# Patient Record
Sex: MALE | Race: BLACK OR AFRICAN AMERICAN | NOT HISPANIC OR LATINO
[De-identification: names, ages, dates, MRNs, and addresses within clinical notes are randomized per-mention and may not be internally consistent; named-entity substitution may affect disease eponyms.]

---

## 2022-09-09 ENCOUNTER — TRANSCRIPTION ENCOUNTER (OUTPATIENT)
Age: 57
End: 2022-09-09

## 2022-09-10 ENCOUNTER — RESULT REVIEW (OUTPATIENT)
Age: 57
End: 2022-09-10

## 2022-09-10 ENCOUNTER — INPATIENT (INPATIENT)
Facility: HOSPITAL | Age: 57
LOS: 13 days | Discharge: HOME HEALTH | End: 2022-09-24
Attending: SURGERY | Admitting: SURGERY
Payer: MEDICAID

## 2022-09-10 ENCOUNTER — TRANSCRIPTION ENCOUNTER (OUTPATIENT)
Age: 57
End: 2022-09-10

## 2022-09-10 VITALS
HEART RATE: 126 BPM | OXYGEN SATURATION: 90 % | RESPIRATION RATE: 32 BRPM | DIASTOLIC BLOOD PRESSURE: 33 MMHG | SYSTOLIC BLOOD PRESSURE: 109 MMHG

## 2022-09-10 LAB
ABO RH CONFIRMATION: SIGNIFICANT CHANGE UP
ALBUMIN SERPL ELPH-MCNC: 1.6 G/DL — LOW (ref 3.3–5)
ALBUMIN SERPL ELPH-MCNC: 2.5 G/DL — LOW (ref 3.3–5)
ALP SERPL-CCNC: 32 U/L — LOW (ref 40–120)
ALP SERPL-CCNC: 92 U/L — SIGNIFICANT CHANGE UP (ref 40–120)
ALT FLD-CCNC: 37 U/L — SIGNIFICANT CHANGE UP (ref 12–78)
ALT FLD-CCNC: 61 U/L — SIGNIFICANT CHANGE UP (ref 12–78)
ANION GAP SERPL CALC-SCNC: 10 MMOL/L — SIGNIFICANT CHANGE UP (ref 5–17)
ANION GAP SERPL CALC-SCNC: 20 MMOL/L — HIGH (ref 5–17)
ANION GAP SERPL CALC-SCNC: 9 MMOL/L — SIGNIFICANT CHANGE UP (ref 5–17)
APPEARANCE UR: ABNORMAL
APTT BLD: 45.1 SEC — HIGH (ref 27.5–35.5)
APTT BLD: 59.7 SEC — HIGH (ref 27.5–35.5)
AST SERPL-CCNC: 121 U/L — HIGH (ref 15–37)
AST SERPL-CCNC: 55 U/L — HIGH (ref 15–37)
BACTERIA # UR AUTO: ABNORMAL
BILIRUB SERPL-MCNC: 1.2 MG/DL — SIGNIFICANT CHANGE UP (ref 0.2–1.2)
BILIRUB SERPL-MCNC: 2.2 MG/DL — HIGH (ref 0.2–1.2)
BILIRUB UR-MCNC: ABNORMAL
BLD GP AB SCN SERPL QL: SIGNIFICANT CHANGE UP
BUN SERPL-MCNC: 29 MG/DL — HIGH (ref 7–23)
BUN SERPL-MCNC: 30 MG/DL — HIGH (ref 7–23)
BUN SERPL-MCNC: 35 MG/DL — HIGH (ref 7–23)
CALCIUM SERPL-MCNC: 8.7 MG/DL — SIGNIFICANT CHANGE UP (ref 8.5–10.1)
CALCIUM SERPL-MCNC: 8.7 MG/DL — SIGNIFICANT CHANGE UP (ref 8.5–10.1)
CALCIUM SERPL-MCNC: 9 MG/DL — SIGNIFICANT CHANGE UP (ref 8.5–10.1)
CHLORIDE SERPL-SCNC: 111 MMOL/L — HIGH (ref 96–108)
CHLORIDE SERPL-SCNC: 113 MMOL/L — HIGH (ref 96–108)
CHLORIDE SERPL-SCNC: 114 MMOL/L — HIGH (ref 96–108)
CK MB BLD-MCNC: 1.3 % — SIGNIFICANT CHANGE UP (ref 0–3.5)
CK MB CFR SERPL CALC: 20.8 NG/ML — HIGH (ref 0.5–3.6)
CK SERPL-CCNC: 1650 U/L — HIGH (ref 26–308)
CO2 SERPL-SCNC: 11 MMOL/L — LOW (ref 22–31)
CO2 SERPL-SCNC: 22 MMOL/L — SIGNIFICANT CHANGE UP (ref 22–31)
CO2 SERPL-SCNC: 24 MMOL/L — SIGNIFICANT CHANGE UP (ref 22–31)
COLOR SPEC: ABNORMAL
CREAT SERPL-MCNC: 1.06 MG/DL — SIGNIFICANT CHANGE UP (ref 0.5–1.3)
CREAT SERPL-MCNC: 1.18 MG/DL — SIGNIFICANT CHANGE UP (ref 0.5–1.3)
CREAT SERPL-MCNC: 2.33 MG/DL — HIGH (ref 0.5–1.3)
D DIMER BLD IA.RAPID-MCNC: 1822 NG/ML DDU — HIGH
DIFF PNL FLD: ABNORMAL
EGFR: 32 ML/MIN/1.73M2 — LOW
EGFR: 72 ML/MIN/1.73M2 — SIGNIFICANT CHANGE UP
EGFR: 82 ML/MIN/1.73M2 — SIGNIFICANT CHANGE UP
EPI CELLS # UR: SIGNIFICANT CHANGE UP
FIBRINOGEN PPP-MCNC: 351 MG/DL — SIGNIFICANT CHANGE UP (ref 340–550)
FLUAV AG NPH QL: SIGNIFICANT CHANGE UP
FLUBV AG NPH QL: SIGNIFICANT CHANGE UP
GAS PNL BLDA: SIGNIFICANT CHANGE UP
GLUCOSE BLDC GLUCOMTR-MCNC: 72 MG/DL — SIGNIFICANT CHANGE UP (ref 70–99)
GLUCOSE SERPL-MCNC: 100 MG/DL — HIGH (ref 70–99)
GLUCOSE SERPL-MCNC: 80 MG/DL — SIGNIFICANT CHANGE UP (ref 70–99)
GLUCOSE SERPL-MCNC: 97 MG/DL — SIGNIFICANT CHANGE UP (ref 70–99)
GLUCOSE UR QL: NEGATIVE MG/DL — SIGNIFICANT CHANGE UP
GRAN CASTS # UR COMP ASSIST: ABNORMAL /LPF
HCT VFR BLD CALC: 26.8 % — LOW (ref 39–50)
HCT VFR BLD CALC: 33.5 % — LOW (ref 39–50)
HCT VFR BLD CALC: 53.5 % — HIGH (ref 39–50)
HCT VFR BLD CALC: 62.3 % — CRITICAL HIGH (ref 39–50)
HGB BLD-MCNC: 11 G/DL — LOW (ref 13–17)
HGB BLD-MCNC: 16.3 G/DL — SIGNIFICANT CHANGE UP (ref 13–17)
HGB BLD-MCNC: 18.3 G/DL — HIGH (ref 13–17)
HGB BLD-MCNC: 8.4 G/DL — LOW (ref 13–17)
INR BLD: 1.93 RATIO — HIGH (ref 0.88–1.16)
INR BLD: 2.04 RATIO — HIGH (ref 0.88–1.16)
INR BLD: 2.23 RATIO — HIGH (ref 0.88–1.16)
KETONES UR-MCNC: ABNORMAL
LACTATE SERPL-SCNC: 11.9 MMOL/L — CRITICAL HIGH (ref 0.7–2)
LACTATE SERPL-SCNC: 5.5 MMOL/L — CRITICAL HIGH (ref 0.7–2)
LACTATE SERPL-SCNC: 5.5 MMOL/L — CRITICAL HIGH (ref 0.7–2)
LACTATE SERPL-SCNC: 7.7 MMOL/L — CRITICAL HIGH (ref 0.7–2)
LACTATE SERPL-SCNC: 8.5 MMOL/L — CRITICAL HIGH (ref 0.7–2)
LEUKOCYTE ESTERASE UR-ACNC: ABNORMAL
LIDOCAIN IGE QN: 70 U/L — LOW (ref 73–393)
MAGNESIUM SERPL-MCNC: 2.1 MG/DL — SIGNIFICANT CHANGE UP (ref 1.6–2.6)
MAGNESIUM SERPL-MCNC: 2.1 MG/DL — SIGNIFICANT CHANGE UP (ref 1.6–2.6)
MCHC RBC-ENTMCNC: 24.5 PG — LOW (ref 27–34)
MCHC RBC-ENTMCNC: 24.7 PG — LOW (ref 27–34)
MCHC RBC-ENTMCNC: 25.6 PG — LOW (ref 27–34)
MCHC RBC-ENTMCNC: 25.9 PG — LOW (ref 27–34)
MCHC RBC-ENTMCNC: 29.4 G/DL — LOW (ref 32–36)
MCHC RBC-ENTMCNC: 30.5 G/DL — LOW (ref 32–36)
MCHC RBC-ENTMCNC: 31.3 G/DL — LOW (ref 32–36)
MCHC RBC-ENTMCNC: 32.8 G/DL — SIGNIFICANT CHANGE UP (ref 32–36)
MCV RBC AUTO: 79 FL — LOW (ref 80–100)
MCV RBC AUTO: 81.2 FL — SIGNIFICANT CHANGE UP (ref 80–100)
MCV RBC AUTO: 81.7 FL — SIGNIFICANT CHANGE UP (ref 80–100)
MCV RBC AUTO: 83.5 FL — SIGNIFICANT CHANGE UP (ref 80–100)
NITRITE UR-MCNC: NEGATIVE — SIGNIFICANT CHANGE UP
NRBC # BLD: 0 /100 WBCS — SIGNIFICANT CHANGE UP (ref 0–0)
PH UR: 6.5 — SIGNIFICANT CHANGE UP (ref 5–8)
PHOSPHATE SERPL-MCNC: 4 MG/DL — SIGNIFICANT CHANGE UP (ref 2.5–4.5)
PHOSPHATE SERPL-MCNC: 4.1 MG/DL — SIGNIFICANT CHANGE UP (ref 2.5–4.5)
PLATELET # BLD AUTO: 227 K/UL — SIGNIFICANT CHANGE UP (ref 150–400)
PLATELET # BLD AUTO: 277 K/UL — SIGNIFICANT CHANGE UP (ref 150–400)
PLATELET # BLD AUTO: 613 K/UL — HIGH (ref 150–400)
PLATELET # BLD AUTO: 804 K/UL — HIGH (ref 150–400)
POTASSIUM SERPL-MCNC: 3.7 MMOL/L — SIGNIFICANT CHANGE UP (ref 3.5–5.3)
POTASSIUM SERPL-MCNC: 4.1 MMOL/L — SIGNIFICANT CHANGE UP (ref 3.5–5.3)
POTASSIUM SERPL-MCNC: 4.1 MMOL/L — SIGNIFICANT CHANGE UP (ref 3.5–5.3)
POTASSIUM SERPL-SCNC: 3.7 MMOL/L — SIGNIFICANT CHANGE UP (ref 3.5–5.3)
POTASSIUM SERPL-SCNC: 4.1 MMOL/L — SIGNIFICANT CHANGE UP (ref 3.5–5.3)
POTASSIUM SERPL-SCNC: 4.1 MMOL/L — SIGNIFICANT CHANGE UP (ref 3.5–5.3)
PROT SERPL-MCNC: 3 GM/DL — LOW (ref 6–8.3)
PROT SERPL-MCNC: 6.5 GM/DL — SIGNIFICANT CHANGE UP (ref 6–8.3)
PROT UR-MCNC: 100 MG/DL
PROTHROM AB SERPL-ACNC: 23.1 SEC — HIGH (ref 10.5–13.4)
PROTHROM AB SERPL-ACNC: 24.4 SEC — HIGH (ref 10.5–13.4)
PROTHROM AB SERPL-ACNC: 26.7 SEC — HIGH (ref 10.5–13.4)
RBC # BLD: 3.28 M/UL — LOW (ref 4.2–5.8)
RBC # BLD: 4.24 M/UL — SIGNIFICANT CHANGE UP (ref 4.2–5.8)
RBC # BLD: 6.59 M/UL — HIGH (ref 4.2–5.8)
RBC # BLD: 7.46 M/UL — HIGH (ref 4.2–5.8)
RBC # FLD: 15.2 % — HIGH (ref 10.3–14.5)
RBC # FLD: 15.4 % — HIGH (ref 10.3–14.5)
RBC # FLD: 17.4 % — HIGH (ref 10.3–14.5)
RBC # FLD: 17.7 % — HIGH (ref 10.3–14.5)
RBC CASTS # UR COMP ASSIST: ABNORMAL /HPF (ref 0–4)
SARS-COV-2 RNA SPEC QL NAA+PROBE: SIGNIFICANT CHANGE UP
SODIUM SERPL-SCNC: 142 MMOL/L — SIGNIFICANT CHANGE UP (ref 135–145)
SODIUM SERPL-SCNC: 145 MMOL/L — SIGNIFICANT CHANGE UP (ref 135–145)
SODIUM SERPL-SCNC: 147 MMOL/L — HIGH (ref 135–145)
SP GR SPEC: 1.02 — SIGNIFICANT CHANGE UP (ref 1.01–1.02)
TROPONIN I, HIGH SENSITIVITY RESULT: 181.1 NG/L — HIGH
TROPONIN I, HIGH SENSITIVITY RESULT: 60.3 NG/L — SIGNIFICANT CHANGE UP
UROBILINOGEN FLD QL: 12 MG/DL
WBC # BLD: 21.3 K/UL — HIGH (ref 3.8–10.5)
WBC # BLD: 30.46 K/UL — HIGH (ref 3.8–10.5)
WBC # BLD: 4.03 K/UL — SIGNIFICANT CHANGE UP (ref 3.8–10.5)
WBC # BLD: 7.74 K/UL — SIGNIFICANT CHANGE UP (ref 3.8–10.5)
WBC # FLD AUTO: 21.3 K/UL — HIGH (ref 3.8–10.5)
WBC # FLD AUTO: 30.46 K/UL — HIGH (ref 3.8–10.5)
WBC # FLD AUTO: 4.03 K/UL — SIGNIFICANT CHANGE UP (ref 3.8–10.5)
WBC # FLD AUTO: 7.74 K/UL — SIGNIFICANT CHANGE UP (ref 3.8–10.5)
WBC UR QL: SIGNIFICANT CHANGE UP

## 2022-09-10 PROCEDURE — 44150 REMOVAL OF COLON: CPT | Mod: 52

## 2022-09-10 PROCEDURE — 45121 REMOVAL OF RECTUM AND COLON: CPT | Mod: AS

## 2022-09-10 PROCEDURE — 36556 INSERT NON-TUNNEL CV CATH: CPT

## 2022-09-10 PROCEDURE — 74018 RADEX ABDOMEN 1 VIEW: CPT | Mod: 26,77

## 2022-09-10 PROCEDURE — 88307 TISSUE EXAM BY PATHOLOGIST: CPT | Mod: 26

## 2022-09-10 PROCEDURE — 74018 RADEX ABDOMEN 1 VIEW: CPT | Mod: 26

## 2022-09-10 PROCEDURE — 44139 MOBILIZATION OF COLON: CPT

## 2022-09-10 PROCEDURE — 93010 ELECTROCARDIOGRAM REPORT: CPT | Mod: 76

## 2022-09-10 PROCEDURE — 71045 X-RAY EXAM CHEST 1 VIEW: CPT | Mod: 26

## 2022-09-10 PROCEDURE — 99223 1ST HOSP IP/OBS HIGH 75: CPT | Mod: 57

## 2022-09-10 PROCEDURE — 71045 X-RAY EXAM CHEST 1 VIEW: CPT | Mod: 26,77

## 2022-09-10 PROCEDURE — 97606 NEG PRS WND THER DME>50 SQCM: CPT

## 2022-09-10 PROCEDURE — 99291 CRITICAL CARE FIRST HOUR: CPT | Mod: 25

## 2022-09-10 PROCEDURE — 99285 EMERGENCY DEPT VISIT HI MDM: CPT | Mod: 25

## 2022-09-10 PROCEDURE — 36620 INSERTION CATHETER ARTERY: CPT | Mod: 59

## 2022-09-10 PROCEDURE — 74177 CT ABD & PELVIS W/CONTRAST: CPT | Mod: 26,MA

## 2022-09-10 RX ORDER — PANTOPRAZOLE SODIUM 20 MG/1
40 TABLET, DELAYED RELEASE ORAL
Refills: 0 | Status: DISCONTINUED | OUTPATIENT
Start: 2022-09-10 | End: 2022-09-24

## 2022-09-10 RX ORDER — VANCOMYCIN HCL 1 G
1000 VIAL (EA) INTRAVENOUS ONCE
Refills: 0 | Status: COMPLETED | OUTPATIENT
Start: 2022-09-10 | End: 2022-09-10

## 2022-09-10 RX ORDER — CASPOFUNGIN ACETATE 7 MG/ML
70 INJECTION, POWDER, LYOPHILIZED, FOR SOLUTION INTRAVENOUS ONCE
Refills: 0 | Status: COMPLETED | OUTPATIENT
Start: 2022-09-10 | End: 2022-09-10

## 2022-09-10 RX ORDER — MEROPENEM 1 G/30ML
1000 INJECTION INTRAVENOUS ONCE
Refills: 0 | Status: COMPLETED | OUTPATIENT
Start: 2022-09-10 | End: 2022-09-10

## 2022-09-10 RX ORDER — MEROPENEM 1 G/30ML
1000 INJECTION INTRAVENOUS EVERY 12 HOURS
Refills: 0 | Status: DISCONTINUED | OUTPATIENT
Start: 2022-09-11 | End: 2022-09-11

## 2022-09-10 RX ORDER — KETAMINE HYDROCHLORIDE 100 MG/ML
2 INJECTION INTRAMUSCULAR; INTRAVENOUS
Qty: 1000 | Refills: 0 | Status: DISCONTINUED | OUTPATIENT
Start: 2022-09-10 | End: 2022-09-13

## 2022-09-10 RX ORDER — CASPOFUNGIN ACETATE 7 MG/ML
INJECTION, POWDER, LYOPHILIZED, FOR SOLUTION INTRAVENOUS
Refills: 0 | Status: COMPLETED | OUTPATIENT
Start: 2022-09-10 | End: 2022-09-14

## 2022-09-10 RX ORDER — NOREPINEPHRINE BITARTRATE/D5W 8 MG/250ML
0.05 PLASTIC BAG, INJECTION (ML) INTRAVENOUS
Qty: 16 | Refills: 0 | Status: DISCONTINUED | OUTPATIENT
Start: 2022-09-10 | End: 2022-09-13

## 2022-09-10 RX ORDER — PHYTONADIONE (VIT K1) 5 MG
10 TABLET ORAL ONCE
Refills: 0 | Status: COMPLETED | OUTPATIENT
Start: 2022-09-10 | End: 2022-09-10

## 2022-09-10 RX ORDER — SODIUM CHLORIDE 9 MG/ML
1000 INJECTION, SOLUTION INTRAVENOUS
Refills: 0 | Status: DISCONTINUED | OUTPATIENT
Start: 2022-09-10 | End: 2022-09-10

## 2022-09-10 RX ORDER — FENTANYL CITRATE 50 UG/ML
50 INJECTION INTRAVENOUS ONCE
Refills: 0 | Status: DISCONTINUED | OUTPATIENT
Start: 2022-09-10 | End: 2022-09-13

## 2022-09-10 RX ORDER — HYDROMORPHONE HYDROCHLORIDE 2 MG/ML
0.5 INJECTION INTRAMUSCULAR; INTRAVENOUS; SUBCUTANEOUS ONCE
Refills: 0 | Status: DISCONTINUED | OUTPATIENT
Start: 2022-09-10 | End: 2022-09-10

## 2022-09-10 RX ORDER — PIPERACILLIN AND TAZOBACTAM 4; .5 G/20ML; G/20ML
3.38 INJECTION, POWDER, LYOPHILIZED, FOR SOLUTION INTRAVENOUS ONCE
Refills: 0 | Status: DISCONTINUED | OUTPATIENT
Start: 2022-09-10 | End: 2022-09-10

## 2022-09-10 RX ORDER — CALCIUM GLUCONATE 100 MG/ML
1 VIAL (ML) INTRAVENOUS ONCE
Refills: 0 | Status: COMPLETED | OUTPATIENT
Start: 2022-09-10 | End: 2022-09-11

## 2022-09-10 RX ORDER — SODIUM CHLORIDE 9 MG/ML
2000 INJECTION, SOLUTION INTRAVENOUS ONCE
Refills: 0 | Status: COMPLETED | OUTPATIENT
Start: 2022-09-10 | End: 2022-09-10

## 2022-09-10 RX ORDER — SODIUM CHLORIDE 9 MG/ML
1000 INJECTION, SOLUTION INTRAVENOUS ONCE
Refills: 0 | Status: COMPLETED | OUTPATIENT
Start: 2022-09-10 | End: 2022-09-10

## 2022-09-10 RX ORDER — TRANEXAMIC ACID 100 MG/ML
1000 INJECTION, SOLUTION INTRAVENOUS EVERY 8 HOURS
Refills: 0 | Status: COMPLETED | OUTPATIENT
Start: 2022-09-10 | End: 2022-09-11

## 2022-09-10 RX ORDER — FENTANYL CITRATE 50 UG/ML
100 INJECTION INTRAVENOUS ONCE
Refills: 0 | Status: DISCONTINUED | OUTPATIENT
Start: 2022-09-10 | End: 2022-09-10

## 2022-09-10 RX ORDER — ONDANSETRON 8 MG/1
4 TABLET, FILM COATED ORAL ONCE
Refills: 0 | Status: COMPLETED | OUTPATIENT
Start: 2022-09-10 | End: 2022-09-10

## 2022-09-10 RX ORDER — KETAMINE HYDROCHLORIDE 100 MG/ML
0.25 INJECTION INTRAMUSCULAR; INTRAVENOUS
Qty: 1000 | Refills: 0 | Status: DISCONTINUED | OUTPATIENT
Start: 2022-09-10 | End: 2022-09-10

## 2022-09-10 RX ORDER — MEROPENEM 1 G/30ML
INJECTION INTRAVENOUS
Refills: 0 | Status: DISCONTINUED | OUTPATIENT
Start: 2022-09-10 | End: 2022-09-11

## 2022-09-10 RX ORDER — CASPOFUNGIN ACETATE 7 MG/ML
50 INJECTION, POWDER, LYOPHILIZED, FOR SOLUTION INTRAVENOUS EVERY 24 HOURS
Refills: 0 | Status: COMPLETED | OUTPATIENT
Start: 2022-09-11 | End: 2022-09-14

## 2022-09-10 RX ORDER — FENTANYL CITRATE 50 UG/ML
0.5 INJECTION INTRAVENOUS
Qty: 2500 | Refills: 0 | Status: DISCONTINUED | OUTPATIENT
Start: 2022-09-10 | End: 2022-09-13

## 2022-09-10 RX ORDER — CHLORHEXIDINE GLUCONATE 213 G/1000ML
1 SOLUTION TOPICAL
Refills: 0 | Status: DISCONTINUED | OUTPATIENT
Start: 2022-09-11 | End: 2022-09-24

## 2022-09-10 RX ORDER — SODIUM CHLORIDE 9 MG/ML
2100 INJECTION INTRAMUSCULAR; INTRAVENOUS; SUBCUTANEOUS ONCE
Refills: 0 | Status: COMPLETED | OUTPATIENT
Start: 2022-09-10 | End: 2022-09-10

## 2022-09-10 RX ORDER — HEPARIN SODIUM 5000 [USP'U]/ML
5000 INJECTION INTRAVENOUS; SUBCUTANEOUS EVERY 12 HOURS
Refills: 0 | Status: DISCONTINUED | OUTPATIENT
Start: 2022-09-10 | End: 2022-09-10

## 2022-09-10 RX ORDER — CHLORHEXIDINE GLUCONATE 213 G/1000ML
15 SOLUTION TOPICAL EVERY 12 HOURS
Refills: 0 | Status: DISCONTINUED | OUTPATIENT
Start: 2022-09-10 | End: 2022-09-14

## 2022-09-10 RX ORDER — PIPERACILLIN AND TAZOBACTAM 4; .5 G/20ML; G/20ML
3.38 INJECTION, POWDER, LYOPHILIZED, FOR SOLUTION INTRAVENOUS ONCE
Refills: 0 | Status: COMPLETED | OUTPATIENT
Start: 2022-09-10 | End: 2022-09-10

## 2022-09-10 RX ORDER — SODIUM CHLORIDE 9 MG/ML
2100 INJECTION, SOLUTION INTRAVENOUS ONCE
Refills: 0 | Status: COMPLETED | OUTPATIENT
Start: 2022-09-10 | End: 2022-09-10

## 2022-09-10 RX ADMIN — Medication 3.19 MICROGRAM(S)/KG/MIN: at 21:09

## 2022-09-10 RX ADMIN — SODIUM CHLORIDE 2000 MILLILITER(S): 9 INJECTION, SOLUTION INTRAVENOUS at 15:27

## 2022-09-10 RX ADMIN — PIPERACILLIN AND TAZOBACTAM 200 GRAM(S): 4; .5 INJECTION, POWDER, LYOPHILIZED, FOR SOLUTION INTRAVENOUS at 15:27

## 2022-09-10 RX ADMIN — SODIUM CHLORIDE 2100 MILLILITER(S): 9 INJECTION, SOLUTION INTRAVENOUS at 14:47

## 2022-09-10 RX ADMIN — SODIUM CHLORIDE 200 MILLILITER(S): 9 INJECTION, SOLUTION INTRAVENOUS at 17:21

## 2022-09-10 RX ADMIN — PANTOPRAZOLE SODIUM 40 MILLIGRAM(S): 20 TABLET, DELAYED RELEASE ORAL at 21:11

## 2022-09-10 RX ADMIN — KETAMINE HYDROCHLORIDE 13.6 MG/KG/HR: 100 INJECTION INTRAMUSCULAR; INTRAVENOUS at 22:29

## 2022-09-10 RX ADMIN — Medication 102 MILLIGRAM(S): at 17:21

## 2022-09-10 RX ADMIN — SODIUM CHLORIDE 2100 MILLILITER(S): 9 INJECTION INTRAMUSCULAR; INTRAVENOUS; SUBCUTANEOUS at 12:56

## 2022-09-10 RX ADMIN — FENTANYL CITRATE 100 MICROGRAM(S): 50 INJECTION INTRAVENOUS at 21:15

## 2022-09-10 RX ADMIN — SODIUM CHLORIDE 2100 MILLILITER(S): 9 INJECTION INTRAMUSCULAR; INTRAVENOUS; SUBCUTANEOUS at 13:56

## 2022-09-10 RX ADMIN — CASPOFUNGIN ACETATE 70 MILLIGRAM(S): 7 INJECTION, POWDER, LYOPHILIZED, FOR SOLUTION INTRAVENOUS at 22:29

## 2022-09-10 RX ADMIN — TRANEXAMIC ACID 220 MILLIGRAM(S): 100 INJECTION, SOLUTION INTRAVENOUS at 21:10

## 2022-09-10 RX ADMIN — FENTANYL CITRATE 100 MICROGRAM(S): 50 INJECTION INTRAVENOUS at 20:50

## 2022-09-10 RX ADMIN — SODIUM CHLORIDE 2100 MILLILITER(S): 9 INJECTION, SOLUTION INTRAVENOUS at 15:47

## 2022-09-10 RX ADMIN — ONDANSETRON 4 MILLIGRAM(S): 8 TABLET, FILM COATED ORAL at 14:31

## 2022-09-10 RX ADMIN — HYDROMORPHONE HYDROCHLORIDE 0.5 MILLIGRAM(S): 2 INJECTION INTRAMUSCULAR; INTRAVENOUS; SUBCUTANEOUS at 14:31

## 2022-09-10 RX ADMIN — HYDROMORPHONE HYDROCHLORIDE 0.5 MILLIGRAM(S): 2 INJECTION INTRAMUSCULAR; INTRAVENOUS; SUBCUTANEOUS at 14:46

## 2022-09-10 RX ADMIN — SODIUM CHLORIDE 1000 MILLILITER(S): 9 INJECTION, SOLUTION INTRAVENOUS at 17:15

## 2022-09-10 RX ADMIN — Medication 250 MILLIGRAM(S): at 17:36

## 2022-09-10 RX ADMIN — FENTANYL CITRATE 3.4 MICROGRAM(S)/KG/HR: 50 INJECTION INTRAVENOUS at 21:09

## 2022-09-10 RX ADMIN — MEROPENEM 100 MILLIGRAM(S): 1 INJECTION INTRAVENOUS at 22:30

## 2022-09-10 NOTE — PROVIDER CONTACT NOTE (EICU) - BACKGROUND
patient just transferred to ICU from OR. Dr. Goncalves and CC NP Marlena at bedside, requesting assistance with orders

## 2022-09-10 NOTE — H&P ADULT - NSHPLABSRESULTS_GEN_ALL_CORE
CBC:            18.3   30.46 )-----------( 804      ( 09-10-22 @ 12:36 )             62.3         Chem:         ( 09-10-22 @ 12:36 )    142  |  111<H>  |  35<H>  ----------------------------<  97  4.1   |  11<L>  |  2.33<H>        Liver Functions: ( 09-10-22 @ 12:36 )  Alb: 2.5 g/dL / Pro: 6.5 gm/dL / ALK PHOS: 92 U/L / ALT: 37 U/L / AST: 55 U/L / GGT: x            Type & Screen:   Troponin I, High Sensitivity Result: 60.3: Serial measurements of high sensitivity troponin I (hs TnI) showing   Lipase, Serum: 70 U/L (09.10.22 @ 12:36)   Lactate, Blood: 11.9: TYPE:(C=Critical, N=Notification, A=Abnormal) c 3    Prothrombin Time and INR, Plasma (09.10.22 @ 15:30)   Prothrombin Time, Plasma: 24.4: Effective February 23,2022, the reference range has changed. sec   INR: 2.04: Recommended targets/ranges for therapeutic INR:    Flu With COVID-19 By JORGE (09.10.22 @ 12:36)   SARS-CoV-2 Result: NotDetec: EUA/IVD     Influenza A Result: NotDetec: EUA/IVD   Influenza B Result: NotDetec: EUA/IVD     < from: CT Abdomen and Pelvis w/ IV Cont (09.10.22 @ 14:24) >    IMPRESSION:  1.6 cm length sigmoid tumor resulting in high-grade colonic obstruction.   Suspecttransmural extension of tumor. Probable pneumatosis in the   ascending colon. The colon is distended to 10.1 cm. Fluid-filled dilated   esophagus.    Enlarged mesenteric lymph node.    CRITICAL VALUE: I discussed the major findings in this report with Dr. Ren Flores  at 9/10/2022 2:42 PM with readback. Critical value     < end of copied text >      EKG reviewed sinus tach

## 2022-09-10 NOTE — ED ADULT NURSE NOTE - PAIN RATING/NUMBER SCALE (0-10): ACTIVITY
Quality 130: Documentation Of Current Medications In The Medical Record: Current Medications Documented Detail Level: Detailed 6

## 2022-09-10 NOTE — ED PROVIDER NOTE - PHYSICAL EXAMINATION
Flores:  General: No distress.  Mentation at baseline.   HEENT: WNL  Chest/Lungs: CTAB, No wheeze, No retractions, No increased work of breathing, Normal rate  Heart: S1S2 RRR, No M/R/G, Pules equal Bilaterally in upper and lower extremities distally  Abd: distended, tympanic non peritoneal  Extrem: FROM in all joints, no significant edema noted, No ulcers.  Cap refil < 2sec.  Skin: No rash noted, warm dry.  Neuro:  Grossly normal.  No difficulty ambulating. No focal deficits.  Psychiatric: No evidence of delusions. No SI/HI.

## 2022-09-10 NOTE — ED ADULT TRIAGE NOTE - CHIEF COMPLAINT QUOTE
BIBA as per EMS pt c/o abdominal pain and distention, N/V x 1 day. Found hypotensive BP 70 systolic and  desatting to 80's on RA. NbP 132/45 and sat 100% on RA in triage. 18g in R AC and L hand placed by Ems, pt received 1.8L NC.   denies blood in stool or vomit.

## 2022-09-10 NOTE — H&P ADULT - NSHPPHYSICALEXAM_GEN_ALL_CORE
Vital Signs Last 24 Hrs  T(C): 36.6 (10 Sep 2022 15:23), Max: 36.6 (10 Sep 2022 15:23)  T(F): 97.8 (10 Sep 2022 15:23), Max: 97.8 (10 Sep 2022 15:23)  HR: 126 (10 Sep 2022 15:23) (122 - 126)  BP: 70/40 (10 Sep 2022 15:23) (70/40 - 138/90)  BP(mean): --  RR: 20 (10 Sep 2022 15:23) (20 - 32)  SpO2: 93% (10 Sep 2022 15:23) (90% - 120%)    Parameters below as of 10 Sep 2022 15:23  Patient On (Oxygen Delivery Method): nasal cannula          Constitutional: awake and alert.  HEENT: PERRLA, EOMI  Neck: Supple.  Respiratory: Breath sounds are clear bilaterally  Cardiovascular: S1 and S2, tachycardia   Gastrointestinal: distended, tympanic decreased bowel sounds. NG tube placed coffee ground return.   Extremities:  no edema   uncircumcised- esquivel placed with dark coca cola colored urine.  Musculoskeletal: no joint swelling/tenderness, no abnormal movements  Skin: No rashes

## 2022-09-10 NOTE — CONSULT NOTE ADULT - SUBJECTIVE AND OBJECTIVE BOX
Pt brought to ICU s/p ex lap in shock state, full note to follow.  Mr. Diaz is a 56 year old male with no reported PMH who presented to [facility] ED earlier today via EMS for severe diffuse abdominal pain with distention and N/V. Per report pt recently arrived back to [place] from [place] and ate food from take out and was concerned that he developed food poisoning, however, he was found to be hypoxemic with O2 sat 80% on room air with low dbp and tachycardia. Last BM reported was a few hours prior. 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 Suspect transmural extension of tumor. Probable pneumatosis in the ascending colon. The colon is distended to 10.1 cm. Fluid-filled dilated esophagus." Lactate was 11.9, INR 2, and creatinine 2.33 with brown colored urine noted. Pt taken to OR for emergent ex lap. Post op pt brought to ICU.         Allergies  No Known Allergies      MEDICATIONS  (STANDING):  caspofungin IVPB      chlorhexidine 0.12% Liquid 15 milliLiter(s) Oral Mucosa every 12 hours  chlorhexidine 2% Cloths 1 Application(s) Topical <User Schedule>  fentaNYL    Injectable 50 MICROGram(s) IV Push once  fentaNYL   Infusion. 0.5 MICROgram(s)/kG/Hr (3.4 mL/Hr) IV Continuous <Continuous>  ketamine Infusion. 2 mG/kG/Hr (13.6 mL/Hr) IV Continuous <Continuous>  meropenem  IVPB      norepinephrine Infusion 0.05 MICROgram(s)/kG/Min (3.19 mL/Hr) IV Continuous <Continuous>  pantoprazole  Injectable 40 milliGRAM(s) IV Push two times a day  tranexamic acid IVPB 1000 milliGRAM(s) IV Intermittent every 8 hours        Daily Height in cm: 175.26 (10 Sep 2022 15:23)    Daily       Drug Dosing Weight  Height (cm): 172.7 (10 Sep 2022 16:34)  Weight (kg): 68 (10 Sep 2022 12:26)  BMI (kg/m2): 22.8 (10 Sep 2022 16:34)  BSA (m2): 1.81 (10 Sep 2022 16:34)      PAST MEDICAL & SURGICAL HISTORY:  No pertinent past medical history      No significant past surgical history        ADVANCE DIRECTIVES: Full code      REVIEW OF SYSTEMS: HARDEEP given pt intubated/sedated.      Vital Signs Last 24 Hrs  T(C): 36.6 (10 Sep 2022 15:23), Max: 36.6 (10 Sep 2022 15:23)  T(F): 97.8 (10 Sep 2022 15:23), Max: 97.8 (10 Sep 2022 15:23)  HR: 94 (10 Sep 2022 21:45) (76 - 126)  BP: 133/79 (10 Sep 2022 21:00) (71/50 - 138/90)  BP(mean): 94 (10 Sep 2022 21:00) (66 - 94)  ABP: 89/61 (10 Sep 2022 21:45) (89/61 - 145/95)  ABP(mean): 74 (10 Sep 2022 21:45) (74 - 118)  RR: 26 (10 Sep 2022 21:45) (16 - 32)  SpO2: 100% (10 Sep 2022 21:34) (90% - 120%)      O2 Parameters below as of 10 Sep 2022 20:15  Patient On (Oxygen Delivery Method): ventilator    O2 Concentration (%): 100    ABG - ( 10 Sep 2022 21:18 )  pH, Arterial: 7.38  pH, Blood: x     /  pCO2: 34    /  pO2: 444   / HCO3: 20    / Base Excess: -4.4  /  SaO2: 100.0         I&O's Detail    10 Sep 2022 07:01  -  10 Sep 2022 22:31  --------------------------------------------------------  IN:  Total IN: 0 mL    OUT:    Nasogastric/Oral tube (mL): 350 mL    Voided (mL): 200 mL  Total OUT: 550 mL    Total NET: -550 mL        PHYSICAL EXAM:  GENERAL: Intubated / sedated  HEAD:  Atraumatic, Normocephalic  EYES: EOMI, PERRLA, conjunctiva and sclera clear  NECK: Supple  NERVOUS SYSTEM:  HARDEEP, sedated  CHEST/LUNG: Mechanical bs noted bilaterally; equal chest expansion noted  HEART: Regular rate and rhythm; No murmurs, rubs, or gallops; s1/s2 ntoed  ABDOMEN: midline abdominal incision noted with large surgical dressing placed to wall suction with bloody output noted, no obvious leaks to dressing  EXTREMITIES: cool to touch, + capillary refill noted  LYMPH: No lymphadenopathy noted  SKIN: No rashes or lesions        LABS:  CBC Full  -  ( 10 Sep 2022 21:00 )  WBC Count : 4.03 K/uL  RBC Count : 3.28 M/uL  Hemoglobin : 8.4 g/dL  Hematocrit : 26.8 %  Platelet Count - Automated : 227 K/uL  Mean Cell Volume : 81.7 fl  Mean Cell Hemoglobin : 25.6 pg  Mean Cell Hemoglobin Concentration : 31.3 g/dL      09-10    145  |  113<H>  |  29<H>  ----------------------------<  80  4.1   |  22  |  1.18    Ca    8.7      10 Sep 2022 21:00  Phos  4.0     09-10  Mg     2.1     09-10    TPro  3.0<L>  /  Alb  1.6<L>  /  TBili  1.2  /  DBili  x   /  AST  121<H>  /  ALT  61  /  AlkPhos  32<L>  09-10        PT/INR - ( 10 Sep 2022 21:00 )   PT: 26.7 sec;   INR: 2.23 ratio    PTT - ( 10 Sep 2022 21:00 )  PTT:59.7 sec      Urinalysis Basic - ( 10 Sep 2022 16:20 )  Color: Brown / Appearance: very cloudy / S.020 / pH: x  Gluc: x / Ketone: Small  / Bili: Moderate / Urobili: 12 mg/dL   Blood: x / Protein: 100 mg/dL / Nitrite: Negative   Leuk Esterase: Trace / RBC: 6-10 /HPF / WBC 3-5   Sq Epi: x / Non Sq Epi: Occasional / Bacteria: Moderate      CARDIAC MARKERS ( 10 Sep 2022 21:00 )  x     / x     / 1650 U/L / x     / 20.8 ng/mL          RADIOLOGY:  CT Abdomen and Pelvis w/ IV Cont (09.10.22 @ 14:24)   IMPRESSION:  1.6 cm length sigmoid tumor resulting in high-grade colonic obstruction.   Suspecttransmural extension of tumor. Probable pneumatosis in the   ascending colon. The colon is distended to 10.1 cm. Fluid-filled dilated   esophagus.    Enlarged mesenteric lymph node.          CRITICAL CARE TIME SPENT: 60 minutes

## 2022-09-10 NOTE — ED ADULT NURSE REASSESSMENT NOTE - NS ED NURSE REASSESS COMMENT FT1
called blood bank, plasma not ready, as per lianne charles. okay to send plasma up stairs, plasma to be given in Operating room.

## 2022-09-10 NOTE — CHART NOTE - NSCHARTNOTEFT_GEN_A_CORE
Multiple phone calls attempted by myself and then  x1 to contact pt wife Naomy at [phone]. Received message stating "call will not go through." Attempted to comb the chart for any other phone numbers, attempted pt personal cell just in case wife may have it on her. Physically checked hallway multiple times and lobby for pt wife. Unable to locate. Multiple phone calls attempted by myself and then  x1 to contact pt wife Naomy at [phone]. Received message stating "call will not go through." Attempted to comb the chart for any other phone numbers, attempted pt personal cell just in case wife may have it on her. Physically checked hallway multiple times and lobby for pt wife. Unable to locate.      UPDATE  Rec phone call from Naomy pt wife on unit phone, she states her number is [phone], will update with admitting.

## 2022-09-10 NOTE — ED PROVIDER NOTE - OBJECTIVE STATEMENT
Pt is a 56 year old male with no significant PMH who presents to the ED today BIBEMS for abdominal pain x 1 day. Per EMS pt came from [place] last night, had some food from [facility] and started to have episodes of nausea and vomit. Pt was hypotensive prior to arrival with a blood pressure of 132/42, abd hypoxic at 80%. Pt's LBM was 2 hours ago which he reports was normal. Pt endorses abdominal pain, distention, and generalized weakness. Pt reports previous similar symptoms. Denies CP, SOB, LOC, constipation, fevers, chills, body aches, coughs, or headaches.

## 2022-09-10 NOTE — H&P ADULT - ASSESSMENT
56 year old male with sigmoid tumor and colonic obstruction, MAXWELL, INR 2.04,     admit to Dr. Plasencia  will need ICU post operatively  consent obtained for ex lap, colon resection and colostomy  iv abx- zosyn  central and art line to be placed  esquivel to gravity  aggressive IVF resucitation  npo  NG to LWS  scd and sqh  will give vit K x1   patient and family aware of findings and plan.   discussed with ICU team.

## 2022-09-10 NOTE — H&P ADULT - HISTORY OF PRESENT ILLNESS
56M with vomiting and chills for 2 days. Recently traveled from [place] and thought he just had a stomach bug however he was increasingly becoming distended and complaining of diffuse abdominal pain. Denies any Pmhx, trauma, or family hx. Has never had a colonoscopy. On ct shows sigmoid tumor and bowel obstruction.

## 2022-09-10 NOTE — CHART NOTE - NSCHARTNOTEFT_GEN_A_CORE
Patient taken emergently to OR after central line placement and aggressive fluid resuscitation. Patient underwent subtotal colectomy and received FFP and PRBC intraop, patient was hemodynamically unstable requiring pressor support intra op and decision made to leave wound open for now with plan for return to OR in 48 hrs or sooner.   Will remain intubated, needs broad spectrum abx- anastasia, vanco, zosyn, and caspofungin, will require continued blood products as needed and pressor support. Patient remains critically ill and this possibility was discussed with wife prior to OR by Dr. Plasencia.   sign out given to ICU team.   patient has 2 drains to wall suction and packing with ioban over wound.   he remains full code.

## 2022-09-10 NOTE — ED PROVIDER NOTE - CARE PLAN
1 Principal Discharge DX:	Large bowel obstruction  Secondary Diagnosis:	Carcinoid tumor of sigmoid colon, unspecified whether malignant

## 2022-09-10 NOTE — CONSULT NOTE ADULT - ASSESSMENT
56 year old male with no PMH presenting to ICU s/p emergent ex lap with colon resection, left open for tx of LBO. Pt also noted to be in shock state, likely 2/2 septic +/- hemorrhagic shock, ABLA, MAXWELL, LA, NSTEMI and decreased LV function on POCU/S. Plan as below:    --admit to ICU, monitor vitals, o2 sat, neuro status, tele rhythm  --maintain ett, abg prn, wean vent settings as tolerated; settings adjusted upon arrival to compensate for LA; adjusted ett per post op cxr placement now in good position  --continue vasopressor support to maintain MAP >65 mmHg; initially rec on ramin/vaso; changing ramin to levo given  LV function and sepsis; s/p IVF resuscitation; POCU/S revealed adequate volume with decreased LV function, focusing on replacing blood products as well as pressors for now, will continue to assess/reassess; a-line placed upon arrival by attending MD; trend lactate  --trend cardiac enzymes; no st elevations noted on 12 lead, however pt likely too unstable for transfer, also unable to anticoagulate in light of hemorrhage; will do additional ekg's  --s/p 3 units prbc 2 ffp in OR with large reported blood loss noted; adding cryo, txa, and additional prbc/ffp/plt transfusions as needed; will goal hgb higher given visual hemorrhage from abdominal wounds, shock state and nstemi  --surgical plan per surgery team, maintain abdominal dressing and keep to wall suction and NGT to suction per surgeon  --stat post op labs  --NPO, monitor glucose  --blood cultures sent in ED, changing abx to meropenem and caspofungin with vanco x level (dose given earlier in ED)  --monitor u/o, bun/creatinine, avoid nephrotoxic agents  --scd for dvt prophylaxis   --full code  --attempted multiple times to contact pt with Naomy, however contact number in chart is not working, will update when able to reach 56 year old male with no PMH presenting to ICU s/p emergent ex lap with colon resection, left open for tx of LBO 2/2 sigmoid colon mass. Pt also noted to be in shock state, likely 2/2 septic +/- hemorrhagic shock, ABLA, MAXWELL, LA, NSTEMI and decreased LV function on POCU/S. Plan as below:    --admit to ICU, monitor vitals, o2 sat, neuro status, tele rhythm  --maintain ett, abg prn, wean vent settings as tolerated; settings adjusted upon arrival to compensate for LA; adjusted ett per post op cxr placement now in good position  --continue vasopressor support to maintain MAP >65 mmHg; initially rec on ramin/vaso; changing ramin to levo given  LV function and sepsis; s/p IVF resuscitation; POCU/S revealed adequate volume with decreased LV function, focusing on replacing blood products as well as pressors for now, will continue to assess/reassess; a-line placed upon arrival by attending MD; trend lactate  --trend cardiac enzymes; no st elevations noted on 12 lead, however pt likely too unstable for transfer, also unable to anticoagulate in light of hemorrhage; will do additional ekg's  --s/p 3 units prbc 2 ffp in OR with large reported blood loss noted; adding cryo, txa, and additional prbc/ffp/plt transfusions as needed; will goal hgb higher given visual hemorrhage from abdominal wounds, shock state and nstemi  --surgical plan per surgery team, maintain abdominal dressing and keep to wall suction and NGT to suction per surgeon  --stat post op labs  --NPO, monitor glucose  --blood cultures sent in ED, changing abx to meropenem and caspofungin with vanco x level (dose given earlier in ED)  --monitor u/o, bun/creatinine, avoid nephrotoxic agents  --scd for dvt prophylaxis   --full code  --attempted multiple times to contact pt with Naomy, however contact number in chart is not working, will update when able to reach 56 year old male with no PMH presenting to ICU s/p emergent ex lap with colon resection, left open for tx of LBO 2/2 sigmoid colon mass. Pt also noted to be in shock state, likely 2/2 septic +/- hemorrhagic shock, ABLA, MAXWELL, LA, NSTEMI and decreased LV function on POCU/S. Plan as below:    --admit to ICU, monitor vitals, o2 sat, neuro status, tele rhythm  --maintain ett, abg prn, wean vent settings as tolerated; settings adjusted upon arrival to compensate for LA; adjusted ett per post op cxr placement now in good position  --continue vasopressor support to maintain MAP >65 mmHg; initially rec on ramin/vaso; changing ramin to levo given  LV function and sepsis; s/p IVF resuscitation; POCU/S revealed adequate volume with decreased LV function, focusing on replacing blood products as well as pressors for now, will continue to assess/reassess; a-line placed upon arrival by attending MD; trend lactate  --trend cardiac enzymes; no st elevations noted on 12 lead, however pt likely too unstable for transfer, also unable to anticoagulate in light of hemorrhage; will do additional ekg's  --s/p 3 units prbc 2 ffp in OR with large reported blood loss noted; adding cryo, txa, and additional prbc/ffp/plt transfusions as needed; will goal hgb higher given visual hemorrhage from abdominal drain, shock state and nstemi  --surgical plan per surgery team, maintain abdominal dressing and keep to wall suction and NGT to suction per surgeon; RTOR for closure  --stat post op labs  --NPO, monitor glucose  --blood cultures sent in ED, changing abx to meropenem and caspofungin with vanco x level (dose given earlier in ED)  --monitor u/o, bun/creatinine, avoid nephrotoxic agents  --scd for dvt prophylaxis   --tte  --full code  --attempted multiple times to contact pt with Naomy, however contact number in chart is not working, will update when able to reach 56 year old male with no PMH presenting to ICU s/p emergent ex lap with colon resection, left open for tx of LBO 2/2 sigmoid colon mass. Pt also noted to be in shock state, likely 2/2 septic +/- hemorrhagic shock, ABLA, MAXWELL, LA, NSTEMI and decreased LV function on POCU/S. Plan as below:    --admit to ICU, monitor vitals, o2 sat, neuro status, tele rhythm  --maintain ett, abg prn, wean vent settings as tolerated; settings adjusted upon arrival to compensate for LA; adjusted ett per post op cxr placement now in good position  --continue vasopressor support to maintain MAP >65 mmHg; initially rec on ramin/vaso; changing ramin to levo given  LV function and sepsis; s/p IVF resuscitation; POCU/S revealed adequate volume with decreased LV function, focusing on replacing blood products as well as pressors for now, will continue to assess/reassess; a-line placed upon arrival by attending MD; trend lactate  --trend cardiac enzymes; no st elevations noted on 12 lead, however pt likely too unstable for transfer, also unable to anticoagulate in light of hemorrhage; will do additional ekg's  --s/p 3 units prbc 2 ffp in OR with large reported blood loss noted; adding cryo, txa, and additional prbc/ffp/plt transfusions as needed; will goal hgb higher given visual hemorrhage from abdominal drain, shock state and nstemi  --surgical plan per surgery team, maintain abdominal dressing and keep to wall suction and NGT to suction per surgeon; RTOR for closure; fup pathology  --stat post op labs  --NPO, monitor glucose  --blood cultures sent in ED, changing abx to meropenem and caspofungin with vanco x level (dose given earlier in ED)  --monitor u/o, bun/creatinine, avoid nephrotoxic agents  --scd for dvt prophylaxis   --tte  --full code  --attempted multiple times to contact pt with Naomy, however contact number in chart is not working, will update when able to reach 56 year old male with no PMH presenting to ICU s/p emergent ex lap with colon resection, left open for tx of LBO 2/2 sigmoid colon mass. Pt also noted to be in shock state, likely 2/2 septic +/- hemorrhagic shock, ABLA, MAXWELL, LA, NSTEMI and decreased LV function on POCU/S. Plan as below:    --admit to ICU, monitor vitals, o2 sat, neuro status, tele rhythm  --maintain ett, abg prn, wean vent settings as tolerated; settings adjusted upon arrival to compensate for LA; adjusted ett per post op cxr placement now in good position  --fentanyl and ketamine for sedation; goal RASS 0/-1  --continue vasopressor support to maintain MAP >65 mmHg; initially rec on ramin/vaso; changing ramin to levo given  LV function and sepsis; s/p IVF resuscitation; POCU/S revealed adequate volume with decreased LV function, focusing on replacing blood products as well as pressors for now, will continue to assess/reassess; a-line placed upon arrival by attending MD; trend lactate  --trend cardiac enzymes; no st elevations noted on 12 lead, however pt likely too unstable for transfer, also unable to anticoagulate in light of hemorrhage; will do additional ekg's  --s/p 3 units prbc 2 ffp in OR with large reported blood loss noted; adding cryo, txa, and additional prbc/ffp/plt transfusions as needed; will goal hgb higher given visual hemorrhage from abdominal drain, shock state and nstemi  --surgical plan per surgery team, maintain abdominal dressing and keep to wall suction and NGT to suction per surgeon; RTOR for closure; fup pathology  --stat post op labs  --NPO, monitor glucose  --blood cultures sent in ED, changing abx to meropenem and caspofungin with vanco x level (dose given earlier in ED)  --monitor u/o, bun/creatinine, avoid nephrotoxic agents  --scd for dvt prophylaxis   --tte  --full code  --attempted multiple times to contact pt with Naomy, however contact number in chart is not working, will update when able to reach 56 year old male with no PMH presenting to ICU s/p emergent ex lap with colon resection, left open for tx of LBO 2/2 sigmoid colon mass. Pt also noted to be in shock state, likely 2/2 septic +/- hemorrhagic shock, ABLA, MAXWELL, LA, NSTEMI and decreased LV function on POCU/S. Plan as below:    --admit to ICU, monitor vitals, o2 sat, neuro status, tele rhythm  --maintain ett, abg prn, wean vent settings as tolerated; settings adjusted upon arrival to compensate for LA; adjusted ett per post op cxr placement now in good position; sbt when tolerating  --fentanyl and ketamine for sedation; goal RASS 0/-1; daily sat when able  --continue vasopressor support to maintain MAP >65 mmHg; initially rec on ramin/vaso; changing ramin to levo given  LV function and sepsis; s/p IVF resuscitation; POCU/S revealed adequate volume with decreased LV function, focusing on replacing blood products as well as pressors for now, will continue to assess/reassess; a-line placed upon arrival by attending MD; trend lactate  --trend cardiac enzymes; no st elevations noted on 12 lead, however pt likely too unstable for transfer, also unable to anticoagulate in light of hemorrhage; will do additional ekg's  --s/p 3 units prbc 2 ffp in OR with large reported blood loss noted; adding cryo, txa, and additional prbc/ffp/plt transfusions as needed; will goal hgb higher given visual hemorrhage from abdominal drain, shock state and nstemi  --surgical plan per surgery team, maintain abdominal dressing and keep to wall suction and NGT to suction per surgeon; RTOR for closure; fup pathology  --stat post op labs  --NPO, monitor glucose  --blood cultures sent in ED, changing abx to meropenem and caspofungin with vanco x level (dose given earlier in ED)  --monitor u/o, bun/creatinine, avoid nephrotoxic agents  --scd for dvt prophylaxis   --tte  --full code  --attempted multiple times to contact pt with Naomy, however contact number in chart is not working, will update when able to reach (later was able to reach Naomy to update as she called the unit, phone number fixed in chart at that time)      d/w attending MD and eicu MD

## 2022-09-10 NOTE — ED ADULT NURSE NOTE - OBJECTIVE STATEMENT
56m denies pmhx presents to the ED with abd pain, bloating and rigid abd after returning from flight from [place] . patient states that last meal was african food and wing stop. also reports diaphoresis prior to arrival 56m denies pmhx presents to the ED with abd pain, bloating and rigid abd after returning from flight from [place] . patient states that last meal was african food and wing stop. also reports diaphoresis prior to arrival. last eat and drink 5am

## 2022-09-11 ENCOUNTER — TRANSCRIPTION ENCOUNTER (OUTPATIENT)
Age: 57
End: 2022-09-11

## 2022-09-11 LAB
-  STREPTOCOCCUS SP. (NOT GRP A, B OR S PNEUMONIAE): SIGNIFICANT CHANGE UP
ALBUMIN SERPL ELPH-MCNC: 2 G/DL — LOW (ref 3.3–5)
ALP SERPL-CCNC: 43 U/L — SIGNIFICANT CHANGE UP (ref 40–120)
ALT FLD-CCNC: 76 U/L — SIGNIFICANT CHANGE UP (ref 12–78)
ANION GAP SERPL CALC-SCNC: 6 MMOL/L — SIGNIFICANT CHANGE UP (ref 5–17)
ANION GAP SERPL CALC-SCNC: 7 MMOL/L — SIGNIFICANT CHANGE UP (ref 5–17)
AST SERPL-CCNC: 165 U/L — HIGH (ref 15–37)
BILIRUB SERPL-MCNC: 2.7 MG/DL — HIGH (ref 0.2–1.2)
BUN SERPL-MCNC: 30 MG/DL — HIGH (ref 7–23)
BUN SERPL-MCNC: 31 MG/DL — HIGH (ref 7–23)
CALCIUM SERPL-MCNC: 8.1 MG/DL — LOW (ref 8.5–10.1)
CALCIUM SERPL-MCNC: 8.4 MG/DL — LOW (ref 8.5–10.1)
CHLORIDE SERPL-SCNC: 113 MMOL/L — HIGH (ref 96–108)
CHLORIDE SERPL-SCNC: 114 MMOL/L — HIGH (ref 96–108)
CK MB BLD-MCNC: 2.2 % — SIGNIFICANT CHANGE UP (ref 0–3.5)
CK MB CFR SERPL CALC: 48 NG/ML — HIGH (ref 0.5–3.6)
CK SERPL-CCNC: 2182 U/L — HIGH (ref 26–308)
CO2 SERPL-SCNC: 24 MMOL/L — SIGNIFICANT CHANGE UP (ref 22–31)
CO2 SERPL-SCNC: 25 MMOL/L — SIGNIFICANT CHANGE UP (ref 22–31)
CREAT SERPL-MCNC: 1.15 MG/DL — SIGNIFICANT CHANGE UP (ref 0.5–1.3)
CREAT SERPL-MCNC: 1.18 MG/DL — SIGNIFICANT CHANGE UP (ref 0.5–1.3)
EGFR: 72 ML/MIN/1.73M2 — SIGNIFICANT CHANGE UP
EGFR: 74 ML/MIN/1.73M2 — SIGNIFICANT CHANGE UP
FIBRINOGEN PPP-MCNC: 518 MG/DL — SIGNIFICANT CHANGE UP (ref 340–550)
GAS PNL BLDA: SIGNIFICANT CHANGE UP
GLUCOSE BLDC GLUCOMTR-MCNC: 141 MG/DL — HIGH (ref 70–99)
GLUCOSE BLDC GLUCOMTR-MCNC: 55 MG/DL — LOW (ref 70–99)
GLUCOSE BLDC GLUCOMTR-MCNC: 57 MG/DL — LOW (ref 70–99)
GLUCOSE BLDC GLUCOMTR-MCNC: 71 MG/DL — SIGNIFICANT CHANGE UP (ref 70–99)
GLUCOSE BLDC GLUCOMTR-MCNC: 86 MG/DL — SIGNIFICANT CHANGE UP (ref 70–99)
GLUCOSE BLDC GLUCOMTR-MCNC: 97 MG/DL — SIGNIFICANT CHANGE UP (ref 70–99)
GLUCOSE SERPL-MCNC: 122 MG/DL — HIGH (ref 70–99)
GLUCOSE SERPL-MCNC: 84 MG/DL — SIGNIFICANT CHANGE UP (ref 70–99)
GRAM STN FLD: SIGNIFICANT CHANGE UP
HCT VFR BLD CALC: 38.3 % — LOW (ref 39–50)
HCT VFR BLD CALC: 39.8 % — SIGNIFICANT CHANGE UP (ref 39–50)
HCT VFR BLD CALC: 40 % — SIGNIFICANT CHANGE UP (ref 39–50)
HCV AB S/CO SERPL IA: 0.08 S/CO — SIGNIFICANT CHANGE UP (ref 0–0.99)
HCV AB SERPL-IMP: SIGNIFICANT CHANGE UP
HGB BLD-MCNC: 12.3 G/DL — LOW (ref 13–17)
HGB BLD-MCNC: 13 G/DL — SIGNIFICANT CHANGE UP (ref 13–17)
HGB BLD-MCNC: 13.2 G/DL — SIGNIFICANT CHANGE UP (ref 13–17)
INR BLD: 1.9 RATIO — HIGH (ref 0.88–1.16)
LACTATE SERPL-SCNC: 3.7 MMOL/L — HIGH (ref 0.7–2)
LACTATE SERPL-SCNC: 4.6 MMOL/L — CRITICAL HIGH (ref 0.7–2)
MAGNESIUM SERPL-MCNC: 2 MG/DL — SIGNIFICANT CHANGE UP (ref 1.6–2.6)
MAGNESIUM SERPL-MCNC: 2.2 MG/DL — SIGNIFICANT CHANGE UP (ref 1.6–2.6)
MCHC RBC-ENTMCNC: 25.6 PG — LOW (ref 27–34)
MCHC RBC-ENTMCNC: 25.6 PG — LOW (ref 27–34)
MCHC RBC-ENTMCNC: 26.7 PG — LOW (ref 27–34)
MCHC RBC-ENTMCNC: 32.1 G/DL — SIGNIFICANT CHANGE UP (ref 32–36)
MCHC RBC-ENTMCNC: 32.5 G/DL — SIGNIFICANT CHANGE UP (ref 32–36)
MCHC RBC-ENTMCNC: 33.2 G/DL — SIGNIFICANT CHANGE UP (ref 32–36)
MCV RBC AUTO: 78.7 FL — LOW (ref 80–100)
MCV RBC AUTO: 79.6 FL — LOW (ref 80–100)
MCV RBC AUTO: 80.6 FL — SIGNIFICANT CHANGE UP (ref 80–100)
METHOD TYPE: SIGNIFICANT CHANGE UP
NRBC # BLD: 0 /100 WBCS — SIGNIFICANT CHANGE UP (ref 0–0)
PHOSPHATE SERPL-MCNC: 2.9 MG/DL — SIGNIFICANT CHANGE UP (ref 2.5–4.5)
PHOSPHATE SERPL-MCNC: 3.9 MG/DL — SIGNIFICANT CHANGE UP (ref 2.5–4.5)
PLATELET # BLD AUTO: 382 K/UL — SIGNIFICANT CHANGE UP (ref 150–400)
PLATELET # BLD AUTO: 464 K/UL — HIGH (ref 150–400)
PLATELET # BLD AUTO: 468 K/UL — HIGH (ref 150–400)
POTASSIUM SERPL-MCNC: 3.9 MMOL/L — SIGNIFICANT CHANGE UP (ref 3.5–5.3)
POTASSIUM SERPL-MCNC: 4.4 MMOL/L — SIGNIFICANT CHANGE UP (ref 3.5–5.3)
POTASSIUM SERPL-SCNC: 3.9 MMOL/L — SIGNIFICANT CHANGE UP (ref 3.5–5.3)
POTASSIUM SERPL-SCNC: 4.4 MMOL/L — SIGNIFICANT CHANGE UP (ref 3.5–5.3)
PROT SERPL-MCNC: 3.9 GM/DL — LOW (ref 6–8.3)
PROTHROM AB SERPL-ACNC: 22.9 SEC — HIGH (ref 10.5–13.4)
RBC # BLD: 4.81 M/UL — SIGNIFICANT CHANGE UP (ref 4.2–5.8)
RBC # BLD: 4.94 M/UL — SIGNIFICANT CHANGE UP (ref 4.2–5.8)
RBC # BLD: 5.08 M/UL — SIGNIFICANT CHANGE UP (ref 4.2–5.8)
RBC # FLD: 15.1 % — HIGH (ref 10.3–14.5)
RBC # FLD: 15.2 % — HIGH (ref 10.3–14.5)
RBC # FLD: 16.7 % — HIGH (ref 10.3–14.5)
SODIUM SERPL-SCNC: 144 MMOL/L — SIGNIFICANT CHANGE UP (ref 135–145)
SODIUM SERPL-SCNC: 145 MMOL/L — SIGNIFICANT CHANGE UP (ref 135–145)
SPECIMEN SOURCE: SIGNIFICANT CHANGE UP
TROPONIN I, HIGH SENSITIVITY RESULT: 122.6 NG/L — HIGH
VANCOMYCIN TROUGH SERPL-MCNC: 1.5 UG/ML — LOW (ref 10–20)
WBC # BLD: 5.55 K/UL — SIGNIFICANT CHANGE UP (ref 3.8–10.5)
WBC # BLD: 8.53 K/UL — SIGNIFICANT CHANGE UP (ref 3.8–10.5)
WBC # BLD: 8.73 K/UL — SIGNIFICANT CHANGE UP (ref 3.8–10.5)
WBC # FLD AUTO: 5.55 K/UL — SIGNIFICANT CHANGE UP (ref 3.8–10.5)
WBC # FLD AUTO: 8.53 K/UL — SIGNIFICANT CHANGE UP (ref 3.8–10.5)
WBC # FLD AUTO: 8.73 K/UL — SIGNIFICANT CHANGE UP (ref 3.8–10.5)

## 2022-09-11 PROCEDURE — 70450 CT HEAD/BRAIN W/O DYE: CPT | Mod: 26

## 2022-09-11 PROCEDURE — 93010 ELECTROCARDIOGRAM REPORT: CPT

## 2022-09-11 PROCEDURE — 99291 CRITICAL CARE FIRST HOUR: CPT

## 2022-09-11 RX ORDER — VANCOMYCIN HCL 1 G
1250 VIAL (EA) INTRAVENOUS EVERY 12 HOURS
Refills: 0 | Status: DISCONTINUED | OUTPATIENT
Start: 2022-09-11 | End: 2022-09-13

## 2022-09-11 RX ORDER — MIDAZOLAM HYDROCHLORIDE 1 MG/ML
0.1 INJECTION, SOLUTION INTRAMUSCULAR; INTRAVENOUS
Qty: 100 | Refills: 0 | Status: DISCONTINUED | OUTPATIENT
Start: 2022-09-11 | End: 2022-09-13

## 2022-09-11 RX ORDER — VASOPRESSIN 20 [USP'U]/ML
0.04 INJECTION INTRAVENOUS
Qty: 50 | Refills: 0 | Status: DISCONTINUED | OUTPATIENT
Start: 2022-09-11 | End: 2022-09-11

## 2022-09-11 RX ORDER — MEROPENEM 1 G/30ML
1000 INJECTION INTRAVENOUS EVERY 8 HOURS
Refills: 0 | Status: DISCONTINUED | OUTPATIENT
Start: 2022-09-12 | End: 2022-09-13

## 2022-09-11 RX ORDER — DEXTROSE 50 % IN WATER 50 %
25 SYRINGE (ML) INTRAVENOUS ONCE
Refills: 0 | Status: COMPLETED | OUTPATIENT
Start: 2022-09-11 | End: 2022-09-11

## 2022-09-11 RX ORDER — MIDAZOLAM HYDROCHLORIDE 1 MG/ML
2 INJECTION, SOLUTION INTRAMUSCULAR; INTRAVENOUS ONCE
Refills: 0 | Status: DISCONTINUED | OUTPATIENT
Start: 2022-09-11 | End: 2022-09-11

## 2022-09-11 RX ORDER — HYDROCORTISONE 20 MG
100 TABLET ORAL EVERY 8 HOURS
Refills: 0 | Status: DISCONTINUED | OUTPATIENT
Start: 2022-09-11 | End: 2022-09-14

## 2022-09-11 RX ORDER — HYDROCORTISONE 20 MG
100 TABLET ORAL ONCE
Refills: 0 | Status: COMPLETED | OUTPATIENT
Start: 2022-09-11 | End: 2022-09-11

## 2022-09-11 RX ORDER — VANCOMYCIN HCL 1 G
1500 VIAL (EA) INTRAVENOUS ONCE
Refills: 0 | Status: COMPLETED | OUTPATIENT
Start: 2022-09-11 | End: 2022-09-11

## 2022-09-11 RX ORDER — ACETAMINOPHEN 500 MG
1000 TABLET ORAL ONCE
Refills: 0 | Status: COMPLETED | OUTPATIENT
Start: 2022-09-11 | End: 2022-09-11

## 2022-09-11 RX ADMIN — MIDAZOLAM HYDROCHLORIDE 6.8 MG/KG/HR: 1 INJECTION, SOLUTION INTRAMUSCULAR; INTRAVENOUS at 19:39

## 2022-09-11 RX ADMIN — TRANEXAMIC ACID 220 MILLIGRAM(S): 100 INJECTION, SOLUTION INTRAVENOUS at 14:48

## 2022-09-11 RX ADMIN — CASPOFUNGIN ACETATE 260 MILLIGRAM(S): 7 INJECTION, POWDER, LYOPHILIZED, FOR SOLUTION INTRAVENOUS at 19:51

## 2022-09-11 RX ADMIN — CHLORHEXIDINE GLUCONATE 15 MILLILITER(S): 213 SOLUTION TOPICAL at 17:10

## 2022-09-11 RX ADMIN — Medication 300 MILLIGRAM(S): at 05:52

## 2022-09-11 RX ADMIN — Medication 25 GRAM(S): at 17:46

## 2022-09-11 RX ADMIN — TRANEXAMIC ACID 220 MILLIGRAM(S): 100 INJECTION, SOLUTION INTRAVENOUS at 05:33

## 2022-09-11 RX ADMIN — PANTOPRAZOLE SODIUM 40 MILLIGRAM(S): 20 TABLET, DELAYED RELEASE ORAL at 05:56

## 2022-09-11 RX ADMIN — MIDAZOLAM HYDROCHLORIDE 2 MILLIGRAM(S): 1 INJECTION, SOLUTION INTRAMUSCULAR; INTRAVENOUS at 17:46

## 2022-09-11 RX ADMIN — Medication 3.19 MICROGRAM(S)/KG/MIN: at 19:24

## 2022-09-11 RX ADMIN — Medication 100 GRAM(S): at 03:27

## 2022-09-11 RX ADMIN — MEROPENEM 100 MILLIGRAM(S): 1 INJECTION INTRAVENOUS at 23:10

## 2022-09-11 RX ADMIN — Medication 100 MILLIGRAM(S): at 14:48

## 2022-09-11 RX ADMIN — CHLORHEXIDINE GLUCONATE 1 APPLICATION(S): 213 SOLUTION TOPICAL at 05:56

## 2022-09-11 RX ADMIN — MEROPENEM 100 MILLIGRAM(S): 1 INJECTION INTRAVENOUS at 05:33

## 2022-09-11 RX ADMIN — Medication 166.67 MILLIGRAM(S): at 19:26

## 2022-09-11 RX ADMIN — MEROPENEM 100 MILLIGRAM(S): 1 INJECTION INTRAVENOUS at 17:11

## 2022-09-11 RX ADMIN — Medication 400 MILLIGRAM(S): at 12:00

## 2022-09-11 RX ADMIN — Medication 3.19 MICROGRAM(S)/KG/MIN: at 13:07

## 2022-09-11 RX ADMIN — Medication 3.19 MICROGRAM(S)/KG/MIN: at 17:10

## 2022-09-11 RX ADMIN — KETAMINE HYDROCHLORIDE 13.6 MG/KG/HR: 100 INJECTION INTRAMUSCULAR; INTRAVENOUS at 20:02

## 2022-09-11 RX ADMIN — FENTANYL CITRATE 3.4 MICROGRAM(S)/KG/HR: 50 INJECTION INTRAVENOUS at 03:27

## 2022-09-11 RX ADMIN — Medication 100 MILLIGRAM(S): at 05:55

## 2022-09-11 RX ADMIN — Medication 1000 MILLIGRAM(S): at 13:00

## 2022-09-11 RX ADMIN — PANTOPRAZOLE SODIUM 40 MILLIGRAM(S): 20 TABLET, DELAYED RELEASE ORAL at 17:10

## 2022-09-11 RX ADMIN — CHLORHEXIDINE GLUCONATE 15 MILLILITER(S): 213 SOLUTION TOPICAL at 05:55

## 2022-09-11 RX ADMIN — Medication 100 MILLIGRAM(S): at 22:20

## 2022-09-11 RX ADMIN — Medication 3.19 MICROGRAM(S)/KG/MIN: at 22:14

## 2022-09-11 RX ADMIN — VASOPRESSIN 2.4 UNIT(S)/MIN: 20 INJECTION INTRAVENOUS at 05:33

## 2022-09-11 RX ADMIN — FENTANYL CITRATE 3.4 MICROGRAM(S)/KG/HR: 50 INJECTION INTRAVENOUS at 15:23

## 2022-09-11 NOTE — PROGRESS NOTE ADULT - ASSESSMENT
56 year old male with no PMH presenting to ICU s/p emergent ex lap with colon resection, left open for tx of LBO 2/2 sigmoid colon mass. Pt also noted to be in shock state, likely 2/2 septic +/- hemorrhagic shock, ABLA, MAXWELL, LA, NSTEMI and decreased LV function on POCU/S.  Ovenight multiple blood products, and on 3 pressors now down to just levo.  Pocus with B/L consolidation ? atelectasis.  Improved LVF with normal RV size and function.   neuro: keep comfortable with fent, will add ketamine or prop as needed for pain  CV shock initially mixed hemorragic, septic and cardiogenic appears to be pure vasoldilatory/septic now  Pulm Consolidations Likely just 2/2 atalactasis will increase peep as hemodynamics allow    56 year old male with no PMH presenting to ICU s/p emergent ex lap with colon resection, left open for tx of LBO 2/2 sigmoid colon mass. Pt also noted to be in shock state, likely 2/2 septic +/- hemorrhagic shock, ABLA, MAXWELL, LA, NSTEMI and decreased LV function on POCU/S.  Ovenight multiple blood products, and on 3 pressors now down to just levo.  Pocus with B/L consolidation ? atelectasis.  Improved LVF with normal RV size and function.   Neuro: keep comfortable with fent, will add ketamine or prop as needed for pain    CV shock initially mixed hemorragic, septic and cardiogenic appears to be pure vasoldilatory/septic now    Pulm: Consolidations Likely just 2/2 atelectasis will increase peep as hemodynamics allow, FiO2 requirements have been decreasing    Renal: urine output has been fantastic though over all i/o dont reflect all the OR blood and fluid lost or administered.   - monitor i/o and BMP    GI: open abdomen with packing, and NGT to suction, colon cancer? with tumor like material eroding through and adherent to surrounding tissue now with peritonitis   - plan to go back to OR per surgery in the next day or so.  Suspect given his improved hemodynamics and coagulopathy he is as optimized as we can make him with his uncontrolled source of infection to go back to OR.     ID: Cultures pending, intrabdominal source   Karen, vanc and caspo adjusted for improved renal function    Heme: coagulaopthy corrected, monitor CBC and INR q8-12  Follow drain output.     DVT ppx: SCDs   Ethics full code.

## 2022-09-11 NOTE — PROGRESS NOTE ADULT - ASSESSMENT
56M s/p subtotal colectomy and received FFP and PRBC intraop, patient was hemodynamically unstable requiring pressor support intra op and decision made to leave wound open for now with plan for return to OR in 48 hrs or sooner.     -continue abx  -monitor drain output  -blood products as needed and pressor support

## 2022-09-11 NOTE — PROGRESS NOTE ADULT - SUBJECTIVE AND OBJECTIVE BOX
CHIEF COMPLAINT:Patient is a 56y old  Male who presents with a chief complaint of bowel obstruction (11 Sep 2022 07:36)        Interval Events:    REVIEW OF SYSTEMS:  [ ] All other systems negative  [x] Unable to assess ROS because ______sedated__    OBJECTIVE:  ICU Vital Signs Last 24 Hrs  T(C): 39.3 (11 Sep 2022 15:08), Max: 39.3 (11 Sep 2022 12:00)  T(F): 102.7 (11 Sep 2022 15:08), Max: 102.7 (11 Sep 2022 12:00)  HR: 118 (11 Sep 2022 15:30) (76 - 133)  BP: 88/56 (11 Sep 2022 12:00) (77/55 - 133/79)  BP(mean): 63 (11 Sep 2022 12:00) (60 - 94)  ABP: 109/73 (11 Sep 2022 15:30) (67/48 - 145/95)  ABP(mean): 87 (11 Sep 2022 15:30) (58 - 118)  RR: 26 (11 Sep 2022 15:30) (16 - 28)  SpO2: 99% (11 Sep 2022 15:30) (93% - 100%)    O2 Parameters below as of 10 Sep 2022 20:15  Patient On (Oxygen Delivery Method): ventilator    O2 Concentration (%): 100      Mode: AC/ CMV (Assist Control/ Continuous Mandatory Ventilation), RR (machine): 26, TV (machine): 450, FiO2: 40, PEEP: 5, ITime: 0.7, MAP: 10, PIP: 19    09-10 @ 07:01  -  - @ 07:00  --------------------------------------------------------  IN: 2307.3 mL / OUT: 5250 mL / NET: -2942.7 mL      CAPILLARY BLOOD GLUCOSE      POCT Blood Glucose.: 71 mg/dL (11 Sep 2022 11:39)      PHYSICAL EXAM:  GENERAL: Intubated and  sedated  HEAD:  Atraumatic, Normocephalic  EYES: EOMI, PERRLA, conjunctiva and sclera clear  CHEST/LUNG: Mechanical bs noted bilaterally; equal chest expansion noted  HEART: Tachycardic, Regular rhythm; No murmurs, rubs, or gallops; s1/s2 ntoed  ABDOMEN: midline abdominal incision noted with large surgical dressing placed to wall suction with bloody output noted,  EXTREMITIES: cool to touch, poor peripheral pulses  LYMPH: No lymphadenopathy noted  SKIN: No rashes or lesions      LINES:    HOSPITAL MEDICATIONS:  Standing Meds:  caspofungin IVPB      caspofungin IVPB 50 milliGRAM(s) IV Intermittent every 24 hours  chlorhexidine 0.12% Liquid 15 milliLiter(s) Oral Mucosa every 12 hours  chlorhexidine 2% Cloths 1 Application(s) Topical <User Schedule>  fentaNYL    Injectable 50 MICROGram(s) IV Push once  fentaNYL   Infusion. 0.5 MICROgram(s)/kG/Hr IV Continuous <Continuous>  hydrocortisone sodium succinate Injectable 100 milliGRAM(s) IV Push every 8 hours  ketamine Infusion. 2 mG/kG/Hr IV Continuous <Continuous>  meropenem  IVPB 1000 milliGRAM(s) IV Intermittent every 12 hours  meropenem  IVPB      norepinephrine Infusion 0.05 MICROgram(s)/kG/Min IV Continuous <Continuous>  pantoprazole  Injectable 40 milliGRAM(s) IV Push two times a day      PRN Meds:      LABS:                        13.2   5.55  )-----------( 382      ( 11 Sep 2022 10:30 )             39.8     Hgb Trend: 13.2<--, 13.0<--, 11.0<--, 8.4<--, 16.3<--  09    145  |  114<H>  |  31<H>  ----------------------------<  84  4.4   |  25  |  1.18    Ca    8.1<L>      11 Sep 2022 11:40  Phos  2.9       Mg     2.0         TPro  3.9<L>  /  Alb  2.0<L>  /  TBili  2.7<H>  /  DBili  x   /  AST  165<H>  /  ALT  76  /  AlkPhos  43      Creatinine Trend: 1.18<--, 1.15<--, 1.06<--, 1.18<--, 2.33<--  PT/INR - ( 11 Sep 2022 11:40 )   PT: 22.9 sec;   INR: 1.90 ratio         PTT - ( 10 Sep 2022 22:15 )  PTT:45.1 sec  Urinalysis Basic - ( 10 Sep 2022 16:20 )    Color: Brown / Appearance: very cloudy / S.020 / pH: x  Gluc: x / Ketone: Small  / Bili: Moderate / Urobili: 12 mg/dL   Blood: x / Protein: 100 mg/dL / Nitrite: Negative   Leuk Esterase: Trace / RBC: 6-10 /HPF / WBC 3-5   Sq Epi: x / Non Sq Epi: Occasional / Bacteria: Moderate      Arterial Blood Gas:   @ 08:12  7.44/34/188/23/99.7/-0.5  ABG lactate: --  Arterial Blood Gas:   @ 05:24  7.34/42/128/23/100.0/-3.0  ABG lactate: --  Arterial Blood Gas:   @ 00:11  7.36/36/127/20/100.0/-4.6  ABG lactate: --  Arterial Blood Gas:  09-10 @ 21:18  7.38/34/444/20/100.0/-4.4  ABG lactate: --        MICROBIOLOGY:     RADIOLOGY:  [ ] Reviewed and interpreted by me    EKG:

## 2022-09-11 NOTE — PROVIDER CONTACT NOTE (CRITICAL VALUE NOTIFICATION) - PERSON GIVING RESULT:
Zuleika Sanchez from [facility]
tanja, [facility]
[place], lab
: ARY REZA

## 2022-09-11 NOTE — PROVIDER CONTACT NOTE (CRITICAL VALUE NOTIFICATION) - TEST AND RESULT REPORTED:
Lactate: 5.5
+Blood culture in anaerobic bottle gram positive cocci in pairs and chains
CPK = 2,182
lactate 11.9   HCT 62.3
lactate 8.5

## 2022-09-11 NOTE — PROGRESS NOTE ADULT - SUBJECTIVE AND OBJECTIVE BOX
SURGERY PROGRESS HPI:  Pt seen and examined at bedside. In ICU, intubated, on max dose of levophed, drains with 1L output overnight.      Vital Signs Last 24 Hrs  T(C): 36.2 (11 Sep 2022 00:00), Max: 36.6 (10 Sep 2022 15:23)  T(F): 97.1 (11 Sep 2022 00:00), Max: 97.8 (10 Sep 2022 15:23)  HR: 125 (11 Sep 2022 05:05) (76 - 126)  BP: 99/74 (11 Sep 2022 00:00) (71/50 - 138/90)  BP(mean): 79 (11 Sep 2022 00:00) (66 - 94)  RR: 26 (11 Sep 2022 03:30) (16 - 32)  SpO2: 100% (11 Sep 2022 05:05) (90% - 120%)    Parameters below as of 10 Sep 2022 20:15  Patient On (Oxygen Delivery Method): ventilator    O2 Concentration (%): 100      PHYSICAL EXAM:    GENERAL: NAD  HEAD:  Atraumatic, Normocephalic  CHEST/LUNG: Clear to ausculation, bilaterally   HEART: RRR S1S2  ABDOMEN: dressing intact, drain with good suction, abd not distended   EXTREMITIES:  calf soft, non tender b/l    I&O's Detail    10 Sep 2022 07:01  -  11 Sep 2022 07:00  --------------------------------------------------------  IN:    FentaNYL: 156.4 mL    IV PiggyBack: 450 mL    Ketamine: 53.6 mL    Norepinephrine: 184.5 mL    Plasma: 300 mL    Platelets - Single Donor: 300 mL    PRBCs (Packed Red Blood Cells): 350 mL  Total IN: 1794.5 mL    OUT:    Blood Loss (mL): 2000 mL    Nasogastric/Oral tube (mL): 450 mL    Voided (mL): 2200 mL  Total OUT: 4650 mL    Total NET: -2855.5 mL          LABS:                        13.0   8.53  )-----------( 464      ( 11 Sep 2022 03:27 )             40.0     09-11    144  |  113<H>  |  30<H>  ----------------------------<  122<H>  3.9   |  24  |  1.15    Ca    8.4<L>      11 Sep 2022 03:27  Phos  3.9       Mg     2.2         TPro  3.9<L>  /  Alb  2.0<L>  /  TBili  2.7<H>  /  DBili  x   /  AST  165<H>  /  ALT  76  /  AlkPhos  43  -11    PT/INR - ( 10 Sep 2022 22:15 )   PT: 23.1 sec;   INR: 1.93 ratio         PTT - ( 10 Sep 2022 22:15 )  PTT:45.1 sec  Urinalysis Basic - ( 10 Sep 2022 16:20 )    Color: Brown / Appearance: very cloudy / S.020 / pH: x  Gluc: x / Ketone: Small  / Bili: Moderate / Urobili: 12 mg/dL   Blood: x / Protein: 100 mg/dL / Nitrite: Negative   Leuk Esterase: Trace / RBC: 6-10 /HPF / WBC 3-5   Sq Epi: x / Non Sq Epi: Occasional / Bacteria: Moderate        type    RADIOLOGY & ADDITIONAL STUDIES:    Assessment:    Plan:  -pain management prn  -continue DVT prophylaxis, OOB, Ambulating  -continue incentive spirometer  -continue local wound care  -antibiotics   -f/u labs  -will discuss pt with surgical attending

## 2022-09-12 ENCOUNTER — RESULT REVIEW (OUTPATIENT)
Age: 57
End: 2022-09-12

## 2022-09-12 DIAGNOSIS — K56.609 UNSPECIFIED INTESTINAL OBSTRUCTION, UNSPECIFIED AS TO PARTIAL VERSUS COMPLETE OBSTRUCTION: ICD-10-CM

## 2022-09-12 DIAGNOSIS — A41.9 SEPSIS, UNSPECIFIED ORGANISM: ICD-10-CM

## 2022-09-12 DIAGNOSIS — C18.9 MALIGNANT NEOPLASM OF COLON, UNSPECIFIED: ICD-10-CM

## 2022-09-12 DIAGNOSIS — A40.9 STREPTOCOCCAL SEPSIS, UNSPECIFIED: ICD-10-CM

## 2022-09-12 LAB
ALBUMIN SERPL ELPH-MCNC: 1.6 G/DL — LOW (ref 3.3–5)
ALP SERPL-CCNC: 49 U/L — SIGNIFICANT CHANGE UP (ref 40–120)
ALT FLD-CCNC: 76 U/L — SIGNIFICANT CHANGE UP (ref 12–78)
ANION GAP SERPL CALC-SCNC: 4 MMOL/L — LOW (ref 5–17)
ANISOCYTOSIS BLD QL: SIGNIFICANT CHANGE UP
APTT BLD: 43.8 SEC — HIGH (ref 27.5–35.5)
APTT BLD: 44.8 SEC — HIGH (ref 27.5–35.5)
AST SERPL-CCNC: 147 U/L — HIGH (ref 15–37)
BASOPHILS # BLD AUTO: 0 K/UL — SIGNIFICANT CHANGE UP (ref 0–0.2)
BASOPHILS NFR BLD AUTO: 0 % — SIGNIFICANT CHANGE UP (ref 0–2)
BILIRUB SERPL-MCNC: 1.9 MG/DL — HIGH (ref 0.2–1.2)
BLD GP AB SCN SERPL QL: SIGNIFICANT CHANGE UP
BUN SERPL-MCNC: 27 MG/DL — HIGH (ref 7–23)
CALCIUM SERPL-MCNC: 7.4 MG/DL — LOW (ref 8.5–10.1)
CHLORIDE SERPL-SCNC: 112 MMOL/L — HIGH (ref 96–108)
CO2 SERPL-SCNC: 27 MMOL/L — SIGNIFICANT CHANGE UP (ref 22–31)
CREAT SERPL-MCNC: 1.18 MG/DL — SIGNIFICANT CHANGE UP (ref 0.5–1.3)
EGFR: 72 ML/MIN/1.73M2 — SIGNIFICANT CHANGE UP
EOSINOPHIL # BLD AUTO: 0 K/UL — SIGNIFICANT CHANGE UP (ref 0–0.5)
EOSINOPHIL NFR BLD AUTO: 0 % — SIGNIFICANT CHANGE UP (ref 0–6)
GIANT NEUTROPHILS: PRESENT — SIGNIFICANT CHANGE UP
GIANT PLATELETS BLD QL SMEAR: PRESENT — SIGNIFICANT CHANGE UP
GLUCOSE BLDC GLUCOMTR-MCNC: 100 MG/DL — HIGH (ref 70–99)
GLUCOSE BLDC GLUCOMTR-MCNC: 102 MG/DL — HIGH (ref 70–99)
GLUCOSE BLDC GLUCOMTR-MCNC: 114 MG/DL — HIGH (ref 70–99)
GLUCOSE BLDC GLUCOMTR-MCNC: 115 MG/DL — HIGH (ref 70–99)
GLUCOSE BLDC GLUCOMTR-MCNC: 80 MG/DL — SIGNIFICANT CHANGE UP (ref 70–99)
GLUCOSE BLDC GLUCOMTR-MCNC: 93 MG/DL — SIGNIFICANT CHANGE UP (ref 70–99)
GLUCOSE SERPL-MCNC: 105 MG/DL — HIGH (ref 70–99)
HCT VFR BLD CALC: 34.3 % — LOW (ref 39–50)
HCT VFR BLD CALC: 36.3 % — LOW (ref 39–50)
HGB BLD-MCNC: 11.6 G/DL — LOW (ref 13–17)
HGB BLD-MCNC: 12.2 G/DL — LOW (ref 13–17)
INR BLD: 2.31 RATIO — HIGH (ref 0.88–1.16)
INR BLD: 2.56 RATIO — HIGH (ref 0.88–1.16)
LACTATE SERPL-SCNC: 2.7 MMOL/L — HIGH (ref 0.7–2)
LG PLATELETS BLD QL AUTO: SIGNIFICANT CHANGE UP
LYMPHOCYTES # BLD AUTO: 0.61 K/UL — LOW (ref 1–3.3)
LYMPHOCYTES # BLD AUTO: 2 % — LOW (ref 13–44)
MACROCYTES BLD QL: SIGNIFICANT CHANGE UP
MAGNESIUM SERPL-MCNC: 2.1 MG/DL — SIGNIFICANT CHANGE UP (ref 1.6–2.6)
MANUAL DIF COMMENT BLD-IMP: SIGNIFICANT CHANGE UP
MANUAL SMEAR VERIFICATION: SIGNIFICANT CHANGE UP
MCHC RBC-ENTMCNC: 26.6 PG — LOW (ref 27–34)
MCHC RBC-ENTMCNC: 27 PG — SIGNIFICANT CHANGE UP (ref 27–34)
MCHC RBC-ENTMCNC: 33.6 G/DL — SIGNIFICANT CHANGE UP (ref 32–36)
MCHC RBC-ENTMCNC: 33.8 G/DL — SIGNIFICANT CHANGE UP (ref 32–36)
MCV RBC AUTO: 79.3 FL — LOW (ref 80–100)
MCV RBC AUTO: 79.8 FL — LOW (ref 80–100)
METAMYELOCYTES # FLD: 2 % — HIGH (ref 0–0)
MONOCYTES # BLD AUTO: 2.13 K/UL — HIGH (ref 0–0.9)
MONOCYTES NFR BLD AUTO: 7 % — SIGNIFICANT CHANGE UP (ref 2–14)
NEUTROPHILS # BLD AUTO: 20.41 K/UL — HIGH (ref 1.8–7.4)
NEUTROPHILS NFR BLD AUTO: 62 % — SIGNIFICANT CHANGE UP (ref 43–77)
NEUTS BAND # BLD: 5 % — SIGNIFICANT CHANGE UP (ref 0–8)
NRBC # BLD: 0 /100 WBCS — SIGNIFICANT CHANGE UP (ref 0–0)
NRBC # BLD: 0 /100 WBCS — SIGNIFICANT CHANGE UP (ref 0–0)
NRBC # BLD: 0 /100 — SIGNIFICANT CHANGE UP (ref 0–0)
NRBC # BLD: SIGNIFICANT CHANGE UP /100 WBCS (ref 0–0)
PHOSPHATE SERPL-MCNC: 2.6 MG/DL — SIGNIFICANT CHANGE UP (ref 2.5–4.5)
PLAT MORPH BLD: NORMAL — SIGNIFICANT CHANGE UP
PLATELET # BLD AUTO: 317 K/UL — SIGNIFICANT CHANGE UP (ref 150–400)
PLATELET # BLD AUTO: 325 K/UL — SIGNIFICANT CHANGE UP (ref 150–400)
PLATELET COUNT - ESTIMATE: ABNORMAL
POIKILOCYTOSIS BLD QL AUTO: SLIGHT — SIGNIFICANT CHANGE UP
POLYCHROMASIA BLD QL SMEAR: SLIGHT — SIGNIFICANT CHANGE UP
POTASSIUM SERPL-MCNC: 4.5 MMOL/L — SIGNIFICANT CHANGE UP (ref 3.5–5.3)
POTASSIUM SERPL-SCNC: 4.5 MMOL/L — SIGNIFICANT CHANGE UP (ref 3.5–5.3)
PROT SERPL-MCNC: 4.4 GM/DL — LOW (ref 6–8.3)
PROTHROM AB SERPL-ACNC: 27.7 SEC — HIGH (ref 10.5–13.4)
PROTHROM AB SERPL-ACNC: 31 SEC — HIGH (ref 10.5–13.4)
RBC # BLD: 4.3 M/UL — SIGNIFICANT CHANGE UP (ref 4.2–5.8)
RBC # BLD: 4.58 M/UL — SIGNIFICANT CHANGE UP (ref 4.2–5.8)
RBC # FLD: 16.3 % — HIGH (ref 10.3–14.5)
RBC # FLD: 16.5 % — HIGH (ref 10.3–14.5)
RBC BLD AUTO: SIGNIFICANT CHANGE UP
SMUDGE CELLS # BLD: PRESENT — SIGNIFICANT CHANGE UP
SODIUM SERPL-SCNC: 143 MMOL/L — SIGNIFICANT CHANGE UP (ref 135–145)
VANCOMYCIN FLD-MCNC: 9.6 UG/ML — SIGNIFICANT CHANGE UP
VARIANT LYMPHS # BLD: 22 % — HIGH (ref 0–6)
WBC # BLD: 13.6 K/UL — HIGH (ref 3.8–10.5)
WBC # BLD: 14.3 K/UL — HIGH (ref 3.8–10.5)
WBC # FLD AUTO: 13.6 K/UL — HIGH (ref 3.8–10.5)
WBC # FLD AUTO: 14.3 K/UL — HIGH (ref 3.8–10.5)

## 2022-09-12 PROCEDURE — 99291 CRITICAL CARE FIRST HOUR: CPT

## 2022-09-12 PROCEDURE — 99233 SBSQ HOSP IP/OBS HIGH 50: CPT | Mod: 57

## 2022-09-12 PROCEDURE — 44125 REMOVAL OF SMALL INTESTINE: CPT | Mod: 58

## 2022-09-12 PROCEDURE — 93306 TTE W/DOPPLER COMPLETE: CPT | Mod: 26

## 2022-09-12 PROCEDURE — 99223 1ST HOSP IP/OBS HIGH 75: CPT

## 2022-09-12 PROCEDURE — 74018 RADEX ABDOMEN 1 VIEW: CPT | Mod: 26

## 2022-09-12 DEVICE — SURGICEL 4 X 8": Type: IMPLANTABLE DEVICE | Status: FUNCTIONAL

## 2022-09-12 DEVICE — STAPLER COVIDIEN TRI-STAPLE 60MM PURPLE RELOAD: Type: IMPLANTABLE DEVICE | Status: FUNCTIONAL

## 2022-09-12 RX ORDER — SODIUM CHLORIDE 9 MG/ML
1000 INJECTION, SOLUTION INTRAVENOUS
Refills: 0 | Status: DISCONTINUED | OUTPATIENT
Start: 2022-09-12 | End: 2022-09-15

## 2022-09-12 RX ORDER — ACETAMINOPHEN 500 MG
1000 TABLET ORAL ONCE
Refills: 0 | Status: COMPLETED | OUTPATIENT
Start: 2022-09-12 | End: 2022-09-12

## 2022-09-12 RX ORDER — PHYTONADIONE (VIT K1) 5 MG
5 TABLET ORAL ONCE
Refills: 0 | Status: COMPLETED | OUTPATIENT
Start: 2022-09-12 | End: 2022-09-12

## 2022-09-12 RX ORDER — SODIUM CHLORIDE 9 MG/ML
500 INJECTION, SOLUTION INTRAVENOUS ONCE
Refills: 0 | Status: COMPLETED | OUTPATIENT
Start: 2022-09-12 | End: 2022-09-12

## 2022-09-12 RX ORDER — SODIUM CHLORIDE 9 MG/ML
1000 INJECTION, SOLUTION INTRAVENOUS
Refills: 0 | Status: DISCONTINUED | OUTPATIENT
Start: 2022-09-12 | End: 2022-09-12

## 2022-09-12 RX ADMIN — KETAMINE HYDROCHLORIDE 13.6 MG/KG/HR: 100 INJECTION INTRAMUSCULAR; INTRAVENOUS at 06:31

## 2022-09-12 RX ADMIN — MEROPENEM 100 MILLIGRAM(S): 1 INJECTION INTRAVENOUS at 23:28

## 2022-09-12 RX ADMIN — CHLORHEXIDINE GLUCONATE 1 APPLICATION(S): 213 SOLUTION TOPICAL at 22:30

## 2022-09-12 RX ADMIN — FENTANYL CITRATE 3.4 MICROGRAM(S)/KG/HR: 50 INJECTION INTRAVENOUS at 09:46

## 2022-09-12 RX ADMIN — MEROPENEM 100 MILLIGRAM(S): 1 INJECTION INTRAVENOUS at 09:49

## 2022-09-12 RX ADMIN — MIDAZOLAM HYDROCHLORIDE 6.8 MG/KG/HR: 1 INJECTION, SOLUTION INTRAMUSCULAR; INTRAVENOUS at 14:57

## 2022-09-12 RX ADMIN — Medication 3.19 MICROGRAM(S)/KG/MIN: at 19:27

## 2022-09-12 RX ADMIN — SODIUM CHLORIDE 60 MILLILITER(S): 9 INJECTION, SOLUTION INTRAVENOUS at 01:44

## 2022-09-12 RX ADMIN — FENTANYL CITRATE 3.4 MICROGRAM(S)/KG/HR: 50 INJECTION INTRAVENOUS at 19:27

## 2022-09-12 RX ADMIN — SODIUM CHLORIDE 100 MILLILITER(S): 9 INJECTION, SOLUTION INTRAVENOUS at 22:53

## 2022-09-12 RX ADMIN — CHLORHEXIDINE GLUCONATE 15 MILLILITER(S): 213 SOLUTION TOPICAL at 05:10

## 2022-09-12 RX ADMIN — Medication 100 MILLIGRAM(S): at 14:28

## 2022-09-12 RX ADMIN — KETAMINE HYDROCHLORIDE 13.6 MG/KG/HR: 100 INJECTION INTRAMUSCULAR; INTRAVENOUS at 09:46

## 2022-09-12 RX ADMIN — MEROPENEM 100 MILLIGRAM(S): 1 INJECTION INTRAVENOUS at 15:06

## 2022-09-12 RX ADMIN — Medication 100 MILLIGRAM(S): at 05:09

## 2022-09-12 RX ADMIN — PANTOPRAZOLE SODIUM 40 MILLIGRAM(S): 20 TABLET, DELAYED RELEASE ORAL at 17:28

## 2022-09-12 RX ADMIN — MIDAZOLAM HYDROCHLORIDE 6.8 MG/KG/HR: 1 INJECTION, SOLUTION INTRAMUSCULAR; INTRAVENOUS at 05:32

## 2022-09-12 RX ADMIN — MIDAZOLAM HYDROCHLORIDE 6.8 MG/KG/HR: 1 INJECTION, SOLUTION INTRAMUSCULAR; INTRAVENOUS at 09:45

## 2022-09-12 RX ADMIN — Medication 166.67 MILLIGRAM(S): at 22:55

## 2022-09-12 RX ADMIN — CHLORHEXIDINE GLUCONATE 1 APPLICATION(S): 213 SOLUTION TOPICAL at 05:10

## 2022-09-12 RX ADMIN — KETAMINE HYDROCHLORIDE 13.6 MG/KG/HR: 100 INJECTION INTRAMUSCULAR; INTRAVENOUS at 19:27

## 2022-09-12 RX ADMIN — PANTOPRAZOLE SODIUM 40 MILLIGRAM(S): 20 TABLET, DELAYED RELEASE ORAL at 05:09

## 2022-09-12 RX ADMIN — Medication 400 MILLIGRAM(S): at 06:23

## 2022-09-12 RX ADMIN — Medication 3.19 MICROGRAM(S)/KG/MIN: at 09:45

## 2022-09-12 RX ADMIN — Medication 166.67 MILLIGRAM(S): at 09:52

## 2022-09-12 RX ADMIN — CHLORHEXIDINE GLUCONATE 15 MILLILITER(S): 213 SOLUTION TOPICAL at 17:29

## 2022-09-12 RX ADMIN — SODIUM CHLORIDE 500 MILLILITER(S): 9 INJECTION, SOLUTION INTRAVENOUS at 05:04

## 2022-09-12 RX ADMIN — Medication 100 MILLIGRAM(S): at 23:21

## 2022-09-12 RX ADMIN — SODIUM CHLORIDE 60 MILLILITER(S): 9 INJECTION, SOLUTION INTRAVENOUS at 17:29

## 2022-09-12 RX ADMIN — Medication 3.19 MICROGRAM(S)/KG/MIN: at 02:40

## 2022-09-12 RX ADMIN — Medication 101 MILLIGRAM(S): at 06:02

## 2022-09-12 RX ADMIN — MIDAZOLAM HYDROCHLORIDE 6.8 MG/KG/HR: 1 INJECTION, SOLUTION INTRAMUSCULAR; INTRAVENOUS at 19:27

## 2022-09-12 RX ADMIN — CASPOFUNGIN ACETATE 260 MILLIGRAM(S): 7 INJECTION, POWDER, LYOPHILIZED, FOR SOLUTION INTRAVENOUS at 22:55

## 2022-09-12 RX ADMIN — Medication 1000 MILLIGRAM(S): at 06:30

## 2022-09-12 NOTE — PROVIDER CONTACT NOTE (EICU) - ACTION/TREATMENT ORDERED:
cryoprecipitate 10 units and ketamine drip ordered as requested
Gentle mIVF D5LR 60cc/h started. Accuchecks q4h. To be followed up by bedside provider.
Additional 500cc LR bolus x1, Vitamin K 5mg IVPB x1 ordered. To be following up by bedside team.

## 2022-09-12 NOTE — DIETITIAN INITIAL EVALUATION ADULT - PERTINENT LABORATORY DATA
Sort throat, cough, sneezing since last night.
09-12    143  |  112<H>  |  27<H>  ----------------------------<  105<H>  4.5   |  27  |  1.18    Ca    7.4<L>      12 Sep 2022 02:20  Phos  2.6     09-12  Mg     2.1     09-12    TPro  4.4<L>  /  Alb  1.6<L>  /  TBili  1.9<H>  /  DBili  x   /  AST  147<H>  /  ALT  76  /  AlkPhos  49  09-12  POCT Blood Glucose.: 102 mg/dL (09-12-22 @ 10:01)

## 2022-09-12 NOTE — DIETITIAN INITIAL EVALUATION ADULT - OTHER CALCULATIONS
Height (cm): 172.7 (09-10)  Weight (kg): 81 (09-11)  BMI (kg/m2): 27.2 (09-11)  IBW: 69.8 kg       % IBW: 116%        UBW: 68.2 kg       %UBW: 118%. I/O: 8508/2790(+768)

## 2022-09-12 NOTE — DIETITIAN INITIAL EVALUATION ADULT - PERTINENT MEDS FT
MEDICATIONS  (STANDING):  caspofungin IVPB      caspofungin IVPB 50 milliGRAM(s) IV Intermittent every 24 hours  chlorhexidine 0.12% Liquid 15 milliLiter(s) Oral Mucosa every 12 hours  chlorhexidine 2% Cloths 1 Application(s) Topical <User Schedule>  dextrose 5% + lactated ringers. 1000 milliLiter(s) (60 mL/Hr) IV Continuous <Continuous>  fentaNYL    Injectable 50 MICROGram(s) IV Push once  fentaNYL   Infusion. 0.5 MICROgram(s)/kG/Hr (3.4 mL/Hr) IV Continuous <Continuous>  hydrocortisone sodium succinate Injectable 100 milliGRAM(s) IV Push every 8 hours  ketamine Infusion. 2 mG/kG/Hr (13.6 mL/Hr) IV Continuous <Continuous>  meropenem  IVPB 1000 milliGRAM(s) IV Intermittent every 8 hours  midazolam Infusion. 0.1 mG/kG/Hr (6.8 mL/Hr) IV Continuous <Continuous>  norepinephrine Infusion 0.05 MICROgram(s)/kG/Min (3.19 mL/Hr) IV Continuous <Continuous>  pantoprazole  Injectable 40 milliGRAM(s) IV Push two times a day  vancomycin  IVPB 1250 milliGRAM(s) IV Intermittent every 12 hours    MEDICATIONS  (PRN):

## 2022-09-12 NOTE — BRIEF OPERATIVE NOTE - NSICDXBRIEFPROCEDURE_GEN_ALL_CORE_FT
PROCEDURES:  Small bowel resection 13-Sep-2022 23:59:35  Gary Plasencia  Creation, enterostomy 13-Sep-2022 23:59:55  Gary Plasencia  Closure, fascia, abdomen 14-Sep-2022 00:00:09  Gary Plasencia  
PROCEDURES:  Exploratory laparotomy 10-Sep-2022 19:58:40  Reina Burnette  Colon resection 10-Sep-2022 19:59:06  Reina Burnette  
PROCEDURES:  Jose christopher 12-Sep-2022 17:41:00  Gwendolyn Dela Cruz

## 2022-09-12 NOTE — DIETITIAN INITIAL EVALUATION ADULT - NSFNSGIIOFT_GEN_A_CORE
09-11-22 @ 07:01  -  09-12-22 @ 07:00  --------------------------------------------------------  OUT:    Nasogastric/Oral tube (mL): 350 mL  Total OUT: 350 mL    Total NET: -350 mL

## 2022-09-12 NOTE — PROGRESS NOTE ADULT - SUBJECTIVE AND OBJECTIVE BOX
Pre-operative Note    - Pre-operative Diagnosis: sigmoid tumor, high grade colon obstruction    - Procedure: closure of abdomen, ileostomy creation      - Labs:                        13.2   5.55  )-----------( 382      ( 11 Sep 2022 10:30 )             39.8     09-11    145  |  114<H>  |  31<H>  ----------------------------<  84  4.4   |  25  |  1.18    Ca    8.1<L>      11 Sep 2022 11:40  Phos  2.9     09-11  Mg     2.0     09-11    TPro  3.9<L>  /  Alb  2.0<L>  /  TBili  2.7<H>  /  DBili  x   /  AST  165<H>  /  ALT  76  /  AlkPhos  43  09-11    PT/INR - ( 11 Sep 2022 11:40 )   PT: 22.9 sec;   INR: 1.90 ratio      PTT - ( 10 Sep 2022 22:15 )  PTT:45.1 sec    - CXR: completed   - EKG: completed    - Orders:  > NPO, IV hydration  > Continue antimicrobials   > Labs: CBC, BMP, coags, type & screen  > 2uPRBC on hold for OR    - Permits:  > Consent in chart  > Case scheduled with nursing supervisor  > Covid negative

## 2022-09-12 NOTE — CONSULT NOTE ADULT - SUBJECTIVE AND OBJECTIVE BOX
[facility]  Division of Infectious Diseases  [phone]    TAISHA ROGERS  56y, Male  [id number]    HPI--  Patient is intubated on ventilator and is a non-historian.  As per HPI:  56M with vomiting and chills for 2 days. Recently traveled from [place] and thought he just had a stomach bug however he was increasingly becoming distended and complaining of diffuse abdominal pain. Denies any Pmhx, trauma, or family hx. Has never had a colonoscopy. On ct shows sigmoid tumor and bowel obstruction.  (10 Sep 2022 15:54)    Patient was emergently taken to the OR and is now postoperative day #2 subtotal colectomy.  He was left with a open abdomen and returned to the OR is planned for today.  He remains in shock, and is hypoglycemic and bacteremic.  He is on broad-spectrum antibiotics including meropenem vancomycin and caspofungin.  He is also on stress dose steroids.      PMH/PSH--  No pertinent past medical history    No significant past surgical history        Allergies--  No Known Allergies      Medications--  Antibiotics: caspofungin IVPB      caspofungin IVPB 50 milliGRAM(s) IV Intermittent every 24 hours  meropenem  IVPB 1000 milliGRAM(s) IV Intermittent every 8 hours  vancomycin  IVPB 1250 milliGRAM(s) IV Intermittent every 12 hours    Immunologic:   Other: chlorhexidine 0.12% Liquid  chlorhexidine 2% Cloths  dextrose 5% + lactated ringers.  fentaNYL    Injectable  fentaNYL   Infusion.  hydrocortisone sodium succinate Injectable  ketamine Infusion.  midazolam Infusion.  norepinephrine Infusion  pantoprazole  Injectable    Antimicrobials last 90 days per EMR: MEDICATIONS  (STANDING):    caspofungin IVPB   70 milliGRAM(s) IV Intermittent (09-10-22 @ 22:29)    caspofungin IVPB   260 mL/Hr IV Intermittent (09-11-22 @ 19:51)    meropenem  IVPB   100 mL/Hr IV Intermittent (09-10-22 @ 22:30)    meropenem  IVPB   100 mL/Hr IV Intermittent (09-11-22 @ 17:11)   100 mL/Hr IV Intermittent (09-11-22 @ 05:33)    meropenem  IVPB   100 mL/Hr IV Intermittent (09-12-22 @ 15:06)   100 mL/Hr IV Intermittent (09-12-22 @ 09:49)   100 mL/Hr IV Intermittent (09-11-22 @ 23:10)    piperacillin/tazobactam IVPB.   200 mL/Hr IV Intermittent (09-10-22 @ 15:27)    vancomycin  IVPB   250 mL/Hr IV Intermittent (09-10-22 @ 17:36)    vancomycin  IVPB   300 mL/Hr IV Intermittent (09-11-22 @ 05:52)    vancomycin  IVPB   166.67 mL/Hr IV Intermittent (09-12-22 @ 09:52)   166.67 mL/Hr IV Intermittent (09-11-22 @ 19:26)        Social History--  EtOH: none known.  Tobacco: none known.  Drug use: none known.      Family/Marital History--  Unable        Review of Systems:  Review of systems unable secondary to clinical condition.     Physical Exam--  Vital Signs: T(F): 99.3 (09-12-22 @ 17:00), Max: 101.4 (09-12-22 @ 05:00)  HR: 88 (09-12-22 @ 17:52)  BP: 103/68 (09-11-22 @ 18:05)  RR: 26 (09-12-22 @ 17:52)  SpO2: 100% (09-12-22 @ 17:52)  Wt(kg): --  General: Nontoxic-appearing Male in no acute distress. Intubated on vent  HEENT: AT/NC. Pupils/EOM unable secondary to patient compliance. Oropharynx/dentition unable secondary to patient compliance.  Anicteric. Conjunctiva pink and moist. OETT.  Neck: Not rigid. No sense of mass.  Nodes: None palpable.  Lungs: Coarse B BS  Heart: Regular rate and rhythm. No Murmur. No rub. No gallop. No palpable thrill.  Abdomen: Bowel sounds absent. Abdomen packed open with iodinated clear dressing overlying. No reaction to palpation.   Back: Unable  Extremities: No cyanosis or clubbing. 1+ edema.   Skin: Warm. Dry. Good turgor. No rash. No vasculitic stigmata.  Psychiatric: Unable      Laboratory & Imaging Data--  CBC                        11.6   14.30 )-----------( 317      ( 12 Sep 2022 11:00 )             34.3       Chemistries  09-12    143  |  112<H>  |  27<H>  ----------------------------<  105<H>  4.5   |  27  |  1.18    Ca    7.4<L>      12 Sep 2022 02:20  Phos  2.6     09-12  Mg     2.1     09-12    TPro  4.4<L>  /  Alb  1.6<L>  /  TBili  1.9<H>  /  DBili  x   /  AST  147<H>  /  ALT  76  /  AlkPhos  49  09-12      Culture Data    Culture - Blood (collected 10 Sep 2022 15:30)  Source: .Blood Blood-Peripheral  Gram Stain (prelim) (11 Sep 2022 19:14):    Growth in anaerobic bottle: Gram Positive Cocci in Pairs and Chains  Preliminary Report (11 Sep 2022 19:14):    Growth in anaerobic bottle: Gram Positive Cocci in Pairs and Chains    ***Blood Panel PCR results on this specimen are available    approximately 3 hours after the Gram stain result.***    Gram stain, PCR, and/or culture results may not always    correspond due to difference in methodologies.    ************************************************************    This PCR assay was performed by multiplex PCR. This    Assay tests for 66 bacterial and resistance gene targets.    Please refer to the [facility] test directory    at [email or web address] for details.  Organism: Blood Culture PCR (11 Sep 2022 20:51)  Organism: Blood Culture PCR (11 Sep 2022 20:51)    Culture - Blood (collected 10 Sep 2022 15:30)  Source: .Blood Blood-Peripheral  Preliminary Report (11 Sep 2022 22:01):    No growth to date.      < from: CT Abdomen and Pelvis w/ IV Cont (09.10.22 @ 14:24) >    ACC: [id number] EXAM:  CT ABDOMEN AND PELVIS IC                          PROCEDURE DATE:  09/10/2022          INTERPRETATION:  CLINICAL INFORMATION: 56 years  Male with pain   distention.    COMPARISON: None.    CONTRAST/COMPLICATIONS:  IV Contrast: Omnipaque 350  85 cc administered   5 cc discarded  Oral Contrast: NONE  Complications: None reported at time of study completion    PROCEDURE:  CT of the Abdomen and Pelvis was performed.  Sagittal and coronal reformats were performed.    FINDINGS:  LOWER CHEST: Within normal limits.    LIVER: Within normal limits.  BILE DUCTS: Normal caliber.  GALLBLADDER: Within normal limits.  SPLEEN: Within normal limits.  PANCREAS: Within normal limits.  ADRENALS: Within normal limits.  KIDNEYS/URETERS: Within normal limits.    BLADDER: Within normal limits.  REPRODUCTIVE ORGANS: Mildly enlarged prostate.    BOWEL: Fluid-filled dilated esophagus. Normal caliber small bowel.   Markedly distended colon from cecum to an approximately 7.6 cm length   apple core lesion highly suspicious for malignancy (2:125 and 6:63).   Adjacent pericolonic fat stranding just of transmural extension of tumor.   Dependent air lucencies in the colon may reflect pneumatosis versus   intervening air between the stool and thecolonic wall, particularly in   the ascending colon. The colon is distended to 10.1 cm. Appendix is not   visualized. No evidence of inflammation in the pericecal region..  PERITONEUM: No ascites.  VESSELS: Within normal limits.  RETROPERITONEUM/LYMPH NODES: Enlarged left lower abdominal mesenteric   lymph node 2.0 x 1.4 cm (4:64).  ABDOMINAL WALL: Within normal limits.  BONES: Within normal limits.    IMPRESSION:  1.6 cm length sigmoid tumor resulting in high-grade colonic obstruction.   Suspecttransmural extension of tumor. Probable pneumatosis in the   ascending colon. The colon is distended to 10.1 cm. Fluid-filled dilated   esophagus.    Enlarged mesenteric lymph node.    CRITICAL VALUE: I discussed the major findings in this report with Dr. Ren Flores  at 9/10/2022 2:42 PM with readback. Critical value   policy of the hospital was followed. Read back and confirmation of   receipt of this communication was  performed. This verbal communication supplements the text report of this   document.    --- End of Report ---  ADDIS GALEANO MD; Attending Radiologist  This document has been electronically signed. Sep 10 2022  2:48PM    < end of copied text >    < from: CT Head No Cont (09.11.22 @ 21:53) >  mpression: No evidence of acute intracranial abnormality.    < end of copied text >    < from: Xray Chest 1 View- PORTABLE-Urgent (Xray Chest 1 View- PORTABLE-Urgent .) (09.10.22 @ 21:42) >  INTERPRETATION:  Chest one view    HISTORY: Intubation    COMPARISON STUDY: Earlier the same day    Frontal expiratory view of the chest shows the heart to be normal in   size. Right jugular line reaches the lower SVC. Nasogastric tube coils in   the stomach. Endotracheal tube reaches the orifice of the right bronchus.    The lungs show minimal right base atelectasis and there is no evidence of   pneumothorax nor pleural effusion.    IMPRESSION:  ET tube as noted.    Review of the patient's electronic record at that time showed that the   patient demonstrated normal breath sounds bilaterally.    < end of copied text >    < from: TTE Echo Complete w/o Contrast w/ Doppler (09.12.22 @ 09:04) >  Summary:   1. Technically difficult study.   2. Left ventricular endocardium not well visualized, overall left   ventricular systolic function appears to be preserved in the visualized   segments. Would consider repeat study with definity contrast if   clinically indicated.   3. The left atrium is normal in size.   4. Structurally normal mitral valve, with normal leaflet excursion.   5. Mild tricuspid regurgitation.      < end of copied text >

## 2022-09-12 NOTE — PROGRESS NOTE ADULT - ASSESSMENT
56 year old male with no PMH admitted to the ICU s/p emergent ex lap with colon resection, left open for tx of LBO 2/2 sigmoid colon mass. In shock state,  septic +/- hemorrhagic shock.     #Neuro  - Intubated and sedated   - Goal RASS 0/-1  - On fentanyl, ketamine and versed     #Cardiac  // Shock initially thought to be septic + hemorrhagic now likely septic  - On Levophed, previously in ramin/vaso   - Maintain MAP >65  - Pending TTE     //NSTEMI  - Troponin 181>122  - No EKG changes     #Pulm  - Intubated on 9/10, mechanical vent settings 26/450/40/5  - F/U ABG   - Wean vent settings as tolerated    #Renal  - Cr 1.18  - Monitor I/O and BMP    #GI  // LBO 2/2 mass eroding through and adherent to surrounding tissue now with peritonitis   -  s/p open abdomen with packing and dressing   - NGT to suction  - Maintain dressing   - Plan to return to OR today, f/u pathology   - NPO     #ID:   // ?Peritonitis   - Blood cultures 9/10 NGT, f/u on cultures   - Temp 100.8 in AM   - On meropenem, Vancomycin and Capsofungin     #ENDO  // BS 80s, started on 60cc/hr D5lr  - q4 checks    Heme  // Blood loss during  procedure   -s/p 3 units prbc 2 ffp in OR, cryo and TXA added   - Hgb 12.2, wbc 13.60   - PT 31, ptt 44.8, inr 2.56   - s/p Vit k overnight   - Plan for 2x FFP prior to or   - Trend H/H, continue transfusions as indicated     // DVT ppx: SCDs       #Ethics   - Full code

## 2022-09-12 NOTE — PROVIDER CONTACT NOTE (EICU) - BACKGROUND
-S/p multiple PRBC, FFP, Cryo, TXA, Vitamin K for mixed shock state (hypovolemic/septic).   -Hgb stable this AM (12.2). No signs of active bleeding per bedside RN. Patient w/ open abdomen w/ packing s/p emergent ex lap with colon resection, left open for tx of LBO 2/2 sigmoid colon mass. Plan to go back to OR possibly today.   -On gentle mIVF  D5LR.

## 2022-09-12 NOTE — BRIEF OPERATIVE NOTE - NSICDXBRIEFPOSTOP_GEN_ALL_CORE_FT
POST-OP DIAGNOSIS:  Septic shock 14-Sep-2022 00:01:10  Gary Plasencia  Colonic mass 14-Sep-2022 00:01:05  Gary Plasencia  Small bowel ischemia 14-Sep-2022 00:01:27  Gary Plasencia  
POST-OP DIAGNOSIS:  Appendicitis, acute 12-Sep-2022 17:42:31  Gwendolyn Dela Cruz

## 2022-09-12 NOTE — CONSULT NOTE ADULT - ASSESSMENT
Severe sepsis with fever, shock, in the setting of large bowel obstruction due to colon tumor with high-grade obstruction.  Official pathology is pending.  Patient has a single blood culture positive for gram-positive cocci in pairs and change.  This is likely an alphahemolytic Streptococcus species but full identification and sensitivity is pending.  Current antibiotics are exceedingly broad.  Does not know that this patient has any recent hospitalization or antibiotic use which would place him at risk for resistant yue.    Suggestions  Would stop vancomycin  For now continue caspofungin  Would de-escalate meropenem to Zosyn unless there is stronger support for use of the former  Follow-up blood culture data  Trend CBC, though white blood cell count may be difficult to interpret in the setting of ongoing steroid use  IV fluids  Decrease pressors as able  Trend lactate  Await pathology results  Await return to the OR  Prognosis poor    Thank you for the courtesy of this referral    Steve Hernandez MD  Attending Physician  [facility]  Division of Infectious Diseases  [phone]

## 2022-09-12 NOTE — PROGRESS NOTE ADULT - SUBJECTIVE AND OBJECTIVE BOX
INTERVAL HPI/OVERNIGHT EVENTS:    SUBJECTIVE: Patient seen and examined at bedside.       VITAL SIGNS:  ICU Vital Signs Last 24 Hrs  T(C): 38.6 (12 Sep 2022 05:00), Max: 39.3 (11 Sep 2022 12:00)  T(F): 101.4 (12 Sep 2022 05:00), Max: 102.7 (11 Sep 2022 12:00)  HR: 101 (12 Sep 2022 07:00) (72 - 133)  BP: 103/68 (11 Sep 2022 18:05) (77/55 - 103/68)  BP(mean): 76 (11 Sep 2022 18:05) (60 - 76)  ABP: 116/70 (12 Sep 2022 07:00) (51/47 - 131/122)  ABP(mean): 89 (12 Sep 2022 07:00) (47 - 127)  RR: 26 (12 Sep 2022 07:00) (25 - 26)  SpO2: 100% (12 Sep 2022 07:00) (96% - 100%)      Mode: AC/ CMV (Assist Control/ Continuous Mandatory Ventilation), RR (machine): 26, TV (machine): 450, FiO2: 40, PEEP: 5, ITime: 1, MAP: 10, PIP: 21  Plateau pressure:   P/F ratio:     09-11 @ 07:01  -  -12 @ 07:00  --------------------------------------------------------  IN: 3557.7 mL / OUT: 2790 mL / NET: 767.7 mL      CAPILLARY BLOOD GLUCOSE      POCT Blood Glucose.: 93 mg/dL (12 Sep 2022 05:28)    ECG:    PHYSICAL EXAM:    General:   HEENT:   Neck:   Respiratory:   Cardiovascular:   Abdomen:   Extremities:  Neurological:    MEDICATIONS:  MEDICATIONS  (STANDING):  caspofungin IVPB      caspofungin IVPB 50 milliGRAM(s) IV Intermittent every 24 hours  chlorhexidine 0.12% Liquid 15 milliLiter(s) Oral Mucosa every 12 hours  chlorhexidine 2% Cloths 1 Application(s) Topical <User Schedule>  dextrose 5% + lactated ringers. 1000 milliLiter(s) (60 mL/Hr) IV Continuous <Continuous>  fentaNYL    Injectable 50 MICROGram(s) IV Push once  fentaNYL   Infusion. 0.5 MICROgram(s)/kG/Hr (3.4 mL/Hr) IV Continuous <Continuous>  hydrocortisone sodium succinate Injectable 100 milliGRAM(s) IV Push every 8 hours  ketamine Infusion. 2 mG/kG/Hr (13.6 mL/Hr) IV Continuous <Continuous>  meropenem  IVPB 1000 milliGRAM(s) IV Intermittent every 8 hours  midazolam Infusion. 0.1 mG/kG/Hr (6.8 mL/Hr) IV Continuous <Continuous>  norepinephrine Infusion 0.05 MICROgram(s)/kG/Min (3.19 mL/Hr) IV Continuous <Continuous>  pantoprazole  Injectable 40 milliGRAM(s) IV Push two times a day  vancomycin  IVPB 1250 milliGRAM(s) IV Intermittent every 12 hours    MEDICATIONS  (PRN):      ALLERGIES:  Allergies    No Known Allergies    Intolerances        LABS:                        12.2   13.60 )-----------( 325      ( 12 Sep 2022 02:20 )             36.3     09-12    143  |  112<H>  |  27<H>  ----------------------------<  105<H>  4.5   |  27  |  1.18    Ca    7.4<L>      12 Sep 2022 02:20  Phos  2.6     -12  Mg     2.1     12    TPro  4.4<L>  /  Alb  1.6<L>  /  TBili  1.9<H>  /  DBili  x   /  AST  147<H>  /  ALT  76  /  AlkPhos  49  09-12    PT/INR - ( 12 Sep 2022 02:20 )   PT: 31.0 sec;   INR: 2.56 ratio         PTT - ( 12 Sep 2022 02:20 )  PTT:44.8 sec  Urinalysis Basic - ( 10 Sep 2022 16:20 )    Color: Brown / Appearance: very cloudy / S.020 / pH: x  Gluc: x / Ketone: Small  / Bili: Moderate / Urobili: 12 mg/dL   Blood: x / Protein: 100 mg/dL / Nitrite: Negative   Leuk Esterase: Trace / RBC: 6-10 /HPF / WBC 3-5   Sq Epi: x / Non Sq Epi: Occasional / Bacteria: Moderate        RADIOLOGY & ADDITIONAL TESTS: Reviewed.   INTERVAL HPI/OVERNIGHT EVENTS: BS 80s, started IVF D5LR 60cc/h. INR 2.56, GIVEN 500cc LR bolus x1, Vitamin K 5mg IVPB x1.     SUBJECTIVE: Patient seen and examined at bedside. Intubated and sedated.       VITAL SIGNS:  ICU Vital Signs Last 24 Hrs  T(C): 38.6 (12 Sep 2022 05:00), Max: 39.3 (11 Sep 2022 12:00)  T(F): 101.4 (12 Sep 2022 05:00), Max: 102.7 (11 Sep 2022 12:00)  HR: 101 (12 Sep 2022 07:00) (72 - 133)  BP: 103/68 (11 Sep 2022 18:05) (77/55 - 103/68)  BP(mean): 76 (11 Sep 2022 18:05) (60 - 76)  ABP: 116/70 (12 Sep 2022 07:00) (51/47 - 131/122)  ABP(mean): 89 (12 Sep 2022 07:00) (47 - 127)  RR: 26 (12 Sep 2022 07:00) (25 - 26)  SpO2: 100% (12 Sep 2022 07:00) (96% - 100%)      Mode: AC/ CMV (Assist Control/ Continuous Mandatory Ventilation), RR (machine): 26, TV (machine): 450, FiO2: 40, PEEP: 5, ITime: 1, MAP: 10, PIP: 21  Plateau pressure:   P/F ratio:     09-11 @ 07:01  -  09-12 @ 07:00  --------------------------------------------------------  IN: 3557.7 mL / OUT: 2790 mL / NET: 767.7 mL      CAPILLARY BLOOD GLUCOSE      POCT Blood Glucose.: 93 mg/dL (12 Sep 2022 05:28)      PHYSICAL EXAM:    General: Intubated and sedated   HEENT: Atraumatic and Normocephalic. NG tube in place,   Respiratory: Intubated  Cardiovascular: RRR  Abdomen: Midline abdominal incision with surgical dressing in place. Output from site brown/bloody.   Extremities: No erythema or warmth, cood to touch.   Neurological: Sedated     MEDICATIONS:  MEDICATIONS  (STANDING):  caspofungin IVPB      caspofungin IVPB 50 milliGRAM(s) IV Intermittent every 24 hours  chlorhexidine 0.12% Liquid 15 milliLiter(s) Oral Mucosa every 12 hours  chlorhexidine 2% Cloths 1 Application(s) Topical <User Schedule>  dextrose 5% + lactated ringers. 1000 milliLiter(s) (60 mL/Hr) IV Continuous <Continuous>  fentaNYL    Injectable 50 MICROGram(s) IV Push once  fentaNYL   Infusion. 0.5 MICROgram(s)/kG/Hr (3.4 mL/Hr) IV Continuous <Continuous>  hydrocortisone sodium succinate Injectable 100 milliGRAM(s) IV Push every 8 hours  ketamine Infusion. 2 mG/kG/Hr (13.6 mL/Hr) IV Continuous <Continuous>  meropenem  IVPB 1000 milliGRAM(s) IV Intermittent every 8 hours  midazolam Infusion. 0.1 mG/kG/Hr (6.8 mL/Hr) IV Continuous <Continuous>  norepinephrine Infusion 0.05 MICROgram(s)/kG/Min (3.19 mL/Hr) IV Continuous <Continuous>  pantoprazole  Injectable 40 milliGRAM(s) IV Push two times a day  vancomycin  IVPB 1250 milliGRAM(s) IV Intermittent every 12 hours    MEDICATIONS  (PRN):      ALLERGIES:  Allergies    No Known Allergies    Intolerances        LABS:                        12.2   13.60 )-----------( 325      ( 12 Sep 2022 02:20 )             36.3     09-12    143  |  112<H>  |  27<H>  ----------------------------<  105<H>  4.5   |  27  |  1.18    Ca    7.4<L>      12 Sep 2022 02:20  Phos  2.6     09-12  Mg     2.1     -12    TPro  4.4<L>  /  Alb  1.6<L>  /  TBili  1.9<H>  /  DBili  x   /  AST  147<H>  /  ALT  76  /  AlkPhos  49  09-12    PT/INR - ( 12 Sep 2022 02:20 )   PT: 31.0 sec;   INR: 2.56 ratio         PTT - ( 12 Sep 2022 02:20 )  PTT:44.8 sec  Urinalysis Basic - ( 10 Sep 2022 16:20 )    Color: Brown / Appearance: very cloudy / S.020 / pH: x  Gluc: x / Ketone: Small  / Bili: Moderate / Urobili: 12 mg/dL   Blood: x / Protein: 100 mg/dL / Nitrite: Negative   Leuk Esterase: Trace / RBC: 6-10 /HPF / WBC 3-5   Sq Epi: x / Non Sq Epi: Occasional / Bacteria: Moderate        RADIOLOGY & ADDITIONAL TESTS: Reviewed.

## 2022-09-12 NOTE — BRIEF OPERATIVE NOTE - NSICDXBRIEFPREOP_GEN_ALL_CORE_FT
PRE-OP DIAGNOSIS:  Acute appendicitis 12-Sep-2022 17:41:10  Gwendolyn Dela Cruz  
PRE-OP DIAGNOSIS:  Bowel obstruction 10-Sep-2022 20:00:22  Reina Burnette  Septic shock 10-Sep-2022 20:00:34  Reina Burnette  
PRE-OP DIAGNOSIS:  Colonic mass 14-Sep-2022 00:00:31  Gary Plasencia  Septic shock 10-Sep-2022 20:00:34  Reina Burnette

## 2022-09-12 NOTE — DIETITIAN INITIAL EVALUATION ADULT - PHYSCIAL ASSESSMENT
Pt's wife did not notice any wt. changes PTA, estimated usual wt. of 150 LBS/68.2 kg reported./overweight/debilitated

## 2022-09-12 NOTE — DIETITIAN INITIAL EVALUATION ADULT - ORAL INTAKE PTA/DIET HISTORY
Pt is sedated, on vent, spoke to pt's wife who is visiting pt at present.  Pt c usual intake of 2 meals/day, avoided milk due to lactose intolerance, used almond milk at home, however was eating some milk containing products ie. ice cream PTA.  Vomiting and chills 2 days PTA noted.

## 2022-09-12 NOTE — DIETITIAN INITIAL EVALUATION ADULT - OTHER INFO
Pt s/p surgery on 09/10 exploratory laparotomy , colon resection, open wound and return to OR 09/12 for closure of wound, ileostomy creation.

## 2022-09-12 NOTE — PROVIDER CONTACT NOTE (EICU) - SITUATION
e-Alerted by bedside RN as patient w/ BS 80 (previously 97 on last check)- sugars have been persistently low throughout day and patient is NPO.
eLert from bedside team requesting assistance with 
e-Alerted by bedside RN w/ elevated lactate (2.7, downtrending/previously 3.7 - improving) and INR 2.56.

## 2022-09-12 NOTE — BRIEF OPERATIVE NOTE - OPERATION/FINDINGS
Removal of termporary abdominal dressing  Frankly necrotic terminal ileum  Areas of patchy necrosis of small bowel  120cm of small bowel left  Creation of enterostomy  Post Surgery X Ray did not demonstrate any remaining lap pads or retained instrumentation
Johanne castrejon, see op report
subtotal colectomy,

## 2022-09-13 LAB
-  CEFTRIAXONE: SIGNIFICANT CHANGE UP
-  CLINDAMYCIN: SIGNIFICANT CHANGE UP
-  ERYTHROMYCIN: SIGNIFICANT CHANGE UP
-  LEVOFLOXACIN: SIGNIFICANT CHANGE UP
-  PENICILLIN: SIGNIFICANT CHANGE UP
-  VANCOMYCIN: SIGNIFICANT CHANGE UP
ALBUMIN SERPL ELPH-MCNC: 1.5 G/DL — LOW (ref 3.3–5)
ALP SERPL-CCNC: 51 U/L — SIGNIFICANT CHANGE UP (ref 40–120)
ALT FLD-CCNC: 73 U/L — SIGNIFICANT CHANGE UP (ref 12–78)
ANION GAP SERPL CALC-SCNC: 3 MMOL/L — LOW (ref 5–17)
AST SERPL-CCNC: 101 U/L — HIGH (ref 15–37)
BILIRUB SERPL-MCNC: 1 MG/DL — SIGNIFICANT CHANGE UP (ref 0.2–1.2)
BUN SERPL-MCNC: 21 MG/DL — SIGNIFICANT CHANGE UP (ref 7–23)
CALCIUM SERPL-MCNC: 7.7 MG/DL — LOW (ref 8.5–10.1)
CHLORIDE SERPL-SCNC: 111 MMOL/L — HIGH (ref 96–108)
CO2 SERPL-SCNC: 29 MMOL/L — SIGNIFICANT CHANGE UP (ref 22–31)
CREAT SERPL-MCNC: 0.86 MG/DL — SIGNIFICANT CHANGE UP (ref 0.5–1.3)
CULTURE RESULTS: SIGNIFICANT CHANGE UP
EGFR: 101 ML/MIN/1.73M2 — SIGNIFICANT CHANGE UP
GLUCOSE BLDC GLUCOMTR-MCNC: 106 MG/DL — HIGH (ref 70–99)
GLUCOSE BLDC GLUCOMTR-MCNC: 117 MG/DL — HIGH (ref 70–99)
GLUCOSE BLDC GLUCOMTR-MCNC: 118 MG/DL — HIGH (ref 70–99)
GLUCOSE BLDC GLUCOMTR-MCNC: 94 MG/DL — SIGNIFICANT CHANGE UP (ref 70–99)
GLUCOSE SERPL-MCNC: 133 MG/DL — HIGH (ref 70–99)
GRAM STN FLD: SIGNIFICANT CHANGE UP
HCT VFR BLD CALC: 27.5 % — LOW (ref 39–50)
HCT VFR BLD CALC: 28.3 % — LOW (ref 39–50)
HGB BLD-MCNC: 9 G/DL — LOW (ref 13–17)
HGB BLD-MCNC: 9.3 G/DL — LOW (ref 13–17)
MAGNESIUM SERPL-MCNC: 2 MG/DL — SIGNIFICANT CHANGE UP (ref 1.6–2.6)
MAGNESIUM SERPL-MCNC: 2.2 MG/DL — SIGNIFICANT CHANGE UP (ref 1.6–2.6)
MCHC RBC-ENTMCNC: 26.5 PG — LOW (ref 27–34)
MCHC RBC-ENTMCNC: 26.5 PG — LOW (ref 27–34)
MCHC RBC-ENTMCNC: 32.7 G/DL — SIGNIFICANT CHANGE UP (ref 32–36)
MCHC RBC-ENTMCNC: 32.9 G/DL — SIGNIFICANT CHANGE UP (ref 32–36)
MCV RBC AUTO: 80.6 FL — SIGNIFICANT CHANGE UP (ref 80–100)
MCV RBC AUTO: 81.1 FL — SIGNIFICANT CHANGE UP (ref 80–100)
METHOD TYPE: SIGNIFICANT CHANGE UP
NRBC # BLD: 0 /100 WBCS — SIGNIFICANT CHANGE UP (ref 0–0)
NRBC # BLD: 0 /100 WBCS — SIGNIFICANT CHANGE UP (ref 0–0)
ORGANISM # SPEC MICROSCOPIC CNT: SIGNIFICANT CHANGE UP
PHOSPHATE SERPL-MCNC: 1.4 MG/DL — LOW (ref 2.5–4.5)
PHOSPHATE SERPL-MCNC: 2 MG/DL — LOW (ref 2.5–4.5)
PLATELET # BLD AUTO: 179 K/UL — SIGNIFICANT CHANGE UP (ref 150–400)
PLATELET # BLD AUTO: 202 K/UL — SIGNIFICANT CHANGE UP (ref 150–400)
POTASSIUM SERPL-MCNC: 3.3 MMOL/L — LOW (ref 3.5–5.3)
POTASSIUM SERPL-SCNC: 3.3 MMOL/L — LOW (ref 3.5–5.3)
PROT SERPL-MCNC: 4.3 GM/DL — LOW (ref 6–8.3)
RBC # BLD: 3.39 M/UL — LOW (ref 4.2–5.8)
RBC # BLD: 3.51 M/UL — LOW (ref 4.2–5.8)
RBC # FLD: 16 % — HIGH (ref 10.3–14.5)
RBC # FLD: 16.2 % — HIGH (ref 10.3–14.5)
SODIUM SERPL-SCNC: 143 MMOL/L — SIGNIFICANT CHANGE UP (ref 135–145)
SPECIMEN SOURCE: SIGNIFICANT CHANGE UP
WBC # BLD: 6.25 K/UL — SIGNIFICANT CHANGE UP (ref 3.8–10.5)
WBC # BLD: 6.74 K/UL — SIGNIFICANT CHANGE UP (ref 3.8–10.5)
WBC # FLD AUTO: 6.25 K/UL — SIGNIFICANT CHANGE UP (ref 3.8–10.5)
WBC # FLD AUTO: 6.74 K/UL — SIGNIFICANT CHANGE UP (ref 3.8–10.5)

## 2022-09-13 PROCEDURE — 99291 CRITICAL CARE FIRST HOUR: CPT | Mod: GC

## 2022-09-13 PROCEDURE — 99232 SBSQ HOSP IP/OBS MODERATE 35: CPT

## 2022-09-13 RX ORDER — HEPARIN SODIUM 5000 [USP'U]/ML
5000 INJECTION INTRAVENOUS; SUBCUTANEOUS EVERY 12 HOURS
Refills: 0 | Status: DISCONTINUED | OUTPATIENT
Start: 2022-09-13 | End: 2022-09-21

## 2022-09-13 RX ORDER — PIPERACILLIN AND TAZOBACTAM 4; .5 G/20ML; G/20ML
3.38 INJECTION, POWDER, LYOPHILIZED, FOR SOLUTION INTRAVENOUS ONCE
Refills: 0 | Status: COMPLETED | OUTPATIENT
Start: 2022-09-13 | End: 2022-09-13

## 2022-09-13 RX ORDER — DEXMEDETOMIDINE HYDROCHLORIDE IN 0.9% SODIUM CHLORIDE 4 UG/ML
0.2 INJECTION INTRAVENOUS
Qty: 200 | Refills: 0 | Status: DISCONTINUED | OUTPATIENT
Start: 2022-09-13 | End: 2022-09-14

## 2022-09-13 RX ORDER — PIPERACILLIN AND TAZOBACTAM 4; .5 G/20ML; G/20ML
3.38 INJECTION, POWDER, LYOPHILIZED, FOR SOLUTION INTRAVENOUS EVERY 8 HOURS
Refills: 0 | Status: DISCONTINUED | OUTPATIENT
Start: 2022-09-13 | End: 2022-09-14

## 2022-09-13 RX ORDER — PIPERACILLIN AND TAZOBACTAM 4; .5 G/20ML; G/20ML
3.75 INJECTION, POWDER, LYOPHILIZED, FOR SOLUTION INTRAVENOUS EVERY 12 HOURS
Refills: 0 | Status: DISCONTINUED | OUTPATIENT
Start: 2022-09-13 | End: 2022-09-13

## 2022-09-13 RX ORDER — POTASSIUM PHOSPHATE, MONOBASIC POTASSIUM PHOSPHATE, DIBASIC 236; 224 MG/ML; MG/ML
30 INJECTION, SOLUTION INTRAVENOUS ONCE
Refills: 0 | Status: COMPLETED | OUTPATIENT
Start: 2022-09-13 | End: 2022-09-13

## 2022-09-13 RX ORDER — FENTANYL CITRATE 50 UG/ML
50 INJECTION INTRAVENOUS
Refills: 0 | Status: DISCONTINUED | OUTPATIENT
Start: 2022-09-13 | End: 2022-09-15

## 2022-09-13 RX ADMIN — SODIUM CHLORIDE 100 MILLILITER(S): 9 INJECTION, SOLUTION INTRAVENOUS at 19:11

## 2022-09-13 RX ADMIN — PANTOPRAZOLE SODIUM 40 MILLIGRAM(S): 20 TABLET, DELAYED RELEASE ORAL at 17:41

## 2022-09-13 RX ADMIN — FENTANYL CITRATE 3.4 MICROGRAM(S)/KG/HR: 50 INJECTION INTRAVENOUS at 08:15

## 2022-09-13 RX ADMIN — PIPERACILLIN AND TAZOBACTAM 25 GRAM(S): 4; .5 INJECTION, POWDER, LYOPHILIZED, FOR SOLUTION INTRAVENOUS at 22:15

## 2022-09-13 RX ADMIN — CHLORHEXIDINE GLUCONATE 15 MILLILITER(S): 213 SOLUTION TOPICAL at 05:08

## 2022-09-13 RX ADMIN — MIDAZOLAM HYDROCHLORIDE 6.8 MG/KG/HR: 1 INJECTION, SOLUTION INTRAMUSCULAR; INTRAVENOUS at 03:27

## 2022-09-13 RX ADMIN — FENTANYL CITRATE 50 MICROGRAM(S): 50 INJECTION INTRAVENOUS at 23:18

## 2022-09-13 RX ADMIN — PIPERACILLIN AND TAZOBACTAM 200 GRAM(S): 4; .5 INJECTION, POWDER, LYOPHILIZED, FOR SOLUTION INTRAVENOUS at 08:49

## 2022-09-13 RX ADMIN — POTASSIUM PHOSPHATE, MONOBASIC POTASSIUM PHOSPHATE, DIBASIC 83.33 MILLIMOLE(S): 236; 224 INJECTION, SOLUTION INTRAVENOUS at 08:15

## 2022-09-13 RX ADMIN — FENTANYL CITRATE 50 MICROGRAM(S): 50 INJECTION INTRAVENOUS at 15:14

## 2022-09-13 RX ADMIN — FENTANYL CITRATE 50 MICROGRAM(S): 50 INJECTION INTRAVENOUS at 23:40

## 2022-09-13 RX ADMIN — MEROPENEM 100 MILLIGRAM(S): 1 INJECTION INTRAVENOUS at 08:15

## 2022-09-13 RX ADMIN — HEPARIN SODIUM 5000 UNIT(S): 5000 INJECTION INTRAVENOUS; SUBCUTANEOUS at 18:27

## 2022-09-13 RX ADMIN — Medication 100 MILLIGRAM(S): at 14:41

## 2022-09-13 RX ADMIN — MIDAZOLAM HYDROCHLORIDE 6.8 MG/KG/HR: 1 INJECTION, SOLUTION INTRAMUSCULAR; INTRAVENOUS at 08:15

## 2022-09-13 RX ADMIN — DEXMEDETOMIDINE HYDROCHLORIDE IN 0.9% SODIUM CHLORIDE 4.05 MICROGRAM(S)/KG/HR: 4 INJECTION INTRAVENOUS at 23:31

## 2022-09-13 RX ADMIN — FENTANYL CITRATE 50 MICROGRAM(S): 50 INJECTION INTRAVENOUS at 15:29

## 2022-09-13 RX ADMIN — SODIUM CHLORIDE 100 MILLILITER(S): 9 INJECTION, SOLUTION INTRAVENOUS at 08:16

## 2022-09-13 RX ADMIN — CASPOFUNGIN ACETATE 260 MILLIGRAM(S): 7 INJECTION, POWDER, LYOPHILIZED, FOR SOLUTION INTRAVENOUS at 21:03

## 2022-09-13 RX ADMIN — PANTOPRAZOLE SODIUM 40 MILLIGRAM(S): 20 TABLET, DELAYED RELEASE ORAL at 05:08

## 2022-09-13 RX ADMIN — PIPERACILLIN AND TAZOBACTAM 25 GRAM(S): 4; .5 INJECTION, POWDER, LYOPHILIZED, FOR SOLUTION INTRAVENOUS at 15:19

## 2022-09-13 RX ADMIN — Medication 100 MILLIGRAM(S): at 06:51

## 2022-09-13 RX ADMIN — SODIUM CHLORIDE 100 MILLILITER(S): 9 INJECTION, SOLUTION INTRAVENOUS at 17:42

## 2022-09-13 RX ADMIN — CHLORHEXIDINE GLUCONATE 15 MILLILITER(S): 213 SOLUTION TOPICAL at 17:42

## 2022-09-13 RX ADMIN — Medication 100 MILLIGRAM(S): at 21:15

## 2022-09-13 NOTE — PROGRESS NOTE ADULT - CRITICAL CARE ATTENDING COMMENT
55 yo M with no PMH a/w large bowel obstruction, now s/p emergent ex lap with colon resection, left open for tx of LBO 2/2 sigmoid colon mass.  Noted to be shock state 2/2 septic and hemorrhagic shock from acute blood loss anemia MAXWELL, lactic acidosis, NSTEMI with decreased LV function on POCUS.  Now weaning off 3 pressors, down to only norepinephrine.  Overnight noted to have ? seizure, s/p ct head with no acute findings.      Neuro: Intubated and sedated - continue with fentanyl, ketamine and versed; maintain RASS goal of 0 to -1.  Pending return to OR for washout and closure  CV: Shock state - likely combined in nature initially with hemorrhagic and septic, possibly septic at this point.  Continue with norepinephrine to maintain MAP >65.  TTE pending.  - Troponin leak possibly NSTEMI vs demand ischemia given pressor requirements in the setting of shock state.  Pulm: Intubate on 9/10 for OR; c/w 26/450/40/+5; given return to OR not a candidate for weaning at this time.   GI: Large bowel obstruction 2/2 mass eroding through and adherent to surrounding structures with peritonitis, plan for return to OR for washout, NGT to suction; abdominal wound with blood/serosanginuous drainage.  Continue to monitor output.  Renal/Metabolic: MAXWELL with creatinine improving. Monitor closely.   ID: Peritonitis from perforated viscous - ID recommending deescalating to zosyn and caspofungin only at this time.   Heme: Acute blood loss anemia in the setting of persistent coagulopathy - s/p 3 units prbc 2 ffp in OR, cryo and TXA added   - received vitamin K overnight, will give 2 units FFP prior to OR.  Trend H/H and will require H/H after OR.    - SCDs  Endo: Started on D5LR for FS in 80s  Dispo: Full code
57 yo M with no PMH a/w large bowel obstruction, now s/p emergent ex lap with colon resection, left open for tx of LBO 2/2 sigmoid colon mass.  Noted to be shock state 2/2 septic and hemorrhagic shock from acute blood loss anemia MAXWELL, lactic acidosis, NSTEMI with decreased LV function on POCUS.  Now weaning off 3 pressors, down to only norepinephrine.  Overnight noted to have ? seizure, s/p ct head with no acute findings.      Neuro: Intubated and sedated - continue with fentanyl, ketamine and versed; maintain RASS goal of 0 to -1.  Pending return to OR for washout and closure.  Weaned off ketamine overnight, now holding fentanyl and versed.    CV: Shock state - likely combined in nature initially with hemorrhagic and septic, possibly septic at this point.  Weaned off norepinephrine overnight.  Maintain MAP >65.  TTE pending.  - Troponin leak possibly NSTEMI vs demand ischemia given pressor requirements in the setting of shock state.  Pulm: Intubate on 9/10 for OR; c/w 26/450/40/+5; given return to OR not a candidate for weaning at this time.   GI: Large bowel obstruction 2/2 mass eroding through and adherent to surrounding structures with peritonitis, plan for return to OR for washout, NGT to suction; abdominal wound with blood/serosanginuous drainage.  Continue to monitor output.  Renal/Metabolic: MAXWELL with creatinine improving. Monitor closely.   ID: Peritonitis from perforated viscous - ID recommending deescalating to zosyn and caspofungin only at this time.   Heme: Acute blood loss anemia in the setting of persistent coagulopathy - s/p 3 units prbc 2 ffp in OR, cryo and TXA added   - received vitamin K overnight, will give 2 units FFP prior to OR.  Trend H/H and will require H/H after OR.    - SCDs  Endo: Started on D5LR for FS in 80s  Dispo: Full code. Awaiting re-evaluation of mental status for pressure support trials and possible extubation.

## 2022-09-13 NOTE — PROGRESS NOTE ADULT - ASSESSMENT
Severe sepsis with fever, shock, in the setting of large bowel obstruction due to colon tumor with high-grade obstruction.  Official pathology is pending.  Patient has a single blood culture positive for gram-positive cocci in pairs and change.  This is likely an alphahemolytic Streptococcus species but full identification and sensitivity is pending.  Current antibiotics are exceedingly broad.  Does not know that this patient has any recent hospitalization or antibiotic use which would place him at risk for resistant yue.    9/13: Shock improved, white blood cell count normal, cultures limited to a single positive bottle with an alphahemolytic Streptococcus species.  While this could be a contaminant, it could also be a true positive given the clinical context.  We will see a role to augment antibiotics at this juncture.    Suggestions  Hope to limit caspofungin use to another 24 hours  Continue Zosyn  Follow-up culture data  Monitor CBC      Steve Hernandez MD  Attending Physician  [facility]  Division of Infectious Diseases  [phone]

## 2022-09-13 NOTE — PROGRESS NOTE ADULT - SUBJECTIVE AND OBJECTIVE BOX
INTERVAL HPI/OVERNIGHT EVENTS: Returned to OR, s/p     SUBJECTIVE: Patient seen and examined at bedside.       VITAL SIGNS:  ICU Vital Signs Last 24 Hrs  T(C): 37.7 (13 Sep 2022 15:30), Max: 37.7 (13 Sep 2022 15:30)  T(F): 99.8 (13 Sep 2022 15:30), Max: 99.8 (13 Sep 2022 15:30)  HR: 83 (13 Sep 2022 16:42) (71 - 88)  BP: 143/88 (12 Sep 2022 23:34) (122/84 - 146/89)  BP(mean): 102 (12 Sep 2022 23:34) (93 - 103)  ABP: 152/90 (13 Sep 2022 16:00) (95/90 - 166/99)  ABP(mean): 114 (13 Sep 2022 16:00) (-3 - 165)  RR: 26 (13 Sep 2022 16:00) (26 - 29)  SpO2: 100% (13 Sep 2022 16:42) (99% - 100%)    O2 Parameters below as of 13 Sep 2022 07:30  Patient On (Oxygen Delivery Method): ventilator,450;5;26;40%          Mode: AC/ CMV (Assist Control/ Continuous Mandatory Ventilation), RR (machine): 26, TV (machine): 450, FiO2: 30, PEEP: 5, ITime: 0.7, MAP: 10, PIP: 21  Plateau pressure:   P/F ratio:     09-12 @ 07:01  -  09-13 @ 07:00  --------------------------------------------------------  IN: 3639.4 mL / OUT: 1575 mL / NET: 2064.4 mL    09-13 @ 07:01  -  09-13 @ 16:47  --------------------------------------------------------  IN: 1131 mL / OUT: 807 mL / NET: 324 mL      CAPILLARY BLOOD GLUCOSE      POCT Blood Glucose.: 118 mg/dL (13 Sep 2022 13:21)    ECG:    PHYSICAL EXAM:    General:   HEENT:   Neck:   Respiratory:   Cardiovascular:   Abdomen:   Extremities:  Neurological:    MEDICATIONS:  MEDICATIONS  (STANDING):  caspofungin IVPB      caspofungin IVPB 50 milliGRAM(s) IV Intermittent every 24 hours  chlorhexidine 0.12% Liquid 15 milliLiter(s) Oral Mucosa every 12 hours  chlorhexidine 2% Cloths 1 Application(s) Topical <User Schedule>  dextrose 5% + lactated ringers. 1000 milliLiter(s) (100 mL/Hr) IV Continuous <Continuous>  fentaNYL   Infusion. 0.5 MICROgram(s)/kG/Hr (3.4 mL/Hr) IV Continuous <Continuous>  hydrocortisone sodium succinate Injectable 100 milliGRAM(s) IV Push every 8 hours  midazolam Infusion. 0.1 mG/kG/Hr (6.8 mL/Hr) IV Continuous <Continuous>  norepinephrine Infusion 0.05 MICROgram(s)/kG/Min (3.19 mL/Hr) IV Continuous <Continuous>  pantoprazole  Injectable 40 milliGRAM(s) IV Push two times a day  piperacillin/tazobactam IVPB.. 3.375 Gram(s) IV Intermittent every 8 hours    MEDICATIONS  (PRN):      ALLERGIES:  Allergies    No Known Allergies    Intolerances        LABS:                        9.0    6.74  )-----------( 179      ( 13 Sep 2022 14:28 )             27.5     09-13    143  |  111<H>  |  21  ----------------------------<  133<H>  3.3<L>   |  29  |  0.86    Ca    7.7<L>      13 Sep 2022 03:00  Phos  2.0     09-13  Mg     2.2     09-13    TPro  4.3<L>  /  Alb  1.5<L>  /  TBili  1.0  /  DBili  x   /  AST  101<H>  /  ALT  73  /  AlkPhos  51  09-13    PT/INR - ( 12 Sep 2022 11:00 )   PT: 27.7 sec;   INR: 2.31 ratio         PTT - ( 12 Sep 2022 11:00 )  PTT:43.8 sec      RADIOLOGY & ADDITIONAL TESTS: Reviewed.   INTERVAL HPI/OVERNIGHT EVENTS: Returned to OR for closure of abdomen, SBR, ileostomy creation    SUBJECTIVE: Patient seen and examined at bedside. No immediate concerns.       VITAL SIGNS:  ICU Vital Signs Last 24 Hrs  T(C): 37.7 (13 Sep 2022 15:30), Max: 37.7 (13 Sep 2022 15:30)  T(F): 99.8 (13 Sep 2022 15:30), Max: 99.8 (13 Sep 2022 15:30)  HR: 83 (13 Sep 2022 16:42) (71 - 88)  BP: 143/88 (12 Sep 2022 23:34) (122/84 - 146/89)  BP(mean): 102 (12 Sep 2022 23:34) (93 - 103)  ABP: 152/90 (13 Sep 2022 16:00) (95/90 - 166/99)  ABP(mean): 114 (13 Sep 2022 16:00) (-3 - 165)  RR: 26 (13 Sep 2022 16:00) (26 - 29)  SpO2: 100% (13 Sep 2022 16:42) (99% - 100%)    O2 Parameters below as of 13 Sep 2022 07:30  Patient On (Oxygen Delivery Method): ventilator,450;5;26;40%          Mode: AC/ CMV (Assist Control/ Continuous Mandatory Ventilation), RR (machine): 26, TV (machine): 450, FiO2: 30, PEEP: 5, ITime: 0.7, MAP: 10, PIP: 21  Plateau pressure:   P/F ratio:     09-12 @ 07:01 - 09-13 @ 07:00  --------------------------------------------------------  IN: 3639.4 mL / OUT: 1575 mL / NET: 2064.4 mL    09-13 @ 07:01  -  09-13 @ 16:47  --------------------------------------------------------  IN: 1131 mL / OUT: 807 mL / NET: 324 mL      CAPILLARY BLOOD GLUCOSE      POCT Blood Glucose.: 118 mg/dL (13 Sep 2022 13:21)    ECG:    PHYSICAL EXAM:    General: Intubated and sedated.   Head: Normocephalic and atraumatic.  Eyes: PERRLA with EOMI.  Neck: Supple. Trachea midline.   Cardiac: Normal S1 and S2 w/ RRR. No murmurs appreciated. No JVD appreciated.  Pulmonary: Intubated. CTA bilaterally  Abdominal: surgical incision with dressing.   Neurologic: Sedated  Skin: Color appropriate for race. Intact, warm, and well-perfused.    MEDICATIONS:  MEDICATIONS  (STANDING):  caspofungin IVPB      caspofungin IVPB 50 milliGRAM(s) IV Intermittent every 24 hours  chlorhexidine 0.12% Liquid 15 milliLiter(s) Oral Mucosa every 12 hours  chlorhexidine 2% Cloths 1 Application(s) Topical <User Schedule>  dextrose 5% + lactated ringers. 1000 milliLiter(s) (100 mL/Hr) IV Continuous <Continuous>  fentaNYL   Infusion. 0.5 MICROgram(s)/kG/Hr (3.4 mL/Hr) IV Continuous <Continuous>  hydrocortisone sodium succinate Injectable 100 milliGRAM(s) IV Push every 8 hours  midazolam Infusion. 0.1 mG/kG/Hr (6.8 mL/Hr) IV Continuous <Continuous>  norepinephrine Infusion 0.05 MICROgram(s)/kG/Min (3.19 mL/Hr) IV Continuous <Continuous>  pantoprazole  Injectable 40 milliGRAM(s) IV Push two times a day  piperacillin/tazobactam IVPB.. 3.375 Gram(s) IV Intermittent every 8 hours    MEDICATIONS  (PRN):      ALLERGIES:  Allergies    No Known Allergies    Intolerances        LABS:                        9.0    6.74  )-----------( 179      ( 13 Sep 2022 14:28 )             27.5     09-13    143  |  111<H>  |  21  ----------------------------<  133<H>  3.3<L>   |  29  |  0.86    Ca    7.7<L>      13 Sep 2022 03:00  Phos  2.0     09-13  Mg     2.2     09-13    TPro  4.3<L>  /  Alb  1.5<L>  /  TBili  1.0  /  DBili  x   /  AST  101<H>  /  ALT  73  /  AlkPhos  51  09-13    PT/INR - ( 12 Sep 2022 11:00 )   PT: 27.7 sec;   INR: 2.31 ratio         PTT - ( 12 Sep 2022 11:00 )  PTT:43.8 sec      RADIOLOGY & ADDITIONAL TESTS: Reviewed.

## 2022-09-13 NOTE — PROGRESS NOTE ADULT - SUBJECTIVE AND OBJECTIVE BOX
[facility]  Division of Infectious Diseases  [phone]    Name: TAISHA ROGERS  Age: 56y  Gender: Male  MRN: [id number]    Interval History--  Notes reviewed. Seen earlier today.  Remains in ICU, intubated on ventilator.  Patient is postoperative day #1 closure of abdominal wall small bowel resection, ileostomy creation.  Discussed with critical care assessment.    Past Medical History--  No pertinent past medical history    No significant past surgical history        For details regarding the patient's social history, family history, and other miscellaneous elements, please refer the initial infectious diseases consultation and/or the admitting history and physical examination for this admission.    Allergies    No Known Allergies    Intolerances        Medications--  Antibiotics:  caspofungin IVPB      caspofungin IVPB 50 milliGRAM(s) IV Intermittent every 24 hours  piperacillin/tazobactam IVPB.. 3.375 Gram(s) IV Intermittent every 8 hours    Immunologic:    Other:  chlorhexidine 0.12% Liquid  chlorhexidine 2% Cloths  dextrose 5% + lactated ringers.  fentaNYL   Infusion.  hydrocortisone sodium succinate Injectable  midazolam Infusion.  norepinephrine Infusion  pantoprazole  Injectable      Review of Systems--  Review of systems unable secondary to clinical condition.    Physical Examination--  Vital Signs: T(F): 99.8 (09-13-22 @ 15:30), Max: 99.8 (09-13-22 @ 15:30)  HR: 83 (09-13-22 @ 16:42)  BP: 143/88 (09-12-22 @ 23:34)  RR: 26 (09-13-22 @ 16:00)  SpO2: 100% (09-13-22 @ 16:42)  Wt(kg): --  General: Nontoxic-appearing Male in no acute distress. Intubated on vent  HEENT: AT/NC. Pupils/EOM unable secondary to patient compliance. Oropharynx/dentition unable secondary to patient compliance.  Anicteric. Conjunctiva pink and moist. OETT.  Neck: Not rigid. No sense of mass.  Nodes: None palpable.  Lungs: Coarse B BS  Heart: Regular rate and rhythm. No Murmur. No rub. No gallop. No palpable thrill.  Abdomen: Bowel sounds absent. Abdomen dressed. No reaction to palpation.   Back: Unable  Extremities: No cyanosis or clubbing. 1+ edema.   Skin: Warm. Dry. Good turgor. No rash. No vasculitic stigmata.  Psychiatric: Unable        Laboratory Studies--  CBC                        9.0    6.74  )-----------( 179      ( 13 Sep 2022 14:28 )             27.5       Chemistries  09-13    143  |  111<H>  |  21  ----------------------------<  133<H>  3.3<L>   |  29  |  0.86    Ca    7.7<L>      13 Sep 2022 03:00  Phos  2.0     09-13  Mg     2.2     09-13    TPro  4.3<L>  /  Alb  1.5<L>  /  TBili  1.0  /  DBili  x   /  AST  101<H>  /  ALT  73  /  AlkPhos  51  09-13      Culture Data    Culture - Blood (collected 10 Sep 2022 15:30)  Source: .Blood Blood-Peripheral  Gram Stain (13 Sep 2022 16:49):    Growth in anaerobic bottle: Gram Positive Cocci in Pairs and Chains  Final Report (13 Sep 2022 16:49):    Growth in anaerobic bottle: Streptococcus anginosus    ***Blood Panel PCR results on this specimen are available    approximately 3 hours after the Gram stain result.***    Gram stain, PCR, and/or culture results may not always    correspond due to difference in methodologies.    ************************************************************    This PCR assay was performed by multiplex PCR. This    Assay tests for 66 bacterial and resistance gene targets.    Please refer to the [facility] test directory    at [email or web address] for details.  Organism: Blood Culture PCR  Streptococcus anginosus (13 Sep 2022 16:49)  Organism: Streptococcus anginosus (13 Sep 2022 16:49)  Organism: Blood Culture PCR (13 Sep 2022 16:49)    Culture - Blood (collected 10 Sep 2022 15:30)  Source: .Blood Blood-Peripheral  Preliminary Report (11 Sep 2022 22:01):    No growth to date.

## 2022-09-13 NOTE — PROGRESS NOTE ADULT - SUBJECTIVE AND OBJECTIVE BOX
SURGERY PROGRESS HPI:  POST-OP CHECK  Pt seen and examined at bedside intubated in the ICU.     Vital Signs Last 24 Hrs  T(C): 36.2 (12 Sep 2022 22:30), Max: 38.2 (12 Sep 2022 08:30)  T(F): 97.2 (12 Sep 2022 22:30), Max: 100.7 (12 Sep 2022 08:30)  HR: 79 (13 Sep 2022 06:00) (71 - 101)  BP: 143/88 (12 Sep 2022 23:34) (122/84 - 146/89)  BP(mean): 102 (12 Sep 2022 23:34) (93 - 103)  RR: 29 (13 Sep 2022 06:00) (26 - 29)  SpO2: 100% (13 Sep 2022 06:00) (99% - 100%)    Parameters below as of 12 Sep 2022 07:30  Patient On (Oxygen Delivery Method): ventilator,450;5;26;40%        PHYSICAL EXAM:    CONSTITUTIONAL: NAD  HEENT: NGT in place with minimal output  RESPIRATORY: Clear to ausculation, bilaterally   CARDIOVASCULAR: RRR S1S2  GASTROINTESTINAL: Dressing clean/dry/intact. JPs x 2 with serosanguinous output. non distended, soft, non tender, no guarding  : Lind indwelling with clear yellow urine  MUSCULOSKELETAL: calf soft, non tender b/l    I&O's Detail    11 Sep 2022 07:01  -  12 Sep 2022 07:00  --------------------------------------------------------  IN:    FentaNYL: 456 mL    IV PiggyBack: 950 mL    Ketamine: 107.3 mL    Lactated Ringers Bolus: 500 mL    Midazolam (Status Epilepticus): 97.2 mL    Norepinephrine: 1097.1 mL    Plasma: 350 mL  Total IN: 3557.7 mL    OUT:    Blood Loss (mL): 1000 mL    Indwelling Catheter - Urethral (mL): 1440 mL    Nasogastric/Oral tube (mL): 350 mL  Total OUT: 2790 mL    Total NET: 767.7 mL      12 Sep 2022 07:01  -  13 Sep 2022 06:35  --------------------------------------------------------  IN:    dextrose 5% + lactated ringers: 700 mL    dextrose 5% + lactated ringers: 660 mL    FentaNYL: 162 mL    IV PiggyBack: 900 mL    Ketamine: 97.2 mL    Midazolam (Status Epilepticus): 162 mL    Norepinephrine: 207 mL    Plasma: 635 mL  Total IN: 3523.2 mL    OUT:    Bulb (mL): 65 mL    Bulb (mL): 130 mL    Colostomy (mL): 25 mL    Indwelling Catheter - Urethral (mL): 1230 mL    Nasogastric/Oral tube (mL): 50 mL  Total OUT: 1500 mL    Total NET: 2023.2 mL    LABS:                        9.3    6.25  )-----------( 202      ( 13 Sep 2022 03:00 )             28.3     09-13    143  |  111<H>  |  21  ----------------------------<  133<H>  3.3<L>   |  29  |  0.86    Ca    7.7<L>      13 Sep 2022 03:00  Phos  1.4     09-13  Mg     2.0     09-13    TPro  4.3<L>  /  Alb  1.5<L>  /  TBili  1.0  /  DBili  x   /  AST  101<H>  /  ALT  73  /  AlkPhos  51  09-13    PT/INR - ( 12 Sep 2022 11:00 )   PT: 27.7 sec;   INR: 2.31 ratio         PTT - ( 12 Sep 2022 11:00 )  PTT:43.8 sec    Culture Results:   No growth to date. (09-10 @ 15:30)  Culture Results:   Growth in anaerobic bottle: Streptococcus anginosus  ***Blood Panel PCR results on this specimen are available  approximately 3 hours after the Gram stain result.***  Gram stain, PCR, and/or culture results may not always  correspond due to difference in methodologies.  ************************************************************  This PCR assay was performed by multiplex PCR. This  Assay tests for 66 bacterial and resistance gene targets.  Please refer to the [facility] test directory  at [email or web address] for details. (09-10 @ 15:30)      Assessment: 57 y/o male with sigmoid tumor with high grade obstruction s/p ex lap, subtotal colectomy 9/10. s/p closure of abdomen, SBR, ileostomy creation POD#1. Off levo.    Plan:  -NPO, NGT to LWS, IVF  -pain management prn  -continue DVT prophylaxis, OOB, Ambulating  -continue incentive spirometer  -continue local wound care  -antimicrobials per ID  -f/u labs  -will discuss pt with surgical attending

## 2022-09-13 NOTE — PROGRESS NOTE ADULT - ATTENDING COMMENTS
Kathy: I have seen and examined the patient face to face, have reviewed and addended the HPI, PE and a/p as necessary.
Kathy: I have seen and examined the patient face to face, have reviewed and addended the HPI, PE and a/p as necessary.     Please see CCM addendum

## 2022-09-13 NOTE — PROGRESS NOTE ADULT - ASSESSMENT
56 year old male with no PMH admitted to the ICU s/p emergent ex lap with colon resection, left open for tx of LBO 2/2 sigmoid colon mass. In shock state,  septic +/- hemorrhagic shock.     #Neuro  - Intubated and sedated   - Goal RASS 0/-1  - On fentanyl and versed, weaning off  - Off ketamine     #Cardiac  // Shock initially thought to be septic + hemorrhagic now likely septic  - On Levophed, previously in ramin/vaso   - Was initially off levo, requried overnight, now weaning   - Maintain MAP >65  - Pending TTE     //NSTEMI  - Troponin 181>122  - No EKG changes     #Pulm  - Intubated on 9/10, mechanical vent settings 26/450/40/5  - F/U ABG   - Wean vent settings as tolerated    #Renal  - Cr 0.86  - Monitor I/O and BMP    #GI  // LBO 2/2 mass eroding through and adherent to surrounding tissue now with peritonitis   -  s/p open abdomen with packing and dressing 9/10  - NGT to suction  - Maintain dressing   - s/p closure of abdomen, SBR, ileostomy creation on 9/2, f/u pathology   - NPO     #ID:   // ?Peritonitis   - Blood cultures 9/10 NGT, f/u on cultures   - Temp 100.8 in AM   - Was on meropenem, Vancomycin, now on zosyn 3.375 q8hrs  - Cont Capsofungin     #ENDO  // BS 80s, started on 60cc/hr D5lr  - q4 checks    Heme  // Blood loss during  procedure   -s/p 3 units prbc 2 ffp in OR cryo and TXA and vit k, 2 addition units of ffp given  - Hgb 9.0, wbc 6.74   - PT 27.7, ptt 43.8, inr 2.31   - Trend H/H, continue transfusions as indicated     // DVT ppx: SCDs       #Ethics   - Full code

## 2022-09-14 LAB
ALBUMIN SERPL ELPH-MCNC: 1.4 G/DL — LOW (ref 3.3–5)
ALP SERPL-CCNC: 72 U/L — SIGNIFICANT CHANGE UP (ref 40–120)
ALT FLD-CCNC: 65 U/L — SIGNIFICANT CHANGE UP (ref 12–78)
ANION GAP SERPL CALC-SCNC: 5 MMOL/L — SIGNIFICANT CHANGE UP (ref 5–17)
AST SERPL-CCNC: 74 U/L — HIGH (ref 15–37)
BASOPHILS # BLD AUTO: 0 K/UL — SIGNIFICANT CHANGE UP (ref 0–0.2)
BASOPHILS NFR BLD AUTO: 0 % — SIGNIFICANT CHANGE UP (ref 0–2)
BILIRUB SERPL-MCNC: 0.6 MG/DL — SIGNIFICANT CHANGE UP (ref 0.2–1.2)
BUN SERPL-MCNC: 22 MG/DL — SIGNIFICANT CHANGE UP (ref 7–23)
CALCIUM SERPL-MCNC: 7.4 MG/DL — LOW (ref 8.5–10.1)
CHLORIDE SERPL-SCNC: 113 MMOL/L — HIGH (ref 96–108)
CO2 SERPL-SCNC: 28 MMOL/L — SIGNIFICANT CHANGE UP (ref 22–31)
CREAT SERPL-MCNC: 0.88 MG/DL — SIGNIFICANT CHANGE UP (ref 0.5–1.3)
EGFR: 100 ML/MIN/1.73M2 — SIGNIFICANT CHANGE UP
EOSINOPHIL # BLD AUTO: 0 K/UL — SIGNIFICANT CHANGE UP (ref 0–0.5)
EOSINOPHIL NFR BLD AUTO: 0 % — SIGNIFICANT CHANGE UP (ref 0–6)
GAS PNL BLDA: SIGNIFICANT CHANGE UP
GLUCOSE BLDC GLUCOMTR-MCNC: 135 MG/DL — HIGH (ref 70–99)
GLUCOSE SERPL-MCNC: 141 MG/DL — HIGH (ref 70–99)
HCT VFR BLD CALC: 25.5 % — LOW (ref 39–50)
HCT VFR BLD CALC: 27 % — LOW (ref 39–50)
HGB BLD-MCNC: 8.2 G/DL — LOW (ref 13–17)
HGB BLD-MCNC: 8.5 G/DL — LOW (ref 13–17)
HYPOGRAN NEUTS BLD QL SMEAR: PRESENT — SIGNIFICANT CHANGE UP
HYPOSEGMENTATION: PRESENT — SIGNIFICANT CHANGE UP
INR BLD: 1.21 RATIO — HIGH (ref 0.88–1.16)
LYMPHOCYTES # BLD AUTO: 0.37 K/UL — LOW (ref 1–3.3)
LYMPHOCYTES # BLD AUTO: 6 % — LOW (ref 13–44)
MAGNESIUM SERPL-MCNC: 2.3 MG/DL — SIGNIFICANT CHANGE UP (ref 1.6–2.6)
MANUAL SMEAR VERIFICATION: SIGNIFICANT CHANGE UP
MCHC RBC-ENTMCNC: 26.3 PG — LOW (ref 27–34)
MCHC RBC-ENTMCNC: 26.5 PG — LOW (ref 27–34)
MCHC RBC-ENTMCNC: 31.5 G/DL — LOW (ref 32–36)
MCHC RBC-ENTMCNC: 32.2 G/DL — SIGNIFICANT CHANGE UP (ref 32–36)
MCV RBC AUTO: 82.5 FL — SIGNIFICANT CHANGE UP (ref 80–100)
MCV RBC AUTO: 83.6 FL — SIGNIFICANT CHANGE UP (ref 80–100)
MONOCYTES # BLD AUTO: 0.31 K/UL — SIGNIFICANT CHANGE UP (ref 0–0.9)
MONOCYTES NFR BLD AUTO: 5 % — SIGNIFICANT CHANGE UP (ref 2–14)
MYELOCYTES NFR BLD: 1 % — HIGH (ref 0–0)
NEUTROPHILS # BLD AUTO: 5.3 K/UL — SIGNIFICANT CHANGE UP (ref 1.8–7.4)
NEUTROPHILS NFR BLD AUTO: 84 % — HIGH (ref 43–77)
NEUTS BAND # BLD: 1 % — SIGNIFICANT CHANGE UP (ref 0–8)
NRBC # BLD: 0 /100 WBCS — SIGNIFICANT CHANGE UP (ref 0–0)
NRBC # BLD: 0 /100 — SIGNIFICANT CHANGE UP (ref 0–0)
NRBC # BLD: SIGNIFICANT CHANGE UP /100 WBCS (ref 0–0)
PHOSPHATE SERPL-MCNC: 1.6 MG/DL — LOW (ref 2.5–4.5)
PLAT MORPH BLD: NORMAL — SIGNIFICANT CHANGE UP
PLATELET # BLD AUTO: 143 K/UL — LOW (ref 150–400)
PLATELET # BLD AUTO: 157 K/UL — SIGNIFICANT CHANGE UP (ref 150–400)
POTASSIUM SERPL-MCNC: 3.2 MMOL/L — LOW (ref 3.5–5.3)
POTASSIUM SERPL-SCNC: 3.2 MMOL/L — LOW (ref 3.5–5.3)
PROT SERPL-MCNC: 4.2 GM/DL — LOW (ref 6–8.3)
PROTHROM AB SERPL-ACNC: 14.4 SEC — HIGH (ref 10.5–13.4)
RBC # BLD: 3.09 M/UL — LOW (ref 4.2–5.8)
RBC # BLD: 3.23 M/UL — LOW (ref 4.2–5.8)
RBC # FLD: 16.1 % — HIGH (ref 10.3–14.5)
RBC # FLD: 16.4 % — HIGH (ref 10.3–14.5)
RBC BLD AUTO: NORMAL — SIGNIFICANT CHANGE UP
SODIUM SERPL-SCNC: 146 MMOL/L — HIGH (ref 135–145)
VARIANT LYMPHS # BLD: 3 % — SIGNIFICANT CHANGE UP (ref 0–6)
WBC # BLD: 6.24 K/UL — SIGNIFICANT CHANGE UP (ref 3.8–10.5)
WBC # BLD: 6.76 K/UL — SIGNIFICANT CHANGE UP (ref 3.8–10.5)
WBC # FLD AUTO: 6.24 K/UL — SIGNIFICANT CHANGE UP (ref 3.8–10.5)
WBC # FLD AUTO: 6.76 K/UL — SIGNIFICANT CHANGE UP (ref 3.8–10.5)

## 2022-09-14 PROCEDURE — 99233 SBSQ HOSP IP/OBS HIGH 50: CPT

## 2022-09-14 PROCEDURE — 99291 CRITICAL CARE FIRST HOUR: CPT

## 2022-09-14 RX ORDER — SODIUM CHLORIDE 9 MG/ML
500 INJECTION, SOLUTION INTRAVENOUS ONCE
Refills: 0 | Status: COMPLETED | OUTPATIENT
Start: 2022-09-14 | End: 2022-09-14

## 2022-09-14 RX ORDER — PROPOFOL 10 MG/ML
10 INJECTION, EMULSION INTRAVENOUS
Qty: 1000 | Refills: 0 | Status: DISCONTINUED | OUTPATIENT
Start: 2022-09-14 | End: 2022-09-14

## 2022-09-14 RX ORDER — POTASSIUM PHOSPHATE, MONOBASIC POTASSIUM PHOSPHATE, DIBASIC 236; 224 MG/ML; MG/ML
30 INJECTION, SOLUTION INTRAVENOUS ONCE
Refills: 0 | Status: COMPLETED | OUTPATIENT
Start: 2022-09-14 | End: 2022-09-14

## 2022-09-14 RX ORDER — HYDROCORTISONE 20 MG
50 TABLET ORAL EVERY 12 HOURS
Refills: 0 | Status: ACTIVE | OUTPATIENT
Start: 2022-09-16 | End: 2022-09-17

## 2022-09-14 RX ORDER — CEFTRIAXONE 500 MG/1
2000 INJECTION, POWDER, FOR SOLUTION INTRAMUSCULAR; INTRAVENOUS EVERY 24 HOURS
Refills: 0 | Status: DISCONTINUED | OUTPATIENT
Start: 2022-09-15 | End: 2022-09-19

## 2022-09-14 RX ORDER — HYDROCORTISONE 20 MG
50 TABLET ORAL DAILY
Refills: 0 | Status: COMPLETED | OUTPATIENT
Start: 2022-09-18 | End: 2022-09-19

## 2022-09-14 RX ORDER — POTASSIUM CHLORIDE 20 MEQ
10 PACKET (EA) ORAL
Refills: 0 | Status: COMPLETED | OUTPATIENT
Start: 2022-09-14 | End: 2022-09-14

## 2022-09-14 RX ORDER — HYDROCORTISONE 20 MG
50 TABLET ORAL EVERY 8 HOURS
Refills: 0 | Status: COMPLETED | OUTPATIENT
Start: 2022-09-14 | End: 2022-09-16

## 2022-09-14 RX ADMIN — HEPARIN SODIUM 5000 UNIT(S): 5000 INJECTION INTRAVENOUS; SUBCUTANEOUS at 17:52

## 2022-09-14 RX ADMIN — Medication 100 MILLIEQUIVALENT(S): at 05:45

## 2022-09-14 RX ADMIN — Medication 50 MILLIGRAM(S): at 17:52

## 2022-09-14 RX ADMIN — PANTOPRAZOLE SODIUM 40 MILLIGRAM(S): 20 TABLET, DELAYED RELEASE ORAL at 05:23

## 2022-09-14 RX ADMIN — DEXMEDETOMIDINE HYDROCHLORIDE IN 0.9% SODIUM CHLORIDE 4.05 MICROGRAM(S)/KG/HR: 4 INJECTION INTRAVENOUS at 06:26

## 2022-09-14 RX ADMIN — FENTANYL CITRATE 50 MICROGRAM(S): 50 INJECTION INTRAVENOUS at 21:49

## 2022-09-14 RX ADMIN — PIPERACILLIN AND TAZOBACTAM 25 GRAM(S): 4; .5 INJECTION, POWDER, LYOPHILIZED, FOR SOLUTION INTRAVENOUS at 17:51

## 2022-09-14 RX ADMIN — DEXMEDETOMIDINE HYDROCHLORIDE IN 0.9% SODIUM CHLORIDE 4.05 MICROGRAM(S)/KG/HR: 4 INJECTION INTRAVENOUS at 08:26

## 2022-09-14 RX ADMIN — FENTANYL CITRATE 50 MICROGRAM(S): 50 INJECTION INTRAVENOUS at 12:52

## 2022-09-14 RX ADMIN — Medication 100 MILLIGRAM(S): at 05:23

## 2022-09-14 RX ADMIN — CASPOFUNGIN ACETATE 260 MILLIGRAM(S): 7 INJECTION, POWDER, LYOPHILIZED, FOR SOLUTION INTRAVENOUS at 21:26

## 2022-09-14 RX ADMIN — CHLORHEXIDINE GLUCONATE 15 MILLILITER(S): 213 SOLUTION TOPICAL at 05:24

## 2022-09-14 RX ADMIN — HEPARIN SODIUM 5000 UNIT(S): 5000 INJECTION INTRAVENOUS; SUBCUTANEOUS at 05:24

## 2022-09-14 RX ADMIN — PANTOPRAZOLE SODIUM 40 MILLIGRAM(S): 20 TABLET, DELAYED RELEASE ORAL at 17:52

## 2022-09-14 RX ADMIN — POTASSIUM PHOSPHATE, MONOBASIC POTASSIUM PHOSPHATE, DIBASIC 83.33 MILLIMOLE(S): 236; 224 INJECTION, SOLUTION INTRAVENOUS at 06:18

## 2022-09-14 RX ADMIN — SODIUM CHLORIDE 1000 MILLILITER(S): 9 INJECTION, SOLUTION INTRAVENOUS at 03:41

## 2022-09-14 RX ADMIN — PIPERACILLIN AND TAZOBACTAM 25 GRAM(S): 4; .5 INJECTION, POWDER, LYOPHILIZED, FOR SOLUTION INTRAVENOUS at 05:24

## 2022-09-14 RX ADMIN — FENTANYL CITRATE 50 MICROGRAM(S): 50 INJECTION INTRAVENOUS at 02:35

## 2022-09-14 RX ADMIN — FENTANYL CITRATE 50 MICROGRAM(S): 50 INJECTION INTRAVENOUS at 14:00

## 2022-09-14 RX ADMIN — FENTANYL CITRATE 50 MICROGRAM(S): 50 INJECTION INTRAVENOUS at 21:25

## 2022-09-14 RX ADMIN — PROPOFOL 4.86 MICROGRAM(S)/KG/MIN: 10 INJECTION, EMULSION INTRAVENOUS at 02:58

## 2022-09-14 RX ADMIN — Medication 100 MILLIEQUIVALENT(S): at 07:37

## 2022-09-14 RX ADMIN — CHLORHEXIDINE GLUCONATE 1 APPLICATION(S): 213 SOLUTION TOPICAL at 02:23

## 2022-09-14 RX ADMIN — FENTANYL CITRATE 50 MICROGRAM(S): 50 INJECTION INTRAVENOUS at 02:19

## 2022-09-14 RX ADMIN — DEXMEDETOMIDINE HYDROCHLORIDE IN 0.9% SODIUM CHLORIDE 4.05 MICROGRAM(S)/KG/HR: 4 INJECTION INTRAVENOUS at 02:51

## 2022-09-14 RX ADMIN — FENTANYL CITRATE 50 MICROGRAM(S): 50 INJECTION INTRAVENOUS at 07:37

## 2022-09-14 RX ADMIN — FENTANYL CITRATE 50 MICROGRAM(S): 50 INJECTION INTRAVENOUS at 08:00

## 2022-09-14 RX ADMIN — Medication 100 MILLIEQUIVALENT(S): at 06:45

## 2022-09-14 RX ADMIN — SODIUM CHLORIDE 100 MILLILITER(S): 9 INJECTION, SOLUTION INTRAVENOUS at 03:57

## 2022-09-14 NOTE — PROGRESS NOTE ADULT - SUBJECTIVE AND OBJECTIVE BOX
INTERVAL HPI/OVERNIGHT EVENTS:    Weaning well this am on PS 5/5/30%    Hgb drifting down, will repeat cbc and INR this afternoon.  Transfuse for hgb<8    CENTRAL LINE: [x ] YES [ ] NO  LOCATION:   [facility] TLC 9/10/22    VIVAR: [x ] YES [ ] NO        A-LINE:  [ x] YES [ ] NO  LOCATION:   R axillary 9/10/22    GLOBAL ISSUE/BEST PRACTICE:  Analgesia: fentaNYL    Injectable PRN  Sedation: dexMEDEtomidine Infusion  HOB elevation: yes  Stress ulcer prophylaxis: pantoprazole  Injectable  VTE prophylaxis: HSQ  Oral Care: Chlorhexidine  Glycemic control:   Nutrition:Diet, NPO (09-10-22 @ 20:44) [Active]    REVIEW OF SYSTEMS: [x] Unable to obtain because: intubated     PHYSICAL EXAM:  Gen: intubated and sedated  HEENT: Intubated with ETT  CARD -s1s2, RRR, no M,G,R;   PULM - Coarse vented breath sounds, symmetric breath sounds;   ABD -  +BS, midline incision intact JAVED R and L with 50cc drainage overnight, ileostomy in RL intact pink, soft, no guarding, no rebound, no masses;   EXT - symmetric pulses, no edema;   NEURO - no focal neuro deficits     ICU Vital Signs Last 24 Hrs  T(C): 36.5 (14 Sep 2022 07:30), Max: 37.7 (13 Sep 2022 15:30)  T(F): 97.7 (14 Sep 2022 07:30), Max: 99.8 (13 Sep 2022 15:30)  HR: 61 (14 Sep 2022 08:45) (61 - 104)  BP: --  BP(mean): --  ABP: 110/67 (14 Sep 2022 07:30) (72/45 - 167/95)  ABP(mean): 84 (14 Sep 2022 07:30) (57 - 124)  RR: 20 (14 Sep 2022 07:30) (18 - 27)  SpO2: 100% (14 Sep 2022 08:45) (98% - 100%)      I&O's Detail    13 Sep 2022 07:01  -  14 Sep 2022 07:00  --------------------------------------------------------  IN:    Dexmedetomidine: 85.1 mL    dextrose 5% + lactated ringers: 2300 mL    FentaNYL: 40.5 mL    IV PiggyBack: 1250 mL    Midazolam (Status Epilepticus): 40.5 mL  Total IN: 3716.1 mL    OUT:    Bulb (mL): 145 mL    Bulb (mL): 185 mL    Colostomy (mL): 700 mL    Indwelling Catheter - Urethral (mL): 1167 mL    Nasogastric/Oral tube (mL): 150 mL  Total OUT: 2347 mL    Total NET: 1369.1 mL      14 Sep 2022 07:01  -  14 Sep 2022 10:13  --------------------------------------------------------  IN:    Dexmedetomidine: 20.3 mL    dextrose 5% + lactated ringers: 100 mL    IV PiggyBack: 100 mL  Total IN: 220.3 mL    OUT:  Total OUT: 0 mL    Total NET: 220.3 mL        MEDICATIONS  NEURO  Meds: dexMEDEtomidine Infusion 0.2 MICROgram(s)/kG/Hr (4.05 mL/Hr) IV Continuous <Continuous>  fentaNYL    Injectable 50 MICROGram(s) IV Push every 3 hours PRN Severe Pain (7 - 10)    RESPIRATORY    Meds:   CARDIOVASCULAR  Meds:   GI/NUTRITION  Meds: pantoprazole  Injectable 40 milliGRAM(s) IV Push two times a day    GENITOURINARY  Meds: dextrose 5% + lactated ringers. 1000 milliLiter(s) IV Continuous <Continuous>    HEMATOLOGIC  Meds: heparin   Injectable 5000 Unit(s) SubCutaneous every 12 hours    [x] VTE Prophylaxis  INFECTIOUS DISEASES  Meds: caspofungin IVPB      caspofungin IVPB 50 milliGRAM(s) IV Intermittent every 24 hours  piperacillin/tazobactam IVPB.. 3.375 Gram(s) IV Intermittent every 8 hours    ENDOCRINE  CAPILLARY BLOOD GLUCOSE      POCT Blood Glucose.: 135 mg/dL (14 Sep 2022 04:32)  POCT Blood Glucose.: 117 mg/dL (13 Sep 2022 21:17)  POCT Blood Glucose.: 106 mg/dL (13 Sep 2022 17:26)  POCT Blood Glucose.: 118 mg/dL (13 Sep 2022 13:21)    Meds:   OTHER MEDICATIONS:  chlorhexidine 0.12% Liquid 15 milliLiter(s) Oral Mucosa every 12 hours  chlorhexidine 2% Cloths 1 Application(s) Topical <User Schedule>  :    LABS:                        8.2    6.24  )-----------( 157      ( 14 Sep 2022 02:35 )             25.5      09-14    146<H>  |  113<H>  |  22  ----------------------------<  141<H>  3.2<L>   |  28  |  0.88    Ca    7.4<L>      14 Sep 2022 02:35  Phos  1.6     09-14  Mg     2.3     09-14    TPro  4.2<L>  /  Alb  1.4<L>  /  TBili  0.6  /  DBili  x   /  AST  74<H>  /  ALT  65  /  AlkPhos  72  09-14    Mode: CPAP with PS, FiO2: 30, PEEP: 5, PS: 5, ITime: 1, MAP: 7, PIP: 10      RADIOLOGY & ADDITIONAL STUDIES:

## 2022-09-14 NOTE — AIRWAY REMOVAL NOTE  ADULT & PEDS - ARTIFICAL AIRWAY REMOVAL COMMENTS
Pt was extubated after physician ordered to a 2LPM/NC, Diaz Root RN, was present at the extubation time, Pt tolerated well, %, no respiratory distress.

## 2022-09-14 NOTE — PROGRESS NOTE ADULT - SUBJECTIVE AND OBJECTIVE BOX
[facility]  Division of Infectious Diseases  [phone]    Name: TAISHA ROGERS  Age: 56y  Gender: Male  MRN: [id number]    Interval History--  Notes reviewed.     Past Medical History--  No pertinent past medical history    No significant past surgical history        For details regarding the patient's social history, family history, and other miscellaneous elements, please refer the initial infectious diseases consultation and/or the admitting history and physical examination for this admission.    Allergies    No Known Allergies    Intolerances        Medications--  Antibiotics:  caspofungin IVPB      caspofungin IVPB 50 milliGRAM(s) IV Intermittent every 24 hours  piperacillin/tazobactam IVPB.. 3.375 Gram(s) IV Intermittent every 8 hours    Immunologic:    Other:  chlorhexidine 0.12% Liquid  chlorhexidine 2% Cloths  dexMEDEtomidine Infusion  dextrose 5% + lactated ringers.  fentaNYL    Injectable PRN  heparin   Injectable  hydrocortisone sodium succinate Injectable  pantoprazole  Injectable      Review of Systems--  A 10-point review of systems was obtained.     Pertinent positives and negatives--  Constitutional: No fevers. No Chills. No Rigors.   Cardiovascular: No chest pain. No palpitations.  Respiratory: No shortness of breath. No cough.  Gastrointestinal: No nausea or vomiting. No diarrhea or constipation.   Psychiatric: Pleasant. Appropriate affect.    Review of systems otherwise negative except as previously noted.    Physical Examination--  Vital Signs: T(F): 98.3 (09-14-22 @ 11:50), Max: 99.8 (09-13-22 @ 15:30)  HR: 71 (09-14-22 @ 11:50)  BP: --  RR: 16 (09-14-22 @ 11:50)  SpO2: 100% (09-14-22 @ 11:50)  Wt(kg): --  General: Nontoxic-appearing Male in no acute distress.  HEENT: AT/NC. PERRL. EOMI. Anicteric. Conjunctiva pink and moist. Oropharynx clear. Dentition fair.  Neck: Not rigid. No sense of mass.  Nodes: None palpable.  Lungs: Clear bilaterally without rales, wheezing or rhonchi  Heart: Regular rate and rhythm. No Murmur. No rub. No gallop. No palpable thrill.  Abdomen: Bowel sounds present and normoactive. Soft. Nondistended. Nontender. No sense of mass. No organomegaly.  Back: No spinal tenderness. No costovertebral angle tenderness.   Extremities: No cyanosis or clubbing. No edema.   Skin: Warm. Dry. Good turgor. No rash. No vasculitic stigmata.  Psychiatric: Appropriate affect and mood for situation.         Laboratory Studies--  CBC                        8.2    6.24  )-----------( 157      ( 14 Sep 2022 02:35 )             25.5       Chemistries  09-14    146<H>  |  113<H>  |  22  ----------------------------<  141<H>  3.2<L>   |  28  |  0.88    Ca    7.4<L>      14 Sep 2022 02:35  Phos  1.6     09-14  Mg     2.3     09-14    TPro  4.2<L>  /  Alb  1.4<L>  /  TBili  0.6  /  DBili  x   /  AST  74<H>  /  ALT  65  /  AlkPhos  72  09-14      Culture Data    Culture - Blood (collected 10 Sep 2022 15:30)  Source: .Blood Blood-Peripheral  Gram Stain (13 Sep 2022 16:49):    Growth in anaerobic bottle: Gram Positive Cocci in Pairs and Chains  Final Report (13 Sep 2022 16:49):    Growth in anaerobic bottle: Streptococcus anginosus    ***Blood Panel PCR results on this specimen are available    approximately 3 hours after the Gram stain result.***    Gram stain, PCR, and/or culture results may not always    correspond due to difference in methodologies.    ************************************************************    This PCR assay was performed by multiplex PCR. This    Assay tests for 66 bacterial and resistance gene targets.    Please refer to the [facility] test directory    at [email or web address] for details.  Organism: Blood Culture PCR  Streptococcus anginosus (13 Sep 2022 16:49)  Organism: Streptococcus anginosus (13 Sep 2022 16:49)  Organism: Blood Culture PCR (13 Sep 2022 16:49)    Culture - Blood (collected 10 Sep 2022 15:30)  Source: .Blood Blood-Peripheral  Preliminary Report (11 Sep 2022 22:01):    No growth to date.             [facility]  Division of Infectious Diseases  [phone]    Name: TAISHA ROGERS  Age: 56y  Gender: Male  MRN: [id number]    Interval History--  Notes reviewed. Seen earlier today. Extubated. Still seems sedated.  Family at bedside.     Past Medical History--  No pertinent past medical history    No significant past surgical history        For details regarding the patient's social history, family history, and other miscellaneous elements, please refer the initial infectious diseases consultation and/or the admitting history and physical examination for this admission.    Allergies    No Known Allergies    Intolerances        Medications--  Antibiotics:  caspofungin IVPB      caspofungin IVPB 50 milliGRAM(s) IV Intermittent every 24 hours  piperacillin/tazobactam IVPB.. 3.375 Gram(s) IV Intermittent every 8 hours    Immunologic:    Other:  chlorhexidine 0.12% Liquid  chlorhexidine 2% Cloths  dexMEDEtomidine Infusion  dextrose 5% + lactated ringers.  fentaNYL    Injectable PRN  heparin   Injectable  hydrocortisone sodium succinate Injectable  pantoprazole  Injectable      Review of Systems--  Review of systems unable secondary to clinical condition.     Physical Examination--  Vital Signs: T(F): 98.3 (09-14-22 @ 11:50), Max: 99.8 (09-13-22 @ 15:30)  HR: 71 (09-14-22 @ 11:50)  BP: --  RR: 16 (09-14-22 @ 11:50)  SpO2: 100% (09-14-22 @ 11:50)  Wt(kg): --  General: Nontoxic-appearing Male in no acute distress. Intubated on vent  HEENT: AT/NC. Pupils/EOM unable secondary to patient compliance. Oropharynx/dentition unable secondary to patient compliance.  Anicteric. Conjunctiva pink and moist.  Neck: Not rigid. No sense of mass.  Nodes: None palpable.  Lungs: Coarse B BS  Heart: Regular rate and rhythm. No Murmur. No rub. No gallop. No palpable thrill.  Abdomen: Bowel sounds absent. Abdomen incision dressed. No reaction to palpation. Ostomy no output  Back: Unable  Extremities: No cyanosis or clubbing. 1+ edema.   Skin: Warm. Dry. Good turgor. No rash. No vasculitic stigmata.  Psychiatric: Unable      Laboratory Studies--  CBC                        8.2    6.24  )-----------( 157      ( 14 Sep 2022 02:35 )             25.5       Chemistries  09-14    146<H>  |  113<H>  |  22  ----------------------------<  141<H>  3.2<L>   |  28  |  0.88    Ca    7.4<L>      14 Sep 2022 02:35  Phos  1.6     09-14  Mg     2.3     09-14    TPro  4.2<L>  /  Alb  1.4<L>  /  TBili  0.6  /  DBili  x   /  AST  74<H>  /  ALT  65  /  AlkPhos  72  09-14      Culture Data  Culture - Blood (collected 10 Sep 2022 15:30)  Source: .Blood Blood-Peripheral  Gram Stain (13 Sep 2022 16:49):    Growth in anaerobic bottle: Gram Positive Cocci in Pairs and Chains  Final Report (13 Sep 2022 16:49):    Growth in anaerobic bottle: Streptococcus anginosus    ***Blood Panel PCR results on this specimen are available    approximately 3 hours after the Gram stain result.***    Gram stain, PCR, and/or culture results may not always    correspond due to difference in methodologies.    ************************************************************    This PCR assay was performed by multiplex PCR. This    Assay tests for 66 bacterial and resistance gene targets.    Please refer to the [facility] test directory    at [email or web address] for details.  Organism: Blood Culture PCR  Streptococcus anginosus (13 Sep 2022 16:49)  Organism: Streptococcus anginosus (13 Sep 2022 16:49)  Organism: Blood Culture PCR (13 Sep 2022 16:49)    Culture - Blood (collected 10 Sep 2022 15:30)  Source: .Blood Blood-Peripheral  Preliminary Report (11 Sep 2022 22:01):    No growth to date.

## 2022-09-14 NOTE — PHARMACOTHERAPY INTERVENTION NOTE - COMMENTS
Recommended repeat surveillance blood cultures given gram positive bacteremia.
Recommended restarting venous thromboembolic chemoprophylaxis post-procedure.
As per policy, piperacillin-tazobactam IV 3.375 grams q12h adjusted to 3.375 grams q8h based on GFR/CrCl > 20.
Recommend discontinuation/taper of stress dose steroids (hydrocortisone) given now improved hemodynamics.

## 2022-09-14 NOTE — PROGRESS NOTE ADULT - ASSESSMENT
57 yo M with no PMH a/w large bowel obstruction, now s/p emergent ex lap with colon resection, left open for tx of LBO 2/2 sigmoid colon mass.  Noted to be shock state 2/2 septic and hemorrhagic shock from acute blood loss anemia MAXWELL, lactic acidosis, NSTEMI with decreased LV function on POCUS.  Now weaning off 3 pressors, down to only norepinephrine.  Overnight noted to have ? seizure, s/p ct head with no acute findings.      Neuro: Intubated and sedated - continue with fentanyl, ketamine and versed; maintain RASS goal of 0 to -1.  Pending return to OR for washout and closure.  Weaned off ketamine overnight, now holding fentanyl and versed.    - required precedex this am while weaning.  weaned to extubation this AM>    CV: Shock state - likely combined in nature initially with hemorrhagic and septic, possibly septic at this point.  Weaned off norepinephrine overnight.  Maintain MAP >65.  TTE pending.  - Troponin leak possibly NSTEMI vs demand ischemia given pressor requirements in the setting of shock state.  Pulm: Intubate on 9/10 for OR; c/w 26/450/40/+5;   	- Extubated today   GI: Large bowel obstruction 2/2 mass eroding through and adherent to surrounding structures with peritonitis, plan for return to OR for washout, NGT to suction; abdominal wound with blood/serosanginuous drainage.  Continue to monitor output.  Renal/Metabolic: MAXWELL with creatinine improving. Monitor closely.   ID: Peritonitis from perforated viscous - ID recommending deescalating to zosyn and caspofungin only at this time.   Heme: Acute blood loss anemia in the setting of persistent coagulopathy - s/p 3 units prbc 2 ffp in OR, cryo and TXA added   - received vitamin K overnight, will give 2 units FFP prior to OR.  Trend H/H and will require H/H after OR.    	- H/H noted to downtrend overnight, repeated within acceptable range and will continue to observe.   - SCDs  Endo: Started on D5LR for FS in 80s  Dispo: Full code. Extubated this am.  Will monitor in ICU today.

## 2022-09-14 NOTE — PROGRESS NOTE ADULT - ASSESSMENT
Severe sepsis with fever, shock, in the setting of large bowel obstruction due to colon tumor with high-grade obstruction.  Official pathology is pending.  Patient has a single blood culture positive for gram-positive cocci in pairs and change.  This is likely an alphahemolytic Streptococcus species but full identification and sensitivity is pending.  Current antibiotics are exceedingly broad.  Does not know that this patient has any recent hospitalization or antibiotic use which would place him at risk for resistant yue.    9/13: Shock improved, white blood cell count normal, cultures limited to a single positive bottle with an alphahemolytic Streptococcus species.  While this could be a contaminant, it could also be a true positive given the clinical context.  We will see a role to augment antibiotics at this juncture.  9/14: POD#2. Off pressors, WBC normal, extubated. No return of bowel function as of yet. Drop in hemoglobin over the past 48h. No concern of hemolysis. Drop in Platelets as well, suspect not on the basis of uncontrolled infection at this point in time      Suggestions  Would stop caspofungin after todayt's doses  Ok to narrow Zosyn to Ceftriaxone 2g/day  Monitor Hb.  Monitor platelets.  Follow-up culture data      Steve Hernandez MD  Attending Physician  [facility]  Division of Infectious Diseases  [phone]

## 2022-09-14 NOTE — PROGRESS NOTE ADULT - SUBJECTIVE AND OBJECTIVE BOX
Patient seen and  examined at bedside resting.  Remains intubated but alert, admits to pain.   Denies fever, chills, N/V/D, CP     Vital Signs Last 24 Hrs  T(F): 98.1 (09-14-22 @ 05:18), Max: 99.8 (09-13-22 @ 15:30)  HR: 68 (09-14-22 @ 06:15)  BP: --  RR: 26 (09-14-22 @ 05:18)  SpO2: 100% (09-14-22 @ 06:15)  Wt(kg): --   CAPILLARY BLOOD GLUCOSE  POCT Blood Glucose.: 135 mg/dL (14 Sep 2022 04:32)    PHYSICAL EXAM:  GENERAL: Alert, NAD  CHEST/LUNG: intubated, breathing synchronous with vent  HEART: Regular rate and rhythm; S1 & S2 appreciated  ABDOMEN: soft, NT/ND. Midline incision packed with saline soaked gauze and covered with dry gauze and ABD. JAVED x2 with minimal serosanguineous output. Ostomy with minimal watery brown stool   EXTREMITIES:  no calf tenderness, No edema    I&O's Detail    12 Sep 2022 07:01  -  13 Sep 2022 07:00  --------------------------------------------------------  IN:    dextrose 5% + lactated ringers: 660 mL    dextrose 5% + lactated ringers: 800 mL    FentaNYL: 170.1 mL    IV PiggyBack: 900 mL    Ketamine: 97.2 mL    Midazolam (Status Epilepticus): 170.1 mL    Norepinephrine: 207 mL    Plasma: 635 mL  Total IN: 3639.4 mL    OUT:    Bulb (mL): 65 mL    Bulb (mL): 130 mL    Colostomy (mL): 25 mL    Indwelling Catheter - Urethral (mL): 1280 mL    Nasogastric/Oral tube (mL): 75 mL  Total OUT: 1575 mL    Total NET: 2064.4 mL      13 Sep 2022 07:01  -  14 Sep 2022 06:42  --------------------------------------------------------  IN:    Dexmedetomidine: 65.8 mL    dextrose 5% + lactated ringers: 2100 mL    FentaNYL: 40.5 mL    IV PiggyBack: 650 mL    Midazolam (Status Epilepticus): 40.5 mL  Total IN: 2896.8 mL    OUT:    Bulb (mL): 135 mL    Bulb (mL): 85 mL    Colostomy (mL): 550 mL    Indwelling Catheter - Urethral (mL): 1057 mL    Nasogastric/Oral tube (mL): 100 mL  Total OUT: 1927 mL    Total NET: 969.8 mL    LABS:                        8.2    6.24  )-----------( 157      ( 14 Sep 2022 02:35 )             25.5     09-14    146<H>  |  113<H>  |  22  ----------------------------<  141<H>  3.2<L>   |  28  |  0.88    Ca    7.4<L>      14 Sep 2022 02:35  Phos  1.6     09-14  Mg     2.3     09-14    TPro  4.2<L>  /  Alb  1.4<L>  /  TBili  0.6  /  DBili  x   /  AST  74<H>  /  ALT  65  /  AlkPhos  72  09-14    PT/INR - ( 12 Sep 2022 11:00 )   PT: 27.7 sec;   INR: 2.31 ratio         PTT - ( 12 Sep 2022 11:00 )  PTT:43.8 sec    RADIOLOGY & ADDITIONAL STUDIES:    A/P  55 y/o male with sigmoid tumor with high grade obstruction s/p ex lap, subtotal colectomy 9/10. s/p closure of abdomen, SBR, ileostomy creation POD#2. Off levo.    - NPO, NGT, IVF  - abx per ID  - analgesia prn  - local wound care per surgery  - monitor JAVED and ostomy output  - DVT ppx, GI ppx  - wean vent as tolerated  - will d/w surgical attending

## 2022-09-15 LAB
ALBUMIN SERPL ELPH-MCNC: 1.5 G/DL — LOW (ref 3.3–5)
ALP SERPL-CCNC: 85 U/L — SIGNIFICANT CHANGE UP (ref 40–120)
ALT FLD-CCNC: 64 U/L — SIGNIFICANT CHANGE UP (ref 12–78)
ANION GAP SERPL CALC-SCNC: 4 MMOL/L — LOW (ref 5–17)
ANION GAP SERPL CALC-SCNC: 5 MMOL/L — SIGNIFICANT CHANGE UP (ref 5–17)
AST SERPL-CCNC: 63 U/L — HIGH (ref 15–37)
BASOPHILS # BLD AUTO: 0.02 K/UL — SIGNIFICANT CHANGE UP (ref 0–0.2)
BASOPHILS NFR BLD AUTO: 0.2 % — SIGNIFICANT CHANGE UP (ref 0–2)
BILIRUB SERPL-MCNC: 0.4 MG/DL — SIGNIFICANT CHANGE UP (ref 0.2–1.2)
BUN SERPL-MCNC: 19 MG/DL — SIGNIFICANT CHANGE UP (ref 7–23)
BUN SERPL-MCNC: 20 MG/DL — SIGNIFICANT CHANGE UP (ref 7–23)
CALCIUM SERPL-MCNC: 7.4 MG/DL — LOW (ref 8.5–10.1)
CALCIUM SERPL-MCNC: 7.7 MG/DL — LOW (ref 8.5–10.1)
CHLORIDE SERPL-SCNC: 114 MMOL/L — HIGH (ref 96–108)
CHLORIDE SERPL-SCNC: 115 MMOL/L — HIGH (ref 96–108)
CO2 SERPL-SCNC: 29 MMOL/L — SIGNIFICANT CHANGE UP (ref 22–31)
CO2 SERPL-SCNC: 30 MMOL/L — SIGNIFICANT CHANGE UP (ref 22–31)
CREAT SERPL-MCNC: 0.74 MG/DL — SIGNIFICANT CHANGE UP (ref 0.5–1.3)
CREAT SERPL-MCNC: 0.79 MG/DL — SIGNIFICANT CHANGE UP (ref 0.5–1.3)
CULTURE RESULTS: SIGNIFICANT CHANGE UP
EGFR: 104 ML/MIN/1.73M2 — SIGNIFICANT CHANGE UP
EGFR: 106 ML/MIN/1.73M2 — SIGNIFICANT CHANGE UP
EOSINOPHIL # BLD AUTO: 0.04 K/UL — SIGNIFICANT CHANGE UP (ref 0–0.5)
EOSINOPHIL NFR BLD AUTO: 0.5 % — SIGNIFICANT CHANGE UP (ref 0–6)
GLUCOSE SERPL-MCNC: 110 MG/DL — HIGH (ref 70–99)
GLUCOSE SERPL-MCNC: 98 MG/DL — SIGNIFICANT CHANGE UP (ref 70–99)
HCT VFR BLD CALC: 26.7 % — LOW (ref 39–50)
HGB BLD-MCNC: 8.4 G/DL — LOW (ref 13–17)
IMM GRANULOCYTES NFR BLD AUTO: 8.2 % — HIGH (ref 0–1.5)
LYMPHOCYTES # BLD AUTO: 1.02 K/UL — SIGNIFICANT CHANGE UP (ref 1–3.3)
LYMPHOCYTES # BLD AUTO: 12.2 % — LOW (ref 13–44)
MAGNESIUM SERPL-MCNC: 2.4 MG/DL — SIGNIFICANT CHANGE UP (ref 1.6–2.6)
MCHC RBC-ENTMCNC: 26.3 PG — LOW (ref 27–34)
MCHC RBC-ENTMCNC: 31.5 G/DL — LOW (ref 32–36)
MCV RBC AUTO: 83.4 FL — SIGNIFICANT CHANGE UP (ref 80–100)
MONOCYTES # BLD AUTO: 0.76 K/UL — SIGNIFICANT CHANGE UP (ref 0–0.9)
MONOCYTES NFR BLD AUTO: 9.1 % — SIGNIFICANT CHANGE UP (ref 2–14)
NEUTROPHILS # BLD AUTO: 5.86 K/UL — SIGNIFICANT CHANGE UP (ref 1.8–7.4)
NEUTROPHILS NFR BLD AUTO: 69.8 % — SIGNIFICANT CHANGE UP (ref 43–77)
NRBC # BLD: 0 /100 WBCS — SIGNIFICANT CHANGE UP (ref 0–0)
PHOSPHATE SERPL-MCNC: 1.6 MG/DL — LOW (ref 2.5–4.5)
PHOSPHATE SERPL-MCNC: 2.3 MG/DL — LOW (ref 2.5–4.5)
PLATELET # BLD AUTO: 161 K/UL — SIGNIFICANT CHANGE UP (ref 150–400)
POTASSIUM SERPL-MCNC: 3.1 MMOL/L — LOW (ref 3.5–5.3)
POTASSIUM SERPL-MCNC: 3.3 MMOL/L — LOW (ref 3.5–5.3)
POTASSIUM SERPL-SCNC: 3.1 MMOL/L — LOW (ref 3.5–5.3)
POTASSIUM SERPL-SCNC: 3.3 MMOL/L — LOW (ref 3.5–5.3)
PROT SERPL-MCNC: 4.2 GM/DL — LOW (ref 6–8.3)
RBC # BLD: 3.2 M/UL — LOW (ref 4.2–5.8)
RBC # FLD: 16.6 % — HIGH (ref 10.3–14.5)
SODIUM SERPL-SCNC: 148 MMOL/L — HIGH (ref 135–145)
SODIUM SERPL-SCNC: 149 MMOL/L — HIGH (ref 135–145)
SPECIMEN SOURCE: SIGNIFICANT CHANGE UP
SURGICAL PATHOLOGY STUDY: SIGNIFICANT CHANGE UP
WBC # BLD: 8.39 K/UL — SIGNIFICANT CHANGE UP (ref 3.8–10.5)
WBC # FLD AUTO: 8.39 K/UL — SIGNIFICANT CHANGE UP (ref 3.8–10.5)

## 2022-09-15 PROCEDURE — 99233 SBSQ HOSP IP/OBS HIGH 50: CPT

## 2022-09-15 RX ORDER — OXYCODONE HYDROCHLORIDE 5 MG/1
5 TABLET ORAL EVERY 6 HOURS
Refills: 0 | Status: DISCONTINUED | OUTPATIENT
Start: 2022-09-15 | End: 2022-09-15

## 2022-09-15 RX ORDER — POTASSIUM PHOSPHATE, MONOBASIC POTASSIUM PHOSPHATE, DIBASIC 236; 224 MG/ML; MG/ML
30 INJECTION, SOLUTION INTRAVENOUS ONCE
Refills: 0 | Status: COMPLETED | OUTPATIENT
Start: 2022-09-15 | End: 2022-09-15

## 2022-09-15 RX ORDER — POTASSIUM CHLORIDE 20 MEQ
40 PACKET (EA) ORAL ONCE
Refills: 0 | Status: COMPLETED | OUTPATIENT
Start: 2022-09-15 | End: 2022-09-15

## 2022-09-15 RX ORDER — SODIUM,POTASSIUM PHOSPHATES 278-250MG
1 POWDER IN PACKET (EA) ORAL ONCE
Refills: 0 | Status: COMPLETED | OUTPATIENT
Start: 2022-09-15 | End: 2022-09-15

## 2022-09-15 RX ORDER — MORPHINE SULFATE 50 MG/1
2 CAPSULE, EXTENDED RELEASE ORAL EVERY 6 HOURS
Refills: 0 | Status: DISCONTINUED | OUTPATIENT
Start: 2022-09-15 | End: 2022-09-21

## 2022-09-15 RX ORDER — DEXTROSE MONOHYDRATE, SODIUM CHLORIDE, AND POTASSIUM CHLORIDE 50; .745; 4.5 G/1000ML; G/1000ML; G/1000ML
1000 INJECTION, SOLUTION INTRAVENOUS
Refills: 0 | Status: DISCONTINUED | OUTPATIENT
Start: 2022-09-15 | End: 2022-09-15

## 2022-09-15 RX ORDER — POTASSIUM CHLORIDE 20 MEQ
10 PACKET (EA) ORAL
Refills: 0 | Status: COMPLETED | OUTPATIENT
Start: 2022-09-15 | End: 2022-09-15

## 2022-09-15 RX ORDER — MORPHINE SULFATE 50 MG/1
2 CAPSULE, EXTENDED RELEASE ORAL EVERY 6 HOURS
Refills: 0 | Status: DISCONTINUED | OUTPATIENT
Start: 2022-09-15 | End: 2022-09-15

## 2022-09-15 RX ORDER — ACETAMINOPHEN 500 MG
975 TABLET ORAL EVERY 6 HOURS
Refills: 0 | Status: DISCONTINUED | OUTPATIENT
Start: 2022-09-15 | End: 2022-09-24

## 2022-09-15 RX ORDER — SODIUM CHLORIDE 9 MG/ML
1000 INJECTION, SOLUTION INTRAVENOUS
Refills: 0 | Status: DISCONTINUED | OUTPATIENT
Start: 2022-09-15 | End: 2022-09-17

## 2022-09-15 RX ADMIN — SODIUM CHLORIDE 75 MILLILITER(S): 9 INJECTION, SOLUTION INTRAVENOUS at 14:21

## 2022-09-15 RX ADMIN — Medication 50 MILLIGRAM(S): at 09:49

## 2022-09-15 RX ADMIN — HEPARIN SODIUM 5000 UNIT(S): 5000 INJECTION INTRAVENOUS; SUBCUTANEOUS at 18:49

## 2022-09-15 RX ADMIN — PANTOPRAZOLE SODIUM 40 MILLIGRAM(S): 20 TABLET, DELAYED RELEASE ORAL at 18:48

## 2022-09-15 RX ADMIN — Medication 50 MILLIGRAM(S): at 18:47

## 2022-09-15 RX ADMIN — PANTOPRAZOLE SODIUM 40 MILLIGRAM(S): 20 TABLET, DELAYED RELEASE ORAL at 05:36

## 2022-09-15 RX ADMIN — CHLORHEXIDINE GLUCONATE 1 APPLICATION(S): 213 SOLUTION TOPICAL at 05:20

## 2022-09-15 RX ADMIN — Medication 975 MILLIGRAM(S): at 18:47

## 2022-09-15 RX ADMIN — Medication 1 PACKET(S): at 18:49

## 2022-09-15 RX ADMIN — HEPARIN SODIUM 5000 UNIT(S): 5000 INJECTION INTRAVENOUS; SUBCUTANEOUS at 05:28

## 2022-09-15 RX ADMIN — Medication 50 MILLIGRAM(S): at 05:28

## 2022-09-15 RX ADMIN — Medication 100 MILLIEQUIVALENT(S): at 06:36

## 2022-09-15 RX ADMIN — POTASSIUM PHOSPHATE, MONOBASIC POTASSIUM PHOSPHATE, DIBASIC 83.33 MILLIMOLE(S): 236; 224 INJECTION, SOLUTION INTRAVENOUS at 06:35

## 2022-09-15 RX ADMIN — CEFTRIAXONE 100 MILLIGRAM(S): 500 INJECTION, POWDER, FOR SOLUTION INTRAMUSCULAR; INTRAVENOUS at 05:29

## 2022-09-15 RX ADMIN — Medication 100 MILLIEQUIVALENT(S): at 05:29

## 2022-09-15 RX ADMIN — Medication 100 MILLIEQUIVALENT(S): at 06:12

## 2022-09-15 RX ADMIN — Medication 40 MILLIEQUIVALENT(S): at 18:49

## 2022-09-15 NOTE — CHART NOTE - NSCHARTNOTEFT_GEN_A_CORE
Mr. Diaz is a 56 year old male with no reported PMH who presented to [facility] ED earlier today via EMS for severe diffuse abdominal pain with distention and N/V. Per report pt recently arrived back to [place] from [place] and ate food from take out and was concerned that he developed food poisoning, however, he was found to be hypoxemic with O2 sat 80% on room air with low dbp and tachycardia. Last BM reported was a few hours prior. 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 Suspect transmural extension of tumor. Probable pneumatosis in the ascending colon. The colon is distended to 10.1 cm. Fluid-filled dilated esophagus." Lactate was 11.9, INR 2, and creatinine 2.33 with brown colored urine noted. Pt taken to OR for emergent ex lap. Post op pt brought to ICU intubated s/p emergent ex lap with colon resection-  postoperative  subtotal colectomy, left open for tx of LBO 2/2 sigmoid colon mass. Pt also noted to be in shock state, likely 2/2 septic +/- hemorrhagic shock,  MAXWELL, [place], NSTEMI and decreased LV function on POCUS. Required multiple blood products, and initially on 3 pressors. Now off. Pocus with B/L consolidation ? atelectasis.  Improved LVF with normal RV size and function. CV shock initially mixed hemorraghic, septic and cardiogenic appears to be pure vasodilatory - septic.   Consolidations Likely just 2/2 atelectasis Shock initially thought to be septic + hemorrhagic now likely septic.  LBO 2/2 mass eroding through and adherent to surrounding tissue now with peritonitis. ID consulted and placed on  meropenem, Vancomycin and Caspofungin.  Continue ABX of per ID. Patient returned to OR 9/12 for removal of temporary abdominal dressing and frankly necrotic terminal ileum. Found to have areas of patchy necrosis of small bowel. Per surgery 120cm of small bowel left and creation of enterostomy.  9/14 patient weaned and extubated to nasal canula O2. Today 9/15 NGT was clamped x 4 hours and now removed and started on clears. PT and OT ordered. Abdominal binder order.    Surgical PA  for Dr. Muniz aware of transfer  NAVYA Lopez  critical care

## 2022-09-15 NOTE — CHART NOTE - NSCHARTNOTEFT_GEN_A_CORE
NGT clamped x 4 hours. NO residual. Results given to Surgical PA.   Plan: will dc ngt and may start clears as d/w surgery.

## 2022-09-15 NOTE — PROGRESS NOTE ADULT - SUBJECTIVE AND OBJECTIVE BOX
INTERVAL HPI/OVERNIGHT EVENTS:      CENTRAL LINE: [x ] YES [ ] NO  LOCATION:   [facility] 9/10/22    VIVAR: [x ] YES [ ] NO        A-LINE:  [ x] YES [ ] NO  LOCATION:   R axillary 9/10/22    GLOBAL ISSUE/BEST PRACTICE:  Analgesia: fentaNYL    Injectable PRN  Sedation: dexMEDEtomidine Infusion  HOB elevation: yes  Stress ulcer prophylaxis: pantoprazole  Injectable  VTE prophylaxis: HSQ  Oral Care: Chlorhexidine  Glycemic control:   Nutrition:Diet, NPO (09-10-22 @ 20:44) [Active]    REVIEW OF SYSTEMS: Extubated  No acute complaints    PHYSICAL EXAM:  GEN - NAD; well appearing; A+O x3; non-toxic appearing  CARD -s1s2, RRR, no M,G,R;   PULM - CTA b/l, symmetric breath sounds;   ABD -  +BS, midline incision intact JAVED R and L with 50cc drainage overnight, ileostomy in RL intact pink, soft, no guarding, no rebound, no masses;   BACK - no CVA tenderness, Normal  spine;   EXT - symmetric pulses, 2+ dp, capillary refill < 2 seconds, no cyanosis, no edema;   NEURO - no focal neuro deficits, no slurred speech      ICU Vital Signs Last 24 Hrs  T(C): 36.1 (15 Sep 2022 07:30), Max: 37.3 (14 Sep 2022 14:43)  T(F): 97 (15 Sep 2022 07:30), Max: 99.2 (14 Sep 2022 14:43)  HR: 86 (15 Sep 2022 13:07) (63 - 93)  BP: 132/65 (15 Sep 2022 13:07) (118/60 - 132/65)  BP(mean): 81 (15 Sep 2022 13:07) (75 - 88)  ABP: 146/79 (15 Sep 2022 12:00) (113/87 - 147/78)  ABP(mean): 105 (15 Sep 2022 12:00) (85 - 136)  RR: 25 (15 Sep 2022 13:07) (4 - 35)  SpO2: 94% (15 Sep 2022 13:07) (94% - 100%)      I&O's Detail    14 Sep 2022 07:01  -  15 Sep 2022 07:00  --------------------------------------------------------  IN:    Dexmedetomidine: 101.5 mL    dextrose 5% + lactated ringers: 1300 mL    IV PiggyBack: 100 mL  Total IN: 1501.5 mL    OUT:    Bulb (mL): 250 mL    Bulb (mL): 110 mL    Colostomy (mL): 560 mL    Indwelling Catheter - Urethral (mL): 375 mL    Nasogastric/Oral tube (mL): 250 mL    Voided (mL): 1030 mL  Total OUT: 2575 mL    Total NET: -1073.5 mL      15 Sep 2022 07:01  -  15 Sep 2022 13:31  --------------------------------------------------------  IN:    dextrose 5% + lactated ringers: 400 mL  Total IN: 400 mL    OUT:    Bulb (mL): 190 mL    Bulb (mL): 80 mL    Colostomy (mL): 370 mL    Nasogastric/Oral tube (mL): 100 mL    Voided (mL): 500 mL  Total OUT: 1240 mL    Total NET: -840 mL        MEDICATIONS  NEURO  Meds: fentaNYL    Injectable 50 MICROGram(s) IV Push every 3 hours PRN Severe Pain (7 - 10)    RESPIRATORY  ABG - ( 14 Sep 2022 10:21 )  pH: 7.46  /  pCO2: 39    /  pO2: 146   / HCO3: 28    / Base Excess: 3.6   /  SaO2: 98.0    Lactate: x                Meds:   CARDIOVASCULAR  Meds:   GI/NUTRITION  Meds: pantoprazole  Injectable 40 milliGRAM(s) IV Push two times a day    GENITOURINARY  Meds: dextrose 5% + sodium chloride 0.45% with potassium chloride 20 mEq/L 1000 milliLiter(s) IV Continuous <Continuous>    HEMATOLOGIC  Meds: heparin   Injectable 5000 Unit(s) SubCutaneous every 12 hours    [x] VTE Prophylaxis  INFECTIOUS DISEASES  Meds: cefTRIAXone   IVPB 2000 milliGRAM(s) IV Intermittent every 24 hours    ENDOCRINE  CAPILLARY BLOOD GLUCOSE        Meds:   OTHER MEDICATIONS:  chlorhexidine 2% Cloths 1 Application(s) Topical <User Schedule>  :    LABS:                        8.4    8.39  )-----------( 161      ( 15 Sep 2022 03:00 )             26.7      09-15    149<H>  |  114<H>  |  20  ----------------------------<  110<H>  3.1<L>   |  30  |  0.79    Ca    7.4<L>      15 Sep 2022 03:00  Phos  1.6     09-15  Mg     2.4     09-15    TPro  4.2<L>  /  Alb  1.5<L>  /  TBili  0.4  /  DBili  x   /  AST  63<H>  /  ALT  64  /  AlkPhos  85  09-15    PT/INR - ( 14 Sep 2022 13:53 )   PT: 14.4 sec;   INR: 1.21 ratio             Culture Results:   No growth to date. (09-13 @ 14:50)  Culture Results:   No growth to date. (09-13 @ 14:24)            RADIOLOGY & ADDITIONAL STUDIES:

## 2022-09-15 NOTE — PROGRESS NOTE ADULT - SUBJECTIVE AND OBJECTIVE BOX
HPI: 56 year old male POD #3 s/p ex lap, subtotal colectomy 9/10. s/p closure of abdomen, SBR, ileostomy creation extubated, off pressors    Vital Signs Last 24 Hrs  T(C): 36.1 (15 Sep 2022 05:46), Max: 37.3 (14 Sep 2022 14:43)  T(F): 96.9 (15 Sep 2022 05:46), Max: 99.2 (14 Sep 2022 14:43)  HR: 88 (15 Sep 2022 06:00) (58 - 93)  BP: 127/68 (15 Sep 2022 06:00) (118/60 - 131/74  BP(mean): 80 (15 Sep 2022 06:00) (75 - 88)  RR: 22 (15 Sep 2022 06:00) (4 - 35)  SpO2: 100% (15 Sep 2022 06:00) (94% - 100%)    Parameters below as of 14 Sep 2022 11:27    O2 Flow (L/min): 2  O2 Concentration (%): 28    MEDICATIONS  (STANDING):  cefTRIAXone   IVPB 2000 milliGRAM(s) IV Intermittent every 24 hours  chlorhexidine 2% Cloths 1 Application(s) Topical <User Schedule>  dextrose 5% + lactated ringers. 1000 milliLiter(s) (100 mL/Hr) IV Continuous <Continuous>  heparin   Injectable 5000 Unit(s) SubCutaneous every 12 hours  hydrocortisone sodium succinate Injectable 50 milliGRAM(s) IV Push every 8 hours  pantoprazole  Injectable 40 milliGRAM(s) IV Push two times a day    MEDICATIONS  (PRN):  fentaNYL    Injectable 50 MICROGram(s) IV Push every 3 hours PRN Severe Pain (7 - 10)    I&O's Detail    14 Sep 2022 07:01  -  15 Sep 2022 07:00  --------------------------------------------------------  IN:    Dexmedetomidine: 101.5 mL    dextrose 5% + lactated ringers: 1200 mL    IV PiggyBack: 100 mL  Total IN: 1401.5 mL    OUT:    Bulb (mL): 250 mL    Bulb (mL): 110 mL    Colostomy (mL): 560 mL    Indwelling Catheter - Urethral (mL): 375 mL    Nasogastric/Oral tube (mL): 250 mL    Voided (mL): 1030 mL  Total OUT: 2575 mL    Total NET: -1173.5 mL          PHYSICAL EXAM:      Constitutional: awake and alert.  HEENT: PERRLA, EOMI,   Neck: Supple.  Respiratory: Breath sounds are clear bilaterally  Cardiovascular: S1 and S2, regular   Gastrointestinal: soft, midline incision dressing changed, packing changed. JAVED with serosanguinous output x2   Extremities:  no edema  Musculoskeletal: no joint swelling/tenderness, no abnormal movements  Skin: No rashes        LABS:                         8.4    8.39  )-----------( 161      ( 15 Sep 2022 03:00 )             26.7     09-15    149<H>  |  114<H>  |  20  ----------------------------<  110<H>  3.1<L>   |  30  |  0.79    Ca    7.4<L>      15 Sep 2022 03:00  Phos  1.6     09-15  Mg     2.4     09-15    TPro  4.2<L>  /  Alb  1.5<L>  /  TBili  0.4  /  DBili  x   /  AST  63<H>  /  ALT  64  /  AlkPhos  85  09-15    LIVER FUNCTIONS - ( 15 Sep 2022 03:00 )  Alb: 1.5 g/dL / Pro: 4.2 gm/dL / ALK PHOS: 85 U/L / ALT: 64 U/L / AST: 63 U/L / GGT: x

## 2022-09-15 NOTE — PROGRESS NOTE ADULT - NS ATTEST RISK PROBLEM GEN_ALL_CORE FT
s/p or with severe blood loss anemia, and shock state, now resolved.  Continues to have electrolyte derangements from post op GI losses, will continue to monitor on floors.

## 2022-09-15 NOTE — PROGRESS NOTE ADULT - ASSESSMENT
55 yo M with no PMH a/w large bowel obstruction, now s/p emergent ex lap with colon resection, left open for tx of LBO 2/2 sigmoid colon mass.  Noted to be shock state 2/2 septic and hemorrhagic shock from acute blood loss anemia MAXWELL, lactic acidosis, NSTEMI with decreased LV function on POCUS.  Now weaning off 3 pressors, down to only norepinephrine.  Overnight noted to have ? seizure, s/p ct head with no acute findings.      Neuro: Extubated yesterday.  Fentanyl prn for pain  CV: Shock state - now resolved;  Maintain MAP >65.    Pulm: Intubate on 9/10 for OR; c/w 26/450/40/+5;   	- Extubated today   GI: Large bowel obstruction 2/2 mass eroding through and adherent to surrounding structures with peritonitis, plan for return to OR for washout, NGT to suction; abdominal wound with blood/serosanginuous drainage.  Continue to monitor output.  Renal/Metabolic: MAXWELL with creatinine improving. Monitor closely.   - hypok and hypo phos- will need to keep a close eye for losses and possibility of short gut.    ID: Peritonitis from perforated viscous - ID recommending deescalating to zosyn and caspofungin only at this time.   Heme: Acute blood loss anemia in the setting of persistent coagulopathy - s/p 3 units prbc 2 ffp in OR, cryo and TXA added   - received vitamin K overnight, will give 2 units FFP prior to OR.  Trend H/H and will require H/H after OR.    - continue to trend h/h  - SCDs; start hsq for now.    Endo: Started on D5LR for FS in 80s  Dispo: Full code. Extubated yesterday.  Stable for transfer to floor

## 2022-09-15 NOTE — PROGRESS NOTE ADULT - ASSESSMENT
56 year old male POD #3 s/p ex lap, subtotal colectomy 9/10. s/p closure of abdomen, SBR, ileostomy creation extubated, off pressors    - NPO, NGT, IVF  - abx per ID  - analgesia prn  - local wound care per surgery  - monitor JAVED and ostomy output  - DVT ppx, GI ppx  - PROM in bed, consider oob   - will d/w surgical attending

## 2022-09-16 LAB
ALBUMIN SERPL ELPH-MCNC: 1.7 G/DL — LOW (ref 3.3–5)
ALP SERPL-CCNC: 105 U/L — SIGNIFICANT CHANGE UP (ref 40–120)
ALT FLD-CCNC: 112 U/L — HIGH (ref 12–78)
ANION GAP SERPL CALC-SCNC: 3 MMOL/L — LOW (ref 5–17)
AST SERPL-CCNC: 108 U/L — HIGH (ref 15–37)
BILIRUB SERPL-MCNC: 0.7 MG/DL — SIGNIFICANT CHANGE UP (ref 0.2–1.2)
BUN SERPL-MCNC: 21 MG/DL — SIGNIFICANT CHANGE UP (ref 7–23)
CALCIUM SERPL-MCNC: 7.9 MG/DL — LOW (ref 8.5–10.1)
CHLORIDE SERPL-SCNC: 116 MMOL/L — HIGH (ref 96–108)
CO2 SERPL-SCNC: 29 MMOL/L — SIGNIFICANT CHANGE UP (ref 22–31)
CREAT SERPL-MCNC: 0.7 MG/DL — SIGNIFICANT CHANGE UP (ref 0.5–1.3)
EGFR: 107 ML/MIN/1.73M2 — SIGNIFICANT CHANGE UP
GLUCOSE BLDC GLUCOMTR-MCNC: 89 MG/DL — SIGNIFICANT CHANGE UP (ref 70–99)
GLUCOSE BLDC GLUCOMTR-MCNC: 93 MG/DL — SIGNIFICANT CHANGE UP (ref 70–99)
GLUCOSE SERPL-MCNC: 101 MG/DL — HIGH (ref 70–99)
HCT VFR BLD CALC: 29.7 % — LOW (ref 39–50)
HGB BLD-MCNC: 9.4 G/DL — LOW (ref 13–17)
MAGNESIUM SERPL-MCNC: 2.4 MG/DL — SIGNIFICANT CHANGE UP (ref 1.6–2.6)
MCHC RBC-ENTMCNC: 26.6 PG — LOW (ref 27–34)
MCHC RBC-ENTMCNC: 31.6 G/DL — LOW (ref 32–36)
MCV RBC AUTO: 84.1 FL — SIGNIFICANT CHANGE UP (ref 80–100)
NRBC # BLD: 1 /100 WBCS — HIGH (ref 0–0)
PHOSPHATE SERPL-MCNC: 2.5 MG/DL — SIGNIFICANT CHANGE UP (ref 2.5–4.5)
PLATELET # BLD AUTO: 186 K/UL — SIGNIFICANT CHANGE UP (ref 150–400)
POTASSIUM SERPL-MCNC: 3.3 MMOL/L — LOW (ref 3.5–5.3)
POTASSIUM SERPL-SCNC: 3.3 MMOL/L — LOW (ref 3.5–5.3)
PROT SERPL-MCNC: 4.8 GM/DL — LOW (ref 6–8.3)
RBC # BLD: 3.53 M/UL — LOW (ref 4.2–5.8)
RBC # FLD: 16.9 % — HIGH (ref 10.3–14.5)
SODIUM SERPL-SCNC: 148 MMOL/L — HIGH (ref 135–145)
SURGICAL PATHOLOGY STUDY: SIGNIFICANT CHANGE UP
WBC # BLD: 12.63 K/UL — HIGH (ref 3.8–10.5)
WBC # FLD AUTO: 12.63 K/UL — HIGH (ref 3.8–10.5)

## 2022-09-16 PROCEDURE — 76937 US GUIDE VASCULAR ACCESS: CPT | Mod: 26

## 2022-09-16 PROCEDURE — 36410 VNPNXR 3YR/> PHY/QHP DX/THER: CPT | Mod: 59

## 2022-09-16 PROCEDURE — 99232 SBSQ HOSP IP/OBS MODERATE 35: CPT

## 2022-09-16 RX ORDER — POTASSIUM CHLORIDE 20 MEQ
10 PACKET (EA) ORAL
Refills: 0 | Status: COMPLETED | OUTPATIENT
Start: 2022-09-16 | End: 2022-09-16

## 2022-09-16 RX ORDER — SODIUM CHLORIDE 9 MG/ML
10 INJECTION INTRAMUSCULAR; INTRAVENOUS; SUBCUTANEOUS
Refills: 0 | Status: DISCONTINUED | OUTPATIENT
Start: 2022-09-16 | End: 2022-09-24

## 2022-09-16 RX ORDER — POTASSIUM CHLORIDE 20 MEQ
40 PACKET (EA) ORAL ONCE
Refills: 0 | Status: COMPLETED | OUTPATIENT
Start: 2022-09-16 | End: 2022-09-16

## 2022-09-16 RX ADMIN — CEFTRIAXONE 100 MILLIGRAM(S): 500 INJECTION, POWDER, FOR SOLUTION INTRAMUSCULAR; INTRAVENOUS at 11:59

## 2022-09-16 RX ADMIN — Medication 975 MILLIGRAM(S): at 06:36

## 2022-09-16 RX ADMIN — HEPARIN SODIUM 5000 UNIT(S): 5000 INJECTION INTRAVENOUS; SUBCUTANEOUS at 05:58

## 2022-09-16 RX ADMIN — SODIUM CHLORIDE 75 MILLILITER(S): 9 INJECTION, SOLUTION INTRAVENOUS at 14:48

## 2022-09-16 RX ADMIN — Medication 100 MILLIEQUIVALENT(S): at 11:59

## 2022-09-16 RX ADMIN — Medication 975 MILLIGRAM(S): at 12:07

## 2022-09-16 RX ADMIN — Medication 50 MILLIGRAM(S): at 17:35

## 2022-09-16 RX ADMIN — HEPARIN SODIUM 5000 UNIT(S): 5000 INJECTION INTRAVENOUS; SUBCUTANEOUS at 17:36

## 2022-09-16 RX ADMIN — CHLORHEXIDINE GLUCONATE 1 APPLICATION(S): 213 SOLUTION TOPICAL at 05:09

## 2022-09-16 RX ADMIN — Medication 975 MILLIGRAM(S): at 05:49

## 2022-09-16 RX ADMIN — Medication 40 MILLIEQUIVALENT(S): at 12:07

## 2022-09-16 RX ADMIN — Medication 100 MILLIEQUIVALENT(S): at 14:47

## 2022-09-16 RX ADMIN — Medication 975 MILLIGRAM(S): at 17:35

## 2022-09-16 RX ADMIN — PANTOPRAZOLE SODIUM 40 MILLIGRAM(S): 20 TABLET, DELAYED RELEASE ORAL at 17:35

## 2022-09-16 RX ADMIN — Medication 100 MILLIEQUIVALENT(S): at 13:34

## 2022-09-16 NOTE — PROGRESS NOTE ADULT - SUBJECTIVE AND OBJECTIVE BOX
TAISHA ROGERS  MRN-[id number]    Follow Up:  sepsis, streptococcus anginosus positive blood cultures, necrotic terminal ileum, necrosis of small bowel, post op state, fevers, leukocytosis     Interval History: The pt was seen and examined earlier, no distress, awake and alert, somewhat fatigued, states that he is feeling great, family present. The pt was extubated, weaned off pressors, downgraded from ICU to the medical floor, no fevers since 9/12, on RA during my exam, WBC 12.63.     PAST MEDICAL & SURGICAL HISTORY:  No pertinent past medical history      No significant past surgical history          ROS:    [ ] Unobtainable because:  [ x] All other systems negative    Constitutional: no fever, no chills  Head: no trauma  Eyes: no vision changes, no eye pain  ENT:  no sore throat, no rhinorrhea  Cardiovascular:  no chest pain, no palpitation  Respiratory:  no SOB, no cough  GI:  no abd pain, no vomiting, with ileostomy  urinary: with Lind   musculoskeletal:  no joint pain, no joint swelling  skin:  no rash  neurology:  no headache, no seizure, no change in mental status  psych: no anxiety, no depression         Allergies  No Known Allergies        ANTIMICROBIALS:  cefTRIAXone   IVPB 2000 every 24 hours      OTHER MEDS:  acetaminophen     Tablet .. 975 milliGRAM(s) Oral every 6 hours  chlorhexidine 2% Cloths 1 Application(s) Topical <User Schedule>  dextrose 5%. 1000 milliLiter(s) IV Continuous <Continuous>  heparin   Injectable 5000 Unit(s) SubCutaneous every 12 hours  hydrocortisone sodium succinate Injectable 50 milliGRAM(s) IV Push every 12 hours  morphine  - Injectable 2 milliGRAM(s) IV Push every 6 hours PRN  oxyCODONE    IR 5 milliGRAM(s) Oral every 6 hours PRN  pantoprazole  Injectable 40 milliGRAM(s) IV Push two times a day  potassium chloride  10 mEq/100 mL IVPB 10 milliEquivalent(s) IV Intermittent every 1 hour  sodium chloride 0.9% lock flush 10 milliLiter(s) IV Push every 1 hour PRN      Vital Signs Last 24 Hrs  T(C): 36.7 (16 Sep 2022 11:39), Max: 36.8 (16 Sep 2022 04:55)  T(F): 98 (16 Sep 2022 11:39), Max: 98.3 (16 Sep 2022 04:55)  HR: 68 (16 Sep 2022 11:39) (60 - 89)  BP: 152/77 (16 Sep 2022 11:39) (130/76 - 157/79)  BP(mean): --  RR: 18 (16 Sep 2022 11:39) (18 - 31)  SpO2: 95% (16 Sep 2022 11:39) (94% - 100%)    Parameters below as of 16 Sep 2022 11:39  Patient On (Oxygen Delivery Method): room air        Physical Exam:  General: Nontoxic-appearing Male in no acute distress. RA  HEENT: AT/NC. Pupils reactive to light bilaterally, no thrush, Anicteric. Conjunctiva pink and moist.  Neck: Not rigid. No sense of mass.  Nodes: None palpable.  Lungs: Diminished breath sounds bilaterally, no wheezing, no rhonchi  Heart: Regular rate and rhythm. No Murmur. No rub. No gallop. No palpable thrill.  Abdomen: Bowel sounds hypoactive, colostomy with thin liquid stool, JAVED x 2 with serosanguineous drainage, Surgical site is clean, no pus, no malodor, no erythema of surrounding tissues, not significantly tender.   Back: Unable  Extremities: No cyanosis or clubbing. trace edema of b/l LEs, 2+ edema of b/l Upper extremities  Skin: Warm. Dry. Good turgor. No rash. No vasculitic stigmata.  Psychiatric: awake and alert, appropriate     WBC Count: 12.63 K/uL (09-16 @ 06:35)  WBC Count: 8.39 K/uL (09-15 @ 03:00)  WBC Count: 6.76 K/uL (09-14 @ 13:53)  WBC Count: 6.24 K/uL (09-14 @ 02:35)  WBC Count: 6.74 K/uL (09-13 @ 14:28)  WBC Count: 6.25 K/uL (09-13 @ 03:00)  WBC Count: 14.30 K/uL (09-12 @ 11:00)                            9.4    12.63 )-----------( 186      ( 16 Sep 2022 06:35 )             29.7       09-16    148<H>  |  116<H>  |  21  ----------------------------<  101<H>  3.3<L>   |  29  |  0.70    Ca    7.9<L>      16 Sep 2022 06:35  Phos  2.5     09-16  Mg     2.4     09-16    TPro  4.8<L>  /  Alb  1.7<L>  /  TBili  0.7  /  DBili  x   /  AST  108<H>  /  ALT  112<H>  /  AlkPhos  105  09-16          Creatinine Trend: 0.70<--, 0.74<--, 0.79<--, 0.88<--, 0.86<--, 1.18<--      MICROBIOLOGY:  v  .Blood Blood-Peripheral  09-13-22   No growth to date.  --  --      .Blood Blood-Peripheral  09-13-22   No growth to date.  --  --      .Blood Blood-Peripheral  09-10-22   No Growth Final  --  Blood Culture PCR  Streptococcus anginosus    D-Dimer Assay, Quantitative: 5495 (09-10)    RADIOLOGY:     TAISHA ROGERS  MRN-[id number]    Follow Up:  sepsis, streptococcus anginosus positive blood cultures, necrotic terminal ileum, necrosis of small bowel, post op state, fevers, leukocytosis     Interval History: The pt was seen and examined earlier, no distress, awake and alert, somewhat fatigued, states that he is feeling great, family present. The pt was extubated, weaned off pressors, downgraded from ICU to the medical floor, no fevers since 9/12, on RA during my exam, WBC 12.63.     PAST MEDICAL & SURGICAL HISTORY:  No pertinent past medical history      No significant past surgical history          ROS:    [ ] Unobtainable because:  [ x] All other systems negative    Constitutional: no fever, no chills  Head: no trauma  Eyes: no vision changes, no eye pain  ENT:  no sore throat, no rhinorrhea  Cardiovascular:  no chest pain, no palpitation  Respiratory:  no SOB, no cough  GI:  no abd pain, no vomiting, with ileostomy  urinary: with Lind   musculoskeletal:  no joint pain, no joint swelling  skin:  no rash  neurology:  no headache, no seizure, no change in mental status  psych: no anxiety, no depression         Allergies  No Known Allergies        ANTIMICROBIALS:  cefTRIAXone   IVPB 2000 every 24 hours      OTHER MEDS:  acetaminophen     Tablet .. 975 milliGRAM(s) Oral every 6 hours  chlorhexidine 2% Cloths 1 Application(s) Topical <User Schedule>  dextrose 5%. 1000 milliLiter(s) IV Continuous <Continuous>  heparin   Injectable 5000 Unit(s) SubCutaneous every 12 hours  hydrocortisone sodium succinate Injectable 50 milliGRAM(s) IV Push every 12 hours  morphine  - Injectable 2 milliGRAM(s) IV Push every 6 hours PRN  oxyCODONE    IR 5 milliGRAM(s) Oral every 6 hours PRN  pantoprazole  Injectable 40 milliGRAM(s) IV Push two times a day  potassium chloride  10 mEq/100 mL IVPB 10 milliEquivalent(s) IV Intermittent every 1 hour  sodium chloride 0.9% lock flush 10 milliLiter(s) IV Push every 1 hour PRN      Vital Signs Last 24 Hrs  T(C): 36.7 (16 Sep 2022 11:39), Max: 36.8 (16 Sep 2022 04:55)  T(F): 98 (16 Sep 2022 11:39), Max: 98.3 (16 Sep 2022 04:55)  HR: 68 (16 Sep 2022 11:39) (60 - 89)  BP: 152/77 (16 Sep 2022 11:39) (130/76 - 157/79)  BP(mean): --  RR: 18 (16 Sep 2022 11:39) (18 - 31)  SpO2: 95% (16 Sep 2022 11:39) (94% - 100%)    Parameters below as of 16 Sep 2022 11:39  Patient On (Oxygen Delivery Method): room air        Physical Exam:  General: Nontoxic-appearing Male in no acute distress. RA  HEENT: AT/NC. Pupils reactive to light bilaterally, no thrush, Anicteric. Conjunctiva pink and moist.  Neck: Not rigid. No sense of mass.  Nodes: None palpable.  Lungs: Diminished breath sounds bilaterally, no wheezing, no rhonchi  Heart: Regular rate and rhythm. No Murmur. No rub. No gallop. No palpable thrill.  Abdomen: Bowel sounds hypoactive, colostomy with thin liquid stool, JAVED x 2 with serosanguineous drainage, Surgical site is clean, no pus, no malodor, no erythema of surrounding tissues, not significantly tender.   : Lind   Back: Unable  Extremities: No cyanosis or clubbing. trace edema of b/l LEs, 2+ edema of b/l Upper extremities  Skin: Warm. Dry. Good turgor. No rash. No vasculitic stigmata.  Psychiatric: awake and alert, appropriate     WBC Count: 12.63 K/uL (09-16 @ 06:35)  WBC Count: 8.39 K/uL (09-15 @ 03:00)  WBC Count: 6.76 K/uL (09-14 @ 13:53)  WBC Count: 6.24 K/uL (09-14 @ 02:35)  WBC Count: 6.74 K/uL (09-13 @ 14:28)  WBC Count: 6.25 K/uL (09-13 @ 03:00)  WBC Count: 14.30 K/uL (09-12 @ 11:00)                            9.4    12.63 )-----------( 186      ( 16 Sep 2022 06:35 )             29.7       09-16    148<H>  |  116<H>  |  21  ----------------------------<  101<H>  3.3<L>   |  29  |  0.70    Ca    7.9<L>      16 Sep 2022 06:35  Phos  2.5     09-16  Mg     2.4     09-16    TPro  4.8<L>  /  Alb  1.7<L>  /  TBili  0.7  /  DBili  x   /  AST  108<H>  /  ALT  112<H>  /  AlkPhos  105  09-16          Creatinine Trend: 0.70<--, 0.74<--, 0.79<--, 0.88<--, 0.86<--, 1.18<--      MICROBIOLOGY:  v  .Blood Blood-Peripheral  09-13-22   No growth to date.  --  --      .Blood Blood-Peripheral  09-13-22   No growth to date.  --  --      .Blood Blood-Peripheral  09-10-22   No Growth Final  --  Blood Culture PCR  Streptococcus anginosus    D-Dimer Assay, Quantitative: 9944 (09-10)    RADIOLOGY:

## 2022-09-16 NOTE — PROGRESS NOTE ADULT - NS ATTEND AMEND GEN_ALL_CORE FT
Attending Addendum--  Case reviewed with SILVIO Moran. Her note reviewed and modified as appropriate.   Patient personally assessed and examined.  Awake and alert, interactive, wife and brother at bedside.  In good spirits. No specific complaints  Abdominal exam benign  F/U CBC LFT  Steve Hernandez MD  Attending Physician  [facility]  Division of Infectious Diseases  [phone]

## 2022-09-16 NOTE — CONSULT NOTE ADULT - SUBJECTIVE AND OBJECTIVE BOX
[facility] NEPHROLOGY SERVICES, [facility]  NEPHROLOGY AND HYPERTENSION  [address]  [address]  [place] [address]  [phone]    MD LIZANDRO ENAMORADO MD ANDREY GONCHARUK, MD MADHU KORRAPATI, MD YELENA ROSENBERG, MD BINNY KOSHY, MD CHRISTOPHER CAPUTO, MD EDWARD BOVER, MD      Information from chart:  "Patient is a 56y old  Male who presents with a chief complaint of bowel obstruction (16 Sep 2022 14:26)    HPI:  56M with vomiting and chills for 2 days. Recently traveled from [place] and thought he just had a stomach bug however he was increasingly becoming distended and complaining of diffuse abdominal pain. Denies any Pmhx, trauma, or family hx. Has never had a colonoscopy. On ct shows sigmoid tumor and bowel obstruction.  (10 Sep 2022 15:54)   "        PAST MEDICAL & SURGICAL HISTORY:  No pertinent past medical history      No significant past surgical history        FAMILY HISTORY:    Allergies    No Known Allergies    Intolerances      Home Medications:    MEDICATIONS  (STANDING):  acetaminophen     Tablet .. 975 milliGRAM(s) Oral every 6 hours  cefTRIAXone   IVPB 2000 milliGRAM(s) IV Intermittent every 24 hours  chlorhexidine 2% Cloths 1 Application(s) Topical <User Schedule>  dextrose 5%. 1000 milliLiter(s) (75 mL/Hr) IV Continuous <Continuous>  heparin   Injectable 5000 Unit(s) SubCutaneous every 12 hours  hydrocortisone sodium succinate Injectable 50 milliGRAM(s) IV Push every 12 hours  pantoprazole  Injectable 40 milliGRAM(s) IV Push two times a day    MEDICATIONS  (PRN):  morphine  - Injectable 2 milliGRAM(s) IV Push every 6 hours PRN Severe Pain (7 - 10)  oxyCODONE    IR 5 milliGRAM(s) Oral every 6 hours PRN Moderate Pain (4 - 6)  sodium chloride 0.9% lock flush 10 milliLiter(s) IV Push every 1 hour PRN Pre/post blood products, medications, blood draw, and to maintain line patency    Vital Signs Last 24 Hrs  T(C): 36.7 (16 Sep 2022 11:39), Max: 36.8 (16 Sep 2022 04:55)  T(F): 98 (16 Sep 2022 11:39), Max: 98.3 (16 Sep 2022 04:55)  HR: 68 (16 Sep 2022 11:39) (60 - 68)  BP: 152/77 (16 Sep 2022 11:39) (130/76 - 157/79)  BP(mean): --  RR: 18 (16 Sep 2022 11:39) (18 - 20)  SpO2: 95% (16 Sep 2022 11:39) (94% - 96%)    Parameters below as of 16 Sep 2022 11:39  Patient On (Oxygen Delivery Method): room air        Daily     Daily     09-15-22 @ 07:01  -  09-16-22 @ 07:00  --------------------------------------------------------  IN: 550 mL / OUT: 2617.5 mL / NET: -2067.5 mL    09-16-22 @ 07:01  -  09-16-22 @ 15:01  --------------------------------------------------------  IN: 0 mL / OUT: 1700 mL / NET: -1700 mL      CAPILLARY BLOOD GLUCOSE      POCT Blood Glucose.: 89 mg/dL (16 Sep 2022 11:20)  POCT Blood Glucose.: 93 mg/dL (16 Sep 2022 07:26)    PHYSICAL EXAM:      T(C): 36.7 (09-16-22 @ 11:39), Max: 36.8 (09-16-22 @ 04:55)  HR: 68 (09-16-22 @ 11:39) (60 - 68)  BP: 152/77 (09-16-22 @ 11:39) (130/76 - 157/79)  RR: 18 (09-16-22 @ 11:39) (18 - 20)  SpO2: 95% (09-16-22 @ 11:39) (94% - 96%)  Wt(kg): --  Lungs clear  Heart S1S2  Abd soft NT ND  Extremities:   tr edema              09-16    148<H>  |  116<H>  |  21  ----------------------------<  101<H>  3.3<L>   |  29  |  0.70    Ca    7.9<L>      16 Sep 2022 06:35  Phos  2.5     09-16  Mg     2.4     09-16    TPro  4.8<L>  /  Alb  1.7<L>  /  TBili  0.7  /  DBili  x   /  AST  108<H>  /  ALT  112<H>  /  AlkPhos  105  09-16                          9.4    12.63 )-----------( 186      ( 16 Sep 2022 06:35 )             29.7     Creatinine Trend: 0.70<--, 0.74<--, 0.79<--, 0.88<--, 0.86<--, 1.18<--          Assessment   Hypernatremia; hypokalemia;   Post operative s/p ex lap, subtotal colectomy 9/10. s/p closure of abdomen, SBR, ileostomy creation.    Plan  Hypotonic IVF;   Replete K  Follow ileostomy output.    Jonah Dow MD

## 2022-09-16 NOTE — PHYSICAL THERAPY INITIAL EVALUATION ADULT - BALANCE TRAINING, PT EVAL
Pt will improve static & dynamic standing balance to Good using [Rolling walker] to perform ADL, Gait independently  in 1 week

## 2022-09-16 NOTE — PROGRESS NOTE ADULT - SUBJECTIVE AND OBJECTIVE BOX
SURGERY PROGRESS HPI:  Pt seen and examined at bedside. Pt denies complaints. No acute events overnight.       Vital Signs Last 24 Hrs  T(C): 36.8 (16 Sep 2022 04:55), Max: 36.8 (16 Sep 2022 04:55)  T(F): 98.3 (16 Sep 2022 04:55), Max: 98.3 (16 Sep 2022 04:55)  HR: 60 (16 Sep 2022 04:55) (60 - 89)  BP: 157/79 (16 Sep 2022 04:55) (118/68 - 157/79)  BP(mean): 81 (15 Sep 2022 13:07) (79 - 87)  RR: 18 (16 Sep 2022 04:55) (14 - 31)  SpO2: 94% (16 Sep 2022 04:55) (94% - 100%)    Parameters below as of 16 Sep 2022 04:55  Patient On (Oxygen Delivery Method): room air          PHYSICAL EXAM:    GENERAL: NAD  HEAD:  Atraumatic, Normocephalic  CHEST/LUNG: Clear to ausculation, bilaterally   HEART: RRR S1S2  ABDOMEN: non distended, +BS, soft, non tender, no guarding, midline incision healing well.  both drains with serosanguinous output  EXTREMITIES:  calf soft, non tender b/l    I&O's Detail    15 Sep 2022 07:01  -  16 Sep 2022 07:00  --------------------------------------------------------  IN:    dextrose 5%: 150 mL    dextrose 5% + lactated ringers: 400 mL  Total IN: 550 mL    OUT:    Bulb (mL): 345 mL    Bulb (mL): 152.5 mL    Colostomy (mL): 1520 mL    Nasogastric/Oral tube (mL): 100 mL    Voided (mL): 500 mL  Total OUT: 2617.5 mL    Total NET: -2067.5 mL          LABS:                        9.4    12.63 )-----------( 186      ( 16 Sep 2022 06:35 )             29.7     09-15    148<H>  |  115<H>  |  19  ----------------------------<  98  3.3<L>   |  29  |  0.74    Ca    7.7<L>      15 Sep 2022 16:40  Phos  2.3     09-15  Mg     2.4     09-15    TPro  4.2<L>  /  Alb  1.5<L>  /  TBili  0.4  /  DBili  x   /  AST  63<H>  /  ALT  64  /  AlkPhos  85  09-15    PT/INR - ( 14 Sep 2022 13:53 )   PT: 14.4 sec;   INR: 1.21 ratio             Culture Results:   No growth to date. (09-13 @ 14:50)  Culture Results:   No growth to date. (09-13 @ 14:24)

## 2022-09-16 NOTE — PROGRESS NOTE ADULT - ASSESSMENT
56 year old male POD #4 s/p ex lap, subtotal colectomy 9/10. s/p closure of abdomen, SBR, ileostomy creation extubated, off pressors    - clear liquid diet, IVF  - abx per ID  - analgesia prn  - local wound care per surgery  - monitor JAVED and ostomy output  - DVT ppx, GI ppx  - PROM in bed, consider oob   - will d/w surgical attending

## 2022-09-16 NOTE — PHYSICAL THERAPY INITIAL EVALUATION ADULT - ADDITIONAL COMMENTS
As per pt he lives with his family in a house with 2 threshold steps where a RW can fit in. Once inside there are 1 flight of stairs  with left rail up to go to his bed room(pt spouse stated can arrange his bed room in the main level for time being), He was independent in all functional mobility using no AD, PTA

## 2022-09-17 LAB
ANION GAP SERPL CALC-SCNC: 6 MMOL/L — SIGNIFICANT CHANGE UP (ref 5–17)
BUN SERPL-MCNC: 12 MG/DL — SIGNIFICANT CHANGE UP (ref 7–23)
CALCIUM SERPL-MCNC: 7.7 MG/DL — LOW (ref 8.5–10.1)
CHLORIDE SERPL-SCNC: 114 MMOL/L — HIGH (ref 96–108)
CO2 SERPL-SCNC: 24 MMOL/L — SIGNIFICANT CHANGE UP (ref 22–31)
CREAT SERPL-MCNC: 0.7 MG/DL — SIGNIFICANT CHANGE UP (ref 0.5–1.3)
EGFR: 107 ML/MIN/1.73M2 — SIGNIFICANT CHANGE UP
GLUCOSE SERPL-MCNC: 145 MG/DL — HIGH (ref 70–99)
MAGNESIUM SERPL-MCNC: 2.2 MG/DL — SIGNIFICANT CHANGE UP (ref 1.6–2.6)
PHOSPHATE SERPL-MCNC: 2.7 MG/DL — SIGNIFICANT CHANGE UP (ref 2.5–4.5)
POTASSIUM SERPL-MCNC: 3.4 MMOL/L — LOW (ref 3.5–5.3)
POTASSIUM SERPL-SCNC: 3.4 MMOL/L — LOW (ref 3.5–5.3)
SODIUM SERPL-SCNC: 144 MMOL/L — SIGNIFICANT CHANGE UP (ref 135–145)

## 2022-09-17 RX ORDER — LOPERAMIDE HCL 2 MG
2 TABLET ORAL EVERY 6 HOURS
Refills: 0 | Status: DISCONTINUED | OUTPATIENT
Start: 2022-09-17 | End: 2022-09-19

## 2022-09-17 RX ORDER — POTASSIUM CHLORIDE 20 MEQ
10 PACKET (EA) ORAL
Refills: 0 | Status: COMPLETED | OUTPATIENT
Start: 2022-09-17 | End: 2022-09-17

## 2022-09-17 RX ORDER — DEXTROSE MONOHYDRATE, SODIUM CHLORIDE, AND POTASSIUM CHLORIDE 50; .745; 4.5 G/1000ML; G/1000ML; G/1000ML
1000 INJECTION, SOLUTION INTRAVENOUS
Refills: 0 | Status: DISCONTINUED | OUTPATIENT
Start: 2022-09-17 | End: 2022-09-19

## 2022-09-17 RX ADMIN — DEXTROSE MONOHYDRATE, SODIUM CHLORIDE, AND POTASSIUM CHLORIDE 100 MILLILITER(S): 50; .745; 4.5 INJECTION, SOLUTION INTRAVENOUS at 14:47

## 2022-09-17 RX ADMIN — Medication 100 MILLIEQUIVALENT(S): at 21:16

## 2022-09-17 RX ADMIN — CHLORHEXIDINE GLUCONATE 1 APPLICATION(S): 213 SOLUTION TOPICAL at 05:13

## 2022-09-17 RX ADMIN — HEPARIN SODIUM 5000 UNIT(S): 5000 INJECTION INTRAVENOUS; SUBCUTANEOUS at 05:52

## 2022-09-17 RX ADMIN — Medication 975 MILLIGRAM(S): at 11:51

## 2022-09-17 RX ADMIN — Medication 975 MILLIGRAM(S): at 19:04

## 2022-09-17 RX ADMIN — Medication 975 MILLIGRAM(S): at 23:41

## 2022-09-17 RX ADMIN — Medication 2 MILLIGRAM(S): at 11:51

## 2022-09-17 RX ADMIN — Medication 975 MILLIGRAM(S): at 12:50

## 2022-09-17 RX ADMIN — PANTOPRAZOLE SODIUM 40 MILLIGRAM(S): 20 TABLET, DELAYED RELEASE ORAL at 05:52

## 2022-09-17 RX ADMIN — Medication 2 MILLIGRAM(S): at 23:41

## 2022-09-17 RX ADMIN — CEFTRIAXONE 100 MILLIGRAM(S): 500 INJECTION, POWDER, FOR SOLUTION INTRAMUSCULAR; INTRAVENOUS at 05:50

## 2022-09-17 RX ADMIN — Medication 100 MILLIEQUIVALENT(S): at 23:41

## 2022-09-17 RX ADMIN — Medication 50 MILLIGRAM(S): at 05:52

## 2022-09-17 RX ADMIN — Medication 100 MILLIEQUIVALENT(S): at 19:47

## 2022-09-17 NOTE — PROGRESS NOTE ADULT - SUBJECTIVE AND OBJECTIVE BOX
NEPHROLOGY PROGRESS NOTE    CHIEF COMPLAINT:  hypernatremia, hypokalemia    HPI:  Brisk ostomy output, 1.7 liters x 24 hrs    ROS:  no SOB    EXAM:  T(F): 98 (09-17-22 @ 10:50)  HR: 82 (09-17-22 @ 10:50)  BP: 146/82 (09-17-22 @ 10:50)  RR: 18 (09-17-22 @ 10:50)  SpO2: 97% (09-17-22 @ 10:50)    Conversant, in no apparent distress  Normal respiratory effort, lungs clear bilaterally  Heart RRR with no murmur, no peripheral edema         LABS                             9.4    12.63 )-----------( 186      ( 16 Sep 2022 06:35 )             29.7          09-16    148<H>  |  116<H>  |  21  ----------------------------<  101<H>  3.3<L>   |  29  |  0.70    Ca    7.9<L>      16 Sep 2022 06:35  Phos  2.5     09-16  Mg     2.4     09-16    TPro  4.8<L>  /  Alb  1.7<L>  /  TBili  0.7  /  DBili  x   /  AST  108<H>  /  ALT  112<H>  /  AlkPhos  105  09-16           Assessment   Hypernatremia; hypokalemia;   Post operative s/p ex lap, subtotal colectomy 9/10. s/p closure of abdomen, SBR, ileostomy creation    Plan  Check electrolytes today and daily  Continue hypotonic IVF  Supplement KCl as needed

## 2022-09-17 NOTE — OCCUPATIONAL THERAPY INITIAL EVALUATION ADULT - GENERAL OBSERVATIONS, REHAB EVAL
Pt was encountered supine in bed; NAD, POD #5 s/p ex lap, subtotal colectomy 9/10. s/p closure of abdomen, SBR, ileostomy creation, abdominal dressing clean, dry and intact, JAVED drain 2x +, iliostomy bag +, abdominal binder +, alert, verbalized name and , followed commands, cooperative; pt c/o pain in abdomen region which impacts pts ability to perform functional tasks/transfers and mobility.

## 2022-09-17 NOTE — OCCUPATIONAL THERAPY INITIAL EVALUATION ADULT - ADDITIONAL COMMENTS
Pt lives with spouse and family (Can assist when home) in a private house with 2 steps (Large enough to fit a walker on) to enter. Once inside, the pt has 12 steps with a L handrail to reach the main floor where the bedroom and bathroom is. The pts bathroom has a walk in shower stall, comfort height toilet seat and no grab bars. The pt ambulates with no device and does not own any device for ambulation.

## 2022-09-17 NOTE — PROGRESS NOTE ADULT - ASSESSMENT
56 year old male POD #5 s/p ex lap, subtotal colectomy 9/10. s/p closure of abdomen, SBR, ileostomy creation.    - clear liquid diet, IVF  - continue abx per ID  - appreciate renal consult recs  - analgesia prn  - local wound care per surgery  - monitor JAVED and ostomy output  - DVT ppx, GI ppx  - continue PT, OOB  - will d/w surgical attending

## 2022-09-17 NOTE — OCCUPATIONAL THERAPY INITIAL EVALUATION ADULT - STRENGTHENING, PT EVAL
Pt will increase BUE/BLE and trunk flexion strength to 5/5 to improve functional strength needed to engage in functional tasks by 4 weeks

## 2022-09-17 NOTE — OCCUPATIONAL THERAPY INITIAL EVALUATION ADULT - LEVEL OF INDEPENDENCE: DRESS LOWER BODY, OT EVAL
Due to pain in abdominal region which impairs pt to be able to actively flex spine./unable to perform

## 2022-09-17 NOTE — OCCUPATIONAL THERAPY INITIAL EVALUATION ADULT - RANGE OF MOTION EXAMINATION
Alert-The patient is alert, awake and responds to voice. The patient is oriented to time, place, and person. The triage nurse is able to obtain subjective information.
Trunk flexion grossly impaired due to abdominal pain from surgical site.

## 2022-09-17 NOTE — OCCUPATIONAL THERAPY INITIAL EVALUATION ADULT - RANGE OF MOTION, PT EVAL
Pt will have WFL trunk flexion AROM in order to perform functional ADLS/transfers and mobility independently within 4 weeks.

## 2022-09-17 NOTE — CHART NOTE - NSCHARTNOTEFT_GEN_A_CORE
Multiple attempts by phlebotomy and nursing to obtain blood draw for AM labs today.  Pt seen bedside, right radial arterial stick performed without incident.  Pt tolerated well. No active bleeding from site.  Results reviewed.  Hypokalemia repleted. Continue maintenance IVF.  CBC clotted, will repeat labs in am.

## 2022-09-17 NOTE — PROGRESS NOTE ADULT - SUBJECTIVE AND OBJECTIVE BOX
SURGERY PROGRESS HPI:  Pt seen and examined at bedside. Pain is well controlled on pain medication. Pt denies complaints. No acute events overnight. Tolerating CLD, air and liquid stool in bag.      Vital Signs Last 24 Hrs  T(C): 36.7 (17 Sep 2022 05:42), Max: 37.2 (16 Sep 2022 23:24)  T(F): 98.1 (17 Sep 2022 05:42), Max: 98.9 (16 Sep 2022 23:24)  HR: 74 (17 Sep 2022 05:42) (68 - 90)  BP: 147/82 (17 Sep 2022 05:42) (145/83 - 152/77)  BP(mean): --  RR: 18 (17 Sep 2022 05:42) (17 - 18)  SpO2: 95% (17 Sep 2022 05:42) (95% - 96%)    Parameters below as of 17 Sep 2022 05:42  Patient On (Oxygen Delivery Method): room air          PHYSICAL EXAM:    GENERAL: NAD  HEAD:  Atraumatic, Normocephalic  CHEST/LUNG: Clear to ausculation, bilaterally   HEART: RRR S1S2  ABDOMEN: non distended, +BS, soft, non tender, no guarding, midline incision healing well, dressing with some green color this AM, no purulence. JPx2 are serosanguinous  EXTREMITIES:  calf soft, non tender b/l    I&O's Detail    15 Sep 2022 07:01  -  16 Sep 2022 07:00  --------------------------------------------------------  IN:    dextrose 5%: 150 mL    dextrose 5% + lactated ringers: 400 mL  Total IN: 550 mL    OUT:    Bulb (mL): 345 mL    Bulb (mL): 152.5 mL    Colostomy (mL): 1520 mL    Nasogastric/Oral tube (mL): 100 mL    Voided (mL): 500 mL  Total OUT: 2617.5 mL    Total NET: -2067.5 mL      16 Sep 2022 07:01  -  17 Sep 2022 06:32  --------------------------------------------------------  IN:  Total IN: 0 mL    OUT:    Bulb (mL): 50 mL    Bulb (mL): 180 mL    Colostomy (mL): 1750 mL    Voided (mL): 1200 mL  Total OUT: 3180 mL    Total NET: -3180 mL          LABS:                        9.4    12.63 )-----------( 186      ( 16 Sep 2022 06:35 )             29.7     09-16    148<H>  |  116<H>  |  21  ----------------------------<  101<H>  3.3<L>   |  29  |  0.70    Ca    7.9<L>      16 Sep 2022 06:35  Phos  2.5     09-16  Mg     2.4     09-16    TPro  4.8<L>  /  Alb  1.7<L>  /  TBili  0.7  /  DBili  x   /  AST  108<H>  /  ALT  112<H>  /  AlkPhos  105  09-16

## 2022-09-17 NOTE — OCCUPATIONAL THERAPY INITIAL EVALUATION ADULT - PERTINENT HX OF CURRENT PROBLEM, REHAB EVAL
56 year old male with no reported PMH who presented to [facility] ED earlier today via EMS for severe diffuse abdominal pain with distention and N/V. Per report pt recently arrived back to [place] from [place] and ate food from take out and was concerned that he developed food poisoning, however, he was found to be hypoxemic with O2 sat 80% on room air with low dbp and tachycardia. Last BM reported was a few hours prior. 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 Suspect transmural extension of tumor. Probable pneumatosis in the ascending colon. The colon is distended to 10.1 cm. Fluid-filled dilated esophagus." Lactate was 11.9, INR 2, and creatinine 2.33 with brown colored urine noted. Pt taken to OR for emergent ex lap. Post op pt brought to ICU intubated s/p emergent ex lap with colon resection-  postoperative  subtotal colectomy, left open for tx of LBO 2/2 sigmoid colon mass. Pt also noted to be in shock state, likely 2/2 septic +/- hemorrhagic shock,  MAXWELL, LA, NSTEMI and decreased LV function on POCUS. Required multiple blood products, and initially on 3 pressors. Now off. Pocus with B/L consolidation ? atelectasis.  Improved LVF with normal RV size and function. CV shock initially mixed hemorraghic, septic and cardiogenic appears to be pure vasodilatory - septic.   Consolidations Likely just 2/2 atelectasis Shock initially thought to be septic + hemorrhagic now likely septic.  LBO 2/2 mass eroding through and adherent to surrounding tissue now with peritonitis. ID consulted and placed on  meropenem, Vancomycin and Caspofungin.  Continue ABX of per ID. Patient returned to OR 9/12 for removal of temporary abdominal dressing and frankly necrotic terminal ileum. Found to have areas of patchy necrosis of small bowel. Per surgery 120cm of small bowel left and creation of enterostomy.  9/14 patient weaned and extubated to nasal canula O2. Today 9/15 NGT was clamped x 4 hours and now removed and started on clears. PT and OT ordered. Abdominal binder order.

## 2022-09-18 LAB
ANION GAP SERPL CALC-SCNC: 5 MMOL/L — SIGNIFICANT CHANGE UP (ref 5–17)
BUN SERPL-MCNC: 13 MG/DL — SIGNIFICANT CHANGE UP (ref 7–23)
CALCIUM SERPL-MCNC: 7.7 MG/DL — LOW (ref 8.5–10.1)
CHLORIDE SERPL-SCNC: 112 MMOL/L — HIGH (ref 96–108)
CO2 SERPL-SCNC: 26 MMOL/L — SIGNIFICANT CHANGE UP (ref 22–31)
CREAT SERPL-MCNC: 0.72 MG/DL — SIGNIFICANT CHANGE UP (ref 0.5–1.3)
CULTURE RESULTS: SIGNIFICANT CHANGE UP
CULTURE RESULTS: SIGNIFICANT CHANGE UP
EGFR: 107 ML/MIN/1.73M2 — SIGNIFICANT CHANGE UP
GLUCOSE SERPL-MCNC: 114 MG/DL — HIGH (ref 70–99)
HCT VFR BLD CALC: 31.6 % — LOW (ref 39–50)
HGB BLD-MCNC: 9.9 G/DL — LOW (ref 13–17)
MAGNESIUM SERPL-MCNC: 2.2 MG/DL — SIGNIFICANT CHANGE UP (ref 1.6–2.6)
MCHC RBC-ENTMCNC: 26.3 PG — LOW (ref 27–34)
MCHC RBC-ENTMCNC: 31.3 G/DL — LOW (ref 32–36)
MCV RBC AUTO: 83.8 FL — SIGNIFICANT CHANGE UP (ref 80–100)
NRBC # BLD: 0 /100 WBCS — SIGNIFICANT CHANGE UP (ref 0–0)
PHOSPHATE SERPL-MCNC: 2.9 MG/DL — SIGNIFICANT CHANGE UP (ref 2.5–4.5)
PLATELET # BLD AUTO: 168 K/UL — SIGNIFICANT CHANGE UP (ref 150–400)
POTASSIUM SERPL-MCNC: 3.6 MMOL/L — SIGNIFICANT CHANGE UP (ref 3.5–5.3)
POTASSIUM SERPL-SCNC: 3.6 MMOL/L — SIGNIFICANT CHANGE UP (ref 3.5–5.3)
RBC # BLD: 3.77 M/UL — LOW (ref 4.2–5.8)
RBC # FLD: 17.8 % — HIGH (ref 10.3–14.5)
SODIUM SERPL-SCNC: 143 MMOL/L — SIGNIFICANT CHANGE UP (ref 135–145)
SPECIMEN SOURCE: SIGNIFICANT CHANGE UP
SPECIMEN SOURCE: SIGNIFICANT CHANGE UP
WBC # BLD: 17.19 K/UL — HIGH (ref 3.8–10.5)
WBC # FLD AUTO: 17.19 K/UL — HIGH (ref 3.8–10.5)

## 2022-09-18 RX ADMIN — Medication 975 MILLIGRAM(S): at 06:45

## 2022-09-18 RX ADMIN — Medication 975 MILLIGRAM(S): at 12:45

## 2022-09-18 RX ADMIN — CHLORHEXIDINE GLUCONATE 1 APPLICATION(S): 213 SOLUTION TOPICAL at 05:46

## 2022-09-18 RX ADMIN — HEPARIN SODIUM 5000 UNIT(S): 5000 INJECTION INTRAVENOUS; SUBCUTANEOUS at 18:45

## 2022-09-18 RX ADMIN — Medication 975 MILLIGRAM(S): at 23:40

## 2022-09-18 RX ADMIN — CEFTRIAXONE 100 MILLIGRAM(S): 500 INJECTION, POWDER, FOR SOLUTION INTRAMUSCULAR; INTRAVENOUS at 05:43

## 2022-09-18 RX ADMIN — PANTOPRAZOLE SODIUM 40 MILLIGRAM(S): 20 TABLET, DELAYED RELEASE ORAL at 05:43

## 2022-09-18 RX ADMIN — Medication 2 MILLIGRAM(S): at 11:36

## 2022-09-18 RX ADMIN — Medication 975 MILLIGRAM(S): at 18:46

## 2022-09-18 RX ADMIN — HEPARIN SODIUM 5000 UNIT(S): 5000 INJECTION INTRAVENOUS; SUBCUTANEOUS at 05:43

## 2022-09-18 RX ADMIN — DEXTROSE MONOHYDRATE, SODIUM CHLORIDE, AND POTASSIUM CHLORIDE 100 MILLILITER(S): 50; .745; 4.5 INJECTION, SOLUTION INTRAVENOUS at 16:47

## 2022-09-18 RX ADMIN — Medication 975 MILLIGRAM(S): at 00:41

## 2022-09-18 RX ADMIN — PANTOPRAZOLE SODIUM 40 MILLIGRAM(S): 20 TABLET, DELAYED RELEASE ORAL at 18:45

## 2022-09-18 RX ADMIN — Medication 975 MILLIGRAM(S): at 11:36

## 2022-09-18 RX ADMIN — Medication 975 MILLIGRAM(S): at 05:45

## 2022-09-18 RX ADMIN — DEXTROSE MONOHYDRATE, SODIUM CHLORIDE, AND POTASSIUM CHLORIDE 100 MILLILITER(S): 50; .745; 4.5 INJECTION, SOLUTION INTRAVENOUS at 05:44

## 2022-09-18 RX ADMIN — Medication 50 MILLIGRAM(S): at 05:43

## 2022-09-18 RX ADMIN — Medication 2 MILLIGRAM(S): at 18:46

## 2022-09-18 RX ADMIN — Medication 975 MILLIGRAM(S): at 19:46

## 2022-09-18 RX ADMIN — Medication 2 MILLIGRAM(S): at 05:44

## 2022-09-18 RX ADMIN — Medication 2 MILLIGRAM(S): at 23:39

## 2022-09-18 NOTE — PROGRESS NOTE ADULT - ASSESSMENT
56 year old male POD #6 s/p ex lap, subtotal colectomy 9/10. s/p closure of abdomen, SBR, ileostomy creation.    - continue regular diet  - continue abx per ID  - local wound care per surgery  - monitor JAVED and ostomy output  - DVT ppx, GI ppx  - continue PT, OOB  - will d/w surgical attending

## 2022-09-18 NOTE — PROGRESS NOTE ADULT - SUBJECTIVE AND OBJECTIVE BOX
SURGERY PROGRESS HPI:  Pt seen and examined at bedside. Pain is well controlled, denies complaints. No acute events overnight. Pt tolerating diet. Pt denies nausea and vomiting.  liquid stool in bag.    Vital Signs Last 24 Hrs  T(C): 36.3 (18 Sep 2022 05:07), Max: 37 (17 Sep 2022 23:36)  T(F): 97.4 (18 Sep 2022 05:07), Max: 98.6 (17 Sep 2022 23:36)  HR: 67 (18 Sep 2022 05:07) (67 - 85)  BP: 143/82 (18 Sep 2022 05:07) (123/76 - 156/76)  BP(mean): --  RR: 18 (18 Sep 2022 05:07) (17 - 18)  SpO2: 96% (18 Sep 2022 05:07) (96% - 98%)    Parameters below as of 18 Sep 2022 05:07  Patient On (Oxygen Delivery Method): room air          PHYSICAL EXAM:    GENERAL: NAD  HEAD:  Atraumatic, Normocephalic  CHEST/LUNG: Clear to ausculation, bilaterally   HEART: RRR S1S2  ABDOMEN: non distended, +BS, soft, non tender, no guarding, midline incision is healing well.  Ostomy with liquid stool.  EXTREMITIES:  calf soft, non tender b/l    I&O's Detail    17 Sep 2022 07:01  -  18 Sep 2022 07:00  --------------------------------------------------------  IN:    dextrose 5% with potassium chloride 20 mEq/L: 1200 mL  Total IN: 1200 mL    OUT:    Bulb (mL): 110 mL    Bulb (mL): 235 mL    Colostomy (mL): 1710 mL    Voided (mL): 600 mL  Total OUT: 2655 mL    Total NET: -1455 mL          LABS:                        9.9    17.19 )-----------( 168      ( 18 Sep 2022 06:34 )             31.6     09-18    143  |  112<H>  |  13  ----------------------------<  114<H>  3.6   |  26  |  0.72    Ca    7.7<L>      18 Sep 2022 06:34  Phos  2.9     09-18  Mg     2.2     09-18

## 2022-09-19 LAB
ANION GAP SERPL CALC-SCNC: 7 MMOL/L — SIGNIFICANT CHANGE UP (ref 5–17)
APTT BLD: 27.4 SEC — LOW (ref 27.5–35.5)
BASOPHILS # BLD AUTO: 0.05 K/UL — SIGNIFICANT CHANGE UP (ref 0–0.2)
BASOPHILS NFR BLD AUTO: 0.3 % — SIGNIFICANT CHANGE UP (ref 0–2)
BUN SERPL-MCNC: 11 MG/DL — SIGNIFICANT CHANGE UP (ref 7–23)
CALCIUM SERPL-MCNC: 7.5 MG/DL — LOW (ref 8.5–10.1)
CHLORIDE SERPL-SCNC: 112 MMOL/L — HIGH (ref 96–108)
CO2 SERPL-SCNC: 24 MMOL/L — SIGNIFICANT CHANGE UP (ref 22–31)
CREAT SERPL-MCNC: 0.71 MG/DL — SIGNIFICANT CHANGE UP (ref 0.5–1.3)
EGFR: 107 ML/MIN/1.73M2 — SIGNIFICANT CHANGE UP
EOSINOPHIL # BLD AUTO: 0.24 K/UL — SIGNIFICANT CHANGE UP (ref 0–0.5)
EOSINOPHIL NFR BLD AUTO: 1.2 % — SIGNIFICANT CHANGE UP (ref 0–6)
GLUCOSE SERPL-MCNC: 102 MG/DL — HIGH (ref 70–99)
HCT VFR BLD CALC: 31 % — LOW (ref 39–50)
HGB BLD-MCNC: 9.7 G/DL — LOW (ref 13–17)
IMM GRANULOCYTES NFR BLD AUTO: 4.6 % — HIGH (ref 0–0.9)
LYMPHOCYTES # BLD AUTO: 1.05 K/UL — SIGNIFICANT CHANGE UP (ref 1–3.3)
LYMPHOCYTES # BLD AUTO: 5.5 % — LOW (ref 13–44)
MAGNESIUM SERPL-MCNC: 1.8 MG/DL — SIGNIFICANT CHANGE UP (ref 1.6–2.6)
MCHC RBC-ENTMCNC: 26.2 PG — LOW (ref 27–34)
MCHC RBC-ENTMCNC: 31.3 G/DL — LOW (ref 32–36)
MCV RBC AUTO: 83.8 FL — SIGNIFICANT CHANGE UP (ref 80–100)
MONOCYTES # BLD AUTO: 0.44 K/UL — SIGNIFICANT CHANGE UP (ref 0–0.9)
MONOCYTES NFR BLD AUTO: 2.3 % — SIGNIFICANT CHANGE UP (ref 2–14)
NEUTROPHILS # BLD AUTO: 16.54 K/UL — HIGH (ref 1.8–7.4)
NEUTROPHILS NFR BLD AUTO: 86.1 % — HIGH (ref 43–77)
NRBC # BLD: 0 /100 WBCS — SIGNIFICANT CHANGE UP (ref 0–0)
PHOSPHATE SERPL-MCNC: 2.5 MG/DL — SIGNIFICANT CHANGE UP (ref 2.5–4.5)
PLATELET # BLD AUTO: 158 K/UL — SIGNIFICANT CHANGE UP (ref 150–400)
POTASSIUM SERPL-MCNC: 3.7 MMOL/L — SIGNIFICANT CHANGE UP (ref 3.5–5.3)
POTASSIUM SERPL-SCNC: 3.7 MMOL/L — SIGNIFICANT CHANGE UP (ref 3.5–5.3)
RBC # BLD: 3.7 M/UL — LOW (ref 4.2–5.8)
RBC # FLD: 18.2 % — HIGH (ref 10.3–14.5)
SODIUM SERPL-SCNC: 143 MMOL/L — SIGNIFICANT CHANGE UP (ref 135–145)
WBC # BLD: 19.21 K/UL — HIGH (ref 3.8–10.5)
WBC # FLD AUTO: 19.21 K/UL — HIGH (ref 3.8–10.5)

## 2022-09-19 PROCEDURE — 76937 US GUIDE VASCULAR ACCESS: CPT | Mod: 26

## 2022-09-19 PROCEDURE — 99232 SBSQ HOSP IP/OBS MODERATE 35: CPT

## 2022-09-19 PROCEDURE — 74177 CT ABD & PELVIS W/CONTRAST: CPT | Mod: 26

## 2022-09-19 PROCEDURE — 71260 CT THORAX DX C+: CPT | Mod: 26

## 2022-09-19 PROCEDURE — 93971 EXTREMITY STUDY: CPT | Mod: 26,LT

## 2022-09-19 PROCEDURE — 36410 VNPNXR 3YR/> PHY/QHP DX/THER: CPT | Mod: 59

## 2022-09-19 RX ORDER — LOPERAMIDE HCL 2 MG
4 TABLET ORAL EVERY 6 HOURS
Refills: 0 | Status: DISCONTINUED | OUTPATIENT
Start: 2022-09-19 | End: 2022-09-24

## 2022-09-19 RX ORDER — PIPERACILLIN AND TAZOBACTAM 4; .5 G/20ML; G/20ML
3.38 INJECTION, POWDER, LYOPHILIZED, FOR SOLUTION INTRAVENOUS EVERY 8 HOURS
Refills: 0 | Status: DISCONTINUED | OUTPATIENT
Start: 2022-09-19 | End: 2022-09-24

## 2022-09-19 RX ORDER — DIATRIZOATE MEGLUMINE 180 MG/ML
90 INJECTION, SOLUTION INTRAVESICAL ONCE
Refills: 0 | Status: COMPLETED | OUTPATIENT
Start: 2022-09-19 | End: 2022-09-19

## 2022-09-19 RX ORDER — HEPARIN SODIUM 5000 [USP'U]/ML
INJECTION INTRAVENOUS; SUBCUTANEOUS
Qty: 25000 | Refills: 0 | Status: DISCONTINUED | OUTPATIENT
Start: 2022-09-19 | End: 2022-09-21

## 2022-09-19 RX ADMIN — PANTOPRAZOLE SODIUM 40 MILLIGRAM(S): 20 TABLET, DELAYED RELEASE ORAL at 05:24

## 2022-09-19 RX ADMIN — HEPARIN SODIUM 1500 UNIT(S)/HR: 5000 INJECTION INTRAVENOUS; SUBCUTANEOUS at 21:55

## 2022-09-19 RX ADMIN — Medication 50 MILLIGRAM(S): at 05:24

## 2022-09-19 RX ADMIN — DEXTROSE MONOHYDRATE, SODIUM CHLORIDE, AND POTASSIUM CHLORIDE 100 MILLILITER(S): 50; .745; 4.5 INJECTION, SOLUTION INTRAVENOUS at 07:00

## 2022-09-19 RX ADMIN — Medication 2 MILLIGRAM(S): at 05:23

## 2022-09-19 RX ADMIN — CEFTRIAXONE 100 MILLIGRAM(S): 500 INJECTION, POWDER, FOR SOLUTION INTRAMUSCULAR; INTRAVENOUS at 05:23

## 2022-09-19 RX ADMIN — HEPARIN SODIUM 5000 UNIT(S): 5000 INJECTION INTRAVENOUS; SUBCUTANEOUS at 05:24

## 2022-09-19 RX ADMIN — DIATRIZOATE MEGLUMINE 90 MILLILITER(S): 180 INJECTION, SOLUTION INTRAVESICAL at 13:22

## 2022-09-19 RX ADMIN — Medication 4 MILLIGRAM(S): at 18:25

## 2022-09-19 RX ADMIN — Medication 975 MILLIGRAM(S): at 11:08

## 2022-09-19 RX ADMIN — Medication 975 MILLIGRAM(S): at 18:25

## 2022-09-19 RX ADMIN — Medication 975 MILLIGRAM(S): at 00:40

## 2022-09-19 RX ADMIN — HEPARIN SODIUM 5000 UNIT(S): 5000 INJECTION INTRAVENOUS; SUBCUTANEOUS at 18:24

## 2022-09-19 RX ADMIN — Medication 2 MILLIGRAM(S): at 11:09

## 2022-09-19 RX ADMIN — CHLORHEXIDINE GLUCONATE 1 APPLICATION(S): 213 SOLUTION TOPICAL at 05:26

## 2022-09-19 RX ADMIN — PANTOPRAZOLE SODIUM 40 MILLIGRAM(S): 20 TABLET, DELAYED RELEASE ORAL at 18:25

## 2022-09-19 RX ADMIN — PIPERACILLIN AND TAZOBACTAM 25 GRAM(S): 4; .5 INJECTION, POWDER, LYOPHILIZED, FOR SOLUTION INTRAVENOUS at 22:02

## 2022-09-19 NOTE — PROGRESS NOTE ADULT - SUBJECTIVE AND OBJECTIVE BOX
TAISHA ROGERS  MRN-[id number]    Follow Up:  sepsis, streptococcus anginosus positive blood cultures, necrotic terminal ileum, necrosis of small bowel, post op state, fevers, leukocytosis     Interval History: The pt was seen and examined earlier, no distress, states that he is feeling much better today. The pt is out of bed to chair, having his lunch and tolerates his prescribed diet. The pt is afebrile, WBC elevated.     PAST MEDICAL & SURGICAL HISTORY:  No pertinent past medical history      No significant past surgical history          ROS:    [ ] Unobtainable because:  [x ] All other systems negative    Constitutional: no fever, no chills  Head: no trauma  Eyes: no vision changes, no eye pain  ENT:  no sore throat, no rhinorrhea  Cardiovascular:  no chest pain, no palpitation  Respiratory:  no SOB, no cough  GI:  no abd pain, no vomiting, colostomy is functional   urinary: no dysuria, no hematuria, no flank pain  musculoskeletal:  no joint pain, no joint swelling  skin:  no rash  neurology:  no headache, no seizure, no change in mental status  psych: no anxiety, no depression         Allergies  No Known Allergies        ANTIMICROBIALS:  cefTRIAXone   IVPB 2000 every 24 hours      OTHER MEDS:  acetaminophen     Tablet .. 975 milliGRAM(s) Oral every 6 hours  chlorhexidine 2% Cloths 1 Application(s) Topical <User Schedule>  dextrose 5% with potassium chloride 20 mEq/L 1000 milliLiter(s) IV Continuous <Continuous>  heparin   Injectable 5000 Unit(s) SubCutaneous every 12 hours  loperamide 4 milliGRAM(s) Oral every 6 hours  morphine  - Injectable 2 milliGRAM(s) IV Push every 6 hours PRN  oxyCODONE    IR 5 milliGRAM(s) Oral every 6 hours PRN  pantoprazole  Injectable 40 milliGRAM(s) IV Push two times a day  sodium chloride 0.9% lock flush 10 milliLiter(s) IV Push every 1 hour PRN      Vital Signs Last 24 Hrs  T(C): 36.4 (19 Sep 2022 11:35), Max: 36.7 (18 Sep 2022 23:37)  T(F): 97.6 (19 Sep 2022 11:35), Max: 98 (18 Sep 2022 23:37)  HR: 80 (19 Sep 2022 11:35) (80 - 94)  BP: 121/74 (19 Sep 2022 11:35) (121/74 - 153/81)  BP(mean): --  RR: 17 (19 Sep 2022 11:35) (16 - 17)  SpO2: 99% (19 Sep 2022 11:35) (95% - 99%)    Parameters below as of 19 Sep 2022 05:27  Patient On (Oxygen Delivery Method): room air        Physical Exam:  General: Nontoxic-appearing Male in no acute distress. RA  HEENT: AT/NC. Pupils reactive to light bilaterally, no thrush, Anicteric. Conjunctiva pink and moist.  Neck: Not rigid. No sense of mass.  Nodes: None palpable.  Lungs: Diminished breath sounds bilaterally, no wheezing, no rhonchi  Heart: Regular rate and rhythm. No Murmur. No rub. No gallop. No palpable thrill.  Abdomen: Bowel sounds present, colostomy if functional, JAVED x 2 with serosanguineous drainage, Surgical site is clean, no pus, no malodor, no erythema of surrounding tissues, not significantly tender.   Back: Unable  Extremities: No cyanosis or clubbing. trace edema of b/l LEs, 2+ edema Left Upper extremity   Skin: Warm. Dry. Good turgor. No rash. No vasculitic stigmata.  Psychiatric: awake and alert, appropriate     WBC Count: 19.21 K/uL (09-19 @ 07:20)  WBC Count: 17.19 K/uL (09-18 @ 06:34)  WBC Count: 12.63 K/uL (09-16 @ 06:35)  WBC Count: 8.39 K/uL (09-15 @ 03:00)  WBC Count: 6.76 K/uL (09-14 @ 13:53)  WBC Count: 6.24 K/uL (09-14 @ 02:35)  WBC Count: 6.74 K/uL (09-13 @ 14:28)                            9.7    19.21 )-----------( 158      ( 19 Sep 2022 07:20 )             31.0       09-19    143  |  112<H>  |  11  ----------------------------<  102<H>  3.7   |  24  |  0.71    Ca    7.5<L>      19 Sep 2022 07:20  Phos  2.5     09-19  Mg     1.8     09-19            Creatinine Trend: 0.71<--, 0.72<--, 0.70<--, 0.70<--, 0.74<--, 0.79<--      MICROBIOLOGY:  v  .Blood Blood-Peripheral  09-13-22   No Growth Final  --  --      .Blood Blood-Peripheral  09-13-22   No Growth Final  --  --      .Blood Blood-Peripheral  09-10-22   No Growth Final  --  Blood Culture PCR  Streptococcus anginosus      D-Dimer Assay, Quantitative: 3384 (09-10)          RADIOLOGY:

## 2022-09-19 NOTE — CHART NOTE - NSCHARTNOTEFT_GEN_A_CORE
Assessment: Pt with no significant PMHx, admitted with large bowel obstruction; s/p ex lap, subtotal colectomy 9/10. S/p closure of abdomen, SBR, ileostomy creation. Pt ICU, s/p shock state, peritonitis from perforated viscous, acute blood loss anemia in setting of persistent coagulapathy s/p PRBC. Pt also with MAXWELL (improving).    Factors impacting intake: [x] none [ ] nausea  [ ] vomiting [ ] diarrhea [ ] constipation  [ ]chewing problems [ ] swallowing issues  [ ] other:     Diet Prescription: Diet, Regular:   Supplement Feeding Modality:  Oral  Ensure Enlive Cans or Servings Per Day:  2       Frequency:  Two Times a day (09-17-22 @ 13:17)    Intake: Pt reports po intake improving to normal; pt reports po intake mostly % of meals. Pt does not want to continue with Ensure Enlive nutrition supplement as he feels it gives him diarrhea; pt amenable to Ensure Clear nutrition supplement.    Current Weight: 87.2 kg (9/15), 85.1 kg (9/13), 81 kg (9/11)  % Weight Change: 7.7% increase x 4 days; likely fluid related    2+ generalized edema, increased    Physical appearance:     Pertinent Medications: MEDICATIONS  (STANDING):  acetaminophen     Tablet .. 975 milliGRAM(s) Oral every 6 hours  cefTRIAXone   IVPB 2000 milliGRAM(s) IV Intermittent every 24 hours  chlorhexidine 2% Cloths 1 Application(s) Topical <User Schedule>  dextrose 5% with potassium chloride 20 mEq/L 1000 milliLiter(s) (100 mL/Hr) IV Continuous <Continuous>  heparin   Injectable 5000 Unit(s) SubCutaneous every 12 hours  loperamide 2 milliGRAM(s) Oral every 6 hours  pantoprazole  Injectable 40 milliGRAM(s) IV Push two times a day    MEDICATIONS  (PRN):  morphine  - Injectable 2 milliGRAM(s) IV Push every 6 hours PRN Severe Pain (7 - 10)  oxyCODONE    IR 5 milliGRAM(s) Oral every 6 hours PRN Moderate Pain (4 - 6)  sodium chloride 0.9% lock flush 10 milliLiter(s) IV Push every 1 hour PRN Pre/post blood products, medications, blood draw, and to maintain line patency    Pertinent Labs: 09-19 Na143 mmol/L Glu 102 mg/dL<H> K+ 3.7 mmol/L Cr  0.71 mg/dL BUN 11 mg/dL 09-19 Phos 2.5 mg/dL 09-16 Alb 1.7 g/dL<L>    Skin:     Estimated Needs:   [ ] no change since previous assessment  [ ] recalculated:     Previous Nutrition Diagnosis:   [ ] Inadequate Energy Intake [ ]Inadequate Oral Intake [ ] Excessive Energy Intake   [ ] Underweight [ ] Increased Nutrient Needs [ ] Overweight/Obesity   [ ] Altered GI Function [ ] Unintended Weight Loss [ ] Food & Nutrition Related Knowledge Deficit [ ] Malnutrition     Nutrition Diagnosis is [ ] ongoing  [ ] resolved [ ] not applicable     New Nutrition Diagnosis: [ ] not applicable       Interventions:   Recommend  [x] Change Diet To: Low fiber,   [ ] Nutrition Supplement  [ ] Nutrition Support  [x] Other: Monitor and replete electrolytes as needed    Monitoring and Evaluation:   [ ] PO intake [ x ] Tolerance to diet prescription [ x ] weights [ x ] labs [ x ] follow up per protocol  [ ] other: Assessment: Pt with no significant PMHx, admitted with large bowel obstruction; s/p ex lap, subtotal colectomy 9/10. S/p closure of abdomen, SBR, ileostomy creation. Pt ICU, s/p shock state, peritonitis from perforated viscous, acute blood loss anemia in setting of persistent coagulapathy s/p PRBC. Pt also with MAXWELL (improving).    Factors impacting intake: [x] none [ ] nausea  [ ] vomiting [ ] diarrhea [ ] constipation  [ ]chewing problems [ ] swallowing issues  [ ] other:     Diet Prescription: Diet, Regular:   Supplement Feeding Modality:  Oral  Ensure Enlive Cans or Servings Per Day:  2       Frequency:  Two Times a day (22 @ 13:17)    Intake: Pt reports po intake improving to normal; pt reports po intake mostly % of meals. Pt does not want to continue with Ensure Enlive nutrition supplement as he feels it gives him diarrhea; pt amenable to Ensure Clear nutrition supplement.    Current Weight: 87.2 kg (9/15), 85.1 kg (), 81 kg ()  % Weight Change: 7.7% increase x 4 days; likely fluid related    Fluid accumulation: 2+ generalized edema; increased from 1+ generalized edema on admission    Physical appearance: Pt with no physical signs of malnutrition except for mild muscle wasting of clavicle region    Pertinent Medications: MEDICATIONS  (STANDING):  acetaminophen     Tablet .. 975 milliGRAM(s) Oral every 6 hours  cefTRIAXone   IVPB 2000 milliGRAM(s) IV Intermittent every 24 hours  chlorhexidine 2% Cloths 1 Application(s) Topical <User Schedule>  dextrose 5% with potassium chloride 20 mEq/L 1000 milliLiter(s) (100 mL/Hr) IV Continuous <Continuous>  heparin   Injectable 5000 Unit(s) SubCutaneous every 12 hours  loperamide 2 milliGRAM(s) Oral every 6 hours  pantoprazole  Injectable 40 milliGRAM(s) IV Push two times a day    MEDICATIONS  (PRN):  morphine  - Injectable 2 milliGRAM(s) IV Push every 6 hours PRN Severe Pain (7 - 10)  oxyCODONE    IR 5 milliGRAM(s) Oral every 6 hours PRN Moderate Pain (4 - 6)  sodium chloride 0.9% lock flush 10 milliLiter(s) IV Push every 1 hour PRN Pre/post blood products, medications, blood draw, and to maintain line patency    Pertinent Labs:  Na143 mmol/L Glu 102 mg/dL<H> K+ 3.7 mmol/L Cr  0.71 mg/dL BUN 11 mg/dL  Phos 2.5 mg/dL  Alb 1.7 g/dL<L>    Skin: WDL    Estimated Needs:   [x] no change since previous assessment on  for estimated energy and protein needs (25-30 kcal/k4448-5478 kcal/day; 25-30 ml/k9303-7071 ml fluid/day)  [x] recalculated: Protein; pt no longer critical care; Using IBW 69.8 kg  1.0-1.2 g/k.8-83.76 g protein/day    Previous Nutrition Diagnosis:   Inadequate Energy Intake  Etiology: s/p surgery, colon resection, ileostomy  Signs/symptoms: NPO x 2 days  GOAL: pt to meet >50-75% nutrition needs via tolerated route - goal being met    Nutrition Diagnosis is [x] ongoing  [ ] resolved [ ] not applicable     New Nutrition Diagnosis: [x] not applicable       Interventions:   Recommend  [x] Change Diet To: Low fiber, Lactose free, Easy to Chew diet + Ensure Clear x 2/day (480 kcal & 16 g protein)  [ ] Nutrition Supplement  [ ] Nutrition Support  [x] Other: Monitor and replete electrolytes as needed    Monitoring and Evaluation:   [ x ] PO intake [ x ] Tolerance to diet prescription [ x ] weights [ x ] labs [ x ] follow up per protocol  [ ] other: Assessment: Pt with no significant PMHx, admitted with large bowel obstruction; s/p ex lap, subtotal colectomy 9/10. S/p closure of abdomen, SBR, ileostomy creation. S/p ICU, s/p shock state, peritonitis from perforated viscous, acute blood loss anemia in setting of persistent coagulapathy s/p PRBC. Pt also with MAXWELL (improving).    Factors impacting intake: [ ] none [ ] nausea  [ ] vomiting [ ] diarrhea [ ] constipation  [ ]chewing problems [ ] swallowing issues  [x] other: alteration in GI function    Diet Prescription: Diet, Regular:   Supplement Feeding Modality:  Oral  Ensure Enlive Cans or Servings Per Day:  2       Frequency:  Two Times a day (22 @ 13:17)    Intake: Pt reports po intake improving to normal, consuming mostly % of meals. Pt does not want to continue with Ensure Enlive nutrition supplement as he feels it gives him diarrhea; pt amenable to Ensure Clear nutrition supplement.    Current Weight: 87.2 kg (9/15), 85.1 kg (), 81 kg ()  % Weight Change: 7.7% increase x 4 days; likely fluid related    Fluid accumulation: 2+ generalized edema (); increased from 1+ generalized edema on admission (9/10)    Physical appearance: Pt with no physical signs of malnutrition except for mild muscle wasting of clavicle region    Pertinent Medications: MEDICATIONS  (STANDING):  acetaminophen     Tablet .. 975 milliGRAM(s) Oral every 6 hours  cefTRIAXone   IVPB 2000 milliGRAM(s) IV Intermittent every 24 hours  chlorhexidine 2% Cloths 1 Application(s) Topical <User Schedule>  dextrose 5% with potassium chloride 20 mEq/L 1000 milliLiter(s) (100 mL/Hr) IV Continuous <Continuous>  heparin   Injectable 5000 Unit(s) SubCutaneous every 12 hours  loperamide 2 milliGRAM(s) Oral every 6 hours  pantoprazole  Injectable 40 milliGRAM(s) IV Push two times a day    MEDICATIONS  (PRN):  morphine  - Injectable 2 milliGRAM(s) IV Push every 6 hours PRN Severe Pain (7 - 10)  oxyCODONE    IR 5 milliGRAM(s) Oral every 6 hours PRN Moderate Pain (4 - 6)  sodium chloride 0.9% lock flush 10 milliLiter(s) IV Push every 1 hour PRN Pre/post blood products, medications, blood draw, and to maintain line patency    Pertinent Labs:  Na143 mmol/L Glu 102 mg/dL<H> K+ 3.7 mmol/L Cr  0.71 mg/dL BUN 11 mg/dL  Phos 2.5 mg/dL  Alb 1.7 g/dL<L>    Skin: WDL    Estimated Needs:   [x] no change since previous assessment on  for estimated energy and fluid needs (25-30 kcal/k4845-0783 kcal/day; 25-30 ml/k4840-8005 ml fluid/day)  [x] recalculated: Protein; pt no longer critical care; Using IBW 69.8 kg for calculations  1.0-1.2 g/k.8-83.76 g protein/day    Previous Nutrition Diagnosis:   Inadequate Energy Intake  Etiology: s/p surgery, colon resection, ileostomy  Signs/symptoms: NPO x 2 days  GOAL: pt to meet >50-75% nutrition needs via tolerated route - goal met    Nutrition Diagnosis is [ ] ongoing [x] resolving [ ] not applicable     New Nutrition Diagnosis: [x] not applicable       Interventions:   Recommend  [x] Change Diet To: Low fiber, Lactose free, Easy to Chew diet + Ensure Clear x 2/day (480 kcal & 16 g protein)  [ ] Nutrition Supplement  [ ] Nutrition Support  [x] Other: Monitor and replete electrolytes as needed    Monitoring and Evaluation:   [ x ] PO intake [ x ] Tolerance to diet prescription [ x ] weights [ x ] labs [ x ] follow up per protocol  [ ] other: Assessment: Pt with no significant PMHx, admitted with large bowel obstruction; s/p ex lap, subtotal colectomy 9/10. S/p closure of abdomen, SBR, ileostomy creation. S/p ICU, s/p shock state, peritonitis from perforated viscous, acute blood loss anemia in setting of persistent coagulapathy s/p PRBC. Pt also with MAXWELL (improving).    Factors impacting intake: [ ] none [ ] nausea  [ ] vomiting [ ] diarrhea [ ] constipation  [ ]chewing problems [ ] swallowing issues  [x] other: alteration in GI function    Diet Prescription: Diet, Regular:   Supplement Feeding Modality:  Oral  Ensure Enlive Cans or Servings Per Day:  2       Frequency:  Two Times a day (22 @ 13:17)    Intake: Pt reports po intake improving to normal, consuming mostly % of meals. Pt does not want to continue with Ensure Enlive nutrition supplement as he feels it gives him diarrhea; pt amenable to Ensure Clear nutrition supplement.    Current Weight: 87.2 kg (9/15), 85.1 kg (), 81 kg ()  % Weight Change: 7.7% increase x 4 days; likely fluid related    Fluid accumulation: 2+ generalized edema (); increased from 1+ generalized edema on admission (9/10)    Physical appearance: Pt with no physical signs of malnutrition except for mild muscle wasting of clavicle region    Pertinent Medications: MEDICATIONS  (STANDING):  acetaminophen     Tablet .. 975 milliGRAM(s) Oral every 6 hours  cefTRIAXone   IVPB 2000 milliGRAM(s) IV Intermittent every 24 hours  chlorhexidine 2% Cloths 1 Application(s) Topical <User Schedule>  dextrose 5% with potassium chloride 20 mEq/L 1000 milliLiter(s) (100 mL/Hr) IV Continuous <Continuous>  heparin   Injectable 5000 Unit(s) SubCutaneous every 12 hours  loperamide 2 milliGRAM(s) Oral every 6 hours  pantoprazole  Injectable 40 milliGRAM(s) IV Push two times a day    MEDICATIONS  (PRN):  morphine  - Injectable 2 milliGRAM(s) IV Push every 6 hours PRN Severe Pain (7 - 10)  oxyCODONE    IR 5 milliGRAM(s) Oral every 6 hours PRN Moderate Pain (4 - 6)  sodium chloride 0.9% lock flush 10 milliLiter(s) IV Push every 1 hour PRN Pre/post blood products, medications, blood draw, and to maintain line patency    Pertinent Labs:  Na143 mmol/L Glu 102 mg/dL<H> K+ 3.7 mmol/L Cr  0.71 mg/dL BUN 11 mg/dL  Phos 2.5 mg/dL  Alb 1.7 g/dL<L>    Skin: WDL    Estimated Needs:   [x] no change since previous assessment on  for estimated energy and fluid needs (25-30 kcal/k2706-8799 kcal/day; 25-30 ml/k7997-7254 ml fluid/day)  [x] recalculated: Protein; pt no longer critical care; Using IBW 69.8 kg for calculations  1.0-1.2 g/k.8-83.76 g protein/day    Previous Nutrition Diagnosis:   Inadequate Energy Intake  Etiology: s/p surgery, colon resection, ileostomy  Signs/symptoms: NPO x 2 days  GOAL: pt to meet >50-75% nutrition needs via tolerated route - goal met    Nutrition Diagnosis is [ ] ongoing [x] resolving [ ] not applicable     New Nutrition Diagnosis: [x] not applicable       Interventions:   Recommend  [x] Change Diet To: Low fiber, Lactose Restricted, Easy to Chew + Ensure Clear x 2/day (480 kcal & 16 g protein)  [ ] Nutrition Supplement  [ ] Nutrition Support  [x] Other: Monitor and replete electrolytes as needed    Monitoring and Evaluation:   [ x ] PO intake [ x ] Tolerance to diet prescription [ x ] weights [ x ] labs [ x ] follow up per protocol  [ ] other:

## 2022-09-19 NOTE — PROGRESS NOTE ADULT - SUBJECTIVE AND OBJECTIVE BOX
[facility] NEPHROLOGY SERVICES, [facility]  NEPHROLOGY AND HYPERTENSION  [address]  [address]  [place] [address]  [phone]    MD LIZANDRO ENAMORADO MD ANDREY GONCHARUK, MD MADHU KORRAPATI, MD YELENA ROSENBERG, MD BINNY KOSHY, MD CHRISTOPHER CAPUTO, MD FELY CANCINO MD          Patient events noted  No distress;     MEDICATIONS  (STANDING):  acetaminophen     Tablet .. 975 milliGRAM(s) Oral every 6 hours  cefTRIAXone   IVPB 2000 milliGRAM(s) IV Intermittent every 24 hours  chlorhexidine 2% Cloths 1 Application(s) Topical <User Schedule>  dextrose 5% with potassium chloride 20 mEq/L 1000 milliLiter(s) (100 mL/Hr) IV Continuous <Continuous>  diatrizoate meglumine/diatrizoate sodium. 90 milliLiter(s) Oral once  heparin   Injectable 5000 Unit(s) SubCutaneous every 12 hours  loperamide 2 milliGRAM(s) Oral every 6 hours  pantoprazole  Injectable 40 milliGRAM(s) IV Push two times a day    MEDICATIONS  (PRN):  morphine  - Injectable 2 milliGRAM(s) IV Push every 6 hours PRN Severe Pain (7 - 10)  oxyCODONE    IR 5 milliGRAM(s) Oral every 6 hours PRN Moderate Pain (4 - 6)  sodium chloride 0.9% lock flush 10 milliLiter(s) IV Push every 1 hour PRN Pre/post blood products, medications, blood draw, and to maintain line patency      09-18-22 @ 07:01 - 09-19-22 @ 07:00  --------------------------------------------------------  IN: 800 mL / OUT: 2875 mL / NET: -2075 mL    09-19-22 @ 07:01  -  09-19-22 @ 12:31  --------------------------------------------------------  IN: 0 mL / OUT: 700 mL / NET: -700 mL      PHYSICAL EXAM:      T(C): 36.4 (09-19-22 @ 11:35), Max: 36.7 (09-18-22 @ 23:37)  HR: 80 (09-19-22 @ 11:35) (80 - 94)  BP: 121/74 (09-19-22 @ 11:35) (121/74 - 153/81)  RR: 17 (09-19-22 @ 11:35) (16 - 17)  SpO2: 99% (09-19-22 @ 11:35) (95% - 99%)  Wt(kg): --  Lungs clear  Heart S1S2  Abd soft NT ND  Extremities:   tr edema                                    9.7    19.21 )-----------( 158      ( 19 Sep 2022 07:20 )             31.0     09-19    143  |  112<H>  |  11  ----------------------------<  102<H>  3.7   |  24  |  0.71    Ca    7.5<L>      19 Sep 2022 07:20  Phos  2.5     09-19  Mg     1.8     09-19          Creatinine Trend: 0.71<--, 0.72<--, 0.70<--, 0.70<--, 0.74<--, 0.79<--             Assessment   Hypernatremia; hypokalemia;   Post operative s/p ex lap, subtotal colectomy 9/10. s/p closure of abdomen, SBR, ileostomy creation    Plan  Check electrolytes every other day acceptable   PO intake         Jonah Dow MD

## 2022-09-19 NOTE — PROGRESS NOTE ADULT - ASSESSMENT
Severe sepsis with fever, shock, in the setting of large bowel obstruction due to colon tumor with high-grade obstruction.  Official pathology is pending.  Patient has a single blood culture positive for gram-positive cocci in pairs and change.  This is likely an alphahemolytic Streptococcus species but full identification and sensitivity is pending.  Current antibiotics are exceedingly broad.  Does not know that this patient has any recent hospitalization or antibiotic use which would place him at risk for resistant yue.    9/13: Shock improved, white blood cell count normal, cultures limited to a single positive bottle with an alphahemolytic Streptococcus species.  While this could be a contaminant, it could also be a true positive given the clinical context.  We will see a role to augment antibiotics at this juncture.  9/14: POD#2. Off pressors, WBC normal, extubated. No return of bowel function as of yet. Drop in hemoglobin over the past 48h. No concern of hemolysis. Drop in Platelets as well, suspect not on the basis of uncontrolled infection at this point in time  9/16: s/p exploratory lap, colon resection on 9/10 and Small bowel resection, Creation, enterostomy, Closure, fascia, abdomen on 9/13. The pt was downgraded from ICU, no fevers since 9/12, on RA and comfortable, WBC 12.63, Cr ok, LFTs elevated, surgical site is clean, ileostomy is functional, JPs x 2 drain serosanguineous drainage, repeat BCs remain with no growth to date, tolerates prescribed diet, Ceftriaxone IV continued.   Pt's LFTs are elevated, will monitor.   Attending Addendum--  Case reviewed with NP Olga Moran. Her note reviewed and modified as appropriate.   Patient personally assessed and examined.  Awake and alert, interactive, wife and brother at bedside.  In good spirits. No specific complaints  Abdominal exam benign  F/U CBC LFT  9/19: feeling better overall, tolerates his diet, no fevers, WBC elevated, repeat BCs remain with no growth, ceftriaxone IV continued, CT c/a/p are pending. Pt's left arm with significant swelling, ordered US venous doppler to r/o DVT.       Suggestions  continue Ceftriaxone 2g/day  Monitor Hb.  Monitor platelets  Monitor LFTs  Follow-up culture data  Trend temperatures and WBC  Monitor ileostomy and JAVED x 2 output   CT c/a/p are pending  US venous doppler left arm r/o DVT     will discuss with attending

## 2022-09-19 NOTE — CONSULT NOTE ADULT - ASSESSMENT
Interventional Radiology    Evaluate for Procedure: Midline placement    HPI: 56y Male with large bowel obstruction 2/2 sigmoid mass s/p ex lap with colectomy and ileostomy creation. Patient requiring midline for venous access.     Allergies:   Medications (Abx/Cardiac/Anticoagulation/Blood Products)    cefTRIAXone   IVPB: 100 mL/Hr IV Intermittent (09-19 @ 05:23)  heparin   Injectable: 5000 Unit(s) SubCutaneous (09-19 @ 05:24)    Data:    T(C): 36.4  HR: 80  BP: 121/74  RR: 17  SpO2: 99%    -WBC 19.21 / HgB 9.7 / Hct 31.0 / Plt 158  -Na 143 / Cl 112 / BUN 11 / Glucose 102  -K 3.7 / CO2 24 / Cr 0.71  -ALT -- / Alk Phos -- / T.Bili --  -INR 1.21 / PTT --    Assessment/Plan:   56y Male with large bowel obstruction 2/2 sigmoid mass s/p ex lap with colectomy and ileostomy creation. Patient requiring midline for venous access.     Midline 9/19

## 2022-09-19 NOTE — PROCEDURE NOTE - NSPROCDETAILS_GEN_ALL_CORE
location identified, draped/prepped, sterile technique used/sterile dressing applied/supine position/ultrasound guidance
location identified, draped/prepped, sterile technique used/sterile dressing applied/sterile technique, catheter placed/ultrasound guidance

## 2022-09-19 NOTE — PROGRESS NOTE ADULT - SUBJECTIVE AND OBJECTIVE BOX
Patient seen and  examined at bedside resting comfortably.   Offers no complaints, states he feels well.  Tolerating diet. Ileostomy functioning well.  Denies fever, chills, N/V/D, CP, SOB.     Vital Signs Last 24 Hrs  T(F): 97.6 (09-19-22 @ 11:35), Max: 98 (09-18-22 @ 23:37)  HR: 80 (09-19-22 @ 11:35)  BP: 121/74 (09-19-22 @ 11:35)  RR: 17 (09-19-22 @ 11:35)  SpO2: 99% (09-19-22 @ 11:35)  Wt(kg): --   CAPILLARY BLOOD GLUCOSE    PHYSICAL EXAM:  GENERAL: Alert, NAD  CHEST/LUNG: Clear to auscultation bilaterally, respirations nonlabored  HEART: Regular rate and rhythm; S1 & S2 appreciated  ABDOMEN: soft, NT/ND. Well healing midline incision. Ostomy with liquid stool in bag. JAVED x2 with serous output  EXTREMITIES:  no calf tenderness, No edema    I&O's Detail    18 Sep 2022 07:01  -  19 Sep 2022 07:00  --------------------------------------------------------  IN:    dextrose 5% with potassium chloride 20 mEq/L: 800 mL  Total IN: 800 mL    OUT:    Bulb (mL): 210 mL    Bulb (mL): 140 mL    Colostomy (mL): 2000 mL    Voided (mL): 525 mL  Total OUT: 2875 mL    Total NET: -2075 mL      19 Sep 2022 07:01  -  19 Sep 2022 13:23  --------------------------------------------------------  IN:  Total IN: 0 mL    OUT:    Bulb (mL): 40 mL    Bulb (mL): 60 mL    Colostomy (mL): 100 mL    Voided (mL): 500 mL  Total OUT: 700 mL    Total NET: -700 mL          LABS:                        9.7    19.21 )-----------( 158      ( 19 Sep 2022 07:20 )             31.0     09-19    143  |  112<H>  |  11  ----------------------------<  102<H>  3.7   |  24  |  0.71    Ca    7.5<L>      19 Sep 2022 07:20  Phos  2.5     09-19  Mg     1.8     09-19    RADIOLOGY & ADDITIONAL STUDIES:    A/P  56 year old male POD #7 s/p ex lap, subtotal colectomy 9/10. s/p closure of abdomen, SBR, ileostomy creation.  With worsening leukocytosis  Abdomen benign on exam    - f/u CT chest, A/P to r/o possible sources of leukocytosis  - increase imodium dose  - regular diet  - abx per ID  - local wound care per surgery  - monitor JAVED and ostomy output  - DVT ppx, GI ppx, PT, OOB  - d/w surgical attending

## 2022-09-20 LAB
ANION GAP SERPL CALC-SCNC: 4 MMOL/L — LOW (ref 5–17)
APTT BLD: 109.8 SEC — HIGH (ref 27.5–35.5)
APTT BLD: 52.3 SEC — HIGH (ref 27.5–35.5)
APTT BLD: 76.5 SEC — HIGH (ref 27.5–35.5)
BASOPHILS # BLD AUTO: 0.04 K/UL — SIGNIFICANT CHANGE UP (ref 0–0.2)
BASOPHILS NFR BLD AUTO: 0.3 % — SIGNIFICANT CHANGE UP (ref 0–2)
BUN SERPL-MCNC: 9 MG/DL — SIGNIFICANT CHANGE UP (ref 7–23)
CALCIUM SERPL-MCNC: 7.6 MG/DL — LOW (ref 8.5–10.1)
CHLORIDE SERPL-SCNC: 113 MMOL/L — HIGH (ref 96–108)
CO2 SERPL-SCNC: 26 MMOL/L — SIGNIFICANT CHANGE UP (ref 22–31)
CREAT SERPL-MCNC: 0.7 MG/DL — SIGNIFICANT CHANGE UP (ref 0.5–1.3)
EGFR: 107 ML/MIN/1.73M2 — SIGNIFICANT CHANGE UP
EOSINOPHIL # BLD AUTO: 0.26 K/UL — SIGNIFICANT CHANGE UP (ref 0–0.5)
EOSINOPHIL NFR BLD AUTO: 1.7 % — SIGNIFICANT CHANGE UP (ref 0–6)
GLUCOSE SERPL-MCNC: 96 MG/DL — SIGNIFICANT CHANGE UP (ref 70–99)
HCT VFR BLD CALC: 29.6 % — LOW (ref 39–50)
HGB BLD-MCNC: 9.3 G/DL — LOW (ref 13–17)
IMM GRANULOCYTES NFR BLD AUTO: 4.7 % — HIGH (ref 0–0.9)
LYMPHOCYTES # BLD AUTO: 1.85 K/UL — SIGNIFICANT CHANGE UP (ref 1–3.3)
LYMPHOCYTES # BLD AUTO: 11.9 % — LOW (ref 13–44)
MAGNESIUM SERPL-MCNC: 1.9 MG/DL — SIGNIFICANT CHANGE UP (ref 1.6–2.6)
MCHC RBC-ENTMCNC: 26.5 PG — LOW (ref 27–34)
MCHC RBC-ENTMCNC: 31.4 G/DL — LOW (ref 32–36)
MCV RBC AUTO: 84.3 FL — SIGNIFICANT CHANGE UP (ref 80–100)
MONOCYTES # BLD AUTO: 0.74 K/UL — SIGNIFICANT CHANGE UP (ref 0–0.9)
MONOCYTES NFR BLD AUTO: 4.8 % — SIGNIFICANT CHANGE UP (ref 2–14)
NEUTROPHILS # BLD AUTO: 11.92 K/UL — HIGH (ref 1.8–7.4)
NEUTROPHILS NFR BLD AUTO: 76.6 % — SIGNIFICANT CHANGE UP (ref 43–77)
NRBC # BLD: 0 /100 WBCS — SIGNIFICANT CHANGE UP (ref 0–0)
PHOSPHATE SERPL-MCNC: 2.9 MG/DL — SIGNIFICANT CHANGE UP (ref 2.5–4.5)
PLATELET # BLD AUTO: 183 K/UL — SIGNIFICANT CHANGE UP (ref 150–400)
POTASSIUM SERPL-MCNC: 3.5 MMOL/L — SIGNIFICANT CHANGE UP (ref 3.5–5.3)
POTASSIUM SERPL-SCNC: 3.5 MMOL/L — SIGNIFICANT CHANGE UP (ref 3.5–5.3)
RBC # BLD: 3.51 M/UL — LOW (ref 4.2–5.8)
RBC # FLD: 19 % — HIGH (ref 10.3–14.5)
SODIUM SERPL-SCNC: 143 MMOL/L — SIGNIFICANT CHANGE UP (ref 135–145)
WBC # BLD: 15.54 K/UL — HIGH (ref 3.8–10.5)
WBC # FLD AUTO: 15.54 K/UL — HIGH (ref 3.8–10.5)

## 2022-09-20 PROCEDURE — 99232 SBSQ HOSP IP/OBS MODERATE 35: CPT

## 2022-09-20 RX ORDER — MAGNESIUM SULFATE 500 MG/ML
1 VIAL (ML) INJECTION ONCE
Refills: 0 | Status: COMPLETED | OUTPATIENT
Start: 2022-09-20 | End: 2022-09-20

## 2022-09-20 RX ORDER — POTASSIUM CHLORIDE 20 MEQ
40 PACKET (EA) ORAL EVERY 4 HOURS
Refills: 0 | Status: COMPLETED | OUTPATIENT
Start: 2022-09-20 | End: 2022-09-20

## 2022-09-20 RX ORDER — POTASSIUM CHLORIDE 20 MEQ
20 PACKET (EA) ORAL ONCE
Refills: 0 | Status: DISCONTINUED | OUTPATIENT
Start: 2022-09-20 | End: 2022-09-20

## 2022-09-20 RX ADMIN — Medication 975 MILLIGRAM(S): at 01:38

## 2022-09-20 RX ADMIN — Medication 40 MILLIEQUIVALENT(S): at 14:15

## 2022-09-20 RX ADMIN — HEPARIN SODIUM 5000 UNIT(S): 5000 INJECTION INTRAVENOUS; SUBCUTANEOUS at 17:44

## 2022-09-20 RX ADMIN — Medication 40 MILLIEQUIVALENT(S): at 11:21

## 2022-09-20 RX ADMIN — PANTOPRAZOLE SODIUM 40 MILLIGRAM(S): 20 TABLET, DELAYED RELEASE ORAL at 17:43

## 2022-09-20 RX ADMIN — PIPERACILLIN AND TAZOBACTAM 25 GRAM(S): 4; .5 INJECTION, POWDER, LYOPHILIZED, FOR SOLUTION INTRAVENOUS at 05:30

## 2022-09-20 RX ADMIN — HEPARIN SODIUM 1700 UNIT(S)/HR: 5000 INJECTION INTRAVENOUS; SUBCUTANEOUS at 05:27

## 2022-09-20 RX ADMIN — Medication 4 MILLIGRAM(S): at 22:54

## 2022-09-20 RX ADMIN — Medication 4 MILLIGRAM(S): at 01:08

## 2022-09-20 RX ADMIN — CHLORHEXIDINE GLUCONATE 1 APPLICATION(S): 213 SOLUTION TOPICAL at 05:37

## 2022-09-20 RX ADMIN — Medication 4 MILLIGRAM(S): at 17:44

## 2022-09-20 RX ADMIN — HEPARIN SODIUM 1700 UNIT(S)/HR: 5000 INJECTION INTRAVENOUS; SUBCUTANEOUS at 14:12

## 2022-09-20 RX ADMIN — Medication 100 GRAM(S): at 11:22

## 2022-09-20 RX ADMIN — PIPERACILLIN AND TAZOBACTAM 25 GRAM(S): 4; .5 INJECTION, POWDER, LYOPHILIZED, FOR SOLUTION INTRAVENOUS at 14:15

## 2022-09-20 RX ADMIN — HEPARIN SODIUM 1700 UNIT(S)/HR: 5000 INJECTION INTRAVENOUS; SUBCUTANEOUS at 20:19

## 2022-09-20 RX ADMIN — Medication 4 MILLIGRAM(S): at 11:21

## 2022-09-20 RX ADMIN — Medication 4 MILLIGRAM(S): at 05:28

## 2022-09-20 RX ADMIN — PIPERACILLIN AND TAZOBACTAM 25 GRAM(S): 4; .5 INJECTION, POWDER, LYOPHILIZED, FOR SOLUTION INTRAVENOUS at 22:54

## 2022-09-20 RX ADMIN — HEPARIN SODIUM 1700 UNIT(S)/HR: 5000 INJECTION INTRAVENOUS; SUBCUTANEOUS at 07:51

## 2022-09-20 RX ADMIN — PANTOPRAZOLE SODIUM 40 MILLIGRAM(S): 20 TABLET, DELAYED RELEASE ORAL at 05:37

## 2022-09-20 RX ADMIN — Medication 975 MILLIGRAM(S): at 01:08

## 2022-09-20 NOTE — PROGRESS NOTE ADULT - ASSESSMENT
56 year old male  POD #8 s/p ex lap, subtotal colectomy 9/10. s/p closure of abdomen, SBR, ileostomy creation.     - Leukocytosis improving on IV Abx- changed to zosyn due to intraabdominal collection  - IR consult for fluid collection drainage.   - Multiple DVT- started on heparin gtt monitor PTT   - increase imodium dose  - regular diet  - local wound care  - monitor JAVED and ostomy output  - DVT ppx, GI ppx, PT, OOB  - d/w surgical attending Dr. Plasencia

## 2022-09-20 NOTE — PROGRESS NOTE ADULT - SUBJECTIVE AND OBJECTIVE BOX
HPI: 56 year old male s/p ex lap subtotal colectomy and ileostomy with small bowel resection, developed multiple DVT and started on heparin gtt last night. Denies complaints at this time.     Vital Signs Last 24 Hrs  T(C): 37 (20 Sep 2022 05:07), Max: 37 (20 Sep 2022 05:07)  T(F): 98.6 (20 Sep 2022 05:07), Max: 98.6 (20 Sep 2022 05:07)  HR: 88 (20 Sep 2022 05:07) (80 - 91)  BP: 124/83 (20 Sep 2022 05:07) (121/74 - 125/77)  BP(mean): --  RR: 18 (20 Sep 2022 05:07) (17 - 18)  SpO2: 96% (20 Sep 2022 05:07) (96% - 99%)    Parameters below as of 20 Sep 2022 05:07  Patient On (Oxygen Delivery Method): room air        MEDICATIONS  (STANDING):  acetaminophen     Tablet .. 975 milliGRAM(s) Oral every 6 hours  chlorhexidine 2% Cloths 1 Application(s) Topical <User Schedule>  heparin   Injectable 5000 Unit(s) SubCutaneous every 12 hours  heparin  Infusion.  Unit(s)/Hr (15 mL/Hr) IV Continuous <Continuous>  loperamide 4 milliGRAM(s) Oral every 6 hours  pantoprazole  Injectable 40 milliGRAM(s) IV Push two times a day  piperacillin/tazobactam IVPB.. 3.375 Gram(s) IV Intermittent every 8 hours    MEDICATIONS  (PRN):  morphine  - Injectable 2 milliGRAM(s) IV Push every 6 hours PRN Severe Pain (7 - 10)  oxyCODONE    IR 5 milliGRAM(s) Oral every 6 hours PRN Moderate Pain (4 - 6)  sodium chloride 0.9% lock flush 10 milliLiter(s) IV Push every 1 hour PRN Pre/post blood products, medications, blood draw, and to maintain line patency      PHYSICAL EXAM:      Constitutional: awake and alert.  HEENT: PERRLA, EOMI,   Neck: Supple.  Respiratory: Breath sounds are clear bilaterally  Cardiovascular: S1 and S2, regular   Gastrointestinal: soft, nontender, midline incision healing by 2ndary intention, packing changed and dressing changed. JAVED x2 with serosanguinous output. Ostomy with brown liquid stool.   Extremities:  LUE swollen   Vascular: Caritid Bruit - no  Musculoskeletal: no joint swelling/tenderness, no abnormal movements  Skin: No rashes      LABS:                         9.3    15.54 )-----------( 183      ( 20 Sep 2022 04:55 )             29.6     09-20    143  |  113<H>  |  9   ----------------------------<  96  3.5   |  26  |  0.70    Ca    7.6<L>      20 Sep 2022 04:55  Phos  2.9     09-20  Mg     1.9     09-20            RADIOLOGY: < from: CT Chest w/ IV Cont (09.19.22 @ 16:25) >  Status post subtotal colectomy. Loculated mildly enhancing pelvic   peritoneal fluid surrounding edematous distal ileum. Correlate for   symptoms of peritonitis.    New superior mesenteric vein thrombus and DVT in the left external iliac   vein. Recommend lower extremity venous Doppler ultrasound to evaluate   extent of the lowerextremity DVT.    < end of copied text >    < from: US Duplex Venous Upper Ext Ltd, Left (09.19.22 @ 16:44) >  IMPRESSION:  Thrombus is identified within visualized segments of the left radial and   ulnar veins, consistent with DVT. Patency of the left internal jugular,   subclavian, axillary and brachial veins.    < end of copied text >

## 2022-09-20 NOTE — CONSULT NOTE ADULT - SUBJECTIVE AND OBJECTIVE BOX
HPI:  56M with vomiting and chills for 2 days. Recently traveled from [place] and thought he just had a stomach bug however he was increasingly becoming distended and complaining of diffuse abdominal pain. Denies any Pmhx, trauma, or family hx.       Interventional Radiology    Evaluate for Procedure: abd fluid collection    HPI:  56M with vomiting and chills for 2 days. Recently traveled from [place] and thought he just had a stomach bug however he was increasingly becoming distended and complaining of diffuse abdominal pain. Denied any Pmhx, trauma, or family hx.    now  POD #8 s/p ex lap, subtotal colectomy 9/10. s/p closure of abdomen, SBR, ileostomy creation. new DVT on full anticoagulation.    has intra-abdominal fluid collection on CT. without an approachable window for IR procedure.       llergies: NKDA  Medications (Abx/Cardiac/Anticoagulation/Blood Products)    cefTRIAXone   IVPB: 100 mL/Hr IV Intermittent (09-19 @ 05:23)  heparin   Injectable: 5000 Unit(s) SubCutaneous (09-19 @ 18:24)  heparin  Infusion.: 1700 Unit(s)/Hr IV Continuous (09-20 @ 05:28)  piperacillin/tazobactam IVPB..: 25 mL/Hr IV Intermittent (09-20 @ 05:30)    Data:    T(C): 37.1  HR: 93  BP: 156/80  RR: 17  SpO2: 95%    -WBC 15.54 / HgB 9.3 / Hct 29.6 / Plt 183  -Na 143 / Cl 113 / BUN 9 / Glucose 96  -K 3.5 / CO2 26 / Cr 0.70  -ALT -- / Alk Phos -- / T.Bili --  -INR -- / PTT 52.3      Radiology:  IMPRESSION:  Status post subtotal colectomy. Loculated mildly enhancing pelvic   peritoneal fluid surrounding edematous distal ileum. Correlate for   symptoms of peritonitis.    New superior mesenteric vein thrombus and DVT in the left external iliac   vein. Recommend lower extremity venous Doppler ultrasound to evaluate   extent of the lower extremity DVT.    IMPRESSION:  Thrombus is identified within visualized segments of the left radial and   ulnar veins, consistent with DVT. Patency of the left internal jugular,   subclavian, axillary and brachial veins.

## 2022-09-20 NOTE — CONSULT NOTE ADULT - ASSESSMENT
Interventional Radiology    Evaluate for Procedure: abd fluid collection    HPI: 56y Male presented with N/v for several days found to have SBO    Allergies: NKDA  Medications (Abx/Cardiac/Anticoagulation/Blood Products)    cefTRIAXone   IVPB: 100 mL/Hr IV Intermittent (09-19 @ 05:23)  heparin   Injectable: 5000 Unit(s) SubCutaneous (09-19 @ 18:24)  heparin  Infusion.: 1700 Unit(s)/Hr IV Continuous (09-20 @ 05:28)  piperacillin/tazobactam IVPB..: 25 mL/Hr IV Intermittent (09-20 @ 05:30)    Data:    T(C): 37.1  HR: 93  BP: 156/80  RR: 17  SpO2: 95%    -WBC 15.54 / HgB 9.3 / Hct 29.6 / Plt 183  -Na 143 / Cl 113 / BUN 9 / Glucose 96  -K 3.5 / CO2 26 / Cr 0.70  -ALT -- / Alk Phos -- / T.Bili --  -INR -- / PTT 52.3      Radiology:     Assessment/Plan:     -- IR defers procedure as there is no window or adequate collection size.   continues medical management  re-consult IR as needed  D/W IR attending Assessment/Plan:     -- IR defers procedure as there is no window or adequate collection size.   continues medical management  re-consult IR as needed  D/W IR attending

## 2022-09-20 NOTE — PROGRESS NOTE ADULT - ASSESSMENT
Severe sepsis with fever, shock, in the setting of large bowel obstruction due to colon tumor with high-grade obstruction.  Official pathology is pending.  Patient has a single blood culture positive for gram-positive cocci in pairs and change.  This is likely an alphahemolytic Streptococcus species but full identification and sensitivity is pending.  Current antibiotics are exceedingly broad.  Does not know that this patient has any recent hospitalization or antibiotic use which would place him at risk for resistant yue.    9/13: Shock improved, white blood cell count normal, cultures limited to a single positive bottle with an alphahemolytic Streptococcus species.  While this could be a contaminant, it could also be a true positive given the clinical context.  We will see a role to augment antibiotics at this juncture.  9/14: POD#2. Off pressors, WBC normal, extubated. No return of bowel function as of yet. Drop in hemoglobin over the past 48h. No concern of hemolysis. Drop in Platelets as well, suspect not on the basis of uncontrolled infection at this point in time  9/16: s/p exploratory lap, colon resection on 9/10 and Small bowel resection, Creation, enterostomy, Closure, fascia, abdomen on 9/13. The pt was downgraded from ICU, no fevers since 9/12, on RA and comfortable, WBC 12.63, Cr ok, LFTs elevated, surgical site is clean, ileostomy is functional, JPs x 2 drain serosanguineous drainage, repeat BCs remain with no growth to date, tolerates prescribed diet, Ceftriaxone IV continued.   Pt's LFTs are elevated, will monitor.   Attending Addendum--  Case reviewed with NP Olga Moran. Her note reviewed and modified as appropriate.   Patient personally assessed and examined.  Awake and alert, interactive, wife and brother at bedside.  In good spirits. No specific complaints  Abdominal exam benign  F/U CBC LFT  9/19: feeling better overall, tolerates his diet, no fevers, WBC elevated, repeat BCs remain with no growth, ceftriaxone IV continued, CT c/a/p are pending. Pt's left arm with significant swelling, ordered US venous doppler to r/o DVT.   9/20: no fevers, WBC better, + multiple DVTs on heparin drip, CT a/p - loculated pelvic peritoneal fluid - seen by IR, IR deferred the procedure as there is no window or adequate collection size. Abx were broadened by the surgery to Zosyn due to new findings on CT.        Suggestions  continue Zosyn IV for now  Monitor Hb  Monitor platelets  Monitor LFTs  Follow all culture data  Trend temperatures and WBC  Monitor ileostomy and JAVED x 2 output   CT c/a/p - reviewed   IR evaluation is appreciated   DVT management per primary service       Discussed with Dr. Hernandez

## 2022-09-20 NOTE — PROGRESS NOTE ADULT - SUBJECTIVE AND OBJECTIVE BOX
TAISHA ROGERS  MRN-[id number]    Follow Up:  sepsis, streptococcus anginosus positive blood cultures, necrotic terminal ileum, necrosis of small bowel, post op state, fevers, leukocytosis, multiple DVTs    Interval History: The pt was seen and examined earlier, no distress, in his bed upright, no new complaints. The pt is afebrile, RA, WBC better.     PAST MEDICAL & SURGICAL HISTORY:  No pertinent past medical history      No significant past surgical history          ROS:    [ ] Unobtainable because:  [ x] All other systems negative    Constitutional: no fever, no chills  Head: no trauma  Eyes: no vision changes, no eye pain  ENT:  no sore throat, no rhinorrhea  Cardiovascular:  no chest pain, no palpitation  Respiratory:  no SOB, no cough  GI:  no abd pain, no vomiting, no diarrhea  urinary: no dysuria, no hematuria, no flank pain  musculoskeletal:  no joint pain, no joint swelling  skin:  no rash  neurology:  no headache, no seizure, no change in mental status  psych: no anxiety, no depression         Allergies  No Known Allergies        ANTIMICROBIALS:  piperacillin/tazobactam IVPB.. 3.375 every 8 hours      OTHER MEDS:  acetaminophen     Tablet .. 975 milliGRAM(s) Oral every 6 hours  chlorhexidine 2% Cloths 1 Application(s) Topical <User Schedule>  heparin   Injectable 5000 Unit(s) SubCutaneous every 12 hours  heparin  Infusion.  Unit(s)/Hr IV Continuous <Continuous>  loperamide 4 milliGRAM(s) Oral every 6 hours  morphine  - Injectable 2 milliGRAM(s) IV Push every 6 hours PRN  oxyCODONE    IR 5 milliGRAM(s) Oral every 6 hours PRN  pantoprazole  Injectable 40 milliGRAM(s) IV Push two times a day  sodium chloride 0.9% lock flush 10 milliLiter(s) IV Push every 1 hour PRN      Vital Signs Last 24 Hrs  T(C): 37.1 (20 Sep 2022 11:18), Max: 37.1 (20 Sep 2022 11:18)  T(F): 98.8 (20 Sep 2022 11:18), Max: 98.8 (20 Sep 2022 11:18)  HR: 93 (20 Sep 2022 11:18) (88 - 93)  BP: 156/80 (20 Sep 2022 11:18) (124/83 - 156/80)  BP(mean): --  RR: 17 (20 Sep 2022 11:18) (17 - 18)  SpO2: 95% (20 Sep 2022 11:18) (95% - 96%)    Parameters below as of 20 Sep 2022 11:18  Patient On (Oxygen Delivery Method): room air        Physical Exam:  General: Nontoxic-appearing Male in no acute distress. RA  HEENT: AT/NC. Pupils reactive to light bilaterally, no thrush, Anicteric. Conjunctiva pink and moist.  Neck: Not rigid. No sense of mass.  Nodes: None palpable.  Lungs: Clear breath sounds bilaterally, no wheezing, no rhonchi  Heart: Regular rate and rhythm. No Murmur. No rub. No gallop. No palpable thrill.  Abdomen: Bowel sounds present, colostomy with liquid brown stool, JAVED x 2 with serosanguineous drainage, Surgical site is clean, no pus, no malodor, no erythema of surrounding tissues, not significantly tender.   Extremities: No cyanosis or clubbing. trace edema of b/l LEs, 2+ edema Left Upper extremity   Skin: Warm. Dry. Good turgor. No rash. No vasculitic stigmata.  Psychiatric: awake and alert, appropriate     WBC Count: 15.54 K/uL (09-20 @ 04:55)  WBC Count: 19.21 K/uL (09-19 @ 07:20)  WBC Count: 17.19 K/uL (09-18 @ 06:34)  WBC Count: 12.63 K/uL (09-16 @ 06:35)  WBC Count: 8.39 K/uL (09-15 @ 03:00)  WBC Count: 6.76 K/uL (09-14 @ 13:53)  WBC Count: 6.24 K/uL (09-14 @ 02:35)                            9.3    15.54 )-----------( 183      ( 20 Sep 2022 04:55 )             29.6       09-20    143  |  113<H>  |  9   ----------------------------<  96  3.5   |  26  |  0.70    Ca    7.6<L>      20 Sep 2022 04:55  Phos  2.9     09-20  Mg     1.9     09-20            Creatinine Trend: 0.70<--, 0.71<--, 0.72<--, 0.70<--, 0.70<--, 0.74<--      MICROBIOLOGY:  v  .Blood Blood-Peripheral  09-13-22   No Growth Final  --  --      .Blood Blood-Peripheral  09-13-22   No Growth Final  --  --      .Blood Blood-Peripheral  09-10-22   No Growth Final  --  Blood Culture PCR  Streptococcus anginosus        D-Dimer Assay, Quantitative: 1822 (09-10)          RADIOLOGY:    < from: CT Abdomen and Pelvis w/ Oral Cont and w/ IV Cont (09.19.22 @ 16:25) >    ACC: [id number] EXAM:  CT ABDOMEN AND PELVIS OC IC                        ACC: [id number] EXAM:  CT CHEST IC                          PROCEDURE DATE:  09/19/2022          INTERPRETATION:  CLINICAL INFORMATION: Leukocytosis.    COMPARISON: None.    CONTRAST/COMPLICATIONS:  IV Contrast: Omnipaque 350 (accession [id number]), IV contrast documented   in associated exam (accession [id number])  95 cc administered  Oral Contrast: [facility] (accession [id number]), NONE (accession [id number])  Complications: None reported at time of study completion    PROCEDURE:  CT abdomen pelvis 9/10/2022    FINDINGS:  CHEST:  LUNGS AND LARGE AIRWAYS: Patent central airways. Dependent right lower   lobe atelectasis..  PLEURA: Trace bilateral pleural effusions..  VESSELS: Within normal limits.  HEART: Heart size is normal. No pericardial effusion.  MEDIASTINUM AND SHAILA: No lymphadenopathy.  CHEST WALL AND LOWER NECK: Subcentimeter hypodense left thyroid lobe   nodule. Mild gynecomastia.    ABDOMEN AND PELVIS:  LIVER: Within normal limits.  BILE DUCTS: Normal caliber.  GALLBLADDER: Within normal limits.  SPLEEN: Mild splenomegaly.  PANCREAS: Within normal limits.  ADRENALS: Within normal limits.  KIDNEYS/URETERS: Within normal limits.    BLADDER: Tiny focus of intraluminal gas likely due to recent   instrumentation..  REPRODUCTIVE ORGANS: Prostate within normal limits.    BOWEL/PERITONEUM: Status post subtotal colectomy with Joseph's procedure   and right lower quadrant ileostomy. Mildly edematous distal ileum.   Loculated mildly enhancing peritoneal fluid in the pelvis measures 9.5 x   3.7 cm in axial dimension. No bowel obstruction.  VESSELS: New thrombus within the superior mesenteric vein and a right   lower quadrant tributary. No portal vein thrombus. New left external   iliac DVT..  RETROPERITONEUM/LYMPH NODES: No lymphadenopathy.  ABDOMINAL WALL: Midline vertical incision..  BONES: Within normal limits.    IMPRESSION:  Status post subtotal colectomy. Loculated mildly enhancing pelvic   peritoneal fluid surrounding edematous distal ileum. Correlate for   symptoms of peritonitis.    New superior mesenteric vein thrombus and DVT in the left external iliac   vein. Recommend lower extremity venous Doppler ultrasound to evaluate   extent of the lowerextremity DVT.    Findings discussed with JUNIOR Dela Cruz by Dr. Carl on 9/19/2022 at 5:10   PM  with read back.        --- End of Report ---            ELICEO CARL MD; Attending Radiologist  This document has been electronically signed. Sep 19 2022  5:09PM    < end of copied text >

## 2022-09-21 LAB
ANION GAP SERPL CALC-SCNC: 7 MMOL/L — SIGNIFICANT CHANGE UP (ref 5–17)
APTT BLD: 96 SEC — HIGH (ref 27.5–35.5)
BUN SERPL-MCNC: 5 MG/DL — LOW (ref 7–23)
CALCIUM SERPL-MCNC: 7.9 MG/DL — LOW (ref 8.5–10.1)
CHLORIDE SERPL-SCNC: 113 MMOL/L — HIGH (ref 96–108)
CO2 SERPL-SCNC: 23 MMOL/L — SIGNIFICANT CHANGE UP (ref 22–31)
CREAT SERPL-MCNC: 0.7 MG/DL — SIGNIFICANT CHANGE UP (ref 0.5–1.3)
EGFR: 107 ML/MIN/1.73M2 — SIGNIFICANT CHANGE UP
GLUCOSE SERPL-MCNC: 104 MG/DL — HIGH (ref 70–99)
HCT VFR BLD CALC: 28.9 % — LOW (ref 39–50)
HGB BLD-MCNC: 9.1 G/DL — LOW (ref 13–17)
MAGNESIUM SERPL-MCNC: 1.9 MG/DL — SIGNIFICANT CHANGE UP (ref 1.6–2.6)
MCHC RBC-ENTMCNC: 26.8 PG — LOW (ref 27–34)
MCHC RBC-ENTMCNC: 31.5 G/DL — LOW (ref 32–36)
MCV RBC AUTO: 85.3 FL — SIGNIFICANT CHANGE UP (ref 80–100)
NRBC # BLD: 0 /100 WBCS — SIGNIFICANT CHANGE UP (ref 0–0)
PHOSPHATE SERPL-MCNC: 2.7 MG/DL — SIGNIFICANT CHANGE UP (ref 2.5–4.5)
PLATELET # BLD AUTO: 188 K/UL — SIGNIFICANT CHANGE UP (ref 150–400)
POTASSIUM SERPL-MCNC: 4.1 MMOL/L — SIGNIFICANT CHANGE UP (ref 3.5–5.3)
POTASSIUM SERPL-SCNC: 4.1 MMOL/L — SIGNIFICANT CHANGE UP (ref 3.5–5.3)
RBC # BLD: 3.39 M/UL — LOW (ref 4.2–5.8)
RBC # FLD: 19.5 % — HIGH (ref 10.3–14.5)
SODIUM SERPL-SCNC: 143 MMOL/L — SIGNIFICANT CHANGE UP (ref 135–145)
WBC # BLD: 14.11 K/UL — HIGH (ref 3.8–10.5)
WBC # FLD AUTO: 14.11 K/UL — HIGH (ref 3.8–10.5)

## 2022-09-21 PROCEDURE — 99232 SBSQ HOSP IP/OBS MODERATE 35: CPT

## 2022-09-21 RX ORDER — APIXABAN 2.5 MG/1
2 TABLET, FILM COATED ORAL
Qty: 28 | Refills: 0
Start: 2022-09-21 | End: 2022-09-27

## 2022-09-21 RX ORDER — ENOXAPARIN SODIUM 100 MG/ML
80 INJECTION SUBCUTANEOUS EVERY 12 HOURS
Refills: 0 | Status: DISCONTINUED | OUTPATIENT
Start: 2022-09-21 | End: 2022-09-23

## 2022-09-21 RX ORDER — ENOXAPARIN SODIUM 100 MG/ML
80 INJECTION SUBCUTANEOUS
Qty: 320 | Refills: 0
Start: 2022-09-21 | End: 2022-12-19

## 2022-09-21 RX ADMIN — HEPARIN SODIUM 1500 UNIT(S)/HR: 5000 INJECTION INTRAVENOUS; SUBCUTANEOUS at 00:13

## 2022-09-21 RX ADMIN — PANTOPRAZOLE SODIUM 40 MILLIGRAM(S): 20 TABLET, DELAYED RELEASE ORAL at 17:52

## 2022-09-21 RX ADMIN — PANTOPRAZOLE SODIUM 40 MILLIGRAM(S): 20 TABLET, DELAYED RELEASE ORAL at 05:48

## 2022-09-21 RX ADMIN — ENOXAPARIN SODIUM 80 MILLIGRAM(S): 100 INJECTION SUBCUTANEOUS at 22:06

## 2022-09-21 RX ADMIN — ENOXAPARIN SODIUM 80 MILLIGRAM(S): 100 INJECTION SUBCUTANEOUS at 09:07

## 2022-09-21 RX ADMIN — Medication 975 MILLIGRAM(S): at 11:45

## 2022-09-21 RX ADMIN — CHLORHEXIDINE GLUCONATE 1 APPLICATION(S): 213 SOLUTION TOPICAL at 17:39

## 2022-09-21 RX ADMIN — Medication 4 MILLIGRAM(S): at 05:49

## 2022-09-21 RX ADMIN — Medication 4 MILLIGRAM(S): at 11:45

## 2022-09-21 RX ADMIN — Medication 975 MILLIGRAM(S): at 17:51

## 2022-09-21 RX ADMIN — Medication 4 MILLIGRAM(S): at 17:51

## 2022-09-21 RX ADMIN — Medication 975 MILLIGRAM(S): at 12:45

## 2022-09-21 RX ADMIN — HEPARIN SODIUM 1500 UNIT(S)/HR: 5000 INJECTION INTRAVENOUS; SUBCUTANEOUS at 07:18

## 2022-09-21 RX ADMIN — PIPERACILLIN AND TAZOBACTAM 25 GRAM(S): 4; .5 INJECTION, POWDER, LYOPHILIZED, FOR SOLUTION INTRAVENOUS at 14:59

## 2022-09-21 RX ADMIN — PIPERACILLIN AND TAZOBACTAM 25 GRAM(S): 4; .5 INJECTION, POWDER, LYOPHILIZED, FOR SOLUTION INTRAVENOUS at 22:06

## 2022-09-21 NOTE — PROGRESS NOTE ADULT - SUBJECTIVE AND OBJECTIVE BOX
Patient seen and examined at bedside, patient without complaints.   Abdominal pain is well controlled.  Denies nausea and vomiting. Tolerating diet.  Denies chest pain, dyspnea, cough.      MEDICATIONS  (STANDING):  acetaminophen     Tablet .. 975 milliGRAM(s) Oral every 6 hours  chlorhexidine 2% Cloths 1 Application(s) Topical <User Schedule>  enoxaparin Injectable 80 milliGRAM(s) SubCutaneous every 12 hours  loperamide 4 milliGRAM(s) Oral every 6 hours  pantoprazole  Injectable 40 milliGRAM(s) IV Push two times a day  piperacillin/tazobactam IVPB.. 3.375 Gram(s) IV Intermittent every 8 hours    MEDICATIONS  (PRN):  oxyCODONE    IR 5 milliGRAM(s) Oral every 6 hours PRN Moderate Pain (4 - 6)  sodium chloride 0.9% lock flush 10 milliLiter(s) IV Push every 1 hour PRN Pre/post blood products, medications, blood draw, and to maintain line patency      Vital Signs Last 24 Hrs  T(C): 37 (21 Sep 2022 12:36), Max: 37.1 (20 Sep 2022 17:55)  T(F): 98.6 (21 Sep 2022 12:36), Max: 98.8 (20 Sep 2022 17:55)  HR: 98 (21 Sep 2022 12:36) (78 - 98)  BP: 106/73 (21 Sep 2022 12:36) (106/73 - 138/83)  RR: 18 (21 Sep 2022 12:36) (17 - 18)  SpO2: 98% (21 Sep 2022 12:36) (97% - 99%)    Parameters below as of 21 Sep 2022 05:22  Patient On (Oxygen Delivery Method): room air      PHYSICAL EXAM:  Constitutional: awake and alert.  HEENT: PERRLA, EOMI,   Neck: Supple.  Respiratory: Breath sounds are clear bilaterally  Cardiovascular: S1 and S2, regular   Gastrointestinal: soft, nontender, midline incision healing by 2ndary intention, packing changed and dressing changed. JAVED x2 with serosanguinous. Ostomy with brown liquid stool.   Extremities:  LUE swollen   Vascular: Caritid Bruit - no  Musculoskeletal: no joint swelling/tenderness, no abnormal movements  Skin: No rashes     I&O's Detail    20 Sep 2022 07:01  -  21 Sep 2022 07:00  --------------------------------------------------------  IN:    Heparin Infusion: 190 mL  Total IN: 190 mL    OUT:    Bulb (mL): 135 mL    Bulb (mL): 155 mL    Colostomy (mL): 1250 mL    Voided (mL): 800 mL  Total OUT: 2340 mL    Total NET: -2150 mL          LABS:                        9.1    14.11 )-----------( 188      ( 21 Sep 2022 06:54 )             28.9     09-21    143  |  113<H>  |  5<L>  ----------------------------<  104<H>  4.1   |  23  |  0.70    Ca    7.9<L>      21 Sep 2022 06:54  Phos  2.7     09-21  Mg     1.9     09-21      PTT - ( 21 Sep 2022 06:54 )  PTT:96.0 sec

## 2022-09-21 NOTE — PROGRESS NOTE ADULT - ASSESSMENT
Severe sepsis with fever, shock, in the setting of large bowel obstruction due to colon tumor with high-grade obstruction.  Official pathology is pending.  Patient has a single blood culture positive for gram-positive cocci in pairs and change.  This is likely an alphahemolytic Streptococcus species but full identification and sensitivity is pending.  Current antibiotics are exceedingly broad.  Does not know that this patient has any recent hospitalization or antibiotic use which would place him at risk for resistant yue.    9/13: Shock improved, white blood cell count normal, cultures limited to a single positive bottle with an alphahemolytic Streptococcus species.  While this could be a contaminant, it could also be a true positive given the clinical context.  We will see a role to augment antibiotics at this juncture.  9/14: POD#2. Off pressors, WBC normal, extubated. No return of bowel function as of yet. Drop in hemoglobin over the past 48h. No concern of hemolysis. Drop in Platelets as well, suspect not on the basis of uncontrolled infection at this point in time  9/16: s/p exploratory lap, colon resection on 9/10 and Small bowel resection, Creation, enterostomy, Closure, fascia, abdomen on 9/13. The pt was downgraded from ICU, no fevers since 9/12, on RA and comfortable, WBC 12.63, Cr ok, LFTs elevated, surgical site is clean, ileostomy is functional, JPs x 2 drain serosanguineous drainage, repeat BCs remain with no growth to date, tolerates prescribed diet, Ceftriaxone IV continued.   Pt's LFTs are elevated, will monitor.   Attending Addendum--  Case reviewed with NP Olga Moran. Her note reviewed and modified as appropriate.   Patient personally assessed and examined.  Awake and alert, interactive, wife and brother at bedside.  In good spirits. No specific complaints  Abdominal exam benign  F/U CBC LFT  9/19: feeling better overall, tolerates his diet, no fevers, WBC elevated, repeat BCs remain with no growth, ceftriaxone IV continued, CT c/a/p are pending. Pt's left arm with significant swelling, ordered US venous doppler to r/o DVT.   9/20: no fevers, WBC better, + multiple DVTs on heparin drip, CT a/p - loculated pelvic peritoneal fluid - seen by IR, IR deferred the procedure as there is no window or adequate collection size. Abx were broadened by the surgery to Zosyn due to new findings on CT.    9/21: remains afebrile, on RA, WBC better 14.11, Cr ok, will continue with broad spectrum abx for now, Zosyn and will continue to monitor.       Suggestions  continue Zosyn IV   Monitor Hb  Monitor platelets  Monitor LFTs  Follow all culture data  Trend temperatures and WBC  Monitor ileostomy and JAVED x 2 output   DVT management per primary service       Discussed with Dr. Hernandez   Discussed with the pt and his wife

## 2022-09-21 NOTE — PROGRESS NOTE ADULT - SUBJECTIVE AND OBJECTIVE BOX
TAISHA ROGERS  MRN-[id number]    Follow Up:  abd fluid collection, s/p abd surgery    Interval History: the pt was seen and examined earlier, no distress, out of bed to chair, in a good mood, reports having some discomfort at the surgical site with cough and laugh, uses pillow for support when he needs to cough, pt's wife is present.     PAST MEDICAL & SURGICAL HISTORY:  No pertinent past medical history      No significant past surgical history          ROS:    [ ] Unobtainable because:  [x] All other systems negative    Constitutional: no fever, no chills  Head: no trauma  Eyes: no vision changes, no eye pain  ENT:  no sore throat, no rhinorrhea  Cardiovascular:  no chest pain, no palpitation  Respiratory:  no SOB, no cough  GI:  post op pain is controlled   urinary: no dysuria, no hematuria, no flank pain  musculoskeletal:  no joint pain, no joint swelling  skin:  no rash  neurology:  no headache, no seizure, no change in mental status  psych: no anxiety, no depression         Allergies  No Known Allergies        ANTIMICROBIALS:  piperacillin/tazobactam IVPB.. 3.375 every 8 hours      OTHER MEDS:  acetaminophen     Tablet .. 975 milliGRAM(s) Oral every 6 hours  chlorhexidine 2% Cloths 1 Application(s) Topical <User Schedule>  enoxaparin Injectable 80 milliGRAM(s) SubCutaneous every 12 hours  loperamide 4 milliGRAM(s) Oral every 6 hours  oxyCODONE    IR 5 milliGRAM(s) Oral every 6 hours PRN  pantoprazole  Injectable 40 milliGRAM(s) IV Push two times a day  sodium chloride 0.9% lock flush 10 milliLiter(s) IV Push every 1 hour PRN      Vital Signs Last 24 Hrs  T(C): 37 (21 Sep 2022 12:36), Max: 37.1 (20 Sep 2022 17:55)  T(F): 98.6 (21 Sep 2022 12:36), Max: 98.8 (20 Sep 2022 17:55)  HR: 98 (21 Sep 2022 12:36) (78 - 98)  BP: 106/73 (21 Sep 2022 12:36) (106/73 - 138/83)  BP(mean): --  RR: 18 (21 Sep 2022 12:36) (17 - 18)  SpO2: 98% (21 Sep 2022 12:36) (97% - 98%)    Parameters below as of 21 Sep 2022 05:22  Patient On (Oxygen Delivery Method): room air        Physical Exam:  General: Nontoxic-appearing Male in no acute distress. RA  HEENT: AT/NC. Pupils reactive to light bilaterally, no thrush, Anicteric. Conjunctiva pink and moist.  Neck: Not rigid. No sense of mass.  Nodes: None palpable.  Lungs: Clear breath sounds bilaterally, no wheezing, no rhonchi  Heart: Regular rate and rhythm. No Murmur. No rub. No gallop. No palpable thrill.  Abdomen: Bowel sounds present, colostomy with liquid brown stool, JAVED x 2 with serosanguineous drainage, Surgical site is dressed   Extremities: No cyanosis or clubbing. trace edema of b/l LEs, 2+ edema Left Upper extremity   Skin: Warm. Dry. Good turgor. No rash. No vasculitic stigmata.  Psychiatric: awake and alert, appropriate     WBC Count: 14.11 K/uL (09-21 @ 06:54)  WBC Count: 15.54 K/uL (09-20 @ 04:55)  WBC Count: 19.21 K/uL (09-19 @ 07:20)  WBC Count: 17.19 K/uL (09-18 @ 06:34)  WBC Count: 12.63 K/uL (09-16 @ 06:35)  WBC Count: 8.39 K/uL (09-15 @ 03:00)                            9.1    14.11 )-----------( 188      ( 21 Sep 2022 06:54 )             28.9       09-21    143  |  113<H>  |  5<L>  ----------------------------<  104<H>  4.1   |  23  |  0.70    Ca    7.9<L>      21 Sep 2022 06:54  Phos  2.7     09-21  Mg     1.9     09-21            Creatinine Trend: 0.70<--, 0.70<--, 0.71<--, 0.72<--, 0.70<--, 0.70<--      MICROBIOLOGY:  v  .Blood Blood-Peripheral  09-13-22   No Growth Final  --  --      .Blood Blood-Peripheral  09-13-22   No Growth Final  --  --      .Blood Blood-Peripheral  09-10-22   No Growth Final  --  Blood Culture PCR  Streptococcus anginosus    D-Dimer Assay, Quantitative: 1822 (09-10)    RADIOLOGY:

## 2022-09-21 NOTE — PROGRESS NOTE ADULT - ASSESSMENT
56 year old male  POD #9 s/p ex lap, subtotal colectomy 9/10. s/p closure of abdomen, SBR, ileostomy creation.     - Leukocytosis improving on IV Abx- Continue Zosyn  - IR consult for fluid collection drainage.   - Multiple DVT, now on full dose Lovenox  Continue Imodium  - regular diet  - local wound care  - monitor JAVED and ostomy output  - DVT ppx, GI ppx, PT, OOB  Discussed with Surgical attending

## 2022-09-22 LAB
ANION GAP SERPL CALC-SCNC: 6 MMOL/L — SIGNIFICANT CHANGE UP (ref 5–17)
ANISOCYTOSIS BLD QL: SIGNIFICANT CHANGE UP
BASOPHILS # BLD AUTO: 0 K/UL — SIGNIFICANT CHANGE UP (ref 0–0.2)
BASOPHILS NFR BLD AUTO: 0 % — SIGNIFICANT CHANGE UP (ref 0–2)
BUN SERPL-MCNC: 5 MG/DL — LOW (ref 7–23)
CALCIUM SERPL-MCNC: 8.1 MG/DL — LOW (ref 8.5–10.1)
CHLORIDE SERPL-SCNC: 108 MMOL/L — SIGNIFICANT CHANGE UP (ref 96–108)
CO2 SERPL-SCNC: 25 MMOL/L — SIGNIFICANT CHANGE UP (ref 22–31)
CREAT SERPL-MCNC: 0.71 MG/DL — SIGNIFICANT CHANGE UP (ref 0.5–1.3)
EGFR: 107 ML/MIN/1.73M2 — SIGNIFICANT CHANGE UP
EOSINOPHIL # BLD AUTO: 0 K/UL — SIGNIFICANT CHANGE UP (ref 0–0.5)
EOSINOPHIL NFR BLD AUTO: 0 % — SIGNIFICANT CHANGE UP (ref 0–6)
GLUCOSE SERPL-MCNC: 92 MG/DL — SIGNIFICANT CHANGE UP (ref 70–99)
HCT VFR BLD CALC: 29.8 % — LOW (ref 39–50)
HGB BLD-MCNC: 9.3 G/DL — LOW (ref 13–17)
LYMPHOCYTES # BLD AUTO: 1.84 K/UL — SIGNIFICANT CHANGE UP (ref 1–3.3)
LYMPHOCYTES # BLD AUTO: 14 % — SIGNIFICANT CHANGE UP (ref 13–44)
MACROCYTES BLD QL: SLIGHT — SIGNIFICANT CHANGE UP
MAGNESIUM SERPL-MCNC: 1.9 MG/DL — SIGNIFICANT CHANGE UP (ref 1.6–2.6)
MANUAL SMEAR VERIFICATION: SIGNIFICANT CHANGE UP
MCHC RBC-ENTMCNC: 26.3 PG — LOW (ref 27–34)
MCHC RBC-ENTMCNC: 31.2 G/DL — LOW (ref 32–36)
MCV RBC AUTO: 84.4 FL — SIGNIFICANT CHANGE UP (ref 80–100)
MICROCYTES BLD QL: SLIGHT — SIGNIFICANT CHANGE UP
MONOCYTES # BLD AUTO: 0.53 K/UL — SIGNIFICANT CHANGE UP (ref 0–0.9)
MONOCYTES NFR BLD AUTO: 4 % — SIGNIFICANT CHANGE UP (ref 2–14)
NEUTROPHILS # BLD AUTO: 10.77 K/UL — HIGH (ref 1.8–7.4)
NEUTROPHILS NFR BLD AUTO: 80 % — HIGH (ref 43–77)
NEUTS BAND # BLD: 2 % — SIGNIFICANT CHANGE UP (ref 0–8)
NRBC # BLD: 0 /100 — SIGNIFICANT CHANGE UP (ref 0–0)
NRBC # BLD: SIGNIFICANT CHANGE UP /100 WBCS (ref 0–0)
OVALOCYTES BLD QL SMEAR: SLIGHT — SIGNIFICANT CHANGE UP
PHOSPHATE SERPL-MCNC: 3.3 MG/DL — SIGNIFICANT CHANGE UP (ref 2.5–4.5)
PLAT MORPH BLD: NORMAL — SIGNIFICANT CHANGE UP
PLATELET # BLD AUTO: 223 K/UL — SIGNIFICANT CHANGE UP (ref 150–400)
POIKILOCYTOSIS BLD QL AUTO: SLIGHT — SIGNIFICANT CHANGE UP
POLYCHROMASIA BLD QL SMEAR: SLIGHT — SIGNIFICANT CHANGE UP
POTASSIUM SERPL-MCNC: 4.2 MMOL/L — SIGNIFICANT CHANGE UP (ref 3.5–5.3)
POTASSIUM SERPL-SCNC: 4.2 MMOL/L — SIGNIFICANT CHANGE UP (ref 3.5–5.3)
RBC # BLD: 3.53 M/UL — LOW (ref 4.2–5.8)
RBC # FLD: 19.6 % — HIGH (ref 10.3–14.5)
RBC BLD AUTO: ABNORMAL
SODIUM SERPL-SCNC: 139 MMOL/L — SIGNIFICANT CHANGE UP (ref 135–145)
WBC # BLD: 13.13 K/UL — HIGH (ref 3.8–10.5)
WBC # FLD AUTO: 13.13 K/UL — HIGH (ref 3.8–10.5)

## 2022-09-22 PROCEDURE — 99232 SBSQ HOSP IP/OBS MODERATE 35: CPT

## 2022-09-22 RX ORDER — APIXABAN 2.5 MG/1
1 TABLET, FILM COATED ORAL
Qty: 60 | Refills: 0
Start: 2022-09-22 | End: 2022-10-21

## 2022-09-22 RX ORDER — APIXABAN 2.5 MG/1
1 TABLET, FILM COATED ORAL
Qty: 60 | Refills: 1
Start: 2022-09-22 | End: 2022-11-20

## 2022-09-22 RX ADMIN — ENOXAPARIN SODIUM 80 MILLIGRAM(S): 100 INJECTION SUBCUTANEOUS at 22:14

## 2022-09-22 RX ADMIN — Medication 975 MILLIGRAM(S): at 19:06

## 2022-09-22 RX ADMIN — Medication 4 MILLIGRAM(S): at 00:07

## 2022-09-22 RX ADMIN — Medication 4 MILLIGRAM(S): at 23:50

## 2022-09-22 RX ADMIN — Medication 975 MILLIGRAM(S): at 20:06

## 2022-09-22 RX ADMIN — PIPERACILLIN AND TAZOBACTAM 25 GRAM(S): 4; .5 INJECTION, POWDER, LYOPHILIZED, FOR SOLUTION INTRAVENOUS at 22:38

## 2022-09-22 RX ADMIN — PANTOPRAZOLE SODIUM 40 MILLIGRAM(S): 20 TABLET, DELAYED RELEASE ORAL at 05:08

## 2022-09-22 RX ADMIN — Medication 975 MILLIGRAM(S): at 12:23

## 2022-09-22 RX ADMIN — Medication 975 MILLIGRAM(S): at 19:30

## 2022-09-22 RX ADMIN — Medication 975 MILLIGRAM(S): at 05:07

## 2022-09-22 RX ADMIN — Medication 975 MILLIGRAM(S): at 13:30

## 2022-09-22 RX ADMIN — CHLORHEXIDINE GLUCONATE 1 APPLICATION(S): 213 SOLUTION TOPICAL at 05:09

## 2022-09-22 RX ADMIN — Medication 975 MILLIGRAM(S): at 00:06

## 2022-09-22 RX ADMIN — Medication 4 MILLIGRAM(S): at 12:23

## 2022-09-22 RX ADMIN — PIPERACILLIN AND TAZOBACTAM 25 GRAM(S): 4; .5 INJECTION, POWDER, LYOPHILIZED, FOR SOLUTION INTRAVENOUS at 14:42

## 2022-09-22 RX ADMIN — Medication 4 MILLIGRAM(S): at 19:06

## 2022-09-22 RX ADMIN — ENOXAPARIN SODIUM 80 MILLIGRAM(S): 100 INJECTION SUBCUTANEOUS at 09:35

## 2022-09-22 RX ADMIN — Medication 975 MILLIGRAM(S): at 23:50

## 2022-09-22 RX ADMIN — PIPERACILLIN AND TAZOBACTAM 25 GRAM(S): 4; .5 INJECTION, POWDER, LYOPHILIZED, FOR SOLUTION INTRAVENOUS at 05:07

## 2022-09-22 RX ADMIN — Medication 4 MILLIGRAM(S): at 05:08

## 2022-09-22 RX ADMIN — PANTOPRAZOLE SODIUM 40 MILLIGRAM(S): 20 TABLET, DELAYED RELEASE ORAL at 19:06

## 2022-09-22 NOTE — PROGRESS NOTE ADULT - ASSESSMENT
Severe sepsis with fever, shock, in the setting of large bowel obstruction due to colon tumor with high-grade obstruction.  Official pathology is pending.  Patient has a single blood culture positive for gram-positive cocci in pairs and change.  This is likely an alphahemolytic Streptococcus species but full identification and sensitivity is pending.  Current antibiotics are exceedingly broad.  Does not know that this patient has any recent hospitalization or antibiotic use which would place him at risk for resistant yue.    9/13: Shock improved, white blood cell count normal, cultures limited to a single positive bottle with an alphahemolytic Streptococcus species.  While this could be a contaminant, it could also be a true positive given the clinical context.  We will see a role to augment antibiotics at this juncture.  9/14: POD#2. Off pressors, WBC normal, extubated. No return of bowel function as of yet. Drop in hemoglobin over the past 48h. No concern of hemolysis. Drop in Platelets as well, suspect not on the basis of uncontrolled infection at this point in time  9/16: s/p exploratory lap, colon resection on 9/10 and Small bowel resection, Creation, enterostomy, Closure, fascia, abdomen on 9/13. The pt was downgraded from ICU, no fevers since 9/12, on RA and comfortable, WBC 12.63, Cr ok, LFTs elevated, surgical site is clean, ileostomy is functional, JPs x 2 drain serosanguineous drainage, repeat BCs remain with no growth to date, tolerates prescribed diet, Ceftriaxone IV continued.   Pt's LFTs are elevated, will monitor.   Attending Addendum--  Case reviewed with NP Olga Moran. Her note reviewed and modified as appropriate.   Patient personally assessed and examined.  Awake and alert, interactive, wife and brother at bedside.  In good spirits. No specific complaints  Abdominal exam benign  F/U CBC LFT  9/19: feeling better overall, tolerates his diet, no fevers, WBC elevated, repeat BCs remain with no growth, ceftriaxone IV continued, CT c/a/p are pending. Pt's left arm with significant swelling, ordered US venous doppler to r/o DVT.   9/20: no fevers, WBC better, + multiple DVTs on heparin drip, CT a/p - loculated pelvic peritoneal fluid - seen by IR, IR deferred the procedure as there is no window or adequate collection size. Abx were broadened by the surgery to Zosyn due to new findings on CT.    9/21: remains afebrile, on RA, WBC better 14.11, Cr ok, will continue with broad spectrum abx for now, Zosyn and will continue to monitor.   9/22: no change in pt's presentation, no fevers, on RA, WBC better 13.13, Cr ok, Zosyn IV continued - will limit zosyn to 7 days total and then will consider changing abx back to ceftriaxone. Left arm with swelling, + known DVT, asked RN to remove piv.       Suggestions  continue Zosyn IV   Monitor Hb  Monitor platelets  Monitor LFTs  Follow all culture data  Trend temperatures and WBC  Monitor ileostomy and JAVED x 2 output   DVT management per primary service   remove left arm PIV      Discussed with Dr. Hernandez   Discussed with RN

## 2022-09-22 NOTE — PROGRESS NOTE ADULT - SUBJECTIVE AND OBJECTIVE BOX
SURGERY PROGRESS HPI:  Pt seen and examined at bedside. Denies pain or complaints. Pt tolerating regular diet. Pt denies nausea and vomiting. +ostomy function. Voiding well. Pt denies chest pain, SOB, dizziness, fever, chills. Ambulating.    Vital Signs Last 24 Hrs  T(C): 36.7 (22 Sep 2022 05:14), Max: 37 (21 Sep 2022 12:36)  T(F): 98.1 (22 Sep 2022 05:14), Max: 98.6 (21 Sep 2022 12:36)  HR: 85 (22 Sep 2022 05:14) (85 - 98)  BP: 130/79 (22 Sep 2022 05:14) (106/73 - 134/71)  BP(mean): --  RR: 18 (22 Sep 2022 05:14) (17 - 18)  SpO2: 98% (22 Sep 2022 05:14) (98% - 98%)    Parameters below as of 22 Sep 2022 05:14  Patient On (Oxygen Delivery Method): room air        PHYSICAL EXAM:    CONSTITUTIONAL: NAD  HEENT:  Atraumatic, Normocephalic  RESPIRATORY: Clear to ausculation, bilaterally   CARDIOVASCULAR: RRR S1S2  GASTROINTESTINAL: Ostomy pink and viable with brown stool in the bag. Dressing clean/dry/intact. Wound cleansed with NS and new packing and dressing placed. JPs x 2 with serous output. non distended, +BS, soft, non tender, no guarding  MUSCULOSKELETAL: calf soft, non tender b/l    I&O's Detail    20 Sep 2022 07:01  -  21 Sep 2022 07:00  --------------------------------------------------------  IN:    Heparin Infusion: 190 mL  Total IN: 190 mL    OUT:    Bulb (mL): 135 mL    Bulb (mL): 155 mL    Colostomy (mL): 1250 mL    Voided (mL): 800 mL  Total OUT: 2340 mL    Total NET: -2150 mL      21 Sep 2022 07:01  -  22 Sep 2022 06:00  --------------------------------------------------------  IN:    IV PiggyBack: 200 mL  Total IN: 200 mL    OUT:    Bulb (mL): 70 mL    Bulb (mL): 30 mL    Colostomy (mL): 200 mL    Voided (mL): 450 mL  Total OUT: 750 mL    Total NET: -550 mL          LABS:                        9.1    14.11 )-----------( 188      ( 21 Sep 2022 06:54 )             28.9     09-21    143  |  113<H>  |  5<L>  ----------------------------<  104<H>  4.1   |  23  |  0.70    Ca    7.9<L>      21 Sep 2022 06:54  Phos  2.7     09-21  Mg     1.9     09-21  PTT - ( 21 Sep 2022 06:54 )  PTT:96.0 sec      56 year old male s/p ex lap, subtotal colectomy 9/10. s/p closure of abdomen, SBR, ileostomy creation 9/12  Pelvic collection too small to drain per IR    - Zosyn  - Full dose Lovenox  - Imodium  - regular diet  - local wound care  - monitor JAVED and ostomy output  - DVT ppx, GI ppx, PT, OOB

## 2022-09-22 NOTE — PROGRESS NOTE ADULT - NS PANP COMMENT GEN_ALL_CORE FT
Patient seen and examined  Extubated, on precedex, off pressors    Abd is soft, mild distension, appropriately tender  Ileostomy is pink, viable, without function  NGT with bilious output  Drains serosanguinous    - appreciate ICU care  - continue NGT and await ostomy function
Patient seen and examined with PA  No nausea or vomiting  Tolerated clears    On exam: awake, alert  Abd is soft, not distended, appropriately tender  Ileostomy is pink, viable with 1750mL of output  Drains are serous    - regular diet  - start loperamide 2mg PO q6 hours  - continue to monitor ileostomy output
Patient seen and examined with PAs  Doing well  NO nausea, vomiting ,fever or chills  Tolerating pO intake    Abd is soft, not distended, appropriately tender  Ileostomy with some thicker output    - diet as tolerated  - continue antibiotics until 9/24  - continue anticoagulation  - continue to monitor ileostomy output quality and quantity
Patient seen and examined with PAs  High ileostomy output  Tolerating PO intake    Ostomy pink, viable, 2L output  JAVED drains are serous    - regular diet  - increase loperamide to 4mg PO q6 hours  - CT chest/abd/pelvis with PO and IV contrast

## 2022-09-22 NOTE — PROGRESS NOTE ADULT - SUBJECTIVE AND OBJECTIVE BOX
TAISHA ROGERS  MRN-[id number]    Follow Up:  dvt, s/p abd surgery    Interval History: the pt was seen and examined earlier, no distress, no change in pt's presentation, no new complaints.     PAST MEDICAL & SURGICAL HISTORY:  No pertinent past medical history      No significant past surgical history          ROS:    [ ] Unobtainable because:  [x ] All other systems negative    Constitutional: no fever, no chills  Head: no trauma  Eyes: no vision changes, no eye pain  ENT:  no sore throat, no rhinorrhea  Cardiovascular:  no chest pain, no palpitation  Respiratory:  no SOB, no cough  GI:  no abd pain, no vomiting, no diarrhea  urinary: no dysuria, no hematuria, no flank pain  musculoskeletal:  no joint pain, no joint swelling  skin:  no rash  neurology:  no headache, no seizure, no change in mental status  psych: no anxiety, no depression         Allergies  No Known Allergies        ANTIMICROBIALS:  piperacillin/tazobactam IVPB.. 3.375 every 8 hours      OTHER MEDS:  acetaminophen     Tablet .. 975 milliGRAM(s) Oral every 6 hours  chlorhexidine 2% Cloths 1 Application(s) Topical <User Schedule>  enoxaparin Injectable 80 milliGRAM(s) SubCutaneous every 12 hours  loperamide 4 milliGRAM(s) Oral every 6 hours  oxyCODONE    IR 5 milliGRAM(s) Oral every 6 hours PRN  pantoprazole  Injectable 40 milliGRAM(s) IV Push two times a day  sodium chloride 0.9% lock flush 10 milliLiter(s) IV Push every 1 hour PRN      Vital Signs Last 24 Hrs  T(C): 36.7 (22 Sep 2022 11:58), Max: 36.8 (21 Sep 2022 16:48)  T(F): 98.1 (22 Sep 2022 11:58), Max: 98.2 (21 Sep 2022 16:48)  HR: 85 (22 Sep 2022 11:58) (85 - 90)  BP: 124/74 (22 Sep 2022 11:58) (124/74 - 134/71)  BP(mean): --  RR: 18 (22 Sep 2022 11:58) (17 - 18)  SpO2: 98% (22 Sep 2022 11:58) (98% - 98%)    Parameters below as of 22 Sep 2022 05:14  Patient On (Oxygen Delivery Method): room air        Physical Exam:  General: Nontoxic-appearing Male in no acute distress. RA  HEENT: AT/NC. Pupils reactive to light bilaterally, no thrush, Anicteric. Conjunctiva pink and moist.  Neck: Not rigid. No sense of mass.  Nodes: None palpable.  Lungs: Clear breath sounds bilaterally, no wheezing, no rhonchi  Heart: Regular rate and rhythm. No Murmur. No rub. No gallop. No palpable thrill.  Abdomen: Bowel sounds present, colostomy with liquid brown stool, JAVED x 2 with serosanguineous drainage, Surgical site is dressed c/d/i  Extremities: No cyanosis or clubbing. trace edema of b/l LEs, 2+ edema Left Upper extremity, + PIV - asked RN not remove, right arm midline c/d/i   Skin: Warm. Dry. Good turgor. No rash. No vasculitic stigmata.  Psychiatric: awake and alert, appropriate     WBC Count: 13.13 K/uL (09-22 @ 07:46)  WBC Count: 14.11 K/uL (09-21 @ 06:54)  WBC Count: 15.54 K/uL (09-20 @ 04:55)  WBC Count: 19.21 K/uL (09-19 @ 07:20)  WBC Count: 17.19 K/uL (09-18 @ 06:34)  WBC Count: 12.63 K/uL (09-16 @ 06:35)                            9.3    13.13 )-----------( 223      ( 22 Sep 2022 07:46 )             29.8       09-22    139  |  108  |  5<L>  ----------------------------<  92  4.2   |  25  |  0.71    Ca    8.1<L>      22 Sep 2022 07:46  Phos  3.3     09-22  Mg     1.9     09-22            Creatinine Trend: 0.71<--, 0.70<--, 0.70<--, 0.71<--, 0.72<--, 0.70<--      MICROBIOLOGY:  v  .Blood Blood-Peripheral  09-13-22   No Growth Final  --  --      .Blood Blood-Peripheral  09-13-22   No Growth Final  --  --      .Blood Blood-Peripheral  09-10-22   No Growth Final  --  Blood Culture PCR  Streptococcus anginosus    D-Dimer Assay, Quantitative: 6982 (09-10)    RADIOLOGY:

## 2022-09-22 NOTE — PROGRESS NOTE ADULT - NS PANP OPT1 GEN_ALL_CORE
I independently performed the documented history, exam, and medical decision making.

## 2022-09-23 ENCOUNTER — TRANSCRIPTION ENCOUNTER (OUTPATIENT)
Age: 57
End: 2022-09-23

## 2022-09-23 LAB
ANION GAP SERPL CALC-SCNC: 6 MMOL/L — SIGNIFICANT CHANGE UP (ref 5–17)
BUN SERPL-MCNC: 6 MG/DL — LOW (ref 7–23)
CALCIUM SERPL-MCNC: 8.4 MG/DL — LOW (ref 8.5–10.1)
CHLORIDE SERPL-SCNC: 109 MMOL/L — HIGH (ref 96–108)
CO2 SERPL-SCNC: 26 MMOL/L — SIGNIFICANT CHANGE UP (ref 22–31)
CREAT SERPL-MCNC: 0.8 MG/DL — SIGNIFICANT CHANGE UP (ref 0.5–1.3)
EGFR: 103 ML/MIN/1.73M2 — SIGNIFICANT CHANGE UP
FLUAV AG NPH QL: SIGNIFICANT CHANGE UP
FLUBV AG NPH QL: SIGNIFICANT CHANGE UP
GLUCOSE SERPL-MCNC: 80 MG/DL — SIGNIFICANT CHANGE UP (ref 70–99)
HCT VFR BLD CALC: 36.2 % — LOW (ref 39–50)
HGB BLD-MCNC: 10.8 G/DL — LOW (ref 13–17)
MAGNESIUM SERPL-MCNC: 1.7 MG/DL — SIGNIFICANT CHANGE UP (ref 1.6–2.6)
MCHC RBC-ENTMCNC: 26.1 PG — LOW (ref 27–34)
MCHC RBC-ENTMCNC: 29.8 G/DL — LOW (ref 32–36)
MCV RBC AUTO: 87.4 FL — SIGNIFICANT CHANGE UP (ref 80–100)
NRBC # BLD: 0 /100 WBCS — SIGNIFICANT CHANGE UP (ref 0–0)
PHOSPHATE SERPL-MCNC: 3.7 MG/DL — SIGNIFICANT CHANGE UP (ref 2.5–4.5)
PLATELET # BLD AUTO: 287 K/UL — SIGNIFICANT CHANGE UP (ref 150–400)
POTASSIUM SERPL-MCNC: 4.3 MMOL/L — SIGNIFICANT CHANGE UP (ref 3.5–5.3)
POTASSIUM SERPL-SCNC: 4.3 MMOL/L — SIGNIFICANT CHANGE UP (ref 3.5–5.3)
RBC # BLD: 4.14 M/UL — LOW (ref 4.2–5.8)
RBC # FLD: 19.7 % — HIGH (ref 10.3–14.5)
SARS-COV-2 RNA SPEC QL NAA+PROBE: SIGNIFICANT CHANGE UP
SODIUM SERPL-SCNC: 141 MMOL/L — SIGNIFICANT CHANGE UP (ref 135–145)
WBC # BLD: 11.17 K/UL — HIGH (ref 3.8–10.5)
WBC # FLD AUTO: 11.17 K/UL — HIGH (ref 3.8–10.5)

## 2022-09-23 PROCEDURE — 99232 SBSQ HOSP IP/OBS MODERATE 35: CPT

## 2022-09-23 RX ORDER — LOPERAMIDE HCL 2 MG
2 TABLET ORAL
Qty: 0 | Refills: 0 | DISCHARGE
Start: 2022-09-23

## 2022-09-23 RX ORDER — MAGNESIUM OXIDE 400 MG ORAL TABLET 241.3 MG
400 TABLET ORAL
Refills: 0 | Status: COMPLETED | OUTPATIENT
Start: 2022-09-23 | End: 2022-09-23

## 2022-09-23 RX ORDER — ACETAMINOPHEN 500 MG
3 TABLET ORAL
Qty: 0 | Refills: 0 | DISCHARGE
Start: 2022-09-23

## 2022-09-23 RX ORDER — APIXABAN 2.5 MG/1
10 TABLET, FILM COATED ORAL EVERY 12 HOURS
Refills: 0 | Status: DISCONTINUED | OUTPATIENT
Start: 2022-09-23 | End: 2022-09-24

## 2022-09-23 RX ADMIN — Medication 975 MILLIGRAM(S): at 00:30

## 2022-09-23 RX ADMIN — PIPERACILLIN AND TAZOBACTAM 25 GRAM(S): 4; .5 INJECTION, POWDER, LYOPHILIZED, FOR SOLUTION INTRAVENOUS at 21:44

## 2022-09-23 RX ADMIN — Medication 4 MILLIGRAM(S): at 23:08

## 2022-09-23 RX ADMIN — Medication 4 MILLIGRAM(S): at 17:40

## 2022-09-23 RX ADMIN — MAGNESIUM OXIDE 400 MG ORAL TABLET 400 MILLIGRAM(S): 241.3 TABLET ORAL at 11:40

## 2022-09-23 RX ADMIN — CHLORHEXIDINE GLUCONATE 1 APPLICATION(S): 213 SOLUTION TOPICAL at 05:48

## 2022-09-23 RX ADMIN — APIXABAN 10 MILLIGRAM(S): 2.5 TABLET, FILM COATED ORAL at 17:40

## 2022-09-23 RX ADMIN — Medication 975 MILLIGRAM(S): at 06:00

## 2022-09-23 RX ADMIN — PANTOPRAZOLE SODIUM 40 MILLIGRAM(S): 20 TABLET, DELAYED RELEASE ORAL at 17:40

## 2022-09-23 RX ADMIN — Medication 4 MILLIGRAM(S): at 11:41

## 2022-09-23 RX ADMIN — PIPERACILLIN AND TAZOBACTAM 25 GRAM(S): 4; .5 INJECTION, POWDER, LYOPHILIZED, FOR SOLUTION INTRAVENOUS at 14:18

## 2022-09-23 RX ADMIN — Medication 975 MILLIGRAM(S): at 05:47

## 2022-09-23 RX ADMIN — PANTOPRAZOLE SODIUM 40 MILLIGRAM(S): 20 TABLET, DELAYED RELEASE ORAL at 05:47

## 2022-09-23 RX ADMIN — MAGNESIUM OXIDE 400 MG ORAL TABLET 400 MILLIGRAM(S): 241.3 TABLET ORAL at 17:40

## 2022-09-23 RX ADMIN — ENOXAPARIN SODIUM 80 MILLIGRAM(S): 100 INJECTION SUBCUTANEOUS at 09:12

## 2022-09-23 RX ADMIN — Medication 4 MILLIGRAM(S): at 05:48

## 2022-09-23 RX ADMIN — PIPERACILLIN AND TAZOBACTAM 25 GRAM(S): 4; .5 INJECTION, POWDER, LYOPHILIZED, FOR SOLUTION INTRAVENOUS at 05:48

## 2022-09-23 NOTE — PROGRESS NOTE ADULT - SUBJECTIVE AND OBJECTIVE BOX
TAISHA ROGERS  MRN-[id number]    Interval History: the pt was seen and examined earlier, no distress, no new complaints, pt's wife is present. The pt remains afebrile, WBC better, tolerates his diet.     PAST MEDICAL & SURGICAL HISTORY:  No pertinent past medical history      No significant past surgical history          ROS:    [ ] Unobtainable because:  [x ] All other systems negative    Constitutional: no fever, no chills  Head: no trauma  Eyes: no vision changes, no eye pain  ENT:  no sore throat, no rhinorrhea  Cardiovascular:  no chest pain, no palpitation  Respiratory:  no SOB, no cough  GI:  no abd pain, no vomiting, no diarrhea  urinary: no dysuria, no hematuria, no flank pain  musculoskeletal:  no joint pain, no joint swelling  skin:  no rash  neurology:  no headache, no seizure, no change in mental status  psych: no anxiety, no depression         Allergies  No Known Allergies        ANTIMICROBIALS:  piperacillin/tazobactam IVPB.. 3.375 every 8 hours      OTHER MEDS:  acetaminophen     Tablet .. 975 milliGRAM(s) Oral every 6 hours  apixaban 10 milliGRAM(s) Oral every 12 hours  chlorhexidine 2% Cloths 1 Application(s) Topical <User Schedule>  loperamide 4 milliGRAM(s) Oral every 6 hours  magnesium oxide 400 milliGRAM(s) Oral two times a day with meals  pantoprazole  Injectable 40 milliGRAM(s) IV Push two times a day  sodium chloride 0.9% lock flush 10 milliLiter(s) IV Push every 1 hour PRN      Vital Signs Last 24 Hrs  T(C): 36.9 (23 Sep 2022 10:31), Max: 36.9 (22 Sep 2022 23:28)  T(F): 98.5 (23 Sep 2022 10:31), Max: 98.5 (23 Sep 2022 10:31)  HR: 90 (23 Sep 2022 10:31) (72 - 90)  BP: 168/90 (23 Sep 2022 10:31) (128/76 - 174/95)  BP(mean): --  RR: 18 (23 Sep 2022 10:31) (18 - 18)  SpO2: 99% (23 Sep 2022 10:31) (97% - 99%)    Parameters below as of 23 Sep 2022 10:31  Patient On (Oxygen Delivery Method): room air        Physical Exam:  General: Nontoxic-appearing Male in no acute distress. RA  HEENT: AT/NC. Pupils reactive to light bilaterally, no thrush, Anicteric. Conjunctiva pink and moist.  Neck: Not rigid. No sense of mass.  Nodes: None palpable.  Lungs: Clear breath sounds bilaterally, no wheezing, no rhonchi  Heart: Regular rate and rhythm. No Murmur. No rub. No gallop. No palpable thrill.  Abdomen: Bowel sounds present, colostomy with liquid brown stool, JAVED x 2 with serous drainage, Surgical site is dressed c/d/i  Extremities: No cyanosis or clubbing. no edema of b/l LEs, 2+ edema Left Upper extremity, right arm midline c/d/i   Skin: Warm. Dry. Good turgor. No rash. No vasculitic stigmata.  Psychiatric: awake and alert, appropriate     WBC Count: 11.17 K/uL (09-23 @ 07:40)  WBC Count: 13.13 K/uL (09-22 @ 07:46)  WBC Count: 14.11 K/uL (09-21 @ 06:54)  WBC Count: 15.54 K/uL (09-20 @ 04:55)  WBC Count: 19.21 K/uL (09-19 @ 07:20)  WBC Count: 17.19 K/uL (09-18 @ 06:34)                            10.8   11.17 )-----------( 287      ( 23 Sep 2022 07:40 )             36.2       09-23    141  |  109<H>  |  6<L>  ----------------------------<  80  4.3   |  26  |  0.80    Ca    8.4<L>      23 Sep 2022 07:40  Phos  3.7     09-23  Mg     1.7     09-23            Creatinine Trend: 0.80<--, 0.71<--, 0.70<--, 0.70<--, 0.71<--, 0.72<--      MICROBIOLOGY:  v  .Blood Blood-Peripheral  09-13-22   No Growth Final  --  --      .Blood Blood-Peripheral  09-13-22   No Growth Final  --  --      .Blood Blood-Peripheral  09-10-22   No Growth Final  --  Blood Culture PCR  Streptococcus anginosus    D-Dimer Assay, Quantitative: 1822 (09-10)      SARS-CoV-2 Result: Trung (09-23-22 @ 07:00)      RADIOLOGY:

## 2022-09-23 NOTE — DISCHARGE NOTE PROVIDER - PROVIDER TOKENS
FREE:[LAST:[Mckenziei],FIRST:[Zach Wilson],PHONE:[(   )    -],FAX:[(   )    -],ADDRESS:[Surgery  [address] [place] [address], [address] [place] [address]  Phone: [phone]  Fax: [phone]  Follow Up Time: 1 week],FOLLOWUP:[1 week]]

## 2022-09-23 NOTE — PROGRESS NOTE ADULT - ASSESSMENT
Severe sepsis with fever, shock, in the setting of large bowel obstruction due to colon tumor with high-grade obstruction.  Official pathology is pending.  Patient has a single blood culture positive for gram-positive cocci in pairs and change.  This is likely an alphahemolytic Streptococcus species but full identification and sensitivity is pending.  Current antibiotics are exceedingly broad.  Does not know that this patient has any recent hospitalization or antibiotic use which would place him at risk for resistant yue.    9/13: Shock improved, white blood cell count normal, cultures limited to a single positive bottle with an alphahemolytic Streptococcus species.  While this could be a contaminant, it could also be a true positive given the clinical context.  We will see a role to augment antibiotics at this juncture.  9/14: POD#2. Off pressors, WBC normal, extubated. No return of bowel function as of yet. Drop in hemoglobin over the past 48h. No concern of hemolysis. Drop in Platelets as well, suspect not on the basis of uncontrolled infection at this point in time  9/16: s/p exploratory lap, colon resection on 9/10 and Small bowel resection, Creation, enterostomy, Closure, fascia, abdomen on 9/13. The pt was downgraded from ICU, no fevers since 9/12, on RA and comfortable, WBC 12.63, Cr ok, LFTs elevated, surgical site is clean, ileostomy is functional, JPs x 2 drain serosanguineous drainage, repeat BCs remain with no growth to date, tolerates prescribed diet, Ceftriaxone IV continued.   Pt's LFTs are elevated, will monitor.   Attending Addendum--  Case reviewed with NP Olga Moran. Her note reviewed and modified as appropriate.   Patient personally assessed and examined.  Awake and alert, interactive, wife and brother at bedside.  In good spirits. No specific complaints  Abdominal exam benign  F/U CBC LFT  9/19: feeling better overall, tolerates his diet, no fevers, WBC elevated, repeat BCs remain with no growth, ceftriaxone IV continued, CT c/a/p are pending. Pt's left arm with significant swelling, ordered US venous doppler to r/o DVT.   9/20: no fevers, WBC better, + multiple DVTs on heparin drip, CT a/p - loculated pelvic peritoneal fluid - seen by IR, IR deferred the procedure as there is no window or adequate collection size. Abx were broadened by the surgery to Zosyn due to new findings on CT.    9/21: remains afebrile, on RA, WBC better 14.11, Cr ok, will continue with broad spectrum abx for now, Zosyn and will continue to monitor.   9/22: no change in pt's presentation, no fevers, on RA, WBC better 13.13, Cr ok, Zosyn IV continued - will limit zosyn to 7 days total and then will consider changing abx back to ceftriaxone. Left arm with swelling, + known DVT, asked RN to remove piv.   9/23: remains afebrile, WBC continues to improve, JPs x 2 with serous drainage. As per my conversation with surgical team, possible discharge home tomorrow with surgical f/up. Will continue with Zosyn IV while in the hospital and if for discharge tomorrow, please give the pt two weeks supply of po Augmentin. ID team recommends to obtain repeat CT a/p prior finishing abx to evaluate for resolution / improvement of loculated pelvic peritoneal fluid seen on previous CT.       Suggestions  continue Zosyn IV while in pt  if for discharge tomorrow, please give the pt 2 weeks supply of po Augmentin  f/up with surgery prior finishing po abx  obtain repeat CT a/p prior finishing abx, to evaluate loculated pelvic peritoneal fluid seen on previous CT, non-drainable as  per IR  Monitor Hb  Monitor platelets  Monitor LFTs  Follow all culture data  Trend temperatures and WBC  Monitor ileostomy and JAVED x 2 output   DVT management per primary service   surgical f/up as op         Discussed with Dr. Hernandez   Discussed with the pt and his wife  Discussed with surgical team, covering PA

## 2022-09-23 NOTE — DISCHARGE NOTE PROVIDER - NSDCFUADDINST_GEN_ALL_CORE_FT
Follow up in 7-10 days with Dr. Plasencia. Please call the office to make an appointment. Activity as tolerated. Rest as needed. You may eat a regular diet. Do not lift anything heavier than 10 pounds. You may take motrin or advil for mild pain as needed, in addition to prescribed narcotic medication. Do not drive while taking narcotic pain medication. You may take over the counter stool softeners as needed. Call for any fever over 101, nausea, vomiting, severe pain, no passing of gas or bowel movement. You may shower avoid soaking the surgical region and pat dry.

## 2022-09-23 NOTE — DISCHARGE NOTE PROVIDER - NSDCMRMEDTOKEN_GEN_ALL_CORE_FT
apixaban 5 mg oral tablet: 1 tab(s) orally 2 times a day   Eliquis Starter Pack for Treatment of DVT and PE 5 mg oral tablet: Take as directed   acetaminophen 325 mg oral tablet: 3 tab(s) orally every 6 hours  amoxicillin-clavulanate 875 mg-125 mg oral tablet: 1 tab(s) orally 2 times a day   apixaban 5 mg oral tablet: 1 tab(s) orally 2 times a day   Eliquis Starter Pack for Treatment of DVT and PE 5 mg oral tablet: Take as directed  loperamide 2 mg oral capsule: 2 cap(s) orally every 6 hours

## 2022-09-23 NOTE — PROGRESS NOTE ADULT - SUBJECTIVE AND OBJECTIVE BOX
SURGERY PROGRESS HPI:  Pt seen and examined at bedside. Denies pain or complaints. Pt tolerating regular diet. Pt denies nausea and vomiting. +ostomy function. Voiding well. Pt denies chest pain, SOB, dizziness, fever, chills. Ambulating.    Vital Signs Last 24 Hrs  T(C): 36.8 (23 Sep 2022 05:27), Max: 36.9 (22 Sep 2022 23:28)  T(F): 98.2 (23 Sep 2022 05:27), Max: 98.4 (22 Sep 2022 23:28)  HR: 87 (23 Sep 2022 05:27) (72 - 87)  BP: 174/95 (23 Sep 2022 05:27) (124/74 - 174/95)  BP(mean): --  RR: 18 (23 Sep 2022 05:27) (18 - 18)  SpO2: 99% (23 Sep 2022 05:27) (97% - 99%)    Parameters below as of 23 Sep 2022 05:27  Patient On (Oxygen Delivery Method): room air        PHYSICAL EXAM:  CONSTITUTIONAL: NAD  HEENT:  Atraumatic, Normocephalic  RESPIRATORY: Clear to ausculation, bilaterally   CARDIOVASCULAR: RRR S1S2  GASTROINTESTINAL: Ostomy pink and viable with brown stool in the bag. Dressing intact. Wound cleansed with NS and new packing and dressing placed. JPs x 2 with serous output. non distended, +BS, soft, non tender, no guarding  MUSCULOSKELETAL: calf soft, non tender b/l    I&O's Detail    21 Sep 2022 07:01  -  22 Sep 2022 07:00  --------------------------------------------------------  IN:    IV PiggyBack: 200 mL  Total IN: 200 mL    OUT:    Bulb (mL): 30 mL    Bulb (mL): 70 mL    Colostomy (mL): 200 mL    Voided (mL): 450 mL  Total OUT: 750 mL    Total NET: -550 mL      22 Sep 2022 07:01  -  23 Sep 2022 06:15  --------------------------------------------------------  IN:    IV PiggyBack: 100 mL  Total IN: 100 mL    OUT:    Bulb (mL): 20 mL    Bulb (mL): 45 mL    Colostomy (mL): 200 mL  Total OUT: 265 mL    Total NET: -165 mL    LABS:                        9.3    13.13 )-----------( 223      ( 22 Sep 2022 07:46 )             29.8     09-22    139  |  108  |  5<L>  ----------------------------<  92  4.2   |  25  |  0.71    Ca    8.1<L>      22 Sep 2022 07:46  Phos  3.3     09-22  Mg     1.9     09-22      PTT - ( 21 Sep 2022 06:54 )  PTT:96.0 sec        56 year old male s/p ex lap, subtotal colectomy 9/10. s/p closure of abdomen, SBR, ileostomy creation 9/12  Pelvic collection too small to drain per IR    - Abx per ID  - Full dose Lovenox  - Imodium  - regular diet  - local wound care  - monitor JAVED and ostomy output  - DVT ppx, GI ppx, PT, OOB

## 2022-09-23 NOTE — DISCHARGE NOTE PROVIDER - HOSPITAL COURSE
56 year old male presented 56 year old male presented with abdominal pain and distension, had been vomiting and thought it was food poisoning. Pt was found to have large sigmoid tumor and high grade SBO, s/o emergency ex lap subtototal colectomy on 9/10 and return to OR for small bowel resection and ileostomy on 9/12/2022. Patient found to have adenocarcinoma - Invasive adenocarcinoma of sigmoid colon, moderately differentiated, 7cm -Tumor invades the visceral peritoneum with gross perforation, pT4a. Patient was cared for in ICU, had nephrology consult. Patient will need oncology follow up as outpatient. Patient with DVT in left radial and ulnar veins, Patient treated with lovenox and discharged on eliquis.

## 2022-09-23 NOTE — DISCHARGE NOTE PROVIDER - CARE PROVIDER_API CALL
Zach Plasencia  Surgery  [address], [address]  [place] [id number]  Phone: [phone]  Fax: [phone]  Follow Up Time: 1 week  Phone: (   )    -  Fax: (   )    -  Follow Up Time: 1 week

## 2022-09-23 NOTE — DISCHARGE NOTE PROVIDER - NSDCCPCAREPLAN_GEN_ALL_CORE_FT
PRINCIPAL DISCHARGE DIAGNOSIS  Diagnosis: Carcinoid tumor of sigmoid colon, unspecified whether malignant  Assessment and Plan of Treatment:       SECONDARY DISCHARGE DIAGNOSES  Diagnosis: Severe sepsis with septic shock  Assessment and Plan of Treatment:     Diagnosis: Large bowel obstruction  Assessment and Plan of Treatment:     Diagnosis: Carcinoid tumor of sigmoid colon, unspecified whether malignant  Assessment and Plan of Treatment:

## 2022-09-23 NOTE — DISCHARGE NOTE PROVIDER - NSDCCPTREATMENT_GEN_ALL_CORE_FT
PRINCIPAL PROCEDURE  Procedure: Exploratory laparotomy  Findings and Treatment: subtotal colectomy 9/10/2022      SECONDARY PROCEDURE  Procedure: Small bowel resection  Findings and Treatment: with creation of ileostomy 9/12/2022

## 2022-09-24 ENCOUNTER — TRANSCRIPTION ENCOUNTER (OUTPATIENT)
Age: 57
End: 2022-09-24

## 2022-09-24 VITALS
SYSTOLIC BLOOD PRESSURE: 153 MMHG | RESPIRATION RATE: 18 BRPM | HEART RATE: 79 BPM | TEMPERATURE: 98 F | OXYGEN SATURATION: 98 % | DIASTOLIC BLOOD PRESSURE: 89 MMHG

## 2022-09-24 RX ADMIN — PANTOPRAZOLE SODIUM 40 MILLIGRAM(S): 20 TABLET, DELAYED RELEASE ORAL at 05:27

## 2022-09-24 RX ADMIN — APIXABAN 10 MILLIGRAM(S): 2.5 TABLET, FILM COATED ORAL at 05:17

## 2022-09-24 RX ADMIN — CHLORHEXIDINE GLUCONATE 1 APPLICATION(S): 213 SOLUTION TOPICAL at 05:17

## 2022-09-24 RX ADMIN — Medication 4 MILLIGRAM(S): at 05:16

## 2022-09-24 RX ADMIN — Medication 4 MILLIGRAM(S): at 11:32

## 2022-09-24 RX ADMIN — PIPERACILLIN AND TAZOBACTAM 25 GRAM(S): 4; .5 INJECTION, POWDER, LYOPHILIZED, FOR SOLUTION INTRAVENOUS at 05:17

## 2022-09-24 RX ADMIN — Medication 975 MILLIGRAM(S): at 11:23

## 2022-09-24 NOTE — PROGRESS NOTE ADULT - REASON FOR ADMISSION
bowel obstruction

## 2022-09-24 NOTE — PROGRESS NOTE ADULT - ASSESSMENT
56 year old male s/p ex lap, subtotal colectomy 9/10. s/p closure of abdomen, SBR, ileostomy creation 9/12  Pelvic collection too small to drain per IR    - Abx per ID- will go home on course of augmentin x 2 weeks and have ct scan prior to completing abx  - Eliquis   - Imodium  - regular diet  - local wound care  - ostomy teaching  - cleared for discharge home will follow up with Dr. Plasencia in 1 week.   - wife and patient aware and agreeable with plan.

## 2022-09-24 NOTE — DISCHARGE NOTE NURSING/CASE MANAGEMENT/SOCIAL WORK - PATIENT PORTAL LINK FT
You can access the FollowMyHealth Patient Portal offered by [facility] by registering at the following website: [email or web address]. By joining [employer] [facility]’s FollowMyHealth portal, you will also be able to view your health information using other applications (apps) compatible with our system.

## 2022-09-24 NOTE — CHART NOTE - NSCHARTNOTESELECT_GEN_ALL_CORE
Event Note
Nutrition Services
emergency surgery update/Event Note
Contact/Event Note
Transfer Note
blood draw/Event Note
midline remove/Event Note

## 2022-09-24 NOTE — DISCHARGE NOTE NURSING/CASE MANAGEMENT/SOCIAL WORK - NSDCPEFALRISK_GEN_ALL_CORE
For information on Fall & Injury Prevention, visit: [email or web address] OR  [email or web address] OR  https://www.cdc.gov/steadi/patient.html

## 2022-09-24 NOTE — PROGRESS NOTE ADULT - SUBJECTIVE AND OBJECTIVE BOX
HPI: 56 year old male s/p ex lap subtotal colectomy, closure of abdominal wound and ileostomy creation. No overnight events cleared for discharge home.     Vital Signs Last 24 Hrs  T(C): 36.6 (24 Sep 2022 17:00), Max: 37.3 (23 Sep 2022 23:44)  T(F): 97.9 (24 Sep 2022 17:00), Max: 99.2 (23 Sep 2022 23:44)  HR: 79 (24 Sep 2022 17:00) (79 - 95)  BP: 153/89 (24 Sep 2022 17:00) (147/84 - 169/93)  BP(mean): --  RR: 18 (24 Sep 2022 17:00) (18 - 18)  SpO2: 98% (24 Sep 2022 17:00) (98% - 100%)    Parameters below as of 23 Sep 2022 23:44  Patient On (Oxygen Delivery Method): room air        MEDICATIONS  (STANDING):  acetaminophen     Tablet .. 975 milliGRAM(s) Oral every 6 hours  apixaban 10 milliGRAM(s) Oral every 12 hours  chlorhexidine 2% Cloths 1 Application(s) Topical <User Schedule>  loperamide 4 milliGRAM(s) Oral every 6 hours    MEDICATIONS  (PRN):      PHYSICAL EXAM:      Constitutional: awake and alert.  HEENT: PERRLA, EOMI,   Neck: Supple.  Respiratory: Breath sounds are clear bilaterally  Cardiovascular: S1 and S2, regular   Gastrointestinal: soft, nontender, packing changed, new dressing placed, JAVED removed x 2. midline incision healing well.   Extremities:  no edema  Musculoskeletal: no joint swelling/tenderness, no abnormal movements  Skin: No rashes            LABS:                         10.8   11.17 )-----------( 287      ( 23 Sep 2022 07:40 )             36.2     09-23    141  |  109<H>  |  6<L>  ----------------------------<  80  4.3   |  26  |  0.80    Ca    8.4<L>      23 Sep 2022 07:40  Phos  3.7     09-23  Mg     1.7     09-23

## 2022-09-28 ENCOUNTER — APPOINTMENT (OUTPATIENT)
Dept: BARIATRICS | Facility: CLINIC | Age: 57
End: 2022-09-28

## 2022-09-28 VITALS
HEART RATE: 94 BPM | WEIGHT: 145.06 LBS | SYSTOLIC BLOOD PRESSURE: 122 MMHG | OXYGEN SATURATION: 100 % | TEMPERATURE: 98.2 F | HEIGHT: 67 IN | DIASTOLIC BLOOD PRESSURE: 80 MMHG | BODY MASS INDEX: 22.77 KG/M2

## 2022-09-28 DIAGNOSIS — E87.2 ACIDOSIS: ICD-10-CM

## 2022-09-28 DIAGNOSIS — E87.6 HYPOKALEMIA: ICD-10-CM

## 2022-09-28 DIAGNOSIS — K56.609 UNSPECIFIED INTESTINAL OBSTRUCTION, UNSPECIFIED AS TO PARTIAL VERSUS COMPLETE OBSTRUCTION: ICD-10-CM

## 2022-09-28 DIAGNOSIS — R65.21 SEVERE SEPSIS WITH SEPTIC SHOCK: ICD-10-CM

## 2022-09-28 DIAGNOSIS — D62 ACUTE POSTHEMORRHAGIC ANEMIA: ICD-10-CM

## 2022-09-28 DIAGNOSIS — I82.622 ACUTE EMBOLISM AND THROMBOSIS OF DEEP VEINS OF LEFT UPPER EXTREMITY: ICD-10-CM

## 2022-09-28 DIAGNOSIS — E16.2 HYPOGLYCEMIA, UNSPECIFIED: ICD-10-CM

## 2022-09-28 DIAGNOSIS — D68.9 COAGULATION DEFECT, UNSPECIFIED: ICD-10-CM

## 2022-09-28 DIAGNOSIS — K65.8 OTHER PERITONITIS: ICD-10-CM

## 2022-09-28 DIAGNOSIS — R57.8 OTHER SHOCK: ICD-10-CM

## 2022-09-28 DIAGNOSIS — K55.029 ACUTE INFARCTION OF SMALL INTESTINE, EXTENT UNSPECIFIED: ICD-10-CM

## 2022-09-28 DIAGNOSIS — N17.9 ACUTE KIDNEY FAILURE, UNSPECIFIED: ICD-10-CM

## 2022-09-28 DIAGNOSIS — E87.0 HYPEROSMOLALITY AND HYPERNATREMIA: ICD-10-CM

## 2022-09-28 DIAGNOSIS — A41.9 SEPSIS, UNSPECIFIED ORGANISM: ICD-10-CM

## 2022-09-28 DIAGNOSIS — Z28.310 UNVACCINATED FOR COVID-19: ICD-10-CM

## 2022-09-28 DIAGNOSIS — C18.7 MALIGNANT NEOPLASM OF SIGMOID COLON: ICD-10-CM

## 2022-09-28 PROBLEM — Z00.00 ENCOUNTER FOR PREVENTIVE HEALTH EXAMINATION: Status: ACTIVE | Noted: 2022-09-28

## 2022-09-28 PROCEDURE — 99024 POSTOP FOLLOW-UP VISIT: CPT

## 2022-09-28 NOTE — HISTORY OF PRESENT ILLNESS
[de-identified] : Patient is a 55 y/o man who presented with a large bowel obstruction, septic shock in the setting of gram negative bacteremia on 9/10/22.  He underwent damage control laparotomy, subtotal colectomy with abdominal packing.  He was rescuscitated over the ensuing days and was taken back to the OR on 9/12/22 where necrotic/ischemic small bowel was resected and end enterostomy was created.  \par \par Post operatively he was appreciated to develop SMV, Left Iliac, Left Radial and LEft Ulnar Vein Thromboses.  He is currently on Eliquis\par He is also on augmentin for the gram negative bacteremia.\par \par Tolerating PO, although appetite is decreased.\par \par Osotmy output is very loose

## 2022-09-28 NOTE — PLAN
[FreeTextEntry1] : Topical Dakins daily\par Repeat CT scan in 2 weeks\par F/U after CT to discuss results\par Cancer center outreach

## 2022-09-30 ENCOUNTER — OUTPATIENT (OUTPATIENT)
Dept: OUTPATIENT SERVICES | Facility: HOSPITAL | Age: 57
LOS: 1 days | Discharge: HOME | End: 2022-09-30

## 2022-09-30 ENCOUNTER — RESULT REVIEW (OUTPATIENT)
Age: 57
End: 2022-09-30

## 2022-09-30 ENCOUNTER — TRANSCRIPTION ENCOUNTER (OUTPATIENT)
Age: 57
End: 2022-09-30

## 2022-09-30 ENCOUNTER — NON-APPOINTMENT (OUTPATIENT)
Age: 57
End: 2022-09-30

## 2022-09-30 DIAGNOSIS — C18.9 MALIGNANT NEOPLASM OF COLON, UNSPECIFIED: ICD-10-CM

## 2022-10-03 ENCOUNTER — APPOINTMENT (OUTPATIENT)
Dept: HEMATOLOGY ONCOLOGY | Facility: CLINIC | Age: 57
End: 2022-10-03

## 2022-10-03 ENCOUNTER — NON-APPOINTMENT (OUTPATIENT)
Age: 57
End: 2022-10-03

## 2022-10-03 VITALS
OXYGEN SATURATION: 99 % | BODY MASS INDEX: 21.45 KG/M2 | TEMPERATURE: 97.7 F | RESPIRATION RATE: 16 BRPM | HEIGHT: 67 IN | WEIGHT: 136.66 LBS | DIASTOLIC BLOOD PRESSURE: 76 MMHG | HEART RATE: 68 BPM | SYSTOLIC BLOOD PRESSURE: 114 MMHG

## 2022-10-03 PROCEDURE — 99205 OFFICE O/P NEW HI 60 MIN: CPT

## 2022-10-03 NOTE — ASSESSMENT
[FreeTextEntry1] : 57 year old male with newly diagnosed L. sided colon cancer, KEY, at least Stage IIb ([id number]) s/p subtotal colectomy, small bowel resection and enterostomy presents to cancer center to establish care. \par \par #L. sided colon cancer, KEY, at least Stage IIb ([id number]) s/p subtotal colectomy\par - due to high risk features (presented with bowel obstruction, presence of macroscopic tumor perforation) will recommend adjuvant therapy. \par -Discussed CAPOX (3mo) vs FOLFOX (6mo) with patient and wife. Chemo education provided. Side effects included but not limited to fatigue, nausea/vomiting, diarrhea, low blood counts, neuropathy reviewed. Patient and wife will go home and discuss options and let us know how they want to proceed. He is still recovering from surgery and ICU stay, requesting 3 weeks time before initiating chemo. Will plan for mediport and in the meantime has repeat CT imaging and surgery appointment next week. \par \par #SMV thrombus and DVT L. external iliac vein\par - c/w Eliquis\par \par RTC in 3 weeks

## 2022-10-03 NOTE — HISTORY OF PRESENT ILLNESS
[Disease: _____________________] : Disease: [unfilled] [AJCC Stage: ____] : AJCC Stage: [unfilled] [de-identified] : 57 year old M who was in usual state of health with no PMH experienced abdominal pain and vomiting X 2 days and presented to ER of [facility] on 9/10/22. Imaging revealed a large bowel obstruction secondary to a sigmoid colon mass with pneumatosis and lactate of 11. He was emergently taken to OR and ex-lap and subtotal colectomy were performed with post-op course complicated by septic shock, placed on broad spectrum antibiotics and stress dose steroids. He was taken back to OR on 9/12/22 for closure of abdominal fascia, resection of necrotic/ischemic small bowel and creation of enterostomy. Post operatively he was noted to develop SMV and left external iliac DVT for which he remains on Eliquis. He presents to cancer center today to establish care. \par \par Patient states he has been healthy, was not taking any medication prior to diagnosis and lived an active lifestyle. He had never had a colonoscopy, was planned for one during pandemic. Otherwise, he was not experiencing weight loss, fatigue, abdominal pain, nausea/vomiting, change in bowel habits- only the 2 day history of vomiting on presentation. 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 lives at home with wife and 2 children aged 13 and 6\par \par CT A/P 9/10/22: 1.6cm sigmoid tumor resulting in high grade colonic obstruction with colon distended to 10.1cm \par Path from distal sigmoid biopsy 9/10/22: invasive adenocarcinoma, moderately differentiated, 7cm tumor invades visceral peritoneum with macroscopic tumor perforation  [de-identified] : [id number] [de-identified] : MMR proteins intact [de-identified] : Patient presents to clinic with wife today. He is overall feeling stronger day by day, but still feels he needs more time to recover prior to initiating any chemo. He is having loose stool from colostomy bag for which he has antidiarrheals. Is worried about weight loss, requests nutrition referral.

## 2022-10-04 ENCOUNTER — NON-APPOINTMENT (OUTPATIENT)
Age: 57
End: 2022-10-04

## 2022-10-05 LAB — SURGICAL PATHOLOGY STUDY: SIGNIFICANT CHANGE UP

## 2022-10-07 ENCOUNTER — INPATIENT (INPATIENT)
Facility: HOSPITAL | Age: 57
LOS: 4 days | Discharge: HOME HEALTH | End: 2022-10-12
Attending: SURGERY | Admitting: SURGERY

## 2022-10-07 VITALS
HEART RATE: 97 BPM | WEIGHT: 136.03 LBS | RESPIRATION RATE: 18 BRPM | OXYGEN SATURATION: 99 % | TEMPERATURE: 98 F | HEIGHT: 69 IN

## 2022-10-07 DIAGNOSIS — Z90.49 ACQUIRED ABSENCE OF OTHER SPECIFIED PARTS OF DIGESTIVE TRACT: Chronic | ICD-10-CM

## 2022-10-07 DIAGNOSIS — E86.0 DEHYDRATION: ICD-10-CM

## 2022-10-07 DIAGNOSIS — Z90.49 ACQUIRED ABSENCE OF OTHER SPECIFIED PARTS OF DIGESTIVE TRACT: ICD-10-CM

## 2022-10-07 DIAGNOSIS — Z93.2 ILEOSTOMY STATUS: ICD-10-CM

## 2022-10-07 DIAGNOSIS — C18.9 MALIGNANT NEOPLASM OF COLON, UNSPECIFIED: ICD-10-CM

## 2022-10-07 DIAGNOSIS — E87.1 HYPO-OSMOLALITY AND HYPONATREMIA: ICD-10-CM

## 2022-10-07 DIAGNOSIS — Z79.01 LONG TERM (CURRENT) USE OF ANTICOAGULANTS: ICD-10-CM

## 2022-10-07 DIAGNOSIS — Z86.718 PERSONAL HISTORY OF OTHER VENOUS THROMBOSIS AND EMBOLISM: ICD-10-CM

## 2022-10-07 DIAGNOSIS — D72.829 ELEVATED WHITE BLOOD CELL COUNT, UNSPECIFIED: ICD-10-CM

## 2022-10-07 LAB
ALBUMIN SERPL ELPH-MCNC: 3.6 G/DL — SIGNIFICANT CHANGE UP (ref 3.3–5)
ALP SERPL-CCNC: 330 U/L — HIGH (ref 40–120)
ALT FLD-CCNC: 86 U/L — HIGH (ref 12–78)
ANION GAP SERPL CALC-SCNC: 10 MMOL/L — SIGNIFICANT CHANGE UP (ref 5–17)
APPEARANCE UR: CLEAR — SIGNIFICANT CHANGE UP
APTT BLD: 38.2 SEC — HIGH (ref 27.5–35.5)
AST SERPL-CCNC: 47 U/L — HIGH (ref 15–37)
BACTERIA # UR AUTO: ABNORMAL
BASOPHILS # BLD AUTO: 0.07 K/UL — SIGNIFICANT CHANGE UP (ref 0–0.2)
BASOPHILS NFR BLD AUTO: 0.5 % — SIGNIFICANT CHANGE UP (ref 0–2)
BILIRUB SERPL-MCNC: 0.5 MG/DL — SIGNIFICANT CHANGE UP (ref 0.2–1.2)
BILIRUB UR-MCNC: NEGATIVE — SIGNIFICANT CHANGE UP
BLD GP AB SCN SERPL QL: SIGNIFICANT CHANGE UP
BUN SERPL-MCNC: 26 MG/DL — HIGH (ref 7–23)
CALCIUM SERPL-MCNC: 10 MG/DL — SIGNIFICANT CHANGE UP (ref 8.5–10.1)
CHLORIDE SERPL-SCNC: 89 MMOL/L — LOW (ref 96–108)
CO2 SERPL-SCNC: 27 MMOL/L — SIGNIFICANT CHANGE UP (ref 22–31)
COLOR SPEC: YELLOW — SIGNIFICANT CHANGE UP
CREAT SERPL-MCNC: 1.64 MG/DL — HIGH (ref 0.5–1.3)
D DIMER BLD IA.RAPID-MCNC: 533 NG/ML DDU — HIGH
DIFF PNL FLD: NEGATIVE — SIGNIFICANT CHANGE UP
EGFR: 48 ML/MIN/1.73M2 — LOW
EOSINOPHIL # BLD AUTO: 0.37 K/UL — SIGNIFICANT CHANGE UP (ref 0–0.5)
EOSINOPHIL NFR BLD AUTO: 2.7 % — SIGNIFICANT CHANGE UP (ref 0–6)
EPI CELLS # UR: SIGNIFICANT CHANGE UP
FLUAV AG NPH QL: SIGNIFICANT CHANGE UP
FLUBV AG NPH QL: SIGNIFICANT CHANGE UP
GLUCOSE SERPL-MCNC: 111 MG/DL — HIGH (ref 70–99)
GLUCOSE UR QL: NEGATIVE MG/DL — SIGNIFICANT CHANGE UP
HCT VFR BLD CALC: 43.6 % — SIGNIFICANT CHANGE UP (ref 39–50)
HGB BLD-MCNC: 13.8 G/DL — SIGNIFICANT CHANGE UP (ref 13–17)
IMM GRANULOCYTES NFR BLD AUTO: 0.9 % — SIGNIFICANT CHANGE UP (ref 0–0.9)
INR BLD: 1.65 RATIO — HIGH (ref 0.88–1.16)
KETONES UR-MCNC: NEGATIVE — SIGNIFICANT CHANGE UP
LEUKOCYTE ESTERASE UR-ACNC: ABNORMAL
LIDOCAIN IGE QN: 910 U/L — HIGH (ref 73–393)
LYMPHOCYTES # BLD AUTO: 18.3 % — SIGNIFICANT CHANGE UP (ref 13–44)
LYMPHOCYTES # BLD AUTO: 2.46 K/UL — SIGNIFICANT CHANGE UP (ref 1–3.3)
MAGNESIUM SERPL-MCNC: 1.5 MG/DL — LOW (ref 1.6–2.6)
MCHC RBC-ENTMCNC: 25.1 PG — LOW (ref 27–34)
MCHC RBC-ENTMCNC: 31.7 G/DL — LOW (ref 32–36)
MCV RBC AUTO: 79.4 FL — LOW (ref 80–100)
MONOCYTES # BLD AUTO: 1.13 K/UL — HIGH (ref 0–0.9)
MONOCYTES NFR BLD AUTO: 8.4 % — SIGNIFICANT CHANGE UP (ref 2–14)
NEUTROPHILS # BLD AUTO: 9.31 K/UL — HIGH (ref 1.8–7.4)
NEUTROPHILS NFR BLD AUTO: 69.2 % — SIGNIFICANT CHANGE UP (ref 43–77)
NITRITE UR-MCNC: NEGATIVE — SIGNIFICANT CHANGE UP
NRBC # BLD: 0 /100 WBCS — SIGNIFICANT CHANGE UP (ref 0–0)
PH UR: 6 — SIGNIFICANT CHANGE UP (ref 5–8)
PLATELET # BLD AUTO: 825 K/UL — HIGH (ref 150–400)
POTASSIUM SERPL-MCNC: 4.9 MMOL/L — SIGNIFICANT CHANGE UP (ref 3.5–5.3)
POTASSIUM SERPL-SCNC: 4.9 MMOL/L — SIGNIFICANT CHANGE UP (ref 3.5–5.3)
PROT SERPL-MCNC: 9.7 GM/DL — HIGH (ref 6–8.3)
PROT UR-MCNC: 30 MG/DL
PROTHROM AB SERPL-ACNC: 19.9 SEC — HIGH (ref 10.5–13.4)
RBC # BLD: 5.49 M/UL — SIGNIFICANT CHANGE UP (ref 4.2–5.8)
RBC # FLD: 17.3 % — HIGH (ref 10.3–14.5)
RBC CASTS # UR COMP ASSIST: SIGNIFICANT CHANGE UP /HPF (ref 0–4)
SARS-COV-2 RNA SPEC QL NAA+PROBE: SIGNIFICANT CHANGE UP
SODIUM SERPL-SCNC: 126 MMOL/L — LOW (ref 135–145)
SP GR SPEC: 1.02 — SIGNIFICANT CHANGE UP (ref 1.01–1.02)
TROPONIN I, HIGH SENSITIVITY RESULT: 6.7 NG/L — SIGNIFICANT CHANGE UP
UROBILINOGEN FLD QL: NEGATIVE MG/DL — SIGNIFICANT CHANGE UP
WBC # BLD: 13.46 K/UL — HIGH (ref 3.8–10.5)
WBC # FLD AUTO: 13.46 K/UL — HIGH (ref 3.8–10.5)
WBC UR QL: SIGNIFICANT CHANGE UP

## 2022-10-07 PROCEDURE — 93010 ELECTROCARDIOGRAM REPORT: CPT

## 2022-10-07 PROCEDURE — 71045 X-RAY EXAM CHEST 1 VIEW: CPT | Mod: 26

## 2022-10-07 PROCEDURE — 99223 1ST HOSP IP/OBS HIGH 75: CPT | Mod: 24

## 2022-10-07 PROCEDURE — 99285 EMERGENCY DEPT VISIT HI MDM: CPT

## 2022-10-07 RX ORDER — DIPHENOXYLATE HCL/ATROPINE 2.5-.025MG
1 TABLET ORAL EVERY 6 HOURS
Refills: 0 | Status: DISCONTINUED | OUTPATIENT
Start: 2022-10-07 | End: 2022-10-07

## 2022-10-07 RX ORDER — SODIUM CHLORIDE 9 MG/ML
1000 INJECTION, SOLUTION INTRAVENOUS ONCE
Refills: 0 | Status: COMPLETED | OUTPATIENT
Start: 2022-10-07 | End: 2022-10-07

## 2022-10-07 RX ORDER — LOPERAMIDE HCL 2 MG
4 TABLET ORAL EVERY 6 HOURS
Refills: 0 | Status: DISCONTINUED | OUTPATIENT
Start: 2022-10-07 | End: 2022-10-12

## 2022-10-07 RX ORDER — SODIUM CHLORIDE 9 MG/ML
1000 INJECTION, SOLUTION INTRAVENOUS
Refills: 0 | Status: DISCONTINUED | OUTPATIENT
Start: 2022-10-07 | End: 2022-10-09

## 2022-10-07 RX ORDER — CHOLESTYRAMINE 4 G/9G
4 POWDER, FOR SUSPENSION ORAL DAILY
Refills: 0 | Status: DISCONTINUED | OUTPATIENT
Start: 2022-10-07 | End: 2022-10-12

## 2022-10-07 RX ORDER — SODIUM CHLORIDE 9 MG/ML
1000 INJECTION INTRAMUSCULAR; INTRAVENOUS; SUBCUTANEOUS ONCE
Refills: 0 | Status: COMPLETED | OUTPATIENT
Start: 2022-10-07 | End: 2022-10-07

## 2022-10-07 RX ORDER — APIXABAN 2.5 MG/1
5 TABLET, FILM COATED ORAL EVERY 12 HOURS
Refills: 0 | Status: DISCONTINUED | OUTPATIENT
Start: 2022-10-07 | End: 2022-10-12

## 2022-10-07 RX ORDER — DIPHENOXYLATE HCL/ATROPINE 2.5-.025MG
2 TABLET ORAL EVERY 6 HOURS
Refills: 0 | Status: DISCONTINUED | OUTPATIENT
Start: 2022-10-07 | End: 2022-10-12

## 2022-10-07 RX ORDER — SODIUM CHLORIDE 9 MG/ML
2000 INJECTION INTRAMUSCULAR; INTRAVENOUS; SUBCUTANEOUS
Refills: 0 | Status: DISCONTINUED | OUTPATIENT
Start: 2022-10-07 | End: 2022-10-09

## 2022-10-07 RX ADMIN — Medication 4 MILLIGRAM(S): at 18:28

## 2022-10-07 RX ADMIN — SODIUM CHLORIDE 1000 MILLILITER(S): 9 INJECTION, SOLUTION INTRAVENOUS at 22:53

## 2022-10-07 RX ADMIN — APIXABAN 5 MILLIGRAM(S): 2.5 TABLET, FILM COATED ORAL at 18:20

## 2022-10-07 RX ADMIN — Medication 2 TABLET(S): at 18:20

## 2022-10-07 RX ADMIN — SODIUM CHLORIDE 1000 MILLILITER(S): 9 INJECTION INTRAMUSCULAR; INTRAVENOUS; SUBCUTANEOUS at 20:05

## 2022-10-07 RX ADMIN — SODIUM CHLORIDE 1000 MILLILITER(S): 9 INJECTION INTRAMUSCULAR; INTRAVENOUS; SUBCUTANEOUS at 22:30

## 2022-10-07 RX ADMIN — SODIUM CHLORIDE 1000 MILLILITER(S): 9 INJECTION INTRAMUSCULAR; INTRAVENOUS; SUBCUTANEOUS at 13:35

## 2022-10-07 NOTE — ED PROVIDER NOTE - DURATION
Pt called to let us know he has been using his nebulizer and is still coughing, has congestion, and low fever 99.5. Wants to know if Justin Newman recommends anything else? 2/day(s)

## 2022-10-07 NOTE — ED PROVIDER NOTE - PHYSICAL EXAMINATION
GEN: Awake, alert, interactive, NAD.  HEAD AND NECK: NC/AT. Airway patent. Neck supple.   EYES:  Clear b/l. EOMI. PERRL.   ENT: Moist mucus membranes.   CARDIAC: Regular rate, regular rhythm. No evident pedal edema.    RESP/CHEST: Normal respiratory effort with no use of accessory muscles or retractions. Clear throughout on auscultation.  ABD: soft, non-distended, non-tender. No rebound, no guarding. (+) ileostomy bag to the RLQ (+) surgical wound with mild dehiscence and serosanguineous drainage.   BACK: No midline spinal TTP. No CVAT.   EXTREMITIES: Moving all extremities with no apparent deformities.   SKIN: Warm, dry, intact normal color. No rash.   NEURO: AOx3, CN II-XII grossly intact, no focal deficits.   PSYCH: Appropriate mood and affect.

## 2022-10-07 NOTE — H&P ADULT - NS ATTEND AMEND GEN_ALL_CORE FT
Patient seen and examined  High ileostomy output; patient states the output has been very watery; and has emptied multiple times a day  Elevated creatinine, electrolyte abnormalities, and hemoconcentrated    Will admit for dehydration secondary to high ileostomy output  Strict monitory of ileostomy output   Loperamide 4mg PO q6 hours; Lomotil 2 tabs PO q6 hours - can add tincture of opium if necessary  2L bolus; and IV fluids

## 2022-10-07 NOTE — ED PROVIDER NOTE - NS ED ATTENDING STATEMENT MOD
This was a shared visit with the JUD. I reviewed and verified the documentation and independently performed the documented:

## 2022-10-07 NOTE — ED PROVIDER NOTE - CLINICAL SUMMARY MEDICAL DECISION MAKING FREE TEXT BOX
58 y/o male with colon ca s/p colon resection here with lightheadedness and dyspnea x 2 days.   Will check labs, ekg, cxr, orthostatis, ivf, cov.d 56 y/o male with colon ca s/p colon resection here with lightheadedness and dyspnea x 2 days.   Will check labs, ekg, cxr, orthostatis, ivf, COVID.   labs reviewed and notable to sodium 126 and mild elevated creatinine 1.6. Mild leukocytosis   D-dimer mildly elevated. Per patient has history blood clots and currently on eliquis.   Discussed with surgery PA, will admit to surgery service. Per surgery  consult does not want cta chest or abdomen/pelvis.

## 2022-10-07 NOTE — H&P ADULT - NSHPPHYSICALEXAM_GEN_ALL_CORE
· Physical Examination: GEN: Awake, alert, interactive, NAD.  HEAD AND NECK: NC/AT. Airway patent. Neck supple.   EYES:  Clear b/l. EOMI. PERRL.   ENT: Moist mucus membranes.   CARDIAC: Regular rate, regular rhythm. No evident pedal edema.    RESP/CHEST: Normal respiratory effort with no use of accessory muscles or retractions. Clear throughout on auscultation.  ABD: soft, non-distended, non-tender. No rebound, no guarding. (+) ileostomy bag to the RLQ (+) surgical wound with mild dehiscence and serosanguineous drainage.   BACK: No midline spinal TTP. No CVAT.   EXTREMITIES: Moving all extremities with no apparent deformities.   SKIN: Warm, dry, intact normal color. No rash.   NEURO: AOx3, CN II-XII grossly intact, no focal deficits.   PSYCH: Appropriate mood and affect.

## 2022-10-07 NOTE — ED ADULT TRIAGE NOTE - CHIEF COMPLAINT QUOTE
pt states " I had bowel surgery on september 12. 2 days ago I started to feel weak, fagtige and short or breath.  pt states he passed out yesterday. history of colon carcer. pt has a colostomy bag.

## 2022-10-07 NOTE — ED ADULT NURSE NOTE - OBJECTIVE STATEMENT
Patient alert and oriented X 4 states he had bowel surgery on september 12th and is c/o weakness, SOB and fatigue. Patient states he had a syncopal episode yesterday. history of colon carcer and pt noted with colostomy bag, denies seeing any blood in colostomy bag.

## 2022-10-07 NOTE — ED PROVIDER NOTE - OBJECTIVE STATEMENT
56 y/o male with colon ca s/p colon resection with ileostomy on 9/2022 here presents with lightheadedness and dyspnea x2 days. Pt reports lightheadedness when standing up and almost passed out yesterday. Reports having some dyspnea. Denies chest pain, dyspnea, vomiting. Mild abdominal discomfort. Denies black stools or blood in stool. Reports having yellow stool the ileostomy bag. Denies fever, chills, cough. 58 y/o male with colon ca s/p colon resection with ileostomy on 9/2022, history dvt on eliquis here presents with lightheadedness and dyspnea x2 days. Pt reports lightheadedness when standing up and almost passed out yesterday. Reports having some dyspnea. Denies chest pain, dyspnea, vomiting. Mild abdominal discomfort. Denies black stools or blood in stool. Reports having yellow stool the ileostomy bag. Denies fever, chills, cough.

## 2022-10-07 NOTE — ED ADULT NURSE NOTE - PAIN RATING/NUMBER SCALE (0-10): ACTIVITY
Patient received semi fowlers (+) Hemovac (+)heplock tolerating bedside ambulation, limited by pain, 0

## 2022-10-07 NOTE — H&P ADULT - ASSESSMENT
57M with with h/o ex lap, subtotal colectomy 9/10, closure of abdomen, SBR, ileostomy creation 9/12, now with weakness, electrolyte abnormalities.  Suspect dehydration 2/2 high output of ileostomy    admit to surgery  regular diet with protein shakes  loperamide and lomotil, 4mg q6h  IV hydration  STRICT MONITORING OF OSTOMY OUTPUT  continue eliquis

## 2022-10-07 NOTE — ED PROVIDER NOTE - NS ED ROS FT
CONSTITUTIONAL: No fever, no chills.   EYES: No visual changes  ENT: No ear pain, no sore throat  CARDIOVASCULAR: No chest pain, no palpitations  RESPIRATORY: No cough.   GI: No abdominal pain, no nausea, no vomiting, no constipation, no diarrhea  GENITOURINARY: No dysuria, no frequency, no hematuria  MUSKULOSKELETAL: No backpain, no joint pain, no myalgias  SKIN: No rash  NEURO: No headache    ALL OTHER SYSTEMS NEGATIVE.

## 2022-10-07 NOTE — PATIENT PROFILE ADULT - FALL HARM RISK - HARM RISK INTERVENTIONS

## 2022-10-07 NOTE — H&P ADULT - HISTORY OF PRESENT ILLNESS
· Chief Complaint: The patient is a 57y Male complaining of syncope.  · HPI Objective Statement: 58 y/o male with colon ca s/p colon resection with ileostomy on 9/2022, history dvt on eliquis here presents with lightheadedness and dyspnea x2 days. Pt reports lightheadedness when standing up and almost passed out yesterday. Reports having some dyspnea. Denies chest pain, dyspnea, vomiting. Mild abdominal discomfort. Denies black stools or blood in stool. Reports having yellow stool the ileostomy bag. Denies fever, chills, cough.  · Presenting Symptoms: DIZZINESS, SHORTNESS OF BREATH, WEAKNESS  · Negative Findings: no chills, no fever, no nausea, no numbness, no tingling, no vomiting  · Duration: day(s)  2  · Severity: MODERATE    Pt seen bedside.  He reports weakness for 2 days.  Denies fevers, chills, nausea or vomiting.  Is tolerating PO, but says he has loss of appetite  Says output in ostomy bag is the same since dc.  Says he changes his ostomy bay 5 times a day when it "gets full."

## 2022-10-07 NOTE — ED ADULT NURSE REASSESSMENT NOTE - NS ED NURSE REASSESS COMMENT FT1
Patient abdominal surgical site dressing changed. wound is clean dry and intact. No bleeding noted. New dressing applied.

## 2022-10-07 NOTE — ED PROVIDER NOTE - INTERPRETATION
After Visit Summary   6/7/2017    Marissa Guadarrama    MRN: [id number]           Patient Information     Date Of Birth          1949        Visit Information        Provider Department      6/7/2017 3:45 PM Allen Downey MD [facility]        Today's Diagnoses     Primary osteoarthritis of right knee    -  1    Chronic pain of right knee          Care Instructions    Please remember to call and schedule a follow up appointment if one was recommended at your earliest convenience.   Orthopedics CLINIC HOURS TELEPHONE NUMBER   Allen Downey M.D.      Kathy Alejandre,  LPN Tuesday 8 am -5 pm    1st & 3rd Wednesday  1-4pm [place] 2nd & 4th Wednesday  8 am -11 pm / [place]  1-4pm / [place] Thursday 8 am -5 pm   Specialty schedulers:   [phone] to make an appointment with any Specialty Provider.   Main Clinic:   [phone] to make an appointment with your primary provider   Urgent Care locations:    [facility] Monday-Friday 5 pm - 9 pm  Saturday-Sunday 9 am -5pm      Monday-Friday 11 am - 9 pm  Saturday 9 am - 5 pm [phone]     If SURGERY has been recommended, please call our Specialty Schedulers at [phone] to schedule your procedure.    If you need a medication refill, please contact your pharmacy. Please allow 3 business days for your refill to be completed.  Use [employer] (secure e-mail communication and access to your chart) to send a message or to make an appointment. Please ask how you can sign up for [employer].            Follow-ups after your visit        Your next 10 appointments already scheduled     Jun 23, 2017   Procedure with Talisha Greco MD   [facility], [place], Endoscopy ([facility], [facility])    [address] [id number] [phone]           The [facility] is located on the corner of [address] [facility] [address] [facility] and [facility] on the  [address]. It is easily accessible from virtually any point in the [place] area, via [address] and [address].            Jul 05, 2017 12:30 PM CDT   MA DIAGNOSTIC DIGITAL BILATERAL with [facility]   [facility] ([facility])    [address] [facility] [address] [address] [phone]           Do not use any powder, lotion or deodorant under your arms or on your breast. If you do, we will ask you to remove it before your exam.  Wear comfortable, two-piece clothing.  If you have any allergies, tell your care team.  Bring any previous mammograms from other facilities or have them mailed to the breast center.            Jul 05, 2017 12:50 PM CDT   LAB with [facility] ([facility])    [address] [facility] [address] [id number] [phone]           Patient must bring picture ID.  Patient should be prepared to give a urine specimen  Please do not eat 10-12 hours before your appointment if you are coming in fasting for labs on lipids, cholesterol, or glucose (sugar).  Pregnant women should follow their Care Team instructions. Water with medications is okay. Do not drink coffee or other fluids.   If you have concerns about taking  your medications, please ask at office or if scheduling via [employer], send a message by clicking on Secure Messaging, Message Your Care Team.            Jul 07, 2017 11:30 AM CDT   Return Visit with Hosea King MD   [facility] ([facility])    [facility]  [address]  [facility] [id number] [phone]              Who to contact     If you have questions or need follow up information about today's clinic visit or your schedule please contact [facility] directly at [phone].  Normal or non-critical lab and imaging results will be communicated to you by MyChart, letter or phone within 4 business days after the clinic has received  "the results. If you do not hear from us within 7 days, please contact the clinic through [employer] or phone. If you have a critical or abnormal lab result, we will notify you by phone as soon as possible.  Submit refill requests through [employer] or call your pharmacy and they will forward the refill request to us. Please allow 3 business days for your refill to be completed.          Additional Information About Your Visit        [employer] Information     [employer] lets you send messages to your doctor, view your test results, renew your prescriptions, schedule appointments and more. To sign up, go to [email or web address] . Click on \"Log in\" on the left side of the screen, which will take you to the Welcome page. Then click on \"Sign up Now\" on the right side of the page.     You will be asked to enter the access code listed below, as well as some personal information. Please follow the directions to create your username and password.     Your access code is: [email or web address]  Expires: 2017  8:39 AM     Your access code will  in 90 days. If you need help or a new code, please call your [facility] clinic or [phone].        Care EveryWhere ID     This is your Care EveryWhere ID. This could be used by other organizations to access your [facility] medical records  [id number]        Your Vitals Were     Pulse Pulse Oximetry BMI (Body Mass Index)             71 91% 38.7 kg/m2          Blood Pressure from Last 3 Encounters:   17 (!) 157/93   17 149/88   17 145/79    Weight from Last 3 Encounters:   17 113.8 kg (250 lb 12.8 oz)   17 116.8 kg (257 lb 8 oz)   17 117.5 kg (259 lb)              We Performed the Following     DRAIN/INJECT LARGE JOINT/BURSA     METHYLPREDNISOLONE 80 MG INJ        Primary Care Provider Office Phone # Fax #    Bernice Howard [phone]       [address] [address]        Thank you!     " Thank you for choosing [facility]  for your care. Our goal is always to provide you with excellent care. Hearing back from our patients is one way we can continue to improve our services. Please take a few minutes to complete the written survey that you may receive in the mail after your visit with us. Thank you!             Your Updated Medication List - Protect others around you: Learn how to safely use, store and throw away your medicines at www.disposemymeds.org.          This list is accurate as of: 6/7/17 11:59 PM.  Always use your most recent med list.                   Brand Name Dispense Instructions for use    albuterol 108 (90 BASE) MCG/ACT Inhaler    PROAIR HFA/PROVENTIL HFA/VENTOLIN HFA    1 Inhaler    Inhale 2 puffs into the lungs every 6 hours as needed for shortness of breath / dyspnea or wheezing       exemestane 25 MG tablet    AROMASIN    90 tablet    Take 1 tablet (25 mg) by mouth daily       gabapentin 600 MG tablet    NEURONTIN    180 tablet    Take 1 tablet (600 mg) by mouth 3 times daily       hydrochlorothiazide 25 MG tablet    HYDRODIURIL     Take 0.5 tablets (12.5 mg) by mouth daily       HYDROcodone-acetaminophen 5-325 MG per tablet    NORCO    30 tablet    Take 1-2 tablets by mouth every 4 hours as needed for other (Moderate to Severe Pain)       lisinopril 10 MG tablet    PRINIVIL/ZESTRIL    90 tablet    TAKE ONE TABLET (10 MG) BY MOUTH ONCE DAILY       oxybutynin 5 MG tablet    DITROPAN    20 tablet    Take 1 tablet (5 mg) by mouth 2 times daily as needed for bladder spasms       oxyCODONE 5 MG IR tablet    ROXICODONE    20 tablet    Take 1 tablet (5 mg) by mouth every 6 hours as needed for pain maximum 4 tablet(s) per day       Vitamin D-3 1000 UNITS Caps      Take 1 capsule by mouth       VITAMIN E NATURAL PO      Take 100 Units by mouth daily          normal sinus rhythm

## 2022-10-08 LAB
ANION GAP SERPL CALC-SCNC: 8 MMOL/L — SIGNIFICANT CHANGE UP (ref 5–17)
BUN SERPL-MCNC: 19 MG/DL — SIGNIFICANT CHANGE UP (ref 7–23)
CALCIUM SERPL-MCNC: 9.3 MG/DL — SIGNIFICANT CHANGE UP (ref 8.5–10.1)
CHLORIDE SERPL-SCNC: 97 MMOL/L — SIGNIFICANT CHANGE UP (ref 96–108)
CO2 SERPL-SCNC: 24 MMOL/L — SIGNIFICANT CHANGE UP (ref 22–31)
CREAT SERPL-MCNC: 1.07 MG/DL — SIGNIFICANT CHANGE UP (ref 0.5–1.3)
EGFR: 80 ML/MIN/1.73M2 — SIGNIFICANT CHANGE UP
GLUCOSE SERPL-MCNC: 84 MG/DL — SIGNIFICANT CHANGE UP (ref 70–99)
HCT VFR BLD CALC: 36.2 % — LOW (ref 39–50)
HGB BLD-MCNC: 11.4 G/DL — LOW (ref 13–17)
MAGNESIUM SERPL-MCNC: 1.3 MG/DL — LOW (ref 1.6–2.6)
MCHC RBC-ENTMCNC: 25.3 PG — LOW (ref 27–34)
MCHC RBC-ENTMCNC: 31.5 G/DL — LOW (ref 32–36)
MCV RBC AUTO: 80.3 FL — SIGNIFICANT CHANGE UP (ref 80–100)
NRBC # BLD: 0 /100 WBCS — SIGNIFICANT CHANGE UP (ref 0–0)
PHOSPHATE SERPL-MCNC: 3.2 MG/DL — SIGNIFICANT CHANGE UP (ref 2.5–4.5)
PLATELET # BLD AUTO: 528 K/UL — HIGH (ref 150–400)
POTASSIUM SERPL-MCNC: 4.7 MMOL/L — SIGNIFICANT CHANGE UP (ref 3.5–5.3)
POTASSIUM SERPL-SCNC: 4.7 MMOL/L — SIGNIFICANT CHANGE UP (ref 3.5–5.3)
RBC # BLD: 4.51 M/UL — SIGNIFICANT CHANGE UP (ref 4.2–5.8)
RBC # FLD: 16.9 % — HIGH (ref 10.3–14.5)
SODIUM SERPL-SCNC: 129 MMOL/L — LOW (ref 135–145)
WBC # BLD: 9.63 K/UL — SIGNIFICANT CHANGE UP (ref 3.8–10.5)
WBC # FLD AUTO: 9.63 K/UL — SIGNIFICANT CHANGE UP (ref 3.8–10.5)

## 2022-10-08 PROCEDURE — 99232 SBSQ HOSP IP/OBS MODERATE 35: CPT | Mod: 24

## 2022-10-08 RX ORDER — MAGNESIUM SULFATE 500 MG/ML
2 VIAL (ML) INJECTION ONCE
Refills: 0 | Status: COMPLETED | OUTPATIENT
Start: 2022-10-08 | End: 2022-10-08

## 2022-10-08 RX ADMIN — Medication 2 TABLET(S): at 00:45

## 2022-10-08 RX ADMIN — Medication 2 TABLET(S): at 23:46

## 2022-10-08 RX ADMIN — Medication 1 TABLET(S): at 17:18

## 2022-10-08 RX ADMIN — Medication 2 TABLET(S): at 11:28

## 2022-10-08 RX ADMIN — SODIUM CHLORIDE 110 MILLILITER(S): 9 INJECTION, SOLUTION INTRAVENOUS at 00:43

## 2022-10-08 RX ADMIN — Medication 2 TABLET(S): at 17:18

## 2022-10-08 RX ADMIN — Medication 4 MILLIGRAM(S): at 05:39

## 2022-10-08 RX ADMIN — Medication 4 MILLIGRAM(S): at 11:28

## 2022-10-08 RX ADMIN — Medication 2 TABLET(S): at 05:39

## 2022-10-08 RX ADMIN — Medication 4 MILLIGRAM(S): at 23:46

## 2022-10-08 RX ADMIN — Medication 4 MILLIGRAM(S): at 17:18

## 2022-10-08 RX ADMIN — APIXABAN 5 MILLIGRAM(S): 2.5 TABLET, FILM COATED ORAL at 17:18

## 2022-10-08 RX ADMIN — SODIUM CHLORIDE 125 MILLILITER(S): 9 INJECTION, SOLUTION INTRAVENOUS at 21:59

## 2022-10-08 RX ADMIN — Medication 25 GRAM(S): at 14:26

## 2022-10-08 RX ADMIN — Medication 1 TABLET(S): at 05:39

## 2022-10-08 RX ADMIN — APIXABAN 5 MILLIGRAM(S): 2.5 TABLET, FILM COATED ORAL at 05:39

## 2022-10-08 RX ADMIN — Medication 4 MILLIGRAM(S): at 00:44

## 2022-10-08 RX ADMIN — CHOLESTYRAMINE 4 GRAM(S): 4 POWDER, FOR SUSPENSION ORAL at 09:22

## 2022-10-08 NOTE — PROGRESS NOTE ADULT - NS ATTEND AMEND GEN_ALL_CORE FT
Patient seen and examined  NO nausea, vomiting, fever or chills  States feels better after boluses  No abdominal pain    On exam: awake, alert  Breathing comfortably on room air  Abd is soft, not tender and not distended  ileostomy output is water; 1250mL recorded overnight    Admitted for dehydration to high ileostomy output  - continue loperamide 4mg q6 hours; and Lomotil 2 tabs q6 hours  - monitor ileostomy output; replace fluids 1:0.5  - if still high output today; may need to start tincture of opium tomorrow

## 2022-10-08 NOTE — PROGRESS NOTE ADULT - ASSESSMENT
57 year old male with recent 57M with h/o ex lap, subtotal colectomy 9/10, closure of abdomen, SBR, ileostomy creation 9/12, now with weakness, electrolyte abnormalities.  Suspect dehydration 2/2 high output of ileostomy      regular diet with protein shakes  questran, loperamide and lomotil, 4mg q6h  IV hydration  STRICT MONITORING OF OSTOMY OUTPUT  continue eliquis

## 2022-10-09 ENCOUNTER — TRANSCRIPTION ENCOUNTER (OUTPATIENT)
Age: 57
End: 2022-10-09

## 2022-10-09 LAB
ANION GAP SERPL CALC-SCNC: 6 MMOL/L — SIGNIFICANT CHANGE UP (ref 5–17)
BUN SERPL-MCNC: 12 MG/DL — SIGNIFICANT CHANGE UP (ref 7–23)
CALCIUM SERPL-MCNC: 8.8 MG/DL — SIGNIFICANT CHANGE UP (ref 8.5–10.1)
CHLORIDE SERPL-SCNC: 100 MMOL/L — SIGNIFICANT CHANGE UP (ref 96–108)
CO2 SERPL-SCNC: 26 MMOL/L — SIGNIFICANT CHANGE UP (ref 22–31)
CREAT SERPL-MCNC: 0.82 MG/DL — SIGNIFICANT CHANGE UP (ref 0.5–1.3)
EGFR: 102 ML/MIN/1.73M2 — SIGNIFICANT CHANGE UP
GLUCOSE SERPL-MCNC: 77 MG/DL — SIGNIFICANT CHANGE UP (ref 70–99)
HCT VFR BLD CALC: 36.9 % — LOW (ref 39–50)
HGB BLD-MCNC: 11.3 G/DL — LOW (ref 13–17)
MAGNESIUM SERPL-MCNC: 1.9 MG/DL — SIGNIFICANT CHANGE UP (ref 1.6–2.6)
MCHC RBC-ENTMCNC: 25 PG — LOW (ref 27–34)
MCHC RBC-ENTMCNC: 30.6 G/DL — LOW (ref 32–36)
MCV RBC AUTO: 81.6 FL — SIGNIFICANT CHANGE UP (ref 80–100)
NRBC # BLD: 0 /100 WBCS — SIGNIFICANT CHANGE UP (ref 0–0)
PHOSPHATE SERPL-MCNC: 3 MG/DL — SIGNIFICANT CHANGE UP (ref 2.5–4.5)
PLATELET # BLD AUTO: 431 K/UL — HIGH (ref 150–400)
POTASSIUM SERPL-MCNC: 4.3 MMOL/L — SIGNIFICANT CHANGE UP (ref 3.5–5.3)
POTASSIUM SERPL-SCNC: 4.3 MMOL/L — SIGNIFICANT CHANGE UP (ref 3.5–5.3)
RBC # BLD: 4.52 M/UL — SIGNIFICANT CHANGE UP (ref 4.2–5.8)
RBC # FLD: 16.5 % — HIGH (ref 10.3–14.5)
SODIUM SERPL-SCNC: 132 MMOL/L — LOW (ref 135–145)
WBC # BLD: 6.72 K/UL — SIGNIFICANT CHANGE UP (ref 3.8–10.5)
WBC # FLD AUTO: 6.72 K/UL — SIGNIFICANT CHANGE UP (ref 3.8–10.5)

## 2022-10-09 PROCEDURE — 99232 SBSQ HOSP IP/OBS MODERATE 35: CPT | Mod: 24

## 2022-10-09 RX ORDER — DIPHENOXYLATE HCL/ATROPINE 2.5-.025MG
2 TABLET ORAL
Qty: 90 | Refills: 0
Start: 2022-10-09

## 2022-10-09 RX ORDER — CHOLESTYRAMINE 4 G/9G
1 POWDER, FOR SUSPENSION ORAL
Qty: 1 | Refills: 0
Start: 2022-10-09

## 2022-10-09 RX ORDER — LOPERAMIDE HCL 2 MG
2 TABLET ORAL
Qty: 0 | Refills: 0 | DISCHARGE
Start: 2022-10-09

## 2022-10-09 RX ORDER — LOPERAMIDE HCL 2 MG
2 TABLET ORAL
Qty: 90 | Refills: 0
Start: 2022-10-09

## 2022-10-09 RX ADMIN — CHOLESTYRAMINE 4 GRAM(S): 4 POWDER, FOR SUSPENSION ORAL at 08:09

## 2022-10-09 RX ADMIN — Medication 2 TABLET(S): at 17:42

## 2022-10-09 RX ADMIN — SODIUM CHLORIDE 125 MILLILITER(S): 9 INJECTION, SOLUTION INTRAVENOUS at 05:34

## 2022-10-09 RX ADMIN — APIXABAN 5 MILLIGRAM(S): 2.5 TABLET, FILM COATED ORAL at 17:42

## 2022-10-09 RX ADMIN — Medication 4 MILLIGRAM(S): at 11:43

## 2022-10-09 RX ADMIN — APIXABAN 5 MILLIGRAM(S): 2.5 TABLET, FILM COATED ORAL at 05:33

## 2022-10-09 RX ADMIN — Medication 4 MILLIGRAM(S): at 17:42

## 2022-10-09 RX ADMIN — Medication 4 MILLIGRAM(S): at 23:54

## 2022-10-09 RX ADMIN — Medication 2 TABLET(S): at 05:34

## 2022-10-09 RX ADMIN — Medication 2 TABLET(S): at 11:43

## 2022-10-09 RX ADMIN — Medication 4 MILLIGRAM(S): at 05:33

## 2022-10-09 RX ADMIN — Medication 2 TABLET(S): at 23:54

## 2022-10-09 NOTE — CHART NOTE - NSCHARTNOTEFT_GEN_A_CORE
Patient seen at bedside with Dr. Ibrahim, ostomy output 1860 in 24 hrs.   dc iv fluids and monitor electrolytes in am.   dispo planning possibly home tomorrow.       LABS:                        11.3   6.72  )-----------( 431      ( 09 Oct 2022 05:30 )             36.9     09 Oct 2022 05:30    132    |  100    |  12     ----------------------------<  77     4.3     |  26     |  0.82     Ca    8.8        09 Oct 2022 05:30  Phos  3.0       09 Oct 2022 05:30  Mg     1.9       09 Oct 2022 05:30      PT/INR - ( 07 Oct 2022 13:00 )   PT: 19.9 sec;   INR: 1.65 ratio         PTT - ( 07 Oct 2022 13:00 )  PTT:38.2 sec

## 2022-10-09 NOTE — DISCHARGE NOTE PROVIDER - NSDCMRMEDTOKEN_GEN_ALL_CORE_FT
acetaminophen 325 mg oral tablet: 3 tab(s) orally every 6 hours  apixaban 5 mg oral tablet: 1 tab(s) orally 2 times a day   cholestyramine 4 g/9 g oral powder for reconstitution: 1 application orally 2 times a day   diphenoxylate-atropine 2.5 mg-0.025 mg oral tablet: 2 tab(s) orally every 6 hours MDD:8  Eliquis Starter Pack for Treatment of DVT and PE 5 mg oral tablet: Take as directed  loperamide 2 mg oral capsule: 2 cap(s) orally every 6 hours  loperamide 2 mg oral capsule: 2 cap(s) orally every 6 hours   acetaminophen 325 mg oral tablet: 3 tab(s) orally every 6 hours  apixaban 5 mg oral tablet: 1 tab(s) orally 2 times a day   cholestyramine 4 g/9 g oral powder for reconstitution: 1 application orally 2 times a day   diphenoxylate-atropine 2.5 mg-0.025 mg oral tablet: 2 tab(s) orally every 6 hours MDD:8  Eliquis Starter Pack for Treatment of DVT and PE 5 mg oral tablet: Take as directed  loperamide 2 mg oral capsule: 2 cap(s) orally every 6 hours  loperamide 2 mg oral capsule: 2 cap(s) orally every 6 hours  ocular lubricant ophthalmic solution: 1 drop(s) to each affected eye every 4 hours, As needed, Dry Eyes

## 2022-10-09 NOTE — DISCHARGE NOTE PROVIDER - NSDCFUSCHEDAPPT_GEN_ALL_CORE_FT
[facility]  BARIATRIC [address]  Scheduled Appointment: 10/12/2022    Gillian Coronado  [facility]  [facility]  Scheduled Appointment: 10/24/2022    Drake Yo  [facility]  INTERVEN [phone]  Scheduled Appointment: 10/27/2022     Gary Plasencia  [facility] Physician [facility]  BARIATRIC [address]  Scheduled Appointment: 10/19/2022    Gillian Coronado  [facility] Physician [facility]  [facility]  Scheduled Appointment: 10/24/2022    Drake Yo  [facility] Physician [facility]  INTERVEN [address]  Scheduled Appointment: 10/27/2022

## 2022-10-09 NOTE — PROGRESS NOTE ADULT - NS ATTEND AMEND GEN_ALL_CORE FT
Patient seen and examined with PA  Doing well  No nausea, vomiting, fever or chills  Has been feeling better after IV fluid resuscitation    On exam: awake, alert  Breathing comfortably on room air  Abd is soft, not tender and not distended  No rebound, no guarding  Ileostomy is pink, viable with output that is getting thicker (1850mL over 24 hours)    - continue diet as tolerated  - continue Lomotil and loperamide  - continue to monitor ileostomy output  - stop IV fluids today

## 2022-10-09 NOTE — DISCHARGE NOTE PROVIDER - CARE PROVIDER_API CALL
Wanda Plasencia)  Surgery  [address] [place] [address]  Phone: [phone]  Fax: [phone]  Follow Up Time: 1 week

## 2022-10-09 NOTE — DISCHARGE NOTE PROVIDER - NSDCCPCAREPLAN_GEN_ALL_CORE_FT
PRINCIPAL DISCHARGE DIAGNOSIS  Diagnosis: Dehydration with hyponatremia  Assessment and Plan of Treatment:

## 2022-10-09 NOTE — DISCHARGE NOTE PROVIDER - NSDCFUADDINST_GEN_ALL_CORE_FT
Follow up in 7-10 days with Dr. Plasencia. Please call the office to make an appointment. Activity as tolerated. Rest as needed. You may eat a regular diet. Do not lift anything heavier than 10 pounds. You may take motrin or advil for mild pain as needed, in addition to prescribed narcotic medication. You may take over the counter stool softeners as needed. Call for any fever over 101, nausea, vomiting, severe pain, no passing of gas or bowel movement.

## 2022-10-09 NOTE — PROGRESS NOTE ADULT - ASSESSMENT
57M with h/o ex lap, subtotal colectomy 9/10, closure of abdomen, SBR, ileostomy creation 9/12, came to ER with weakness, electrolyte abnormalities. Likely dehydration 2/2 high output of ileostomy.  Is feeling better after IVF.    continue regular diet with protein shakes  continue questran, loperamide and lomotil, 4mg q6h  IV hydration  STRICT MONITORING OF OSTOMY OUTPUT  continue eliquis

## 2022-10-09 NOTE — DISCHARGE NOTE PROVIDER - HOSPITAL COURSE
57 year old male with colon cancer and short segment colon and ostomy with high outputs unable to hydrate to match losses. Patient started on loperamide, lomitil, and questran, and outputs decreased. Patient tolerating diet. Electrolytes stabilized. Patient cleared for discharge home.

## 2022-10-10 ENCOUNTER — APPOINTMENT (OUTPATIENT)
Dept: CT IMAGING | Facility: IMAGING CENTER | Age: 57
End: 2022-10-10

## 2022-10-10 ENCOUNTER — TRANSCRIPTION ENCOUNTER (OUTPATIENT)
Age: 57
End: 2022-10-10

## 2022-10-10 LAB
ANION GAP SERPL CALC-SCNC: 7 MMOL/L — SIGNIFICANT CHANGE UP (ref 5–17)
BUN SERPL-MCNC: 9 MG/DL — SIGNIFICANT CHANGE UP (ref 7–23)
CALCIUM SERPL-MCNC: 8.5 MG/DL — SIGNIFICANT CHANGE UP (ref 8.5–10.1)
CHLORIDE SERPL-SCNC: 101 MMOL/L — SIGNIFICANT CHANGE UP (ref 96–108)
CO2 SERPL-SCNC: 25 MMOL/L — SIGNIFICANT CHANGE UP (ref 22–31)
CREAT SERPL-MCNC: 0.86 MG/DL — SIGNIFICANT CHANGE UP (ref 0.5–1.3)
EGFR: 100 ML/MIN/1.73M2 — SIGNIFICANT CHANGE UP
GLUCOSE SERPL-MCNC: 83 MG/DL — SIGNIFICANT CHANGE UP (ref 70–99)
HCT VFR BLD CALC: 34.1 % — LOW (ref 39–50)
HGB BLD-MCNC: 10.6 G/DL — LOW (ref 13–17)
MAGNESIUM SERPL-MCNC: 1.8 MG/DL — SIGNIFICANT CHANGE UP (ref 1.6–2.6)
MCHC RBC-ENTMCNC: 25.1 PG — LOW (ref 27–34)
MCHC RBC-ENTMCNC: 31.1 G/DL — LOW (ref 32–36)
MCV RBC AUTO: 80.8 FL — SIGNIFICANT CHANGE UP (ref 80–100)
NRBC # BLD: 0 /100 WBCS — SIGNIFICANT CHANGE UP (ref 0–0)
PHOSPHATE SERPL-MCNC: 3.3 MG/DL — SIGNIFICANT CHANGE UP (ref 2.5–4.5)
PLATELET # BLD AUTO: 451 K/UL — HIGH (ref 150–400)
POTASSIUM SERPL-MCNC: 4.7 MMOL/L — SIGNIFICANT CHANGE UP (ref 3.5–5.3)
POTASSIUM SERPL-SCNC: 4.7 MMOL/L — SIGNIFICANT CHANGE UP (ref 3.5–5.3)
RBC # BLD: 4.22 M/UL — SIGNIFICANT CHANGE UP (ref 4.2–5.8)
RBC # FLD: 16.6 % — HIGH (ref 10.3–14.5)
SODIUM SERPL-SCNC: 133 MMOL/L — LOW (ref 135–145)
WBC # BLD: 8.3 K/UL — SIGNIFICANT CHANGE UP (ref 3.8–10.5)
WBC # FLD AUTO: 8.3 K/UL — SIGNIFICANT CHANGE UP (ref 3.8–10.5)

## 2022-10-10 RX ORDER — MAGNESIUM OXIDE 400 MG ORAL TABLET 241.3 MG
400 TABLET ORAL ONCE
Refills: 0 | Status: DISCONTINUED | OUTPATIENT
Start: 2022-10-10 | End: 2022-10-10

## 2022-10-10 RX ADMIN — Medication 1 DROP(S): at 15:04

## 2022-10-10 RX ADMIN — Medication 4 MILLIGRAM(S): at 05:25

## 2022-10-10 RX ADMIN — APIXABAN 5 MILLIGRAM(S): 2.5 TABLET, FILM COATED ORAL at 18:02

## 2022-10-10 RX ADMIN — Medication 2 TABLET(S): at 12:13

## 2022-10-10 RX ADMIN — Medication 4 MILLIGRAM(S): at 18:01

## 2022-10-10 RX ADMIN — Medication 2 TABLET(S): at 05:25

## 2022-10-10 RX ADMIN — CHOLESTYRAMINE 4 GRAM(S): 4 POWDER, FOR SUSPENSION ORAL at 10:07

## 2022-10-10 RX ADMIN — Medication 2 TABLET(S): at 18:01

## 2022-10-10 RX ADMIN — Medication 4 MILLIGRAM(S): at 12:13

## 2022-10-10 RX ADMIN — APIXABAN 5 MILLIGRAM(S): 2.5 TABLET, FILM COATED ORAL at 05:25

## 2022-10-10 NOTE — DISCHARGE NOTE NURSING/CASE MANAGEMENT/SOCIAL WORK - NSDCPEFALRISK_GEN_ALL_CORE
For information on Fall & Injury Prevention, visit: [email or web address] OR  [email or web address] OR  https://www.cdc.gov/steadi/patient.html

## 2022-10-10 NOTE — DISCHARGE NOTE NURSING/CASE MANAGEMENT/SOCIAL WORK - PATIENT PORTAL LINK FT
You can access the FollowMyHealth Patient Portal offered by [facility] by registering at the following website: [email or web address]. By joining [employer]’s FollowMyHealth portal, you will also be able to view your health information using other applications (apps) compatible with our system.

## 2022-10-10 NOTE — PROGRESS NOTE ADULT - ASSESSMENT
57M with h/o ex lap, subtotal colectomy 9/10, closure of abdomen, SBR, ileostomy creation 9/12, came to ER with weakness, electrolyte abnormalities. Likely dehydration 2/2 high output of ileostomy.  Is feeling better after IVF.    continue questran, loperamide and lomotil, 4mg q6h  IV hydration  dc planning

## 2022-10-11 LAB
ANION GAP SERPL CALC-SCNC: 6 MMOL/L — SIGNIFICANT CHANGE UP (ref 5–17)
BUN SERPL-MCNC: 10 MG/DL — SIGNIFICANT CHANGE UP (ref 7–23)
CALCIUM SERPL-MCNC: 9.2 MG/DL — SIGNIFICANT CHANGE UP (ref 8.5–10.1)
CHLORIDE SERPL-SCNC: 102 MMOL/L — SIGNIFICANT CHANGE UP (ref 96–108)
CO2 SERPL-SCNC: 25 MMOL/L — SIGNIFICANT CHANGE UP (ref 22–31)
CREAT SERPL-MCNC: 0.78 MG/DL — SIGNIFICANT CHANGE UP (ref 0.5–1.3)
EGFR: 103 ML/MIN/1.73M2 — SIGNIFICANT CHANGE UP
GLUCOSE SERPL-MCNC: 87 MG/DL — SIGNIFICANT CHANGE UP (ref 70–99)
HCT VFR BLD CALC: 34.6 % — LOW (ref 39–50)
HGB BLD-MCNC: 10.8 G/DL — LOW (ref 13–17)
MAGNESIUM SERPL-MCNC: 1.3 MG/DL — LOW (ref 1.6–2.6)
MCHC RBC-ENTMCNC: 24.9 PG — LOW (ref 27–34)
MCHC RBC-ENTMCNC: 31.2 G/DL — LOW (ref 32–36)
MCV RBC AUTO: 79.7 FL — LOW (ref 80–100)
NRBC # BLD: 0 /100 WBCS — SIGNIFICANT CHANGE UP (ref 0–0)
PHOSPHATE SERPL-MCNC: 3.5 MG/DL — SIGNIFICANT CHANGE UP (ref 2.5–4.5)
PLATELET # BLD AUTO: 466 K/UL — HIGH (ref 150–400)
POTASSIUM SERPL-MCNC: 4.5 MMOL/L — SIGNIFICANT CHANGE UP (ref 3.5–5.3)
POTASSIUM SERPL-SCNC: 4.5 MMOL/L — SIGNIFICANT CHANGE UP (ref 3.5–5.3)
RBC # BLD: 4.34 M/UL — SIGNIFICANT CHANGE UP (ref 4.2–5.8)
RBC # FLD: 16.2 % — HIGH (ref 10.3–14.5)
SODIUM SERPL-SCNC: 133 MMOL/L — LOW (ref 135–145)
WBC # BLD: 7.91 K/UL — SIGNIFICANT CHANGE UP (ref 3.8–10.5)
WBC # FLD AUTO: 7.91 K/UL — SIGNIFICANT CHANGE UP (ref 3.8–10.5)

## 2022-10-11 PROCEDURE — 74177 CT ABD & PELVIS W/CONTRAST: CPT | Mod: 26

## 2022-10-11 RX ORDER — SODIUM CHLORIDE 9 MG/ML
1000 INJECTION INTRAMUSCULAR; INTRAVENOUS; SUBCUTANEOUS ONCE
Refills: 0 | Status: COMPLETED | OUTPATIENT
Start: 2022-10-11 | End: 2022-10-11

## 2022-10-11 RX ORDER — MAGNESIUM SULFATE 500 MG/ML
2 VIAL (ML) INJECTION ONCE
Refills: 0 | Status: COMPLETED | OUTPATIENT
Start: 2022-10-11 | End: 2022-10-11

## 2022-10-11 RX ORDER — DIATRIZOATE MEGLUMINE 180 MG/ML
90 INJECTION, SOLUTION INTRAVESICAL ONCE
Refills: 0 | Status: COMPLETED | OUTPATIENT
Start: 2022-10-11 | End: 2022-10-11

## 2022-10-11 RX ORDER — IOHEXOL 300 MG/ML
30 INJECTION, SOLUTION INTRAVENOUS ONCE
Refills: 0 | Status: DISCONTINUED | OUTPATIENT
Start: 2022-10-11 | End: 2022-10-11

## 2022-10-11 RX ADMIN — Medication 2 TABLET(S): at 00:04

## 2022-10-11 RX ADMIN — APIXABAN 5 MILLIGRAM(S): 2.5 TABLET, FILM COATED ORAL at 17:06

## 2022-10-11 RX ADMIN — Medication 4 MILLIGRAM(S): at 11:19

## 2022-10-11 RX ADMIN — APIXABAN 5 MILLIGRAM(S): 2.5 TABLET, FILM COATED ORAL at 05:01

## 2022-10-11 RX ADMIN — CHOLESTYRAMINE 4 GRAM(S): 4 POWDER, FOR SUSPENSION ORAL at 09:38

## 2022-10-11 RX ADMIN — Medication 2 TABLET(S): at 17:06

## 2022-10-11 RX ADMIN — Medication 2 TABLET(S): at 23:31

## 2022-10-11 RX ADMIN — Medication 2 TABLET(S): at 11:19

## 2022-10-11 RX ADMIN — DIATRIZOATE MEGLUMINE 90 MILLILITER(S): 180 INJECTION, SOLUTION INTRAVESICAL at 13:41

## 2022-10-11 RX ADMIN — Medication 4 MILLIGRAM(S): at 23:31

## 2022-10-11 RX ADMIN — SODIUM CHLORIDE 1000 MILLILITER(S): 9 INJECTION INTRAMUSCULAR; INTRAVENOUS; SUBCUTANEOUS at 08:51

## 2022-10-11 RX ADMIN — Medication 4 MILLIGRAM(S): at 00:03

## 2022-10-11 RX ADMIN — Medication 25 GRAM(S): at 10:44

## 2022-10-11 RX ADMIN — Medication 4 MILLIGRAM(S): at 17:06

## 2022-10-11 RX ADMIN — Medication 4 MILLIGRAM(S): at 05:01

## 2022-10-11 RX ADMIN — Medication 2 TABLET(S): at 05:01

## 2022-10-11 NOTE — PROGRESS NOTE ADULT - ASSESSMENT
57M with h/o ex lap, subtotal colectomy 9/10, closure of abdomen, SBR, ileostomy creation 9/12, came to ER with weakness, electrolyte abnormalities. Likely dehydration 2/2 high output of ileostomy.   Volume of output improving     continue questran, loperamide and lomotil, 4mg q6h  dc planning today once Homecare set up 57M with h/o ex lap, subtotal colectomy 9/10, closure of abdomen, SBR, ileostomy creation 9/12, came to ER with weakness, electrolyte abnormalities. Likely dehydration 2/2 high output of ileostomy.   Volume of output improving     continue questran, loperamide and lomotil, 4mg q6h  Ct abdomen and pelvis with PO and IV contrast

## 2022-10-12 ENCOUNTER — APPOINTMENT (OUTPATIENT)
Dept: BARIATRICS | Facility: CLINIC | Age: 57
End: 2022-10-12

## 2022-10-12 VITALS
OXYGEN SATURATION: 100 % | DIASTOLIC BLOOD PRESSURE: 72 MMHG | TEMPERATURE: 98 F | SYSTOLIC BLOOD PRESSURE: 116 MMHG | HEART RATE: 73 BPM | RESPIRATION RATE: 18 BRPM

## 2022-10-12 LAB
ANION GAP SERPL CALC-SCNC: 4 MMOL/L — LOW (ref 5–17)
BUN SERPL-MCNC: 7 MG/DL — SIGNIFICANT CHANGE UP (ref 7–23)
CALCIUM SERPL-MCNC: 8.5 MG/DL — SIGNIFICANT CHANGE UP (ref 8.5–10.1)
CHLORIDE SERPL-SCNC: 104 MMOL/L — SIGNIFICANT CHANGE UP (ref 96–108)
CO2 SERPL-SCNC: 25 MMOL/L — SIGNIFICANT CHANGE UP (ref 22–31)
CREAT SERPL-MCNC: 0.72 MG/DL — SIGNIFICANT CHANGE UP (ref 0.5–1.3)
EGFR: 106 ML/MIN/1.73M2 — SIGNIFICANT CHANGE UP
GLUCOSE SERPL-MCNC: 83 MG/DL — SIGNIFICANT CHANGE UP (ref 70–99)
HCT VFR BLD CALC: 32.8 % — LOW (ref 39–50)
HGB BLD-MCNC: 10.1 G/DL — LOW (ref 13–17)
MAGNESIUM SERPL-MCNC: 1.5 MG/DL — LOW (ref 1.6–2.6)
MCHC RBC-ENTMCNC: 25.4 PG — LOW (ref 27–34)
MCHC RBC-ENTMCNC: 30.8 G/DL — LOW (ref 32–36)
MCV RBC AUTO: 82.6 FL — SIGNIFICANT CHANGE UP (ref 80–100)
NRBC # BLD: 0 /100 WBCS — SIGNIFICANT CHANGE UP (ref 0–0)
PHOSPHATE SERPL-MCNC: 3.6 MG/DL — SIGNIFICANT CHANGE UP (ref 2.5–4.5)
PLATELET # BLD AUTO: 490 K/UL — HIGH (ref 150–400)
POTASSIUM SERPL-MCNC: 4.2 MMOL/L — SIGNIFICANT CHANGE UP (ref 3.5–5.3)
POTASSIUM SERPL-SCNC: 4.2 MMOL/L — SIGNIFICANT CHANGE UP (ref 3.5–5.3)
RBC # BLD: 3.97 M/UL — LOW (ref 4.2–5.8)
RBC # FLD: 16.5 % — HIGH (ref 10.3–14.5)
SODIUM SERPL-SCNC: 133 MMOL/L — LOW (ref 135–145)
WBC # BLD: 7.98 K/UL — SIGNIFICANT CHANGE UP (ref 3.8–10.5)
WBC # FLD AUTO: 7.98 K/UL — SIGNIFICANT CHANGE UP (ref 3.8–10.5)

## 2022-10-12 RX ORDER — MAGNESIUM SULFATE 500 MG/ML
2 VIAL (ML) INJECTION ONCE
Refills: 0 | Status: COMPLETED | OUTPATIENT
Start: 2022-10-12 | End: 2022-10-12

## 2022-10-12 RX ORDER — SODIUM CHLORIDE 9 MG/ML
1000 INJECTION INTRAMUSCULAR; INTRAVENOUS; SUBCUTANEOUS ONCE
Refills: 0 | Status: COMPLETED | OUTPATIENT
Start: 2022-10-12 | End: 2022-10-12

## 2022-10-12 RX ADMIN — Medication 2 TABLET(S): at 11:19

## 2022-10-12 RX ADMIN — APIXABAN 5 MILLIGRAM(S): 2.5 TABLET, FILM COATED ORAL at 05:12

## 2022-10-12 RX ADMIN — Medication 2 TABLET(S): at 05:12

## 2022-10-12 RX ADMIN — SODIUM CHLORIDE 1000 MILLILITER(S): 9 INJECTION INTRAMUSCULAR; INTRAVENOUS; SUBCUTANEOUS at 09:37

## 2022-10-12 RX ADMIN — Medication 25 GRAM(S): at 10:25

## 2022-10-12 RX ADMIN — Medication 4 MILLIGRAM(S): at 05:13

## 2022-10-12 RX ADMIN — Medication 4 MILLIGRAM(S): at 11:19

## 2022-10-12 RX ADMIN — CHOLESTYRAMINE 4 GRAM(S): 4 POWDER, FOR SUSPENSION ORAL at 08:11

## 2022-10-12 NOTE — PROGRESS NOTE ADULT - SUBJECTIVE AND OBJECTIVE BOX
Patient seen and examined at bedside resting comfortably.   Offers no complaints, states he feels well and is ready to go home.   Tolerating diet. Voiding well, ostomy output now controlled.   Denies fever, chills, N/V/D, CP, SOB    Vital Signs Last 24 Hrs  T(F): 97.9 (10-12-22 @ 05:04), Max: 98.4 (10-11-22 @ 23:29)  HR: 77 (10-12-22 @ 05:04)  BP: 103/64 (10-12-22 @ 05:04)  RR: 18 (10-12-22 @ 05:04)  SpO2: 100% (10-12-22 @ 05:04)    PHYSICAL EXAM:  GENERAL: Alert, NAD  CHEST/LUNG: Clear to auscultation bilaterally, respirations nonlabored  HEART: Regular rate and rhythm; S1 & S2 appreciated  ABDOMEN: + Bowel sounds, soft, Nontender, Nondistended. Ileostomy bag with brown liquid output  EXTREMITIES:  no calf tenderness, No edema    I&O's Detail    11 Oct 2022 07:01  -  12 Oct 2022 07:00  --------------------------------------------------------  IN:    Oral Fluid: 240 mL  Total IN: 240 mL    OUT:    Ileostomy (mL): 1025 mL    Voided (mL): 2000 mL  Total OUT: 3025 mL    Total NET: -2785 mL          LABS:                        10.1   7.98  )-----------( 490      ( 12 Oct 2022 06:30 )             32.8     10-12    133<L>  |  104  |  7   ----------------------------<  83  4.2   |  25  |  0.72    Ca    8.5      12 Oct 2022 06:30  Phos  3.6     10-12  Mg     1.5     10-12    RADIOLOGY & ADDITIONAL STUDIES:  < from: CT Abdomen and Pelvis w/ Oral Cont and w/ IV Cont (10.11.22 @ 15:21) >  ACC: [id number] EXAM:  CT ABDOMEN AND PELVIS OC IC                          PROCEDURE DATE:  10/11/2022          INTERPRETATION:  CLINICAL INFORMATION: 57 years  Male with s/p ex lap,   subtotal colectomy. Status post subtotal colectomy 9/12/2022 forlarge   bowel obstruction and septic shock.    COMPARISON: 9/19/2022    CONTRAST/COMPLICATIONS:  IV Contrast: Omnipaque 350  95 cc administered   5 cc discarded  Oral Contrast: Gastroview  Complications: None reported at time of study completion    PROCEDURE:  CT of the Abdomen and Pelvis was performed.  Sagittal and coronal reformats were performed.    Limitation: Motion artifact. Streak artifact from patient's arms.    FINDINGS:  LOWER CHEST: Partially visualized right pectoralis muscle asymmetry. Left   gynecomastia.    LIVER: Within normal limits.  BILE DUCTS: Normal caliber.  GALLBLADDER: Within normal limits.  SPLEEN: Stable mild splenomegaly.  PANCREAS: Within normal limits.  ADRENALS: Within normal limits.  KIDNEYS/URETERS: Within normal limits.    BLADDER: Decompressed.  REPRODUCTIVE ORGANS: Mildly enlarged prostate.    BOWEL: No bowel obstruction. Subtotal colectomy with right-sided   ileostomy and Joseph's pouch. Mildly thickened distal ileum   reidentified. Previously seen adjacent collection is significantly   smaller than prior. Please refer to peritoneum findings.  PERITONEUM: No ascites. 2.5 x 1.8 cm fluid collection along the bladder   dome (2:85), previously 9.5 x 3.7 cm. Right lower quadrant mesenteric fat   stranding improved from prior.  VESSELS: Within normal limits. No SMV thrombosis identified. No portal   vein thrombosis. Limited evaluation for external iliac DVT due to phase   of injection.  RETROPERITONEUM/LYMPH NODES: No lymphadenopathy.  ABDOMINAL WALL: Postoperative changes.  BONES: Within normal limits.    IMPRESSION:    Subtotal colectomy with right-sided ileostomy. No bowel obstruction.    2.5 x 1.8 cm fluid collection along the bladder dome is significantly   smaller than prior. Right lower quadrant mesenteric fat stranding   improved from prior.    Stable mild splenomegaly.    Partially visualized right pectoralis muscle asymmetry.    < end of copied text >    A/P  57M with h/o ex lap, subtotal colectomy 9/10, closure of abdomen, SBR, ileostomy creation 9/12, came to ER with weakness, electrolyte abnormalities. Likely dehydration 2/2 high output of ileostomy.   CT 10/11 with significantly smaller fluid collection along bladder dome    - continue questran, loperamide and lomotil, 4mg q6h  - continue reg diet  - wound care per surgery  - stable for discharge from surgical standpoint  - d/w Dr. Baldwin
HPI: 57M with high output from ileostomy states he is feeling much better this morning, outputs decreasing with medication and iv fluids to replete losses.     Vital Signs Last 24 Hrs  T(C): 36.7 (08 Oct 2022 05:19), Max: 36.7 (07 Oct 2022 15:35)  T(F): 98.1 (08 Oct 2022 05:19), Max: 98.1 (08 Oct 2022 05:19)  HR: 70 (08 Oct 2022 05:19) (70 - 97)  BP: 113/71 (08 Oct 2022 05:19) (113/71 - 126/75)  BP(mean): --  RR: 18 (08 Oct 2022 05:19) (18 - 19)  SpO2: 99% (08 Oct 2022 05:19) (96% - 100%)    Parameters below as of 08 Oct 2022 05:19  Patient On (Oxygen Delivery Method): room air        MEDICATIONS  (STANDING):  amoxicillin  875 milliGRAM(s)/clavulanate      amoxicillin  875 milliGRAM(s)/clavulanate 1 Tablet(s) Oral once  amoxicillin  875 milliGRAM(s)/clavulanate 1 Tablet(s) Oral every 12 hours  apixaban 5 milliGRAM(s) Oral every 12 hours  cholestyramine Powder (Sugar-Free) 4 Gram(s) Oral daily  diphenoxylate/atropine 2 Tablet(s) Oral every 6 hours  lactated ringers. 1000 milliLiter(s) (110 mL/Hr) IV Continuous <Continuous>  loperamide 4 milliGRAM(s) Oral every 6 hours  sodium chloride 0.9%. 2000 milliLiter(s) (1000 mL/Hr) IV Continuous <Continuous>    MEDICATIONS  (PRN):    I&O's Detail    07 Oct 2022 07:01  -  08 Oct 2022 06:12  --------------------------------------------------------  IN:  Total IN: 0 mL    OUT:    Ileostomy (mL): 1250 mL    Voided (mL): 300 mL  Total OUT: 1550 mL    Total NET: -1550 mL        PHYSICAL EXAM:      Constitutional: awake and alert.  HEENT: PERRLA, EOMI,   Neck: Supple.  Respiratory: Breath sounds are clear bilaterally  Cardiovascular: S1 and S2, regular   Gastrointestinal: soft, nontender, ileostomy output decreasing from 800-->250>200.   Extremities:  no edema  Musculoskeletal: no joint swelling/tenderness, no abnormal movements  Skin: No rashes        LABS:                         13.8   13.46 )-----------( 825      ( 07 Oct 2022 13:00 )             43.6     10-07    126<L>  |  89<L>  |  26<H>  ----------------------------<  111<H>  4.9   |  27  |  1.64<H>    Ca    10.0      07 Oct 2022 13:00  Mg     1.5     10-07    TPro  9.7<H>  /  Alb  3.6  /  TBili  0.5  /  DBili  x   /  AST  47<H>  /  ALT  86<H>  /  AlkPhos  330<H>  10-07    LIVER FUNCTIONS - ( 07 Oct 2022 13:00 )  Alb: 3.6 g/dL / Pro: 9.7 gm/dL / ALK PHOS: 330 U/L / ALT: 86 U/L / AST: 47 U/L / GGT: x               
SURGERY PROGRESS HPI:  Pt seen and examined at bedside. Says he is feeling better.  Output from ostomy noted below. No acute events overnight. Pt tolerating diet. Pt denies nausea and vomiting.      Vital Signs Last 24 Hrs  T(C): 36.4 (09 Oct 2022 05:20), Max: 36.7 (08 Oct 2022 10:55)  T(F): 97.6 (09 Oct 2022 05:20), Max: 98.1 (08 Oct 2022 10:55)  HR: 67 (09 Oct 2022 05:20) (67 - 83)  BP: 139/77 (09 Oct 2022 05:20) (105/67 - 139/77)  BP(mean): --  RR: 18 (09 Oct 2022 05:20) (17 - 18)  SpO2: 100% (09 Oct 2022 05:20) (98% - 100%)    Parameters below as of 08 Oct 2022 17:10  Patient On (Oxygen Delivery Method): room air          PHYSICAL EXAM:    GENERAL: NAD  HEAD:  Atraumatic, Normocephalic  CHEST/LUNG: Breathing normally, in NAD  HEART: RRR   ABDOMEN: non distended, +BS, soft, non tender, no guarding, ostomy with air and liquid stool in bag. Midline incision healing well, dressing CDI  EXTREMITIES:  calf soft, non tender b/l    I&O's Detail    07 Oct 2022 07:01  -  08 Oct 2022 07:00  --------------------------------------------------------  IN:  Total IN: 0 mL    OUT:    Ileostomy (mL): 1250 mL    Voided (mL): 300 mL  Total OUT: 1550 mL    Total NET: -1550 mL      08 Oct 2022 07:01  -  09 Oct 2022 05:54  --------------------------------------------------------  IN:    Oral Fluid: 2030 mL  Total IN: 2030 mL    OUT:    Ileostomy (mL): 1610 mL  Total OUT: 1610 mL    Total NET: 420 mL          LABS:                        11.4   9.63  )-----------( 528      ( 08 Oct 2022 07:15 )             36.2     10-08    129<L>  |  97  |  19  ----------------------------<  84  4.7   |  24  |  1.07    Ca    9.3      08 Oct 2022 07:15  Phos  3.2     10-08  Mg     1.3     10-08    TPro  9.7<H>  /  Alb  3.6  /  TBili  0.5  /  DBili  x   /  AST  47<H>  /  ALT  86<H>  /  AlkPhos  330<H>  10-07    PT/INR - ( 07 Oct 2022 13:00 )   PT: 19.9 sec;   INR: 1.65 ratio         PTT - ( 07 Oct 2022 13:00 )  PTT:38.2 sec  Urinalysis Basic - ( 07 Oct 2022 16:45 )    Color: Yellow / Appearance: Clear / S.020 / pH: x  Gluc: x / Ketone: Negative  / Bili: Negative / Urobili: Negative mg/dL   Blood: x / Protein: 30 mg/dL / Nitrite: Negative   Leuk Esterase: Trace / RBC: 0-2 /HPF / WBC 3-5   Sq Epi: x / Non Sq Epi: Occasional / Bacteria: Moderate      
Surgery NP note  Patient seen and examined bedside resting comfortably with wife.   No complaints offered. No Abdominal pain.  Denies nausea and vomiting. Tolerating diet.  Ostomy with liquid output    T(F): 98.5 (10-11-22 @ 10:42), Max: 98.5 (10-11-22 @ 10:42)  HR: 77 (10-11-22 @ 10:42) (71 - 79)  BP: 99/62 (10-11-22 @ 10:42) (99/62 - 119/80)  RR: 18 (10-11-22 @ 10:42) (18 - 18)  SpO2: 99% (10-11-22 @ 10:42) (99% - 100%)  Wt(kg): --  CAPILLARY BLOOD GLUCOSE          PHYSICAL EXAM:  General: NAD, WDWN.   Neuro:  Alert & responsive  HEENT: NCAT, EOMI, conjunctiva clear  CV: +S1+S2 regular rate and rhythm  Lung: clear to ausculation bilaterally, respirations nonlabored, good inspiratory effort  Abdomen: soft, Non tender, No Distension. Normal active BS, ileostomy pink viable with brown liquid output, 600 ml /12hrs. MIDLINE wound Dsg changed, no purulence, + Granulation. healing well  Extremities: no pedal edema or calf tenderness noted     LABS:                        10.8   7.91  )-----------( 466      ( 11 Oct 2022 06:13 )             34.6     10-11    133<L>  |  102  |  10  ----------------------------<  87  4.5   |  25  |  0.78    Ca    9.2      11 Oct 2022 06:13  Phos  3.5     10-11  Mg     1.3     10-11          I&O's Detail    10 Oct 2022 07:01  -  11 Oct 2022 07:00  --------------------------------------------------------  IN:    Oral Fluid: 200 mL  Total IN: 200 mL    OUT:    Ileostomy (mL): 800 mL  Total OUT: 800 mL    Total NET: -600 mL          
SURGERY PROGRESS HPI:  Pt seen and examined at bedside. Pt is feeling much better, increased appetite, solid stool in ostomy.      Vital Signs Last 24 Hrs  T(C): 36.1 (10 Oct 2022 05:14), Max: 36.6 (09 Oct 2022 10:40)  T(F): 97 (10 Oct 2022 05:14), Max: 97.8 (09 Oct 2022 10:40)  HR: 69 (10 Oct 2022 05:14) (69 - 78)  BP: 138/84 (10 Oct 2022 05:14) (108/69 - 138/84)  BP(mean): --  RR: 18 (10 Oct 2022 05:14) (18 - 18)  SpO2: 99% (10 Oct 2022 05:14) (99% - 100%)    Parameters below as of 09 Oct 2022 23:52  Patient On (Oxygen Delivery Method): room air          PHYSICAL EXAM:    GENERAL: NAD  HEAD:  Atraumatic, Normocephalic  CHEST/LUNG: Clear to ausculation, bilaterally   HEART: RRR S1S2  ABDOMEN: non distended, +BS, soft, non tender, no guarding, ostomy pink with solid stool, air.    EXTREMITIES:  calf soft, non tender b/l    I&O's Detail    09 Oct 2022 07:01  -  10 Oct 2022 07:00  --------------------------------------------------------  IN:    Oral Fluid: 1040 mL  Total IN: 1040 mL    OUT:    Ileostomy (mL): 750 mL    Voided (mL): 4 mL  Total OUT: 754 mL    Total NET: 286 mL          LABS:                        10.6   8.30  )-----------( 451      ( 10 Oct 2022 06:00 )             34.1     10-10    133<L>  |  101  |  9   ----------------------------<  83  4.7   |  25  |  0.86    Ca    8.5      10 Oct 2022 06:00  Phos  3.3     10-10  Mg     1.8     10-10

## 2022-10-19 ENCOUNTER — APPOINTMENT (OUTPATIENT)
Dept: BARIATRICS | Facility: CLINIC | Age: 57
End: 2022-10-19

## 2022-10-19 VITALS
TEMPERATURE: 97.9 F | SYSTOLIC BLOOD PRESSURE: 125 MMHG | OXYGEN SATURATION: 100 % | WEIGHT: 135.5 LBS | DIASTOLIC BLOOD PRESSURE: 77 MMHG | HEART RATE: 80 BPM | HEIGHT: 67 IN | BODY MASS INDEX: 21.27 KG/M2

## 2022-10-19 PROCEDURE — 99024 POSTOP FOLLOW-UP VISIT: CPT

## 2022-10-21 NOTE — PLAN
[FreeTextEntry1] : Dakins not indicated at this time\par silver nitrate applied to wound\par Daily dressing changes with dry dressing\par Cont lomotil and loperamide qid\par F/U next week for wound check

## 2022-10-21 NOTE — HISTORY OF PRESENT ILLNESS
[de-identified] : Patient is a 55 y/o man who presented with a large bowel obstruction, septic shock in the setting of gram negative bacteremia on 9/10/22.  He underwent damage control laparotomy, subtotal colectomy with abdominal packing.  He was resuscitated over the ensuing days and was taken back to the OR on 9/12/22 where necrotic/ischemic small bowel was resected and end enterostomy was created.  Of note, he still has gross tumor in the pelvis.\par \par Post operatively he was appreciated to develop SMV, Left Iliac, Left Radial and Left Ulnar Vein Thromboses.\par  [de-identified] : Hospitalized last week for dehydration and repeat CT performed as in-patient - collection markedly improved and abx stopped\par \par Currently tolerating PO\par Energy and stamina improved\par had fever over the weekend, but this is improved\par No abdominal pain

## 2022-10-21 NOTE — PHYSICAL EXAM
[de-identified] : NCAT, NAD [de-identified] : Incision healing well with granulation tissue at lower aspect\par Ostomy with appliance intact and healthy appearing mucosa

## 2022-10-24 ENCOUNTER — APPOINTMENT (OUTPATIENT)
Dept: HEMATOLOGY ONCOLOGY | Facility: CLINIC | Age: 57
End: 2022-10-24

## 2022-10-24 VITALS
DIASTOLIC BLOOD PRESSURE: 91 MMHG | HEART RATE: 80 BPM | RESPIRATION RATE: 16 BRPM | BODY MASS INDEX: 21.45 KG/M2 | TEMPERATURE: 98.1 F | OXYGEN SATURATION: 99 % | HEIGHT: 67.01 IN | SYSTOLIC BLOOD PRESSURE: 129 MMHG | WEIGHT: 136.66 LBS

## 2022-10-24 PROCEDURE — 99214 OFFICE O/P EST MOD 30 MIN: CPT

## 2022-10-24 NOTE — HISTORY OF PRESENT ILLNESS
[Disease: _____________________] : Disease: [unfilled] [AJCC Stage: ____] : AJCC Stage: [unfilled] [de-identified] : 57 year old M who was in usual state of health with no PMH experienced abdominal pain and vomiting X 2 days and presented to ER of [facility] on 9/10/22. Imaging revealed a large bowel obstruction secondary to a sigmoid colon mass with pneumatosis and lactate of 11. He was emergently taken to OR and ex-lap and subtotal colectomy were performed with post-op course complicated by septic shock, placed on broad spectrum antibiotics and stress dose steroids. He was taken back to OR on 9/12/22 for closure of abdominal fascia, resection of necrotic/ischemic small bowel and creation of enterostomy. Post operatively he was noted to develop SMV and left external iliac DVT for which he remains on Eliquis. He presents to cancer center today to establish care. \par \par Patient states he has been healthy, was not taking any medication prior to diagnosis and lived an active lifestyle. He had never had a colonoscopy, was planned for one during pandemic. Otherwise, he was not experiencing weight loss, fatigue, abdominal pain, nausea/vomiting, change in bowel habits- only the 2 day history of vomiting on presentation. 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 lives at home with wife and 2 children aged 13 and 6\par \par CT A/P 9/10/22: 1.6cm sigmoid tumor resulting in high grade colonic obstruction with colon distended to 10.1cm \par Path from distal sigmoid biopsy 9/10/22: invasive adenocarcinoma, moderately differentiated, 7cm tumor invades visceral peritoneum with macroscopic tumor perforation \par \par 10/21/22: Case reviewed with patient's surgeon Dr. Plasencia who explained patient still has  gross tumor in the pelvis. Has recommended f/u with Dr. Moe of surg-onc  [de-identified] : MMR proteins intact [de-identified] : [id number] [de-identified] : Patient presents to clinic with wife today. He recently had an admission for dehydration due to increased ostomy output, now improved. Feels stronger daily, has appt with surg-onc Friday to discuss further plans re:residual tumor in pelvis.

## 2022-10-24 NOTE — ASSESSMENT
[FreeTextEntry1] : 57 year old male with newly diagnosed L. sided colon cancer, KEY, at least Stage IIb ([id number]) s/p subtotal colectomy, small bowel resection and enterostomy presents to cancer center to establish care. \par \par #L. sided colon cancer, KEY, at least Stage IIb ([id number]) s/p subtotal colectomy, however there is still gross tumor in pelvis\par - patient will need f/u with surg-onc, Dr. Moe. Has appt on Friday, will discuss treatment plan after \par - due to high risk features (presented with bowel obstruction, presence of macroscopic tumor perforation) will recommend adjuvant therapy. \par -Previously discussed CAPOX (3mo) vs FOLFOX (6mo) with patient and wife. Chemo education provided. Side effects included but not limited to fatigue, nausea/vomiting, diarrhea, low blood counts, neuropathy reviewed. Patient and wife will go home and discuss options and let us know how they want to proceed. Needs mediport, repeat imaging and surgical oncology appointment later this week. 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

## 2022-10-26 ENCOUNTER — APPOINTMENT (OUTPATIENT)
Dept: BARIATRICS | Facility: CLINIC | Age: 57
End: 2022-10-26

## 2022-10-26 ENCOUNTER — NON-APPOINTMENT (OUTPATIENT)
Age: 57
End: 2022-10-26

## 2022-10-26 VITALS
SYSTOLIC BLOOD PRESSURE: 121 MMHG | DIASTOLIC BLOOD PRESSURE: 84 MMHG | TEMPERATURE: 97.2 F | HEIGHT: 67 IN | HEART RATE: 72 BPM | OXYGEN SATURATION: 98 % | WEIGHT: 134.38 LBS | BODY MASS INDEX: 21.09 KG/M2

## 2022-10-26 PROCEDURE — 99024 POSTOP FOLLOW-UP VISIT: CPT

## 2022-10-26 RX ORDER — LOPERAMIDE HYDROCHLORIDE 2 MG/1
2 TABLET ORAL
Qty: 90 | Refills: 3 | Status: ACTIVE | COMMUNITY
Start: 2022-10-26 | End: 1900-01-01

## 2022-10-26 NOTE — HISTORY OF PRESENT ILLNESS
[de-identified] : Patient is a 55 y/o man who presented with a large bowel obstruction, septic shock in the setting of gram negative bacteremia on 9/10/22.  He underwent damage control laparotomy, subtotal colectomy with abdominal packing.  He was resuscitated over the ensuing days and was taken back to the OR on 9/12/22 where necrotic/ischemic small bowel was resected and end enterostomy was created.  Of note, he still has gross tumor in the pelvis.\par \par Post operatively he was appreciated to develop SMV, Left Iliac, Left Radial and Left Ulnar Vein Thromboses.\par  [de-identified] : Currently tolerating PO\par \par Being followed by Medical Oncology\par Upcoming appointment with surgical oncology

## 2022-10-26 NOTE — PLAN
[FreeTextEntry1] : -  Cont loperamide and lomotil for diarrhea prevention\par -  Cont Apixaban for DVT\par -  Cont local wound care - silver nitrate application today\par -  Cont F/U with Med Onc and Surg Onc\par -  F/U with me in 2 weeks

## 2022-10-26 NOTE — PHYSICAL EXAM
[de-identified] : Osotmy output thick\par Ostomy with healthy appearing mucosa\par wound with granulation tissue of lower midline

## 2022-10-27 ENCOUNTER — APPOINTMENT (OUTPATIENT)
Dept: INTERVENTIONAL RADIOLOGY/VASCULAR | Facility: CLINIC | Age: 57
End: 2022-10-27

## 2022-10-28 ENCOUNTER — APPOINTMENT (OUTPATIENT)
Dept: SURGICAL ONCOLOGY | Facility: CLINIC | Age: 57
End: 2022-10-28

## 2022-10-28 VITALS
WEIGHT: 134 LBS | RESPIRATION RATE: 17 BRPM | OXYGEN SATURATION: 99 % | HEART RATE: 71 BPM | DIASTOLIC BLOOD PRESSURE: 82 MMHG | SYSTOLIC BLOOD PRESSURE: 127 MMHG | BODY MASS INDEX: 21.03 KG/M2 | TEMPERATURE: 98 F | HEIGHT: 67 IN

## 2022-10-28 PROCEDURE — 99205 OFFICE O/P NEW HI 60 MIN: CPT

## 2022-10-28 NOTE — PHYSICAL EXAM
[FreeTextEntry1] : General: No acute distress. Well nourished and well kempt.\par Head: AT/NC\par Eyes: PERRL. EOMI. No scleral icterus\par Pulmonary: No respiratory distress. Clear to auscultation bilaterally. No wheezes, rales, or rhonchi. \par CV: Regular rate and rhythm. No chest wall tenderness. Distal pulses intact. Good capillary refill.\par ABD: Soft. NT/ND. No rebound, no guarding, no rigidity. No peritoneal signs. No masses.  Incision with minimal breakdown covered with dry dressing. Ileostomy functional\par Extremities: Warm. No edema. 2+ pulses.\par Neurological: A&O x 4.\par Psychiatric: Normal affect and mood.\par \par

## 2022-10-28 NOTE — ASSESSMENT
[FreeTextEntry1] : 57M with obstructing sigmoid cancer that presented with colonic ischemia requiring total abdominal colectomy with end ileostomy. Initially was left in discontinuity with open abdomen given septic shock with GNR bacteremia. He has recovered very well and is home with stable/ increasing weight, tolerating a diet, and functional ileostomy. \par \par Per discussion with his surgeon, there was residual disease in the pelvis. \par \par We discussed options at this point including re-exploration versus systemic therapy. \par \par I think the best course of action at this juncture is systemic therapy with interval imaging prior to considering resection and/ or ileostomy reversal, which has the potential for considerable morbidity. I don’t think diagnostic laparoscopy will be feasible/ safe given the extent of prior operation. \par \par Plan: \par Check baseline CEA\par Obtain pelvic MRI to see is we can visualize residual disease\par Consider PET-CT\par Tumor board discussion\par Pathology review\par FU with Dr. Plasencia and Dr. Coronado\par If plan for exploration will need to coordinate with colorectal and urology\par

## 2022-11-08 ENCOUNTER — APPOINTMENT (OUTPATIENT)
Dept: MRI IMAGING | Facility: CLINIC | Age: 57
End: 2022-11-08

## 2022-11-08 ENCOUNTER — OUTPATIENT (OUTPATIENT)
Dept: OUTPATIENT SERVICES | Facility: HOSPITAL | Age: 57
LOS: 1 days | End: 2022-11-08
Payer: MEDICAID

## 2022-11-08 DIAGNOSIS — Z90.49 ACQUIRED ABSENCE OF OTHER SPECIFIED PARTS OF DIGESTIVE TRACT: Chronic | ICD-10-CM

## 2022-11-08 DIAGNOSIS — C18.9 MALIGNANT NEOPLASM OF COLON, UNSPECIFIED: ICD-10-CM

## 2022-11-08 PROCEDURE — 72197 MRI PELVIS W/O & W/DYE: CPT

## 2022-11-08 PROCEDURE — A9585: CPT

## 2022-11-08 PROCEDURE — 72197 MRI PELVIS W/O & W/DYE: CPT | Mod: 26

## 2022-11-09 ENCOUNTER — APPOINTMENT (OUTPATIENT)
Dept: BARIATRICS | Facility: CLINIC | Age: 57
End: 2022-11-09

## 2022-11-09 VITALS
HEART RATE: 65 BPM | SYSTOLIC BLOOD PRESSURE: 128 MMHG | OXYGEN SATURATION: 98 % | BODY MASS INDEX: 20.63 KG/M2 | TEMPERATURE: 98.1 F | HEIGHT: 67 IN | WEIGHT: 131.44 LBS | DIASTOLIC BLOOD PRESSURE: 84 MMHG

## 2022-11-09 PROCEDURE — 99024 POSTOP FOLLOW-UP VISIT: CPT

## 2022-11-14 ENCOUNTER — RESULT REVIEW (OUTPATIENT)
Age: 57
End: 2022-11-14

## 2022-11-14 ENCOUNTER — APPOINTMENT (OUTPATIENT)
Dept: HEMATOLOGY ONCOLOGY | Facility: CLINIC | Age: 57
End: 2022-11-14

## 2022-11-14 VITALS
WEIGHT: 134.04 LBS | TEMPERATURE: 97.5 F | OXYGEN SATURATION: 99 % | BODY MASS INDEX: 21.04 KG/M2 | RESPIRATION RATE: 17 BRPM | HEIGHT: 67 IN | DIASTOLIC BLOOD PRESSURE: 75 MMHG | SYSTOLIC BLOOD PRESSURE: 151 MMHG | HEART RATE: 69 BPM

## 2022-11-14 LAB
BASOPHILS # BLD AUTO: 0.06 K/UL — SIGNIFICANT CHANGE UP (ref 0–0.2)
BASOPHILS NFR BLD AUTO: 0.9 % — SIGNIFICANT CHANGE UP (ref 0–2)
EOSINOPHIL # BLD AUTO: 0.53 K/UL — HIGH (ref 0–0.5)
EOSINOPHIL NFR BLD AUTO: 7.6 % — HIGH (ref 0–6)
HCT VFR BLD CALC: 41.1 % — SIGNIFICANT CHANGE UP (ref 39–50)
HGB BLD-MCNC: 12.5 G/DL — LOW (ref 13–17)
IMM GRANULOCYTES NFR BLD AUTO: 0.3 % — SIGNIFICANT CHANGE UP (ref 0–0.9)
LYMPHOCYTES # BLD AUTO: 1.93 K/UL — SIGNIFICANT CHANGE UP (ref 1–3.3)
LYMPHOCYTES # BLD AUTO: 27.6 % — SIGNIFICANT CHANGE UP (ref 13–44)
MCHC RBC-ENTMCNC: 23.4 PG — LOW (ref 27–34)
MCHC RBC-ENTMCNC: 30.4 G/DL — LOW (ref 32–36)
MCV RBC AUTO: 76.8 FL — LOW (ref 80–100)
MONOCYTES # BLD AUTO: 0.46 K/UL — SIGNIFICANT CHANGE UP (ref 0–0.9)
MONOCYTES NFR BLD AUTO: 6.6 % — SIGNIFICANT CHANGE UP (ref 2–14)
NEUTROPHILS # BLD AUTO: 4 K/UL — SIGNIFICANT CHANGE UP (ref 1.8–7.4)
NEUTROPHILS NFR BLD AUTO: 57 % — SIGNIFICANT CHANGE UP (ref 43–77)
NRBC # BLD: 0 /100 WBCS — SIGNIFICANT CHANGE UP (ref 0–0)
PLATELET # BLD AUTO: 463 K/UL — HIGH (ref 150–400)
RBC # BLD: 5.35 M/UL — SIGNIFICANT CHANGE UP (ref 4.2–5.8)
RBC # FLD: 15.7 % — HIGH (ref 10.3–14.5)
WBC # BLD: 7 K/UL — SIGNIFICANT CHANGE UP (ref 3.8–10.5)
WBC # FLD AUTO: 7 K/UL — SIGNIFICANT CHANGE UP (ref 3.8–10.5)

## 2022-11-14 PROCEDURE — 99214 OFFICE O/P EST MOD 30 MIN: CPT

## 2022-11-14 NOTE — ASSESSMENT
[FreeTextEntry1] : 57 year old male with newly diagnosed L. sided colon cancer, KEY, at least Stage IIb ([id number]) s/p subtotal colectomy, small bowel resection and enterostomy presents to cancer center to establish care. \par \par #L. sided colon cancer, KEY, at least Stage IIb ([id number]) s/p subtotal colectomy, however there is still gross tumor in pelvis\par - patient had f/u with surg-onc, Dr. Moe. MRI pelvis performed 11/8 and reviewed imaging with her- concern for residual disease in dome of bladder. \par - At this time would recommend initiation of systemic therapy and ex-lap with surg-onc later, but patient would like to wait until his case is presented at tumor board to make this decision. In addition he has expressed interest in receiving a second opinion which I encouraged. \par - still needs Mediport. does not want to schedule at this time. \par - patient with high risk features (presented with bowel obstruction, presence of macroscopic tumor perforation). Previously discussed CAPOX vs FOLFOX (6mo) with patient and wife. Chemo education provided. Side effects included but not limited to fatigue, nausea/vomiting, diarrhea, low blood counts, neuropathy reviewed. \par \par #SMV thrombus and DVT L. external iliac vein\par - c/w Eliquis\par \par RTC in 3 weeks

## 2022-11-14 NOTE — HISTORY OF PRESENT ILLNESS
[Disease: _____________________] : Disease: [unfilled] [AJCC Stage: ____] : AJCC Stage: [unfilled] [de-identified] : 57 year old M who was in usual state of health with no PMH experienced abdominal pain and vomiting X 2 days and presented to ER of [facility] on 9/10/22. Imaging revealed a large bowel obstruction secondary to a sigmoid colon mass with pneumatosis and lactate of 11. He was emergently taken to OR and ex-lap and subtotal colectomy were performed with post-op course complicated by septic shock, placed on broad spectrum antibiotics and stress dose steroids. He was taken back to OR on 9/12/22 for closure of abdominal fascia, resection of necrotic/ischemic small bowel and creation of enterostomy. Post operatively he was noted to develop SMV and left external iliac DVT for which he remains on Eliquis. He presents to cancer center today to establish care. \par \par Patient states he has been healthy, was not taking any medication prior to diagnosis and lived an active lifestyle. He had never had a colonoscopy, was planned for one during pandemic. Otherwise, he was not experiencing weight loss, fatigue, abdominal pain, nausea/vomiting, change in bowel habits- only the 2 day history of vomiting on presentation. 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 lives at home with wife and 2 children aged 13 and 6\par \par CT A/P 9/10/22: 1.6cm sigmoid tumor resulting in high grade colonic obstruction with colon distended to 10.1cm \par Path from distal sigmoid biopsy 9/10/22: invasive adenocarcinoma, moderately differentiated, 7cm tumor invades visceral peritoneum with macroscopic tumor perforation \par \par 10/21/22: Case reviewed with patient's surgeon Dr. Plasencia who explained patient still has  gross tumor in the pelvis. Has recommended f/u with Dr. Moe of surg-onc  [de-identified] : [id number] [de-identified] : MMR proteins intact [de-identified] : Patient presents to clinic with wife today. He recently had MRI pelvis 11/8/22 with 2.1 X 2.1 X 1.8 cm enhancing abnormality in wall of the dome of bladder, likely residual disease post-op. At this time would recommend initiation of chemotherapy then follow up with surg-onc for ex-lap however patient is hesitant to begin at this time. Would like to wait until his case is presented at tumor board. He otherwise has no complaints today and is feeling well.

## 2022-11-15 LAB
ALBUMIN SERPL ELPH-MCNC: 4.6 G/DL
ALP BLD-CCNC: 246 U/L
ALT SERPL-CCNC: 222 U/L
ANION GAP SERPL CALC-SCNC: 13 MMOL/L
AST SERPL-CCNC: 82 U/L
BILIRUB SERPL-MCNC: 0.7 MG/DL
BUN SERPL-MCNC: 16 MG/DL
CALCIUM SERPL-MCNC: 9.8 MG/DL
CEA SERPL-MCNC: 3 NG/ML
CHLORIDE SERPL-SCNC: 98 MMOL/L
CO2 SERPL-SCNC: 21 MMOL/L
CREAT SERPL-MCNC: 0.98 MG/DL
EGFR: 89 ML/MIN/1.73M2
GLUCOSE SERPL-MCNC: 88 MG/DL
HBV CORE IGG+IGM SER QL: NONREACTIVE
HBV SURFACE AB SER QL: NONREACTIVE
HBV SURFACE AG SER QL: NONREACTIVE
HCV AB SER QL: NONREACTIVE
HCV S/CO RATIO: 0.14 S/CO
POTASSIUM SERPL-SCNC: 5 MMOL/L
PROT SERPL-MCNC: 7.8 G/DL
SODIUM SERPL-SCNC: 132 MMOL/L

## 2022-11-22 NOTE — PLAN
[FreeTextEntry1] : -  Cont loperamide and lomotil for diarrhea prevention\par -  Cont Apixaban for DVT\par -  Cont F/U with Med Onc and Surg Onc\par -  F/U with me in 2 months

## 2022-11-22 NOTE — HISTORY OF PRESENT ILLNESS
[de-identified] : Patient is a 55 y/o man who presented with a large bowel obstruction, septic shock in the setting of gram negative bacteremia on 9/10/22.  He underwent damage control laparotomy, subtotal colectomy with abdominal packing.  He was resuscitated over the ensuing days and was taken back to the OR on 9/12/22 where necrotic/ischemic small bowel was resected and end enterostomy was created.  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

## 2022-11-22 NOTE — PHYSICAL EXAM
[de-identified] : NCAT, NAD [de-identified] : Wound - CDI\par Ostomy - healthy appearing mucosa, stool well-formed

## 2022-12-07 ENCOUNTER — NON-APPOINTMENT (OUTPATIENT)
Age: 57
End: 2022-12-07

## 2022-12-12 ENCOUNTER — NON-APPOINTMENT (OUTPATIENT)
Age: 57
End: 2022-12-12

## 2022-12-28 ENCOUNTER — APPOINTMENT (OUTPATIENT)
Dept: BARIATRICS | Facility: CLINIC | Age: 57
End: 2022-12-28
Payer: MEDICAID

## 2022-12-28 VITALS
OXYGEN SATURATION: 98 % | TEMPERATURE: 98.1 F | WEIGHT: 121.44 LBS | HEART RATE: 65 BPM | BODY MASS INDEX: 19.06 KG/M2 | DIASTOLIC BLOOD PRESSURE: 84 MMHG | HEIGHT: 67 IN | SYSTOLIC BLOOD PRESSURE: 116 MMHG

## 2022-12-28 PROCEDURE — 99213 OFFICE O/P EST LOW 20 MIN: CPT

## 2023-01-03 ENCOUNTER — APPOINTMENT (OUTPATIENT)
Dept: UROLOGY | Facility: CLINIC | Age: 58
End: 2023-01-03

## 2023-01-05 NOTE — PHYSICAL EXAM
[de-identified] : Appears cachectic [de-identified] : Soft, non-tender, wound healed, ostomy with thin output

## 2023-01-05 NOTE — HISTORY OF PRESENT ILLNESS
[de-identified] : Patient is a 55 y/o man who presented with a large bowel obstruction, septic shock in the setting of gram negative bacteremia on 9/10/22.  He underwent damage control laparotomy, subtotal colectomy with abdominal packing.  He was resuscitated over the ensuing days and was taken back to the OR on 9/12/22 where necrotic/ischemic small bowel was resected and end enterostomy was created.  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 [de-identified] : Currently following up with oncology and surgical oncology.  Oncology recommending chemotherapy.  Patient is deferring at this time.  He has been engaging in a good amount of physical activity.  He has been losing weight.

## 2023-01-05 NOTE — PLAN
[FreeTextEntry1] : Encourage increase in protein intake (90g daily recommended)\par Encouraged to cont F/U with oncology\par Keep up with fluid intake\par F/U in 2 weeks\par

## 2023-01-11 ENCOUNTER — APPOINTMENT (OUTPATIENT)
Dept: BARIATRICS | Facility: CLINIC | Age: 58
End: 2023-01-11
Payer: MEDICAID

## 2023-01-11 VITALS
TEMPERATURE: 97.9 F | HEART RATE: 76 BPM | DIASTOLIC BLOOD PRESSURE: 82 MMHG | BODY MASS INDEX: 19.93 KG/M2 | OXYGEN SATURATION: 100 % | HEIGHT: 67 IN | SYSTOLIC BLOOD PRESSURE: 121 MMHG | WEIGHT: 127 LBS

## 2023-01-11 PROCEDURE — 99212 OFFICE O/P EST SF 10 MIN: CPT

## 2023-01-12 NOTE — PLAN
[FreeTextEntry1] : - Cont loperamide and lomotil\par - Cont 90g of protein daily\par - F/U in 2 weeks\par - F/U with Dr. Esparza to discuss reversal

## 2023-01-12 NOTE — HISTORY OF PRESENT ILLNESS
[de-identified] : Patient is a 55 y/o man who presented with a large bowel obstruction, septic shock in the setting of gram negative bacteremia on 9/10/22.  He underwent damage control laparotomy, subtotal colectomy with abdominal packing.  He was resuscitated over the ensuing days and was taken back to the OR on 9/12/22 where necrotic/ischemic small bowel was resected and end enterostomy was created.  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 [de-identified] : Tolerating PO\par Has been maintaining adequate PO Protein intake\par Good exercise tolerance\par Foregoing chemotherapy at this time

## 2023-01-31 ENCOUNTER — NON-APPOINTMENT (OUTPATIENT)
Age: 58
End: 2023-01-31

## 2023-01-31 ENCOUNTER — APPOINTMENT (OUTPATIENT)
Dept: UROLOGY | Facility: CLINIC | Age: 58
End: 2023-01-31
Payer: MEDICAID

## 2023-01-31 ENCOUNTER — LABORATORY RESULT (OUTPATIENT)
Age: 58
End: 2023-01-31

## 2023-01-31 ENCOUNTER — OUTPATIENT (OUTPATIENT)
Dept: OUTPATIENT SERVICES | Facility: HOSPITAL | Age: 58
LOS: 1 days | End: 2023-01-31
Payer: MEDICAID

## 2023-01-31 DIAGNOSIS — Z90.49 ACQUIRED ABSENCE OF OTHER SPECIFIED PARTS OF DIGESTIVE TRACT: Chronic | ICD-10-CM

## 2023-01-31 DIAGNOSIS — R35.0 FREQUENCY OF MICTURITION: ICD-10-CM

## 2023-01-31 PROCEDURE — 52000 CYSTOURETHROSCOPY: CPT

## 2023-01-31 PROCEDURE — 99204 OFFICE O/P NEW MOD 45 MIN: CPT | Mod: 25

## 2023-02-01 ENCOUNTER — APPOINTMENT (OUTPATIENT)
Dept: BARIATRICS | Facility: CLINIC | Age: 58
End: 2023-02-01
Payer: MEDICAID

## 2023-02-01 VITALS
DIASTOLIC BLOOD PRESSURE: 79 MMHG | SYSTOLIC BLOOD PRESSURE: 118 MMHG | HEIGHT: 67 IN | WEIGHT: 127 LBS | HEART RATE: 85 BPM | OXYGEN SATURATION: 100 % | BODY MASS INDEX: 19.93 KG/M2 | TEMPERATURE: 97.9 F

## 2023-02-01 LAB
ALBUMIN SERPL ELPH-MCNC: 4.8 G/DL
ALP BLD-CCNC: 134 U/L
ALT SERPL-CCNC: 27 U/L
ANION GAP SERPL CALC-SCNC: 14 MMOL/L
AST SERPL-CCNC: 28 U/L
BASOPHILS # BLD AUTO: 0.26 K/UL
BASOPHILS NFR BLD AUTO: 2.6 %
BILIRUB SERPL-MCNC: 1.1 MG/DL
BUN SERPL-MCNC: 26 MG/DL
CALCIUM SERPL-MCNC: 9.5 MG/DL
CHLORIDE SERPL-SCNC: 95 MMOL/L
CO2 SERPL-SCNC: 23 MMOL/L
CREAT SERPL-MCNC: 1.52 MG/DL
EGFR: 53 ML/MIN/1.73M2
EOSINOPHIL # BLD AUTO: 0.35 K/UL
EOSINOPHIL NFR BLD AUTO: 3.5 %
GLUCOSE SERPL-MCNC: 80 MG/DL
HCT VFR BLD CALC: 46.8 %
HGB BLD-MCNC: 14.8 G/DL
LYMPHOCYTES # BLD AUTO: 2.61 K/UL
LYMPHOCYTES NFR BLD AUTO: 26.1 %
MAN DIFF?: NORMAL
MCHC RBC-ENTMCNC: 24.3 PG
MCHC RBC-ENTMCNC: 31.6 GM/DL
MCV RBC AUTO: 76.8 FL
MONOCYTES # BLD AUTO: 0.61 K/UL
MONOCYTES NFR BLD AUTO: 6.1 %
NEUTROPHILS # BLD AUTO: 6.18 K/UL
NEUTROPHILS NFR BLD AUTO: 61.7 %
PLATELET # BLD AUTO: 650 K/UL
POTASSIUM SERPL-SCNC: 6.1 MMOL/L
PROT SERPL-MCNC: 8.2 G/DL
RBC # BLD: 6.09 M/UL
RBC # FLD: 21.2 %
SODIUM SERPL-SCNC: 132 MMOL/L
WBC # FLD AUTO: 10.01 K/UL

## 2023-02-01 PROCEDURE — 99212 OFFICE O/P EST SF 10 MIN: CPT

## 2023-02-01 NOTE — HISTORY OF PRESENT ILLNESS
[FreeTextEntry1] : Patient initially presented with a large obstructing mass in the sigmoid colon.  He had a bowel obstruction associated.  He underwent surgery including resection of the bowel and diverting ostomy.  Subsequent imaging demonstrated an enhancing lesion adjacent to or involving the anterior bladder wall.  Patient presents now for evaluation.\par \par He denies bothersome urinary symptoms.  Denies gross hematuria or dysuria.  No prior prostate or bladder surgery.\par \par He was recommended initially to undergo adjuvant chemotherapy however the patient opted against.  His last imaging was about 3 months ago.

## 2023-02-01 NOTE — HISTORY OF PRESENT ILLNESS
[de-identified] : Patient is a 57 y/o man who presented with a large bowel obstruction, septic shock in the setting of gram negative bacteremia on 9/10/22.  He underwent damage control laparotomy, subtotal colectomy with abdominal packing.  He was resuscitated over the ensuing days and was taken back to the OR on 9/12/22 where necrotic/ischemic small bowel was resected and end enterostomy was created.  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 [de-identified] : Being followed by surg onc and urology\par Tolerating PO\par

## 2023-02-01 NOTE — PHYSICAL EXAM
[General Appearance - Well Developed] : well developed [General Appearance - Well Nourished] : well nourished [Normal Appearance] : normal appearance [Well Groomed] : well groomed [General Appearance - In No Acute Distress] : no acute distress [Edema] : no peripheral edema [] : no respiratory distress [Respiration, Rhythm And Depth] : normal respiratory rhythm and effort [Exaggerated Use Of Accessory Muscles For Inspiration] : no accessory muscle use [Abdomen Soft] : soft [Abdomen Tenderness] : non-tender [Costovertebral Angle Tenderness] : no ~M costovertebral angle tenderness [Urethral Meatus] : meatus normal [Penis Abnormality] : normal circumcised penis [Urinary Bladder Findings] : the bladder was normal on palpation [Testes Tenderness] : no tenderness of the testes [Testes Mass (___cm)] : there were no testicular masses

## 2023-02-01 NOTE — PHYSICAL EXAM
[de-identified] : Soft, non-tender, non-distended\par Incisions CDI\par Ileostomy with healthy appearing mucosa

## 2023-02-01 NOTE — ASSESSMENT
[FreeTextEntry1] : Previous CT and MRI imaging reviewed.  Based on imaging I recommended cystoscopy which was performed today in the office.  On cystoscopy was a large area in the posterior bladder wall with a submucosal nodularity.  The overlying mucosa was normal.  Findings are consistent with either extrinsic compression or intramural nodule of the bladder.\par \par I recommended that he undergo repeat imaging.  MRI abdomen/pelvis with and without IV contrast was ordered.  Labs today.  Likely will require cystoscopy and biopsy to determine if this represents a colon cancer recurrent implant.  All questions answered.

## 2023-02-03 DIAGNOSIS — C18.9 MALIGNANT NEOPLASM OF COLON, UNSPECIFIED: ICD-10-CM

## 2023-02-03 DIAGNOSIS — D41.4 NEOPLASM OF UNCERTAIN BEHAVIOR OF BLADDER: ICD-10-CM

## 2023-02-14 LAB
ANION GAP SERPL CALC-SCNC: 14 MMOL/L
BUN SERPL-MCNC: 29 MG/DL
CALCIUM SERPL-MCNC: 10.3 MG/DL
CHLORIDE SERPL-SCNC: 95 MMOL/L
CO2 SERPL-SCNC: 20 MMOL/L
CREAT SERPL-MCNC: 1.46 MG/DL
EGFR: 55 ML/MIN/1.73M2
GLUCOSE SERPL-MCNC: 93 MG/DL
POTASSIUM SERPL-SCNC: 5.2 MMOL/L
SODIUM SERPL-SCNC: 130 MMOL/L

## 2023-03-01 ENCOUNTER — RESULT REVIEW (OUTPATIENT)
Age: 58
End: 2023-03-01

## 2023-03-02 ENCOUNTER — APPOINTMENT (OUTPATIENT)
Dept: MRI IMAGING | Facility: CLINIC | Age: 58
End: 2023-03-02

## 2023-03-16 ENCOUNTER — OUTPATIENT (OUTPATIENT)
Dept: OUTPATIENT SERVICES | Facility: HOSPITAL | Age: 58
LOS: 1 days | End: 2023-03-16
Payer: MEDICAID

## 2023-03-16 ENCOUNTER — APPOINTMENT (OUTPATIENT)
Dept: MRI IMAGING | Facility: CLINIC | Age: 58
End: 2023-03-16
Payer: MEDICAID

## 2023-03-16 DIAGNOSIS — Z90.49 ACQUIRED ABSENCE OF OTHER SPECIFIED PARTS OF DIGESTIVE TRACT: Chronic | ICD-10-CM

## 2023-03-16 DIAGNOSIS — D41.4 NEOPLASM OF UNCERTAIN BEHAVIOR OF BLADDER: ICD-10-CM

## 2023-03-16 PROCEDURE — 74183 MRI ABD W/O CNTR FLWD CNTR: CPT | Mod: 26

## 2023-03-16 PROCEDURE — 72197 MRI PELVIS W/O & W/DYE: CPT | Mod: 26

## 2023-03-16 PROCEDURE — A9585: CPT

## 2023-03-16 PROCEDURE — 74183 MRI ABD W/O CNTR FLWD CNTR: CPT

## 2023-03-16 PROCEDURE — 72197 MRI PELVIS W/O & W/DYE: CPT

## 2023-04-06 ENCOUNTER — EMERGENCY (EMERGENCY)
Facility: HOSPITAL | Age: 58
LOS: 0 days | Discharge: ANOTHER HOSPITAL | End: 2023-04-07
Attending: STUDENT IN AN ORGANIZED HEALTH CARE EDUCATION/TRAINING PROGRAM
Payer: MEDICAID

## 2023-04-06 VITALS
TEMPERATURE: 98 F | DIASTOLIC BLOOD PRESSURE: 74 MMHG | OXYGEN SATURATION: 95 % | WEIGHT: 116.84 LBS | HEIGHT: 67 IN | HEART RATE: 87 BPM | SYSTOLIC BLOOD PRESSURE: 107 MMHG | RESPIRATION RATE: 19 BRPM

## 2023-04-06 DIAGNOSIS — E87.1 HYPO-OSMOLALITY AND HYPONATREMIA: ICD-10-CM

## 2023-04-06 DIAGNOSIS — Z85.038 PERSONAL HISTORY OF OTHER MALIGNANT NEOPLASM OF LARGE INTESTINE: ICD-10-CM

## 2023-04-06 DIAGNOSIS — Z20.822 CONTACT WITH AND (SUSPECTED) EXPOSURE TO COVID-19: ICD-10-CM

## 2023-04-06 DIAGNOSIS — Z90.49 ACQUIRED ABSENCE OF OTHER SPECIFIED PARTS OF DIGESTIVE TRACT: Chronic | ICD-10-CM

## 2023-04-06 DIAGNOSIS — N13.2 HYDRONEPHROSIS WITH RENAL AND URETERAL CALCULOUS OBSTRUCTION: ICD-10-CM

## 2023-04-06 DIAGNOSIS — E87.5 HYPERKALEMIA: ICD-10-CM

## 2023-04-06 DIAGNOSIS — N17.9 ACUTE KIDNEY FAILURE, UNSPECIFIED: ICD-10-CM

## 2023-04-06 DIAGNOSIS — R11.2 NAUSEA WITH VOMITING, UNSPECIFIED: ICD-10-CM

## 2023-04-06 DIAGNOSIS — M54.50 LOW BACK PAIN, UNSPECIFIED: ICD-10-CM

## 2023-04-06 DIAGNOSIS — Z79.01 LONG TERM (CURRENT) USE OF ANTICOAGULANTS: ICD-10-CM

## 2023-04-06 DIAGNOSIS — R79.89 OTHER SPECIFIED ABNORMAL FINDINGS OF BLOOD CHEMISTRY: ICD-10-CM

## 2023-04-06 LAB
ALBUMIN SERPL ELPH-MCNC: 3.4 G/DL — SIGNIFICANT CHANGE UP (ref 3.3–5)
ALBUMIN SERPL ELPH-MCNC: 4.1 G/DL — SIGNIFICANT CHANGE UP (ref 3.3–5)
ALP SERPL-CCNC: 168 U/L — HIGH (ref 40–120)
ALP SERPL-CCNC: 209 U/L — HIGH (ref 40–120)
ALT FLD-CCNC: 114 U/L — HIGH (ref 12–78)
ALT FLD-CCNC: 135 U/L — HIGH (ref 12–78)
ANION GAP SERPL CALC-SCNC: 3 MMOL/L — LOW (ref 5–17)
ANION GAP SERPL CALC-SCNC: 8 MMOL/L — SIGNIFICANT CHANGE UP (ref 5–17)
APPEARANCE UR: ABNORMAL
AST SERPL-CCNC: 45 U/L — HIGH (ref 15–37)
AST SERPL-CCNC: 50 U/L — HIGH (ref 15–37)
BACTERIA # UR AUTO: ABNORMAL
BASOPHILS # BLD AUTO: 0 K/UL — SIGNIFICANT CHANGE UP (ref 0–0.2)
BASOPHILS NFR BLD AUTO: 0 % — SIGNIFICANT CHANGE UP (ref 0–2)
BILIRUB SERPL-MCNC: 1.3 MG/DL — HIGH (ref 0.2–1.2)
BILIRUB SERPL-MCNC: 1.6 MG/DL — HIGH (ref 0.2–1.2)
BILIRUB UR-MCNC: NEGATIVE — SIGNIFICANT CHANGE UP
BUN SERPL-MCNC: 69 MG/DL — HIGH (ref 7–23)
BUN SERPL-MCNC: 71 MG/DL — HIGH (ref 7–23)
CALCIUM SERPL-MCNC: 10.3 MG/DL — HIGH (ref 8.5–10.1)
CALCIUM SERPL-MCNC: 9.6 MG/DL — SIGNIFICANT CHANGE UP (ref 8.5–10.1)
CHLORIDE SERPL-SCNC: 89 MMOL/L — LOW (ref 96–108)
CHLORIDE SERPL-SCNC: 95 MMOL/L — LOW (ref 96–108)
CO2 SERPL-SCNC: 20 MMOL/L — LOW (ref 22–31)
CO2 SERPL-SCNC: 26 MMOL/L — SIGNIFICANT CHANGE UP (ref 22–31)
COLOR SPEC: YELLOW — SIGNIFICANT CHANGE UP
CREAT SERPL-MCNC: 3.23 MG/DL — HIGH (ref 0.5–1.3)
CREAT SERPL-MCNC: 3.23 MG/DL — HIGH (ref 0.5–1.3)
DIFF PNL FLD: ABNORMAL
EGFR: 21 ML/MIN/1.73M2 — LOW
EGFR: 21 ML/MIN/1.73M2 — LOW
EOSINOPHIL # BLD AUTO: 0 K/UL — SIGNIFICANT CHANGE UP (ref 0–0.5)
EOSINOPHIL NFR BLD AUTO: 0 % — SIGNIFICANT CHANGE UP (ref 0–6)
EPI CELLS # UR: NEGATIVE — SIGNIFICANT CHANGE UP
GLUCOSE SERPL-MCNC: 112 MG/DL — HIGH (ref 70–99)
GLUCOSE SERPL-MCNC: 181 MG/DL — HIGH (ref 70–99)
GLUCOSE UR QL: NEGATIVE MG/DL — SIGNIFICANT CHANGE UP
HCT VFR BLD CALC: 48.9 % — SIGNIFICANT CHANGE UP (ref 39–50)
HGB BLD-MCNC: 16.1 G/DL — SIGNIFICANT CHANGE UP (ref 13–17)
KETONES UR-MCNC: NEGATIVE — SIGNIFICANT CHANGE UP
LACTATE SERPL-SCNC: 1.8 MMOL/L — SIGNIFICANT CHANGE UP (ref 0.7–2)
LEUKOCYTE ESTERASE UR-ACNC: ABNORMAL
LG PLATELETS BLD QL AUTO: SLIGHT — SIGNIFICANT CHANGE UP
LYMPHOCYTES # BLD AUTO: 1.66 K/UL — SIGNIFICANT CHANGE UP (ref 1–3.3)
LYMPHOCYTES # BLD AUTO: 16 % — SIGNIFICANT CHANGE UP (ref 13–44)
MANUAL SMEAR VERIFICATION: YES — SIGNIFICANT CHANGE UP
MCHC RBC-ENTMCNC: 27.3 PG — SIGNIFICANT CHANGE UP (ref 27–34)
MCHC RBC-ENTMCNC: 32.9 G/DL — SIGNIFICANT CHANGE UP (ref 32–36)
MCV RBC AUTO: 82.9 FL — SIGNIFICANT CHANGE UP (ref 80–100)
MONOCYTES # BLD AUTO: 0.31 K/UL — SIGNIFICANT CHANGE UP (ref 0–0.9)
MONOCYTES NFR BLD AUTO: 3 % — SIGNIFICANT CHANGE UP (ref 2–14)
NEUTROPHILS # BLD AUTO: 8.39 K/UL — HIGH (ref 1.8–7.4)
NEUTROPHILS NFR BLD AUTO: 81 % — HIGH (ref 43–77)
NITRITE UR-MCNC: NEGATIVE — SIGNIFICANT CHANGE UP
NRBC # BLD: 0 /100 — SIGNIFICANT CHANGE UP (ref 0–0)
NRBC # BLD: SIGNIFICANT CHANGE UP /100 WBCS (ref 0–0)
PH UR: 5 — SIGNIFICANT CHANGE UP (ref 5–8)
PLAT MORPH BLD: ABNORMAL
PLATELET # BLD AUTO: 527 K/UL — HIGH (ref 150–400)
PLATELET CLUMP BLD QL SMEAR: SLIGHT
POTASSIUM SERPL-MCNC: 5 MMOL/L — SIGNIFICANT CHANGE UP (ref 3.5–5.3)
POTASSIUM SERPL-MCNC: 6.5 MMOL/L — CRITICAL HIGH (ref 3.5–5.3)
POTASSIUM SERPL-SCNC: 5 MMOL/L — SIGNIFICANT CHANGE UP (ref 3.5–5.3)
POTASSIUM SERPL-SCNC: 6.5 MMOL/L — CRITICAL HIGH (ref 3.5–5.3)
PROT SERPL-MCNC: 7.4 GM/DL — SIGNIFICANT CHANGE UP (ref 6–8.3)
PROT SERPL-MCNC: 9 GM/DL — HIGH (ref 6–8.3)
PROT UR-MCNC: 30 MG/DL
RAPID RVP RESULT: SIGNIFICANT CHANGE UP
RBC # BLD: 5.9 M/UL — HIGH (ref 4.2–5.8)
RBC # FLD: 14.2 % — SIGNIFICANT CHANGE UP (ref 10.3–14.5)
RBC BLD AUTO: SIGNIFICANT CHANGE UP
RBC CASTS # UR COMP ASSIST: ABNORMAL /HPF (ref 0–4)
SARS-COV-2 RNA SPEC QL NAA+PROBE: SIGNIFICANT CHANGE UP
SODIUM SERPL-SCNC: 117 MMOL/L — CRITICAL LOW (ref 135–145)
SODIUM SERPL-SCNC: 124 MMOL/L — LOW (ref 135–145)
SP GR SPEC: 1.02 — SIGNIFICANT CHANGE UP (ref 1.01–1.02)
URATE CRY FLD QL MICRO: ABNORMAL
UROBILINOGEN FLD QL: NEGATIVE MG/DL — SIGNIFICANT CHANGE UP
WBC # BLD: 10.36 K/UL — SIGNIFICANT CHANGE UP (ref 3.8–10.5)
WBC # FLD AUTO: 10.36 K/UL — SIGNIFICANT CHANGE UP (ref 3.8–10.5)
WBC UR QL: SIGNIFICANT CHANGE UP

## 2023-04-06 PROCEDURE — 99285 EMERGENCY DEPT VISIT HI MDM: CPT

## 2023-04-06 PROCEDURE — 93010 ELECTROCARDIOGRAM REPORT: CPT

## 2023-04-06 PROCEDURE — 74176 CT ABD & PELVIS W/O CONTRAST: CPT | Mod: 26,MA

## 2023-04-06 PROCEDURE — 71045 X-RAY EXAM CHEST 1 VIEW: CPT | Mod: 26

## 2023-04-06 RX ORDER — SODIUM BICARBONATE 1 MEQ/ML
50 SYRINGE (ML) INTRAVENOUS ONCE
Refills: 0 | Status: COMPLETED | OUTPATIENT
Start: 2023-04-06 | End: 2023-04-06

## 2023-04-06 RX ORDER — SODIUM CHLORIDE 9 MG/ML
1000 INJECTION INTRAMUSCULAR; INTRAVENOUS; SUBCUTANEOUS
Refills: 0 | Status: DISCONTINUED | OUTPATIENT
Start: 2023-04-06 | End: 2023-04-06

## 2023-04-06 RX ORDER — INSULIN HUMAN 100 [IU]/ML
8 INJECTION, SOLUTION SUBCUTANEOUS ONCE
Refills: 0 | Status: COMPLETED | OUTPATIENT
Start: 2023-04-06 | End: 2023-04-06

## 2023-04-06 RX ORDER — CALCIUM GLUCONATE 100 MG/ML
1 VIAL (ML) INTRAVENOUS ONCE
Refills: 0 | Status: COMPLETED | OUTPATIENT
Start: 2023-04-06 | End: 2023-04-06

## 2023-04-06 RX ORDER — ONDANSETRON 8 MG/1
4 TABLET, FILM COATED ORAL ONCE
Refills: 0 | Status: COMPLETED | OUTPATIENT
Start: 2023-04-06 | End: 2023-04-06

## 2023-04-06 RX ORDER — MORPHINE SULFATE 50 MG/1
2 CAPSULE, EXTENDED RELEASE ORAL ONCE
Refills: 0 | Status: DISCONTINUED | OUTPATIENT
Start: 2023-04-06 | End: 2023-04-06

## 2023-04-06 RX ORDER — PANTOPRAZOLE SODIUM 20 MG/1
40 TABLET, DELAYED RELEASE ORAL ONCE
Refills: 0 | Status: COMPLETED | OUTPATIENT
Start: 2023-04-06 | End: 2023-04-06

## 2023-04-06 RX ORDER — ALBUTEROL 90 UG/1
2.5 AEROSOL, METERED ORAL ONCE
Refills: 0 | Status: COMPLETED | OUTPATIENT
Start: 2023-04-06 | End: 2023-04-06

## 2023-04-06 RX ORDER — SODIUM POLYSTYRENE SULFONATE 4.1 MEQ/G
30 POWDER, FOR SUSPENSION ORAL ONCE
Refills: 0 | Status: COMPLETED | OUTPATIENT
Start: 2023-04-06 | End: 2023-04-06

## 2023-04-06 RX ORDER — DEXTROSE 50 % IN WATER 50 %
50 SYRINGE (ML) INTRAVENOUS ONCE
Refills: 0 | Status: COMPLETED | OUTPATIENT
Start: 2023-04-06 | End: 2023-04-06

## 2023-04-06 RX ORDER — SODIUM CHLORIDE 9 MG/ML
1000 INJECTION INTRAMUSCULAR; INTRAVENOUS; SUBCUTANEOUS ONCE
Refills: 0 | Status: COMPLETED | OUTPATIENT
Start: 2023-04-06 | End: 2023-04-06

## 2023-04-06 RX ADMIN — PANTOPRAZOLE SODIUM 40 MILLIGRAM(S): 20 TABLET, DELAYED RELEASE ORAL at 21:10

## 2023-04-06 RX ADMIN — SODIUM CHLORIDE 1000 MILLILITER(S): 9 INJECTION INTRAMUSCULAR; INTRAVENOUS; SUBCUTANEOUS at 18:44

## 2023-04-06 RX ADMIN — Medication 50 MILLILITER(S): at 21:11

## 2023-04-06 RX ADMIN — Medication 100 GRAM(S): at 21:11

## 2023-04-06 RX ADMIN — ALBUTEROL 2.5 MILLIGRAM(S): 90 AEROSOL, METERED ORAL at 21:16

## 2023-04-06 RX ADMIN — Medication 1 GRAM(S): at 22:30

## 2023-04-06 RX ADMIN — MORPHINE SULFATE 2 MILLIGRAM(S): 50 CAPSULE, EXTENDED RELEASE ORAL at 21:41

## 2023-04-06 RX ADMIN — ONDANSETRON 4 MILLIGRAM(S): 8 TABLET, FILM COATED ORAL at 21:10

## 2023-04-06 RX ADMIN — Medication 50 MILLIEQUIVALENT(S): at 21:12

## 2023-04-06 RX ADMIN — SODIUM CHLORIDE 1000 MILLILITER(S): 9 INJECTION INTRAMUSCULAR; INTRAVENOUS; SUBCUTANEOUS at 21:16

## 2023-04-06 RX ADMIN — MORPHINE SULFATE 2 MILLIGRAM(S): 50 CAPSULE, EXTENDED RELEASE ORAL at 21:11

## 2023-04-06 RX ADMIN — INSULIN HUMAN 8 UNIT(S): 100 INJECTION, SOLUTION SUBCUTANEOUS at 21:29

## 2023-04-06 RX ADMIN — SODIUM POLYSTYRENE SULFONATE 30 GRAM(S): 4.1 POWDER, FOR SUSPENSION ORAL at 21:11

## 2023-04-06 NOTE — ED PROVIDER NOTE - CARE PLAN
1 Principal Discharge DX:	Acute renal failure  Secondary Diagnosis:	Hyperkalemia  Secondary Diagnosis:	Hyponatremia  Secondary Diagnosis:	Kidney stone  Secondary Diagnosis:	Hydronephrosis, right

## 2023-04-06 NOTE — ED PROVIDER NOTE - CROS ED ROS STATEMENT
Hypotension
Hypoxia/Other (specify)...
Abnormal heart rate
Abnormal respiratory rate/Acute mental status changes
all other ROS negative except as per HPI

## 2023-04-06 NOTE — ED PROVIDER NOTE - PROGRESS NOTE DETAILS
Attending Geovanni: received sign out pending CT results. CT w/ 2 mm UVJ stone on R. suspect likely cause of symptoms. W/ associated hydro, however stone is small. Labs notable for hyponatremia, hyperK, and elevated Cr. Meds ordered by previous attending. Spoke w/ ICU for consult given multiple metabolic abnormalities, they will come eval pt. Attending Geovanni: received sign out pending CT results. CT w/ 2 mm UVJ stone on R. suspect likely cause of symptoms. W/ associated hydro, however stone is small. Labs notable for hyponatremia, hyperK, and elevated Cr. Meds ordered by previous attending. Spoke w/ ICU for consult given multiple metabolic abnormalities. ICU PA informed me there is no urology coverage at this time and recommended speaking w/ transfer center for urology consult. Attending Geovanni: spoke w/ transfer center. message left for urologist to call back to consult on case Attending Geovanni: spoke w/ transfer center. uro attending has not called back and phone is going straight to voicemal. transfer center spoke w/ uro resident at [facility] who stated they could not take consult and attending has to. transfer center will continue to try to get uro attending Attending Geovanni: transfer center unable to get in contact w/ uro attending [facility]. Was able to get ahold of [facility] Uro attending Dr. Meadows. Discussed case w/ him and he agreed for transfer for Uro eval at [facility] ED. Discussed results and plan w/ pt and wife, both agreeable w/ transfer.

## 2023-04-06 NOTE — ED PROVIDER NOTE - CLINICAL SUMMARY MEDICAL DECISION MAKING FREE TEXT BOX
pt presents today with h/o colon cancer (s/p ileostomy) and bladder mass c/o right sided lower back pain associated with nausea and vomiting and 10 pound weight loss in one month, on exam pt is thin appearing, has tenderness with right back paraspinal area, pain aggravated with movement, will obtain labs and ct, medicate for pain and hydrate, dispo pending results

## 2023-04-06 NOTE — ED ADULT TRIAGE NOTE - CHIEF COMPLAINT QUOTE
See 12/20 documentation regarding hip replacement   patient c/o of R flank pain ,  c/o of dysuria and dark urine , history of ileostomy

## 2023-04-06 NOTE — ED ADULT NURSE NOTE - OBJECTIVE STATEMENT
Patient received with compplaints of pain to R side of abdomen since this morning around 11 AM, pt state she feels nauseous, denies any vomiting.

## 2023-04-06 NOTE — ED PROVIDER NOTE - OBJECTIVE STATEMENT
57 year old male with h/o colon cancer (s/p ileostomy bag 9/2022) and bladder mass presents today c/o back pain which woke him up today after taking a nap, pt reports having severe pressure type right lower back pain which is aggravated when going from a sitting to lying position associated with nausea and vomiting, pt also reports 10 pound weight loss in one month (-) fevers or chills (-) chest pain (-) sob

## 2023-04-06 NOTE — ED ADULT NURSE NOTE - NSICDXPASTSURGICALHX_GEN_ALL_CORE_FT
Patient called in and stated that since his appointment with DR MANRIQUE he has noticed what appears to be a vein on his right arm, where the iv was at, that is hard and very tender to the touch. Stated it has been this way since his release from the hospital, would like to know if this is normal? Or should he come in for an appointment with Dr. Manrique?    Please advise    Call back is [phone]  
PAST SURGICAL HISTORY:  S/P colon resection

## 2023-04-07 ENCOUNTER — INPATIENT (INPATIENT)
Facility: HOSPITAL | Age: 58
LOS: 5 days | Discharge: HOME | DRG: 682 | End: 2023-04-13
Attending: FAMILY MEDICINE | Admitting: INTERNAL MEDICINE
Payer: COMMERCIAL

## 2023-04-07 VITALS
SYSTOLIC BLOOD PRESSURE: 110 MMHG | OXYGEN SATURATION: 100 % | RESPIRATION RATE: 14 BRPM | TEMPERATURE: 98 F | DIASTOLIC BLOOD PRESSURE: 77 MMHG | HEART RATE: 77 BPM

## 2023-04-07 VITALS
TEMPERATURE: 98 F | RESPIRATION RATE: 18 BRPM | OXYGEN SATURATION: 98 % | HEART RATE: 68 BPM | SYSTOLIC BLOOD PRESSURE: 114 MMHG | HEIGHT: 67 IN | DIASTOLIC BLOOD PRESSURE: 86 MMHG

## 2023-04-07 DIAGNOSIS — Z90.49 ACQUIRED ABSENCE OF OTHER SPECIFIED PARTS OF DIGESTIVE TRACT: Chronic | ICD-10-CM

## 2023-04-07 DIAGNOSIS — E87.1 HYPO-OSMOLALITY AND HYPONATREMIA: ICD-10-CM

## 2023-04-07 LAB
ANION GAP SERPL CALC-SCNC: 15 MMOL/L — SIGNIFICANT CHANGE UP (ref 5–17)
APPEARANCE UR: ABNORMAL
BACTERIA # UR AUTO: ABNORMAL
BILIRUB UR-MCNC: NEGATIVE — SIGNIFICANT CHANGE UP
BUN SERPL-MCNC: 54.5 MG/DL — HIGH (ref 8–20)
CALCIUM SERPL-MCNC: 10.3 MG/DL — SIGNIFICANT CHANGE UP (ref 8.4–10.5)
CHLORIDE SERPL-SCNC: 88 MMOL/L — LOW (ref 96–108)
CO2 SERPL-SCNC: 21 MMOL/L — LOW (ref 22–29)
COLOR SPEC: YELLOW — SIGNIFICANT CHANGE UP
CREAT ?TM UR-MCNC: 145 MG/DL — SIGNIFICANT CHANGE UP
CREAT SERPL-MCNC: 2.22 MG/DL — HIGH (ref 0.5–1.3)
CULTURE RESULTS: NO GROWTH — SIGNIFICANT CHANGE UP
DIFF PNL FLD: ABNORMAL
EGFR: 34 ML/MIN/1.73M2 — LOW
EPI CELLS # UR: SIGNIFICANT CHANGE UP
FLUAV AG NPH QL: SIGNIFICANT CHANGE UP
FLUBV AG NPH QL: SIGNIFICANT CHANGE UP
GLUCOSE SERPL-MCNC: 98 MG/DL — SIGNIFICANT CHANGE UP (ref 70–99)
GLUCOSE UR QL: 100 MG/DL
KETONES UR-MCNC: NEGATIVE — SIGNIFICANT CHANGE UP
LEUKOCYTE ESTERASE UR-ACNC: NEGATIVE — SIGNIFICANT CHANGE UP
NITRITE UR-MCNC: NEGATIVE — SIGNIFICANT CHANGE UP
OSMOLALITY SERPL: 281 MOSMOL/KG — SIGNIFICANT CHANGE UP (ref 275–300)
OSMOLALITY UR: 538 MOSM/KG — SIGNIFICANT CHANGE UP (ref 300–1000)
OSMOLALITY UR: 540 MOSM/KG — SIGNIFICANT CHANGE UP (ref 300–1000)
OSMOLALITY UR: 567 MOSM/KG — SIGNIFICANT CHANGE UP (ref 50–1200)
PH UR: 5 — SIGNIFICANT CHANGE UP (ref 5–8)
POTASSIUM SERPL-MCNC: 5 MMOL/L — SIGNIFICANT CHANGE UP (ref 3.5–5.3)
POTASSIUM SERPL-SCNC: 5 MMOL/L — SIGNIFICANT CHANGE UP (ref 3.5–5.3)
POTASSIUM UR-SCNC: 40 MMOL/L — SIGNIFICANT CHANGE UP
PROT ?TM UR-MCNC: 26 MG/DL — HIGH (ref 0–12)
PROT UR-MCNC: 30 MG/DL
PROT/CREAT UR-RTO: 0.2 RATIO — SIGNIFICANT CHANGE UP
RBC CASTS # UR COMP ASSIST: ABNORMAL /HPF (ref 0–4)
RSV RNA NPH QL NAA+NON-PROBE: SIGNIFICANT CHANGE UP
SARS-COV-2 RNA SPEC QL NAA+PROBE: SIGNIFICANT CHANGE UP
SODIUM SERPL-SCNC: 124 MMOL/L — LOW (ref 135–145)
SODIUM UR-SCNC: <30 MMOL/L — SIGNIFICANT CHANGE UP
SODIUM UR-SCNC: <30 MMOL/L — SIGNIFICANT CHANGE UP
SP GR SPEC: 1.01 — SIGNIFICANT CHANGE UP (ref 1.01–1.02)
SPECIMEN SOURCE: SIGNIFICANT CHANGE UP
TSH SERPL-MCNC: 0.56 UIU/ML — SIGNIFICANT CHANGE UP (ref 0.27–4.2)
URATE CRY FLD QL MICRO: ABNORMAL
UROBILINOGEN FLD QL: NEGATIVE MG/DL — SIGNIFICANT CHANGE UP
WBC UR QL: SIGNIFICANT CHANGE UP /HPF (ref 0–5)

## 2023-04-07 PROCEDURE — 74176 CT ABD & PELVIS W/O CONTRAST: CPT | Mod: 26,MG

## 2023-04-07 PROCEDURE — 52332 CYSTOSCOPY AND TREATMENT: CPT | Mod: RT

## 2023-04-07 PROCEDURE — 99223 1ST HOSP IP/OBS HIGH 75: CPT

## 2023-04-07 PROCEDURE — 99285 EMERGENCY DEPT VISIT HI MDM: CPT

## 2023-04-07 PROCEDURE — 99222 1ST HOSP IP/OBS MODERATE 55: CPT | Mod: 57,25

## 2023-04-07 PROCEDURE — 74420 UROGRAPHY RTRGR +-KUB: CPT | Mod: 26,59

## 2023-04-07 PROCEDURE — G1004: CPT

## 2023-04-07 DEVICE — GUIDEWIRE SENSOR DUAL-FLEX NITINOL STRAIGHT .035" X 150CM: Type: IMPLANTABLE DEVICE | Status: FUNCTIONAL

## 2023-04-07 DEVICE — URETERAL STENT PERCUFLEX PLUS 7FR 26CM: Type: IMPLANTABLE DEVICE | Status: FUNCTIONAL

## 2023-04-07 DEVICE — URETERAL CATH OPEN END FLEXI-TIP 5FR .038" X 70CM: Type: IMPLANTABLE DEVICE | Status: FUNCTIONAL

## 2023-04-07 RX ORDER — DESMOPRESSIN ACETATE 0.1 MG/1
2 TABLET ORAL ONCE
Refills: 0 | Status: COMPLETED | OUTPATIENT
Start: 2023-04-07 | End: 2023-04-07

## 2023-04-07 RX ORDER — DESMOPRESSIN ACETATE 0.1 MG/1
0.2 TABLET ORAL ONCE
Refills: 0 | Status: DISCONTINUED | OUTPATIENT
Start: 2023-04-07 | End: 2023-04-07

## 2023-04-07 RX ORDER — LOPERAMIDE HCL 2 MG
2 TABLET ORAL EVERY 6 HOURS
Refills: 0 | Status: DISCONTINUED | OUTPATIENT
Start: 2023-04-07 | End: 2023-04-13

## 2023-04-07 RX ORDER — FENTANYL CITRATE 50 UG/ML
50 INJECTION INTRAVENOUS
Refills: 0 | Status: DISCONTINUED | OUTPATIENT
Start: 2023-04-07 | End: 2023-04-07

## 2023-04-07 RX ORDER — CEFTRIAXONE 500 MG/1
1000 INJECTION, POWDER, FOR SOLUTION INTRAMUSCULAR; INTRAVENOUS EVERY 24 HOURS
Refills: 0 | Status: COMPLETED | OUTPATIENT
Start: 2023-04-07 | End: 2023-04-10

## 2023-04-07 RX ORDER — SODIUM CHLORIDE 9 MG/ML
1000 INJECTION, SOLUTION INTRAVENOUS
Refills: 0 | Status: DISCONTINUED | OUTPATIENT
Start: 2023-04-07 | End: 2023-04-07

## 2023-04-07 RX ORDER — CEFTRIAXONE 500 MG/1
1000 INJECTION, POWDER, FOR SOLUTION INTRAMUSCULAR; INTRAVENOUS EVERY 24 HOURS
Refills: 0 | Status: DISCONTINUED | OUTPATIENT
Start: 2023-04-07 | End: 2023-04-07

## 2023-04-07 RX ORDER — APIXABAN 2.5 MG/1
5 TABLET, FILM COATED ORAL EVERY 12 HOURS
Refills: 0 | Status: DISCONTINUED | OUTPATIENT
Start: 2023-04-08 | End: 2023-04-13

## 2023-04-07 RX ORDER — IBUPROFEN 200 MG
600 TABLET ORAL EVERY 8 HOURS
Refills: 0 | Status: DISCONTINUED | OUTPATIENT
Start: 2023-04-07 | End: 2023-04-09

## 2023-04-07 RX ORDER — FENTANYL CITRATE 50 UG/ML
25 INJECTION INTRAVENOUS
Refills: 0 | Status: DISCONTINUED | OUTPATIENT
Start: 2023-04-07 | End: 2023-04-07

## 2023-04-07 RX ORDER — TAMSULOSIN HYDROCHLORIDE 0.4 MG/1
0.4 CAPSULE ORAL AT BEDTIME
Refills: 0 | Status: DISCONTINUED | OUTPATIENT
Start: 2023-04-07 | End: 2023-04-13

## 2023-04-07 RX ORDER — CEFTRIAXONE 500 MG/1
1000 INJECTION, POWDER, FOR SOLUTION INTRAMUSCULAR; INTRAVENOUS ONCE
Refills: 0 | Status: COMPLETED | OUTPATIENT
Start: 2023-04-07 | End: 2023-04-07

## 2023-04-07 RX ORDER — CEFTRIAXONE 500 MG/1
1000 INJECTION, POWDER, FOR SOLUTION INTRAMUSCULAR; INTRAVENOUS ONCE
Refills: 0 | Status: DISCONTINUED | OUTPATIENT
Start: 2023-04-07 | End: 2023-04-07

## 2023-04-07 RX ORDER — SODIUM CHLORIDE 9 MG/ML
1000 INJECTION, SOLUTION INTRAVENOUS
Refills: 0 | Status: COMPLETED | OUTPATIENT
Start: 2023-04-07 | End: 2023-04-07

## 2023-04-07 RX ADMIN — DESMOPRESSIN ACETATE 2 MICROGRAM(S): 0.1 TABLET ORAL at 06:37

## 2023-04-07 RX ADMIN — SODIUM CHLORIDE 1000 MILLILITER(S): 9 INJECTION, SOLUTION INTRAVENOUS at 06:40

## 2023-04-07 RX ADMIN — TAMSULOSIN HYDROCHLORIDE 0.4 MILLIGRAM(S): 0.4 CAPSULE ORAL at 21:50

## 2023-04-07 RX ADMIN — CEFTRIAXONE 1000 MILLIGRAM(S): 500 INJECTION, POWDER, FOR SOLUTION INTRAMUSCULAR; INTRAVENOUS at 06:10

## 2023-04-07 NOTE — ED ADULT NURSE REASSESSMENT NOTE - NS ED NURSE REASSESS COMMENT FT1
Pt AOx4 and reports no acute distress at this time. Pt resting comfortably in bed. Will continue to monitor.
Pt received from day RN. Pt reports diminished right flank pain. Will continue to monitor
Pt AOx4 and resting comfortbly in bed. Pt reports no acute distress at this time. Pt accepts the transfer to [facility] . Pt IV hep lock patent and catheter secured.

## 2023-04-07 NOTE — CHART NOTE - NSCHARTNOTEFT_GEN_A_CORE
Patient seen and examined this am at bedside with Dr. Meadows.  Patient at present feeling much better, pain is resolved at present .   Dr. Meadows discussed with patient present situation in light of initial presentation explaining possible cystoscopy with placement of stent explaining risks and complications of procedure and need of following up with need to address stone at later time.     In light of his present finding on examination Surgeon requesting f/u CT scan wo contrast to reassess if possibly passed the stone.      Impression: renal calculi ( right ) with right hydro, possibly passed stone  Plan:  repeat CT scan ( ordered urgent )  with possible surgery today pending CT results. .

## 2023-04-07 NOTE — CONSULT NOTE ADULT - SUBJECTIVE AND OBJECTIVE BOX
HPI Objective Statement: 57 year old male with h/o colon cancer (s/p  ileostomy bag 2022) and bladder mass presents today c/o back pain which woke  him up today after taking a nap, pt reports having severe pressure type right  lower back pain which is aggravated when going from a sitting to lying position  associated with nausea and vomiting, pt also reports 10 pound weight loss in  one month (-) fevers or chills (-) chest pain (-) sob      PAST MEDICAL & SURGICAL HISTORY:  No pertinent past medical history      Colon cancer      H/O ileostomy      S/P colon resection          REVIEW OF SYSTEMS      General:	    Skin/Breast:  	  Ophthalmologic:  	  ENMT:	    Respiratory and Thorax:  	  Cardiovascular:	    Gastrointestinal:	    Genitourinary:	    Musculoskeletal:	    Neurological:	    Psychiatric:	    Hematology/Lymphatics:	    Endocrine:	    Allergic/Immunologic:	    MEDICATIONS  (STANDING):  cefTRIAXone   IVPB 1000 milliGRAM(s) IV Intermittent once    MEDICATIONS  (PRN):      Allergies    No Known Allergies    Intolerances        SOCIAL HISTORY:    FAMILY HISTORY:      Vital Signs Last 24 Hrs  T(C): 36.5 (2023 04:10), Max: 36.6 (2023 15:38)  T(F): 97.7 (2023 04:10), Max: 97.9 (2023 02:45)  HR: 68 (2023 04:10) (65 - 87)  BP: 114/86 (2023 04:10) (107/74 - 137/70)  BP(mean): --  RR: 18 (2023 04:10) (10 - 19)  SpO2: 98% (2023 04:10) (95% - 100%)    Parameters below as of 2023 04:10  Patient On (Oxygen Delivery Method): room air        PHYSICAL EXAM:      Constitutional:    Eyes:    ENMT:    Neck:    Breasts:    Back:    Respiratory:    Cardiovascular:    Gastrointestinal:    Genitourinary:    Rectal:    Extremities:    Vascular:    Neurological:    Skin:    Lymph Nodes:    Musculoskeletal:    Psychiatric:        LABS:                        16.1   10.36 )-----------( 527      ( 2023 18:40 )             48.9     04-06    124<L>  |  95<L>  |  69<H>  ----------------------------<  181<H>  5.0   |  26  |  3.23<H>    Ca    9.6      2023 23:05  Phos  3.3     04-06  Mg     1.8     04-    TPro  7.4  /  Alb  3.4  /  TBili  1.3<H>  /  DBili  x   /  AST  45<H>  /  ALT  114<H>  /  AlkPhos  168<H>  -      Urinalysis Basic - ( 2023 20:00 )    Color: Yellow / Appearance: Slightly Turbid / S.020 / pH: x  Gluc: x / Ketone: Negative  / Bili: Negative / Urobili: Negative mg/dL   Blood: x / Protein: 30 mg/dL / Nitrite: Negative   Leuk Esterase: Small / RBC: 6-10 /HPF / WBC 0-2   Sq Epi: x / Non Sq Epi: x / Bacteria: Few        RADIOLOGY & ADDITIONAL STUDIES: HPI: 57 year old male with hx of colon cancer (s/p ileostomy) with likely new ?bladder cancer who presents as transfer from [place] with flank pain. At [place] CT showed 2 mm UVJ stone on right and cholelithiasis. Labs were notable for hyponatremia (117 -> 124 on repeat BMP @2300), ARF of ~3.2. Pt transferred for Urology eval. At bedside patient states that since leaving [place] he has been feeling well without nausea, vomiting, or pain. Notes that he at times drinks more than his 2L restriction. Often empties ostomy 2-3x during the night. Has had 10 lb unintentional weight loss in last 1 month. Is scheduled for upcoming surgical oncology appt.    PAST MEDICAL & SURGICAL HISTORY:  No pertinent past medical history      Colon cancer      H/O ileostomy      S/P colon resection            MEDICATIONS  (STANDING):  cefTRIAXone   IVPB 1000 milliGRAM(s) IV Intermittent once    MEDICATIONS  (PRN):      Allergies    No Known Allergies    Intolerances    SOCIAL HISTORY:    FAMILY HISTORY:      Vital Signs Last 24 Hrs  T(C): 36.5 (2023 04:10), Max: 36.6 (2023 15:38)  T(F): 97.7 (2023 04:10), Max: 97.9 (2023 02:45)  HR: 68 (2023 04:10) (65 - 87)  BP: 114/86 (2023 04:10) (107/74 - 137/70)  BP(mean): --  RR: 18 (2023 04:10) (10 - 19)  SpO2: 98% (2023 04:10) (95% - 100%)    Parameters below as of 2023 04:10  Patient On (Oxygen Delivery Method): room air        PHYSICAL EXAM:      Constitutional:  Respiratory:  Cardiovascular:  Gastrointestinal:  Genitourinary:  Neurological:            LABS:                        16.1   10.36 )-----------( 527      ( 2023 18:40 )             48.9     04-06    124<L>  |  95<L>  |  69<H>  ----------------------------<  181<H>  5.0   |  26  |  3.23<H>    Ca    9.6      2023 23:05  Phos  3.3     04-06  Mg     1.8     04-06    TPro  7.4  /  Alb  3.4  /  TBili  1.3<H>  /  DBili  x   /  AST  45<H>  /  ALT  114<H>  /  AlkPhos  168<H>  -      Urinalysis Basic - ( 2023 20:00 )    Color: Yellow / Appearance: Slightly Turbid / S.020 / pH: x  Gluc: x / Ketone: Negative  / Bili: Negative / Urobili: Negative mg/dL   Blood: x / Protein: 30 mg/dL / Nitrite: Negative   Leuk Esterase: Small / RBC: 6-10 /HPF / WBC 0-2   Sq Epi: x / Non Sq Epi: x / Bacteria: Few        RADIOLOGY & ADDITIONAL STUDIES: HPI: 57 year old male with hx of colon cancer (s/p ileostomy) with likely new ?bladder cancer who presents as transfer from [place] with flank pain. At [place] CT showed 2 mm UVJ stone on right and cholelithiasis. Labs were notable for hyponatremia (117 -> 124 on repeat BMP @2300), ARF of ~3.2. Pt transferred for Urology eval. At bedside patient states that since leaving [place] he has been feeling well without nausea, vomiting, or pain. Notes that he at times drinks more than his 2L restriction. Often empties ostomy 2-3x during the night. Has had 10 lb unintentional weight loss in last 1 month. Is scheduled for upcoming surgical oncology appt.    PAST MEDICAL & SURGICAL HISTORY:  No pertinent past medical history      Colon cancer      H/O ileostomy      S/P colon resection            MEDICATIONS  (STANDING):  cefTRIAXone   IVPB 1000 milliGRAM(s) IV Intermittent once    MEDICATIONS  (PRN):      Allergies    No Known Allergies    Intolerances    SOCIAL HISTORY:    FAMILY HISTORY:      Vital Signs Last 24 Hrs  T(C): 36.5 (2023 04:10), Max: 36.6 (2023 15:38)  T(F): 97.7 (2023 04:10), Max: 97.9 (2023 02:45)  HR: 68 (2023 04:10) (65 - 87)  BP: 114/86 (2023 04:10) (107/74 - 137/70)  BP(mean): --  RR: 18 (2023 04:10) (10 - 19)  SpO2: 98% (2023 04:10) (95% - 100%)    Parameters below as of 2023 04:10  Patient On (Oxygen Delivery Method): room air        PHYSICAL EXAM:  Constitutional: north gentleman appears fatigued  Respiratory: no respiratory distress, no dyspnea  Genitourinary: voiding   Neurological: A&OX3            LABS:                        16.1   10.36 )-----------( 527      ( 2023 18:40 )             48.9     04-06    124<L>  |  95<L>  |  69<H>  ----------------------------<  181<H>  5.0   |  26  |  3.23<H>    Ca    9.6      2023 23:05  Phos  3.3     04-06  Mg     1.8     04-06    TPro  7.4  /  Alb  3.4  /  TBili  1.3<H>  /  DBili  x   /  AST  45<H>  /  ALT  114<H>  /  AlkPhos  168<H>  04-06      Urinalysis Basic - ( 2023 20:00 )    Color: Yellow / Appearance: Slightly Turbid / S.020 / pH: x  Gluc: x / Ketone: Negative  / Bili: Negative / Urobili: Negative mg/dL   Blood: x / Protein: 30 mg/dL / Nitrite: Negative   Leuk Esterase: Small / RBC: 6-10 /HPF / WBC 0-2   Sq Epi: x / Non Sq Epi: x / Bacteria: Few        RADIOLOGY & ADDITIONAL STUDIES:

## 2023-04-07 NOTE — ED ADULT NURSE NOTE - COVID-19  TEST TYPE
[address] Encounter    TELEHEALTH VISIT -- Audio Only     SUBJECTIVE:    Cee Chavez is a 28 y.o. female evaluated via telephone on 12/14/2021. Consent:  Alvan Ahumada and/or health care decision maker is aware that she may receive a bill for this telephone service, depending on her insurance coverage, and has provided verbal consent to proceed: Yes    Documentation:  I communicated with the patient about:  Chief Complaint   Patient presents with    Congestion     Patient is present for flu-like sx. patient states that sx started this morning with fever and body aches. History of Present Illness:    Generalized Body Aches  This is a new problem. The current episode started today. The problem has been waxing and waning. Associated symptoms include chills, fatigue, a fever (101 F), headaches and myalgias. Pertinent negatives include no abdominal pain, change in bowel habit, chest pain, congestion, coughing, nausea, sore throat or vomiting. Associated symptoms comments: +denies loss of smell or loss of taste. She has tried NSAIDs for the symptoms. The treatment provided mild relief. Symptoms began: 12/14/21. Max temperature in last 24 hrs:101 F. Treatment to date: NSAID, which has been somewhat effective. COVID-19 exposure source: no known exposures to COVID-19; sick contacts- / kids both had cough/ congestion sxs last week. Patient works in a dental office. Relevant PMH: No pertinent PMH. Non-smoker. No recent travel. Fully vaccinated for COVID-19- has booster + flu shot scheduled 12/19. Review of Systems   Constitutional: Positive for chills, fatigue and fever (101 F). HENT: Negative for congestion, postnasal drip, rhinorrhea and sore throat. Respiratory: Negative for cough and shortness of breath. Cardiovascular: Negative for chest pain.    Gastrointestinal: Negative for abdominal pain, change in bowel habit, diarrhea, nausea and vomiting. Musculoskeletal: Positive for myalgias. Neurological: Positive for headaches. OBJECTIVE:     Vital Signs: (As obtained by: pt or caregiver):   Patient-Reported Vitals 12/14/2021   Patient-Reported Weight 114 lb   Patient-Reported Height 5 ft 3 in   Patient-Reported Temperature 100 F      Exam: N/A  (telehealth audio visit)     POC Testing Today:   Results for POC orders placed in visit on 12/14/21   POCT Influenza A/B   Result Value Ref Range    Influenza A Ab NEG     Influenza B Ab Neg      SARS-COV-2, POC   Date Value Ref Range Status   12/14/2021 Not-Detected Not Detected Final       TELEHEALTH ASSESSMENT & PLAN:   Details of this discussion including any medical advice provided:     28 y.o. female with fever, chills, malaise x 1 day. 1. Influenza-like symptoms  Comments:  negative rapid test for SARS-CoV-2 & Flu A+B confirmation PCR test sent. continue supportive care, observation of sxs. see PCP for floow-up if not improving  Orders:  -     POCT COVID-19, Antigen  -     Covid-19 Ambulatory; Future  -     POCT Influenza A/B    - Labs as ordered. - Discussed dx/tx and normal progression of viral infections. - Analgesia/ fever control with OTC acetaminophen prn.  - Advised on symptomatic care- fluids, rest, upright position, intranasal saline prn congestion, heat application to sinuses prn for comfort. - If negative PCR test for COVID-19 and still symptomatic needs OV/consider abx. - Red flags and expected time course for resolution were reviewed. Return for PCP follow-up or return to [facility] as needed if symptoms worsen or fail to improve as expected. I affirm this is a Patient Initiated Episode with an Established Patient who has not had a related appointment within my department in the past 7 days or scheduled within the next 24 hours.     Total Time: minutes: 11-20 minutes    TELEHEALTH EVALUATION -- Audio/Telephone (During [facility]- public health emergency)  This service was provided through telehealth, by MARCELA Cobian CNP and the patient in his/her home via telephone call. 13:20 minutes were spent on the phone with patient. Provider performed history of present illness and review of systems. Diagnosis and treatment plan was discussed with patient. Pharmacy of choice was reviewed along with past medical history, medication allergies, and current medications. Education provided to patient or patient parents/guardian with current illness diagnosis as well as when to seek additional healthcare due to changing or for worsening symptoms. Patient voiced understanding.      Electronically signed by MARCELA Cobian CNP on 12/14/2021 at 5:38 PM MOLECULAR PCR

## 2023-04-07 NOTE — ED ADULT TRIAGE NOTE - CHIEF COMPLAINT QUOTE
[facility] as a transfer from [place]. Pt presented to [place] initially c/o right flank pain, and was found to have a kidney stone. Pt with PMHx ileostomy r/t mass removal on the colon. As per EMS, pt sent to [facility] ED for urology consult. Pt endorses burning with urination.

## 2023-04-07 NOTE — ED ADULT NURSE NOTE - OBJECTIVE STATEMENT
Pt AAOX4 transferred from [place] for C/O Flank pain level 5/10. Lind cath patent draining clear urine.

## 2023-04-07 NOTE — ED PROVIDER NOTE - PHYSICAL EXAMINATION
Gen: Well appearing in NAD  Head: NC/AT  Neck: trachea midline  Resp:  No distress  Ext: no deformities  Abd: +RCVAT  Neuro:  A&O appears non focal  Skin:  Warm and dry as visualized  Psych: Calm, cooperative

## 2023-04-07 NOTE — ED ADULT NURSE NOTE - CHIEF COMPLAINT QUOTE
[facility]E as a transfer from [place]. Pt presented to [place] initially c/o right flank pain, and was found to have a kidney stone. Pt with PMHx ileostomy r/t mass removal on the colon. As per EMS, pt sent to [facility] ED for urology consult. Pt endorses burning with urination.

## 2023-04-07 NOTE — H&P ADULT - ASSESSMENT
57 year old male with PMH of Left sided adeno Ca Colon s/p resection and ileostomy in 2022, no Chemo after, recent MRI in March 2023 showing ? Recurrent Colon Ca based on pelvic mass adjacent to bladder, h/o DVT 2022 and non occlusive portal vein thrombus, presented to  on 4/6/23 with c/o back pain which woke him up after taking a nap, Pt reports having severe pressure type right lower back pain which is aggravated when going from a sitting to lying position associated with nausea and vomiting, pt also reports 10 pound weight loss in one month which he attributes to reducing salt intake and drinking more water. Denies trauma/ hematuria/ fever/ chills/ LOC/ dizziness/ confusion. + for N/V at   At - CT- w/ 2 mm UVJ stone on R. w/ mod hydro. And hyponatremia + MAXWELL. Transferred to [facility] for Uro care.   At [facility]- Initially planned for OR for cysto with stents. But since his symptoms have resolved now, will rpt CT to assess status. MICU was consulted for rapid correction of NA prior to arrival. and DDAVP given.    # Right obstructing renal calculi  appears to have passed given resolution of symptoms  Seen by Uro  Rpt CT - unchanged. In addition has bilateral non obstructing calculi as well.    # R mod Hydronephrosis  sec to above  post resolution of obstruction monitor for gradual improvement    # MAXWELL  Crt 0.7 in Oct 2022  possible etio is obstructive nephro  no c/o retention/ freq etc  Crt downtrending already  Cont to trend  avoid nephrotoxics    # Hyponatremia  sec to low salt intake + more free water intake vs SIADH vs combination of all  Urine Osm nl and U Na <30  S Osm nl  Pt was at 117 at . and corrected to 124 rapidly (before transfer here)  Seen by MICU here. Desmopressin x 1 dose given at 6 AM + D5 1 L  Rpt Na at 9:30 AM is 121   Goal correction not to exceed 6-8mEq every 24 hours  TSH nl  Random cortisol testing  AM cortisol ordered for tomorrow  No dietary restrictions. Avoid excessive free water intake.    Will rpt Na in 6 hrs from prior. Then decide further course.    # Left sided adeno Ca Colon s/p resection and ileostomy in 2022, no Chemo after,   recent MRI in March 2023 showing ? Recurrent Colon Ca based on pelvic mass adjacent to bladder  Has o/p Surg Onc appt scheduled already  Functioning Ileostomy  Cont ostomy care.    # DVT, non occlusive portal vein thrombus  On Eliquis  Cont same    Eliquis

## 2023-04-07 NOTE — CONSULT NOTE ADULT - SUBJECTIVE AND OBJECTIVE BOX
Patient is a 57y old  Male who presents with a chief complaint of     BRIEF HOSPITAL COURSE:   57M PMH colon CA s/p ileostomy, possible bladder CA, who presented to [facility] with R-sided flank pain, found on CT with 2mm UVJ stone on right as well as cholelithiasis, and acute renal failure with Cr of 3.2. Also found with Na of 117 that corrected to 124. He reports drinking much more water (approx 3L/day) over the past few weeks and has been decreasing his salt intake because he thought this "the healthy thing to do." Noted with 10lb weight loss. No fevers or chills. Denies headaches, denies weakness.         PAST MEDICAL & SURGICAL HISTORY:  No pertinent past medical history      Colon cancer      H/O ileostomy      S/P colon resection        Allergies    No Known Allergies    Intolerances      FAMILY HISTORY:      Family history otherwise noncontributory.    Social History:   Non smoker, no EtOH abuse    Review of Systems:  ALL OTHER REVIEW OF SYSTEMS EXCEPT PER HPI NEGATIVE.      Medications:  cefTRIAXone Injectable. 1000 milliGRAM(s) IV Push Once                  desmopressin Injectable 2 MICROGram(s) SubCutaneous Once    dextrose 5%. 1000 milliLiter(s) IV Continuous <Continuous>                ICU Vital Signs Last 24 Hrs  T(C): 36.5 (2023 04:10), Max: 36.6 (2023 15:38)  T(F): 97.7 (2023 04:10), Max: 97.9 (2023 02:45)  HR: 68 (2023 04:10) (65 - 87)  BP: 114/86 (2023 04:10) (107/74 - 137/70)  BP(mean): --  ABP: --  ABP(mean): --  RR: 18 (2023 04:10) (10 - 19)  SpO2: 98% (2023 04:10) (95% - 100%)    O2 Parameters below as of 2023 04:10  Patient On (Oxygen Delivery Method): room air          Vital Signs Last 24 Hrs  T(C): 36.5 (2023 04:10), Max: 36.6 (2023 15:38)  T(F): 97.7 (2023 04:10), Max: 97.9 (2023 02:45)  HR: 68 (2023 04:10) (65 - 87)  BP: 114/86 (2023 04:10) (107/74 - 137/70)  BP(mean): --  RR: 18 (2023 04:10) (10 - 19)  SpO2: 98% (2023 04:10) (95% - 100%)    Parameters below as of 2023 04:10  Patient On (Oxygen Delivery Method): room air            I&O's Detail        LABS:                        16.1   10.36 )-----------( 527      ( 2023 18:40 )             48.9     04-06    124<L>  |  95<L>  |  69<H>  ----------------------------<  181<H>  5.0   |  26  |  3.23<H>    Ca    9.6      2023 23:05  Phos  3.3     04-06  Mg     1.8     04-06    TPro  7.4  /  Alb  3.4  /  TBili  1.3<H>  /  DBili  x   /  AST  45<H>  /  ALT  114<H>  /  AlkPhos  168<H>  04-06          CAPILLARY BLOOD GLUCOSE      POCT Blood Glucose.: 88 mg/dL (2023 21:27)      Urinalysis Basic - ( 2023 20:00 )    Color: Yellow / Appearance: Slightly Turbid / S.020 / pH: x  Gluc: x / Ketone: Negative  / Bili: Negative / Urobili: Negative mg/dL   Blood: x / Protein: 30 mg/dL / Nitrite: Negative   Leuk Esterase: Small / RBC: 6-10 /HPF / WBC 0-2   Sq Epi: x / Non Sq Epi: x / Bacteria: Few      CULTURES:      Physical Examination:  GENERAL: In NAD   HEENT: NC/AT  NECK: Supple  PULM: CTA B/L, good respiratory effort  CVS: +S1, S2  ABD: Soft, TTP in R flank  EXTREMITIES: No pedal edema B/L  SKIN: No open wounds  NEURO: Grossly non-focal    DEVICES:     RADIOLOGY:   < from: CT Abdomen and Pelvis No Cont (23 @ 20:56) >    ACC: [id number] EXAM:  CT ABDOMEN AND PELVIS   ORDERED BY: ABDIAS ABRAHAM     PROCEDURE DATE:  2023          INTERPRETATION:  CLINICAL INFORMATION: Elevated LFTs    COMPARISON: MR abdomen 3/16/2023 and CT abdomen pelvis 10/11/2022.    CONTRAST/COMPLICATIONS:  IV Contrast:  Oral Contrast:  Complications:    PROCEDURE:  CT of the Abdomen and Pelvis was performed.  Sagittal and coronal reformats were performed.    FINDINGS: Evaluation is limited by lack of intravenous contrast.  LOWER CHEST: Within normal limits.    LIVER: Within normal limits.  BILE DUCTS: Normal caliber.  GALLBLADDER: Cholelithiasis.  SPLEEN: Splenomegaly.  PANCREAS: Within normal limits.  ADRENALS: Within normal limits.  KIDNEYS/URETERS: A 2 mm calculus in the right ureterovesicular junction   results in moderate right hydronephrosis. Mild right perinephric   stranding. High density within bilateral lower pole collecting systems   may represent renal calculi versus delayed contrast excretion of the   patient has had recent intravenous contrast administration.    BLADDER: Within normal limits.  REPRODUCTIVE ORGANS: Prostate is enlarged.    BOWEL: No bowel obstruction. Subtotal colectomy with a right lower   quadrant ileostomy and Joseph's pouch. PERITONEUM: No ascites.  VESSELS: Within normal limits.  RETROPERITONEUM/LYMPH NODES: No lymphadenopathy.  ABDOMINAL WALL: Within normal limits.  BONES: Within normal limits.    IMPRESSION:  Moderate right hydronephrosis secondary to 2 mm right UVJ stone.    Cholelithiasis. No evidence for bowel duct                                    dilatation.        --- End of Report ---            ELICEO CARL MD; Attending Radiologist  This document has been electronically signed. 2023  9:20PM    < end of copied text >

## 2023-04-07 NOTE — PATIENT PROFILE ADULT - NSPROGENPREVTRANSF_GEN_A_NUR
Telephone Encounter by Vesna Best RN at 11/15/18 10:47 AM     Author:  Vesna Best RN Service:  (none) Author Type:  Registered Nurse     Filed:  11/15/18 10:47 AM Encounter Date:  11/14/2018 Status:  Signed     :  Vesna Best RN (Registered Nurse)            Patient notified and verbalized understanding.[NH1.1M]       Revision History        User Key Date/Time User Provider Type Action    > NH1.1 11/15/18 10:47 AM Vesna Best RN Registered Nurse Sign    M - Manual             no

## 2023-04-07 NOTE — ED PROVIDER NOTE - ATTENDING CONTRIBUTION TO CARE
57y M w/ hx colon CA s/p ileostomy, bladder mass, transferred from [facility] for kidney stone and hyponatremia. Pt found to have sodium of 117 with acute renal failure. Repeat sodium 4.5 hours later was 124. Also found to have 2mm R UVJ stone. On exam, pt well appearing and in no acute distress. Abdomen soft and nontender. MICU consulted given overcorrection of hyponatremia; advising D5 bolus and DDAVP. Urology consulted for management of kidney stone. Signed out to Dr. Tanner pending repeat sodium and final urology recs.

## 2023-04-07 NOTE — ED ADULT NURSE REASSESSMENT NOTE - NS ED NURSE REASSESS COMMENT FT1
Pt is resting in bed comfortably at this time, no apparent distress noted at this time. pt safety maintained. Pt denies any complaints at this time. Pt denies pain and nausea. PT is awaiting CT.

## 2023-04-07 NOTE — ED ADULT NURSE REASSESSMENT NOTE - NS ED NURSE REASSESS COMMENT FT1
pt care assumed at 0730, no apparent distress noted at this time, charting as noted. Pt received A&Ox4 resting in bed comfortably at this time. Pt denies pain. Pt denies Nausea. Skin around drain is intact. Pt is breathing unlabored. OR at the bedside. pt care assumed at 0730, no apparent distress noted at this time, charting as noted. Pt received A&Ox4 resting in bed comfortably at this time. Pt denies pain. Pt denies Nausea. Skin around abdominal drain is intact. Pt is breathing unlabored. MD at the bedside discussing plan of care.

## 2023-04-07 NOTE — CONSULT NOTE ADULT - ASSESSMENT
57M PMH colon CA s/p ileostomy, possible bladder CA, who presented to [facility] with R-sided flank pain, found on CT with 2mm UVJ stone on right, hydronephrosis, as well as cholelithiasis, and acute renal failure with Cr of 3.2, found to be hyponatremic with rapid correction    Impression/Recommendations:    Severe hyponatremia  - Unclear etiology at this time  - Checking TSH, cortisol, urine + serum osm, urine lytes  - Will not correct more than 6-8mEq every 24 hours  - Advise 1L D5W and 2mcg DDAVP  - Serial BMP, monitor mental status  - Lind, monitor Is/os closely    R-sided hydronephrosis  2mm UVJ stone on right  - Urology following, plan for cystoscopy and stent placement  - Pain control  - Lind    Acute renal failure  - Management of hydronephrosis as above  - Renally adjust medications    Dispo/Ethics  - If patient continues to overcorrect, will monitor in ICU; if repeat BMP is ~120, he can be monitored in SDU  - GOC to be addressed    D/w ED team - Dr. Barrett, Dr. Kevin Ivey M.D.  , Pulmonary & Critical Care Medicine  [facility]  Pulmonary and Sleep Medicine at [place]  [address]  [place], [place] [address]  T: [phone]  F: [phone]   57M PMH colon CA s/p ileostomy, possible bladder CA, who presented to [facility] with R-sided flank pain, found on CT with 2mm UVJ stone on right, hydronephrosis, as well as cholelithiasis, and acute renal failure with Cr of 3.2, found to be hyponatremic with rapid correction    Impression/Recommendations:    Severe hyponatremia  - Unclear etiology at this time  - Check TSH, cortisol, urine + serum osm, urine lytes  - Do not correct more than 6-8mEq every 24 hours  - Advise 1L D5W and 2mcg DDAVP  - Serial BMP, monitor mental status  - Lind, monitor Is/Os closely    R-sided hydronephrosis  2mm UVJ stone on right  - Urology following, plan for cystoscopy and stent placement  - Pain control  - Lind    Acute renal failure  - Management of hydronephrosis as above  - Renally adjust medications    Dispo/Ethics  - If patient continues to overcorrect, will monitor in ICU; if repeat BMP is ~120, he can be admitted to SDU  - [facility] to be addressed    D/w ED team - Dr. Barrett, Dr. Kevin Ivey M.D.  , Pulmonary & Critical Care Medicine  [facility]  Pulmonary and Sleep Medicine at [place]  [address]  [place], [place] [address]  T: [phone]  F: [phone]   57M PMH colon CA s/p ileostomy, possible bladder CA, who presented to [facility] with R-sided flank pain, found on CT with 2mm UVJ stone on right, hydronephrosis, as well as cholelithiasis, and acute renal failure with Cr of 3.2, found to be hyponatremic with rapid correction    Impression/Recommendations:    Severe hyponatremia  - Unclear etiology at this time  - Check TSH, cortisol, urine + serum osm, urine lytes  - Do not correct more than 6-8mEq every 24 hours  - Advise 1L D5W and 2mcg DDAVP as patient overcorrected  - Serial BMP, monitor mental status  - Lind, monitor Is/Os closely    R-sided hydronephrosis  2mm UVJ stone on right  - Urology following, plan for cystoscopy and stent placement  - Pain control  - Lind    Acute renal failure  - Management of hydronephrosis as above  - Renally adjust medications    Dispo/Ethics  - If patient continues to overcorrect, will monitor in ICU; if repeat BMP is ~120, he can be admitted to SDU  - [facility] to be addressed    D/w ED team - Dr. Barrett, Dr. Kevin Ivey M.D.  , Pulmonary & Critical Care Medicine  [facility]  Pulmonary and Sleep Medicine at [place]  [address]  [place], [place] [address]  T: [phone]  F: [phone]

## 2023-04-07 NOTE — ED PROVIDER NOTE - PROGRESS NOTE DETAILS
Kevin: Spoke with Urology PA. Pt planned for stent today. Pt to be NPO, receive IV abx, and be admitted. Kevin: MICU called given rapid correction of hyponatremia prior to ED arrival. DDAVP, D5W, and bmp for after D5w ordered. Plan discussed with DALIA Still. stable on reassessment, labs and imaging independently reviewed, discussion with hospitalist for admission. -DO Martha Flores: Na improved, pt admitted

## 2023-04-07 NOTE — ED ADULT NURSE REASSESSMENT NOTE - NS ED NURSE REASSESS COMMENT FT1
pt handed off to DALIA QUIROZ in stable condition. pt in no apparent distress noted at this time. vital signs stable. pt transferred to holding room in stable condition.

## 2023-04-07 NOTE — ED PROVIDER NOTE - PROGRESS NOTE ADDITIONAL2
[Abdominal Masses] : No abdominal masses [Abdomen Tenderness] : ~T ~M No abdominal tenderness [Alert] : alert [Oriented to Person] : oriented to person [Oriented to Place] : oriented to place [Oriented to Time] : oriented to time [Calm] : calm [de-identified] : She  is alert, well-groomed, and in NAD\par   [de-identified] : anicteric.  Nasal mucosa pink, septum midline. Oral mucosa pink.  Tongue midline, Pharynx without exudates.\par   [de-identified] : Neck supple. Trachea midline. Thyroid isthmus barely palpable, lobes not felt.\par   [de-identified] : Surgical wounds  healed well.   no signs of  inflammation or infection.  Additional Progress Note...

## 2023-04-07 NOTE — ED PROVIDER NOTE - CLINICAL SUMMARY MEDICAL DECISION MAKING FREE TEXT BOX
57 year old male w hx of colon cancer s/p ileostomy presents as transfer for UVJ stone with ARF/hyponatremia. Concern for rapid correction of Na (117->corrected 126) ~4.5 hours so MICU consulted. Pt will require repeat BMP after d5w and ddavp; will be signed out to day team

## 2023-04-07 NOTE — ED PROVIDER NOTE - OBJECTIVE STATEMENT
57 year old male with hx of colon cancer (s/p ileostomy) with likely new ?bladder cancer who presents as transfer from [place] with flank pain. At [place] CT showed 2 mm UVJ stone on right and cholelithiasis. Labs were notable for hyponatremia (117 -> 124 on repeat BMP @2300), ARF of ~3.2. Pt transferred for Urology eval. At bedside patient states that since leaving [facility] he has been feeling well without nausea, vomiting, or pain. Notes that he at times drinks more than his 2L restriction. Often empties ostomy 2-3x during the night. Has had 10 lb unintentional weight loss in last 1 month. Is scheduled for upcoming surgical oncology appt.

## 2023-04-07 NOTE — H&P ADULT - NSHPPHYSICALEXAM_GEN_ALL_CORE
Vital Signs Last 24 Hrs  T(C): 36.3 (04-07-23 @ 07:35), Max: 36.6 (04-06-23 @ 15:38)  T(F): 97.3 (04-07-23 @ 07:35), Max: 97.9 (04-07-23 @ 02:45)  HR: 62 (04-07-23 @ 07:35) (62 - 87)  BP: 123/79 (04-07-23 @ 07:35) (107/74 - 137/70)  BP(mean): --  RR: 18 (04-07-23 @ 07:35) (10 - 19)  SpO2: 100% (04-07-23 @ 07:35) (95% - 100%)    GENERAL: Currently comfortable, pain free, no complaints  HEAD:  Atraumatic, Normocephalic  EYES: EOMI, PERRLA, conjunctiva and sclera clear  NECK: Supple, No JVD, Normal thyroid  NERVOUS SYSTEM:  Alert & Oriented X 3, Motor Strength 5/5 B/L upper and lower extremities  CHEST/LUNG: CTA bilaterally; No rales, rhonchi, wheezing, or rubs  HEART: Regular rate and rhythm; No murmurs, rubs, or gallops  ABDOMEN: Soft, Nontender, Nondistended; Bowel sounds present; Ileostomy + functioning   EXTREMITIES:  2+ Peripheral Pulses, No clubbing, cyanosis, or edema  SKIN: No rashes or lesions

## 2023-04-07 NOTE — H&P ADULT - HISTORY OF PRESENT ILLNESS
57 year old male with PMH of Left sided adeno Ca Colon s/p resection and ileostomy in 2022, no Chemo after, recent MRI in March 2023 showing ? Recurrent Colon Ca based on pelvic mass adjacent to bladder, h/o DVT 2022 and non occlusive portal vein thrombus, presented to  on 4/6/23 with c/o back pain which woke him up after taking a nap, Pt reports having severe pressure type right lower back pain which is aggravated when going from a sitting to lying position associated with nausea and vomiting, pt also reports 10 pound weight loss in one month which he attributes to reducing salt intake and drinking more water. Denies trauma/ hematuria/ fever/ chills/ LOC/ dizziness/ confusion. + for N/V at   At - CT- w/ 2 mm UVJ stone on R. w/ mod hydro. And hyponatremia + MAXWELL. Transferred to [facility] for Uro care.   At [facility]- Initially planned for OR for cysto with stents. But since his symptoms have resolved now, will rpt CT to assess status. MICU was consulted for rapid correction of NA prior to arrival. and DDAVP given.    SH- Denies habits, lives with spouse and children, Works from home  FH- Denies family history of cancers

## 2023-04-07 NOTE — CHART NOTE - NSCHARTNOTEFT_GEN_A_CORE
Post-op Check    Subjective:  Pt offers no acute complaints at this time. Pain well controlled on current regiment. Denies nausea, vomiting, chest pain, SOB, palpitations.       MEDICATIONS  (STANDING):  cefTRIAXone Injectable. 1000 milliGRAM(s) IV Push every 24 hours  tamsulosin 0.4 milliGRAM(s) Oral at bedtime    MEDICATIONS  (PRN):  ibuprofen  Tablet. 600 milliGRAM(s) Oral every 8 hours PRN Moderate Pain (4 - 6)  loperamide 2 milliGRAM(s) Oral every 6 hours PRN Diarrhea      Vital Signs Last 24 Hrs  T(C): 36.9 (07 Apr 2023 18:56), Max: 36.9 (07 Apr 2023 18:56)  T(F): 98.4 (07 Apr 2023 18:56), Max: 98.4 (07 Apr 2023 18:56)  HR: 68 (07 Apr 2023 18:56) (58 - 84)  BP: 120/78 (07 Apr 2023 18:56) (88/55 - 123/79)  BP(mean): 82 (07 Apr 2023 18:42) (64 - 85)  RR: 18 (07 Apr 2023 18:56) (9 - 18)  SpO2: 99% (07 Apr 2023 18:56) (98% - 100%)    Parameters below as of 07 Apr 2023 18:56  Patient On (Oxygen Delivery Method): room air        Physical Exam:    General: Laying comfortably in bed, NAD  HEENT: PERRL, EOMI  Neck: No JVD, FROM without pain  Respiratory: no accessory muscle use, respirations non-labored  Abdomen: soft, nontender, nondistended   Neurological: A&O x 3; without gross deficit  : flank non-tender, esquivel in place with light hematuria    A: 57M s/p cysto and right stent doing well    P:  Continue current care  Pain control  OOB as tolerated  Encourage IS  DVT ppx

## 2023-04-07 NOTE — ED PROVIDER NOTE - CARE PLAN
Principal Discharge DX:	Hyponatremia  Secondary Diagnosis:	Right renal stone  Secondary Diagnosis:	Acute renal failure (ARF)   1

## 2023-04-07 NOTE — CONSULT NOTE ADULT - ASSESSMENT
Patient w/ history of colon ca w/ colostomy presenting to outside hospital with 2 days of abdominal pain found to have hyperkalemia, hyponatremia, arf creatinine 3.2 and 2 mm right renal stone in the right UVJ  plan:  admit to medicine  IV abx  consider ID consult   consider renal/ hemeonc consult  trend labs  NPO  OR today for cysto with right stent  Patient w/ history of colon ca w/ colostomy presenting to outside hospital with 2 days of abdominal pain found to have hyperkalemia, hyponatremia, arf creatinine 3.2 and 2 mm right renal stone in the right UVJ  plan:  consider MICU consult/ stepdown otherwise admit to medicine  IV abx  consider ID consult   consider renal/ hemeonc consult  trend labs  NPO  OR today for cysto with right stent

## 2023-04-08 LAB
ANION GAP SERPL CALC-SCNC: 11 MMOL/L — SIGNIFICANT CHANGE UP (ref 5–17)
ANION GAP SERPL CALC-SCNC: 14 MMOL/L — SIGNIFICANT CHANGE UP (ref 5–17)
BASOPHILS # BLD AUTO: 0.01 K/UL — SIGNIFICANT CHANGE UP (ref 0–0.2)
BASOPHILS NFR BLD AUTO: 0.2 % — SIGNIFICANT CHANGE UP (ref 0–2)
BUN SERPL-MCNC: 39.8 MG/DL — HIGH (ref 8–20)
BUN SERPL-MCNC: 48.4 MG/DL — HIGH (ref 8–20)
CALCIUM SERPL-MCNC: 8.9 MG/DL — SIGNIFICANT CHANGE UP (ref 8.4–10.5)
CALCIUM SERPL-MCNC: 9.7 MG/DL — SIGNIFICANT CHANGE UP (ref 8.4–10.5)
CHLORIDE SERPL-SCNC: 85 MMOL/L — LOW (ref 96–108)
CHLORIDE SERPL-SCNC: 92 MMOL/L — LOW (ref 96–108)
CO2 SERPL-SCNC: 20 MMOL/L — LOW (ref 22–29)
CO2 SERPL-SCNC: 20 MMOL/L — LOW (ref 22–29)
CORTIS AM PEAK SERPL-MCNC: 2.2 UG/DL — LOW (ref 6–18.4)
CREAT SERPL-MCNC: 1.89 MG/DL — HIGH (ref 0.5–1.3)
CREAT SERPL-MCNC: 2.03 MG/DL — HIGH (ref 0.5–1.3)
EGFR: 37 ML/MIN/1.73M2 — LOW
EGFR: 41 ML/MIN/1.73M2 — LOW
EOSINOPHIL # BLD AUTO: 0.02 K/UL — SIGNIFICANT CHANGE UP (ref 0–0.5)
EOSINOPHIL NFR BLD AUTO: 0.3 % — SIGNIFICANT CHANGE UP (ref 0–6)
GLUCOSE SERPL-MCNC: 101 MG/DL — HIGH (ref 70–99)
GLUCOSE SERPL-MCNC: 104 MG/DL — HIGH (ref 70–99)
HCT VFR BLD CALC: 38.3 % — LOW (ref 39–50)
HGB BLD-MCNC: 12.8 G/DL — LOW (ref 13–17)
IMM GRANULOCYTES NFR BLD AUTO: 0.7 % — SIGNIFICANT CHANGE UP (ref 0–0.9)
LYMPHOCYTES # BLD AUTO: 0.71 K/UL — LOW (ref 1–3.3)
LYMPHOCYTES # BLD AUTO: 12.2 % — LOW (ref 13–44)
MAGNESIUM SERPL-MCNC: 1.7 MG/DL — SIGNIFICANT CHANGE UP (ref 1.6–2.6)
MCHC RBC-ENTMCNC: 28 PG — SIGNIFICANT CHANGE UP (ref 27–34)
MCHC RBC-ENTMCNC: 33.4 GM/DL — SIGNIFICANT CHANGE UP (ref 32–36)
MCV RBC AUTO: 83.8 FL — SIGNIFICANT CHANGE UP (ref 80–100)
MONOCYTES # BLD AUTO: 0.43 K/UL — SIGNIFICANT CHANGE UP (ref 0–0.9)
MONOCYTES NFR BLD AUTO: 7.4 % — SIGNIFICANT CHANGE UP (ref 2–14)
NEUTROPHILS # BLD AUTO: 4.59 K/UL — SIGNIFICANT CHANGE UP (ref 1.8–7.4)
NEUTROPHILS NFR BLD AUTO: 79.2 % — HIGH (ref 43–77)
PLATELET # BLD AUTO: 328 K/UL — SIGNIFICANT CHANGE UP (ref 150–400)
POTASSIUM SERPL-MCNC: 4.7 MMOL/L — SIGNIFICANT CHANGE UP (ref 3.5–5.3)
POTASSIUM SERPL-MCNC: 5.7 MMOL/L — HIGH (ref 3.5–5.3)
POTASSIUM SERPL-SCNC: 4.7 MMOL/L — SIGNIFICANT CHANGE UP (ref 3.5–5.3)
POTASSIUM SERPL-SCNC: 5.7 MMOL/L — HIGH (ref 3.5–5.3)
RBC # BLD: 4.57 M/UL — SIGNIFICANT CHANGE UP (ref 4.2–5.8)
RBC # FLD: 13.8 % — SIGNIFICANT CHANGE UP (ref 10.3–14.5)
SODIUM SERPL-SCNC: 119 MMOL/L — CRITICAL LOW (ref 135–145)
SODIUM SERPL-SCNC: 123 MMOL/L — LOW (ref 135–145)
UUN UR-MCNC: 1031 MG/DL — SIGNIFICANT CHANGE UP
WBC # BLD: 5.8 K/UL — SIGNIFICANT CHANGE UP (ref 3.8–10.5)
WBC # FLD AUTO: 5.8 K/UL — SIGNIFICANT CHANGE UP (ref 3.8–10.5)

## 2023-04-08 PROCEDURE — 99232 SBSQ HOSP IP/OBS MODERATE 35: CPT

## 2023-04-08 PROCEDURE — 99233 SBSQ HOSP IP/OBS HIGH 50: CPT

## 2023-04-08 RX ORDER — SODIUM BICARBONATE 1 MEQ/ML
650 SYRINGE (ML) INTRAVENOUS THREE TIMES A DAY
Refills: 0 | Status: DISCONTINUED | OUTPATIENT
Start: 2023-04-08 | End: 2023-04-12

## 2023-04-08 RX ORDER — OXYBUTYNIN CHLORIDE 5 MG
5 TABLET ORAL ONCE
Refills: 0 | Status: COMPLETED | OUTPATIENT
Start: 2023-04-08 | End: 2023-04-08

## 2023-04-08 RX ORDER — SODIUM ZIRCONIUM CYCLOSILICATE 10 G/10G
10 POWDER, FOR SUSPENSION ORAL ONCE
Refills: 0 | Status: COMPLETED | OUTPATIENT
Start: 2023-04-08 | End: 2023-04-08

## 2023-04-08 RX ORDER — SODIUM CHLORIDE 5 G/100ML
1000 INJECTION, SOLUTION INTRAVENOUS
Refills: 0 | Status: DISCONTINUED | OUTPATIENT
Start: 2023-04-08 | End: 2023-04-09

## 2023-04-08 RX ADMIN — SODIUM ZIRCONIUM CYCLOSILICATE 10 GRAM(S): 10 POWDER, FOR SUSPENSION ORAL at 10:03

## 2023-04-08 RX ADMIN — APIXABAN 5 MILLIGRAM(S): 2.5 TABLET, FILM COATED ORAL at 18:38

## 2023-04-08 RX ADMIN — Medication 650 MILLIGRAM(S): at 22:10

## 2023-04-08 RX ADMIN — Medication 600 MILLIGRAM(S): at 23:11

## 2023-04-08 RX ADMIN — CEFTRIAXONE 1000 MILLIGRAM(S): 500 INJECTION, POWDER, FOR SOLUTION INTRAMUSCULAR; INTRAVENOUS at 05:18

## 2023-04-08 RX ADMIN — TAMSULOSIN HYDROCHLORIDE 0.4 MILLIGRAM(S): 0.4 CAPSULE ORAL at 22:10

## 2023-04-08 RX ADMIN — Medication 600 MILLIGRAM(S): at 21:33

## 2023-04-08 RX ADMIN — APIXABAN 5 MILLIGRAM(S): 2.5 TABLET, FILM COATED ORAL at 05:18

## 2023-04-08 RX ADMIN — Medication 650 MILLIGRAM(S): at 12:51

## 2023-04-08 RX ADMIN — Medication 5 MILLIGRAM(S): at 22:10

## 2023-04-08 RX ADMIN — SODIUM CHLORIDE 40 MILLILITER(S): 5 INJECTION, SOLUTION INTRAVENOUS at 10:03

## 2023-04-08 NOTE — PROGRESS NOTE ADULT - ASSESSMENT
58M s/p cysto and right stent with multiple medical comorbidities and evidence of hyponatremia, clearing hematuria     care per primary team   maintain esquivel until hematuria clears  dvt ppx SCD   pain control   ensure appropriate UOP to avoid worsening hydro    58M s/p cysto and right stent with multiple medical comorbidities and evidence of hyponatremia, clearing hematuria     care per primary team   maintain esquivel until hematuria clears  dvt ppx SCD   pain control   ensure appropriate UOP to avoid worsening hydro   Cr downtrending

## 2023-04-08 NOTE — PROGRESS NOTE ADULT - ASSESSMENT
57 year old male with PMH of Left sided adeno Ca Colon s/p resection and ileostomy in 2022, no Chemo after, recent MRI in March 2023 showing ? Recurrent Colon Ca based on pelvic mass adjacent to bladder, h/o DVT 2022 and non occlusive portal vein thrombus, presented to [facility]  on 4/6/23 with c/o back pain which woke him up after taking a nap, Pt reports having severe pressure type right lower back pain which is aggravated when going from a sitting to lying position associated with nausea and vomiting, pt also reports 10 pound weight loss in one month which he attributes to reducing salt intake and drinking more water.   At - CT- w/ 2 mm UVJ stone on R. w/ mod hydro. And hyponatremia + MAXWELL. Transferred to [facility] for Uro care.   At [facility]- Initially planned for OR for cysto with stents. But since his symptoms have resolved now, will rpt CT to assess status. MICU was consulted for rapid correction of NA prior to arrival. and DDAVP given.      hypovolemic hyponatremia     renal consulted    2% saline     trend bmp    liberalized diet    decrease free water intake    hyperkalemia:  maintain on tele    lokelma    # Right obstructing renal calculi  s/p renal stent placement by urology     # R mod Hydronephrosis  sec to above    # MAXWELL  Crt 0.7 in Oct 2022  possible etio is obstructive nephro  improving    # Left sided adeno Ca Colon s/p resection and ileostomy in 2022, no Chemo after,   recent MRI in March 2023 showing ? Recurrent Colon Ca based on pelvic mass adjacent to bladder  Has o/p Surg Onc appt scheduled already  Functioning Ileostomy  Cont ostomy care.    # DVT, non occlusive portal vein thrombus  On Eliquis

## 2023-04-08 NOTE — CONSULT NOTE ADULT - SUBJECTIVE AND OBJECTIVE BOX
HPI:  57 year old male with PMH of Left sided adeno Ca Colon s/p resection and ileostomy in , no Chemo after, recent MRI in 2023 showing ? Recurrent Colon Ca based on pelvic mass adjacent to bladder, h/o DVT  and non occlusive portal vein thrombus, presented to  on 23 with c/o back pain which woke him up after taking a nap, Pt reports having severe pressure type right lower back pain which is aggravated when going from a sitting to lying position associated with nausea and vomiting, pt also reports 10 pound weight loss in one month which he attributes to reducing salt intake and drinking more water. Denies trauma/ hematuria/ fever/ chills/ LOC/ dizziness/ confusion. + for N/V at   At - CT- w/ 2 mm UVJ stone on R. w/ mod hydro. And hyponatremia + MAXWELL. Transferred to [facility] for Uro care.   At [facility]- Initially planned for OR for cysto with stents. But since his symptoms have resolved now, will rpt CT to assess status. MICU was consulted for rapid correction of NA prior to arrival. and DDAVP given.    SH- Denies habits, lives with spouse and children, Works from home  FH- Denies family history of cancers (2023 11:31)    PAST MEDICAL & SURGICAL HISTORY:  No pertinent past medical history      Colon cancer      H/O ileostomy      S/P colon resection    FAMILY HISTORY:  NC    Social History:Non smoker    MEDICATIONS  (STANDING):  apixaban 5 milliGRAM(s) Oral every 12 hours  cefTRIAXone Injectable. 1000 milliGRAM(s) IV Push every 24 hours  tamsulosin 0.4 milliGRAM(s) Oral at bedtime    MEDICATIONS  (PRN):  ibuprofen  Tablet. 600 milliGRAM(s) Oral every 8 hours PRN Moderate Pain (4 - 6)  loperamide 2 milliGRAM(s) Oral every 6 hours PRN Diarrhea   Meds reviewed    Allergies    No Known Allergies    Intolerances      Vital Signs Last 24 Hrs  T(C): 36.4 (2023 04:00), Max: 36.9 (2023 18:56)  T(F): 97.6 (2023 04:00), Max: 98.4 (2023 18:56)  HR: 64 (2023 04:00) (58 - 84)  BP: 114/68 (2023 04:00) (88/55 - 123/64)  BP(mean): 82 (2023 18:42) (64 - 85)  RR: 18 (2023 04:00) (9 - 18)  SpO2: 99% (2023 04:00) (98% - 100%)    Parameters below as of 2023 04:00  Patient On (Oxygen Delivery Method): room air      PHYSICAL EXAM:    GENERAL: appears chronically ill  HEAD: NCAT  EYES: EOMI  NECK: Supple  NERVOUS SYSTEM:  Alert & Oriented  CHEST/LUNG: Clear to percussion bilaterally  HEART: Regular rate and rhythm  ABDOMEN: Soft, Nontender, Nondistended; +BS  EXTREMITIES: edema      LABS:                        12.8   5.80  )-----------( 328      ( 2023 06:05 )             38.3     04-08    119<LL>  |  85<L>  |  48.4<H>  ----------------------------<  104<H>  5.7<H>   |  20.0<L>  |  2.03<H>    Ca    9.7      2023 06:05  Phos  3.3     04-06  Mg     1.7     04-08    TPro  7.4  /  Alb  3.4  /  TBili  1.3<H>  /  DBili  x   /  AST  45<H>  /  ALT  114<H>  /  AlkPhos  168<H>  04-06      Urinalysis Basic - ( 2023 11:11 )    Color: Yellow / Appearance: Slightly Turbid / S.015 / pH: x  Gluc: x / Ketone: Negative  / Bili: Negative / Urobili: Negative mg/dL   Blood: x / Protein: 30 mg/dL / Nitrite: Negative   Leuk Esterase: Negative / RBC: 6-10 /HPF / WBC 3-5 /HPF   Sq Epi: x / Non Sq Epi: x / Bacteria: Occasional      Magnesium, Serum: 1.7 mg/dL (08 @ 06:05)          RADIOLOGY & ADDITIONAL TESTS:     HPI:  57 year old male with PMH of Left sided adeno Ca Colon s/p resection and ileostomy in , no Chemo after, recent MRI in 2023 showing ? Recurrent Colon Ca based on pelvic mass adjacent to bladder, h/o DVT  and non occlusive portal vein thrombus, presented to  on 23 with c/o back pain which woke him up after taking a nap, Pt reports having severe pressure type right lower back pain which is aggravated when going from a sitting to lying position associated with nausea and vomiting, pt also reports 10 pound weight loss in one month which he attributes to reducing salt intake and drinking more water. Denies trauma/ hematuria/ fever/ chills/ LOC/ dizziness/ confusion. + for N/V at   At - CT- w/ 2 mm UVJ stone on R. w/ mod hydro. And hyponatremia + MAXWELL. Transferred to [facility] for Uro care.   At [facility]- Initially planned for OR for cysto with stents. But since his symptoms have resolved now, will rpt CT to assess status. MICU was consulted for rapid correction of NA prior to arrival. and DDAVP given.  Currently feels fine  Eating breakfast  Denies HA CP N/V no SOB    SH- Denies habits, lives with spouse and children, Works from home  FH- Denies family history of cancers (2023 11:31)    PAST MEDICAL & SURGICAL HISTORY:  No pertinent past medical history      Colon cancer      H/O ileostomy      S/P colon resection    FAMILY HISTORY:  NC    Social History:Non smoker    MEDICATIONS  (STANDING):  apixaban 5 milliGRAM(s) Oral every 12 hours  cefTRIAXone Injectable. 1000 milliGRAM(s) IV Push every 24 hours  tamsulosin 0.4 milliGRAM(s) Oral at bedtime    MEDICATIONS  (PRN):  ibuprofen  Tablet. 600 milliGRAM(s) Oral every 8 hours PRN Moderate Pain (4 - 6)  loperamide 2 milliGRAM(s) Oral every 6 hours PRN Diarrhea   Meds reviewed    Allergies    No Known Allergies    Intolerances      Vital Signs Last 24 Hrs  T(C): 36.4 (2023 04:00), Max: 36.9 (2023 18:56)  T(F): 97.6 (2023 04:00), Max: 98.4 (2023 18:56)  HR: 64 (2023 04:00) (58 - 84)  BP: 114/68 (2023 04:00) (88/55 - 123/64)  BP(mean): 82 (2023 18:42) (64 - 85)  RR: 18 (2023 04:00) (9 - 18)  SpO2: 99% (2023 04:00) (98% - 100%)    Parameters below as of 2023 04:00  Patient On (Oxygen Delivery Method): room air      PHYSICAL EXAM:    GENERAL: appears chronically ill  HEAD: NCAT  EYES: EOMI  NECK: Supple  NERVOUS SYSTEM:  Alert & Oriented  CHEST/LUNG: Clear to percussion bilaterally  HEART: Regular rate and rhythm  ABDOMEN: Soft, Nontender, Nondistended; +BS  EXTREMITIES: no edema      LABS:                        12.8   5.80  )-----------( 328      ( 2023 06:05 )             38.3     04-08    119<LL>  |  85<L>  |  48.4<H>  ----------------------------<  104<H>  5.7<H>   |  20.0<L>  |  2.03<H>    Ca    9.7      2023 06:05  Phos  3.3     04-06  Mg     1.7     04-08    TPro  7.4  /  Alb  3.4  /  TBili  1.3<H>  /  DBili  x   /  AST  45<H>  /  ALT  114<H>  /  AlkPhos  168<H>  04-06      Urinalysis Basic - ( 2023 11:11 )    Color: Yellow / Appearance: Slightly Turbid / S.015 / pH: x  Gluc: x / Ketone: Negative  / Bili: Negative / Urobili: Negative mg/dL   Blood: x / Protein: 30 mg/dL / Nitrite: Negative   Leuk Esterase: Negative / RBC: 6-10 /HPF / WBC 3-5 /HPF   Sq Epi: x / Non Sq Epi: x / Bacteria: Occasional      Magnesium, Serum: 1.7 mg/dL (-08 @ 06:05)          RADIOLOGY & ADDITIONAL TESTS:

## 2023-04-08 NOTE — CONSULT NOTE ADULT - ASSESSMENT
57 year old male with PMH of Left sided adeno Ca Colon s/p resection and ileostomy in 2022, no Chemo after, recent MRI in March 2023 showing ? Recurrent Colon Ca based on pelvic mass adjacent to bladder, h/o DVT 2022 and non occlusive portal vein thrombus, presented to [facility] on 4/6/23 with c/o back pain which woke him up after taking a nap, Pt reports having severe pressure type right lower back pain which is aggravated when going from a sitting to lying position associated with nausea and vomiting, pt also reports 10 pound weight loss in one month which he attributes to reducing salt intake and drinking more water.    HypoNatremia  Urine studies not c/w SIADH  Goal correction not to exceed 6-8mEq every 24 hours  DDAVP given by ICU team serum Na 121--> 124 --> 119 ( past 12 hrs)    MAXWELL suspect on CKD  Noted R side mod Hydronephrosis 57 year old male with PMH of Left sided adeno Ca Colon s/p resection and ileostomy in 2022, no Chemo after, recent MRI in March 2023 showing ? Recurrent Colon Ca based on pelvic mass adjacent to bladder, h/o DVT 2022 and non occlusive portal vein thrombus, presented to [facility] on 4/6/23 with c/o back pain which woke him up after taking a nap, Pt reports having severe pressure type right lower back pain which is aggravated when going from a sitting to lying position associated with nausea and vomiting, pt also reports 10 pound weight loss in one month which he attributes to reducing salt intake and drinking more water.    HypoNatremia  Urine studies not c/w SIADH  Goal correction not to exceed 6-8mEq every 24 hours  DDAVP given by ICU team serum Na 121--> 124 --> 119 ( past 12 hrs)  Will give 2% saline for 40 cc/hr for 4 hrs then stop should stop approx 2 PM will repeat BMP approx 2:30 PM  Repeat BMP 7 PM    MAXWELL suspect on CKD  Noted R side mod Hydronephrosis  Avoid nephrotoxic agents   Avoid NSAIS  Renally dose meds    HyperKalemia 2/2 MAXWELL? will give Lokelma 10 gm x 1 repeat BMP 2:30 PM    AM labs will follow

## 2023-04-08 NOTE — PROGRESS NOTE ADULT - SUBJECTIVE AND OBJECTIVE BOX
seen for renal stone, hyponatremia    feels well  ros negative      MEDICATIONS  (STANDING):  apixaban 5 milliGRAM(s) Oral every 12 hours  cefTRIAXone Injectable. 1000 milliGRAM(s) IV Push every 24 hours  sodium bicarbonate 650 milliGRAM(s) Oral three times a day  sodium chloride 2% . 1000 milliLiter(s) (40 mL/Hr) IV Continuous <Continuous>  tamsulosin 0.4 milliGRAM(s) Oral at bedtime    MEDICATIONS  (PRN):  ibuprofen  Tablet. 600 milliGRAM(s) Oral every 8 hours PRN Moderate Pain (4 - 6)  loperamide 2 milliGRAM(s) Oral every 6 hours PRN Diarrhea      Allergies    No Known Allergies      Vital Signs Last 24 Hrs  T(C): 36.6 (2023 09:00), Max: 36.9 (2023 18:56)  T(F): 97.8 (2023 09:00), Max: 98.4 (2023 18:56)  HR: 57 (2023 09:00) (57 - 84)  BP: 118/68 (2023 09:00) (88/55 - 123/64)  BP(mean): 82 (2023 18:42) (64 - 85)  RR: 18 (2023 09:00) (9 - 18)  SpO2: 99% (2023 09:00) (98% - 100%)    Parameters below as of 2023 09:00  Patient On (Oxygen Delivery Method): room air        PHYSICAL EXAM:    GENERAL: NAD  CHEST/LUNG: Clear to ausculation bilaterally  HEART: Regular rate and rhythm; S1 S2  ABDOMEN: Soft, ; Bowel sounds present  +ostomy   EXTREMITIES: no edema   NERVOUS SYSTEM:  Alert & Oriented X3, Motor Strength 5/5 B/L upper and lower extremities  PSYCH: normal mood, appropriate response.    LABS:                        12.8   5.80  )-----------( 328      ( 2023 06:05 )             38.3     04-08    119<LL>  |  85<L>  |  48.4<H>  ----------------------------<  104<H>  5.7<H>   |  20.0<L>  |  2.03<H>    Ca    9.7      2023 06:05  Phos  3.3     04-  Mg     1.7     -    TPro  7.4  /  Alb  3.4  /  TBili  1.3<H>  /  DBili  x   /  AST  45<H>  /  ALT  114<H>  /  AlkPhos  168<H>  -      Urinalysis Basic - ( 2023 11:11 )    Color: Yellow / Appearance: Slightly Turbid / S.015 / pH: x  Gluc: x / Ketone: Negative  / Bili: Negative / Urobili: Negative mg/dL   Blood: x / Protein: 30 mg/dL / Nitrite: Negative   Leuk Esterase: Negative / RBC: 6-10 /HPF / WBC 3-5 /HPF   Sq Epi: x / Non Sq Epi: x / Bacteria: Occasional        CAPILLARY BLOOD GLUCOSE            RADIOLOGY & ADDITIONAL TESTS:

## 2023-04-08 NOTE — PROGRESS NOTE ADULT - SUBJECTIVE AND OBJECTIVE BOX
Subjective: 57 year old male with PMH of Left sided adeno Ca Colon s/p resection and ileostomy in , no Chemo after, recent MRI in 2023 showing ? Recurrent Colon Ca based on pelvic mass adjacent to bladder, h/o DVT  and non occlusive portal vein thrombus, presented to [facility]  on 23 with c/o back pain which woke him up after taking a nap, Pt reports having severe pressure type right lower back pain which is aggravated when going from a sitting to lying position associated with nausea and vomiting, pt also reports 10 pound weight loss in one month which he attributes to reducing salt intake and drinking more water.   At [facility]- CT- w/ 2 mm UVJ stone on R. w/ mod hydro. And hyponatremia + MAXWELL. Transferred to [facility] for Uro care.     s/p cysto and right stent doing well   23       Patient seen and examined bedside. No acute events overnight. Feeling well. Tolerating diet. Pain well controlled.   esquivel in place with clearing hematuria. Denies NV CP SOB headache fever chills.       MEDICATIONS  (STANDING):  apixaban 5 milliGRAM(s) Oral every 12 hours  cefTRIAXone Injectable. 1000 milliGRAM(s) IV Push every 24 hours  sodium bicarbonate 650 milliGRAM(s) Oral three times a day  sodium chloride 2% . 1000 milliLiter(s) (40 mL/Hr) IV Continuous <Continuous>  tamsulosin 0.4 milliGRAM(s) Oral at bedtime    MEDICATIONS  (PRN):  ibuprofen  Tablet. 600 milliGRAM(s) Oral every 8 hours PRN Moderate Pain (4 - 6)  loperamide 2 milliGRAM(s) Oral every 6 hours PRN Diarrhea      Vital Signs Last 24 Hrs  T(C): 36.6 (2023 09:00), Max: 36.9 (2023 18:56)  T(F): 97.8 (2023 09:00), Max: 98.4 (2023 18:56)  HR: 57 (2023 09:00) (57 - 84)  BP: 118/68 (2023 09:00) (88/55 - 123/64)  BP(mean): 82 (2023 18:42) (64 - 85)  RR: 18 (2023 09:00) (9 - 18)  SpO2: 99% (2023 09:00) (98% - 100%)    Parameters below as of 2023 09:00  Patient On (Oxygen Delivery Method): room air        Physical Exam:    Constitutional: NAD  Gastrointestinal: Soft, non-tender  : esquivel in place with light pink urine in bag, tube with clearing hematuria , no clots noted   Extremities: No peripheral edema, No cyanosis  Neurological: A&O x 3; without gross deficit, GCS: 15  Musculoskeletal: No joint pain, swelling, deformity, or point tenderness; no limitation of movement      LABS:                        12.8   5.80  )-----------( 328      ( 2023 06:05 )             38.3     04-08    119<LL>  |  85<L>  |  48.4<H>  ----------------------------<  104<H>  5.7<H>   |  20.0<L>  |  2.03<H>    Ca    9.7      2023 06:05  Phos  3.3     04-06  Mg     1.7     04-08    TPro  7.4  /  Alb  3.4  /  TBili  1.3<H>  /  DBili  x   /  AST  45<H>  /  ALT  114<H>  /  AlkPhos  168<H>  04-06      Urinalysis Basic - ( 2023 11:11 )    Color: Yellow / Appearance: Slightly Turbid / S.015 / pH: x  Gluc: x / Ketone: Negative  / Bili: Negative / Urobili: Negative mg/dL   Blood: x / Protein: 30 mg/dL / Nitrite: Negative   Leuk Esterase: Negative / RBC: 6-10 /HPF / WBC 3-5 /HPF   Sq Epi: x / Non Sq Epi: x / Bacteria: Occasional       Subjective: 57 year old male with PMH of Left sided adeno Ca Colon s/p resection and ileostomy in , no Chemo after, recent MRI in 2023 showing ? Recurrent Colon Ca based on pelvic mass adjacent to bladder, h/o DVT  and non occlusive portal vein thrombus, presented to [facility]  on 23 with c/o back pain which woke him up after taking a nap, Pt reports having severe pressure type right lower back pain which is aggravated when going from a sitting to lying position associated with nausea and vomiting, pt also reports 10 pound weight loss in one month which he attributes to reducing salt intake and drinking more water.   At [facility]- CT- w/ 2 mm UVJ stone on R. w/ mod hydro. And hyponatremia + MAXWELL. Transferred to [facility] for Uro care.     s/p cysto and right stent doing well   23       Patient seen and examined bedside. No acute events overnight. Feeling well. Tolerating diet. Pain well controlled. Cr downtrending.   esquivel in place with clearing hematuria. Denies NV CP SOB headache fever chills.       MEDICATIONS  (STANDING):  apixaban 5 milliGRAM(s) Oral every 12 hours  cefTRIAXone Injectable. 1000 milliGRAM(s) IV Push every 24 hours  sodium bicarbonate 650 milliGRAM(s) Oral three times a day  sodium chloride 2% . 1000 milliLiter(s) (40 mL/Hr) IV Continuous <Continuous>  tamsulosin 0.4 milliGRAM(s) Oral at bedtime    MEDICATIONS  (PRN):  ibuprofen  Tablet. 600 milliGRAM(s) Oral every 8 hours PRN Moderate Pain (4 - 6)  loperamide 2 milliGRAM(s) Oral every 6 hours PRN Diarrhea      Vital Signs Last 24 Hrs  T(C): 36.6 (2023 09:00), Max: 36.9 (2023 18:56)  T(F): 97.8 (2023 09:00), Max: 98.4 (2023 18:56)  HR: 57 (2023 09:00) (57 - 84)  BP: 118/68 (2023 09:00) (88/55 - 123/64)  BP(mean): 82 (2023 18:42) (64 - 85)  RR: 18 (2023 09:00) (9 - 18)  SpO2: 99% (2023 09:00) (98% - 100%)    Parameters below as of 2023 09:00  Patient On (Oxygen Delivery Method): room air        Physical Exam:    Constitutional: NAD  Gastrointestinal: Soft, non-tender  : esquivel in place with light pink urine in bag, tube with clearing hematuria , no clots noted   Extremities: No peripheral edema, No cyanosis  Neurological: A&O x 3; without gross deficit, GCS: 15  Musculoskeletal: No joint pain, swelling, deformity, or point tenderness; no limitation of movement      LABS:                        12.8   5.80  )-----------( 328      ( 2023 06:05 )             38.3     04-08    119<LL>  |  85<L>  |  48.4<H>  ----------------------------<  104<H>  5.7<H>   |  20.0<L>  |  2.03<H>    Ca    9.7      2023 06:05  Phos  3.3     04-06  Mg     1.7     04-08    TPro  7.4  /  Alb  3.4  /  TBili  1.3<H>  /  DBili  x   /  AST  45<H>  /  ALT  114<H>  /  AlkPhos  168<H>  04-06      Urinalysis Basic - ( 2023 11:11 )    Color: Yellow / Appearance: Slightly Turbid / S.015 / pH: x  Gluc: x / Ketone: Negative  / Bili: Negative / Urobili: Negative mg/dL   Blood: x / Protein: 30 mg/dL / Nitrite: Negative   Leuk Esterase: Negative / RBC: 6-10 /HPF / WBC 3-5 /HPF   Sq Epi: x / Non Sq Epi: x / Bacteria: Occasional

## 2023-04-09 LAB
ANION GAP SERPL CALC-SCNC: 11 MMOL/L — SIGNIFICANT CHANGE UP (ref 5–17)
ANION GAP SERPL CALC-SCNC: 9 MMOL/L — SIGNIFICANT CHANGE UP (ref 5–17)
BUN SERPL-MCNC: 32.2 MG/DL — HIGH (ref 8–20)
BUN SERPL-MCNC: 43.6 MG/DL — HIGH (ref 8–20)
CALCIUM SERPL-MCNC: 8.9 MG/DL — SIGNIFICANT CHANGE UP (ref 8.4–10.5)
CALCIUM SERPL-MCNC: 9 MG/DL — SIGNIFICANT CHANGE UP (ref 8.4–10.5)
CHLORIDE SERPL-SCNC: 91 MMOL/L — LOW (ref 96–108)
CHLORIDE SERPL-SCNC: 93 MMOL/L — LOW (ref 96–108)
CO2 SERPL-SCNC: 20 MMOL/L — LOW (ref 22–29)
CO2 SERPL-SCNC: 22 MMOL/L — SIGNIFICANT CHANGE UP (ref 22–29)
CREAT SERPL-MCNC: 1.67 MG/DL — HIGH (ref 0.5–1.3)
CREAT SERPL-MCNC: 2.17 MG/DL — HIGH (ref 0.5–1.3)
EGFR: 34 ML/MIN/1.73M2 — LOW
EGFR: 47 ML/MIN/1.73M2 — LOW
GLUCOSE SERPL-MCNC: 123 MG/DL — HIGH (ref 70–99)
GLUCOSE SERPL-MCNC: 96 MG/DL — SIGNIFICANT CHANGE UP (ref 70–99)
HCT VFR BLD CALC: 34.6 % — LOW (ref 39–50)
HGB BLD-MCNC: 11.6 G/DL — LOW (ref 13–17)
MCHC RBC-ENTMCNC: 28 PG — SIGNIFICANT CHANGE UP (ref 27–34)
MCHC RBC-ENTMCNC: 33.5 GM/DL — SIGNIFICANT CHANGE UP (ref 32–36)
MCV RBC AUTO: 83.6 FL — SIGNIFICANT CHANGE UP (ref 80–100)
PLATELET # BLD AUTO: 298 K/UL — SIGNIFICANT CHANGE UP (ref 150–400)
POTASSIUM SERPL-MCNC: 5 MMOL/L — SIGNIFICANT CHANGE UP (ref 3.5–5.3)
POTASSIUM SERPL-MCNC: 5 MMOL/L — SIGNIFICANT CHANGE UP (ref 3.5–5.3)
POTASSIUM SERPL-SCNC: 5 MMOL/L — SIGNIFICANT CHANGE UP (ref 3.5–5.3)
POTASSIUM SERPL-SCNC: 5 MMOL/L — SIGNIFICANT CHANGE UP (ref 3.5–5.3)
RBC # BLD: 4.14 M/UL — LOW (ref 4.2–5.8)
RBC # FLD: 13.4 % — SIGNIFICANT CHANGE UP (ref 10.3–14.5)
SODIUM SERPL-SCNC: 122 MMOL/L — LOW (ref 135–145)
SODIUM SERPL-SCNC: 124 MMOL/L — LOW (ref 135–145)
WBC # BLD: 5.56 K/UL — SIGNIFICANT CHANGE UP (ref 3.8–10.5)
WBC # FLD AUTO: 5.56 K/UL — SIGNIFICANT CHANGE UP (ref 3.8–10.5)

## 2023-04-09 PROCEDURE — 99232 SBSQ HOSP IP/OBS MODERATE 35: CPT

## 2023-04-09 PROCEDURE — 99223 1ST HOSP IP/OBS HIGH 75: CPT

## 2023-04-09 PROCEDURE — 99233 SBSQ HOSP IP/OBS HIGH 50: CPT

## 2023-04-09 RX ORDER — HYDROCORTISONE 20 MG
10 TABLET ORAL DAILY
Refills: 0 | Status: DISCONTINUED | OUTPATIENT
Start: 2023-04-10 | End: 2023-04-10

## 2023-04-09 RX ORDER — HYDROCORTISONE 20 MG
5 TABLET ORAL
Refills: 0 | Status: DISCONTINUED | OUTPATIENT
Start: 2023-04-10 | End: 2023-04-10

## 2023-04-09 RX ORDER — COSYNTROPIN 0.25 MG/ML
0.25 INJECTION, SOLUTION INTRAVENOUS ONCE
Refills: 0 | Status: COMPLETED | OUTPATIENT
Start: 2023-04-09 | End: 2023-04-09

## 2023-04-09 RX ORDER — HYDROCORTISONE 20 MG
5 TABLET ORAL ONCE
Refills: 0 | Status: COMPLETED | OUTPATIENT
Start: 2023-04-09 | End: 2023-04-09

## 2023-04-09 RX ORDER — SODIUM CHLORIDE 5 G/100ML
1000 INJECTION, SOLUTION INTRAVENOUS
Refills: 0 | Status: COMPLETED | OUTPATIENT
Start: 2023-04-09 | End: 2023-04-09

## 2023-04-09 RX ORDER — ACETAMINOPHEN 500 MG
650 TABLET ORAL EVERY 6 HOURS
Refills: 0 | Status: DISCONTINUED | OUTPATIENT
Start: 2023-04-09 | End: 2023-04-13

## 2023-04-09 RX ADMIN — Medication 650 MILLIGRAM(S): at 22:31

## 2023-04-09 RX ADMIN — APIXABAN 5 MILLIGRAM(S): 2.5 TABLET, FILM COATED ORAL at 06:02

## 2023-04-09 RX ADMIN — APIXABAN 5 MILLIGRAM(S): 2.5 TABLET, FILM COATED ORAL at 17:32

## 2023-04-09 RX ADMIN — CEFTRIAXONE 1000 MILLIGRAM(S): 500 INJECTION, POWDER, FOR SOLUTION INTRAMUSCULAR; INTRAVENOUS at 06:02

## 2023-04-09 RX ADMIN — Medication 650 MILLIGRAM(S): at 13:38

## 2023-04-09 RX ADMIN — TAMSULOSIN HYDROCHLORIDE 0.4 MILLIGRAM(S): 0.4 CAPSULE ORAL at 22:31

## 2023-04-09 RX ADMIN — Medication 5 MILLIGRAM(S): at 22:31

## 2023-04-09 RX ADMIN — COSYNTROPIN 0.25 MILLIGRAM(S): 0.25 INJECTION, SOLUTION INTRAVENOUS at 16:11

## 2023-04-09 RX ADMIN — Medication 650 MILLIGRAM(S): at 06:02

## 2023-04-09 RX ADMIN — SODIUM CHLORIDE 40 MILLILITER(S): 5 INJECTION, SOLUTION INTRAVENOUS at 14:40

## 2023-04-09 NOTE — PROGRESS NOTE ADULT - SUBJECTIVE AND OBJECTIVE BOX
Subjective: Patient seen and examined at bedside, resting comfortably, no complaints at this time. Reports flank pain is improved.     STATUS POST:  R cysto stent     POST OPERATIVE DAY #: 2    MEDICATIONS  (STANDING):  apixaban 5 milliGRAM(s) Oral every 12 hours  cefTRIAXone Injectable. 1000 milliGRAM(s) IV Push every 24 hours  sodium bicarbonate 650 milliGRAM(s) Oral three times a day  sodium chloride 2% . 1000 milliLiter(s) (40 mL/Hr) IV Continuous <Continuous>  tamsulosin 0.4 milliGRAM(s) Oral at bedtime  MEDICATIONS  (PRN):  ibuprofen  Tablet. 600 milliGRAM(s) Oral every 8 hours PRN Moderate Pain (4 - 6)  loperamide 2 milliGRAM(s) Oral every 6 hours PRN Diarrhea    Vital Signs Last 24 Hrs  T(C): 36.7 (2023 04:07), Max: 36.7 (2023 16:53)  T(F): 98 (2023 04:07), Max: 98 (2023 16:53)  HR: 69 (2023 04:07) (57 - 74)  BP: 93/57 (2023 04:07) (93/57 - 118/68)  BP(mean): --  RR: 18 (2023 04:07) (18 - 18)  SpO2: 97% (2023 04:07) (96% - 99%)  Parameters below as of 2023 04:07  Patient On (Oxygen Delivery Method): room air    Physical Exam:  Constitutional: NAD  HEENT: PERRL, EOMI  Neck: No JVD, FROM without pain  Respiratory: Respirations non-labored, no accessory muscle use  Gastrointestinal: Soft, non-tender, non-distended, no flank tenderness   Extremities: No peripheral edema, No cyanosis  Neurological: A&O x 3; without gross deficit  : Esquivel in place with some yellow urine and old blood seen, draining well     LABS:                   11.6   5.56  )-----------( 298      ( 2023 04:48 )             34.6   04-09  122<L>  |  91<L>  |  43.6<H>  ----------------------------<  96  5.0   |  22.0  |  2.17<H>  Ca    8.9      2023 04:48  Mg     1.7     04-08  Urinalysis Basic - ( 2023 11:11 )  Color: Yellow / Appearance: Slightly Turbid / S.015 / pH: x  Gluc: x / Ketone: Negative  / Bili: Negative / Urobili: Negative mg/dL   Blood: x / Protein: 30 mg/dL / Nitrite: Negative   Leuk Esterase: Negative / RBC: 6-10 /HPF / WBC 3-5 /HPF   Sq Epi: x / Non Sq Epi: x / Bacteria: Occasional    A: Patient is a 57 yo M with mhx of adeno colon ca, s/p resection and ileostomy , 2023 MRI showed concern for recurrent colon ca based on pelvic mass adjacent to blader, presented with lower back pain and 1mo 10lb weight loss, transferred to [facility] for urology care from OSH after CT showed R 2 mm UVJ stone with moderate hydronephrosis and labs showed hyponatremia, + MAXWELL, now s/p R cysto stent, pod#2, Cr was downtrending, but today went up to 2.17 from 1.89. Esquivel is still in place, urine is yellow with some old dark blood draining.     Plan:   - Continue care per primary team   - Continue esquivel for now   - I&Os, monitor urine output   - Trend labs, Cr   - Will cont to follow for now since Cr increased today   Subjective: Patient seen and examined at bedside, resting comfortably, no complaints at this time. Reports flank pain is improved.     STATUS POST:  R cysto stent     POST OPERATIVE DAY #: 2    MEDICATIONS  (STANDING):  apixaban 5 milliGRAM(s) Oral every 12 hours  cefTRIAXone Injectable. 1000 milliGRAM(s) IV Push every 24 hours  sodium bicarbonate 650 milliGRAM(s) Oral three times a day  sodium chloride 2% . 1000 milliLiter(s) (40 mL/Hr) IV Continuous <Continuous>  tamsulosin 0.4 milliGRAM(s) Oral at bedtime  MEDICATIONS  (PRN):  ibuprofen  Tablet. 600 milliGRAM(s) Oral every 8 hours PRN Moderate Pain (4 - 6)  loperamide 2 milliGRAM(s) Oral every 6 hours PRN Diarrhea    Vital Signs Last 24 Hrs  T(C): 36.7 (2023 04:07), Max: 36.7 (2023 16:53)  T(F): 98 (2023 04:07), Max: 98 (2023 16:53)  HR: 69 (2023 04:07) (57 - 74)  BP: 93/57 (2023 04:07) (93/57 - 118/68)  BP(mean): --  RR: 18 (2023 04:07) (18 - 18)  SpO2: 97% (2023 04:07) (96% - 99%)  Parameters below as of 2023 04:07  Patient On (Oxygen Delivery Method): room air    Physical Exam:  Constitutional: NAD  HEENT: PERRL, EOMI  Neck: No JVD, FROM without pain  Respiratory: Respirations non-labored, no accessory muscle use  Gastrointestinal: Soft, non-tender, non-distended, no flank tenderness   Extremities: No peripheral edema, No cyanosis  Neurological: A&O x 3; without gross deficit  : Esquivel in place with some yellow urine and old blood seen, draining well     LABS:                   11.6   5.56  )-----------( 298      ( 2023 04:48 )             34.6   04-09  122<L>  |  91<L>  |  43.6<H>  ----------------------------<  96  5.0   |  22.0  |  2.17<H>  Ca    8.9      2023 04:48  Mg     1.7     04-08  Urinalysis Basic - ( 2023 11:11 )  Color: Yellow / Appearance: Slightly Turbid / S.015 / pH: x  Gluc: x / Ketone: Negative  / Bili: Negative / Urobili: Negative mg/dL   Blood: x / Protein: 30 mg/dL / Nitrite: Negative   Leuk Esterase: Negative / RBC: 6-10 /HPF / WBC 3-5 /HPF   Sq Epi: x / Non Sq Epi: x / Bacteria: Occasional    A: Patient is a 59 yo M with mhx of adeno colon ca, s/p resection and ileostomy , 2023 MRI showed concern for recurrent colon ca based on pelvic mass adjacent to blader, presented with lower back pain and 1mo 10lb weight loss, transferred to [facility] for urology care from OSH after CT showed R 2 mm UVJ stone with moderate hydronephrosis and labs showed hyponatremia, + MAXWELL, now s/p R cysto stent, pod#2, Cr was downtrending, but today went up to 2.17 from 1.89. Esquivel is still in place, urine is yellow with some old dark blood draining.     Plan:   - Continue care per primary team   - Continue esquivel for now   - I&Os, monitor urine output   - Trend labs, Cr      Subjective: Patient seen and examined at bedside, resting comfortably, no complaints at this time. Reports flank pain is improved.     STATUS POST:  R cysto stent     POST OPERATIVE DAY #: 2    MEDICATIONS  (STANDING):  apixaban 5 milliGRAM(s) Oral every 12 hours  cefTRIAXone Injectable. 1000 milliGRAM(s) IV Push every 24 hours  sodium bicarbonate 650 milliGRAM(s) Oral three times a day  sodium chloride 2% . 1000 milliLiter(s) (40 mL/Hr) IV Continuous <Continuous>  tamsulosin 0.4 milliGRAM(s) Oral at bedtime  MEDICATIONS  (PRN):  ibuprofen  Tablet. 600 milliGRAM(s) Oral every 8 hours PRN Moderate Pain (4 - 6)  loperamide 2 milliGRAM(s) Oral every 6 hours PRN Diarrhea    Vital Signs Last 24 Hrs  T(C): 36.7 (2023 04:07), Max: 36.7 (2023 16:53)  T(F): 98 (2023 04:07), Max: 98 (2023 16:53)  HR: 69 (2023 04:07) (57 - 74)  BP: 93/57 (2023 04:07) (93/57 - 118/68)  BP(mean): --  RR: 18 (2023 04:07) (18 - 18)  SpO2: 97% (2023 04:07) (96% - 99%)  Parameters below as of 2023 04:07  Patient On (Oxygen Delivery Method): room air    Physical Exam:  Constitutional: NAD  HEENT: PERRL, EOMI  Neck: No JVD, FROM without pain  Respiratory: Respirations non-labored, no accessory muscle use  Gastrointestinal: Soft, non-tender, non-distended, no flank tenderness   Extremities: No peripheral edema, No cyanosis  Neurological: A&O x 3; without gross deficit  : Esquivel in place with some yellow urine and old blood seen, draining well     LABS:                   11.6   5.56  )-----------( 298      ( 2023 04:48 )             34.6   04-09  122<L>  |  91<L>  |  43.6<H>  ----------------------------<  96  5.0   |  22.0  |  2.17<H>  Ca    8.9      2023 04:48  Mg     1.7     04-08  Urinalysis Basic - ( 2023 11:11 )  Color: Yellow / Appearance: Slightly Turbid / S.015 / pH: x  Gluc: x / Ketone: Negative  / Bili: Negative / Urobili: Negative mg/dL   Blood: x / Protein: 30 mg/dL / Nitrite: Negative   Leuk Esterase: Negative / RBC: 6-10 /HPF / WBC 3-5 /HPF   Sq Epi: x / Non Sq Epi: x / Bacteria: Occasional    A: Patient is a 59 yo M with mhx of adeno colon ca, s/p resection and ileostomy , 2023 MRI showed concern for recurrent colon ca based on pelvic mass adjacent to blader, presented with lower back pain and 1mo 10lb weight loss, transferred to [facility] for urology care from OSH after CT showed R 2 mm UVJ stone with moderate hydronephrosis and labs showed hyponatremia, + MAXWELL, now s/p R cysto stent, pod#2, Cr was downtrending, but today went up to 2.17 from 1.89. Esquivel is still in place, urine is yellow with some old dark blood draining.     Plan:   - Continue care per primary team   - Continue esquivel for now   - I&Os, monitor urine output   - Trend labs, Cr     Plan discussed with Dr. Meadows

## 2023-04-09 NOTE — CONSULT NOTE ADULT - ASSESSMENT
58 yo male with h/o colon CA s/p resection  found to have obstrucitve uropathy   Hyponateremia  Hyperkalemia   ARF -   and hypocortioslemia    ?Primary Hypocortisolism (michelle) vs Secondary Hypocortisolism     Cortrosyn Stim test ordered and performed     seems baseline and 1 hour performed    30 min cortisol  may have been missed   Pt with  chronic progressive hyponatremia- outpt labs reviewed-   weight loss-   Pt had been  drinking extra water over past month     No signs of SIADH   Serum osm  wnl Urine Osm wnl  Urine sodium appropriately low     Pt with ? 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

## 2023-04-09 NOTE — CONSULT NOTE ADULT - SUBJECTIVE AND OBJECTIVE BOX
HPI:  57 year old male with PMH of Left sided adeno Ca Colon s/p resection and ileostomy in 2022, no Chemo after NO h/o XRT , recent MRI in March 2023 showing ? Recurrent Colon Ca based on pelvic mass adjacent to bladder, h/o DVT 2022 and non occlusive portal vein thrombus, presented to  on 4/6/23 with c/o back pain which woke him up after taking a nap, Pt reports having severe pressure type right lower back pain which is aggravated when going from a sitting to lying position associated with nausea and vomiting, pt also reports 10 pound weight loss in one month which he attributes to reducing salt intake and drinking more water. Denies trauma/ hematuria/ fever/ chills/ LOC/ dizziness/ confusion. + for N/V at   At - CT- w/ 2 mm UVJ stone on R. w/ mod hydro. And hyponatremia  to 117 + MAXWELL. Transferred to [facility] for Uro care.   At [facility]- Initially planned for OR for cysto with stents. But since his symptoms have resolved now, will rpt CT to assess status. MICU was consulted for rapid correction of NA prior to arrival. and DDAVP given.  Pt soidum imporved a little    work up  showed low cortisol of 2.2 done yesterday AM   pt has not been taking  any steroids  no nasla steroids no steroid creams etc     On review oflabs  from earlier in year serum sodium a littl low at 130 in  Feb 2023       PAST MEDICAL & SURGICAL HISTORY:    Colon cancer      H/O ileostomy      S/P colon resection    ? residual 4 cm mass near bladder per pt - has  been seeing surgical oncology in [facility] - needs to follow up with Surg Onc to determine plan       h/o PE/ DVT     FAMILY HISTORY: Dad CVA   no autoimmune illnesses no thyroid  no DM       SOCIAL HISTORY:  [place]  NO exposure to chem or XRT     REVIEW OF SYSTEMS:    Constitutional: No fever, no chills, +weight loss 10 lbs over  past  1 month       Neck: No neck pain, no change in voice.    Lungs: No shortness of breath, no wheezing, no cough    CV: No chest pain, no palpitations, no pain with walking.    GI: No nausea, no vomiting, no constipation, no diarrhea, no abdominal pain    : No urinary frequency, no blood in urine, no urinary burning, no difficulty voiding.    Musculoskeletal: No muscle pain, no joint pain, no swelling.    Skin: No rash, no infections.    Neurologic: No headaches, no weakness, no burning or pain in feet, no tremor.    Endocrine: No heat intolerance, no cold intolerance, no increased sweating, no shakiness between meals.    Psych: No depression, no anxiety, no trouble concentrating    MEDICATIONS  (STANDING):  apixaban 5 milliGRAM(s) Oral every 12 hours  cefTRIAXone Injectable. 1000 milliGRAM(s) IV Push every 24 hours  sodium bicarbonate 650 milliGRAM(s) Oral three times a day  tamsulosin 0.4 milliGRAM(s) Oral at bedtime    MEDICATIONS  (PRN):  acetaminophen     Tablet .. 650 milliGRAM(s) Oral every 6 hours PRN Temp greater or equal to 38C (100.4F), Mild Pain (1 - 3), Moderate Pain (4 - 6)  loperamide 2 milliGRAM(s) Oral every 6 hours PRN Diarrhea      Allergies    No Known Allergies    Intolerances          CAPILLARY BLOOD GLUCOSE          PHYSICAL EXAM:    Vital Signs Last 24 Hrs  T(C): 36.9 (09 Apr 2023 10:58), Max: 36.9 (09 Apr 2023 10:58)  T(F): 98.4 (09 Apr 2023 10:58), Max: 98.4 (09 Apr 2023 10:58)  HR: 73 (09 Apr 2023 10:58) (69 - 73)  BP: 97/58 (09 Apr 2023 10:58) (93/57 - 97/58)  BP(mean): --  RR: 18 (09 Apr 2023 10:58) (18 - 18)  SpO2: 91% (09 Apr 2023 10:58) (91% - 97%)    Parameters below as of 09 Apr 2023 10:58  Patient On (Oxygen Delivery Method): room air        General appearance: Thin male NAD   Eyes: Pupils equal and reactive to light. EOM full. No exophthalmos.    Neck: Trachea midline. No thyroid enlargement.    Lungs: Normal respiratory excursion. Lungs clear.    CV: Regular cardiac rhythm. No murmur. Carotid and pedal pulses intact.    Abdomen: Soft, non tender, no organomegaly or mass.    Musculoskeletal: No cyanosis, clubbing, or edema. No pedal lesions.    Skin: Warm and moist. No rash. No acanthosis.    Neuro: Cranial nerves intact. Normal motor and sensory function. DTR's normal.    Psych: Normal affect, good judgement.            LABS:                        11.6   5.56  )-----------( 298      ( 09 Apr 2023 04:48 )             34.6     04-09    122<L>  |  91<L>  |  43.6<H>  ----------------------------<  96  5.0   |  22.0  |  2.17<H>    Ca    8.9      09 Apr 2023 04:48  Mg     1.7     04-08      Cortisol AM, Serum . (04.08.23 @ 06:05)   Cortisol AM, Serum: 2.2 ug/dLThyroid Stimulating Hormone, Serum (04.07.23 @ 09:30)   Thyroid Stimulating Hormone, Serum: 0.56 uIU/mL          CAPILLARY BLOOD GLUCOSE      RADIOLOGY & ADDITIONAL STUDIES:     HPI:  57 year old male with PMH of Left sided adeno Ca Colon s/p resection and ileostomy in 2022, no Chemo after NO h/o XRT , recent MRI in March 2023 showing ? Recurrent Colon Ca based on pelvic mass adjacent to bladder, h/o DVT 2022 and non occlusive portal vein thrombus, presented to  on 4/6/23 with c/o back pain which woke him up after taking a nap, Pt reports having severe pressure type right lower back pain which is aggravated when going from a sitting to lying position associated with nausea and vomiting, pt also reports 10 pound weight loss in one month which he attributes to reducing salt intake and drinking more water. Denies trauma/ hematuria/ fever/ chills/ LOC/ dizziness/ confusion. + for N/V at   At - CT- w/ 2 mm UVJ stone on R. w/ mod hydro. And hyponatremia  to 117 + MAXWELL. Transferred to [facility] for Uro care.   At [facility]- Initially planned for OR for cysto with stents. But since his symptoms have resolved now, will rpt CT to assess status. MICU was consulted for rapid correction of NA prior to arrival. and DDAVP given.  Pt soidum imporved a little    work up  showed low cortisol of 2.2 done yesterday AM   pt has not been taking  any steroids  no nasla steroids no steroid creams etc     On review oflabs  from earlier in year serum sodium a littl low at 130 in  Feb 2023       PAST MEDICAL & SURGICAL HISTORY:    Colon cancer      H/O ileostomy      S/P colon resection    ? residual 4 cm mass near bladder per pt - has  been seeing surgical oncology in [facility] - needs to follow up with Surg Onc to determine plan     cholelithiasis     h/o PE/ DVT     FAMILY HISTORY: Dad CVA   no autoimmune illnesses no thyroid  no DM       SOCIAL HISTORY:  Born [place]  NO exposure to chem or XRT     REVIEW OF SYSTEMS:    Constitutional: No fever, no chills, +weight loss 10 lbs over  past  1 month       Neck: No neck pain, no change in voice.    Lungs: No shortness of breath, no wheezing, no cough    CV: No chest pain, no palpitations, no pain with walking.    GI: No nausea, no vomiting, no constipation, no diarrhea, no abdominal pain    : No urinary frequency, no blood in urine, no urinary burning, no difficulty voiding.    Musculoskeletal: No muscle pain, no joint pain, no swelling.    Skin: No rash, no infections.    Neurologic: No headaches, no weakness, no burning or pain in feet, no tremor.    Endocrine: No heat intolerance, no cold intolerance, no increased sweating, no shakiness between meals.    Psych: No depression, no anxiety, no trouble concentrating    MEDICATIONS  (STANDING):  apixaban 5 milliGRAM(s) Oral every 12 hours  cefTRIAXone Injectable. 1000 milliGRAM(s) IV Push every 24 hours  sodium bicarbonate 650 milliGRAM(s) Oral three times a day  tamsulosin 0.4 milliGRAM(s) Oral at bedtime    MEDICATIONS  (PRN):  acetaminophen     Tablet .. 650 milliGRAM(s) Oral every 6 hours PRN Temp greater or equal to 38C (100.4F), Mild Pain (1 - 3), Moderate Pain (4 - 6)  loperamide 2 milliGRAM(s) Oral every 6 hours PRN Diarrhea      Allergies    No Known Allergies    Intolerances          CAPILLARY BLOOD GLUCOSE          PHYSICAL EXAM:    Vital Signs Last 24 Hrs  T(C): 36.9 (09 Apr 2023 10:58), Max: 36.9 (09 Apr 2023 10:58)  T(F): 98.4 (09 Apr 2023 10:58), Max: 98.4 (09 Apr 2023 10:58)  HR: 73 (09 Apr 2023 10:58) (69 - 73)  BP: 97/58 (09 Apr 2023 10:58) (93/57 - 97/58)  BP(mean): --  RR: 18 (09 Apr 2023 10:58) (18 - 18)  SpO2: 91% (09 Apr 2023 10:58) (91% - 97%)    Parameters below as of 09 Apr 2023 10:58  Patient On (Oxygen Delivery Method): room air        General appearance: Thin male NAD   Eyes: Pupils equal and reactive to light. EOM full. No exophthalmos.    Neck: Trachea midline. No thyroid enlargement.    Lungs: Normal respiratory excursion. Lungs clear.    CV: Regular cardiac rhythm. No murmur. Carotid and pedal pulses intact.    Abdomen: Soft, non tender, no organomegaly or mass.    Musculoskeletal: No cyanosis, clubbing, or edema. No pedal lesions.    Skin: Warm and moist. No rash. No acanthosis.    Neuro: Cranial nerves intact. Normal motor and sensory function. DTR's normal.    Psych: Normal affect, good judgement.            LABS:                        11.6   5.56  )-----------( 298      ( 09 Apr 2023 04:48 )             34.6     04-09    122<L>  |  91<L>  |  43.6<H>  ----------------------------<  96  5.0   |  22.0  |  2.17<H>    Ca    8.9      09 Apr 2023 04:48  Mg     1.7     04-08      Cortisol AM, Serum . (04.08.23 @ 06:05)   Cortisol AM, Serum: 2.2 ug/dLThyroid Stimulating Hormone, Serum (04.07.23 @ 09:30)   Thyroid Stimulating Hormone, Serum: 0.56 uIU/mL    Potassium, Random Urine: 40: Reference Ranges have NOT been established for random urine analytes due   to variability in fluid intake and concentration. mmol/L (04.07.23 @ 11:11) Urea Nitrogen, Random Urine: 1031: Reference Ranges have NOT been established for random urine analytes due   to variability in fluid intake and concentration. mg/dL (04.07.23 @ 11:11) Creatinine, Random Urine: 145: Reference Ranges have NOT been established for random urine analytes due   to variability in fluid intake and concentration. mg/dL (04.07.23 @ 11:11) Total Protein, Random Urine: 26.0 mg/dL (04.07.23 @ 11:11) Protein/Creatinine Ratio Calculation: 0.2 Ratio (04.07.23 @ 11:11) Sodium, Random Urine: <30: Reference Ranges have NOT been established for random urine analytes due   to variability in fluid intake and concentration. mmol/L (04.07.23 @ 11:11) Urinalysis (04.07.23 @ 11:11)   pH Urine: 5.0  Glucose Qualitative, Urine: 100 mg/dL  Blood, Urine: Moderate  Color: Yellow  Urine Appearance: Slightly Turbid  Bilirubin: Negative  Ketone - Urine: Negative  Specific Gravity: 1.015  Protein, Urine: 30 mg/dL  Urobilinogen: Negative mg/dL  Nitrite: Negative  Leukocyte Esterase Concentration: NegativeOsmolality, Random Urine: 538 mosm/kg (04.07.23 @ 07:00) Osmolality, Serum: 281 mosmol/kg (04.07.23 @ 09:30)       CAPILLARY BLOOD GLUCOSE      RADIOLOGY & ADDITIONAL STUDIES:    < from: CT Abdomen and Pelvis No Cont (04.07.23 @ 09:05) >  ACC: [id number] EXAM:  CT ABDOMEN AND PELVIS   ORDERED BY: BENITO MAYS     PROCEDURE DATE:  04/07/2023          INTERPRETATION:  CLINICAL INFORMATION: Follow-up of urinary tract stone    COMPARISON: 4/6/2023    CONTRAST/COMPLICATIONS:  IV Contrast:NONE  Oral Contrast: NONE  Complications: None reported at time of study completion    PROCEDURE:  CT of the Abdomen and Pelvis was performed.  Sagittal and coronal reformats were performed.    FINDINGS:  LOWER CHEST: Within normal limits.    LIVER: Within normal limits.  BILE DUCTS: Normal caliber.  GALLBLADDER: Poorly visualized..  SPLEEN: Within normal limits.  PANCREAS: Within normal limits.  ADRENALS: Within normal limits.  KIDNEYS/URETERS: Moderate right hydronephrosis secondary to a 3 mm   calculus right UVJ junction unchanged. Right perirenal stranding.   Multiple bilateral nonobstructive intrarenal calculi the largest on the   left measuring up to 9 mm.    BLADDER: Lind..  REPRODUCTIVE ORGANS: Enlarged prostate    BOWEL: Limited dueto nonopacification underdistention. No gross bowel   inflammation. No bowel obstruction status post right lower quadrant   ileostomy, subtotal colectomy with Joseph's pouch. Appendix no   appendicitis  PERITONEUM: No ascites.  VESSELS: Nonaneurysmal.  RETROPERITONEUM/LYMPH NODES: No lymphadenopathy.  ABDOMINAL WALL: Within normal limits.  BONES: Within normal limits.    IMPRESSION:  Moderate right hydronephrosis secondary to 3 mm right UVJ calculus   similar to prior.  Additional bilateral nonobstructive nephrolithiasis.  Additional findings as discussed        --- End of Report ---            < end of copied text >  < from: CT Abdomen and Pelvis w/ Oral Cont and w/ IV Cont (10.11.22 @ 15:21) >  ACC: [id number] EXAM:  CT ABDOMEN AND PELVIS OC IC                          PROCEDURE DATE:  10/11/2022          INTERPRETATION:  CLINICAL INFORMATION: 57 years  Male with s/p ex lap,   subtotal colectomy. Status post subtotal colectomy 9/12/2022 forlarge   bowel obstruction and septic shock.    COMPARISON: 9/19/2022    CONTRAST/COMPLICATIONS:  IV Contrast: Omnipaque 350  95 cc administered   5 cc discarded  Oral Contrast: Gastroview  Complications: None reported at time of study completion    PROCEDURE:  CT of the Abdomen and Pelvis was performed.  Sagittal and coronal reformats were performed.    Limitation: Motion artifact. Streak artifact from patient's arms.    FINDINGS:  LOWER CHEST: Partially visualized right pectoralis muscle asymmetry. Left   gynecomastia.    LIVER: Within normal limits.  BILE DUCTS: Normal caliber.  GALLBLADDER: Within normal limits.  SPLEEN: Stable mild splenomegaly.  PANCREAS: Within normal limits.  ADRENALS: Within normal limits.  KIDNEYS/URETERS: Within normal limits.    BLADDER: Decompressed.  REPRODUCTIVE ORGANS: Mildly enlarged prostate.    BOWEL: No bowel obstruction. Subtotal colectomy with right-sided   ileostomy and Joseph's pouch. Mildly thickened distal ileum   reidentified. Previously seen adjacent collection is significantly   smaller than prior. Please refer to peritoneum findings.  PERITONEUM: No ascites. 2.5 x 1.8 cm fluid collection along the bladder   dome (2:85), previously 9.5 x 3.7 cm. Right lower quadrant mesenteric fat   stranding improved from prior.  VESSELS: Within normal limits. No SMV thrombosis identified. No portal   vein thrombosis. Limited evaluation for external iliac DVT due to phase   of injection.  RETROPERITONEUM/LYMPH NODES: No lymphadenopathy.  ABDOMINAL WALL: Postoperative changes.  BONES: Within normal limits.    IMPRESSION:    Subtotal colectomy with right-sided ileostomy. No bowel obstruction.    2.5 x 1.8 cm fluid collection along the bladder dome is significantly   smaller than prior. Right lower quadrant mesenteric fat stranding   improved from prior.    Stable mild splenomegaly.    Partially visualized right pectoralis muscle asymmetry.        --- End of Report ---            ADDIS GALEANO MD; Attending Radiologist  This document has been electronically best    < end of copied text >  < from: CT Head No Cont (09.11.22 @ 21:53) >  ACC: [id number] EXAM:  CT BRAIN                          PROCEDURE DATE:  09/11/2022          INTERPRETATION:  Clinical Indication: Weakness, vomiting, coagulopathic.   Rule out bleed.    Comparison: None    Technique: Noncontrast axial CT images of the head were obtained. Coronal   and sagittal reconstructions were performed.    Head CT Findings:  The ventricles and sulci are normal in size for the patient's stated age.    No acute intracranial hemorrhage is identified.  There is no extra-axial   fluid collection. No mass effect or midline shift is seen.  There is no   evidence of acute territorial infarct.  The visualized paranasal sinuses are clear except for trace bilateral   maxillary sinus mucosal thickening. The mastoid air cells are well   aerated on the left. There is trace right mastoid opacification.  No   acute osseous abnormality is seen.  Nasogastric tube is partially imaged.    Impression: No evidence of acute intracranial abnormality.    --- End of Report ---            STEPHANIE JAMA MD; Attending Radiologist  This document has been electronically sign    < end of copied text >

## 2023-04-09 NOTE — PROGRESS NOTE ADULT - SUBJECTIVE AND OBJECTIVE BOX
seen for hyponatremia    complaining of gen weakness  ros negative      MEDICATIONS  (STANDING):  apixaban 5 milliGRAM(s) Oral every 12 hours  cefTRIAXone Injectable. 1000 milliGRAM(s) IV Push every 24 hours  sodium bicarbonate 650 milliGRAM(s) Oral three times a day  sodium chloride 2% . 1000 milliLiter(s) (40 mL/Hr) IV Continuous <Continuous>  tamsulosin 0.4 milliGRAM(s) Oral at bedtime    MEDICATIONS  (PRN):  acetaminophen     Tablet .. 650 milliGRAM(s) Oral every 6 hours PRN Temp greater or equal to 38C (100.4F), Mild Pain (1 - 3), Moderate Pain (4 - 6)  loperamide 2 milliGRAM(s) Oral every 6 hours PRN Diarrhea      Allergies    No Known Allergies    Vital Signs Last 24 Hrs  T(C): 36.9 (09 Apr 2023 10:58), Max: 36.9 (09 Apr 2023 10:58)  T(F): 98.4 (09 Apr 2023 10:58), Max: 98.4 (09 Apr 2023 10:58)  HR: 73 (09 Apr 2023 10:58) (67 - 74)  BP: 97/58 (09 Apr 2023 10:58) (93/57 - 107/56)  BP(mean): --  RR: 18 (09 Apr 2023 10:58) (18 - 18)  SpO2: 91% (09 Apr 2023 10:58) (91% - 97%)    Parameters below as of 09 Apr 2023 10:58  Patient On (Oxygen Delivery Method): room air        PHYSICAL EXAM:    GENERAL: NAD  CHEST/LUNG: Clear to ausculation bilaterally  HEART: Regular rate and rhythm; S1 S2  ABDOMEN: Soft, + ostomy   EXTREMITIES: no edema  gu  blood tinged urine in esquivel   NERVOUS SYSTEM:  Alert & Oriented X3, Motor Strength 5/5 B/L upper and lower extremities  PSYCH: normal mood, appropriate response.    LABS:                        11.6   5.56  )-----------( 298      ( 09 Apr 2023 04:48 )             34.6     04-09    122<L>  |  91<L>  |  43.6<H>  ----------------------------<  96  5.0   |  22.0  |  2.17<H>    Ca    8.9      09 Apr 2023 04:48  Mg     1.7     04-08            CAPILLARY BLOOD GLUCOSE            RADIOLOGY & ADDITIONAL TESTS:

## 2023-04-09 NOTE — PROGRESS NOTE ADULT - ASSESSMENT
57 year old male with PMH of Left sided adeno Ca Colon s/p resection and ileostomy in 2022, no Chemo after, recent MRI in March 2023 showing ? Recurrent Colon Ca based on pelvic mass adjacent to bladder, h/o DVT 2022 and non occlusive portal vein thrombus, presented to [facility] on 4/6/23 with c/o back pain which woke him up after taking a nap, Pt reports having severe pressure type right lower back pain which is aggravated when going from a sitting to lying position associated with nausea and vomiting, pt also reports 10 pound weight loss in one month which he attributes to reducing salt intake and drinking more water.    HypoNatremia  Urine studies not c/w SIADH  Goal correction not to exceed 6-8mEq every 24 hours  Serum Na 121--> 124 --> 119 --> 123--> 122   Will give 2% saline for 40 cc/hr for 6 hrs then stop should stop   Repeat BMP 7 PM    MAXWELL on CKD suspect stage III  Serum Cr 3.2 few days ago is now 2.1  Noted R side mod Hydronephrosis  Avoid nephrotoxic agents   Avoid NSAID   Renally dose meds    HyperKalemia treated resolved w [facility] labs will follow   57 year old male with PMH of Left sided adeno Ca Colon s/p resection and ileostomy in 2022, no Chemo after, recent MRI in March 2023 showing ? Recurrent Colon Ca based on pelvic mass adjacent to bladder, h/o DVT 2022 and non occlusive portal vein thrombus, presented to [facility] on 4/6/23 with c/o back pain which woke him up after taking a nap, Pt reports having severe pressure type right lower back pain which is aggravated when going from a sitting to lying position associated with nausea and vomiting, pt also reports 10 pound weight loss in one month which he attributes to reducing salt intake and drinking more water.    Recurrent Colon Ca based on pelvic mass adjacent to bladder  Renal stone in the right UVJ  s/p cysto and R stent on 4/7    HypoNatremia  Urine studies not c/w SIADH  Goal correction not to exceed 6-8mEq every 24 hours  Serum Na 121--> 124 --> 119 --> 123--> 122   Will give 2% saline for 40 cc/hr for 6 hrs then stop should stop   Repeat BMP 7 PM    MAXWELL on CKD suspect stage III  Serum Cr 3.2 few days ago is now 2.1  Noted R side mod Hydronephrosis s/p strnt  Avoid nephrotoxic agents   Avoid NSAID   Renally dose meds    [facility]

## 2023-04-09 NOTE — PROGRESS NOTE ADULT - ASSESSMENT
57 year old male with PMH of Left sided adeno Ca Colon s/p resection and ileostomy in 2022, no Chemo after, recent MRI in March 2023 showing ? Recurrent Colon Ca based on pelvic mass adjacent to bladder, h/o DVT 2022 and non occlusive portal vein thrombus, presented to [facility]  on 4/6/23 with c/o back pain which woke him up after taking a nap, Pt reports having severe pressure type right lower back pain which is aggravated when going from a sitting to lying position associated with nausea and vomiting, pt also reports 10 pound weight loss in one month which he attributes to reducing salt intake and drinking more water.   At - CT- w/ 2 mm UVJ stone on R. w/ mod hydro. And hyponatremia + MAXWELL. Transferred to [facility] for Uro care.   At [facility]- Initially planned for OR for cysto with stents. But since his symptoms have resolved now, will rpt CT to assess status. MICU was consulted for rapid correction of NA prior to arrival. and DDAVP given.      hypovolemic hyponatremia     renal consulted    2% saline     trend bmp    liberalized diet    decrease free water intake    low cortisol: denies prior steroid use     endocrine consulted for further workup.    hyperkalemia:  maintain on tele    lokelma    # Right obstructing renal calculi  s/p renal stent placement by urology   esquivel with blood tinged urine, monitor h/h  (initial hg 16 likely concentrated)    # R mod Hydronephrosis  sec to above    # MAXWELL  Crt 0.7 in Oct 2022  possible etio is obstructive nephro  improving    # Left sided adeno Ca Colon s/p resection and ileostomy in 2022, no Chemo after,   recent MRI in March 2023 showing ? Recurrent Colon Ca based on pelvic mass adjacent to bladder  Has o/p Surg Onc appt scheduled already  Functioning Ileostomy  Cont ostomy care.    # DVT, non occlusive portal vein thrombus  On Eliquis  may need to hold if hematuria persists.

## 2023-04-09 NOTE — PROGRESS NOTE ADULT - SUBJECTIVE AND OBJECTIVE BOX
NEPHROLOGY INTERVAL HPI/OVERNIGHT EVENTS:    Examined  Awake no distress  Lind w bloody urine  Denies HA CP no SOB    MEDICATIONS  (STANDING):  apixaban 5 milliGRAM(s) Oral every 12 hours  cefTRIAXone Injectable. 1000 milliGRAM(s) IV Push every 24 hours  sodium bicarbonate 650 milliGRAM(s) Oral three times a day  sodium chloride 2% . 1000 milliLiter(s) (40 mL/Hr) IV Continuous <Continuous>  tamsulosin 0.4 milliGRAM(s) Oral at bedtime    MEDICATIONS  (PRN):  ibuprofen  Tablet. 600 milliGRAM(s) Oral every 8 hours PRN Moderate Pain (4 - 6)  loperamide 2 milliGRAM(s) Oral every 6 hours PRN Diarrhea      Allergies    No Known Allergies    Intolerances        Vital Signs Last 24 Hrs  T(C): 36.7 (2023 04:07), Max: 36.7 (2023 16:53)  T(F): 98 (2023 04:07), Max: 98 (2023 16:53)  HR: 69 (2023 04:07) (57 - 74)  BP: 93/57 (2023 04:07) (93/57 - 118/68)  BP(mean): --  RR: 18 (2023 04:07) (18 - 18)  SpO2: 97% (2023 04:07) (96% - 99%)    Parameters below as of 2023 04:07  Patient On (Oxygen Delivery Method): room air      PHYSICAL EXAM:  GENERAL: NAD  HEAD: NCAT  EYES: EOMI  NECK: Supple  NERVOUS SYSTEM:  Alert & Oriented  CHEST/LUNG: Clear to percussion bilaterally  HEART: Regular rate and rhythm  ABDOMEN: Soft, Nontender, Nondistended; +BS  EXTREMITIES: no edema    LABS:                        11.6   5.56  )-----------( 298      ( 2023 04:48 )             34.6     04-09    122<L>  |  91<L>  |  43.6<H>  ----------------------------<  96  5.0   |  22.0  |  2.17<H>    Ca    8.9      2023 04:48  Mg     1.7     04-08        Urinalysis Basic - ( 2023 11:11 )    Color: Yellow / Appearance: Slightly Turbid / S.015 / pH: x  Gluc: x / Ketone: Negative  / Bili: Negative / Urobili: Negative mg/dL   Blood: x / Protein: 30 mg/dL / Nitrite: Negative   Leuk Esterase: Negative / RBC: 6-10 /HPF / WBC 3-5 /HPF   Sq Epi: x / Non Sq Epi: x / Bacteria: Occasional              RADIOLOGY & ADDITIONAL TESTS:

## 2023-04-10 LAB
ACTH STIM ACTH BASELINE: 21.1 PG/ML — SIGNIFICANT CHANGE UP (ref 7.2–63.3)
ACTH STIM CORTISOL 30 MIN: 21.3 UG/DL — SIGNIFICANT CHANGE UP
ACTH STIM CORTISOL 60 MIN: 18.3 UG/DL — SIGNIFICANT CHANGE UP
ACTH STIM CORTISOL BASELINE: 10.2 UG/DL — SIGNIFICANT CHANGE UP (ref 6–18.4)
ALBUMIN SERPL ELPH-MCNC: 3.4 G/DL — SIGNIFICANT CHANGE UP (ref 3.3–5.2)
ALP SERPL-CCNC: 139 U/L — HIGH (ref 40–120)
ALT FLD-CCNC: 61 U/L — HIGH
ANION GAP SERPL CALC-SCNC: 9 MMOL/L — SIGNIFICANT CHANGE UP (ref 5–17)
AST SERPL-CCNC: 30 U/L — SIGNIFICANT CHANGE UP
BILIRUB DIRECT SERPL-MCNC: 0.2 MG/DL — SIGNIFICANT CHANGE UP (ref 0–0.3)
BILIRUB INDIRECT FLD-MCNC: 0.4 MG/DL — SIGNIFICANT CHANGE UP (ref 0.2–1)
BILIRUB SERPL-MCNC: 0.6 MG/DL — SIGNIFICANT CHANGE UP (ref 0.4–2)
BUN SERPL-MCNC: 27.3 MG/DL — HIGH (ref 8–20)
CALCIUM SERPL-MCNC: 9.2 MG/DL — SIGNIFICANT CHANGE UP (ref 8.4–10.5)
CHLORIDE SERPL-SCNC: 95 MMOL/L — LOW (ref 96–108)
CO2 SERPL-SCNC: 22 MMOL/L — SIGNIFICANT CHANGE UP (ref 22–29)
CREAT SERPL-MCNC: 1.59 MG/DL — HIGH (ref 0.5–1.3)
EGFR: 50 ML/MIN/1.73M2 — LOW
GLUCOSE SERPL-MCNC: 97 MG/DL — SIGNIFICANT CHANGE UP (ref 70–99)
HCT VFR BLD CALC: 33.3 % — LOW (ref 39–50)
HGB BLD-MCNC: 11.1 G/DL — LOW (ref 13–17)
MCHC RBC-ENTMCNC: 28.2 PG — SIGNIFICANT CHANGE UP (ref 27–34)
MCHC RBC-ENTMCNC: 33.3 GM/DL — SIGNIFICANT CHANGE UP (ref 32–36)
MCV RBC AUTO: 84.5 FL — SIGNIFICANT CHANGE UP (ref 80–100)
PHOSPHATE SERPL-MCNC: 2.7 MG/DL — SIGNIFICANT CHANGE UP (ref 2.4–4.7)
PLATELET # BLD AUTO: 307 K/UL — SIGNIFICANT CHANGE UP (ref 150–400)
POTASSIUM SERPL-MCNC: 5.3 MMOL/L — SIGNIFICANT CHANGE UP (ref 3.5–5.3)
POTASSIUM SERPL-SCNC: 5.3 MMOL/L — SIGNIFICANT CHANGE UP (ref 3.5–5.3)
PROT SERPL-MCNC: 6.3 G/DL — LOW (ref 6.6–8.7)
RBC # BLD: 3.94 M/UL — LOW (ref 4.2–5.8)
RBC # FLD: 13.8 % — SIGNIFICANT CHANGE UP (ref 10.3–14.5)
SODIUM SERPL-SCNC: 126 MMOL/L — LOW (ref 135–145)
URATE SERPL-MCNC: 5.6 MG/DL — SIGNIFICANT CHANGE UP (ref 3.4–7)
WBC # BLD: 4.98 K/UL — SIGNIFICANT CHANGE UP (ref 3.8–10.5)
WBC # FLD AUTO: 4.98 K/UL — SIGNIFICANT CHANGE UP (ref 3.8–10.5)

## 2023-04-10 PROCEDURE — 99233 SBSQ HOSP IP/OBS HIGH 50: CPT | Mod: GC

## 2023-04-10 PROCEDURE — 99233 SBSQ HOSP IP/OBS HIGH 50: CPT

## 2023-04-10 PROCEDURE — 99232 SBSQ HOSP IP/OBS MODERATE 35: CPT

## 2023-04-10 RX ORDER — CEFTRIAXONE 500 MG/1
1000 INJECTION, POWDER, FOR SOLUTION INTRAMUSCULAR; INTRAVENOUS EVERY 24 HOURS
Refills: 0 | Status: DISCONTINUED | OUTPATIENT
Start: 2023-04-10 | End: 2023-04-13

## 2023-04-10 RX ORDER — CEFTRIAXONE 500 MG/1
1000 INJECTION, POWDER, FOR SOLUTION INTRAMUSCULAR; INTRAVENOUS EVERY 24 HOURS
Refills: 0 | Status: DISCONTINUED | OUTPATIENT
Start: 2023-04-10 | End: 2023-04-10

## 2023-04-10 RX ORDER — SODIUM CHLORIDE 9 MG/ML
1000 INJECTION INTRAMUSCULAR; INTRAVENOUS; SUBCUTANEOUS
Refills: 0 | Status: DISCONTINUED | OUTPATIENT
Start: 2023-04-10 | End: 2023-04-13

## 2023-04-10 RX ORDER — SODIUM CHLORIDE 9 MG/ML
1 INJECTION INTRAMUSCULAR; INTRAVENOUS; SUBCUTANEOUS THREE TIMES A DAY
Refills: 0 | Status: DISCONTINUED | OUTPATIENT
Start: 2023-04-10 | End: 2023-04-12

## 2023-04-10 RX ADMIN — Medication 650 MILLIGRAM(S): at 13:57

## 2023-04-10 RX ADMIN — TAMSULOSIN HYDROCHLORIDE 0.4 MILLIGRAM(S): 0.4 CAPSULE ORAL at 21:20

## 2023-04-10 RX ADMIN — SODIUM CHLORIDE 80 MILLILITER(S): 9 INJECTION INTRAMUSCULAR; INTRAVENOUS; SUBCUTANEOUS at 21:19

## 2023-04-10 RX ADMIN — Medication 650 MILLIGRAM(S): at 05:34

## 2023-04-10 RX ADMIN — Medication 10 MILLIGRAM(S): at 05:34

## 2023-04-10 RX ADMIN — CEFTRIAXONE 1000 MILLIGRAM(S): 500 INJECTION, POWDER, FOR SOLUTION INTRAMUSCULAR; INTRAVENOUS at 05:34

## 2023-04-10 RX ADMIN — CEFTRIAXONE 1000 MILLIGRAM(S): 500 INJECTION, POWDER, FOR SOLUTION INTRAMUSCULAR; INTRAVENOUS at 17:49

## 2023-04-10 RX ADMIN — Medication 650 MILLIGRAM(S): at 21:19

## 2023-04-10 RX ADMIN — APIXABAN 5 MILLIGRAM(S): 2.5 TABLET, FILM COATED ORAL at 17:49

## 2023-04-10 RX ADMIN — APIXABAN 5 MILLIGRAM(S): 2.5 TABLET, FILM COATED ORAL at 05:34

## 2023-04-10 RX ADMIN — SODIUM CHLORIDE 1 GRAM(S): 9 INJECTION INTRAMUSCULAR; INTRAVENOUS; SUBCUTANEOUS at 21:20

## 2023-04-10 RX ADMIN — SODIUM CHLORIDE 80 MILLILITER(S): 9 INJECTION INTRAMUSCULAR; INTRAVENOUS; SUBCUTANEOUS at 19:24

## 2023-04-10 NOTE — PROGRESS NOTE ADULT - SUBJECTIVE AND OBJECTIVE BOX
Patient is a 57y old  Male who presents with a chief complaint of Hyponatremia, hypo-osmolarity, or hypo-osmolar hyponatremia     (10 Apr 2023 11:01)      Patient seen and examined at bedside. No overnight events reported.     ALLERGIES:  No Known Allergies    MEDICATIONS  (STANDING):  apixaban 5 milliGRAM(s) Oral every 12 hours  hydrocortisone 10 milliGRAM(s) Oral daily  hydrocortisone 5 milliGRAM(s) Oral <User Schedule>  sodium bicarbonate 650 milliGRAM(s) Oral three times a day  tamsulosin 0.4 milliGRAM(s) Oral at bedtime    MEDICATIONS  (PRN):  acetaminophen     Tablet .. 650 milliGRAM(s) Oral every 6 hours PRN Temp greater or equal to 38C (100.4F), Mild Pain (1 - 3), Moderate Pain (4 - 6)  loperamide 2 milliGRAM(s) Oral every 6 hours PRN Diarrhea    Vital Signs Last 24 Hrs  T(F): 98.2 (10 Apr 2023 11:08), Max: 98.2 (10 Apr 2023 11:08)  HR: 75 (10 Apr 2023 11:08) (71 - 78)  BP: 114/70 (10 Apr 2023 11:08) (100/60 - 114/76)  RR: 17 (10 Apr 2023 11:08) (17 - 18)  SpO2: 100% (10 Apr 2023 11:08) (100% - 100%)  I&O's Summary    09 Apr 2023 07:01  -  10 Apr 2023 07:00  --------------------------------------------------------  IN: 0 mL / OUT: 3175 mL / NET: -3175 mL      PHYSICAL EXAM:  General: NAD, A/O x 3  ENT: MMM, no thrush  Neck: Supple, No JVD  Lungs: Clear to auscultation bilaterally, good air entry, non-labored breathing  Cardio: RRR, S1/S2, No murmur  Abdomen: Soft, mild suprapubic tenderness, Nondistended; ostomy w/ normal stool output  Extremities: No calf tenderness, No pitting edema  : Lind draining bloody urine    LABS:                        11.1   4.98  )-----------( 307      ( 10 Apr 2023 04:38 )             33.3     04-10    126  |  95  |  27.3  ----------------------------<  97  5.3   |  22.0  |  1.59    Ca    9.2      10 Apr 2023 04:38  Mg     1.7     04-08    TPro  6.3  /  Alb  3.4  /  TBili  0.6  /  DBili  0.2  /  AST  30  /  ALT  61  /  AlkPhos  139  04-10        Culture - Urine (collected 06 Apr 2023 20:00)  Source: Clean Catch Clean Catch (Midstream)  Final Report (07 Apr 2023 21:56):    No growth        RADIOLOGY & ADDITIONAL TESTS:    No new tests    Care Discussed with Consultants/Other Providers:     Urology   Patient is a 57y old  Male who presents with a chief complaint of Hyponatremia, hypo-osmolarity, or hypo-osmolar hyponatremia    Patient seen and examined at bedside.     ALLERGIES:  No Known Allergies    MEDICATIONS  (STANDING):  apixaban 5 milliGRAM(s) Oral every 12 hours  hydrocortisone 10 milliGRAM(s) Oral daily  hydrocortisone 5 milliGRAM(s) Oral <User Schedule>  sodium bicarbonate 650 milliGRAM(s) Oral three times a day  tamsulosin 0.4 milliGRAM(s) Oral at bedtime    MEDICATIONS  (PRN):  acetaminophen     Tablet .. 650 milliGRAM(s) Oral every 6 hours PRN Temp greater or equal to 38C (100.4F), Mild Pain (1 - 3), Moderate Pain (4 - 6)  loperamide 2 milliGRAM(s) Oral every 6 hours PRN Diarrhea    Vital Signs Last 24 Hrs  T(F): 98.2 (10 Apr 2023 11:08), Max: 98.2 (10 Apr 2023 11:08)  HR: 75 (10 Apr 2023 11:08) (71 - 78)  BP: 114/70 (10 Apr 2023 11:08) (100/60 - 114/76)  RR: 17 (10 Apr 2023 11:08) (17 - 18)  SpO2: 100% (10 Apr 2023 11:08) (100% - 100%)  I&O's Summary    09 Apr 2023 07:01  -  10 Apr 2023 07:00  --------------------------------------------------------  IN: 0 mL / OUT: 3175 mL / NET: -3175 mL      PHYSICAL EXAM:  General: NAD, A/O x 3  ENT: MMM, no thrush  Neck: Supple, No JVD  Lungs: Clear to auscultation bilaterally, good air entry, non-labored breathing  Cardio: +s1/s2  Abdomen: Soft, mild suprapubic tenderness, Nondistended; ostomy w/ normal stool output  Extremities: No calf tenderness, No pitting edema    LABS:                        11.1   4.98  )-----------( 307      ( 10 Apr 2023 04:38 )             33.3     04-10    126  |  95  |  27.3  ----------------------------<  97  5.3   |  22.0  |  1.59    Ca    9.2      10 Apr 2023 04:38  Mg     1.7     04-08    TPro  6.3  /  Alb  3.4  /  TBili  0.6  /  DBili  0.2  /  AST  30  /  ALT  61  /  AlkPhos  139  04-10    Culture - Urine (collected 06 Apr 2023 20:00)  Source: Clean Catch Clean Catch (Midstream)  Final Report (07 Apr 2023 21:56):    No growth    RADIOLOGY & ADDITIONAL TESTS:  No new tests    Care Discussed with Consultants/Other Providers:   Urology  Nephrology

## 2023-04-10 NOTE — PROGRESS NOTE ADULT - ASSESSMENT
57 year old male with PMH of Left sided adeno Ca Colon s/p resection and ileostomy in 2022, no Chemo after, recent MRI in March 2023 showing ? Recurrent Colon Ca based on pelvic mass adjacent to bladder, h/o DVT 2022 and non occlusive portal vein thrombus, presented to [facility] on 4/6/23 with c/o back pain which woke him up after taking a nap, Pt reports having severe pressure type right lower back pain which is aggravated when going from a sitting to lying position associated with nausea and vomiting, pt also reports 10 pound weight loss in one month which he attributes to reducing salt intake and drinking more water.    Recurrent Colon Ca based on pelvic mass adjacent to bladder  Renal stone in the right UVJ  s/p cysto and R stent on 4/7    HypoNatremia  Urine studies not c/w SIADH, check uric acid  Goal correction not to exceed 6-8mEq every 24 hours  Serum Na 121--> 124 --> 119 --> 123--> 122 >126  Recd 2% saline for 40 cc/hr for 6 hrs then stop should stop   Ur Na < 35 - add PO NAcl  Repeat BMP 7 PM    MAXWELL on CKD suspect stage III  Serum Cr 3.2 few days ago is now 2.1>1.59  Noted R side mod Hydronephrosis s/p strnt  Avoid nephrotoxic agents   Avoid NSAID   Renally dose meds    HyperKalemia treated resolved w Lokelma, t/rick 2/2 low Cortisol - watch     AM labs will follow

## 2023-04-10 NOTE — DIETITIAN INITIAL EVALUATION ADULT - PERTINENT MEDS FT
MEDICATIONS  (STANDING):  apixaban 5 milliGRAM(s) Oral every 12 hours  hydrocortisone 10 milliGRAM(s) Oral daily  hydrocortisone 5 milliGRAM(s) Oral <User Schedule>  sodium bicarbonate 650 milliGRAM(s) Oral three times a day  tamsulosin 0.4 milliGRAM(s) Oral at bedtime    MEDICATIONS  (PRN):  acetaminophen     Tablet .. 650 milliGRAM(s) Oral every 6 hours PRN Temp greater or equal to 38C (100.4F), Mild Pain (1 - 3), Moderate Pain (4 - 6)  loperamide 2 milliGRAM(s) Oral every 6 hours PRN Diarrhea

## 2023-04-10 NOTE — PROGRESS NOTE ADULT - ASSESSMENT
57 year old male with PMH of Left sided adeno Ca Colon s/p resection and ileostomy in 2022, no Chemo after, recent MRI in March 2023 showing ? Recurrent Colon Ca based on pelvic mass adjacent to bladder, h/o DVT 2022 and non occlusive portal vein thrombus, presented to [facility]  on 4/6/23 with c/o back pain which woke him up after taking a nap, Pt reports having severe pressure type right lower back pain which is aggravated when going from a sitting to lying position associated with nausea and vomiting, pt also reports 10 pound weight loss in one month which he attributes to reducing salt intake and drinking more water.   At - CT- w/ 2 mm UVJ stone on R. w/ mod hydro. And hyponatremia + MAXWELL. Transferred to [facility] for Uro care.   At [facility]- Initially planned for OR for cysto with stents. But since his symptoms have resolved now, will rpt CT to assess status. MICU was consulted for rapid correction of NA prior to arrival. and DDAVP given.    #Right obstructing renal calculi with hydronephrosis  -s/p renal stent placement by urology  -Urology consulted, appreciate recs  -D/c esquivel   -S/p ceftriaxone, will d/c on Ceftin 5 day course  -Flomax, continue on d/c  -Outpatient urology f/u    #Hematuria  -Likely 2/2 obstructive uropathy as above  -Monitor HH, stable  -Outpatient urology f/u    #Hypovolemic hyponatremia  -Nephrology consulted, appreciate recs  -S/p DDAVP, 2% saline  -Sodium bicarbonate TID  -Fluid restriction, liberalized diet  -Improving, monitor for over/rapid correction  -Trend BMP    #MAXWELL  -Likely 2/2 obstructive uropathy  -Improved with IVF  -Monitor BMP    #L colon adenocarcinoma s/p resection with ostomy  -Recurrent Ca with pelvic mass adjacent to bladder as of 3/2023  -Ostomy functioning appropriately  -Outpatient surgical oncology f/u    #Hypocortisolemia  -Endocrine consulted, appreciate recs  -Hydrocortisone PO    #Hyperkalemia  -Improving  -Monitor BMP    # DVT, non occlusive portal vein thrombus  -Eliquis 57 year old male with PMH of Left sided adeno Ca Colon s/p resection and ileostomy in 2022, no Chemo after, recent MRI in March 2023 showing ? Recurrent Colon Ca based on pelvic mass adjacent to bladder, h/o DVT 2022 and non occlusive portal vein thrombus, presented to [facility]  on 4/6/23 with c/o back pain which woke him up after taking a nap found to have kidney stone    #Right obstructing renal calculi with hydronephrosis  - s/p renal stent placement by urology  - Urology consult appreciated- ok to d/c home and d/c esquivel and follow up with urology outpatient when medically stable  - ceftriaxone while in hospital will d/c on Ceftin 5 day course  - Flomax     #Hypovolemic hyponatremia  - Nephrology consult appreciated  - S/p DDAVP, 2% saline  - Sodium bicarbonate TID  - Fluid restriction, liberalized diet  - Improving- monitor    #MAXWELL  - Likely 2/2 obstructive uropathy  - improving  - monitor cr  - avoid nephrotoxic meds    #L colon adenocarcinoma s/p resection with ostomy  - Recurrent Ca with pelvic mass adjacent to bladder as of 3/2023  - Ostomy functioning appropriately  - Outpatient surgical oncology f/u    #Hypocortisol  - endo consult appreciated-> d/w Dr Yeison atkinson to d/c steroid follow up in office 6 weeks no further in patient testing     #DVT, non occlusive portal vein thrombus  - Eliquis    patient speaking to family does not want me to call,  57 year old male with PMH of Left sided adeno Ca Colon s/p resection and ileostomy in 2022, no Chemo after, recent MRI in March 2023 showing ? Recurrent Colon Ca based on pelvic mass adjacent to bladder, h/o DVT 2022 and non occlusive portal vein thrombus, presented to [facility]  on 4/6/23 with c/o back pain which woke him up after taking a nap found to have kidney stone    #Right obstructing renal calculi with hydronephrosis  - s/p renal stent placement by urology  - Urology consult appreciated- ok to d/c home and d/c esquivel and follow up with urology outpatient when medically stable  - ceftriaxone while in hospital will d/c on Ceftin 5 day course  - Flomax     #Hypovolemic hyponatremia  - Nephrology consult appreciated  - S/p DDAVP, 2% saline  - Sodium bicarbonate TID  - IVF-> d/w Dr Sarthak atkinson to start normal saline 80cc/hr x 12 hours   - Improving- monitor    #MAXWELL  - Likely 2/2 obstructive uropathy  - improving  - monitor cr  - avoid nephrotoxic meds    #L colon adenocarcinoma s/p resection with ostomy  - Recurrent Ca with pelvic mass adjacent to bladder as of 3/2023  - Ostomy functioning appropriately  - Outpatient surgical oncology f/u    #Hypocortisol  - endo consult appreciated-> d/w Dr Yeison atkinson to d/c steroid follow up in office 6 weeks no further in patient testing     #DVT, non occlusive portal vein thrombus  - Eliquis    patient speaking to family does not want me to call,

## 2023-04-10 NOTE — PROGRESS NOTE ADULT - ATTENDING COMMENTS
I have personally seen and examined patient on the above date.  I discussed the case with MD Resident Dr Kauffman and I agree with findings and plan as detailed per note above, which I have amended where appropriate.

## 2023-04-10 NOTE — DIETITIAN INITIAL EVALUATION ADULT - WEIGHT CHANGE
Learning About Mindfulness for Stress What are mindfulness and stress? Stress is what you feel when you have to handle more than you are used to. A lot of things can cause stress. You may feel stress when you go on a job interview, take a test, or run a race. This kind of short-term stress is normal and even useful. It can help you if you need to work hard or react quickly. Stress also can last a long time. Long-term stress is caused by stressful situations or events. Examples of long-term stress include long-term health problems, ongoing problems at work, and conflicts in your family. Long-term stress can harm your health. Mindfulness is a focus only on things happening in the present moment. It's a process of purposefully paying attention to and being aware of your surroundings, your emotions, your thoughts, and how your body feels. You are aware of these things, but you aren't judging these experiences as \"good\" or \"bad. \" Mindfulness can help you learn to calm your mind and body to help you cope with illness, pain, and stress. How does mindfulness help to relieve stress? Mindfulness can help quiet your mind and relax your body. Studies show that it can help some people sleep better, feel less anxious, and bring their blood pressure down. And it's been shown to help some people live and cope better with certain health problems like heart disease, depression, chronic pain, and cancer. How do you practice mindfulness? To be mindful is to pay attention, to be present, and to be accepting. · When you're mindful, you do just one thing and you pay close attention to that one thing. For example, you may sit quietly and notice your emotions or how your food tastes and smells. · When you're present, you focus on the things that are happening right now. You let go of your thoughts about the past and the future.  When you dwell on the past or the future, you miss moments that can heal and strengthen you. You may miss moments like hearing a child laugh or seeing a friendly face when you think you're all alone. · When you're accepting, you don't  the present moment. Instead you accept your thoughts and feelings as they come. You can practice anytime, anywhere, and in any way you choose. You can practice in many ways. Here are a few ideas: · While doing your chores, like washing the dishes, let your mind focus on what's in your hand. What does the dish feel like? Is the water warm or cold? · Go outside and take a few deep breaths. What is the air like? Is it warm or cold? · When you can, take some time at the start of your day to sit alone and think. · Take a slow walk by yourself. Count your steps while you breathe in and out. · Try yoga breathing exercises, stretches, and poses to strengthen and relax your muscles. · At work, if you can, try to stop for a few moments each hour. Note how your body feels. Let yourself regroup and let your mind settle before you return to what you were doing. · If you struggle with anxiety or \"worry thoughts,\" imagine your mind as a blue yanci and your worry thoughts as clouds. Now imagine those worry thoughts floating across your mind's yanci. Just let them pass by as you watch. Follow-up care is a key part of your treatment and safety. Be sure to make and go to all appointments, and call your doctor if you are having problems. It's also a good idea to know your test results and keep a list of the medicines you take. Where can you learn more? Go to http://radha-jose antonio.info/ Enter H319 in the search box to learn more about \"Learning About Mindfulness for Stress. \" Current as of: December 16, 2019               Content Version: 12.5 © 7877-7401 [employer]. Care instructions adapted under license by [employer] (which disclaims liability or warranty for this information).  If you have questions about a medical condition or this instruction, always ask your healthcare professional. [place] [address] any warranty or liability for your use of this information. yes

## 2023-04-10 NOTE — PROGRESS NOTE ADULT - SUBJECTIVE AND OBJECTIVE BOX
Patient seen and examined    I&O's Summary    09 Apr 2023 07:01  -  10 Apr 2023 07:00  --------------------------------------------------------  IN: 0 mL / OUT: 3175 mL / NET: -3175 mL    10 Apr 2023 07:01  -  10 Apr 2023 14:03  --------------------------------------------------------  IN: 0 mL / OUT: 100 mL / NET: -100 mL    Lind out, voiding blood stained urine    REVIEW OF SYSTEMS:    CONSTITUTIONAL: No F/C  RESPIRATORY: No cough,  No SOB  CARDIOVASCULAR: No CP/palpitations,    GASTROINTESTINAL: No abdominal pain  or NVD   GENITOURINARY: No UTI sx, mild pain lower abd  NEUROLOGICAL: No headaches, NO wk/numbness  MUSCULOSKELETAL:   EXTREMITIES : no swelling,    Vital Signs Last 24 Hrs  T(C): 36.8 (10 Apr 2023 11:08), Max: 36.8 (10 Apr 2023 11:08)  T(F): 98.2 (10 Apr 2023 11:08), Max: 98.2 (10 Apr 2023 11:08)  HR: 75 (10 Apr 2023 11:08) (71 - 78)  BP: 114/70 (10 Apr 2023 11:08) (100/60 - 114/76)  BP(mean): --  RR: 17 (10 Apr 2023 11:08) (17 - 18)  SpO2: 100% (10 Apr 2023 11:08) (100% - 100%)    Parameters below as of 10 Apr 2023 11:08  Patient On (Oxygen Delivery Method): room air        PHYSICAL EXAM:    GENERAL: NAD,   EYES:  conjunctiva and sclera clear  NECK: Supple, No JVD/Bruit  NERVOUS SYSTEM:  A/O x3,   CHEST:  No rales or rhonchi  HEART:  RRR, No murmur  ABDOMEN: Soft, NT ex sl over bladder area /ND BS+  EXTREMITIES:  No Edema;  SKIN: No rashes    LABS:                          11.1   4.98  )-----------( 307      ( 10 Apr 2023 04:38 )             33.3     04-10    126<L>  |  95<L>  |  27.3<H>  ----------------------------<  97  5.3   |  22.0  |  1.59<H>    Ca    9.2      10 Apr 2023 04:38    TPro  6.3<L>  /  Alb  3.4  /  TBili  0.6  /  DBili  0.2  /  AST  30  /  ALT  61<H>  /  AlkPhos  139<H>  04-10      MEDICATIONS  (STANDING):  acetaminophen     Tablet .. PRN  apixaban  cefTRIAXone   IVPB  loperamide PRN  sodium bicarbonate  tamsulosin

## 2023-04-10 NOTE — PROGRESS NOTE ADULT - ASSESSMENT
58 y/o M with PMHx left sided adeno Ca Colon s/p resection and ileostomy in 2022, no chemo after, recent MRI in March 2023 showing ? Recurrent Colon Ca based on pelvic mass adjacent to bladder, h/o DVT 2022 and non occlusive portal vein thrombus, presented to [facility] on 4/6/23 with c/o back pain which woke him up after taking a nap, Pt reports having severe pressure type right lower back pain which is aggravated when going from a sitting to lying position associated with nausea and vomiting, pt also reports 10 pound weight loss in one month which he attributes to reducing salt intake and drinking more water.   At - CT- w/ 2 mm UVJ stone on R. w/ mod hydro. And hyponatremia + MAXWELL. Transferred to [facility] for Uro care.   At [facility]- Initially planned for OR for cysto with stents. But since his symptoms have resolved now, will rpt CT to assess status, DDAVP given.    1. Hyponatremia, low cortisol  -   - Initial AM cortisol low, 2.2  - ACTH stim test performed and within normal limits  - Can discontinue hydrocortisone  - Recommend to follow up in 6 weeks for repeat AM cortisol  - No signs of SIADH, serum osm and urine osm within normal limits  - D/w primary team   58 y/o M with PMHx left sided adeno Ca Colon s/p resection and ileostomy in 2022, no chemo after, recent MRI in March 2023 showing ? Recurrent Colon Ca based on pelvic mass adjacent to bladder, h/o DVT 2022 and non occlusive portal vein thrombus, presented to [facility] on 4/6/23 with c/o back pain which woke him up after taking a nap, Pt reports having severe pressure type right lower back pain which is aggravated when going from a sitting to lying position associated with nausea and vomiting, pt also reports 10 pound weight loss in one month which he attributes to reducing salt intake and drinking more water.   At - CT- w/ 2 mm UVJ stone on R. w/ mod hydro. And hyponatremia + MAXWELL. Transferred to [facility] for Uro care.   At [facility]- Initially planned for OR for cysto with stents. But since his symptoms have resolved now, will rpt CT to assess status, DDAVP given.    1. Hyponatremia, low cortisol  -   - Initial AM cortisol low, 2.2  - ACTH stim test performed and within normal limits, baseline 10---18,---21.  - Can discontinue hydrocortisone  - Recommend to follow up in 6 weeks for repeat 8AM cortisol  - No signs of SIADH, serum osm and urine osm within normal limits  - D/w primary team

## 2023-04-10 NOTE — PROGRESS NOTE ADULT - SUBJECTIVE AND OBJECTIVE BOX
INTERVAL EVENTS:  Follow up for hyponatremia, low am cortisol    ROS: Offers no complaints at time of exam. No chest pain, sob, abd pain.    MEDICATIONS  (STANDING):  apixaban 5 milliGRAM(s) Oral every 12 hours  hydrocortisone 10 milliGRAM(s) Oral daily  hydrocortisone 5 milliGRAM(s) Oral <User Schedule>  sodium bicarbonate 650 milliGRAM(s) Oral three times a day  tamsulosin 0.4 milliGRAM(s) Oral at bedtime    MEDICATIONS  (PRN):  acetaminophen     Tablet .. 650 milliGRAM(s) Oral every 6 hours PRN Temp greater or equal to 38C (100.4F), Mild Pain (1 - 3), Moderate Pain (4 - 6)  loperamide 2 milliGRAM(s) Oral every 6 hours PRN Diarrhea    Allergies  No Known Allergies    Vital Signs Last 24 Hrs  T(C): 36.8 (10 Apr 2023 11:08), Max: 36.8 (10 Apr 2023 11:08)  T(F): 98.2 (10 Apr 2023 11:08), Max: 98.2 (10 Apr 2023 11:08)  HR: 75 (10 Apr 2023 11:08) (71 - 78)  BP: 114/70 (10 Apr 2023 11:08) (100/60 - 114/76)  BP(mean): --  RR: 17 (10 Apr 2023 11:08) (17 - 18)  SpO2: 100% (10 Apr 2023 11:08) (100% - 100%)    Parameters below as of 10 Apr 2023 11:08  Patient On (Oxygen Delivery Method): room air      PHYSICAL EXAM:  General: No apparent distress  Neck: Supple, trachea midline, no thyromegaly  Respiratory: Lungs clear bilaterally, normal rate, effort  Cardiac: +S1, S2, no m/r/g  GI: +BS, soft, non tender, non distended  Extremities: No peripheral edema, no pedal lesions  Neuro: A+O X3, no tremor      LABS:                        11.1   4.98  )-----------( 307      ( 10 Apr 2023 04:38 )             33.3     04-10    126<L>  |  95<L>  |  27.3<H>  ----------------------------<  97  5.3   |  22.0  |  1.59<H>    Ca    9.2      10 Apr 2023 04:38    TPro  6.3<L>  /  Alb  3.4  /  TBili  0.6  /  DBili  0.2  /  AST  30  /  ALT  61<H>  /  AlkPhos  139<H>  04-10      Thyroid Stimulating Hormone, Serum: 0.56 uIU/mL (04-07-23 @ 09:30)

## 2023-04-10 NOTE — DIETITIAN INITIAL EVALUATION ADULT - ETIOLOGY
related to inadequate protein calorie intake in the setting of colon ca, s/p reection, ileostomy, ureteral stone, s/p stent.

## 2023-04-10 NOTE — DIETITIAN INITIAL EVALUATION ADULT - NSFNSGIIOFT_GEN_A_CORE
04-09-23 @ 07:01  -  04-10-23 @ 07:00  --------------------------------------------------------  OUT:    Ileostomy (mL): 2000 mL  Total OUT: 2000 mL    Total NET: -2000 mL

## 2023-04-10 NOTE — PROGRESS NOTE ADULT - ASSESSMENT
59 yo male transfer from  for obstructing right distal ureter stone, MAXWELL, POD#3 s/p cystoscopy, right ureteral stent placement  - MAXWELL slowly improving  - d/c esquivel  - d/c with PO abx, tamsulosin  - pt to f/u with Dr. Milian in 1-2 weeks

## 2023-04-10 NOTE — DIETITIAN INITIAL EVALUATION ADULT - PERTINENT LABORATORY DATA
04-10    126<L>  |  95<L>  |  27.3<H>  ----------------------------<  97  5.3   |  22.0  |  1.59<H>    Ca    9.2      10 Apr 2023 04:38    TPro  6.3<L>  /  Alb  3.4  /  TBili  0.6  /  DBili  0.2  /  AST  30  /  ALT  61<H>  /  AlkPhos  139<H>  04-10

## 2023-04-10 NOTE — PROGRESS NOTE ADULT - SUBJECTIVE AND OBJECTIVE BOX
Subjective:57yMale POD#3 s/p cystoscopy, right ureteral stent placement for obstructing stone.  Pt was transferred from [facility]. Pt clinically improved, MAXWELL improving.      Vital Signs Last 24 Hrs  T(C): 36.7 (10 Apr 2023 06:13), Max: 36.9 (09 Apr 2023 10:58)  T(F): 98.1 (10 Apr 2023 06:13), Max: 98.4 (09 Apr 2023 10:58)  HR: 78 (10 Apr 2023 06:13) (71 - 78)  BP: 114/76 (10 Apr 2023 06:13) (97/58 - 114/76)  BP(mean): --  RR: 18 (10 Apr 2023 06:13) (18 - 18)  SpO2: 100% (10 Apr 2023 06:13) (91% - 100%)    Parameters below as of 09 Apr 2023 10:58  Patient On (Oxygen Delivery Method): room air      I&O's Detail    09 Apr 2023 07:01  -  10 Apr 2023 07:00  --------------------------------------------------------  IN:  Total IN: 0 mL    OUT:    Ileostomy (mL): 2000 mL    Indwelling Catheter - Urethral (mL): 1175 mL  Total OUT: 3175 mL    Total NET: -3175 mL          Labs:                        11.1   4.98  )-----------( 307      ( 10 Apr 2023 04:38 )             33.3     04-10    126<L>  |  95<L>  |  27.3<H>  ----------------------------<  97  5.3   |  22.0  |  1.59<H>    Ca    9.2      10 Apr 2023 04:38    TPro  6.3<L>  /  Alb  3.4  /  TBili  0.6  /  DBili  0.2  /  AST  30  /  ALT  61<H>  /  AlkPhos  139<H>  04-10

## 2023-04-10 NOTE — DIETITIAN INITIAL EVALUATION ADULT - ORAL INTAKE PTA/DIET HISTORY
Pt reports -150 lbs. Pt endorses 30lb weight loss after resection and ileostomy last year, states he gained about 10 back and had a good appetite until this recent uretal stone where he lost the 10lbs again. States he is feeling better no and eating well. Pt states that he is very cognisant of his protein intake and drinks 2 protein shakes/ day at home. HBV proteins encouraged. Pt receptive, all questions answered.

## 2023-04-10 NOTE — DIETITIAN INITIAL EVALUATION ADULT - OTHER INFO
58 yo male with h/o colon CA s/p resection  found to have obstrucitve uropathy   Hyponateremia  Hyperkalemia   ARF -   and hypocortioslemia    ?Primary Hypocortisolism (michelle) vs Secondary Hypocortisolism

## 2023-04-11 LAB
ALBUMIN SERPL ELPH-MCNC: 3.5 G/DL — SIGNIFICANT CHANGE UP (ref 3.3–5.2)
ALP SERPL-CCNC: 195 U/L — HIGH (ref 40–120)
ALT FLD-CCNC: 96 U/L — HIGH
ANION GAP SERPL CALC-SCNC: 10 MMOL/L — SIGNIFICANT CHANGE UP (ref 5–17)
AST SERPL-CCNC: 56 U/L — HIGH
BILIRUB SERPL-MCNC: 0.7 MG/DL — SIGNIFICANT CHANGE UP (ref 0.4–2)
BUN SERPL-MCNC: 21.2 MG/DL — HIGH (ref 8–20)
CALCIUM SERPL-MCNC: 9.2 MG/DL — SIGNIFICANT CHANGE UP (ref 8.4–10.5)
CALCIUM SERPL-MCNC: 9.7 MG/DL — SIGNIFICANT CHANGE UP (ref 8.4–10.5)
CHLORIDE SERPL-SCNC: 98 MMOL/L — SIGNIFICANT CHANGE UP (ref 96–108)
CO2 SERPL-SCNC: 21 MMOL/L — LOW (ref 22–29)
CREAT SERPL-MCNC: 1.3 MG/DL — SIGNIFICANT CHANGE UP (ref 0.5–1.3)
EGFR: 64 ML/MIN/1.73M2 — SIGNIFICANT CHANGE UP
GLUCOSE SERPL-MCNC: 93 MG/DL — SIGNIFICANT CHANGE UP (ref 70–99)
HCT VFR BLD CALC: 34 % — LOW (ref 39–50)
HGB BLD-MCNC: 11.2 G/DL — LOW (ref 13–17)
MCHC RBC-ENTMCNC: 28.1 PG — SIGNIFICANT CHANGE UP (ref 27–34)
MCHC RBC-ENTMCNC: 32.9 GM/DL — SIGNIFICANT CHANGE UP (ref 32–36)
MCV RBC AUTO: 85.2 FL — SIGNIFICANT CHANGE UP (ref 80–100)
PLATELET # BLD AUTO: 321 K/UL — SIGNIFICANT CHANGE UP (ref 150–400)
POTASSIUM SERPL-MCNC: 4.1 MMOL/L — SIGNIFICANT CHANGE UP (ref 3.5–5.3)
POTASSIUM SERPL-SCNC: 4.1 MMOL/L — SIGNIFICANT CHANGE UP (ref 3.5–5.3)
PROT SERPL-MCNC: 6.7 G/DL — SIGNIFICANT CHANGE UP (ref 6.6–8.7)
PTH-INTACT FLD-MCNC: 29 PG/ML — SIGNIFICANT CHANGE UP (ref 15–65)
RBC # BLD: 3.99 M/UL — LOW (ref 4.2–5.8)
RBC # FLD: 13.9 % — SIGNIFICANT CHANGE UP (ref 10.3–14.5)
SODIUM SERPL-SCNC: 129 MMOL/L — LOW (ref 135–145)
WBC # BLD: 5.09 K/UL — SIGNIFICANT CHANGE UP (ref 3.8–10.5)
WBC # FLD AUTO: 5.09 K/UL — SIGNIFICANT CHANGE UP (ref 3.8–10.5)

## 2023-04-11 PROCEDURE — 99231 SBSQ HOSP IP/OBS SF/LOW 25: CPT

## 2023-04-11 PROCEDURE — 99233 SBSQ HOSP IP/OBS HIGH 50: CPT

## 2023-04-11 RX ORDER — SODIUM CHLORIDE 9 MG/ML
1000 INJECTION INTRAMUSCULAR; INTRAVENOUS; SUBCUTANEOUS
Refills: 0 | Status: COMPLETED | OUTPATIENT
Start: 2023-04-11 | End: 2023-04-11

## 2023-04-11 RX ADMIN — Medication 650 MILLIGRAM(S): at 05:48

## 2023-04-11 RX ADMIN — Medication 650 MILLIGRAM(S): at 13:25

## 2023-04-11 RX ADMIN — SODIUM CHLORIDE 1 GRAM(S): 9 INJECTION INTRAMUSCULAR; INTRAVENOUS; SUBCUTANEOUS at 05:48

## 2023-04-11 RX ADMIN — SODIUM CHLORIDE 1 GRAM(S): 9 INJECTION INTRAMUSCULAR; INTRAVENOUS; SUBCUTANEOUS at 13:25

## 2023-04-11 RX ADMIN — APIXABAN 5 MILLIGRAM(S): 2.5 TABLET, FILM COATED ORAL at 05:48

## 2023-04-11 RX ADMIN — SODIUM CHLORIDE 1 GRAM(S): 9 INJECTION INTRAMUSCULAR; INTRAVENOUS; SUBCUTANEOUS at 21:57

## 2023-04-11 RX ADMIN — SODIUM CHLORIDE 75 MILLILITER(S): 9 INJECTION INTRAMUSCULAR; INTRAVENOUS; SUBCUTANEOUS at 14:49

## 2023-04-11 RX ADMIN — APIXABAN 5 MILLIGRAM(S): 2.5 TABLET, FILM COATED ORAL at 16:59

## 2023-04-11 RX ADMIN — TAMSULOSIN HYDROCHLORIDE 0.4 MILLIGRAM(S): 0.4 CAPSULE ORAL at 21:57

## 2023-04-11 RX ADMIN — CEFTRIAXONE 1000 MILLIGRAM(S): 500 INJECTION, POWDER, FOR SOLUTION INTRAMUSCULAR; INTRAVENOUS at 16:57

## 2023-04-11 RX ADMIN — Medication 650 MILLIGRAM(S): at 21:57

## 2023-04-11 NOTE — PROGRESS NOTE ADULT - ASSESSMENT
57 year old male with PMH of Left sided adeno Ca Colon s/p resection and ileostomy in 2022, no Chemo after, recent MRI in March 2023 showing ? Recurrent Colon Ca based on pelvic mass adjacent to bladder, h/o DVT 2022 and non occlusive portal vein thrombus, presented to [facility]  on 4/6/23 with c/o back pain which woke him up after taking a nap found to have kidney stone    Right obstructing renal calculi with hydronephrosis  s/p renal stent placement by urology  d/w Renal and urology   s/p Lind removal   Continue ceftriaxone while in hospital will d/c on Ceftin 5 day course  Flomax     Hypovolemic hyponatremia  Nephrology consult appreciated  S/p DDAVP, 2% saline  Sodium bicarbonate TID  Will restart  ml/hr for now, repeat in am   Improving- monitor    MAXWELL  Likely 2/2 obstructive uropathy  improving  monitor cr  avoid nephrotoxic meds    L colon adenocarcinoma s/p resection with ostomy  Recurrent Ca with pelvic mass adjacent to bladder as of 3/2023   Ostomy functioning appropriately   Outpatient surgical oncology f/u    Hypocortisol  endo consult appreciated-> d/w Dr Yeison atkinson to d/c steroid follow up in office 6 weeks no further in patient testing     DVT, non occlusive portal vein thrombus  - Eliquis    patient speaking to family deferred family call previously

## 2023-04-11 NOTE — PROGRESS NOTE ADULT - SUBJECTIVE AND OBJECTIVE BOX
INTERVAL EVENTS:  Follow up hyponatremia, low cortisol management    ROS: No pain at time of exam. Endorses good appetite.    MEDICATIONS  (STANDING):  apixaban 5 milliGRAM(s) Oral every 12 hours  cefTRIAXone Injectable. 1000 milliGRAM(s) IV Push every 24 hours  sodium bicarbonate 650 milliGRAM(s) Oral three times a day  sodium chloride 1 Gram(s) Oral three times a day  sodium chloride 0.9%. 1000 milliLiter(s) (80 mL/Hr) IV Continuous <Continuous>  tamsulosin 0.4 milliGRAM(s) Oral at bedtime    MEDICATIONS  (PRN):  acetaminophen     Tablet .. 650 milliGRAM(s) Oral every 6 hours PRN Temp greater or equal to 38C (100.4F), Mild Pain (1 - 3), Moderate Pain (4 - 6)  loperamide 2 milliGRAM(s) Oral every 6 hours PRN Diarrhea    Allergies  No Known Allergies    Vital Signs Last 24 Hrs  T(C): 36.8 (11 Apr 2023 05:38), Max: 36.8 (11 Apr 2023 05:38)  T(F): 98.3 (11 Apr 2023 05:38), Max: 98.3 (11 Apr 2023 05:38)  HR: 93 (11 Apr 2023 05:38) (70 - 93)  BP: 110/72 (11 Apr 2023 05:38) (103/66 - 110/72)  BP(mean): --  RR: 17 (11 Apr 2023 05:38) (17 - 18)  SpO2: 96% (11 Apr 2023 05:38) (96% - 99%)    Parameters below as of 11 Apr 2023 05:38  Patient On (Oxygen Delivery Method): room air    PHYSICAL EXAM:  General: No apparent distress  Neck: Supple, trachea midline, no thyromegaly  Respiratory: Lungs clear bilaterally, normal rate, effort  Cardiac: +S1, S2, no m/r/g  GI: +BS, soft  Extremities: No peripheral edema, no pedal lesions  Neuro: A+O X3, no tremor    LABS:                        11.2   5.09  )-----------( 321      ( 11 Apr 2023 06:44 )             34.0     04-11    129<L>  |  98  |  21.2<H>  ----------------------------<  93  4.1   |  21.0<L>  |  1.30    Ca    9.2      11 Apr 2023 06:44  Phos  2.7     04-10    TPro  6.7  /  Alb  3.5  /  TBili  0.7  /  DBili  x   /  AST  56<H>  /  ALT  96<H>  /  AlkPhos  195<H>  04-11        Thyroid Stimulating Hormone, Serum: 0.56 uIU/mL (04-07-23 @ 09:30)

## 2023-04-11 NOTE — PROGRESS NOTE ADULT - ASSESSMENT
57 year old male with PMH of Left sided adeno Ca Colon s/p resection and ileostomy in 2022, no Chemo after, recent MRI in March 2023 showing ? Recurrent Colon Ca based on pelvic mass adjacent to bladder, h/o DVT 2022 and non occlusive portal vein thrombus, presented to [facility] on 4/6/23 with c/o back pain which woke him up after taking a nap, Pt reports having severe pressure type right lower back pain which is aggravated when going from a sitting to lying position associated with nausea and vomiting, pt also reports 10 pound weight loss in one month which he attributes to reducing salt intake and drinking more water.    Recurrent Colon Ca based on pelvic mass adjacent to bladder  Renal stone in the right UVJ  s/p cysto and R stent on 4/7    HypoNatremia improving  Urine studies not c/w SIADH,  uric acid 5.6, Phos , 3  Goal correction not to exceed 6-8mEq every 24 hours  Serum Na 121--> 124 --> 119 --> 123--> 122 >126>129   Ur Na < 35 - add PO NAcl    MAXWELL on CKD suspect stage III  Serum Cr 3.2 few days ago is now 2.1>1.59>1.3  Noted R side mod Hydronephrosis s/p strnt  Avoid nephrotoxic agents   Avoid NSAID   Renally dose meds    HyperKalemia treated resolved     AM labs will follow

## 2023-04-11 NOTE — PROGRESS NOTE ADULT - SUBJECTIVE AND OBJECTIVE BOX
Patient seen and examined        I&O's Summary    10 Apr 2023 07:01  -  11 Apr 2023 07:00  --------------------------------------------------------  IN: 650 mL / OUT: 1620 mL / NET: -970 mL      OOB/Ch voiding blood stained urine, less daark    REVIEW OF SYSTEMS:    CONSTITUTIONAL: No F/C  RESPIRATORY: No cough,  No SOB  CARDIOVASCULAR: No CP/palpitations,    GASTROINTESTINAL: No abdominal pain  or NVD   GENITOURINARY: No UTI sx, mild pain lower abd  NEUROLOGICAL: No headaches, NO wk/numbness  MUSCULOSKELETAL:   EXTREMITIES : no swelling,    Vital Signs Last 24 Hrs  T(C): 36.6 (11 Apr 2023 11:35), Max: 36.8 (11 Apr 2023 05:38)  T(F): 97.8 (11 Apr 2023 11:35), Max: 98.3 (11 Apr 2023 05:38)  HR: 85 (11 Apr 2023 11:35) (70 - 93)  BP: 106/71 (11 Apr 2023 11:35) (103/66 - 110/72)  BP(mean): --  RR: 17 (11 Apr 2023 11:35) (17 - 18)  SpO2: 99% (11 Apr 2023 11:35) (96% - 99%)    Parameters below as of 11 Apr 2023 11:35  Patient On (Oxygen Delivery Method): room air      PHYSICAL EXAM:    GENERAL: NAD,   EYES:  conjunctiva and sclera clear  NECK: Supple, No JVD/Bruit  NERVOUS SYSTEM:  A/O x3,   CHEST:  No rales or rhonchi  HEART:  RRR, No murmur  ABDOMEN: Soft, NT ex sl over bladder area /ND BS+; R side bag half full  EXTREMITIES:  No Edema;  SKIN: No rashes    LABS:                        11.2   5.09  )-----------( 321      ( 11 Apr 2023 06:44 )             34.0                           11.1   4.98  )-----------( 307      ( 10 Apr 2023 04:38 )             33.3     04-10    126<L>  |  95<L>  |  27.3<H>  ----------------------------<  97  5.3   |  22.0  |  1.59<H>    Ca    9.2      10 Apr 2023 04:38    TPro  6.3<L>  /  Alb  3.4  /  TBili  0.6  /  DBili  0.2  /  AST  30  /  ALT  61<H>  /  AlkPhos  139<H>  04-10  04-11    129<L>  |  98  |  21.2<H>  ----------------------------<  93  4.1   |  21.0<L>  |  1.30    Ca    9.2      11 Apr 2023 06:44  Phos  2.7     04-10    TPro  6.7  /  Alb  3.5  /  TBili  0.7  /  DBili  x   /  AST  56<H>  /  ALT  96<H>  /  AlkPhos  195<H>  04-11      MEDICATIONS  (STANDING):  acetaminophen     Tablet .. PRN  apixaban  cefTRIAXone   IVPB  loperamide PRN  sodium bicarbonate  tamsulosin

## 2023-04-11 NOTE — PROGRESS NOTE ADULT - SUBJECTIVE AND OBJECTIVE BOX
[facility] Division of Hospital Medicine    Chief Complaint:  Hyponatremia     SUBJECTIVE / OVERNIGHT EVENTS:  Pt examined lying in bed  Making urine, cola colored   Patient denies chest pain, SOB, abd pain, N/V, fever, chills, dysuria or any other complaints. All remainder ROS negative.     MEDICATIONS  (STANDING):  apixaban 5 milliGRAM(s) Oral every 12 hours  cefTRIAXone Injectable. 1000 milliGRAM(s) IV Push every 24 hours  sodium bicarbonate 650 milliGRAM(s) Oral three times a day  sodium chloride 1 Gram(s) Oral three times a day  sodium chloride 0.9%. 1000 milliLiter(s) (80 mL/Hr) IV Continuous <Continuous>  tamsulosin 0.4 milliGRAM(s) Oral at bedtime    MEDICATIONS  (PRN):  acetaminophen     Tablet .. 650 milliGRAM(s) Oral every 6 hours PRN Temp greater or equal to 38C (100.4F), Mild Pain (1 - 3), Moderate Pain (4 - 6)  loperamide 2 milliGRAM(s) Oral every 6 hours PRN Diarrhea        I&O's Summary    10 Apr 2023 07:01  -  11 Apr 2023 07:00  --------------------------------------------------------  IN: 650 mL / OUT: 1620 mL / NET: -970 mL        PHYSICAL EXAM:  Vital Signs Last 24 Hrs  T(C): 36.6 (11 Apr 2023 11:35), Max: 36.8 (11 Apr 2023 05:38)  T(F): 97.8 (11 Apr 2023 11:35), Max: 98.3 (11 Apr 2023 05:38)  HR: 85 (11 Apr 2023 11:35) (70 - 93)  BP: 106/71 (11 Apr 2023 11:35) (103/66 - 110/72)  BP(mean): --  RR: 17 (11 Apr 2023 11:35) (17 - 18)  SpO2: 99% (11 Apr 2023 11:35) (96% - 99%)    Parameters below as of 11 Apr 2023 11:35  Patient On (Oxygen Delivery Method): room air      CONSTITUTIONAL: Non toxic appearing thin male lying in bed,   ENMT: Moist oral mucosa, no pharyngeal injection or exudates; normal dentition  RESPIRATORY: Normal respiratory effort; lungs are clear to auscultation bilaterally  CARDIOVASCULAR: Regular rate and rhythm, normal S1 and S2, no murmur/rub/gallop; No lower extremity edema; Peripheral pulses are 2+ bilaterally  ABDOMEN: Nontender to palpation, Rt colostomy, stoma site c/d/i  MUSCLOSKELETAL:   no clubbing or cyanosis of digits; no joint swelling or tenderness to palpation  PSYCH: A+O to person, place, and time; affect appropriate  NEUROLOGY: CN 2-12 are intact and symmetric; no gross sensory deficits;   SKIN: No rashes; no palpable lesions    LABS:                        11.2   5.09  )-----------( 321      ( 11 Apr 2023 06:44 )             34.0     04-11    129<L>  |  98  |  21.2<H>  ----------------------------<  93  4.1   |  21.0<L>  |  1.30    Ca    9.2      11 Apr 2023 06:44  Phos  2.7     04-10    TPro  6.7  /  Alb  3.5  /  TBili  0.7  /  DBili  x   /  AST  56<H>  /  ALT  96<H>  /  AlkPhos  195<H>  04-11              CAPILLARY BLOOD GLUCOSE            RADIOLOGY & ADDITIONAL TESTS:  Results Reviewed:   Imaging Personally Reviewed:  Electrocardiogram Personally Reviewed:

## 2023-04-11 NOTE — PROGRESS NOTE ADULT - ASSESSMENT
58 y/o M with PMHx left sided adeno Ca Colon s/p resection and ileostomy in 2022, no chemo after, recent MRI in March 2023 showing ? Recurrent Colon Ca based on pelvic mass adjacent to bladder, h/o DVT 2022 and non occlusive portal vein thrombus, presented to [facility] on 4/6/23 with c/o back pain which woke him up after taking a nap, Pt reports having severe pressure type right lower back pain which is aggravated when going from a sitting to lying position associated with nausea and vomiting, pt also reports 10 pound weight loss in one month which he attributes to reducing salt intake and drinking more water.   At - CT- w/ 2 mm UVJ stone on R. w/ mod hydro. And hyponatremia + MAXWELL. Transferred to [facility] for Uro care.   At [facility]- Initially planned for OR for cysto with stents. But since his symptoms have resolved now, will rpt CT to assess status, DDAVP given.    1. Hyponatremia, low cortisol  -   - Initial AM cortisol low, 2.2  - ACTH stim test performed and within normal limits, baseline 10---18---21  - Hydrocortisone discontinued  - No signs of SIADH, serum osm and urine osm within normal limits  - Recommend to follow up in 6 weeks for repeat 8AM cortisol. At that time, may check full thyroid panel/LH, FSH, testosterone prolactin

## 2023-04-12 ENCOUNTER — TRANSCRIPTION ENCOUNTER (OUTPATIENT)
Age: 58
End: 2023-04-12

## 2023-04-12 LAB
ANION GAP SERPL CALC-SCNC: 15 MMOL/L — SIGNIFICANT CHANGE UP (ref 5–17)
BUN SERPL-MCNC: 18.9 MG/DL — SIGNIFICANT CHANGE UP (ref 8–20)
CALCIUM SERPL-MCNC: 9.6 MG/DL — SIGNIFICANT CHANGE UP (ref 8.4–10.5)
CHLORIDE SERPL-SCNC: 92 MMOL/L — LOW (ref 96–108)
CO2 SERPL-SCNC: 18 MMOL/L — LOW (ref 22–29)
CREAT SERPL-MCNC: 1.25 MG/DL — SIGNIFICANT CHANGE UP (ref 0.5–1.3)
EGFR: 67 ML/MIN/1.73M2 — SIGNIFICANT CHANGE UP
GLUCOSE SERPL-MCNC: 78 MG/DL — SIGNIFICANT CHANGE UP (ref 70–99)
HCT VFR BLD CALC: 33.5 % — LOW (ref 39–50)
HGB BLD-MCNC: 11.2 G/DL — LOW (ref 13–17)
MCHC RBC-ENTMCNC: 28.5 PG — SIGNIFICANT CHANGE UP (ref 27–34)
MCHC RBC-ENTMCNC: 33.4 GM/DL — SIGNIFICANT CHANGE UP (ref 32–36)
MCV RBC AUTO: 85.2 FL — SIGNIFICANT CHANGE UP (ref 80–100)
PLATELET # BLD AUTO: 319 K/UL — SIGNIFICANT CHANGE UP (ref 150–400)
POTASSIUM SERPL-MCNC: 4 MMOL/L — SIGNIFICANT CHANGE UP (ref 3.5–5.3)
POTASSIUM SERPL-SCNC: 4 MMOL/L — SIGNIFICANT CHANGE UP (ref 3.5–5.3)
RBC # BLD: 3.93 M/UL — LOW (ref 4.2–5.8)
RBC # FLD: 13.8 % — SIGNIFICANT CHANGE UP (ref 10.3–14.5)
SODIUM SERPL-SCNC: 125 MMOL/L — LOW (ref 135–145)
WBC # BLD: 5.76 K/UL — SIGNIFICANT CHANGE UP (ref 3.8–10.5)
WBC # FLD AUTO: 5.76 K/UL — SIGNIFICANT CHANGE UP (ref 3.8–10.5)

## 2023-04-12 PROCEDURE — 99232 SBSQ HOSP IP/OBS MODERATE 35: CPT

## 2023-04-12 PROCEDURE — 99233 SBSQ HOSP IP/OBS HIGH 50: CPT

## 2023-04-12 RX ORDER — UREA 15 G
15 POWDER IN PACKET (EA) ORAL DAILY
Refills: 0 | Status: DISCONTINUED | OUTPATIENT
Start: 2023-04-12 | End: 2023-04-13

## 2023-04-12 RX ORDER — SODIUM BICARBONATE 1 MEQ/ML
650 SYRINGE (ML) INTRAVENOUS
Refills: 0 | Status: DISCONTINUED | OUTPATIENT
Start: 2023-04-12 | End: 2023-04-13

## 2023-04-12 RX ORDER — CHOLESTYRAMINE 4 G/9G
4 POWDER, FOR SUSPENSION ORAL DAILY
Refills: 0 | Status: DISCONTINUED | OUTPATIENT
Start: 2023-04-12 | End: 2023-04-13

## 2023-04-12 RX ADMIN — Medication 650 MILLIGRAM(S): at 23:11

## 2023-04-12 RX ADMIN — Medication 650 MILLIGRAM(S): at 11:38

## 2023-04-12 RX ADMIN — TAMSULOSIN HYDROCHLORIDE 0.4 MILLIGRAM(S): 0.4 CAPSULE ORAL at 21:24

## 2023-04-12 RX ADMIN — CHOLESTYRAMINE 4 GRAM(S): 4 POWDER, FOR SUSPENSION ORAL at 19:09

## 2023-04-12 RX ADMIN — APIXABAN 5 MILLIGRAM(S): 2.5 TABLET, FILM COATED ORAL at 17:04

## 2023-04-12 RX ADMIN — APIXABAN 5 MILLIGRAM(S): 2.5 TABLET, FILM COATED ORAL at 05:08

## 2023-04-12 RX ADMIN — Medication 650 MILLIGRAM(S): at 05:08

## 2023-04-12 RX ADMIN — CEFTRIAXONE 1000 MILLIGRAM(S): 500 INJECTION, POWDER, FOR SOLUTION INTRAMUSCULAR; INTRAVENOUS at 17:04

## 2023-04-12 RX ADMIN — Medication 15 GRAM(S): at 19:09

## 2023-04-12 RX ADMIN — SODIUM CHLORIDE 1 GRAM(S): 9 INJECTION INTRAMUSCULAR; INTRAVENOUS; SUBCUTANEOUS at 05:08

## 2023-04-12 RX ADMIN — SODIUM CHLORIDE 1 GRAM(S): 9 INJECTION INTRAMUSCULAR; INTRAVENOUS; SUBCUTANEOUS at 11:38

## 2023-04-12 RX ADMIN — Medication 650 MILLIGRAM(S): at 17:04

## 2023-04-12 NOTE — DISCHARGE NOTE PROVIDER - NSDCCPTREATMENT_GEN_ALL_CORE_FT
PRINCIPAL PROCEDURE  Procedure: Cystoscopy, with ureteral stent insertion  Findings and Treatment: right stent placement

## 2023-04-12 NOTE — PROGRESS NOTE ADULT - SUBJECTIVE AND OBJECTIVE BOX
UROLOGY F/U:    POD# 5 s/p cysto- right stent placement  Patient seen and examined at bedside; feels well, OOB and ambulating well.  tolerating PO    Vital Signs Last 24 Hrs  T(C): 36.7 (12 Apr 2023 05:00), Max: 36.8 (11 Apr 2023 17:46)  T(F): 98 (12 Apr 2023 05:00), Max: 98.3 (11 Apr 2023 17:46)  HR: 87 (12 Apr 2023 05:00) (68 - 87)  BP: 117/76 (12 Apr 2023 05:00) (102/67 - 117/76)  RR: 18 (12 Apr 2023 05:00) (17 - 18)  SpO2: 99% (12 Apr 2023 05:00) (99% - 100%)        abdomen: soft n/d, n/t no suprapubic discomfort   : voiding well, same urinal this am urine slightly dark yellow ( encourage to take extra fluids PO  denies any urinary symptoms    Labs:                         11.2   5.76  )-----------( 319      ( 12 Apr 2023 06:31 )             33.5     04-12    125<L>  |  92<L>  |  18.9  ----------------------------<  78  4.0   |  18.0<L>  |  1.25      Impression:  stable post op- improving   discussed with Surgeon present situation and continued care  Plan:  continue present care and plans per medicine.  OK for discharge from urological stand.  verbally reinforced outpatient f/u with Dr. Meadows in office 7-10days to patient.

## 2023-04-12 NOTE — PROGRESS NOTE ADULT - NS ATTEND AMEND GEN_ALL_CORE FT
agree with assessment  will need further urology follow up after  will assist with scheduling appointment
agree with assessment and plan  arranged for patient to have follow up with urology closer to home
I have seen and examined patient with NP, agree with above assessment and plan.
I have seen and examined patient with NP, agree with above assessment and plan.
agree with assessment and plan  discussed care with the patient  will monitor abs  will also assist with finding a urologist closer to where he lives for further treatment
agree with assessment  will monitor labs further

## 2023-04-12 NOTE — PROGRESS NOTE ADULT - ASSESSMENT
57 year old male with PMH of Left sided adeno Ca Colon s/p resection and ileostomy in 2022, no Chemo after, recent MRI in March 2023 showing ? Recurrent Colon Ca based on pelvic mass adjacent to bladder, h/o DVT 2022 and non occlusive portal vein thrombus, presented to [facility]  on 4/6/23 with c/o back, fopund to have obstructing Rt renal calculi and transferred for higher level of care to [facility]. On arrival, imaging neg for stone (likely had passed) with Na notable for rapid correction.     Right obstructing renal calculi with hydronephrosis  s/p renal stent placement by urology (4/8)  d/w Renal and urology   s/p Lind removal, voiding w/o difficulty. Urine color improving    Continue ceftriaxone (was started for empirical coverage for hydro/pyelonephritis. Can transition to PO on dsc   Flomax   d/w Urology, outpt f/u (Dr Meadows in 7-10 days)    Hypovolemic hyponatremia with hyperkalemia   Nephrology consult appreciated  S/p DDAVP, 2% saline / Lokelma   Sodium bicarbonate TID  K improved, after initial improvement in Na now down again  d/w pt in detail regarding need for ongoing fluid replacement (s/p colostomy) but avoidance of free water  Renal on board, f/u     MAXWELL  Likely 2/2 obstructive uropathy  improving  monitor cr  avoid nephrotoxic meds    L colon adenocarcinoma s/p resection with ostomy  Recurrent Ca with pelvic mass adjacent to bladder as of 3/2023  Ostomy functioning appropriately however with significant volume losses.   Outpatient surgical oncology f/u    Hypocortisol  endo consult appreciated-> d/w Dr Yeison atkinson to d/c steroid follow up in office 6 weeks no further in patient testing     DVT, non occlusive portal vein thrombus  Eliquis    patient previously declined family updates

## 2023-04-12 NOTE — DISCHARGE NOTE PROVIDER - CARE PROVIDER_API CALL
Denilson Meadows)  Urology  [address], [address]  [place] [address]  Phone: [phone]  Fax: [phone]  Follow Up Time: 1 week    Reese De León)  Internal Medicine; Nephrology  [address] [facility], [place] [address]  Phone: [phone]  Fax: [phone]  Follow Up Time: 1 week    Marie Bowman)  EndocrinologyMetabDiabetes; Internal Medicine  [address] [place] [address]  Phone: [phone]  Fax: [phone]  Follow Up Time: 1 month    Giorgio Hinds [facility]  [address] [place] [address]  Phone: [phone]  Fax: [phone]  Established Patient  Follow Up Time: 1 week

## 2023-04-12 NOTE — DISCHARGE NOTE PROVIDER - PROVIDER TOKENS
PROVIDER:[TOKEN:[[id number]],FOLLOWUP:[1 week]],PROVIDER:[TOKEN:[[id number]],FOLLOWUP:[1 week]],PROVIDER:[TOKEN:[[id number]],FOLLOWUP:[1 month]],PROVIDER:[TOKEN:[[id number]],FOLLOWUP:[1 week],ESTABLISHEDPATIENT:[T]]

## 2023-04-12 NOTE — DISCHARGE NOTE PROVIDER - NSDCFUADDAPPT_GEN_ALL_CORE_FT
Discussed follow up with Patient with Dr. Meadows or his Pvt. Urologist. Dr. Meadows at [address] [place] [address], phone [phone] . Discussed follow up with Patient with Dr. Meadows or his Pvt. Urologist. Dr. Meadows at [address] [place] [address], phone [phone] .    Follow up with Endo in 6 weeks for repeat 8AM cortisol, full thyroid panel, LH, FSH, testosterone and prolactin.     Follow up with Oncology as scheduled.

## 2023-04-12 NOTE — DISCHARGE NOTE PROVIDER - NSDCCPCAREPLAN_GEN_ALL_CORE_FT
PRINCIPAL DISCHARGE DIAGNOSIS  Diagnosis: Hyponatremia  Assessment and Plan of Treatment:       SECONDARY DISCHARGE DIAGNOSES  Diagnosis: Right renal stone  Assessment and Plan of Treatment:     Diagnosis: Acute renal failure (ARF)  Assessment and Plan of Treatment:     Diagnosis: Ureteral stone with hydronephrosis  Assessment and Plan of Treatment:      PRINCIPAL DISCHARGE DIAGNOSIS  Diagnosis: Hyponatremia  Assessment and Plan of Treatment: Improving.   Encouraged for oral intake.   Follow up with PMD/Renal in 1 week.      SECONDARY DISCHARGE DIAGNOSES  Diagnosis: Right renal stone  Assessment and Plan of Treatment: s/p ureteral stent.  Renal function improving.  Continue medications as prescribed.  Follow up with Urology/Nephro in 1 week.    Diagnosis: Acute renal failure (ARF)  Assessment and Plan of Treatment:     Diagnosis: Ureteral stone with hydronephrosis  Assessment and Plan of Treatment:

## 2023-04-12 NOTE — PROGRESS NOTE ADULT - SUBJECTIVE AND OBJECTIVE BOX
NEPHROLOGY INTERVAL HPI/OVERNIGHT EVENTS:    Feels well   Trying to keep up with Ostomy losses by drinking water   Ostomy losses 1.6 L    ml     feels well  No new events     MEDICATIONS  (STANDING):  apixaban 5 milliGRAM(s) Oral every 12 hours  cefTRIAXone Injectable. 1000 milliGRAM(s) IV Push every 24 hours  sodium bicarbonate 650 milliGRAM(s) Oral three times a day  sodium chloride 1 Gram(s) Oral three times a day  sodium chloride 0.9%. 1000 milliLiter(s) (80 mL/Hr) IV Continuous <Continuous>  tamsulosin 0.4 milliGRAM(s) Oral at bedtime    MEDICATIONS  (PRN):  acetaminophen     Tablet .. 650 milliGRAM(s) Oral every 6 hours PRN Temp greater or equal to 38C (100.4F), Mild Pain (1 - 3), Moderate Pain (4 - 6)  loperamide 2 milliGRAM(s) Oral every 6 hours PRN Diarrhea      Allergies    No Known Allergies    Intolerances              Vital Signs Last 24 Hrs  T(C): 36.9 (2023 12:45), Max: 37.1 (2023 09:00)  T(F): 98.5 (2023 12:45), Max: 98.7 (2023 09:00)  HR: 85 (2023 12:45) (68 - 87)  BP: 107/73 (2023 12:45) (102/67 - 120/81)  BP(mean): --  RR: 18 (2023 12:45) (18 - 18)  SpO2: 100% (2023 12:45) (99% - 100%)    Parameters below as of 2023 12:45  Patient On (Oxygen Delivery Method): room air      Daily     Daily Weight in k.1 (2023 01:00)  I&O's Detail    2023 07:01  -  2023 07:00  --------------------------------------------------------  IN:    Oral Fluid: 500 mL  Total IN: 500 mL    OUT:    Ileostomy (mL): 1610 mL    Voided (mL): 900 mL  Total OUT: 2510 mL    Total NET: - mL        I&O's Summary    2023 07:01  -  2023 07:00  --------------------------------------------------------  IN: 500 mL / OUT: 2510 mL / NET: - mL    GENERAL: NAD,   EYES:  conjunctiva and sclera clear  NECK: Supple, No JVD/Bruit  NERVOUS SYSTEM:  A/O x3,   CHEST:  No rales or rhonchi  HEART:  RRR, No murmur  ABDOMEN: Soft, NT ex sl over bladder area /ND BS+; R side bag half full  EXTREMITIES:  No Edema;        LABS:                        11.2   5.76  )-----------( 319      ( 2023 06:31 )             33.5     04-12    125<L>  |  92<L>  |  18.9  ----------------------------<  78  4.0   |  18.0<L>  |  1.25    Ca    9.6      2023 06:31    TPro  6.7  /  Alb  3.5  /  TBili  0.7  /  DBili  x   /  AST  56<H>  /  ALT  96<H>  /  AlkPhos  195<H>  04-11                RADIOLOGY & ADDITIONAL TESTS:

## 2023-04-12 NOTE — DISCHARGE NOTE PROVIDER - ATTENDING DISCHARGE PHYSICAL EXAMINATION:
Vital Signs   T(C): 36.8 (13 Apr 2023 12:39), Max: 36.8 (12 Apr 2023 17:04)  T(F): 98.2 (13 Apr 2023 12:39), Max: 98.3 (13 Apr 2023 08:49)  HR: 99 (13 Apr 2023 12:39) (80 - 99)  BP: 102/70 (13 Apr 2023 12:39) (102/70 - 116/77)  RR: 18 (13 Apr 2023 12:39) (18 - 18)  SpO2: 99% (13 Apr 2023 12:39) (99% - 100%)  Parameters below as of 13 Apr 2023 12:39  Patient On (Oxygen Delivery Method): room air  General: Middle aged male sitting in chair comfortably. No acute distress  HEENT: EOMI. Clear conjunctivae. Moist mucus membrane  Neck: Supple.   Chest: CTA bilaterally - no wheezing, rales or rhonchi.   Heart: Normal S1 & S2 with RRR.   Abdomen: Non distended. Soft. Non-tender. + BS. Ostomy in place. Old healed scar.   Ext: No pedal edema. No calf tenderness   Neuro: AAO x 3. No focal deficit. No speech disorder.  Skin: Warm and Dry  Psychiatry: Normal mood and affect

## 2023-04-12 NOTE — DISCHARGE NOTE PROVIDER - CARE PROVIDERS DIRECT ADDRESSES
,DirectAddress_Unknown,DirectAddress_Unknown,[email or web address],DirectAddress_Unknown

## 2023-04-12 NOTE — DISCHARGE NOTE PROVIDER - NSDCMRMEDTOKEN_GEN_ALL_CORE_FT
apixaban 5 mg oral tablet: 1 tab(s) orally 2 times a day   loperamide 2 mg oral capsule: 2 cap(s) orally every 6 hours   apixaban 5 mg oral tablet: 1 tab(s) orally 2 times a day   cholestyramine 4 g/9 g oral powder for reconstitution: 4 gram(s) orally once a day  loperamide 2 mg oral capsule: 2 cap(s) orally every 6 hours as needed for  diarrhea  sodium bicarbonate 650 mg oral tablet: 2 tab(s) orally 2 times a day  tamsulosin 0.4 mg oral capsule: 1 cap(s) orally once a day (at bedtime)

## 2023-04-12 NOTE — PROGRESS NOTE ADULT - SUBJECTIVE AND OBJECTIVE BOX
[facility] Division of Hospital Medicine    Chief Complaint:  Hyponatremia     SUBJECTIVE / OVERNIGHT EVENTS:  Pt examined lying in bed  Urine still dark but improving   Na again down. d/w pt in detail, reports that prior to increasing his fluid intake ~1 month ago he had no fluid/ electrolyte issues   Patient denies chest pain, SOB, abd pain, N/V, fever, chills, dysuria or any other complaints. All remainder ROS negative.     MEDICATIONS  (STANDING):  apixaban 5 milliGRAM(s) Oral every 12 hours  cefTRIAXone Injectable. 1000 milliGRAM(s) IV Push every 24 hours  sodium bicarbonate 650 milliGRAM(s) Oral three times a day  sodium chloride 1 Gram(s) Oral three times a day  sodium chloride 0.9%. 1000 milliLiter(s) (80 mL/Hr) IV Continuous <Continuous>  tamsulosin 0.4 milliGRAM(s) Oral at bedtime    MEDICATIONS  (PRN):  acetaminophen     Tablet .. 650 milliGRAM(s) Oral every 6 hours PRN Temp greater or equal to 38C (100.4F), Mild Pain (1 - 3), Moderate Pain (4 - 6)  loperamide 2 milliGRAM(s) Oral every 6 hours PRN Diarrhea        PHYSICAL EXAM:  Vital Signs Last 24 Hrs  T(C): 37.1 (12 Apr 2023 09:00), Max: 37.1 (12 Apr 2023 09:00)  T(F): 98.7 (12 Apr 2023 09:00), Max: 98.7 (12 Apr 2023 09:00)  HR: 81 (12 Apr 2023 09:00) (68 - 87)  BP: 120/81 (12 Apr 2023 09:00) (102/67 - 120/81)  BP(mean): --  RR: 18 (12 Apr 2023 09:00) (18 - 18)  SpO2: 99% (12 Apr 2023 09:00) (99% - 100%)    Parameters below as of 12 Apr 2023 05:00  Patient On (Oxygen Delivery Method): room air        CONSTITUTIONAL: Non toxic appearing thin male lying in bed,   ENMT: Moist oral mucosa, no pharyngeal injection or exudates; normal dentition  RESPIRATORY: Normal respiratory effort; lungs are clear to auscultation bilaterally  CARDIOVASCULAR: Regular rate and rhythm, normal S1 and S2, no murmur/rub/gallop; No lower extremity edema; Peripheral pulses are 2+ bilaterally  ABDOMEN: Nontender to palpation, Rt colostomy, stoma site c/d/i  MUSCLOSKELETAL:   no clubbing or cyanosis of digits; no joint swelling or tenderness to palpation  PSYCH: A+O to person, place, and time; affect appropriate  NEUROLOGY: CN 2-12 are intact and symmetric; no gross sensory deficits;   SKIN: No rashes; no palpable lesions    LABS:                          11.2   5.76  )-----------( 319      ( 12 Apr 2023 06:31 )             33.5   04-12    125<L>  |  92<L>  |  18.9  ----------------------------<  78  4.0   |  18.0<L>  |  1.25    Ca    9.6      12 Apr 2023 06:31    TPro  6.7  /  Alb  3.5  /  TBili  0.7  /  DBili  x   /  AST  56<H>  /  ALT  96<H>  /  AlkPhos  195<H>  04-11              CAPILLARY BLOOD GLUCOSE            RADIOLOGY & ADDITIONAL TESTS:  Results Reviewed:   Imaging Personally Reviewed:  Electrocardiogram Personally Reviewed:

## 2023-04-12 NOTE — DISCHARGE NOTE PROVIDER - HOSPITAL COURSE
57 year old male with PMH of Left sided adeno Ca Colon s/p resection and ileostomy in 2022, no Chemo after, recent MRI in March 2023 showing ? Recurrent Colon Ca based on pelvic mass adjacent to bladder, h/o DVT 2022 and non occlusive portal vein thrombus, presented to [facility]  on 4/6/23 with c/o back, found to have obstructing Rt renal calculi and transferred for higher level of care to [facility]. Seen by Urology and had right ureteral stent placement on 4/8. Postoperatively, he was monitored closely. Renal function is improving. He was treated for Hyperkalemia / Hyponatremia (Sodium improving) / Hypomagnesemia. He was followed closely by Uro / Renal / Endo. He is feeling much better and is stable for discharge.

## 2023-04-12 NOTE — PROGRESS NOTE ADULT - ASSESSMENT
57 year old male with PMH of Left sided adeno Ca Colon s/p resection and ileostomy in 2022, no Chemo after, recent MRI in March 2023 showing ? Recurrent Colon Ca based on pelvic mass adjacent to bladder, h/o DVT 2022 and non occlusive portal vein thrombus, presented to [facility] on 4/[facility]/23 with c/o back pain which woke him up after taking a nap, Pt reports having severe pressure type right lower back pain which is aggravated when going from a sitting to lying position associated with nausea and vomiting, pt also reports 10 pound weight loss in one month which he attributes to reducing salt intake and drinking more water.    Recurrent Colon Ca based on pelvic mass adjacent to bladder  Renal stone in the right UVJ  s/p cysto and R stent on 4/7    MA - Convert NaCL to NaHCO3 QID     HypoNatremia - decreased effectual solute   Urine studies not c/w SIADH,  uric acid 5.6, Phos , 3  Add oral Urea   Add Questran   Patient to be more careful with his water ingestion     MAXWELL on CKD suspect stage III  Creat much better   Noted R side mod Hydronephrosis s/p stent   Avoid nephrotoxic agents   Avoid NSAID

## 2023-04-12 NOTE — DISCHARGE NOTE PROVIDER - NSDCFUSCHEDAPPT_GEN_ALL_CORE_FT
Brayan Nino  [facility] Physician [facility]  UROLOGY [address]  Scheduled Appointment: 05/03/2023

## 2023-04-13 ENCOUNTER — TRANSCRIPTION ENCOUNTER (OUTPATIENT)
Age: 58
End: 2023-04-13

## 2023-04-13 VITALS
TEMPERATURE: 98 F | HEART RATE: 82 BPM | RESPIRATION RATE: 18 BRPM | SYSTOLIC BLOOD PRESSURE: 110 MMHG | OXYGEN SATURATION: 99 % | DIASTOLIC BLOOD PRESSURE: 72 MMHG

## 2023-04-13 LAB
ANION GAP SERPL CALC-SCNC: 16 MMOL/L — SIGNIFICANT CHANGE UP (ref 5–17)
BUN SERPL-MCNC: 31.3 MG/DL — HIGH (ref 8–20)
CALCIUM SERPL-MCNC: 10 MG/DL — SIGNIFICANT CHANGE UP (ref 8.4–10.5)
CHLORIDE SERPL-SCNC: 95 MMOL/L — LOW (ref 96–108)
CO2 SERPL-SCNC: 20 MMOL/L — LOW (ref 22–29)
CREAT SERPL-MCNC: 1.61 MG/DL — HIGH (ref 0.5–1.3)
EGFR: 49 ML/MIN/1.73M2 — LOW
GLUCOSE SERPL-MCNC: 149 MG/DL — HIGH (ref 70–99)
HCT VFR BLD CALC: 39.5 % — SIGNIFICANT CHANGE UP (ref 39–50)
HGB BLD-MCNC: 12.9 G/DL — LOW (ref 13–17)
MAGNESIUM SERPL-MCNC: 1.5 MG/DL — LOW (ref 1.6–2.6)
MCHC RBC-ENTMCNC: 27.9 PG — SIGNIFICANT CHANGE UP (ref 27–34)
MCHC RBC-ENTMCNC: 32.7 GM/DL — SIGNIFICANT CHANGE UP (ref 32–36)
MCV RBC AUTO: 85.5 FL — SIGNIFICANT CHANGE UP (ref 80–100)
PLATELET # BLD AUTO: 432 K/UL — HIGH (ref 150–400)
POTASSIUM SERPL-MCNC: 3.7 MMOL/L — SIGNIFICANT CHANGE UP (ref 3.5–5.3)
POTASSIUM SERPL-SCNC: 3.7 MMOL/L — SIGNIFICANT CHANGE UP (ref 3.5–5.3)
RBC # BLD: 4.62 M/UL — SIGNIFICANT CHANGE UP (ref 4.2–5.8)
RBC # FLD: 14 % — SIGNIFICANT CHANGE UP (ref 10.3–14.5)
SODIUM SERPL-SCNC: 131 MMOL/L — LOW (ref 135–145)
WBC # BLD: 6.71 K/UL — SIGNIFICANT CHANGE UP (ref 3.8–10.5)
WBC # FLD AUTO: 6.71 K/UL — SIGNIFICANT CHANGE UP (ref 3.8–10.5)

## 2023-04-13 PROCEDURE — 76000 FLUOROSCOPY <1 HR PHYS/QHP: CPT

## 2023-04-13 PROCEDURE — G1004: CPT

## 2023-04-13 PROCEDURE — 85025 COMPLETE CBC W/AUTO DIFF WBC: CPT

## 2023-04-13 PROCEDURE — 83935 ASSAY OF URINE OSMOLALITY: CPT

## 2023-04-13 PROCEDURE — 80048 BASIC METABOLIC PNL TOTAL CA: CPT

## 2023-04-13 PROCEDURE — 99231 SBSQ HOSP IP/OBS SF/LOW 25: CPT

## 2023-04-13 PROCEDURE — 82533 TOTAL CORTISOL: CPT

## 2023-04-13 PROCEDURE — 83735 ASSAY OF MAGNESIUM: CPT

## 2023-04-13 PROCEDURE — C2617: CPT

## 2023-04-13 PROCEDURE — C1769: CPT

## 2023-04-13 PROCEDURE — 83930 ASSAY OF BLOOD OSMOLALITY: CPT

## 2023-04-13 PROCEDURE — 81001 URINALYSIS AUTO W/SCOPE: CPT

## 2023-04-13 PROCEDURE — 84156 ASSAY OF PROTEIN URINE: CPT

## 2023-04-13 PROCEDURE — 36415 COLL VENOUS BLD VENIPUNCTURE: CPT

## 2023-04-13 PROCEDURE — 80053 COMPREHEN METABOLIC PANEL: CPT

## 2023-04-13 PROCEDURE — 74176 CT ABD & PELVIS W/O CONTRAST: CPT | Mod: MG

## 2023-04-13 PROCEDURE — C1758: CPT

## 2023-04-13 PROCEDURE — 82570 ASSAY OF URINE CREATININE: CPT

## 2023-04-13 PROCEDURE — 82310 ASSAY OF CALCIUM: CPT

## 2023-04-13 PROCEDURE — 87637 SARSCOV2&INF A&B&RSV AMP PRB: CPT

## 2023-04-13 PROCEDURE — 83970 ASSAY OF PARATHORMONE: CPT

## 2023-04-13 PROCEDURE — 84300 ASSAY OF URINE SODIUM: CPT

## 2023-04-13 PROCEDURE — 84540 ASSAY OF URINE/UREA-N: CPT

## 2023-04-13 PROCEDURE — 80076 HEPATIC FUNCTION PANEL: CPT

## 2023-04-13 PROCEDURE — 84133 ASSAY OF URINE POTASSIUM: CPT

## 2023-04-13 PROCEDURE — 84443 ASSAY THYROID STIM HORMONE: CPT

## 2023-04-13 PROCEDURE — 84550 ASSAY OF BLOOD/URIC ACID: CPT

## 2023-04-13 PROCEDURE — 84100 ASSAY OF PHOSPHORUS: CPT

## 2023-04-13 PROCEDURE — 82024 ASSAY OF ACTH: CPT

## 2023-04-13 PROCEDURE — 99239 HOSP IP/OBS DSCHRG MGMT >30: CPT

## 2023-04-13 PROCEDURE — 85027 COMPLETE CBC AUTOMATED: CPT

## 2023-04-13 RX ORDER — TAMSULOSIN HYDROCHLORIDE 0.4 MG/1
1 CAPSULE ORAL
Qty: 30 | Refills: 0
Start: 2023-04-13 | End: 2023-05-12

## 2023-04-13 RX ORDER — CHOLESTYRAMINE 4 G/9G
4 POWDER, FOR SUSPENSION ORAL
Qty: 120 | Refills: 0
Start: 2023-04-13 | End: 2023-05-12

## 2023-04-13 RX ORDER — SODIUM BICARBONATE 1 MEQ/ML
2 SYRINGE (ML) INTRAVENOUS
Qty: 60 | Refills: 0
Start: 2023-04-13 | End: 2023-04-27

## 2023-04-13 RX ORDER — MAGNESIUM SULFATE 500 MG/ML
2 VIAL (ML) INJECTION ONCE
Refills: 0 | Status: COMPLETED | OUTPATIENT
Start: 2023-04-13 | End: 2023-04-13

## 2023-04-13 RX ADMIN — CHOLESTYRAMINE 4 GRAM(S): 4 POWDER, FOR SUSPENSION ORAL at 11:13

## 2023-04-13 RX ADMIN — APIXABAN 5 MILLIGRAM(S): 2.5 TABLET, FILM COATED ORAL at 18:07

## 2023-04-13 RX ADMIN — Medication 650 MILLIGRAM(S): at 11:13

## 2023-04-13 RX ADMIN — CEFTRIAXONE 1000 MILLIGRAM(S): 500 INJECTION, POWDER, FOR SOLUTION INTRAMUSCULAR; INTRAVENOUS at 18:08

## 2023-04-13 RX ADMIN — Medication 25 GRAM(S): at 14:34

## 2023-04-13 RX ADMIN — APIXABAN 5 MILLIGRAM(S): 2.5 TABLET, FILM COATED ORAL at 05:15

## 2023-04-13 RX ADMIN — Medication 650 MILLIGRAM(S): at 05:15

## 2023-04-13 RX ADMIN — Medication 15 GRAM(S): at 11:13

## 2023-04-13 NOTE — PROGRESS NOTE ADULT - ASSESSMENT
56 y/o M with PMHx left sided adeno Ca Colon s/p resection and ileostomy in 2022, no chemo after, recent MRI in March 2023 showing ? Recurrent Colon Ca based on pelvic mass adjacent to bladder, h/o DVT 2022 and non occlusive portal vein thrombus, presented to [facility] on 4/6/23 with c/o back pain which woke him up after taking a nap, Pt reports having severe pressure type right lower back pain which is aggravated when going from a sitting to lying position associated with nausea and vomiting, pt also reports 10 pound weight loss in one month which he attributes to reducing salt intake and drinking more water.   At - CT- w/ 2 mm UVJ stone on R. w/ mod hydro. And hyponatremia + MAXWELL. Transferred to [facility] for Uro care.   At [facility]- Initially planned for OR for cysto with stents. But since his symptoms have resolved now, will rpt CT to assess status, DDAVP given.    1. Hyponatremia, low cortisol, question of AI  - Initial AM cortisol low, 2.2  - ACTH stim test performed and within normal limits, baseline 10---18---21  - Hydrocortisone discontinued  - No signs of SIADH, serum osm and urine osm within normal limits  - Recommend to follow up in 6 weeks for repeat 8AM cortisol. At that time, may check full thyroid panel/LH, FSH, testosterone prolactin   -For hyponatremia follow nephrology recs.    Will sign off for now, to call us incase of any questions.

## 2023-04-13 NOTE — PROGRESS NOTE ADULT - SUBJECTIVE AND OBJECTIVE BOX
NEPHROLOGY INTERVAL HPI/OVERNIGHT EVENTS:    Feels better   Cholestyramine helped   Improved consistency     MEDICATIONS  (STANDING):  apixaban 5 milliGRAM(s) Oral every 12 hours  cefTRIAXone Injectable. 1000 milliGRAM(s) IV Push every 24 hours  cholestyramine Powder (Sugar-Free) 4 Gram(s) Oral daily  magnesium sulfate  IVPB 2 Gram(s) IV Intermittent once  sodium bicarbonate 650 milliGRAM(s) Oral four times a day  sodium chloride 0.9%. 1000 milliLiter(s) (80 mL/Hr) IV Continuous <Continuous>  tamsulosin 0.4 milliGRAM(s) Oral at bedtime  urea Oral Powder 15 Gram(s) Oral daily    MEDICATIONS  (PRN):  acetaminophen     Tablet .. 650 milliGRAM(s) Oral every 6 hours PRN Temp greater or equal to 38C (100.4F), Mild Pain (1 - 3), Moderate Pain (4 - 6)  loperamide 2 milliGRAM(s) Oral every 6 hours PRN Diarrhea      Allergies    No Known Allergies    Intolerances            Vital Signs Last 24 Hrs  T(C): 36.8 (13 Apr 2023 12:39), Max: 36.8 (12 Apr 2023 17:04)  T(F): 98.2 (13 Apr 2023 12:39), Max: 98.3 (13 Apr 2023 08:49)  HR: 99 (13 Apr 2023 12:39) (80 - 99)  BP: 102/70 (13 Apr 2023 12:39) (102/70 - 116/77)  BP(mean): --  RR: 18 (13 Apr 2023 12:39) (18 - 18)  SpO2: 99% (13 Apr 2023 12:39) (99% - 100%)    Parameters below as of 13 Apr 2023 12:39  Patient On (Oxygen Delivery Method): room air      Daily     Daily   I&O's Detail    12 Apr 2023 07:01  -  13 Apr 2023 07:00  --------------------------------------------------------  IN:  Total IN: 0 mL    OUT:    Voided (mL): 750 mL  Total OUT: 750 mL    Total NET: -750 mL        I&O's Summary    12 Apr 2023 07:01  -  13 Apr 2023 07:00  --------------------------------------------------------  IN: 0 mL / OUT: 750 mL / NET: -750 mL        PHYSICAL EXAM:    GENERAL: NAD,   EYES:  conjunctiva and sclera clear  NECK: Supple, No JVD/Bruit  NERVOUS SYSTEM:  A/O x3,   CHEST:  No rales or rhonchi  HEART:  RRR, No murmur  ABDOMEN: Soft, NT ex sl over bladder area /ND BS+; R side bag half full  EXTREMITIES:  No Edema;      LABS:                        12.9   6.71  )-----------( 432      ( 13 Apr 2023 08:15 )             39.5     04-13    131<L>  |  95<L>  |  31.3<H>  ----------------------------<  149<H>  3.7   |  20.0<L>  |  1.61<H>    Ca    10.0      13 Apr 2023 08:15  Mg     1.5     04-13          Magnesium, Serum: 1.5 mg/dL (04-13 @ 08:15)          RADIOLOGY & ADDITIONAL TESTS:

## 2023-04-13 NOTE — DISCHARGE NOTE NURSING/CASE MANAGEMENT/SOCIAL WORK - PATIENT PORTAL LINK FT
You can access the FollowMyHealth Patient Portal offered by [facility] by registering at the following website: [email or web address]. By joining [facility]’s FollowMyHealth portal, you will also be able to view your health information using other applications (apps) compatible with our system.

## 2023-04-13 NOTE — PROGRESS NOTE ADULT - REASON FOR ADMISSION
transfer from [facility]

## 2023-04-13 NOTE — DISCHARGE NOTE NURSING/CASE MANAGEMENT/SOCIAL WORK - NSDCFUADDAPPT_GEN_ALL_CORE_FT
Discussed follow up with Patient with Dr. Meadows or his Pvt. Urologist. Dr. Meadows at [address] [place] [address], phone [phone] .    Follow up with Endo in 6 weeks for repeat 8AM cortisol, full thyroid panel, LH, FSH, testosterone and prolactin.     Follow up with Oncology as scheduled.

## 2023-04-13 NOTE — PROGRESS NOTE ADULT - SUBJECTIVE AND OBJECTIVE BOX
INTERVAL EVENTS:  Follow up hyponatremia, low cortisol management, with normal ACTH stim    ROS: Feels well, No pain at time of exam. Endorses good appetite.    MEDICATIONS  (STANDING):  apixaban 5 milliGRAM(s) Oral every 12 hours  cefTRIAXone Injectable. 1000 milliGRAM(s) IV Push every 24 hours  sodium bicarbonate 650 milliGRAM(s) Oral three times a day  sodium chloride 1 Gram(s) Oral three times a day  sodium chloride 0.9%. 1000 milliLiter(s) (80 mL/Hr) IV Continuous <Continuous>  tamsulosin 0.4 milliGRAM(s) Oral at bedtime    MEDICATIONS  (PRN):  acetaminophen     Tablet .. 650 milliGRAM(s) Oral every 6 hours PRN Temp greater or equal to 38C (100.4F), Mild Pain (1 - 3), Moderate Pain (4 - 6)  loperamide 2 milliGRAM(s) Oral every 6 hours PRN Diarrhea    Allergies  No Known Allergies    Vital Signs Last 24 Hrs  T(C): 36.8 (11 Apr 2023 05:38), Max: 36.8 (11 Apr 2023 05:38)  T(F): 98.3 (11 Apr 2023 05:38), Max: 98.3 (11 Apr 2023 05:38)  HR: 93 (11 Apr 2023 05:38) (70 - 93)  BP: 110/72 (11 Apr 2023 05:38) (103/66 - 110/72)  BP(mean): --  RR: 17 (11 Apr 2023 05:38) (17 - 18)  SpO2: 96% (11 Apr 2023 05:38) (96% - 99%)    Parameters below as of 11 Apr 2023 05:38  Patient On (Oxygen Delivery Method): room air    PHYSICAL EXAM:  General: No apparent distress  Respiratory: Lungs clear bilaterally, normal rate, effort  Cardiac: +S1, S2, no m/r/g  GI: +BS, soft  Extremities: No peripheral edema, no pedal lesions  Neuro: A+O X3, no tremor    LABS:                        11.2   5.09  )-----------( 321      ( 11 Apr 2023 06:44 )             34.0     04-11    129<L>  |  98  |  21.2<H>  ----------------------------<  93  4.1   |  21.0<L>  |  1.30    Ca    9.2      11 Apr 2023 06:44  Phos  2.7     04-10    TPro  6.7  /  Alb  3.5  /  TBili  0.7  /  DBili  x   /  AST  56<H>  /  ALT  96<H>  /  AlkPhos  195<H>  04-11        Thyroid Stimulating Hormone, Serum: 0.56 uIU/mL (04-07-23 @ 09:30)

## 2023-04-13 NOTE — PROGRESS NOTE ADULT - ASSESSMENT
57 year old male with PMH of Left sided adeno Ca Colon s/p resection and ileostomy in 2022, no Chemo after, recent MRI in March 2023 showing ? Recurrent Colon Ca based on pelvic mass adjacent to bladder, h/o DVT 2022 and non occlusive portal vein thrombus, presented to [facility] on 4/[facility]/23 with c/o back pain which woke him up after taking a nap, Pt reports having severe pressure type right lower back pain which is aggravated when going from a sitting to lying position associated with nausea and vomiting, pt also reports 10 pound weight loss in one month which he attributes to reducing salt intake and drinking more water.    Recurrent Colon Ca based on pelvic mass adjacent to bladder  Renal stone in the right UVJ  s/p cysto and R stent on 4/7    MA - Convert NaCL to NaHCO3 QID     HypoNatremia - decreased effectual solute   Urine studies not c/w SIADH   Added  Questran - when gets home , can take twice a day   Patient to be more careful with his water ingestion   Con hold Urea     MAXWELL on CKD suspect stage III  Creat much better   Noted R side mod Hydronephrosis s/p stent   Avoid nephrotoxic agents   Avoid NSAID     Cleared from a renal perspective for Discharge   Will need follow up labs in 1-2 weeks      57 year old male with PMH of Left sided adeno Ca Colon s/p resection and ileostomy in 2022, no Chemo after, recent MRI in March 2023 showing ? Recurrent Colon Ca based on pelvic mass adjacent to bladder, h/o DVT 2022 and non occlusive portal vein thrombus, presented to [facility] on 4/[facility]/23 with c/o back pain which woke him up after taking a nap, Pt reports having severe pressure type right lower back pain which is aggravated when going from a sitting to lying position associated with nausea and vomiting, pt also reports 10 pound weight loss in one month which he attributes to reducing salt intake and drinking more water.    Recurrent Colon Ca based on pelvic mass adjacent to bladder  Renal stone in the right UVJ  s/p cysto and R stent on 4/7    MA - Convert NaCL to NaHCO3 QID     Low Mag - 2 grams IV , ordered     HypoNatremia - decreased effectual solute   Urine studies not c/w SIADH   Added  Questran - when gets home , can take twice a day   Patient to be more careful with his water ingestion   Con hold Urea     MAXWELL on CKD suspect stage III  Creat much better   Noted R side mod Hydronephrosis s/p stent   Avoid nephrotoxic agents   Avoid NSAID     Cleared from a renal perspective for Discharge   Will need follow up labs in 1-2 weeks

## 2023-04-13 NOTE — PROGRESS NOTE ADULT - PROVIDER SPECIALTY LIST ADULT
Endocrinology
Hospitalist
Nephrology
Internal Medicine
Nephrology
Urology
Endocrinology
Endocrinology
Internal Medicine
Urology
Nephrology
Urology
Urology

## 2023-04-14 LAB
CORTICOSTEROID BINDING GLOBULIN RESULT: 1.3 MG/DL — LOW
CORTIS F/TOTAL MFR SERPL: 36 % — SIGNIFICANT CHANGE UP
CORTIS SERPL-MCNC: 13 UG/DL — SIGNIFICANT CHANGE UP
CORTISOL, FREE RESULT: 4.7 UG/DL — HIGH

## 2023-04-19 PROBLEM — Z98.890 OTHER SPECIFIED POSTPROCEDURAL STATES: Chronic | Status: ACTIVE | Noted: 2023-04-06

## 2023-04-21 ENCOUNTER — APPOINTMENT (OUTPATIENT)
Dept: UROLOGY | Facility: CLINIC | Age: 58
End: 2023-04-21
Payer: MEDICAID

## 2023-04-21 VITALS
RESPIRATION RATE: 16 BRPM | HEIGHT: 67 IN | SYSTOLIC BLOOD PRESSURE: 107 MMHG | WEIGHT: 127 LBS | BODY MASS INDEX: 19.93 KG/M2 | DIASTOLIC BLOOD PRESSURE: 73 MMHG | OXYGEN SATURATION: 97 % | HEART RATE: 84 BPM

## 2023-04-21 PROCEDURE — 99213 OFFICE O/P EST LOW 20 MIN: CPT

## 2023-04-21 NOTE — ASSESSMENT
[FreeTextEntry1] : \par plan:\par - seeing Dr. Nino to plan for treatment for the stones\par - oncology consult for treatment of suspected lesion\par

## 2023-04-21 NOTE — HISTORY OF PRESENT ILLNESS
[FreeTextEntry1] : 58 yo M for follow up\par here with spouse\par previously seen in the hospital, transferred from [place] due to obstructing stone and hyperkalemia\par had a ureteral stent inserted\par \par 1. nephrolithiasis;\par reviewed CT with the patient: has small stones bilaterally, looks more like milk of calci and small fragments, and not formed stones\par now with right side stent, due to a small ureteral stone at the time\par discussed option of bilateral ureteroscopy, possibly with CVAC\par seeing Dr. Nino next week, will have treatment closer to home\par \par 2. colon cancer\par s/p surgery and colostomy, was offered chemotherapy after, but he needed to recover first.\par suspected recurrence of colon cancer\par had cystoscopy with Dr. Kruger 01/2023, no lesion inside the bladder\par last MRI 03/2023 - the suspected lesion grew from 2 to 4 cm\par seeing surgical oncologist soon for plan\par \par plan:\par - seeing Dr. Nino to plan for treatment for the stones\par - oncology consult for treatment of suspected lesion\par

## 2023-04-28 ENCOUNTER — APPOINTMENT (OUTPATIENT)
Dept: SURGICAL ONCOLOGY | Facility: CLINIC | Age: 58
End: 2023-04-28
Payer: MEDICAID

## 2023-04-28 VITALS
BODY MASS INDEX: 17.58 KG/M2 | HEIGHT: 67 IN | SYSTOLIC BLOOD PRESSURE: 104 MMHG | HEART RATE: 84 BPM | DIASTOLIC BLOOD PRESSURE: 73 MMHG | OXYGEN SATURATION: 97 % | RESPIRATION RATE: 16 BRPM | WEIGHT: 112 LBS

## 2023-04-28 PROCEDURE — 99214 OFFICE O/P EST MOD 30 MIN: CPT

## 2023-04-28 NOTE — ASSESSMENT
[FreeTextEntry1] : 57M with obstructing sigmoid cancer that presented with colonic ischemia requiring total abdominal colectomy with end ileostomy. Initially was left in discontinuity with open abdomen given septic shock with GNR bacteremia. He has recovered very well and is home with stable/ increasing weight, tolerating a diet, and functional ileostomy. \par \par Per discussion with his surgeon, there was residual disease in the pelvis. \par \par We discussed options at this point including re-exploration versus systemic therapy. \par \par I think the best course of action at this juncture is systemic therapy with interval imaging prior to considering resection and/ or ileostomy reversal, which has the potential for considerable morbidity. I don’t think diagnostic laparoscopy will be feasible/ safe given the extent of prior operation. \par \par The patient has refused systemic therapy, not interested in meeting with medical oncology or radiation oncology. \par He clearly has residual disease on imaging. He understands that there is progressive residual malignancy. Losing weight. Has not had restaging scans in several months. \par \par Plan: \par CT CAP and follow up with me after\par Labs with CEA\par Follow up with Urology- Dr. Saskia Fernández\par Discussed with medical oncology- Dr. Coronado\par Strongly encouraged patient to reconsider discussion with medical and radiation oncology- he will think about it\par I do not think re-exploration is indicated at this time \par

## 2023-04-28 NOTE — HISTORY OF PRESENT ILLNESS
[de-identified] : 57M referred by Dr. Zach Plasencia at [facility] who presented him with large bowel obstruction and septic shock with gram negative bacteremia on 9/10/22. Dr. Plasencia performed life-saving emergent subtotal colectomy and was left in discontinuity, Required several units of blood. Vasopressors.  He returned to the OR on 9/12/22 at which point additional necrotic appearing small bowel was resected and an end ileostomy was created. Was in hospital for almost 3 weeks (initial 2 weeks and then readmission for dehydration/ high ileostomy output.) \par \par There was gross residual disease in the pelvis, which was not amenable to resection. 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 He is on eliquis for this. \par \par Currently he has recovered well and is tolerating a diet. Has gained one pound, after losing about 16-20 pounds. \par He met with Dr. Gillian Coronado from medical oncology. \par \par CT  Chest abdomen pelvis 9/19/22: no visible disease. Thromboses as above\par CT abdomen/ pelvis 10/11/22: resolving pelvic fluid. No visible disease \par \par MRI of pelvis 11/8/22 Within the wall of the dome of the bladder is a 2.1 x 2.1 x 1.8 cm enhancing abnormality. This appears to be a site of previous postoperative collection however no fluid component can be identified this time.\par \par 11/15/22 CEA 3.0 \par \par He followed up with Dr Coronado on 11/14/22, her recommendation was to begin systemic chemotherapy. \par 12/7/22- telephonic conversation: Patient declined port placement. Called him to explain necessity of port. He is obtaining medical opinions from friends/ colleagues in medicine and was told to consider a PET or biopsy. He wants to wait until after the Holidays. Reiterated that next step is port placement/ systemic therapy and encouraged him to do this as soon as possible. \par \par He followed up with Dr Muniz in February- he recommended f/u with me for reversal. \par He follows up with Dr Kruger- underwent cystoscopy this showed large area in the posterior bladder wall with a submucosal nodularity. The overlying mucosa was normal. Findings are consistent with either extrinsic compression or intramural nodule of the bladder. Plan was to undergo imaging and possible cystoscopy with biopsy. \par \par MRI 3/16/23 No evidence of intrinsic bladder lesion. Increasing size of enhancing soft tissue mass along the serosal surface of the left bladder dome measuring 4.0 x 3.7 cm (26-24), previously 2.1 x 2.1 cm. Findings are suspicious for recurrent colon cancer. Nonocclusive left portal vein thrombus.\par \par He presents with his wife, still on eliquis, he denies any abdominal pain. Recently hospitalized in [facility], transferred from [facility], for kidney stones. S/p urethral stent placement, by Dr Meadows.  He has lost weight, his appetite is great, urinating freely, ostomy is working well. He is very adamant about getting chemotherapy. He explains it is because he saw his cousin, who was diagnosed with cervical cancer, undergo chemotherapy, whither away and die. \par \par Social: Lives with his wife. No smoking or alcohol.

## 2023-04-30 ENCOUNTER — EMERGENCY (EMERGENCY)
Facility: HOSPITAL | Age: 58
LOS: 0 days | Discharge: ANOTHER HOSPITAL | End: 2023-04-30
Attending: STUDENT IN AN ORGANIZED HEALTH CARE EDUCATION/TRAINING PROGRAM
Payer: MEDICAID

## 2023-04-30 ENCOUNTER — INPATIENT (INPATIENT)
Facility: HOSPITAL | Age: 58
LOS: 3 days | Discharge: HOME | End: 2023-05-04
Attending: STUDENT IN AN ORGANIZED HEALTH CARE EDUCATION/TRAINING PROGRAM | Admitting: STUDENT IN AN ORGANIZED HEALTH CARE EDUCATION/TRAINING PROGRAM
Payer: MEDICAID

## 2023-04-30 VITALS
OXYGEN SATURATION: 100 % | TEMPERATURE: 99 F | DIASTOLIC BLOOD PRESSURE: 78 MMHG | SYSTOLIC BLOOD PRESSURE: 117 MMHG | HEART RATE: 80 BPM | RESPIRATION RATE: 18 BRPM

## 2023-04-30 VITALS
HEART RATE: 96 BPM | RESPIRATION RATE: 18 BRPM | OXYGEN SATURATION: 99 % | SYSTOLIC BLOOD PRESSURE: 108 MMHG | HEIGHT: 67 IN | DIASTOLIC BLOOD PRESSURE: 76 MMHG | WEIGHT: 113.98 LBS | TEMPERATURE: 98 F

## 2023-04-30 VITALS
SYSTOLIC BLOOD PRESSURE: 110 MMHG | HEART RATE: 89 BPM | TEMPERATURE: 98 F | RESPIRATION RATE: 20 BRPM | HEIGHT: 67 IN | OXYGEN SATURATION: 99 % | DIASTOLIC BLOOD PRESSURE: 71 MMHG

## 2023-04-30 DIAGNOSIS — Z79.01 LONG TERM (CURRENT) USE OF ANTICOAGULANTS: ICD-10-CM

## 2023-04-30 DIAGNOSIS — Z87.448 PERSONAL HISTORY OF OTHER DISEASES OF URINARY SYSTEM: ICD-10-CM

## 2023-04-30 DIAGNOSIS — R11.2 NAUSEA WITH VOMITING, UNSPECIFIED: ICD-10-CM

## 2023-04-30 DIAGNOSIS — E86.0 DEHYDRATION: ICD-10-CM

## 2023-04-30 DIAGNOSIS — C18.9 MALIGNANT NEOPLASM OF COLON, UNSPECIFIED: ICD-10-CM

## 2023-04-30 DIAGNOSIS — Z90.49 ACQUIRED ABSENCE OF OTHER SPECIFIED PARTS OF DIGESTIVE TRACT: Chronic | ICD-10-CM

## 2023-04-30 DIAGNOSIS — K56.609 UNSPECIFIED INTESTINAL OBSTRUCTION, UNSPECIFIED AS TO PARTIAL VERSUS COMPLETE OBSTRUCTION: ICD-10-CM

## 2023-04-30 DIAGNOSIS — N17.9 ACUTE KIDNEY FAILURE, UNSPECIFIED: ICD-10-CM

## 2023-04-30 DIAGNOSIS — E86.1 HYPOVOLEMIA: ICD-10-CM

## 2023-04-30 DIAGNOSIS — E87.5 HYPERKALEMIA: ICD-10-CM

## 2023-04-30 DIAGNOSIS — E87.1 HYPO-OSMOLALITY AND HYPONATREMIA: ICD-10-CM

## 2023-04-30 LAB
ALBUMIN SERPL ELPH-MCNC: 3.2 G/DL — LOW (ref 3.3–5)
ALBUMIN SERPL ELPH-MCNC: 3.3 G/DL — SIGNIFICANT CHANGE UP (ref 3.3–5)
ALBUMIN SERPL ELPH-MCNC: 3.8 G/DL — SIGNIFICANT CHANGE UP (ref 3.3–5)
ALP SERPL-CCNC: 249 U/L — HIGH (ref 40–120)
ALP SERPL-CCNC: 254 U/L — HIGH (ref 40–120)
ALP SERPL-CCNC: 292 U/L — HIGH (ref 40–120)
ALT FLD-CCNC: 178 U/L — HIGH (ref 12–78)
ALT FLD-CCNC: 178 U/L — HIGH (ref 12–78)
ALT FLD-CCNC: 204 U/L — HIGH (ref 12–78)
ANION GAP SERPL CALC-SCNC: 17 MMOL/L — HIGH (ref 7–14)
ANION GAP SERPL CALC-SCNC: 4 MMOL/L — LOW (ref 5–17)
AST SERPL-CCNC: 127 U/L — HIGH (ref 15–37)
AST SERPL-CCNC: 130 U/L — HIGH (ref 15–37)
AST SERPL-CCNC: 145 U/L — HIGH (ref 15–37)
BASOPHILS # BLD AUTO: 0.03 K/UL — SIGNIFICANT CHANGE UP (ref 0–0.2)
BASOPHILS NFR BLD AUTO: 0.3 % — SIGNIFICANT CHANGE UP (ref 0–2)
BILIRUB SERPL-MCNC: 0.8 MG/DL — SIGNIFICANT CHANGE UP (ref 0.2–1.2)
BILIRUB SERPL-MCNC: 0.9 MG/DL — SIGNIFICANT CHANGE UP (ref 0.2–1.2)
BILIRUB SERPL-MCNC: 1.1 MG/DL — SIGNIFICANT CHANGE UP (ref 0.2–1.2)
BUN SERPL-MCNC: 52 MG/DL — HIGH (ref 7–23)
BUN SERPL-MCNC: 57 MG/DL — HIGH (ref 7–23)
BUN SERPL-MCNC: 57 MG/DL — HIGH (ref 7–23)
BUN SERPL-MCNC: 59 MG/DL — HIGH (ref 7–23)
CALCIUM SERPL-MCNC: 10.1 MG/DL — SIGNIFICANT CHANGE UP (ref 8.4–10.5)
CALCIUM SERPL-MCNC: 10.7 MG/DL — HIGH (ref 8.5–10.1)
CALCIUM SERPL-MCNC: 11.2 MG/DL — HIGH (ref 8.5–10.1)
CALCIUM SERPL-MCNC: 9.4 MG/DL — SIGNIFICANT CHANGE UP (ref 8.5–10.1)
CHLORIDE SERPL-SCNC: 87 MMOL/L — LOW (ref 98–107)
CHLORIDE SERPL-SCNC: 90 MMOL/L — LOW (ref 96–108)
CHLORIDE SERPL-SCNC: 90 MMOL/L — LOW (ref 96–108)
CHLORIDE SERPL-SCNC: 92 MMOL/L — LOW (ref 96–108)
CO2 SERPL-SCNC: 26 MMOL/L — SIGNIFICANT CHANGE UP (ref 22–31)
CO2 SERPL-SCNC: 29 MMOL/L — SIGNIFICANT CHANGE UP (ref 22–31)
CO2 SERPL-SCNC: 29 MMOL/L — SIGNIFICANT CHANGE UP (ref 22–31)
CO2 SERPL-SCNC: 31 MMOL/L — SIGNIFICANT CHANGE UP (ref 22–31)
CREAT SERPL-MCNC: 2.7 MG/DL — HIGH (ref 0.5–1.3)
CREAT SERPL-MCNC: 2.77 MG/DL — HIGH (ref 0.5–1.3)
CREAT SERPL-MCNC: 2.81 MG/DL — HIGH (ref 0.5–1.3)
CREAT SERPL-MCNC: 2.83 MG/DL — HIGH (ref 0.5–1.3)
EGFR: 25 ML/MIN/1.73M2 — LOW
EGFR: 25 ML/MIN/1.73M2 — LOW
EGFR: 26 ML/MIN/1.73M2 — LOW
EGFR: 26 ML/MIN/1.73M2 — LOW
EOSINOPHIL # BLD AUTO: 0.08 K/UL — SIGNIFICANT CHANGE UP (ref 0–0.5)
EOSINOPHIL NFR BLD AUTO: 0.7 % — SIGNIFICANT CHANGE UP (ref 0–6)
GLUCOSE SERPL-MCNC: 112 MG/DL — HIGH (ref 70–99)
GLUCOSE SERPL-MCNC: 114 MG/DL — HIGH (ref 70–99)
GLUCOSE SERPL-MCNC: 128 MG/DL — HIGH (ref 70–99)
GLUCOSE SERPL-MCNC: 151 MG/DL — HIGH (ref 70–99)
HCT VFR BLD CALC: 46.8 % — SIGNIFICANT CHANGE UP (ref 39–50)
HGB BLD-MCNC: 15.4 G/DL — SIGNIFICANT CHANGE UP (ref 13–17)
IMM GRANULOCYTES NFR BLD AUTO: 0.5 % — SIGNIFICANT CHANGE UP (ref 0–0.9)
LYMPHOCYTES # BLD AUTO: 0.78 K/UL — LOW (ref 1–3.3)
LYMPHOCYTES # BLD AUTO: 7.2 % — LOW (ref 13–44)
MAGNESIUM SERPL-MCNC: 1.5 MG/DL — LOW (ref 1.6–2.6)
MCHC RBC-ENTMCNC: 28 PG — SIGNIFICANT CHANGE UP (ref 27–34)
MCHC RBC-ENTMCNC: 32.9 G/DL — SIGNIFICANT CHANGE UP (ref 32–36)
MCV RBC AUTO: 85.1 FL — SIGNIFICANT CHANGE UP (ref 80–100)
MONOCYTES # BLD AUTO: 0.48 K/UL — SIGNIFICANT CHANGE UP (ref 0–0.9)
MONOCYTES NFR BLD AUTO: 4.4 % — SIGNIFICANT CHANGE UP (ref 2–14)
NEUTROPHILS # BLD AUTO: 9.47 K/UL — HIGH (ref 1.8–7.4)
NEUTROPHILS NFR BLD AUTO: 86.9 % — HIGH (ref 43–77)
NRBC # BLD: 0 /100 WBCS — SIGNIFICANT CHANGE UP (ref 0–0)
PLATELET # BLD AUTO: 683 K/UL — HIGH (ref 150–400)
POTASSIUM SERPL-MCNC: 4.7 MMOL/L — SIGNIFICANT CHANGE UP (ref 3.5–5.3)
POTASSIUM SERPL-MCNC: 5.6 MMOL/L — HIGH (ref 3.5–5.3)
POTASSIUM SERPL-MCNC: 5.8 MMOL/L — HIGH (ref 3.5–5.3)
POTASSIUM SERPL-MCNC: 6.3 MMOL/L — CRITICAL HIGH (ref 3.5–5.3)
POTASSIUM SERPL-SCNC: 4.7 MMOL/L — SIGNIFICANT CHANGE UP (ref 3.5–5.3)
POTASSIUM SERPL-SCNC: 5.6 MMOL/L — HIGH (ref 3.5–5.3)
POTASSIUM SERPL-SCNC: 5.8 MMOL/L — HIGH (ref 3.5–5.3)
POTASSIUM SERPL-SCNC: 6.3 MMOL/L — CRITICAL HIGH (ref 3.5–5.3)
PROT SERPL-MCNC: 7.7 GM/DL — SIGNIFICANT CHANGE UP (ref 6–8.3)
PROT SERPL-MCNC: 7.9 GM/DL — SIGNIFICANT CHANGE UP (ref 6–8.3)
PROT SERPL-MCNC: 8.8 GM/DL — HIGH (ref 6–8.3)
RBC # BLD: 5.5 M/UL — SIGNIFICANT CHANGE UP (ref 4.2–5.8)
RBC # FLD: 12.9 % — SIGNIFICANT CHANGE UP (ref 10.3–14.5)
SODIUM SERPL-SCNC: 123 MMOL/L — LOW (ref 135–145)
SODIUM SERPL-SCNC: 125 MMOL/L — LOW (ref 135–145)
SODIUM SERPL-SCNC: 125 MMOL/L — LOW (ref 135–145)
SODIUM SERPL-SCNC: 130 MMOL/L — LOW (ref 135–145)
WBC # BLD: 10.89 K/UL — HIGH (ref 3.8–10.5)
WBC # FLD AUTO: 10.89 K/UL — HIGH (ref 3.8–10.5)

## 2023-04-30 PROCEDURE — G1004: CPT

## 2023-04-30 PROCEDURE — 74176 CT ABD & PELVIS W/O CONTRAST: CPT | Mod: 26,MA,77

## 2023-04-30 PROCEDURE — 99285 EMERGENCY DEPT VISIT HI MDM: CPT

## 2023-04-30 PROCEDURE — 74018 RADEX ABDOMEN 1 VIEW: CPT | Mod: 26

## 2023-04-30 PROCEDURE — 99223 1ST HOSP IP/OBS HIGH 75: CPT

## 2023-04-30 PROCEDURE — 74176 CT ABD & PELVIS W/O CONTRAST: CPT | Mod: 26,MG

## 2023-04-30 RX ORDER — DEXTROSE 50 % IN WATER 50 %
50 SYRINGE (ML) INTRAVENOUS ONCE
Refills: 0 | Status: COMPLETED | OUTPATIENT
Start: 2023-04-30 | End: 2023-04-30

## 2023-04-30 RX ORDER — SODIUM CHLORIDE 9 MG/ML
1000 INJECTION INTRAMUSCULAR; INTRAVENOUS; SUBCUTANEOUS ONCE
Refills: 0 | Status: COMPLETED | OUTPATIENT
Start: 2023-04-30 | End: 2023-04-30

## 2023-04-30 RX ORDER — DEXAMETHASONE 0.5 MG/5ML
4 ELIXIR ORAL EVERY 12 HOURS
Refills: 0 | Status: DISCONTINUED | OUTPATIENT
Start: 2023-04-30 | End: 2023-05-01

## 2023-04-30 RX ORDER — ACETAMINOPHEN 500 MG
1000 TABLET ORAL ONCE
Refills: 0 | Status: COMPLETED | OUTPATIENT
Start: 2023-04-30 | End: 2023-04-30

## 2023-04-30 RX ORDER — METOCLOPRAMIDE HCL 10 MG
10 TABLET ORAL EVERY 8 HOURS
Refills: 0 | Status: DISCONTINUED | OUTPATIENT
Start: 2023-04-30 | End: 2023-05-01

## 2023-04-30 RX ORDER — INSULIN HUMAN 100 [IU]/ML
6 INJECTION, SOLUTION SUBCUTANEOUS ONCE
Refills: 0 | Status: COMPLETED | OUTPATIENT
Start: 2023-04-30 | End: 2023-04-30

## 2023-04-30 RX ORDER — SODIUM POLYSTYRENE SULFONATE 4.1 MEQ/G
30 POWDER, FOR SUSPENSION ORAL ONCE
Refills: 0 | Status: COMPLETED | OUTPATIENT
Start: 2023-04-30 | End: 2023-04-30

## 2023-04-30 RX ORDER — CALCIUM GLUCONATE 100 MG/ML
1 VIAL (ML) INTRAVENOUS ONCE
Refills: 0 | Status: COMPLETED | OUTPATIENT
Start: 2023-04-30 | End: 2023-04-30

## 2023-04-30 RX ORDER — IPRATROPIUM/ALBUTEROL SULFATE 18-103MCG
3 AEROSOL WITH ADAPTER (GRAM) INHALATION ONCE
Refills: 0 | Status: COMPLETED | OUTPATIENT
Start: 2023-04-30 | End: 2023-04-30

## 2023-04-30 RX ORDER — ONDANSETRON 8 MG/1
4 TABLET, FILM COATED ORAL ONCE
Refills: 0 | Status: COMPLETED | OUTPATIENT
Start: 2023-04-30 | End: 2023-04-30

## 2023-04-30 RX ORDER — SODIUM BICARBONATE 1 MEQ/ML
50 SYRINGE (ML) INTRAVENOUS ONCE
Refills: 0 | Status: COMPLETED | OUTPATIENT
Start: 2023-04-30 | End: 2023-04-30

## 2023-04-30 RX ORDER — IOHEXOL 300 MG/ML
30 INJECTION, SOLUTION INTRAVENOUS ONCE
Refills: 0 | Status: DISCONTINUED | OUTPATIENT
Start: 2023-04-30 | End: 2023-04-30

## 2023-04-30 RX ORDER — OCTREOTIDE ACETATE 200 UG/ML
100 INJECTION, SOLUTION INTRAVENOUS; SUBCUTANEOUS EVERY 8 HOURS
Refills: 0 | Status: DISCONTINUED | OUTPATIENT
Start: 2023-04-30 | End: 2023-05-01

## 2023-04-30 RX ADMIN — Medication 400 MILLIGRAM(S): at 10:59

## 2023-04-30 RX ADMIN — Medication 50 MILLILITER(S): at 13:15

## 2023-04-30 RX ADMIN — SODIUM CHLORIDE 500 MILLILITER(S): 9 INJECTION INTRAMUSCULAR; INTRAVENOUS; SUBCUTANEOUS at 23:33

## 2023-04-30 RX ADMIN — Medication 100 GRAM(S): at 12:50

## 2023-04-30 RX ADMIN — ONDANSETRON 4 MILLIGRAM(S): 8 TABLET, FILM COATED ORAL at 10:58

## 2023-04-30 RX ADMIN — Medication 1000 MILLIGRAM(S): at 11:30

## 2023-04-30 RX ADMIN — Medication 4 MILLIGRAM(S): at 23:33

## 2023-04-30 RX ADMIN — Medication 10 MILLIGRAM(S): at 23:33

## 2023-04-30 RX ADMIN — Medication 3 MILLILITER(S): at 12:50

## 2023-04-30 RX ADMIN — INSULIN HUMAN 6 UNIT(S): 100 INJECTION, SOLUTION SUBCUTANEOUS at 13:15

## 2023-04-30 RX ADMIN — Medication 50 MILLIEQUIVALENT(S): at 12:50

## 2023-04-30 RX ADMIN — OCTREOTIDE ACETATE 100 MICROGRAM(S): 200 INJECTION, SOLUTION INTRAVENOUS; SUBCUTANEOUS at 23:39

## 2023-04-30 RX ADMIN — Medication 1 GRAM(S): at 13:36

## 2023-04-30 RX ADMIN — SODIUM POLYSTYRENE SULFONATE 30 GRAM(S): 4.1 POWDER, FOR SUSPENSION ORAL at 12:51

## 2023-04-30 NOTE — ED PROVIDER NOTE - CLINICAL SUMMARY MEDICAL DECISION MAKING FREE TEXT BOX
Pt is a 58 yo M with pmh kidney stones s/p ureteral stent, colon adenocarcinoma s/p resection and ileostomy 2022, dvt presenting as transfer from [place] for SBO. Currently with vs wnl and stable, with sbo visualized on ct at [place], surgery consulted here, recommend ct with po con. Will give po con ct, repeat labs for hemolyzed results, reassess. Surg recs pending scan.

## 2023-04-30 NOTE — ED ADULT TRIAGE NOTE - CHIEF COMPLAINT QUOTE
Pt a transfer from [facility]. Pt has hx of colon cancer with ileostomy. Pt with poor output from ileostomy bag, LLQ pain, n/v, and Hyperkalemic. Pt with NGT tube in place.

## 2023-04-30 NOTE — H&P ADULT - NSHPREVIEWOFSYSTEMS_GEN_ALL_CORE
Review of Systems:   CONSTITUTIONAL: No fever, chills  EYES: No eye pain, visual disturbances, or discharge  ENMT:  No difficulty hearing, tinnitus, vertigo; No sinus or throat pain  NECK: No pain or stiffness  RESPIRATORY: No cough, wheezing, chills or hemoptysis; No shortness of breath  CARDIOVASCULAR: No chest pain, palpitations, dizziness, or leg swelling  GASTROINTESTINAL: No abdominal or epigastric pain.  nausea, vomiting, +ostomy output   GENITOURINARY: No dysuria, frequency, hematuria, or incontinence  NEUROLOGICAL: No headaches, memory loss, loss of strength, numbness, or tremors  SKIN: No itching, burning, rashes, or lesions   LYMPH NODES: No enlarged glands  ENDOCRINE: No heat or cold intolerance; No hair loss  MUSCULOSKELETAL: No joint pain or swelling; No muscle, back, or extremity pain

## 2023-04-30 NOTE — CONSULT NOTE ADULT - SUBJECTIVE AND OBJECTIVE BOX
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 Pt reports he has not noted gas or stool from his ostomy bag since then, normally changes it 3-4 times a day. He also began having nausea, vomiting, and intermittent abdominal cramping pain since 2AM. Initally presented to [facility] for evaluation where he was noted to have SBO on non-contrast CT scan. NGT placed with approx. 200 cc output. Patient endorses continued abdominal pain but notes that he has had a small amount of stool into his ostomy bag since NGT placed. Pt not on any chemo/XRT.     Pt also endorsing hematuria since restarting his Eliquis yesterday after it was held for his recent ureteral stent placement. Denies fevers, chills, chest pain, SOB, nausea, vomiting, dysuria.         PAST MEDICAL & SURGICAL HISTORY:  Colon cancer      H/O ileostomy      S/P colon resection    Home Medications:  loperamide 2 mg oral capsule: 2 cap(s) orally every 6 hours as needed for  diarrhea (13 Apr 2023 14:32)  apixaban 5 milliGRAM(s) Oral every 12 hours      Allergies    No Known Allergies    Intolerances        SOCIAL HISTORY: Noncontributory    FAMILY HISTORY:  Nonsmoker, no drug or alcohol use      Physical Exam:  General: NAD, resting comfortably  HEENT: NC/AT, EOMI, normal hearing, NGT in place with brown output  Pulmonary: normal resp effort on RA  Cardiovascular: RRR  Abdominal: soft, ND, diffuse tenderness to palpation, worst in LUQ  Extremities: WWP  Neuro: A/O x 3  Psych: Affect appropriate    Vital Signs Last 24 Hrs  T(C): 36.7 (30 Apr 2023 18:29), Max: 37.1 (30 Apr 2023 18:10)  T(F): 98 (30 Apr 2023 18:29), Max: 98.8 (30 Apr 2023 18:10)  HR: 89 (30 Apr 2023 18:29) (78 - 96)  BP: 110/71 (30 Apr 2023 18:29) (108/75 - 117/78)  BP(mean): --  RR: 20 (30 Apr 2023 18:29) (18 - 20)  SpO2: 99% (30 Apr 2023 18:29) (98% - 100%)        I&O's Summary          LABS:                        15.4   10.89 )-----------( 683      ( 30 Apr 2023 09:15 )             46.8     04-30    125<L>  |  92<L>  |  57<H>  ----------------------------<  151<H>  5.6<H>   |  29  |  2.81<H>    Ca    11.2<H>      30 Apr 2023 15:00    TPro  7.9  /  Alb  3.2<L>  /  TBili  1.1  /  DBili  x   /  AST  127<H>  /  ALT  178<H>  /  AlkPhos  254<H>  04-30        CAPILLARY BLOOD GLUCOSE        LIVER FUNCTIONS - ( 30 Apr 2023 15:00 )  Alb: 3.2 g/dL / Pro: 7.9 gm/dL / ALK PHOS: 254 U/L / ALT: 178 U/L / AST: 127 U/L / GGT: x               RADIOLOGY & ADDITIONAL STUDIES:  < from: CT Abdomen and Pelvis No Cont (04.30.23 @ 12:07) >  FINDINGS:  LOWER CHEST: Tracescarring/atelectasis dependent right lung base.    LIVER: Within normal limits.  BILE DUCTS: Normal caliber.  GALLBLADDER: Cholelithiasis. Contracted gallbladder. Mild pericholecystic   stranding, essentially unchanged.  SPLEEN: Mildly enlarged measuring 14 cm. 2 small foci of peripheral   peripheral splenic or diaphragmatic calcifications.  PANCREAS: Within normal limits.  ADRENALS: Within normal limits.  KIDNEYS/URETERS: Interval placement of a right ureteral stent. Bilateral   nonobstructing renal calculi, predominantly in the lower kidneys,   essentially unchanged. Fullness of the upper collecting systems   bilaterally, right more than left.    BLADDER: Approximately 4.5 x 4.1 cm sized mass related to the dome of the   urinary bladder towards the left side. Evidence of adjacent surgical   sutures. This mass measured 4 x 3.7 cm on prior MR dated 03/16/2023.  REPRODUCTIVE ORGANS: Prostate is enlarged.    BOWEL: Dilated small bowel measuring up to 3.5 cm. Transition point is in   the pelvic region to the left side, adjacent to the above described   surgical sutures and bladder dome mass. Right lower quadrant ileostomy   and a Colby's pouch. Prior subtotal colectomy.  PERITONEUM: No ascites or pneumoperitoneum.  VESSELS: Within normal limits.  RETROPERITONEUM/LYMPH NODES: No lymphadenopathy.  ABDOMINAL WALL: Right lower quadrant ileostomy.  BONES: Within normal limits.    IMPRESSION:  Small bowel obstruction with transition point in the left pelvis at the   level of prior surgery and an adjacent mass related to the dome of   urinary bladder which demonstrates interval increase in size and is   concerning for a metastatic lesion/recurrence.    Right ureteral stent in stable position. Multiple bilateral   nonobstructing renalcalculi.    Additional findings as above.    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 Pt reports he has not noted gas or stool from his ostomy bag since then, normally changes it 3-4 times a day. He also began having nausea, vomiting, and intermittent abdominal cramping pain since 2AM. Initally presented to [facility] for evaluation where he was noted to have SBO on non-contrast CT scan. NGT placed with approx. 200 cc output. Patient endorses continued abdominal pain but notes that he has had a small amount of stool into his ostomy bag since NGT placed. Pt not on any chemo/XRT.     Pt also endorsing hematuria since restarting his Eliquis yesterday after it was held for his recent ureteral stent placement. Denies fevers, chills, chest pain, SOB, nausea, vomiting, dysuria.         PAST MEDICAL & SURGICAL HISTORY:  Colon cancer      H/O ileostomy      S/P colon resection    Home Medications:  loperamide 2 mg oral capsule: 2 cap(s) orally every 6 hours as needed for  diarrhea (13 Apr 2023 14:32)  apixaban 5 milliGRAM(s) Oral every 12 hours      Allergies    No Known Allergies    Intolerances        SOCIAL HISTORY: Noncontributory    FAMILY HISTORY:  Nonsmoker, no drug or alcohol use      Physical Exam:  General: NAD, resting comfortably  HEENT: NC/AT, EOMI, normal hearing, NGT in place with brown output  Pulmonary: normal resp effort on RA  Cardiovascular: RRR  Abdominal: soft, ND, diffuse tenderness to palpation, worst in LUQ  Extremities: WWP  Neuro: A/O x 3  Psych: Affect appropriate    Vital Signs Last 24 Hrs  T(C): 36.7 (30 Apr 2023 18:29), Max: 37.1 (30 Apr 2023 18:10)  T(F): 98 (30 Apr 2023 18:29), Max: 98.8 (30 Apr 2023 18:10)  HR: 89 (30 Apr 2023 18:29) (78 - 96)  BP: 110/71 (30 Apr 2023 18:29) (108/75 - 117/78)  BP(mean): --  RR: 20 (30 Apr 2023 18:29) (18 - 20)  SpO2: 99% (30 Apr 2023 18:29) (98% - 100%)        I&O's Summary          LABS:                        15.4   10.89 )-----------( 683      ( 30 Apr 2023 09:15 )             46.8     04-30    125<L>  |  92<L>  |  57<H>  ----------------------------<  151<H>  5.6<H>   |  29  |  2.81<H>    Ca    11.2<H>      30 Apr 2023 15:00    TPro  7.9  /  Alb  3.2<L>  /  TBili  1.1  /  DBili  x   /  AST  127<H>  /  ALT  178<H>  /  AlkPhos  254<H>  04-30        CAPILLARY BLOOD GLUCOSE        LIVER FUNCTIONS - ( 30 Apr 2023 15:00 )  Alb: 3.2 g/dL / Pro: 7.9 gm/dL / ALK PHOS: 254 U/L / ALT: 178 U/L / AST: 127 U/L / GGT: x               RADIOLOGY & ADDITIONAL STUDIES:  < from: CT Abdomen and Pelvis w/ Oral Cont (04.30.23 @ 20:37) >    FINDINGS:  LOWER CHEST: Within normal limits.    Limited evaluation of the abdominal organs without IV contrast.  LIVER: Within normal limits.  BILE DUCTS: Normal caliber.  GALLBLADDER: Cholelithiasis.  SPLEEN: Splenomegaly with spleen measuring 14.1 cm in the craniocaudal   dimension. There are 2small foci of peripheral peripheral splenic or   diaphragmatic calcifications.    PANCREAS: Within normal limits.  ADRENALS: Within normal limits.  KIDNEYS/URETERS: Status post right double-J ureteral stent with ends   terminating in the right renal pelvis and bladder. Stable bilateral   pelviectasis. Bilateral nonobstructing renal stones.    BLADDER: Approximately 5.1 x 3.7 cm mass related to the dome of the   urinary bladder towards the left side, similar to prior MR 3/16/2023   which measured 4 x 3.7 cm.  REPRODUCTIVE ORGANS: Prostate is enlarged.    BOWEL: Enteric tube terminating in the gastric fundus. Dilated stomach   and loops of small bowel consistent with small bowel obstruction with   transition point in the left lower quadrant (301-75), similar to prior   study. Decompressed distal small bowel to the level of the right lower   quadrant ileostomy.. Subtotal colectomy and Joseph's pouch.  PERITONEUM: No ascites.  VESSELS: Within normal limits.  RETROPERITONEUM/LYMPH NODES: No lymphadenopathy.  ABDOMINAL WALL: Right lower quadrant ileostomy.  BONES: Within normal limits.    IMPRESSION:  Limited evaluation of the abdominal organs without IV contrast.    Small bowel obstruction with transition point in the right lower   quadrant, proximal to the ileostomy. No pneumatosis.    Stable bilateral pelviectasis with stable position of the right   nephroureteral stent.    < end of copied text >      < from: CT Abdomen and Pelvis No Cont (04.30.23 @ 12:07) >  FINDINGS:  LOWER CHEST: Tracescarring/atelectasis dependent right lung base.    LIVER: Within normal limits.  BILE DUCTS: Normal caliber.  GALLBLADDER: Cholelithiasis. Contracted gallbladder. Mild pericholecystic   stranding, essentially unchanged.  SPLEEN: Mildly enlarged measuring 14 cm. 2 small foci of peripheral   peripheral splenic or diaphragmatic calcifications.  PANCREAS: Within normal limits.  ADRENALS: Within normal limits.  KIDNEYS/URETERS: Interval placement of a right ureteral stent. Bilateral   nonobstructing renal calculi, predominantly in the lower kidneys,   essentially unchanged. Fullness of the upper collecting systems   bilaterally, right more than left.    BLADDER: Approximately 4.5 x 4.1 cm sized mass related to the dome of the   urinary bladder towards the left side. Evidence of adjacent surgical   sutures. This mass measured 4 x 3.7 cm on prior MR dated 03/16/2023.  REPRODUCTIVE ORGANS: Prostate is enlarged.    BOWEL: Dilated small bowel measuring up to 3.5 cm. Transition point is in   the pelvic region to the left side, adjacent to the above described   surgical sutures and bladder dome mass. Right lower quadrant ileostomy   and a Colby's pouch. Prior subtotal colectomy.  PERITONEUM: No ascites or pneumoperitoneum.  VESSELS: Within normal limits.  RETROPERITONEUM/LYMPH NODES: No lymphadenopathy.  ABDOMINAL WALL: Right lower quadrant ileostomy.  BONES: Within normal limits.    IMPRESSION:  Small bowel obstruction with transition point in the left pelvis at the   level of prior surgery and an adjacent mass related to the dome of   urinary bladder which demonstrates interval increase in size and is   concerning for a metastatic lesion/recurrence.    Right ureteral stent in stable position. Multiple bilateral   nonobstructing renalcalculi.    Additional findings as above.    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 Pt reports he has not noted gas or stool from his ostomy bag since then, normally changes it 3-4 times a day. He also began having nausea, vomiting, and intermittent abdominal cramping pain since 2AM. Initally presented to [facility] for evaluation where he was noted to have SBO on non-contrast CT scan. NGT placed with approx. 200 cc output. Patient endorses continued abdominal pain but notes that he has had a small amount of stool into his ostomy bag since NGT placed. Pt not on any chemo/XRT.     Pt also endorsing hematuria since restarting his Eliquis yesterday after it was held for his recent ureteral stent placement. Denies fevers, chills, chest pain, SOB, nausea, vomiting, dysuria.         PAST MEDICAL & SURGICAL HISTORY:  Colon cancer      H/O ileostomy      S/P colon resection    Home Medications:  loperamide 2 mg oral capsule: 2 cap(s) orally every 6 hours as needed for  diarrhea (13 Apr 2023 14:32)  apixaban 5 milliGRAM(s) Oral every 12 hours      Allergies    No Known Allergies    Intolerances        SOCIAL HISTORY: Noncontributory    FAMILY HISTORY:  Nonsmoker, no drug or alcohol use      Physical Exam:  General: NAD, resting comfortably  HEENT: NC/AT, EOMI, normal hearing, NGT in place with brown output  Pulmonary: normal resp effort on RA  Cardiovascular: RRR  Abdominal: soft, ND, diffuse tenderness to palpation, worst in LUQ  Extremities: WWP  Neuro: A/O x 3  Psych: Affect appropriate    Vital Signs Last 24 Hrs  T(C): 36.7 (30 Apr 2023 18:29), Max: 37.1 (30 Apr 2023 18:10)  T(F): 98 (30 Apr 2023 18:29), Max: 98.8 (30 Apr 2023 18:10)  HR: 89 (30 Apr 2023 18:29) (78 - 96)  BP: 110/71 (30 Apr 2023 18:29) (108/75 - 117/78)  BP(mean): --  RR: 20 (30 Apr 2023 18:29) (18 - 20)  SpO2: 99% (30 Apr 2023 18:29) (98% - 100%)        I&O's Summary          LABS:                        15.4   10.89 )-----------( 683      ( 30 Apr 2023 09:15 )             46.8     04-30    125<L>  |  92<L>  |  57<H>  ----------------------------<  151<H>  5.6<H>   |  29  |  2.81<H>    Ca    11.2<H>      30 Apr 2023 15:00    TPro  7.9  /  Alb  3.2<L>  /  TBili  1.1  /  DBili  x   /  AST  127<H>  /  ALT  178<H>  /  AlkPhos  254<H>  04-30        CAPILLARY BLOOD GLUCOSE        LIVER FUNCTIONS - ( 30 Apr 2023 15:00 )  Alb: 3.2 g/dL / Pro: 7.9 gm/dL / ALK PHOS: 254 U/L / ALT: 178 U/L / AST: 127 U/L / GGT: x               RADIOLOGY & ADDITIONAL STUDIES:  < from: CT Abdomen and Pelvis w/ Oral Cont (04.30.23 @ 20:37) >    FINDINGS:  LOWER CHEST: Within normal limits.    Limited evaluation of the abdominal organs without IV contrast.  LIVER: Within normal limits.  BILE DUCTS: Normal caliber.  GALLBLADDER: Cholelithiasis.  SPLEEN: Splenomegaly with spleen measuring 14.1 cm in the craniocaudal   dimension. There are 2small foci of peripheral peripheral splenic or   diaphragmatic calcifications.    PANCREAS: Within normal limits.  ADRENALS: Within normal limits.  KIDNEYS/URETERS: Status post right double-J ureteral stent with ends   terminating in the right renal pelvis and bladder. Stable bilateral   pelviectasis. Bilateral nonobstructing renal stones.    BLADDER: Approximately 5.1 x 3.7 cm mass related to the dome of the   urinary bladder towards the left side, similar to prior MR 3/16/2023   which measured 4 x 3.7 cm.  REPRODUCTIVE ORGANS: Prostate is enlarged.    BOWEL: Enteric tube terminating in the gastric fundus. Dilated stomach   and loops of small bowel consistent with small bowel obstruction with   transition point in the left lower quadrant (301-75), similar to prior   study. Decompressed distal small bowel to the level of the right lower   quadrant ileostomy.. Subtotal colectomy and Joseph's pouch.  PERITONEUM: No ascites.  VESSELS: Within normal limits.  RETROPERITONEUM/LYMPH NODES: No lymphadenopathy.  ABDOMINAL WALL: Right lower quadrant ileostomy.  BONES: Within normal limits.    IMPRESSION:  Limited evaluation of the abdominal organs without IV contrast.    Small bowel obstruction with transition point in the right lower   quadrant, proximal to the ileostomy. No pneumatosis.    Stable bilateral pelviectasis with stable position of the right   nephroureteral stent.    < end of copied text >      < from: CT Abdomen and Pelvis No Cont (04.30.23 @ 12:07) >  FINDINGS:  LOWER CHEST: Tracescarring/atelectasis dependent right lung base.    LIVER: Within normal limits.  BILE DUCTS: Normal caliber.  GALLBLADDER: Cholelithiasis. Contracted gallbladder. Mild pericholecystic   stranding, essentially unchanged.  SPLEEN: Mildly enlarged measuring 14 cm. 2 small foci of peripheral   peripheral splenic or diaphragmatic calcifications.  PANCREAS: Within normal limits.  ADRENALS: Within normal limits.  KIDNEYS/URETERS: Interval placement of a right ureteral stent. Bilateral   nonobstructing renal calculi, predominantly in the lower kidneys,   essentially unchanged. Fullness of the upper collecting systems   bilaterally, right more than left.    BLADDER: Approximately 4.5 x 4.1 cm sized mass related to the dome of the   urinary bladder towards the left side. Evidence of adjacent surgical   sutures. This mass measured 4 x 3.7 cm on prior MR dated 03/16/2023.  REPRODUCTIVE ORGANS: Prostate is enlarged.    BOWEL: Dilated small bowel measuring up to 3.5 cm. Transition point is in   the pelvic region to the left side, adjacent to the above described   surgical sutures and bladder dome mass. Right lower quadrant ileostomy   and a Colby's pouch. Prior subtotal colectomy.  PERITONEUM: No ascites or pneumoperitoneum.  VESSELS: Within normal limits.  RETROPERITONEUM/LYMPH NODES: No lymphadenopathy.  ABDOMINAL WALL: Right lower quadrant ileostomy.  BONES: Within normal limits.    IMPRESSION:  Small bowel obstruction with transition point in the left pelvis at the   level of prior surgery and an adjacent mass related to the dome of   urinary bladder which demonstrates interval increase in size and is   concerning for a metastatic lesion/recurrence.    Right ureteral stent in stable position. Multiple bilateral   nonobstructing renalcalculi.    Additional findings as above.    < end of copied text >

## 2023-04-30 NOTE — ED PROVIDER NOTE - CLINICAL SUMMARY MEDICAL DECISION MAKING FREE TEXT BOX
Pt with PMHx of colon cancer, s/p ileostomy bag, kidney stones with right ureteral stones, presents today with abdominal pain a/w nausea, vomiting since 2:30am, pt noted no output in his ileostomy bag since 3 pm yesterday (+) history of surgery. No hx of trauma. No hematemesis or hematochezia/melena. Pt is hemodynamically stable, mentating well.   Considered DDx but unlikely due to the clinical presentation and exam:   Cardiac: ACS, AAA rupture, dissection  Pulm: Lower lobe pneumonia  GI: SBO, Inflammatory bowel disease, Irritable Bowel Syndrome, viscous perforation,gastroenteritis, gastritis/PUD/perforated ulcer  Metabolic: DKA  Renal: Urolithiasis, UTI/cystitis/pyelonephritis.  ID: Shingles, cellulitis.  : Testicular torsion  OB/GYN: ovarian torsion, GYN pathology PID/TOA, fibroids, ruptured ectopic pregnancy  PLAN:  - Reviewed patient's prior medical record.   - IV fluids, parenteral analgesia & anti-emetics PRN  - EKG, troponin for atypical ACS, CBC, CMP, lipase, UA/UCx,  lactate,   - CT abd/pelvis   -Observation and reassessment, surgical consultation if necessary. Pt with PMHx of colon cancer, s/p ileostomy bag, kidney stones with right ureteral stones, presents today with abdominal pain a/w nausea, vomiting since 2:30am, pt noted no output in his ileostomy bag since 3 pm yesterday (+) history of surgery. No hx of trauma. No hematemesis or hematochezia/melena. Pt is hemodynamically stable, mentating well.   Considered DDx but unlikely due to the clinical presentation and exam:   Cardiac: ACS, AAA rupture, dissection  Pulm: Lower lobe pneumonia  GI: SBO, Inflammatory bowel disease, Irritable Bowel Syndrome, viscous perforation,gastroenteritis, gastritis/PUD/perforated ulcer  Renal: Urolithiasis, UTI/cystitis/pyelonephritis.  : Testicular torsion  sasha  PLAN:  - Reviewed patient's prior medical record.   - IV fluids, parenteral analgesia & anti-emetics PRN  - EKG, troponin for atypical ACS, CBC, CMP, lipase, UA/UCx,  lactate,   - CT abd/pelvis   -Observation and reassessment, surgical consultation if necessary.

## 2023-04-30 NOTE — H&P ADULT - PROBLEM SELECTOR PLAN 1
-likely 2/2 malignancy   -seen by surgery, no plan for acute intervention given return of ostomy output following NGT placement  -c/w octreotide, Reglan , decadron per surg reccs  -follow with SurgOnc Dr. Esparza who planned oupt  CT to reimage tumor seen on March MR; determine if CT performed today sufficient

## 2023-04-30 NOTE — ED PROVIDER NOTE - PHYSICAL EXAMINATION
CONSTITUTIONAL: Well-developed; well-nourished; in no acute distress.   SKIN: warm, dry. Ostomy bag to abdomen without surrounding erythema or edema, leakage, with normal appearing output.  HEAD: Normocephalic; atraumatic.  EYES: no conjunctival injection. no scleral icterus  ENT: No nasal discharge; airway clear. Ng tube in place to suction  NECK: Supple; non tender.  CARD: S1, S2 normal; no murmurs, gallops, or rubs. Regular rate and rhythm.   RESP: No wheezes, rales or rhonchi. Good air movement bilaterally.   ABD: soft diffuse ttp without rebound, no guarding, no distention, no rigidity. ostomy bag as above  EXT: No cyanosis or edema.   NEURO: Alert, oriented, grossly unremarkable  PSYCH: Cooperative, appropriate.

## 2023-04-30 NOTE — ED ADULT NURSE REASSESSMENT NOTE - NS ED NURSE REASSESS COMMENT FT1
patient is A&OX4, awake and alert. Pt breathing spontaneous and unlabored, denies SOB and chest pain. NG tube in place, on suction. bed lowest position, call bell within reach. will continue to monitor.

## 2023-04-30 NOTE — CONSULT NOTE ADULT - ASSESSMENT
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 Noncon CT at [facility] with concern for SBO,       - Recs pending result of CT with PO contrast       Patient to be discussed with attending, Dr. 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 Noncon CT at [facility] with concern for SBO with TP in LLQ. Repeat CT at [facility] with evidence of PO contrast passage beyond LLQ, though contrast not seen in ostomy. Pt now with stool in ostomy, no longer clinically obstructed. SBO resolving. Lab work concerning for new MAXWELL with multiple electrolyte abnormalities    - No indication for emergent surgical intervention  - Recommend medical admission and Nephrology work-up  - NPO/IVF  - Malignant small bowel protocol: Reglan 10mg Q8H, Decadron 4mg Q12H, Octreotide 100mcg subQ Q8H  - Will continue to follow      Patient to be discussed with attending, Dr. Isaias Amezcua MD  PGY3 Consult Resident  D Team Surgery (Surgical Oncology)  [phone]

## 2023-04-30 NOTE — ED PROVIDER NOTE - OBJECTIVE STATEMENT
Pt is a 58 yo M with pmh kidney stones s/p ureteral stent, colon adenocarcinoma s/p resection and ileostomy 2022, dvt presenting as transfer from [place] for SBO. Pt presented to [place] with poor ileostomy bag output for 1.5 days, with associated nausea and vomiting during that time, was found to have sbo on ct and transferred. Was placed with ng tube to suction, and in interim began to have normal output to bag. Currently with diffuse abdominal pain nonradiating not worse with position with mild nausea. Denies fever, chills, leg pain or swelling, cp, sob.

## 2023-04-30 NOTE — H&P ADULT - NSHPPHYSICALEXAM_GEN_ALL_CORE
PHYSICAL EXAM:      Constitutional: NAD, well-groomed, well-developed  HEENT:  EOMI, Normal Hearing  Neck: No LAD, No JVD  Back: Normal spine flexure, No CVA tenderness  Respiratory: CTAB  Cardiovascular: S1 and S2, RRR  Gastrointestinal: BS+, soft, NT/ND, ostomy draining   Extremities: No peripheral edema  Vascular: 2+ peripheral pulses  Neurological: A/O x 3, no focal deficits  Psychiatric: Normal mood, normal affect  Musculoskeletal: 5/5 strength b/l upper and lower extremities  Skin: No rashes

## 2023-04-30 NOTE — CONSULT NOTE ADULT - SUBJECTIVE AND OBJECTIVE BOX
Patient is a 57y old  Male who presents with a chief complaint of nausea and vomiting, Unable to hold fluids or food down since this morning. Patient known to surgical service for colon resection and ostomy creation 2/2 colon CA. Seen by Dr. Luo of oncology. His cancer is metastatic. He has a short segment of colon causing absorption issues at baseline since surgery due to extensive disease. Patient with MAXWELL likely 2/2 dehydration and noted hyponatremia.     PAST MEDICAL & SURGICAL HISTORY:    Colon cancer    H/O ileostomy    S/P colon resection    FAMILY HISTORY: non contributory         Social Hx:  Nonsmoker, no drug or alcohol use    MEDICATIONS  (STANDING):  iohexol 300 mG (iodine)/mL Oral Solution 30 milliLiter(s) Oral once       Allergies    No Known Allergies    Intolerances        ROS: Pertinent positives in HPI, all other ROS were reviewed and are negative.      Vital Signs Last 24 Hrs  T(C): 36.6 (30 Apr 2023 06:35), Max: 36.6 (30 Apr 2023 06:35)  T(F): 97.8 (30 Apr 2023 06:35), Max: 97.8 (30 Apr 2023 06:35)  HR: 96 (30 Apr 2023 06:35) (96 - 96)  BP: 108/76 (30 Apr 2023 06:35) (108/76 - 108/76)  BP(mean): --  RR: 18 (30 Apr 2023 06:35) (18 - 18)  SpO2: 99% (30 Apr 2023 06:35) (99% - 99%)    Parameters below as of 30 Apr 2023 06:35  Patient On (Oxygen Delivery Method): room air        Constitutional: awake and alert. Appears thin and dehydrated.   HEENT: PERRLA, EOMI,   Neck: Supple.  Respiratory: Breath sounds are clear bilaterally  Cardiovascular: S1 and S2, regular   Gastrointestinal: soft, nontender, midline incision well healed, not distended. Digitized ostomy with stool noted in bag.   Extremities:  no edema  Vascular: Caritid Bruit - no  Musculoskeletal: no joint swelling/tenderness, no abnormal movements  Skin: No rashes          Labs:                        15.4   10.89 )-----------( 683      ( 30 Apr 2023 09:15 )             46.8     04-30    125<L>  |  90<L>  |  59<H>  ----------------------------<  128<H>  5.8<H>   |  31  |  2.83<H>    Ca    10.7<H>      30 Apr 2023 09:15    TPro  8.8<H>  /  Alb  3.8  /  TBili  0.8  /  DBili  x   /  AST  145<H>  /  ALT  204<H>  /  AlkPhos  292<H>  04-30            RADIOLOGY: < from: CT Abdomen and Pelvis No Cont (04.30.23 @ 12:07) >  KIDNEYS/URETERS: Interval placement of a right ureteral stent. Bilateral   nonobstructing renal calculi, predominantly in the lower kidneys,   essentially unchanged. Fullness of the upper collecting systems   bilaterally, right more than left.    BLADDER: Approximately 4.5 x 4.1 cm sized mass related to the dome of the   urinary bladder towards the left side. Evidence of adjacent surgical   sutures. This mass measured 4 x 3.7 cm on prior MR dated 03/16/2023.  REPRODUCTIVE ORGANS: Prostate is enlarged.    BOWEL: Dilated small bowel measuring up to 3.5 cm. Transition point is in   the pelvic region to the left side, adjacent to the above described   surgical sutures and bladder dome mass. Right lower quadrant ileostomy   and a Colby's pouch. Prior subtotal colectomy.  PERITONEUM: No ascites or pneumoperitoneum.  VESSELS: Within normal limits.  RETROPERITONEUM/LYMPH NODES: No lymphadenopathy.  ABDOMINAL WALL: Right lower quadrant ileostomy.  BONES: Within normal limits.    IMPRESSION:  Small bowel obstruction with transition point in the left pelvis at the   level of prior surgery and an adjacent mass related to the dome of   urinary bladder which demonstrates interval increase in size and is   concerning for a metastatic lesion/recurrence.    Right ureteral stent in stable position. Multiple bilateral   nonobstructing renalcalculi.    Additional findings as above.          < end of copied text >

## 2023-04-30 NOTE — ED ADULT TRIAGE NOTE - CHIEF COMPLAINT QUOTE
PMH of kidney stones, colon resection removal of  tumor  C/o ileostomy bag blockage on RLQ for last 12 hours. Now complaining of abdominal pain, nausea and generalized weakness.

## 2023-04-30 NOTE — ED ADULT NURSE NOTE - CHIEF COMPLAINT QUOTE
Pt a transfer from [facility]. Pt has hx of colon cancer with ileostomy. Pt with poor output from ileostomy bag, LLQ pain, n/v, and Hyperkalemic. Pt with NGT tube in place.

## 2023-04-30 NOTE — ED PROVIDER NOTE - PROGRESS NOTE DETAILS
surgery pa called, transfer center called for update, reached out to dr hess who states that he is not oncall, surgeon oncall to be paged, pending call back, also attempted to call dr zeb wilhelm (surgical oncologist patient recently saw in the office on 4/28/23), transfer center unable to contact

## 2023-04-30 NOTE — H&P ADULT - NSHPLABSRESULTS_GEN_ALL_CORE
15.4   10.89 )-----------( 683      ( 30 Apr 2023 09:15 )             46.8     04-30    130<L>  |  87<L>  |  52<H>  ----------------------------<  112<H>  4.7   |  26  |  2.70<H>    Ca    10.1      30 Apr 2023 19:21  Mg     1.50     04-30    TPro  7.9  /  Alb  3.2<L>  /  TBili  1.1  /  DBili  x   /  AST  127<H>  /  ALT  178<H>  /  AlkPhos  254<H>  04-30    CAPILLARY BLOOD GLUCOSE            Vital Signs Last 24 Hrs  T(C): 36.8 (30 Apr 2023 22:21), Max: 37.1 (30 Apr 2023 18:10)  T(F): 98.2 (30 Apr 2023 22:21), Max: 98.8 (30 Apr 2023 18:10)  HR: 85 (30 Apr 2023 22:21) (78 - 96)  BP: 107/73 (30 Apr 2023 22:21) (107/73 - 117/78)  BP(mean): --  RR: 16 (30 Apr 2023 22:21) (16 - 20)  SpO2: 100% (30 Apr 2023 22:21) (98% - 100%)    Parameters below as of 30 Apr 2023 22:21  Patient On (Oxygen Delivery Method): room air

## 2023-04-30 NOTE — ED PROVIDER NOTE - ATTENDING CONTRIBUTION TO CARE
I performed a face to face history and physical exam of the patient and discussed their management with the resident. I reviewed the resident's note and agree with the documented findings and plan of care.     Dr. Schultz: 58 y/o male with kidney stones s/p ureteral stent, colon adnenocarcinoma s/p resection and ileostomy 2022, transferred from [place] for SBO and surgical oncology.  On exam pt with NGT in place in NAD, heart RRR, lungs CTA b/l, abd soft nondistended nontender, + ileostomy with brown stool,, extremities without swelling, strength 5/5 in all extremities and skin without rash.     Surgery consult called, will obtain PO contrast CT scan  Will repeat labs as patient treated for hyperkalamia at [facility]      I received additional history from [place] notes    Labs were ordered and independently reviewed and demonstrate normalized K, hyponatremia, elevated creatinine, elevated LFTS  Imaging was ordered and independently reviewed and demonstrates pending CT scan  An EKG was ordered and independently reviewed and demonstrates NSR @ 84 bpm, T wave inversion I,avL, v1,v2    Results and management discussed with surgery

## 2023-04-30 NOTE — ED ADULT NURSE REASSESSMENT NOTE - NS ED NURSE REASSESS COMMENT FT1
pt had NG inserted by CHADWICK CHAWLA narjustino , pt tolerated well, pt with 200cc dark brown outflow , safety precautions are in place, nursing care continues

## 2023-04-30 NOTE — H&P ADULT - PROBLEM SELECTOR PLAN 4
med list reconciled; pt strictly npo overnight; follow with surgery tomorrow to determine when ok to start po

## 2023-04-30 NOTE — ED ADULT NURSE NOTE - BEFAST FACE
-Pro-, normal cardiac enz, less likely CHF, but will check Echo  -Doppler leg negative for DVT  -Likely due to anasarca from low albumin level; will consult nutrition  -Leg elevation No

## 2023-04-30 NOTE — H&P ADULT - PROBLEM SELECTOR PLAN 2
-prerenal component present; CT abd negative for hydronephrosis, ureteral stent   placed April 8th for obstructing  stone intact; no CT evidence of extrinsic compression from tumor; pt denies difficulty voiding  -c/w IVF, trend renal function -prerenal component present; CT abd negative for hydronephrosis; ureteral stent  placed April 8th for obstructing  stone intact; no CT evidence of extrinsic compression from tumor; pt denies difficulty voiding  -c/w IVF, trend renal function

## 2023-04-30 NOTE — ED ADULT NURSE REASSESSMENT NOTE - NS ED NURSE REASSESS COMMENT FT1
unable to gain IV access after several attempts with several RN's, MD made aware, pt and pt's family made aware of US guided access for the IV at this time

## 2023-04-30 NOTE — CONSULT NOTE ADULT - SUBJECTIVE AND OBJECTIVE BOX
Patient chart reviewed, full consult to follow.   MAXWELL CKD 3; pre/ post renal factors;   Hyponatremia, chronic; hypovolemic element;   IVF NS for now; medical rx K with Lokelma; insulin/ D50  Bladder scan post void; may require esquivel;   Prognosis is guarded.  Thank you for the courtesy of this consultation. [facility] NE[facility], [facility]  NEPHROLOGY AND HYPERTENSION  [address]  [address]  [place] [address]  [phone]    MD LIZANDRO ENAMORADO MD YELENA ROSENBERG, MD BINNY KOSHY, MD CHRISTOPHER CAPUTO, MD EDWARD BOVER, MD      Information from chart:  "Patient is a 57y old  Male who presents with a chief complaint of   HPI:   "    PAST MEDICAL & SURGICAL HISTORY:  Colon cancer      H/O ileostomy      S/P colon resection        FAMILY HISTORY:    Allergies    No Known Allergies    Intolerances      Home Medications:  loperamide 2 mg oral capsule: 2 cap(s) orally every 6 hours as needed for  diarrhea (13 Apr 2023 14:32)    MEDICATIONS  (STANDING):  iohexol 300 mG (iodine)/mL Oral Solution 30 milliLiter(s) Oral once    MEDICATIONS  (PRN):    Vital Signs Last 24 Hrs  T(C): 36.7 (30 Apr 2023 18:29), Max: 37.1 (30 Apr 2023 18:10)  T(F): 98 (30 Apr 2023 18:29), Max: 98.8 (30 Apr 2023 18:10)  HR: 89 (30 Apr 2023 18:29) (78 - 96)  BP: 110/71 (30 Apr 2023 18:29) (108/75 - 117/78)  BP(mean): --  RR: 20 (30 Apr 2023 18:29) (18 - 20)  SpO2: 99% (30 Apr 2023 18:29) (98% - 100%)    Parameters below as of 30 Apr 2023 06:35  Patient On (Oxygen Delivery Method): room air        Daily Height in cm: 170.18 (30 Apr 2023 06:35)    Daily     CAPILLARY BLOOD GLUCOSE        PHYSICAL EXAM:      T(C): 36.7 (04-30-23 @ 18:29), Max: 37.1 (04-30-23 @ 18:10)  HR: 89 (04-30-23 @ 18:29) (78 - 96)  BP: 110/71 (04-30-23 @ 18:29) (108/75 - 117/78)  RR: 20 (04-30-23 @ 18:29) (18 - 20)  SpO2: 99% (04-30-23 @ 18:29) (98% - 100%)  Wt(kg): --  Lungs clear  Heart S1S2  Abd soft NT ND  Extremities:   tr edema              04-30    130<L>  |  87<L>  |  52<H>  ----------------------------<  112<H>  4.7   |  26  |  2.70<H>    Ca    10.1      30 Apr 2023 19:21  Mg     1.50     04-30    TPro  7.9  /  Alb  3.2<L>  /  TBili  1.1  /  DBili  x   /  AST  127<H>  /  ALT  178<H>  /  AlkPhos  254<H>  04-30                          15.4   10.89 )-----------( 683      ( 30 Apr 2023 09:15 )             46.8     Creatinine Trend: 2.70<--, 2.81<--, 2.77<--, 2.83<--, 1.61<--, 1.25<--          Assessment   MAXWELL CKD 3; pre/ post renal factors;   Hyponatremia, chronic; hypovolemic element;     Plan  IVF NS for now; medical rx K with Lokelma; insulin/ D50  Bladder scan post void; may require esquivel;   Transfer to [facility]  Prognosis is guarded.    Jonah Dow MD

## 2023-04-30 NOTE — ED ADULT NURSE NOTE - OBJECTIVE STATEMENT
as per pt, "I had my colon removed and a colostomy bag placed last September however I am eating I had breakfast and there is no stool coming out I have been vomiting my food since this morning", pt has colostomy bag placed to RLQ, bag is clean, pt c/o abdominal pain on LRQ and LLQ, pt's family at bedside

## 2023-04-30 NOTE — ED PROVIDER NOTE - OBJECTIVE STATEMENT
57 year old male with h/o kidney stones, s/p 57 year old male with h/o kidney stones, s/p ureteral stent, left sided adenocarcinoma of the colon s/p resection and ileostomy in 2022 (no chemo s/p resection) and DVT presents today c/o no output from his ileostomy bag since 3pm yesterday, at 2:30am pt attempted to drink water to encourage some output but started vomiting multiple times, pt also c/o lower abdominal tenderness (-) fevers or chills (-) chest pain or sob

## 2023-04-30 NOTE — ED ADULT NURSE NOTE - OBJECTIVE STATEMENT
patient arrives from [facility] as a transfer for surg onc service. patient has a PMHx of colon cancer with ileostomy and yesterday around 3pm noted that ileostomy was no longer making output. patient was noted to be hyperkalemic at [facility] and was treated for it. patient arrives with R nare NGT in place to low continuous suction. Per MD Arellano, RN to pause suction and administer PO contrast for CT scan. 20G IV in R forearm by [facility].

## 2023-04-30 NOTE — CONSULT NOTE ADULT - ASSESSMENT
57 year old male with metastatic colon CA, hyponatremia, MAXWELL, Hyperkalemia, and dehydration.     Metastatic developments in bowel and bladder  recommend transfer to tertiary facility for surgical oncology   Patient known to Dr. Hoffman of oncology at [facility]  Discussed with and seen by Dr. Baldwin

## 2023-04-30 NOTE — ED PROVIDER NOTE - CPE EDP PSYCH NORM
Increase metoprolol to 50 mg daily.  2D echo to be done, call [phone] to schedule  Follow up with Dr. Martin on 4-2-2020 at 1 pm   Please bring all prescription medications in their original bottles to your next appointment. Thank You    normal...

## 2023-04-30 NOTE — H&P ADULT - HISTORY OF PRESENT ILLNESS
58 yo m h/o left sided colon ca s/p resection, ileostomy 2022, possible recurrence per March MRI based on pelvic mass adjacent to bladder, DVT on eliquis,. Obstructing renal stone s/p ureteral stent 4/8/23  58 yo m h/o left sided colon ca s/p resection, ileostomy 2022, possible recurrence per March MRI based on pelvic mass adjacent to bladder, DVT on eliquis. Obstructing renal stone s/p ureteral stent 4/8/23  58 yo m h/o left sided colon ca s/p resection, ileostomy 2022, possible recurrence per March MRI based on pelvic mass adjacent to bladder, DVT on eliquis. Obstructing renal stone s/p ureteral stent 4/8/23 . Pt presents in setting of nausea, vomiting, inability to tolerate po, no ostomy output  that began today. CT abd at [facility] + SBO. 5.1 x 3.7 cm  mass seen near urinary bladder, similar to March MRI. Stable ureteral stent. Pt reports no pain or difficulty voiding, UA pending. SCr 2.7 compared to 1.6 April 13th. Transferred to [facility], NGT placed, and ostomy began draining. Seen by surgery, who recommended no acute intervention given return of ostomy output

## 2023-05-01 DIAGNOSIS — K56.609 UNSPECIFIED INTESTINAL OBSTRUCTION, UNSPECIFIED AS TO PARTIAL VERSUS COMPLETE OBSTRUCTION: ICD-10-CM

## 2023-05-01 DIAGNOSIS — N17.9 ACUTE KIDNEY FAILURE, UNSPECIFIED: ICD-10-CM

## 2023-05-01 DIAGNOSIS — N39.0 URINARY TRACT INFECTION, SITE NOT SPECIFIED: ICD-10-CM

## 2023-05-01 DIAGNOSIS — E87.5 HYPERKALEMIA: ICD-10-CM

## 2023-05-01 DIAGNOSIS — Z29.9 ENCOUNTER FOR PROPHYLACTIC MEASURES, UNSPECIFIED: ICD-10-CM

## 2023-05-01 DIAGNOSIS — Z79.899 OTHER LONG TERM (CURRENT) DRUG THERAPY: ICD-10-CM

## 2023-05-01 DIAGNOSIS — R80.9 PROTEINURIA, UNSPECIFIED: ICD-10-CM

## 2023-05-01 DIAGNOSIS — C18.9 MALIGNANT NEOPLASM OF COLON, UNSPECIFIED: ICD-10-CM

## 2023-05-01 DIAGNOSIS — R81 GLYCOSURIA: ICD-10-CM

## 2023-05-01 DIAGNOSIS — I82.409 ACUTE EMBOLISM AND THROMBOSIS OF UNSPECIFIED DEEP VEINS OF UNSPECIFIED LOWER EXTREMITY: ICD-10-CM

## 2023-05-01 DIAGNOSIS — N13.30 UNSPECIFIED HYDRONEPHROSIS: ICD-10-CM

## 2023-05-01 LAB
ALBUMIN SERPL ELPH-MCNC: 3.7 G/DL — SIGNIFICANT CHANGE UP (ref 3.3–5)
ALP SERPL-CCNC: 235 U/L — HIGH (ref 40–120)
ALT FLD-CCNC: 139 U/L — HIGH (ref 4–41)
ANION GAP SERPL CALC-SCNC: 15 MMOL/L — HIGH (ref 7–14)
ANION GAP SERPL CALC-SCNC: 20 MMOL/L — HIGH (ref 7–14)
APPEARANCE UR: ABNORMAL
APTT BLD: 31.5 SEC — SIGNIFICANT CHANGE UP (ref 27–36.3)
AST SERPL-CCNC: 72 U/L — HIGH (ref 4–40)
BACTERIA # UR AUTO: ABNORMAL
BASOPHILS # BLD AUTO: 0.01 K/UL — SIGNIFICANT CHANGE UP (ref 0–0.2)
BASOPHILS NFR BLD AUTO: 0.1 % — SIGNIFICANT CHANGE UP (ref 0–2)
BILIRUB SERPL-MCNC: 1.5 MG/DL — HIGH (ref 0.2–1.2)
BILIRUB UR-MCNC: NEGATIVE — SIGNIFICANT CHANGE UP
BUN SERPL-MCNC: 51 MG/DL — HIGH (ref 7–23)
BUN SERPL-MCNC: 53 MG/DL — HIGH (ref 7–23)
CALCIUM SERPL-MCNC: 10.1 MG/DL — SIGNIFICANT CHANGE UP (ref 8.4–10.5)
CALCIUM SERPL-MCNC: 9.8 MG/DL — SIGNIFICANT CHANGE UP (ref 8.4–10.5)
CHLORIDE SERPL-SCNC: 90 MMOL/L — LOW (ref 98–107)
CHLORIDE SERPL-SCNC: 90 MMOL/L — LOW (ref 98–107)
CHLORIDE UR-SCNC: <20 MMOL/L — SIGNIFICANT CHANGE UP
CO2 SERPL-SCNC: 21 MMOL/L — LOW (ref 22–31)
CO2 SERPL-SCNC: 26 MMOL/L — SIGNIFICANT CHANGE UP (ref 22–31)
COLOR SPEC: YELLOW — SIGNIFICANT CHANGE UP
CREAT ?TM UR-MCNC: 178 MG/DL — SIGNIFICANT CHANGE UP
CREAT SERPL-MCNC: 2.75 MG/DL — HIGH (ref 0.5–1.3)
CREAT SERPL-MCNC: 2.75 MG/DL — HIGH (ref 0.5–1.3)
DIFF PNL FLD: ABNORMAL
EGFR: 26 ML/MIN/1.73M2 — LOW
EGFR: 26 ML/MIN/1.73M2 — LOW
EOSINOPHIL # BLD AUTO: 0.01 K/UL — SIGNIFICANT CHANGE UP (ref 0–0.5)
EOSINOPHIL NFR BLD AUTO: 0.1 % — SIGNIFICANT CHANGE UP (ref 0–6)
EPI CELLS # UR: 1 /HPF — SIGNIFICANT CHANGE UP (ref 0–5)
GLUCOSE BLDC GLUCOMTR-MCNC: 134 MG/DL — HIGH (ref 70–99)
GLUCOSE BLDC GLUCOMTR-MCNC: 255 MG/DL — HIGH (ref 70–99)
GLUCOSE SERPL-MCNC: 127 MG/DL — HIGH (ref 70–99)
GLUCOSE SERPL-MCNC: 140 MG/DL — HIGH (ref 70–99)
GLUCOSE UR QL: ABNORMAL
HCT VFR BLD CALC: 43.7 % — SIGNIFICANT CHANGE UP (ref 39–50)
HGB BLD-MCNC: 14.3 G/DL — SIGNIFICANT CHANGE UP (ref 13–17)
HYALINE CASTS # UR AUTO: 0 /LPF — SIGNIFICANT CHANGE UP (ref 0–7)
IANC: 8.31 K/UL — HIGH (ref 1.8–7.4)
IMM GRANULOCYTES NFR BLD AUTO: 0.4 % — SIGNIFICANT CHANGE UP (ref 0–0.9)
INR BLD: 1.51 RATIO — HIGH (ref 0.88–1.16)
KETONES UR-MCNC: NEGATIVE — SIGNIFICANT CHANGE UP
LEUKOCYTE ESTERASE UR-ACNC: ABNORMAL
LYMPHOCYTES # BLD AUTO: 0.44 K/UL — LOW (ref 1–3.3)
LYMPHOCYTES # BLD AUTO: 4.7 % — LOW (ref 13–44)
MAGNESIUM SERPL-MCNC: 1.5 MG/DL — LOW (ref 1.6–2.6)
MCHC RBC-ENTMCNC: 28.5 PG — SIGNIFICANT CHANGE UP (ref 27–34)
MCHC RBC-ENTMCNC: 32.7 GM/DL — SIGNIFICANT CHANGE UP (ref 32–36)
MCV RBC AUTO: 87.1 FL — SIGNIFICANT CHANGE UP (ref 80–100)
MONOCYTES # BLD AUTO: 0.54 K/UL — SIGNIFICANT CHANGE UP (ref 0–0.9)
MONOCYTES NFR BLD AUTO: 5.8 % — SIGNIFICANT CHANGE UP (ref 2–14)
NEUTROPHILS # BLD AUTO: 8.31 K/UL — HIGH (ref 1.8–7.4)
NEUTROPHILS NFR BLD AUTO: 88.9 % — HIGH (ref 43–77)
NITRITE UR-MCNC: NEGATIVE — SIGNIFICANT CHANGE UP
NRBC # BLD: 0 /100 WBCS — SIGNIFICANT CHANGE UP (ref 0–0)
NRBC # FLD: 0 K/UL — SIGNIFICANT CHANGE UP (ref 0–0)
PH UR: 6 — SIGNIFICANT CHANGE UP (ref 5–8)
PHOSPHATE SERPL-MCNC: 4.6 MG/DL — HIGH (ref 2.5–4.5)
PLATELET # BLD AUTO: 439 K/UL — HIGH (ref 150–400)
POTASSIUM SERPL-MCNC: 4.9 MMOL/L — SIGNIFICANT CHANGE UP (ref 3.5–5.3)
POTASSIUM SERPL-MCNC: 5.6 MMOL/L — HIGH (ref 3.5–5.3)
POTASSIUM SERPL-SCNC: 4.9 MMOL/L — SIGNIFICANT CHANGE UP (ref 3.5–5.3)
POTASSIUM SERPL-SCNC: 5.6 MMOL/L — HIGH (ref 3.5–5.3)
POTASSIUM UR-SCNC: 80.3 MMOL/L — SIGNIFICANT CHANGE UP
PROT SERPL-MCNC: 7.4 G/DL — SIGNIFICANT CHANGE UP (ref 6–8.3)
PROT UR-MCNC: ABNORMAL
PROTHROM AB SERPL-ACNC: 17.6 SEC — HIGH (ref 10.5–13.4)
RBC # BLD: 5.02 M/UL — SIGNIFICANT CHANGE UP (ref 4.2–5.8)
RBC # FLD: 12.8 % — SIGNIFICANT CHANGE UP (ref 10.3–14.5)
RBC CASTS # UR COMP ASSIST: 216 /HPF — HIGH (ref 0–4)
SODIUM SERPL-SCNC: 131 MMOL/L — LOW (ref 135–145)
SODIUM SERPL-SCNC: 131 MMOL/L — LOW (ref 135–145)
SODIUM UR-SCNC: <20 MMOL/L — SIGNIFICANT CHANGE UP
SP GR SPEC: 1.02 — SIGNIFICANT CHANGE UP (ref 1.01–1.05)
UROBILINOGEN FLD QL: SIGNIFICANT CHANGE UP
WBC # BLD: 9.35 K/UL — SIGNIFICANT CHANGE UP (ref 3.8–10.5)
WBC # FLD AUTO: 9.35 K/UL — SIGNIFICANT CHANGE UP (ref 3.8–10.5)
WBC UR QL: 42 /HPF — HIGH (ref 0–5)

## 2023-05-01 PROCEDURE — 99222 1ST HOSP IP/OBS MODERATE 55: CPT

## 2023-05-01 PROCEDURE — 76770 US EXAM ABDO BACK WALL COMP: CPT | Mod: 26

## 2023-05-01 PROCEDURE — 99233 SBSQ HOSP IP/OBS HIGH 50: CPT

## 2023-05-01 RX ORDER — SODIUM ZIRCONIUM CYCLOSILICATE 10 G/10G
10 POWDER, FOR SUSPENSION ORAL ONCE
Refills: 0 | Status: DISCONTINUED | OUTPATIENT
Start: 2023-05-01 | End: 2023-05-01

## 2023-05-01 RX ORDER — MAGNESIUM SULFATE 500 MG/ML
1 VIAL (ML) INJECTION ONCE
Refills: 0 | Status: COMPLETED | OUTPATIENT
Start: 2023-05-01 | End: 2023-05-01

## 2023-05-01 RX ORDER — APIXABAN 2.5 MG/1
5 TABLET, FILM COATED ORAL EVERY 12 HOURS
Refills: 0 | Status: DISCONTINUED | OUTPATIENT
Start: 2023-05-01 | End: 2023-05-04

## 2023-05-01 RX ORDER — DEXAMETHASONE 0.5 MG/5ML
4 ELIXIR ORAL EVERY 12 HOURS
Refills: 0 | Status: DISCONTINUED | OUTPATIENT
Start: 2023-05-01 | End: 2023-05-04

## 2023-05-01 RX ORDER — OCTREOTIDE ACETATE 200 UG/ML
100 INJECTION, SOLUTION INTRAVENOUS; SUBCUTANEOUS EVERY 8 HOURS
Refills: 0 | Status: DISCONTINUED | OUTPATIENT
Start: 2023-05-01 | End: 2023-05-04

## 2023-05-01 RX ORDER — METOCLOPRAMIDE HCL 10 MG
10 TABLET ORAL EVERY 8 HOURS
Refills: 0 | Status: DISCONTINUED | OUTPATIENT
Start: 2023-05-01 | End: 2023-05-04

## 2023-05-01 RX ORDER — DEXTROSE 50 % IN WATER 50 %
50 SYRINGE (ML) INTRAVENOUS ONCE
Refills: 0 | Status: COMPLETED | OUTPATIENT
Start: 2023-05-01 | End: 2023-05-01

## 2023-05-01 RX ORDER — INSULIN HUMAN 100 [IU]/ML
5 INJECTION, SOLUTION SUBCUTANEOUS ONCE
Refills: 0 | Status: COMPLETED | OUTPATIENT
Start: 2023-05-01 | End: 2023-05-01

## 2023-05-01 RX ORDER — SODIUM CHLORIDE 9 MG/ML
1000 INJECTION INTRAMUSCULAR; INTRAVENOUS; SUBCUTANEOUS
Refills: 0 | Status: DISCONTINUED | OUTPATIENT
Start: 2023-05-01 | End: 2023-05-04

## 2023-05-01 RX ORDER — SODIUM CHLORIDE 9 MG/ML
1000 INJECTION INTRAMUSCULAR; INTRAVENOUS; SUBCUTANEOUS
Refills: 0 | Status: DISCONTINUED | OUTPATIENT
Start: 2023-05-01 | End: 2023-05-01

## 2023-05-01 RX ORDER — BENZOCAINE AND MENTHOL 5; 1 G/100ML; G/100ML
1 LIQUID ORAL EVERY 6 HOURS
Refills: 0 | Status: DISCONTINUED | OUTPATIENT
Start: 2023-05-01 | End: 2023-05-04

## 2023-05-01 RX ADMIN — Medication 10 MILLIGRAM(S): at 05:38

## 2023-05-01 RX ADMIN — OCTREOTIDE ACETATE 100 MICROGRAM(S): 200 INJECTION, SOLUTION INTRAVENOUS; SUBCUTANEOUS at 05:37

## 2023-05-01 RX ADMIN — Medication 10 MILLIGRAM(S): at 18:03

## 2023-05-01 RX ADMIN — SODIUM CHLORIDE 100 MILLILITER(S): 9 INJECTION INTRAMUSCULAR; INTRAVENOUS; SUBCUTANEOUS at 18:02

## 2023-05-01 RX ADMIN — Medication 100 GRAM(S): at 11:47

## 2023-05-01 RX ADMIN — Medication 4 MILLIGRAM(S): at 05:38

## 2023-05-01 RX ADMIN — OCTREOTIDE ACETATE 100 MICROGRAM(S): 200 INJECTION, SOLUTION INTRAVENOUS; SUBCUTANEOUS at 22:26

## 2023-05-01 RX ADMIN — Medication 50 MILLILITER(S): at 11:47

## 2023-05-01 RX ADMIN — APIXABAN 5 MILLIGRAM(S): 2.5 TABLET, FILM COATED ORAL at 18:03

## 2023-05-01 RX ADMIN — Medication 4 MILLIGRAM(S): at 18:03

## 2023-05-01 RX ADMIN — SODIUM CHLORIDE 100 MILLILITER(S): 9 INJECTION INTRAMUSCULAR; INTRAVENOUS; SUBCUTANEOUS at 22:27

## 2023-05-01 RX ADMIN — OCTREOTIDE ACETATE 100 MICROGRAM(S): 200 INJECTION, SOLUTION INTRAVENOUS; SUBCUTANEOUS at 16:31

## 2023-05-01 RX ADMIN — INSULIN HUMAN 5 UNIT(S): 100 INJECTION, SOLUTION SUBCUTANEOUS at 11:46

## 2023-05-01 NOTE — PROGRESS NOTE ADULT - ASSESSMENT
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 Noncon CT at [facility] with concern for SBO with TP in LLQ. Repeat CT at [facility] with evidence of PO contrast passage beyond LLQ, Pt now with stool in ostomy, no longer clinically obstructed. SBO resolving. Lab work concerning for new MAXWELL with multiple electrolyte abnormalities    - Patient is no longer clinically obstructed. Given continued ostomy function and low NGT, would recommend d/c NGT vs clamp trial  -Advance diet as tolerated  -Remainder of care per primary team

## 2023-05-01 NOTE — DIETITIAN INITIAL EVALUATION ADULT - PERTINENT LABORATORY DATA
05-01    131<L>  |  90<L>  |  51<H>  ----------------------------<  127<H>  5.6<H>   |  21<L>  |  2.75<H>    Ca    10.1      01 May 2023 06:38  Phos  4.6     05-01  Mg     1.50     05-01    TPro  7.4  /  Alb  3.7  /  TBili  1.5<H>  /  DBili  x   /  AST  72<H>  /  ALT  139<H>  /  AlkPhos  235<H>  05-01  POCT Blood Glucose.: 134 mg/dL (05-01-23 @ 11:28)

## 2023-05-01 NOTE — CONSULT NOTE ADULT - ATTENDING COMMENTS
Pt. with MAXWELL in setting of decreased oral intake and obstructive uropathy. Scr elevated at 2.75 today. Assessment and plan for MAXWELL and hyperkalemia as outlined above. Monitor labs and urine output. Avoid any potential nephrotoxins. Dose medications as per eGFR.

## 2023-05-01 NOTE — PATIENT PROFILE ADULT - FALL HARM RISK - HARM RISK INTERVENTIONS

## 2023-05-01 NOTE — DIETITIAN INITIAL EVALUATION ADULT - ADD RECOMMEND
When medically feasible per team, rec advance diet to low fiber as tolerated. Defer consistencies to team.

## 2023-05-01 NOTE — PROGRESS NOTE ADULT - SUBJECTIVE AND OBJECTIVE BOX
GENERAL SURGERY PROGRESS NOTE    No acute events    10-point review of systems completed and negative except as noted above.      OBJECTIVE    MEDICATIONS  benzocaine/menthol Lozenge 1 Lozenge Oral every 6 hours PRN  dexAMETHasone  Injectable 4 milliGRAM(s) IV Push every 12 hours  metoclopramide Injectable 10 milliGRAM(s) IV Push every 8 hours  octreotide  Injectable 100 MICROGram(s) SubCutaneous every 8 hours  sodium chloride 0.9%. 1000 milliLiter(s) IV Continuous <Continuous>      PHYSICAL EXAM  T(C): 36.6 (23 @ 12:30), Max: 37.1 (23 @ 18:10)  HR: 77 (23 @ 12:30) (77 - 89)  BP: 117/76 (23 @ 12:30) (107/73 - 117/78)  RR: 17 (23 @ 12:30) (16 - 20)  SpO2: 96% (23 @ 12:30) (96% - 100%)    23 @ 07:01  -  23 @ 17:21  --------------------------------------------------------  IN: 0 mL / OUT: 500 mL / NET: -500 mL    Physical Exam:  General: NAD, resting comfortably  HEENT: NC/AT, EOMI, normal hearing, NGT in place with brown output  Pulmonary: normal resp effort on RA  Cardiovascular: RRR  Abdominal: BS+, soft, NT/ND, ostomy draining with air and brown liquid in bag  Extremities: WWP  Neuro: A/O x 3  Psych: Affect appropriate      LABS                        14.3   9.35  )-----------( 439      ( 01 May 2023 06:38 )             43.7     05    131<L>  |  90<L>  |  53<H>  ----------------------------<  140<H>  4.9   |  26  |  2.75<H>    Ca    9.8      01 May 2023 15:17  Phos  4.6     05-  Mg     1.50         TPro  7.4  /  Alb  3.7  /  TBili  1.5<H>  /  DBili  x   /  AST  72<H>  /  ALT  139<H>  /  AlkPhos  235<H>      PT/INR - ( 01 May 2023 06:38 )   PT: 17.6 sec;   INR: 1.51 ratio         PTT - ( 01 May 2023 06:38 )  PTT:31.5 sec  Urinalysis Basic - ( 01 May 2023 15:41 )    Color: Yellow / Appearance: Slightly Turbid / S.019 / pH: x  Gluc: x / Ketone: Negative  / Bili: Negative / Urobili: <2 mg/dL   Blood: x / Protein: 100 mg/dL / Nitrite: Negative   Leuk Esterase: Large / RBC: 216 /HPF / WBC 42 /HPF   Sq Epi: x / Non Sq Epi: x / Bacteria: Few

## 2023-05-01 NOTE — PROGRESS NOTE ADULT - PROBLEM SELECTOR PLAN 1
- likely 2/2 malignancy   - currently on octreotide, Reglan , decadron per surg recs  - seen by surgery, no plan for acute intervention given return of ostomy output following NGT placement  - seems to be resolving    Plan   - maintain strict NPO  - continue to measure NGT output  - follow with SurgOnc Dr. Esparza who planned oupt CT to reimage tumor seen on March MR; determine if CT performed today sufficient

## 2023-05-01 NOTE — PROGRESS NOTE ADULT - PROBLEM SELECTOR PLAN 6
- noted on prior labs but milder  - known obstructive disease with mild hydronephrosis   - will continue to monitor

## 2023-05-01 NOTE — DIETITIAN INITIAL EVALUATION ADULT - OTHER INFO
57 year old male with a PMH of left sided colon ca s/p resection, ileostomy 2022, possible recurrence per March MRI based on pelvic mass adjacent to bladder, Obstructing renal stone s/p ureteral stent 4/8/23 presents in setting of nausea, vomiting, inability to tolerate po, no ostomy output per chart.    Patient currently NPO w/ NGT 2/2 SBO. No N/V reported. States being lactose intolerant. Reports weight has been fluctuating up and down. Per HIE, noted w/ weight 57.6 kg (2/1). ABW is 52 kg (5/1) per chart indicating a -9.8% weight loss x 3 months, significant. Patient/wife state weight loss was from cutting back on carbohydrate rich foods to eat healthier as well as prolonged fatigue/loss of appetite from MRI in March. No edema or pressure injuries noted per RN flow sheet.    Discussed w/ patient low fiber diet, small frequent meals, not restricting food groups to prevent further weight loss and to continue w/ nutritional supplements at home. Provided low fiber nutrition handout.

## 2023-05-01 NOTE — CONSULT NOTE ADULT - SUBJECTIVE AND OBJECTIVE BOX
[facility] DIVISION OF KIDNEY DISEASES AND HYPERTENSION -- [phone]  -- INITIAL CONSULT NOTE  --------------------------------------------------------------------------------  HPI: 57 year old male with PMH of colon Ca (Ileostomy placement 2022) initially presented to [facility] on with nausea, vomiting. Pt underwent CT A/P which showed SBO and was transferred to [facility] for further evaluation. ER labs at [facility] showed SCr of 2.83. Nephrology consulted for MAXWELL.     Pt seen and examined today, spouse at bedside. Pt gives hx of episode of right kidney stone and required ureteral stent placement by urology during his recent admission in April at [facility] (4/7/23- 4/13/23).  SCr was 3.23 on 4/6/23 and improved to 1.25 on 4/12/23. SCr was elevated at 1.61 on 4/13/23. Pt during current admission noted to have SCr of 2.83 on 4/30/23. UA done on 5/1 shows hematuria (large blood, proteinuria) and pyuria. Vivien of less than 20 noted. CT A/P showed bilateral non obstructing kidney stones.     Pt currently NPO. Denies fever, chills, nausea. vomiting, CP and SOB during evaluation. Pt says his left is swollen. SCr elevated/stable at 2.75 today. Pt is non-oliguric. Pt was also noted to have elevated serum potassium of 6.3 on presentation and was treated medically. Last serum potassium improved to 4.9 today.    PAST HISTORY  --------------------------------------------------------------------------------  PAST MEDICAL & SURGICAL HISTORY:  Colon cancer  H/O ileostomy  S/P colon resection      FAMILYISTORY:    PAST SOCIAL HISTORY:    ALLERGIES & MEDICATIONS  --------------------------------------------------------------------------------  Allergies    lactose (Unknown)  No Known Drug Allergies    Intolerances    Standing Inpatient Medications  apixaban 5 milliGRAM(s) Oral every 12 hours  dexAMETHasone     Tablet 4 milliGRAM(s) Oral every 12 hours  metoclopramide 10 milliGRAM(s) Oral every 8 hours  octreotide  Injectable 100 MICROGram(s) SubCutaneous every 8 hours  sodium chloride 0.9%. 1000 milliLiter(s) IV Continuous <Continuous>    PRN Inpatient Medications  benzocaine/menthol Lozenge 1 Lozenge Oral every 6 hours PRN      REVIEW OF SYSTEMS  --------------------------------------------------------------------------------  Gen: No fevers/chills  Head/Eyes/Ears: No HA  Respiratory: No dyspnea, cough  CV: No chest pain  GI: No abdominal pain, see HPI  : No dysuria, hematuria  MSK: R arm swelling  Skin: No rashes  Heme: see HPI    VITALS/PHYSICAL EXAM  --------------------------------------------------------------------------------  T(C): 36.6 (05-01-23 @ 12:30), Max: 36.8 (04-30-23 @ 22:21)  HR: 77 (05-01-23 @ 12:30) (77 - 85)  BP: 117/76 (05-01-23 @ 12:30) (107/73 - 117/76)  RR: 17 (05-01-23 @ 12:30) (16 - 18)  SpO2: 96% (05-01-23 @ 12:30) (96% - 100%)  Wt(kg): --  Height (cm): 170.2 (05-01-23 @ 02:25)  Weight (kg): 52 (05-01-23 @ 02:25)  BMI (kg/m2): 18 (05-01-23 @ 02:25)  BSA (m2): 1.6 (05-01-23 @ 02:25)    05-01-23 @ 07:01  -  05-01-23 @ 18:34  --------------------------------------------------------  IN: 0 mL / OUT: 500 mL / NET: -500 mL    Physical Exam:  	Gen: male, sitting comfortably, NAD  	HEENT: Anicteric, NGT+  	Pulm: CTA B/L  	CV: S1S2+  	Abd: Soft, +BS    	Ext: No LE edema B/L, Right arm swelling seen  	Neuro: Awake  	Skin: Warm and dry    LABS/STUDIES  --------------------------------------------------------------------------------              14.3   9.35  >-----------<  439      [05-01-23 @ 06:38]              43.7     131  |  90  |  53  ----------------------------<  140      [05-01-23 @ 15:17]  4.9   |  26  |  2.75        Ca     9.8     [05-01-23 @ 15:17]      Mg     1.50     [05-01-23 @ 06:38]      Phos  4.6     [05-01-23 @ 06:38]    Creatinine Trend:  SCr 2.75 [05-01 @ 15:17]  SCr 2.75 [05-01 @ 06:38]  SCr 2.70 [04-30 @ 19:21]  SCr 2.81 [04-30 @ 15:00]  SCr 2.77 [04-30 @ 11:28]    Urinalysis - [05-01-23 @ 15:41]      Color Yellow / Appearance Slightly Turbid / SG 1.019 / pH 6.0      Gluc 500 mg/dL / Ketone Negative  / Bili Negative / Urobili <2 mg/dL       Blood Large / Protein 100 mg/dL / Leuk Est Large / Nitrite Negative       / WBC 42 / Hyaline 0 / Gran  / Sq Epi  / Non Sq Epi  / Bacteria Few    Urine Sodium <20      [05-01-23 @ 15:00]  Urine Potassium 80.3      [05-01-23 @ 15:00]  Urine Chloride <20      [05-01-23 @ 15:00]    HCV 0.08, Nonreact      [09-11-22 @ 03:27] [facility] DIVISION OF KIDNEY DISEASES AND HYPERTENSION -- [phone]  -- INITIAL CONSULT NOTE  --------------------------------------------------------------------------------  HPI: 57-year-old male with PMH of colon cancer (ileostomy placement in 2022) initially presented to [facility] with nausea and vomiting. Pt underwent CT A/P which showed SBO and was transferred to [facility] for further evaluation. ER labs at [facility] showed Scr of 2.83. Nephrology consulted for MAXWELL.     Pt. seen and examined today, spouse at bedside. Pt. gives history of right kidney stone and required ureteral stent placement during his recent admission at [facility] in April 2023. Scr was 3.23 on 4/6/23 and improved to 1.25 on 4/12/23. Scr was elevated at 1.61 on 4/13/23. Pt. noted to have elevated Scr of 2.83 during current hospital stay on 4/30/23. UA done today (5/1/23) showed hematuria, proteinuria and pyuria. Vivien <20. CT A/P done on 4/30/23 showed bilateral non-obstructing kidney stones.     Pt. currently NPO. Pt. says he feels dehydrated. Pt. gives history of RUE swelling. No fever, chills, nausea, vomiting, CP or SOB during evaluation. Scr elevated/stable at 2.75 today. Pt is non-oliguric. Pt. was also noted to have elevated serum potassium of 6.3 on presentation (4/30/23) and was treated medically. Last serum potassium improved to 4.9 today. Pt. with mild B/L hydronephrosis on kidney sonogram done earlier today (5/1/23).     PAST HISTORY  --------------------------------------------------------------------------------  PAST MEDICAL & SURGICAL HISTORY:  Colon cancer  H/O ileostomy  S/P colon resection    FAMILYISTORY:    PAST SOCIAL HISTORY:    ALLERGIES & MEDICATIONS  --------------------------------------------------------------------------------  Allergies    lactose (Unknown)  No Known Drug Allergies    Intolerances    Standing Inpatient Medications  apixaban 5 milliGRAM(s) Oral every 12 hours  dexAMETHasone     Tablet 4 milliGRAM(s) Oral every 12 hours  metoclopramide 10 milliGRAM(s) Oral every 8 hours  octreotide  Injectable 100 MICROGram(s) SubCutaneous every 8 hours  sodium chloride 0.9%. 1000 milliLiter(s) IV Continuous <Continuous>    PRN Inpatient Medications  benzocaine/menthol Lozenge 1 Lozenge Oral every 6 hours PRN    REVIEW OF SYSTEMS  --------------------------------------------------------------------------------  Gen: No fever  Head/Eyes/Ears: No HA  Respiratory: No dyspnea, cough  CV: No chest pain  GI: No abdominal pain, see HPI  : See HPI  MSK: No LE swelling, RUE swelling+  Skin: No rash  Heme: See HPI    VITALS/PHYSICAL EXAM  --------------------------------------------------------------------------------  T(C): 36.6 (05-01-23 @ 12:30), Max: 36.8 (04-30-23 @ 22:21)  HR: 77 (05-01-23 @ 12:30) (77 - 85)  BP: 117/76 (05-01-23 @ 12:30) (107/73 - 117/76)  RR: 17 (05-01-23 @ 12:30) (16 - 18)  SpO2: 96% (05-01-23 @ 12:30) (96% - 100%)  Wt(kg): --  Height (cm): 170.2 (05-01-23 @ 02:25)  Weight (kg): 52 (05-01-23 @ 02:25)  BMI (kg/m2): 18 (05-01-23 @ 02:25)  BSA (m2): 1.6 (05-01-23 @ 02:25)    05-01-23 @ 07:01  -  05-01-23 @ 18:34  --------------------------------------------------------  IN: 0 mL / OUT: 500 mL / NET: -500 mL    Physical Exam:  	Gen: resting in bed, ill appearing   	HEENT: Anicteric, NGT+  	Pulm: CTA B/L  	CV: S1S2+  	Abd: Soft, +BS    	Ext: No LE edema B/L, RUE swelling seen+  	Neuro: Awake, alert  	Skin: Warm and dry    LABS/STUDIES  --------------------------------------------------------------------------------              14.3   9.35  >-----------<  439      [05-01-23 @ 06:38]              43.7     131  |  90  |  53  ----------------------------<  140      [05-01-23 @ 15:17]  4.9   |  26  |  2.75        Ca     9.8     [05-01-23 @ 15:17]      Mg     1.50     [05-01-23 @ 06:38]      Phos  4.6     [05-01-23 @ 06:38]    Creatinine Trend:  SCr 2.75 [05-01 @ 15:17]  SCr 2.75 [05-01 @ 06:38]  SCr 2.70 [04-30 @ 19:21]  SCr 2.81 [04-30 @ 15:00]  SCr 2.77 [04-30 @ 11:28]    Urinalysis - [05-01-23 @ 15:41]      Color Yellow / Appearance Slightly Turbid / SG 1.019 / pH 6.0      Gluc 500 mg/dL / Ketone Negative  / Bili Negative / Urobili <2 mg/dL       Blood Large / Protein 100 mg/dL / Leuk Est Large / Nitrite Negative       / WBC 42 / Hyaline 0 / Gran  / Sq Epi  / Non Sq Epi  / Bacteria Few    Urine Sodium <20      [05-01-23 @ 15:00]  Urine Potassium 80.3      [05-01-23 @ 15:00]  Urine Chloride <20      [05-01-23 @ 15:00]0.08, Nonreact      [09-11-22 @ 03:27]

## 2023-05-01 NOTE — DIETITIAN INITIAL EVALUATION ADULT - PERTINENT MEDS FT
MEDICATIONS  (STANDING):  dexAMETHasone  Injectable 4 milliGRAM(s) IV Push every 12 hours  metoclopramide Injectable 10 milliGRAM(s) IV Push every 8 hours  octreotide  Injectable 100 MICROGram(s) SubCutaneous every 8 hours  sodium chloride 0.9%. 1000 milliLiter(s) (100 mL/Hr) IV Continuous <Continuous>    MEDICATIONS  (PRN):  benzocaine/menthol Lozenge 1 Lozenge Oral every 6 hours PRN Sore Throat

## 2023-05-01 NOTE — CONSULT NOTE ADULT - PROBLEM SELECTOR RECOMMENDATION 2
Pt with hyperkalemia on presentation in setting of MAXWELL and obstructive uropathy. Serum K was elevated at 6.3. Pt received medical management. Last serum potassium WNL at  4.9 today. Low K diet. Monitor serum potassium. Pt. with hyperkalemia in setting of MAXWELL and obstructive uropathy. Pt. received medical management. Last serum potassium WNL at  4.9 today. Low potassium diet. Monitor serum potassium.

## 2023-05-01 NOTE — DIETITIAN INITIAL EVALUATION ADULT - PROBLEM SELECTOR PLAN 2
-prerenal component present; CT abd negative for hydronephrosis; ureteral stent  placed April 8th for obstructing  stone intact; no CT evidence of extrinsic compression from tumor; pt denies difficulty voiding  -c/w IVF, trend renal function

## 2023-05-01 NOTE — DIETITIAN INITIAL EVALUATION ADULT - ORAL INTAKE PTA/DIET HISTORY
Patient seen for assessment w/ wife at bedside. Reports improving appetite PTA, but has had significant weight loss. Follows a general healthy diet per diet recall. Uses optimum nutrition protein powder.

## 2023-05-01 NOTE — PROGRESS NOTE ADULT - ASSESSMENT
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

## 2023-05-01 NOTE — PROGRESS NOTE ADULT - PROBLEM SELECTOR PLAN 2
- prerenal component present as patient says PO intake was reduced prior to admission  - CT abd negative for hydronephrosis; ureteral stent  placed April 8th for obstructing  stone intact; no CT evidence of extrinsic compression from tumor  - Nephro consulted, patient has MAXWELL as prior labs noted to be good with acute worsening  - urine studies sent    Plan  - will calculate FENa  - c/w IVF, trend renal function

## 2023-05-01 NOTE — PROGRESS NOTE ADULT - PROBLEM SELECTOR PLAN 4
- iso BL nephrolithiasis   - BL stents in place and noted to be stable  - will continue to monitor urine output

## 2023-05-01 NOTE — CONSULT NOTE ADULT - PROBLEM SELECTOR RECOMMENDATION 9
Pt with non-oliguric MAXWELL in setting of decreased PO intake, GI losses (vomiting) and obstructive uropathy. Pt with MAXWELL episode during his recent admission in April at [facility] for right kidney stone. SCr was 3.23 on 4/6/23 and improved to 1.25 on 4/12/23. SCr was elevated at 1.61 on 4/13/23. Pt presented with nausea, vomiting and decreased PO intake at [facility] and was found to have SBO. SCr was elevated 2.83 on presentation on current admission. UA done on 5/1 shows hematuria (large blood, proteinuria) and pyuria. Vivien of less than 20 noted. CT A/P showed bilateral non obstructing kidney stones. US kidney and bladder done today (5/1/23) shows mild bilateral hydronephrosis. Mass near bladder dome also noted on US. Pt currently NPO and receiving IVF. SCr elevated at 2.75 today. Pt is nonoliguric. Recommend urology evaluation for hydronephrosis. Avoid nephrotoxins. Dose meds as per egfr. Monitor labs and BP. Pt. with non-oliguric MAXWELL in setting of decreased PO intake, GI losses, and obstructive uropathy/hydronephrosis. Pt. with history of nephrolithiasis. Pt. with MAXWELL during his recent hospital stay at [facility] in April 2023 for right kidney stone. Scr was 3.23 on 4/6/23 and improved to 1.25 on 4/12/23. Scr was elevated at 1.61 on 4/13/23. Pt. presented with nausea, vomiting and decreased PO intake at [facility] and was found to have SBO. Scr was elevated 2.83 on presentation on current admission. UA done on 5/1 shows hematuria, proteinuria and pyuria. Vivien was <20. CT A/P done on 4/30/23 showed bilateral non-obstructing kidney stones. US kidney and bladder done today (5/1/23) showed mild bilateral hydronephrosis. Mass near bladder dome also noted on CT study and sonogram. Recommend urology evaluation for hydronephrosis and above CT/sonogram findings. Pt. currently NPO and receiving IVF. Scr elevated/stable at 2.75 today. Monitor labs and urine output. Avoid any potential nephrotoxins. Dose medications as per eGFR.

## 2023-05-01 NOTE — PROGRESS NOTE ADULT - SUBJECTIVE AND OBJECTIVE BOX
PROGRESS NOTE:   Patient is a 57y old  Male who presents with a chief complaint of Intestinal obstruction    SUBJECTIVE / OVERNIGHT EVENTS:  Patient is noticing passage of fluid and copious gas into his ostomy bag    MEDICATIONS  (STANDING):  dexAMETHasone  Injectable 4 milliGRAM(s) IV Push every 12 hours  metoclopramide Injectable 10 milliGRAM(s) IV Push every 8 hours  octreotide  Injectable 100 MICROGram(s) SubCutaneous every 8 hours  sodium chloride 0.9%. 1000 milliLiter(s) (100 mL/Hr) IV Continuous <Continuous>    MEDICATIONS  (PRN):  benzocaine/menthol Lozenge 1 Lozenge Oral every 6 hours PRN Sore Throat    CAPILLARY BLOOD GLUCOSE  POCT Blood Glucose.: 255 mg/dL (01 May 2023 12:19)  POCT Blood Glucose.: 134 mg/dL (01 May 2023 11:28)    I&O's Summary  01 May 2023 07:01  -  01 May 2023 16:48  --------------------------------------------------------  IN: 0 mL / OUT: 500 mL / NET: -500 mL    PHYSICAL EXAM:  Vital Signs Last 24 Hrs  T(C): 36.6 (01 May 2023 12:30), Max: 37.1 (2023 18:10)  T(F): 97.8 (01 May 2023 12:30), Max: 98.8 (2023 18:10)  HR: 77 (01 May 2023 12:30) (77 - 89)  BP: 117/76 (01 May 2023 12:30) (107/73 - 117/78)  RR: 17 (01 May 2023 12:30) (16 - 20)  SpO2: 96% (01 May 2023 12:30) (96% - 100%) on room air    Constitutional: NAD, well-groomed, well-developed  HEENT:  EOMI, Normal Hearing  Neck: No LAD, No JVD  Back: Normal spine flexure, No CVA tenderness  Respiratory: CTAB  Cardiovascular: S1 and S2, RRR  Gastrointestinal: BS+, soft, NT/ND, ostomy draining with air and brown liquid in bag  Extremities: No peripheral edema  Vascular: 2+ peripheral pulses  Neurological: A/O x 3, no focal deficits  Psychiatric: Normal mood, normal affect  Musculoskeletal: 5/5 strength b/l upper and lower extremities    LABS:                 14.3   9.35  )-----------( 439                  43.7     131<L>  |  90<L>  |  53<H>  ----------------------------<  140<H>  4.9   |  26  |  2.75<H>    Ca    9.8      01 May 2023 15:17  Phos  4.6       Mg     1.50         TPro  7.4  /  Alb  3.7  /  TBili  1.5<H>  /  DBili  x   /  AST  72<H>  /  ALT  139<H>  /  AlkPhos  235<H>    PT/INR - ( 01 May 2023 06:38 )   PT: 17.6 sec;   INR: 1.51 ratio    PTT - ( 01 May 2023 06:38 )  PTT:31.5 sec    Urinalysis Basic - ( 01 May 2023 15:41 )  Color: Yellow / Appearance: Slightly Turbid / S.019 / pH: x  Gluc: x / Ketone: Negative  / Bili: Negative / Urobili: <2 mg/dL   Blood: x / Protein: 100 mg/dL / Nitrite: Negative   Leuk Esterase: Large / RBC: 216 /HPF / WBC 42 /HPF   Sq Epi: x / Non Sq Epi: x / Bacteria: Few    RADIOLOGY & ADDITIONAL TESTS:  Results Reviewed: Y    COORDINATION OF CARE:  Care Discussed with Consultants/Other Providers [Y/N]: Y  Prior or Outpatient Records Reviewed [Y/N]: MACKENZIE Guerra PGY1  Can be reached on MS teams

## 2023-05-02 LAB
ALBUMIN SERPL ELPH-MCNC: 3.3 G/DL — SIGNIFICANT CHANGE UP (ref 3.3–5)
ALP SERPL-CCNC: 177 U/L — HIGH (ref 40–120)
ALT FLD-CCNC: 120 U/L — HIGH (ref 4–41)
ANION GAP SERPL CALC-SCNC: 12 MMOL/L — SIGNIFICANT CHANGE UP (ref 7–14)
AST SERPL-CCNC: 51 U/L — HIGH (ref 4–40)
BILIRUB SERPL-MCNC: 1 MG/DL — SIGNIFICANT CHANGE UP (ref 0.2–1.2)
BUN SERPL-MCNC: 54 MG/DL — HIGH (ref 7–23)
CALCIUM SERPL-MCNC: 9.2 MG/DL — SIGNIFICANT CHANGE UP (ref 8.4–10.5)
CHLORIDE SERPL-SCNC: 94 MMOL/L — LOW (ref 98–107)
CO2 SERPL-SCNC: 25 MMOL/L — SIGNIFICANT CHANGE UP (ref 22–31)
CREAT SERPL-MCNC: 2.5 MG/DL — HIGH (ref 0.5–1.3)
EGFR: 29 ML/MIN/1.73M2 — LOW
GLUCOSE SERPL-MCNC: 124 MG/DL — HIGH (ref 70–99)
HCT VFR BLD CALC: 36.5 % — LOW (ref 39–50)
HGB BLD-MCNC: 11.8 G/DL — LOW (ref 13–17)
MAGNESIUM SERPL-MCNC: 1.7 MG/DL — SIGNIFICANT CHANGE UP (ref 1.6–2.6)
MCHC RBC-ENTMCNC: 28.3 PG — SIGNIFICANT CHANGE UP (ref 27–34)
MCHC RBC-ENTMCNC: 32.3 GM/DL — SIGNIFICANT CHANGE UP (ref 32–36)
MCV RBC AUTO: 87.5 FL — SIGNIFICANT CHANGE UP (ref 80–100)
NRBC # BLD: 0 /100 WBCS — SIGNIFICANT CHANGE UP (ref 0–0)
NRBC # FLD: 0 K/UL — SIGNIFICANT CHANGE UP (ref 0–0)
PHOSPHATE SERPL-MCNC: 4 MG/DL — SIGNIFICANT CHANGE UP (ref 2.5–4.5)
PLATELET # BLD AUTO: 302 K/UL — SIGNIFICANT CHANGE UP (ref 150–400)
POTASSIUM SERPL-MCNC: 5 MMOL/L — SIGNIFICANT CHANGE UP (ref 3.5–5.3)
POTASSIUM SERPL-SCNC: 5 MMOL/L — SIGNIFICANT CHANGE UP (ref 3.5–5.3)
PROT SERPL-MCNC: 6.7 G/DL — SIGNIFICANT CHANGE UP (ref 6–8.3)
RBC # BLD: 4.17 M/UL — LOW (ref 4.2–5.8)
RBC # FLD: 12.6 % — SIGNIFICANT CHANGE UP (ref 10.3–14.5)
SODIUM SERPL-SCNC: 131 MMOL/L — LOW (ref 135–145)
WBC # BLD: 5.59 K/UL — SIGNIFICANT CHANGE UP (ref 3.8–10.5)
WBC # FLD AUTO: 5.59 K/UL — SIGNIFICANT CHANGE UP (ref 3.8–10.5)

## 2023-05-02 PROCEDURE — 99233 SBSQ HOSP IP/OBS HIGH 50: CPT

## 2023-05-02 RX ORDER — MAGNESIUM SULFATE 500 MG/ML
2 VIAL (ML) INJECTION ONCE
Refills: 0 | Status: COMPLETED | OUTPATIENT
Start: 2023-05-02 | End: 2023-05-02

## 2023-05-02 RX ORDER — SODIUM BICARBONATE 1 MEQ/ML
1300 SYRINGE (ML) INTRAVENOUS
Refills: 0 | Status: DISCONTINUED | OUTPATIENT
Start: 2023-05-02 | End: 2023-05-04

## 2023-05-02 RX ADMIN — Medication 10 MILLIGRAM(S): at 10:36

## 2023-05-02 RX ADMIN — Medication 4 MILLIGRAM(S): at 17:16

## 2023-05-02 RX ADMIN — APIXABAN 5 MILLIGRAM(S): 2.5 TABLET, FILM COATED ORAL at 05:53

## 2023-05-02 RX ADMIN — OCTREOTIDE ACETATE 100 MICROGRAM(S): 200 INJECTION, SOLUTION INTRAVENOUS; SUBCUTANEOUS at 21:45

## 2023-05-02 RX ADMIN — OCTREOTIDE ACETATE 100 MICROGRAM(S): 200 INJECTION, SOLUTION INTRAVENOUS; SUBCUTANEOUS at 13:20

## 2023-05-02 RX ADMIN — SODIUM CHLORIDE 100 MILLILITER(S): 9 INJECTION INTRAMUSCULAR; INTRAVENOUS; SUBCUTANEOUS at 12:26

## 2023-05-02 RX ADMIN — SODIUM CHLORIDE 100 MILLILITER(S): 9 INJECTION INTRAMUSCULAR; INTRAVENOUS; SUBCUTANEOUS at 21:45

## 2023-05-02 RX ADMIN — Medication 25 GRAM(S): at 12:27

## 2023-05-02 RX ADMIN — OCTREOTIDE ACETATE 100 MICROGRAM(S): 200 INJECTION, SOLUTION INTRAVENOUS; SUBCUTANEOUS at 05:53

## 2023-05-02 RX ADMIN — Medication 1300 MILLIGRAM(S): at 17:16

## 2023-05-02 RX ADMIN — APIXABAN 5 MILLIGRAM(S): 2.5 TABLET, FILM COATED ORAL at 17:16

## 2023-05-02 RX ADMIN — Medication 4 MILLIGRAM(S): at 05:53

## 2023-05-02 RX ADMIN — Medication 10 MILLIGRAM(S): at 17:15

## 2023-05-02 RX ADMIN — Medication 10 MILLIGRAM(S): at 01:44

## 2023-05-02 RX ADMIN — SODIUM CHLORIDE 100 MILLILITER(S): 9 INJECTION INTRAMUSCULAR; INTRAVENOUS; SUBCUTANEOUS at 05:53

## 2023-05-02 NOTE — PROGRESS NOTE ADULT - PROBLEM SELECTOR PLAN 6
- noted on prior labs but milder  - known obstructive disease with mild hydronephrosis   - will repeat but likely have patient follow up with Nephro outpt

## 2023-05-02 NOTE — PROGRESS NOTE ADULT - PROBLEM SELECTOR PLAN 2
- likely 2/2 malignancy   - currently on octreotide, Reglan , decadron per surg recs  - tolerating diet, will transition to regular if tolerating full liquid  - resolved    Plan   - see if SBO regimen can be discontinued

## 2023-05-02 NOTE — PROGRESS NOTE ADULT - PROBLEM SELECTOR PLAN 1
- prerenal component present as patient says PO intake was reduced prior to admission  - CT abd negative for hydronephrosis; ureteral stent  placed April 8th for obstructing  stone intact; no CT evidence of extrinsic compression from tumor  - Nephro consulted, patient has MAXWELL as prior labs noted to be good with acute worsening  - FENa showing pre-renal MAXWELL    Plan  - c/w IVF and encourage oral intake

## 2023-05-02 NOTE — PROGRESS NOTE ADULT - ASSESSMENT
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 Noncon CT at [facility] with concern for SBO with TP in LLQ. Repeat CT at [facility] with evidence of PO contrast passage beyond LLQ, Pt now with stool in ostomy, no longer clinically obstructed. SBO resolving. Lab work concerning for new MAXWELL with multiple electrolyte abnormalities. Patient is no longer clinically obstructed since tolerating diet with continued ostomy function.  - Advance diet as tolerated  - Remainder of care per primary team

## 2023-05-02 NOTE — PROGRESS NOTE ADULT - PROBLEM SELECTOR PLAN 3
- iso BL nephrolithiasis   - BL stents in place and noted to be stable  - Urology contacted; patient to reschedule appt with Dr. Nino, no inpt intervention  - will continue to monitor urine output

## 2023-05-02 NOTE — PROGRESS NOTE ADULT - SUBJECTIVE AND OBJECTIVE BOX
PROGRESS NOTE:   Patient is a 57y old  Male who presents with a chief complaint of sbo     SUBJECTIVE / OVERNIGHT EVENTS:  Patient tolerates CLD and meds and has not had resumption of nausea. Stool and gas are still passing into ostomy bag    MEDICATIONS  (STANDING):  apixaban 5 milliGRAM(s) Oral every 12 hours  dexAMETHasone     Tablet 4 milliGRAM(s) Oral every 12 hours  metoclopramide 10 milliGRAM(s) Oral every 8 hours  octreotide  Injectable 100 MICROGram(s) SubCutaneous every 8 hours  sodium bicarbonate 1300 milliGRAM(s) Oral two times a day  sodium chloride 0.9%. 1000 milliLiter(s) (100 mL/Hr) IV Continuous <Continuous>    MEDICATIONS  (PRN):  benzocaine/menthol Lozenge 1 Lozenge Oral every 6 hours PRN Sore Throat    I&O's Summary  01 May 2023 07:01  -  02 May 2023 07:00  --------------------------------------------------------  IN: 200 mL / OUT: 1450 mL / NET: -1250 mL    PHYSICAL EXAM:  Vital Signs Last 24 Hrs  T(C): 36.4 (02 May 2023 13:32), Max: 36.6 (01 May 2023 21:18)  T(F): 97.6 (02 May 2023 13:32), Max: 97.8 (01 May 2023 21:18)  HR: 59 (02 May 2023 13:32) (59 - 83)  BP: 101/65 (02 May 2023 13:32) (101/65 - 110/68)  RR: 17 (02 May 2023 13:32) (17 - 18)  SpO2: 100% (02 May 2023 13:32) (100% - 100%) on room air    Constitutional: NAD, well-groomed, well-developed  Back: Normal spine flexure, No CVA tenderness  Respiratory: CTAB  Cardiovascular: S1 and S2, RRR  Gastrointestinal: BS+, soft, NT/ND, ostomy draining with air and brown stool in bag  Extremities: No peripheral edema  Vascular: 2+ peripheral pulses  Neurological: A/O x 3, no focal deficits  Psychiatric: Normal mood, normal affect  Musculoskeletal: 5/5 strength b/l upper and lower extremities    LABS:                 11.8   5.59  )-----------( 302                36.5     131<L>  |  94<L>  |  54<H>  ----------------------------<  124<H>  5.0   |  25  |  2.50<H>    Ca    9.2      02 May 2023 06:20  Phos  4.0     05-02  Mg     1.70     05-02    TPro  6.7  /  Alb  3.3  /  TBili  1.0  /  DBili  x   /  AST  51<H>  /  ALT  120<H>  /  AlkPhos  177<H>  05-02  PT/INR - ( 01 May 2023 06:38 )   PT: 17.6 sec;   INR: 1.51 ratio    PTT - ( 01 May 2023 06:38 )  PTT:31.5 sec    COORDINATION OF CARE:  Care Discussed with Consultants/Other Providers [Y/N]: Y  Prior or Outpatient Records Reviewed [Y/N]: MACKENZIE Guerra, PGY1  Can be reached on MS teams

## 2023-05-02 NOTE — PROGRESS NOTE ADULT - PROBLEM SELECTOR PLAN 5
- currently asymptomatic  - will monitor off abx for now - currently asymptomatic  - will order urine culture to confirm if infection present, as, per Urology, UA is typically positive if stents present  - will monitor off abx for now

## 2023-05-02 NOTE — PROGRESS NOTE ADULT - SUBJECTIVE AND OBJECTIVE BOX
Surgical oncology follow up note:    S: tolerating clears. Denies nausea or vomiting. Pain resolved.    O:   Vital Signs Last 24 Hrs  T(C): 36.4 (02 May 2023 13:32), Max: 36.6 (01 May 2023 21:18)  T(F): 97.6 (02 May 2023 13:32), Max: 97.8 (01 May 2023 21:18)  HR: 59 (02 May 2023 13:32) (59 - 83)  BP: 101/65 (02 May 2023 13:32) (101/65 - 110/68)  BP(mean): --  RR: 17 (02 May 2023 13:32) (17 - 18)  SpO2: 100% (02 May 2023 13:32) (100% - 100%)    Parameters below as of 02 May 2023 13:32  Patient On (Oxygen Delivery Method): room air    General: no acute distress, alert, oriented x3  Breathing comfortable on room air  Cardiovascular: RRR  Abdominal: soft, non distended, non tender. Ileostomy with air and brown liquid in bag    I&O's Detail    01 May 2023 07:01  -  02 May 2023 07:00  --------------------------------------------------------  IN:    Oral Fluid: 200 mL  Total IN: 200 mL    OUT:    Colostomy (mL): 800 mL    Voided (mL): 650 mL  Total OUT: 1450 mL    Total NET: -1250 mL

## 2023-05-03 ENCOUNTER — TRANSCRIPTION ENCOUNTER (OUTPATIENT)
Age: 58
End: 2023-05-03

## 2023-05-03 ENCOUNTER — APPOINTMENT (OUTPATIENT)
Dept: UROLOGY | Facility: CLINIC | Age: 58
End: 2023-05-03

## 2023-05-03 LAB
ANION GAP SERPL CALC-SCNC: 12 MMOL/L — SIGNIFICANT CHANGE UP (ref 7–14)
BUN SERPL-MCNC: 46 MG/DL — HIGH (ref 7–23)
CALCIUM SERPL-MCNC: 8.9 MG/DL — SIGNIFICANT CHANGE UP (ref 8.4–10.5)
CEA SERPL-MCNC: 29.8 NG/ML — HIGH (ref 1–3.8)
CHLORIDE SERPL-SCNC: 94 MMOL/L — LOW (ref 98–107)
CO2 SERPL-SCNC: 23 MMOL/L — SIGNIFICANT CHANGE UP (ref 22–31)
CREAT SERPL-MCNC: 1.78 MG/DL — HIGH (ref 0.5–1.3)
CULTURE RESULTS: SIGNIFICANT CHANGE UP
EGFR: 44 ML/MIN/1.73M2 — LOW
GLUCOSE SERPL-MCNC: 111 MG/DL — HIGH (ref 70–99)
HCT VFR BLD CALC: 33.5 % — LOW (ref 39–50)
HGB BLD-MCNC: 10.7 G/DL — LOW (ref 13–17)
MAGNESIUM SERPL-MCNC: 2.1 MG/DL — SIGNIFICANT CHANGE UP (ref 1.6–2.6)
MCHC RBC-ENTMCNC: 28.4 PG — SIGNIFICANT CHANGE UP (ref 27–34)
MCHC RBC-ENTMCNC: 31.9 GM/DL — LOW (ref 32–36)
MCV RBC AUTO: 88.9 FL — SIGNIFICANT CHANGE UP (ref 80–100)
NRBC # BLD: 0 /100 WBCS — SIGNIFICANT CHANGE UP (ref 0–0)
NRBC # FLD: 0 K/UL — SIGNIFICANT CHANGE UP (ref 0–0)
PHOSPHATE SERPL-MCNC: 3.3 MG/DL — SIGNIFICANT CHANGE UP (ref 2.5–4.5)
PLATELET # BLD AUTO: 282 K/UL — SIGNIFICANT CHANGE UP (ref 150–400)
POTASSIUM SERPL-MCNC: 4.8 MMOL/L — SIGNIFICANT CHANGE UP (ref 3.5–5.3)
POTASSIUM SERPL-SCNC: 4.8 MMOL/L — SIGNIFICANT CHANGE UP (ref 3.5–5.3)
RBC # BLD: 3.77 M/UL — LOW (ref 4.2–5.8)
RBC # FLD: 12.5 % — SIGNIFICANT CHANGE UP (ref 10.3–14.5)
SODIUM SERPL-SCNC: 129 MMOL/L — LOW (ref 135–145)
SPECIMEN SOURCE: SIGNIFICANT CHANGE UP
WBC # BLD: 4.62 K/UL — SIGNIFICANT CHANGE UP (ref 3.8–10.5)
WBC # FLD AUTO: 4.62 K/UL — SIGNIFICANT CHANGE UP (ref 3.8–10.5)

## 2023-05-03 PROCEDURE — 99233 SBSQ HOSP IP/OBS HIGH 50: CPT

## 2023-05-03 PROCEDURE — 99232 SBSQ HOSP IP/OBS MODERATE 35: CPT | Mod: GC

## 2023-05-03 PROCEDURE — 71250 CT THORAX DX C-: CPT | Mod: 26

## 2023-05-03 RX ADMIN — OCTREOTIDE ACETATE 100 MICROGRAM(S): 200 INJECTION, SOLUTION INTRAVENOUS; SUBCUTANEOUS at 21:55

## 2023-05-03 RX ADMIN — Medication 4 MILLIGRAM(S): at 05:29

## 2023-05-03 RX ADMIN — Medication 10 MILLIGRAM(S): at 17:30

## 2023-05-03 RX ADMIN — SODIUM CHLORIDE 100 MILLILITER(S): 9 INJECTION INTRAMUSCULAR; INTRAVENOUS; SUBCUTANEOUS at 16:40

## 2023-05-03 RX ADMIN — OCTREOTIDE ACETATE 100 MICROGRAM(S): 200 INJECTION, SOLUTION INTRAVENOUS; SUBCUTANEOUS at 05:29

## 2023-05-03 RX ADMIN — Medication 10 MILLIGRAM(S): at 09:52

## 2023-05-03 RX ADMIN — APIXABAN 5 MILLIGRAM(S): 2.5 TABLET, FILM COATED ORAL at 05:29

## 2023-05-03 RX ADMIN — Medication 1300 MILLIGRAM(S): at 05:28

## 2023-05-03 RX ADMIN — SODIUM CHLORIDE 100 MILLILITER(S): 9 INJECTION INTRAMUSCULAR; INTRAVENOUS; SUBCUTANEOUS at 13:32

## 2023-05-03 RX ADMIN — Medication 1300 MILLIGRAM(S): at 17:30

## 2023-05-03 RX ADMIN — SODIUM CHLORIDE 100 MILLILITER(S): 9 INJECTION INTRAMUSCULAR; INTRAVENOUS; SUBCUTANEOUS at 05:28

## 2023-05-03 RX ADMIN — APIXABAN 5 MILLIGRAM(S): 2.5 TABLET, FILM COATED ORAL at 17:30

## 2023-05-03 RX ADMIN — OCTREOTIDE ACETATE 100 MICROGRAM(S): 200 INJECTION, SOLUTION INTRAVENOUS; SUBCUTANEOUS at 13:34

## 2023-05-03 RX ADMIN — Medication 4 MILLIGRAM(S): at 17:30

## 2023-05-03 NOTE — DISCHARGE NOTE PROVIDER - NSDCFUSCHEDAPPT_GEN_ALL_CORE_FT
Eugenia Adkins  [facility]  ENDOCRIN [id number]  Scheduled Appointment: 05/16/2023

## 2023-05-03 NOTE — DISCHARGE NOTE PROVIDER - CARE PROVIDERS DIRECT ADDRESSES
,DirectAddress_Unknown,DirectAddress_Unknown ,DirectAddress_Unknown,DirectAddress_Unknown,[email or web address]

## 2023-05-03 NOTE — PROGRESS NOTE ADULT - PROBLEM SELECTOR PLAN 3
- iso BL nephrolithiasis   - BL stents in place and noted to be stable  - Urology contacted; patient to reschedule appt with Dr. Nino, no inpt intervention  - will continue to monitor urine output - iso BL nephrolithiasis   - BL stents in place and noted to be stable  - Urology to see patient while here   - will continue to monitor urine output

## 2023-05-03 NOTE — PROGRESS NOTE ADULT - PROBLEM SELECTOR PLAN 5
- currently asymptomatic  - will order urine culture to confirm if infection present, as, per Urology, UA is typically positive if stents present  - will monitor off abx for now - Eliquis continued

## 2023-05-03 NOTE — DISCHARGE NOTE PROVIDER - HOSPITAL COURSE
HPI:  56 yo m h/o left sided colon ca s/p resection, ileostomy 2022, possible recurrence per March MRI based on pelvic mass adjacent to bladder, DVT on eliquis. Obstructing renal stone s/p ureteral stent 4/8/23 . Pt presents in setting of nausea, vomiting, inability to tolerate po, no ostomy output  that began today. CT abd at [place] + SBO. 5.1 x 3.7 cm  mass seen near urinary bladder, similar to March MRI. Stable ureteral stent. Pt reports no pain or difficulty voiding, UA pending. SCr 2.7 compared to 1.6 April 13th. Transferred to [facility], NGT placed, and ostomy began draining. Seen by surgery, who recommended no acute intervention given return of ostomy output.  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 An NGT was also placed to [facility]. IVF was started. He started to notice air and liquid in his bag on 5/1. His urine studies revealed pre-renal MAXWELL, for which fluids were continued. Nephrology was consulted and gave recommendations. A US renal was done whoch showed mild bilateral hydronephrosis, BL nonobstructing calculi, and mass at urinary done (known). UA was positive and a urine culture was sent; this was . He was monitored off antibiotics. The NGT was removed on 5/1 as he continued to have fluid and air in his bag and was not nauseous. A CLD was started and Eliquis was removed. On 5/2, his diet was advanced to regular, which he tolerated. He was seen by both Surgical Oncology and Urology while inpatient.   He was seen by the primary team on 5/4 and determined to be amenable for discharge home.     HPI:  56 yo m h/o left sided colon ca s/p resection, ileostomy 2022, possible recurrence per March MRI based on pelvic mass adjacent to bladder, DVT on eliquis. Obstructing renal stone s/p ureteral stent 4/8/23 . Pt presents in setting of nausea, vomiting, inability to tolerate po, no ostomy output  that began today. CT abd at [place] + SBO. 5.1 x 3.7 cm  mass seen near urinary bladder, similar to March MRI. Stable ureteral stent. Pt reports no pain or difficulty voiding, UA pending. SCr 2.7 compared to 1.6 April 13th. Transferred to [facility], NGT placed, and ostomy began draining. Seen by surgery, who recommended no acute intervention given return of ostomy output.  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 An NGT was also placed to [facility]. IVF was started. He started to notice air and liquid in his bag on 5/1. His urine studies revealed pre-renal MAXWELL, for which fluids were continued. Nephrology was consulted and gave recommendations. A US renal was done which showed mild bilateral hydronephrosis, BL nonobstructing calculi, and mass at urinary done (known). UA was positive and a urine culture was sent; this was . He was monitored off antibiotics. The NGT was removed on 5/1 as he continued to have fluid and air in his bag and was not nauseous. A CLD was started and Eliquis was removed. On 5/2, his diet was advanced to regular, which he tolerated. He was seen by both Surgical Oncology and Urology while inpatient. A CEA was obtained, for cancer staging, which was elevated, as awell as a CT chest which showed an indeterminate 2 mm right lower lobe nodule.  He was seen by the primary team on 5/4 and determined to be amenable for discharge home.     HPI:  56 yo m h/o left sided colon ca s/p resection, ileostomy 2022, possible recurrence per March MRI based on pelvic mass adjacent to bladder, DVT on eliquis. Obstructing renal stone s/p ureteral stent 4/8/23 . Pt presents in setting of nausea, vomiting, inability to tolerate po, no ostomy output  that began today. CT abd at [place] + SBO. 5.1 x 3.7 cm  mass seen near urinary bladder, similar to March MRI. Stable ureteral stent. Pt reports no pain or difficulty voiding, UA pending. SCr 2.7 compared to 1.6 April 13th. Transferred to [facility], NGT placed, and ostomy began draining. Seen by surgery, who recommended no acute intervention given return of ostomy output.  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 An NGT was also placed to [facility]. IVF was started. He started to notice air and liquid in his bag on 5/1. His urine studies revealed pre-renal MAXWELL, for which fluids were continued. Nephrology was consulted and gave recommendations. A US renal was done which showed mild bilateral hydronephrosis, BL nonobstructing calculi, and mass at urinary done (known). UA was positive and a urine culture was sent; which was negative. He was monitored off antibiotics. The NGT was removed on 5/1 as he continued to have fluid and air in his bag and was not nauseous. On 5/2, his diet was advanced to regular, which he tolerated. He was seen by both Surgical Oncology and Urology while inpatient. A CEA was obtained, for cancer staging, which was elevated, as awell as a CT chest which showed an indeterminate 2 mm right lower lobe nodule.  He was seen by the primary team on 5/4 and determined to be amenable for discharge home.

## 2023-05-03 NOTE — PROGRESS NOTE ADULT - PROBLEM SELECTOR PLAN 6
- noted on prior labs but milder  - known obstructive disease with mild hydronephrosis   - will repeat but likely have patient follow up with Nephro outpt - noted on prior labs but milder  - known obstructive disease with mild hydronephrosis   - will repeat s/p hydration but likely have patient follow up with Nephro outpt

## 2023-05-03 NOTE — PROGRESS NOTE ADULT - PROBLEM SELECTOR PLAN 4
- Eliquis continued - currently asymptomatic  - f/u UCx as, per Urology, UA is typically positive if stents present  - will monitor off abx for now

## 2023-05-03 NOTE — PROGRESS NOTE ADULT - PROBLEM SELECTOR PLAN 1
Pt. with non-oliguric MAXWELL in setting of decreased PO intake, GI losses, and obstructive uropathy/hydronephrosis. Pt. with history of nephrolithiasis. Pt. with MAXWELL during his recent hospital stay at [facility] in April 2023 for right kidney stone. Scr was 3.23 on 4/6/23 and improved to 1.25 on 4/12/23. Scr was elevated at 1.61 on 4/13/23. Pt. presented with nausea, vomiting and decreased PO intake at [facility] and was found to have SBO. Scr was elevated 2.83 on presentation on current admission. UA done on 5/1 shows hematuria, proteinuria and pyuria. Vivien was <20. CT A/P done on 4/30/23 showed bilateral non-obstructing kidney stones. US kidney and bladder done on 5/1/23 showed mild bilateral hydronephrosis. Mass near bladder dome also noted on CT study and sonogram. Recommend urology evaluation for hydronephrosis and above CT/sonogram findings. Pt. currently on IVF. Scr decreased to 1.78 today. Monitor labs and urine output. Avoid any potential nephrotoxins. Dose medications as per eGFR

## 2023-05-03 NOTE — DISCHARGE NOTE PROVIDER - ATTENDING DISCHARGE PHYSICAL EXAMINATION:
Constitutional: NAD, well-groomed, well-developed  Back: Normal spine flexure, No CVA tenderness  Respiratory: CTAB  Cardiovascular: S1 and S2, RRR  Gastrointestinal: BS+, soft, NT/ND, ostomy draining with air and brown stool in bag; pink stoma mucosa  Extremities: No peripheral edema  Vascular: 2+ peripheral pulses  Neurological: A/O x 3, no focal deficits  Psychiatric: Normal mood, normal affect  Musculoskeletal: 5/5 strength b/l upper and lower extremities

## 2023-05-03 NOTE — PROGRESS NOTE ADULT - SUBJECTIVE AND OBJECTIVE BOX
PROGRESS NOTE:   Patient is a 57y old  Male who presents with a chief complaint of sbo     SUBJECTIVE / OVERNIGHT EVENTS:  No acute events overnight. Patient tolerates regular diet and is still passing stool into his ostomy.    MEDICATIONS  (STANDING):  apixaban 5 milliGRAM(s) Oral every 12 hours  dexAMETHasone     Tablet 4 milliGRAM(s) Oral every 12 hours  metoclopramide 10 milliGRAM(s) Oral every 8 hours  octreotide  Injectable 100 MICROGram(s) SubCutaneous every 8 hours  sodium bicarbonate 1300 milliGRAM(s) Oral two times a day  sodium chloride 0.9%. 1000 milliLiter(s) (100 mL/Hr) IV Continuous <Continuous>    MEDICATIONS  (PRN):  benzocaine/menthol Lozenge 1 Lozenge Oral every 6 hours PRN Sore Throat    I&O's Summary  02 May 2023 07:01  -  03 May 2023 07:00  --------------------------------------------------------  IN: 0 mL / OUT: 2100 mL / NET: -2100 mL    PHYSICAL EXAM:  Vital Signs Last 24 Hrs  T(C): 36.7 (03 May 2023 05:52), Max: 36.7 (02 May 2023 22:02)  T(F): 98 (03 May 2023 05:52), Max: 98 (02 May 2023 22:02)  HR: 60 (03 May 2023 05:52) (59 - 60)  BP: 107/68 (03 May 2023 05:52) (96/59 - 107/68)  RR: 17 (03 May 2023 05:52) (17 - 17)  SpO2: 100% (03 May 2023 05:52) (100% - 100%) on room air    Constitutional: NAD, well-groomed, well-developed  Back: Normal spine flexure, No CVA tenderness  Respiratory: CTAB  Cardiovascular: S1 and S2, RRR  Gastrointestinal: BS+, soft, NT/ND, ostomy draining with air and brown stool in bag  Extremities: No peripheral edema  Vascular: 2+ peripheral pulses  Neurological: A/O x 3, no focal deficits  Psychiatric: Normal mood, normal affect  Musculoskeletal: 5/5 strength b/l upper and lower extremities    LABS:                  10.7   4.62  )-----------( 282                 33.5     129<L>  |  94<L>  |  46<H>  ----------------------------<  111<H>  4.8   |  23  |  1.78<H>    Ca    8.9      03 May 2023 07:05  Phos  3.3     05-03  Mg     2.10     05-03    TPro  6.7  /  Alb  3.3  /  TBili  1.0  /  DBili  x   /  AST  51<H>  /  ALT  120<H>  /  AlkPhos  177<H>  05-02    COORDINATION OF CARE:  Care Discussed with Consultants/Other Providers [Y/N]: MACKENZIE  Prior or Outpatient Records Reviewed [Y/N]: MACKENZIE Guerra PGY1  Can be reached on MS teams

## 2023-05-03 NOTE — PROGRESS NOTE ADULT - PROBLEM SELECTOR PLAN 2
Pt. with hyperkalemia in setting of MAXWELL and obstructive uropathy. Pt. received medical management. Last serum potassium WNL at 4.8 today. Low potassium diet. Monitor serum potassium.

## 2023-05-03 NOTE — DISCHARGE NOTE PROVIDER - NSDCCPTREATMENT_GEN_ALL_CORE_FT
PRINCIPAL PROCEDURE  Procedure: CT chest wo con  Findings and Treatment: IMPRESSION:  Indeterminate 2 mm right lower lobe nodule. Attention on follow-up   imaging is recommended.  Focal atelectasis/scarring at the right lung base.  Recommend follow-up imaging in 3 months      SECONDARY PROCEDURE  Procedure: CT abdomen pelvis  Findings and Treatment: IMPRESSION:  Limited evaluation of the abdominal organs without IV contrast.  Small bowel obstruction with transition point in the right lower   quadrant, proximal to the ileostomy. No pneumatosis.  Stable bilateral pelviectasis with stable position of the right   nephroureteral stent

## 2023-05-03 NOTE — DISCHARGE NOTE PROVIDER - CARE PROVIDER_API CALL
Catalina Mendosa)  Complex General Surgical Oncology; Surgery  [address] [place] [address]  Phone: [phone]  Fax: [phone]  Established Patient  Follow Up Time: 2 weeks    Giorgio Hinds [address] [address]  Phone: [phone]  Fax: [phone]  Established Patient  Follow Up Time: 2 weeks   Catalina Mendosa)  Complex General Surgical Oncology; Surgery  [address] [place] [address]  Phone: [phone]  Fax: [phone]  Established Patient  Follow Up Time: 2 weeks    [place] [address]  Phone: [phone]  Fax: [phone]  Established Patient  Follow Up Time: 2 weeks    Brayan Nino)  Urology  [address] [facility], [place] [address]  Phone: [phone]  Fax: [phone]  Established Patient  Follow Up Time: 1 week

## 2023-05-03 NOTE — DISCHARGE NOTE PROVIDER - PROVIDER TOKENS
PROVIDER:[TOKEN:[[id number]],FOLLOWUP:[2 weeks],ESTABLISHEDPATIENT:[T]],PROVIDER:[TOKEN:[[id number]],FOLLOWUP:[2 weeks],ESTABLISHEDPATIENT:[T]] PROVIDER:[TOKEN:[[id number]],FOLLOWUP:[2 weeks],ESTABLISHEDPATIENT:[T]],PROVIDER:[TOKEN:[[id number]],FOLLOWUP:[2 weeks],ESTABLISHEDPATIENT:[T]],PROVIDER:[TOKEN:[[id number]],FOLLOWUP:[1 week],ESTABLISHEDPATIENT:[T]]

## 2023-05-03 NOTE — PROGRESS NOTE ADULT - PROBLEM SELECTOR PLAN 1
- prerenal component present as patient says PO intake was reduced prior to admission  - CT abd negative for hydronephrosis; ureteral stent  placed April 8th for obstructing  stone intact; no CT evidence of extrinsic compression from tumor  - Nephro consulted, patient has MAXWELL as prior labs noted to be good with acute worsening  - FENa showing pre-renal MAXWELL    Plan  - c/w IVF and encourage oral intake - prerenal component present as patient says PO intake was reduced prior to admission  - CT abd negative for hydronephrosis; ureteral stent  placed April 8th for obstructing  stone intact; no CT evidence of extrinsic compression from tumor  - Nephro consulted, patient has MAXWELL as prior labs noted to be good with acute worsening  - FENa showing pre-renal MAXWELL    Plan  - c/w IVF, can dc on discharge

## 2023-05-03 NOTE — PROGRESS NOTE ADULT - SUBJECTIVE AND OBJECTIVE BOX
[facility] DIVISION OF KIDNEY DISEASES AND HYPERTENSION   FOLLOW UP NOTE    --------------------------------------------------------------------------------  HPI: 57-year-old male with PMH of colon cancer (ileostomy placement in 2022) initially presented to [facility] with nausea and vomiting. Pt underwent CT A/P which showed SBO and was transferred to [facility] for further evaluation. ER labs at [facility] showed Scr of 2.83. Pt being seen for MAXWELL.     Pt. gives history of right kidney stone and required ureteral stent placement during his recent admission at [facility] in April 2023. Scr was 3.23 on 4/6/23 and improved to 1.25 on 4/12/23. Scr was elevated at 1.61 on 4/13/23. Pt. noted to have elevated Scr of 2.83 during current hospital stay on 4/30/23. UA done on 5/1/23 showed hematuria, proteinuria and pyuria. Vivien <20. CT A/P done on 4/30/23 showed bilateral non-obstructing kidney stones. Pt. with mild B/L hydronephrosis on kidney sonogram done on 5/1/23. Pt. was also noted to have elevated serum potassium of 6.3 on presentation (4/30/23) and was treated medically. Last serum potassium improved to 4.8 today.     Pt. seen and examined today, reports feeling well. No fever, chills, nausea, vomiting, CP or SOB during evaluation. Pt currently on IVF. Scr decreased to 1.78. Pt is non-oliguric, however noted to have dark urine.     PAST HISTORY  --------------------------------------------------------------------------------  No significant changes to PMH, PSH, FHx, SHx, unless otherwise noted    ALLERGIES & MEDICATIONS  --------------------------------------------------------------------------------  Allergies  lactose (Unknown)  No Known Drug Allergies    Intolerances    Standing Inpatient Medications  apixaban 5 milliGRAM(s) Oral every 12 hours  dexAMETHasone     Tablet 4 milliGRAM(s) Oral every 12 hours  metoclopramide 10 milliGRAM(s) Oral every 8 hours  octreotide  Injectable 100 MICROGram(s) SubCutaneous every 8 hours  sodium bicarbonate 1300 milliGRAM(s) Oral two times a day  sodium chloride 0.9%. 1000 milliLiter(s) IV Continuous <Continuous>    PRN Inpatient Medications  benzocaine/menthol Lozenge 1 Lozenge Oral every 6 hours PRN    REVIEW OF SYSTEMS  --------------------------------------------------------------------------------  Gen: No fever  Head/Eyes/Ears: No HA  Respiratory: No dyspnea, cough  CV: No chest pain  GI: No abdominal pain, see HPI  : See HPI  MSK: No LE swelling  Skin: No rash  Heme: See HPI    VITALS/PHYSICAL EXAM  --------------------------------------------------------------------------------  T(C): 36.7 (05-03-23 @ 05:52), Max: 36.7 (05-02-23 @ 22:02)  HR: 60 (05-03-23 @ 05:52) (59 - 60)  BP: 107/68 (05-03-23 @ 05:52) (96/59 - 107/68)  RR: 17 (05-03-23 @ 05:52) (17 - 17)  SpO2: 100% (05-03-23 @ 05:52) (100% - 100%)  Wt(kg): --    05-02-23 @ 07:01  -  05-03-23 @ 07:00  --------------------------------------------------------  IN: 0 mL / OUT: 2100 mL / NET: -2100 mL    Physical Exam:  	Gen: resting in bed, ill appearing   	HEENT: Anicteric  	Pulm: CTA B/L  	CV: S1S2+  	Abd: Soft, +BS, ostomy bag seen    	Ext: No LE edema B/L  	Neuro: Awake, alert  	Skin: Warm and dry    LABS/STUDIES  --------------------------------------------------------------------------------              10.7   4.62  >-----------<  282      [05-03-23 @ 07:05]              33.5     129  |  94  |  46  ----------------------------<  111      [05-03-23 @ 07:05]  4.8   |  23  |  1.78        Ca     8.9     [05-03-23 @ 07:05]      Mg     2.10     [05-03-23 @ 07:05]      Phos  3.3     [05-03-23 @ 07:05]    Creatinine Trend:  SCr 1.78 [05-03 @ 07:05]  SCr 2.50 [05-02 @ 06:20]  SCr 2.75 [05-01 @ 15:17]  SCr 2.75 [05-01 @ 06:38]  SCr 2.70 [04-30 @ 19:21]    Urinalysis - [05-01-23 @ 15:41]      Color Yellow / Appearance Slightly Turbid / SG 1.019 / pH 6.0      Gluc 500 mg/dL / Ketone Negative  / Bili Negative / Urobili <2 mg/dL       Blood Large / Protein 100 mg/dL / Leuk Est Large / Nitrite Negative       / WBC 42 / Hyaline 0 / Gran  / Sq Epi  / Non Sq Epi  / Bacteria Few    Urine Creatinine 178      [05-01-23 @ 15:00]  Urine Sodium <20      [05-01-23 @ 15:00]  Urine Potassium 80.3      [05-01-23 @ 15:00]  Urine Chloride <20      [05-01-23 @ 15:00] [facility] DIVISION OF KIDNEY DISEASES AND HYPERTENSION   FOLLOW UP NOTE    --------------------------------------------------------------------------------  HPI: 57-year-old male with PMH of colon cancer (ileostomy placement in 2022) initially presented to [facility] with nausea and vomiting. Pt underwent CT A/P which showed SBO and was transferred to [facility] for further evaluation. ER labs at [facility] showed Scr of 2.83. Pt being seen for MAXWELL.     Pt. gives history of right kidney stone and required ureteral stent placement during his recent admission at [facility] in April 2023. Scr was 3.23 on 4/6/23 and improved to 1.25 on 4/12/23. Scr was elevated at 1.61 on 4/13/23. Pt. noted to have elevated Scr of 2.83 during current hospital stay on 4/30/23. UA done on 5/1/23 showed hematuria, proteinuria and pyuria. Vivien <20. CT A/P done on 4/30/23 showed bilateral non-obstructing kidney stones. Pt. with mild B/L hydronephrosis on kidney sonogram done on 5/1/23. Pt. was also noted to have elevated serum potassium of 6.3 on presentation (4/30/23) and was treated medically. Last serum potassium improved to 4.8 today.     Pt. seen and examined today, reports feeling well. No fever, chills, nausea, vomiting, CP or SOB during evaluation. Pt. currently on IVF. Scr decreased to 1.78. Pt is non-oliguric, however noted to have dark urine.     PAST HISTORY  --------------------------------------------------------------------------------  No significant changes to PMH, PSH, FHx, SHx, unless otherwise noted    ALLERGIES & MEDICATIONS  --------------------------------------------------------------------------------  Allergies  lactose (Unknown)  No Known Drug Allergies    Intolerances    Standing Inpatient Medications  apixaban 5 milliGRAM(s) Oral every 12 hours  dexAMETHasone     Tablet 4 milliGRAM(s) Oral every 12 hours  metoclopramide 10 milliGRAM(s) Oral every 8 hours  octreotide  Injectable 100 MICROGram(s) SubCutaneous every 8 hours  sodium bicarbonate 1300 milliGRAM(s) Oral two times a day  sodium chloride 0.9%. 1000 milliLiter(s) IV Continuous <Continuous>    PRN Inpatient Medications  benzocaine/menthol Lozenge 1 Lozenge Oral every 6 hours PRN    REVIEW OF SYSTEMS  --------------------------------------------------------------------------------  Gen: No fever  Head/Eyes/Ears: No HA  Respiratory: No dyspnea, cough  CV: No chest pain  GI: No abdominal pain, see HPI  : See HPI  MSK: No LE swelling  Skin: No rash  Heme: See HPI    VITALS/PHYSICAL EXAM  --------------------------------------------------------------------------------  T(C): 36.7 (05-03-23 @ 05:52), Max: 36.7 (05-02-23 @ 22:02)  HR: 60 (05-03-23 @ 05:52) (59 - 60)  BP: 107/68 (05-03-23 @ 05:52) (96/59 - 107/68)  RR: 17 (05-03-23 @ 05:52) (17 - 17)  SpO2: 100% (05-03-23 @ 05:52) (100% - 100%)  Wt(kg): --    05-02-23 @ 07:01  -  05-03-23 @ 07:00  --------------------------------------------------------  IN: 0 mL / OUT: 2100 mL / NET: -2100 mL    Physical Exam:  	Gen: resting in bed, ill appearing   	HEENT: Anicteric  	Pulm: CTA B/L  	CV: S1S2+  	Abd: Soft, +BS, ostomy bag seen    	Ext: No LE edema B/L  	Neuro: Awake, alert  	Skin: Warm and dry    LABS/STUDIES  --------------------------------------------------------------------------------              10.7   4.62  >-----------<  282      [05-03-23 @ 07:05]              33.5     129  |  94  |  46  ----------------------------<  111      [05-03-23 @ 07:05]  4.8   |  23  |  1.78        Ca     8.9     [05-03-23 @ 07:05]      Mg     2.10     [05-03-23 @ 07:05]      Phos  3.3     [05-03-23 @ 07:05]    Creatinine Trend:  SCr 1.78 [05-03 @ 07:05]  SCr 2.50 [05-02 @ 06:20]  SCr 2.75 [05-01 @ 15:17]  SCr 2.75 [05-01 @ 06:38]  SCr 2.70 [04-30 @ 19:21]    Urinalysis - [05-01-23 @ 15:41]      Color Yellow / Appearance Slightly Turbid / SG 1.019 / pH 6.0      Gluc 500 mg/dL / Ketone Negative  / Bili Negative / Urobili <2 mg/dL       Blood Large / Protein 100 mg/dL / Leuk Est Large / Nitrite Negative       / WBC 42 / Hyaline 0 / Gran  / Sq Epi  / Non Sq Epi  / Bacteria Few    Urine Creatinine 178      [05-01-23 @ 15:00]  Urine Sodium <20      [05-01-23 @ 15:00]  Urine Potassium 80.3      [05-01-23 @ 15:00]  Urine Chloride <20      [05-01-23 @ 15:00]

## 2023-05-03 NOTE — PROGRESS NOTE ADULT - PROBLEM SELECTOR PLAN 2
- likely 2/2 malignancy   - currently on octreotide, Reglan , decadron per surg recs  - tolerating diet, will transition to regular if tolerating full liquid  - resolved    Plan   - see if SBO regimen can be discontinued - likely 2/2 malignancy   - currently on octreotide, Reglan , decadron per surg recs  - now on regular diet  - resolved  - SBO regimen can be stopped on discharge

## 2023-05-03 NOTE — PROGRESS NOTE ADULT - SUBJECTIVE AND OBJECTIVE BOX
Surgical oncology follow up note:    S: tolerating regular diet. Denies nausea, vomiting, or abdominal pain.    O:  Vital Signs Last 24 Hrs  T(C): 36.7 (03 May 2023 05:52), Max: 36.7 (02 May 2023 22:02)  T(F): 98 (03 May 2023 05:52), Max: 98 (02 May 2023 22:02)  HR: 60 (03 May 2023 05:52) (59 - 60)  BP: 107/68 (03 May 2023 05:52) (96/59 - 107/68)  BP(mean): --  RR: 17 (03 May 2023 05:52) (17 - 17)  SpO2: 100% (03 May 2023 05:52) (100% - 100%)    Parameters below as of 03 May 2023 05:52  Patient On (Oxygen Delivery Method): room air    No acute distress, alert, oriented x3  Breathing comfortable on room air  Abdomen soft, non distended, non tender, with ileostomy liquid mix stools output    I&O's Detail    02 May 2023 07:01  -  03 May 2023 07:00  --------------------------------------------------------  IN:  Total IN: 0 mL    OUT:    Colostomy (mL): 1400 mL    Voided (mL): 700 mL  Total OUT: 2100 mL    Total NET: -2100 mL

## 2023-05-03 NOTE — PROGRESS NOTE ADULT - PROBLEM SELECTOR PLAN 8
Diet: Full liquid  Activity: as tolerated  DVT ppx: Eliquis  Dispo: home when amenable Diet: regular w Ensure  Activity: as tolerated  DVT ppx: Eliquis  Dispo: home when amenable

## 2023-05-03 NOTE — DISCHARGE NOTE PROVIDER - NSDCMRMEDTOKEN_GEN_ALL_CORE_FT
apixaban 5 mg oral tablet: 1 tab(s) orally 2 times a day   cholestyramine 4 g/9 g oral powder for reconstitution: 4 gram(s) orally once a day  loperamide 2 mg oral capsule: 2 cap(s) orally every 6 hours as needed for  diarrhea  sodium bicarbonate 650 mg oral tablet: 2 tab(s) orally 2 times a day  tamsulosin 0.4 mg oral capsule: 1 cap(s) orally once a day (at bedtime)

## 2023-05-03 NOTE — PROGRESS NOTE ADULT - ASSESSMENT
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 Noncon CT at [facility] with concern for SBO with TP in LLQ. Repeat CT at [facility] with evidence of PO contrast passage beyond LLQ, Pt now with stool in ostomy, no longer clinically obstructed. SBO resolving. Lab work concerning for new MAXWELL with multiple electrolyte abnormalities. Patient is no longer clinically obstructed since tolerating diet with continued ostomy function.  - Continue diet as tolerated  - Obtain CEA and CT chest to evaluate metastatic disease before discharge  - Remainder of care per primary team

## 2023-05-03 NOTE — DISCHARGE NOTE PROVIDER - NSDCCPCAREPLAN_GEN_ALL_CORE_FT
PRINCIPAL DISCHARGE DIAGNOSIS  Diagnosis: SBO (small bowel obstruction)  Assessment and Plan of Treatment: This was noted to be your first episode of small bowel obstruction. We placed you on a medication regimen to increase gut movement. We also placed a nasogastric tube in your stomach to decompress your small bowel and release tension. Your obstruction resolved quickly and you were noted to have air and liquid in your ostomy bag. As you were also no longer nauseous, we removed the nasogastric tube and satrted a clear liquid diet, progressing as you were able to tolerate.  Having bowel resention places you at risk of having small bowel obstruction. Please continue to monitor for the symptoms of this and come to the ED immediately if you experience them.      SECONDARY DISCHARGE DIAGNOSES  Diagnosis: MAXWELL (acute kidney injury)  Assessment and Plan of Treatment: You were found to have an elevated creatinine on admission. This is likley due to dehydration. As your creatinine has been noted to be normal before, this was felt to be acute rather than chronic injury. You were put on IV fluids, to which your creatinine responded appropriately by decreasing. It decreased even further once you were eating and drinking.  Please continue to hydrate appriproiately as having an ostomy places you at higher risk of dehydration since you do not have a colon to absorb water. We suggest following up with a nephrologist for further care.    Diagnosis: Abnormal finding on urinalysis  Assessment and Plan of Treatment: You were found to have elevated protein and glusoce concentrations in your urine. We obtaines an ultrasound image of your kidneys which showed mild hydronephrosis.   Please follow up with a nephrologist for further management of these fincings.     No PRINCIPAL DISCHARGE DIAGNOSIS  Diagnosis: SBO (small bowel obstruction)  Assessment and Plan of Treatment: This was noted to be your first episode of small bowel obstruction. We placed you on a medication regimen to increase gut movement. We also placed a nasogastric tube in your stomach to decompress your small bowel and release tension. Your obstruction resolved quickly and you were noted to have air and liquid in your ostomy bag. As you were also no longer nauseous, we removed the nasogastric tube and satrted a clear liquid diet, progressing as you were able to tolerate.  Having bowel resention places you at risk of having small bowel obstruction. Please continue to monitor for the symptoms of this and come to the ED immediately if you experience them.      SECONDARY DISCHARGE DIAGNOSES  Diagnosis: MAXWELL (acute kidney injury)  Assessment and Plan of Treatment: You were found to have an elevated creatinine on admission. This is likley due to dehydration. As your creatinine has been noted to be normal before, this was felt to be acute rather than chronic injury. You were put on IV fluids, to which your creatinine responded appropriately by decreasing. It decreased even further once you were eating and drinking.  Please continue to hydrate appriproiately as having an ostomy places you at higher risk of dehydration since you do not have a colon to absorb water. We suggest following up with a nephrologist for further care.    Diagnosis: Abnormal finding on urinalysis  Assessment and Plan of Treatment: You were found to have elevated protein and glusoce concentrations in your urine. We obtained an ultrasound image of your kidneys which showed mild hydronephrosis.   Please follow up with a nephrologist for further management of these findings.    Diagnosis: Lung nodule  Assessment and Plan of Treatment: This was found on the CT chest requested by the Surgical Oncology team for cancer staging. Please follow up with Dr. Luo for further management.    Diagnosis: Elevated CEA  Assessment and Plan of Treatment: This was found on the lab requested by the Surgical Oncology team for cancer staging. Please follow up with Dr. Luo for further management.

## 2023-05-03 NOTE — DISCHARGE NOTE PROVIDER - NSFOLLOWUPCLINICS_GEN_ALL_ED_FT
[facility]  Nephrology  [address] [place] [address], [address] [place] [address]  Phone: [phone]  Fax:   Follow Up Time: 1 week

## 2023-05-04 ENCOUNTER — TRANSCRIPTION ENCOUNTER (OUTPATIENT)
Age: 58
End: 2023-05-04

## 2023-05-04 VITALS
TEMPERATURE: 98 F | HEART RATE: 62 BPM | DIASTOLIC BLOOD PRESSURE: 65 MMHG | SYSTOLIC BLOOD PRESSURE: 109 MMHG | RESPIRATION RATE: 17 BRPM | OXYGEN SATURATION: 100 %

## 2023-05-04 DIAGNOSIS — R91.1 SOLITARY PULMONARY NODULE: ICD-10-CM

## 2023-05-04 DIAGNOSIS — R97.0 ELEVATED CARCINOEMBRYONIC ANTIGEN [CEA]: ICD-10-CM

## 2023-05-04 LAB
ANION GAP SERPL CALC-SCNC: 11 MMOL/L — SIGNIFICANT CHANGE UP (ref 7–14)
BUN SERPL-MCNC: 38 MG/DL — HIGH (ref 7–23)
CALCIUM SERPL-MCNC: 9 MG/DL — SIGNIFICANT CHANGE UP (ref 8.4–10.5)
CHLORIDE SERPL-SCNC: 99 MMOL/L — SIGNIFICANT CHANGE UP (ref 98–107)
CO2 SERPL-SCNC: 22 MMOL/L — SIGNIFICANT CHANGE UP (ref 22–31)
CREAT SERPL-MCNC: 1.6 MG/DL — HIGH (ref 0.5–1.3)
EGFR: 50 ML/MIN/1.73M2 — LOW
GLUCOSE SERPL-MCNC: 98 MG/DL — SIGNIFICANT CHANGE UP (ref 70–99)
HCT VFR BLD CALC: 38.8 % — LOW (ref 39–50)
HGB BLD-MCNC: 12.2 G/DL — LOW (ref 13–17)
MAGNESIUM SERPL-MCNC: 2 MG/DL — SIGNIFICANT CHANGE UP (ref 1.6–2.6)
MCHC RBC-ENTMCNC: 27.5 PG — SIGNIFICANT CHANGE UP (ref 27–34)
MCHC RBC-ENTMCNC: 31.4 GM/DL — LOW (ref 32–36)
MCV RBC AUTO: 87.6 FL — SIGNIFICANT CHANGE UP (ref 80–100)
NRBC # BLD: 0 /100 WBCS — SIGNIFICANT CHANGE UP (ref 0–0)
NRBC # FLD: 0 K/UL — SIGNIFICANT CHANGE UP (ref 0–0)
PHOSPHATE SERPL-MCNC: 2.8 MG/DL — SIGNIFICANT CHANGE UP (ref 2.5–4.5)
PLATELET # BLD AUTO: 304 K/UL — SIGNIFICANT CHANGE UP (ref 150–400)
POTASSIUM SERPL-MCNC: 4.3 MMOL/L — SIGNIFICANT CHANGE UP (ref 3.5–5.3)
POTASSIUM SERPL-SCNC: 4.3 MMOL/L — SIGNIFICANT CHANGE UP (ref 3.5–5.3)
RBC # BLD: 4.43 M/UL — SIGNIFICANT CHANGE UP (ref 4.2–5.8)
RBC # FLD: 12.4 % — SIGNIFICANT CHANGE UP (ref 10.3–14.5)
SODIUM SERPL-SCNC: 132 MMOL/L — LOW (ref 135–145)
WBC # BLD: 5.19 K/UL — SIGNIFICANT CHANGE UP (ref 3.8–10.5)
WBC # FLD AUTO: 5.19 K/UL — SIGNIFICANT CHANGE UP (ref 3.8–10.5)

## 2023-05-04 PROCEDURE — 99239 HOSP IP/OBS DSCHRG MGMT >30: CPT

## 2023-05-04 PROCEDURE — 99232 SBSQ HOSP IP/OBS MODERATE 35: CPT | Mod: GC

## 2023-05-04 RX ORDER — TAMSULOSIN HYDROCHLORIDE 0.4 MG/1
0.4 CAPSULE ORAL AT BEDTIME
Refills: 0 | Status: DISCONTINUED | OUTPATIENT
Start: 2023-05-04 | End: 2023-05-04

## 2023-05-04 RX ADMIN — OCTREOTIDE ACETATE 100 MICROGRAM(S): 200 INJECTION, SOLUTION INTRAVENOUS; SUBCUTANEOUS at 05:36

## 2023-05-04 RX ADMIN — Medication 4 MILLIGRAM(S): at 05:36

## 2023-05-04 RX ADMIN — Medication 1300 MILLIGRAM(S): at 05:36

## 2023-05-04 RX ADMIN — Medication 10 MILLIGRAM(S): at 02:23

## 2023-05-04 RX ADMIN — APIXABAN 5 MILLIGRAM(S): 2.5 TABLET, FILM COATED ORAL at 05:35

## 2023-05-04 RX ADMIN — Medication 10 MILLIGRAM(S): at 11:52

## 2023-05-04 RX ADMIN — SODIUM CHLORIDE 100 MILLILITER(S): 9 INJECTION INTRAMUSCULAR; INTRAVENOUS; SUBCUTANEOUS at 02:23

## 2023-05-04 NOTE — PROGRESS NOTE ADULT - PROBLEM SELECTOR PLAN 5
- found on the lab requested by the Surgical Oncology team for cancer staging  - to follow up with Dr. Luo for further management

## 2023-05-04 NOTE — PROVIDER CONTACT NOTE (OTHER) - BACKGROUND
Patient admitted with intestinal obstruction. PMH of colon cancer and H/O ileostomy.

## 2023-05-04 NOTE — PROGRESS NOTE ADULT - PROBLEM SELECTOR PLAN 1
- prerenal component present as patient says PO intake was reduced prior to admission  - CT abd negative for hydronephrosis; ureteral stent  placed April 8th for obstructing  stone intact; no CT evidence of extrinsic compression from tumor  - Nephro consulted, patient has MAXWELL as prior labs noted to be good with acute worsening  - FENa showing pre-renal MAXWELL  - continuing to resolve    Plan  - c/w IVF, can dc on discharge

## 2023-05-04 NOTE — CHART NOTE - NSCHARTNOTEFT_GEN_A_CORE
Source: Patient [x ]    Family [ ]     other [x ] chart review    Patient seen for severe malnutrition f/u. 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    Patient seen at bedside. Reports good appetite. Currently ordered for ensure clear (240 kcal, 8 g pro). No GI distress reported. No edema or pressure injuries noted per RN flow sheet. Patient w/ no f/u questions regarding nutrition.    Diet : Diet, Regular:   No Concentrated Potassium  Supplement Feeding Modality:  Oral  Ensure Clear Cans or Servings Per Day:  1       Frequency:  Three Times a day (05-03-23 @ 11:01)    Current Weight: Weight (kg): no new weight to assess  52 kg (5/1)    Pertinent Medications: MEDICATIONS  (STANDING):  apixaban 5 milliGRAM(s) Oral every 12 hours  dexAMETHasone     Tablet 4 milliGRAM(s) Oral every 12 hours  metoclopramide 10 milliGRAM(s) Oral every 8 hours  octreotide  Injectable 100 MICROGram(s) SubCutaneous every 8 hours  sodium bicarbonate 1300 milliGRAM(s) Oral two times a day  sodium chloride 0.9%. 1000 milliLiter(s) (100 mL/Hr) IV Continuous <Continuous>  tamsulosin 0.4 milliGRAM(s) Oral at bedtime    MEDICATIONS  (PRN):  benzocaine/menthol Lozenge 1 Lozenge Oral every 6 hours PRN Sore Throat    Pertinent Labs:  05-04 Na132 mmol/L<L> Glu 98 mg/dL K+ 4.3 mmol/L Cr  1.60 mg/dL<H> BUN 38 mg/dL<H> 05-04 Phos 2.8 mg/dL 05-02 Alb 3.3 g/dL    Estimated Needs: [x ] no change since previous assessment    Previous Nutrition Diagnosis: Severe malnutrition    Nutrition Diagnosis is [x ] ongoing  [ ] resolved [ ] not applicable     Recommend  - continue diet as ordered  - continue w/ ensure clear TID (720 kcal, 24 g pro)  - obtain weekly weight and document PO intake to monitor trend     Monitoring and Evaluation:   [x ] PO intake [x ] Tolerance to diet prescription [x ] weights [x ] follow up per protocol

## 2023-05-04 NOTE — PROGRESS NOTE ADULT - ATTENDING COMMENTS
Pt. with MAXWELL in setting of decreased oral intake and obstructive uropathy. Scr decreased to 1.78 today. Assessment and plan for MAXWELL and hyperkalemia as outlined above. Monitor labs and urine output. Avoid any potential nephrotoxins. Dose medications as per eGFR.
Pt. with MAXWELL in setting of decreased oral intake and obstructive uropathy. Scr decreased to 1.6 today. Assessment and plan for MAXWELL and hyperkalemia as outlined above. Monitor labs and urine output. Avoid any potential nephrotoxins. Dose medications as per eGFR.
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     - Increased ostomy output, and back to baseline per patient; minimal output in NGT  - F/u sx regarding further plan. Ok to clamp NGT? timing for PO  - C/w malignant SBO protocol: Decadron + Reglan + Octreotide  - Resume home meds when cleared for PO  - MAXWELL likely due to decreased PO; IVF while NPO, and trend Cr; f/u nephro rec  - LE DVT: if no sx plan, will resume A/C (Lovenox while NPO and Eliquis while PO)  - UA (+), asymptomatic, observe off abx.
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     - Increased ostomy output, and back to baseline per patient; S/p NGT removal and advance diet as tolerated  - C/w malignant SBO protocol: Decadron + Reglan + Octreotide; will f/u with surgery regarding duration  - Resumed home PO meds  - MAXWELL likely due to decreased PO; s/p IVF; trend Cr; f/u nephro rec  - Curbsided urology regarding asymptomatic bacteruria given recent ureteral stenting in April 2023. Will obtain Ucx; f/u outpatient uro for further management of the stents  - LE DVT: Resumed Eliquis; Was dx in 9/2022 per patient; given onc hx, will continue
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     - Ostomy output back to baseline per patient; Tolerating regular diet  - C/w malignant SBO protocol: Decadron + Reglan + Octreotide; can stop upon discharge.  - Resumed home PO meds  - MAXWELL likely due to decreased PO; improving with IVF; trend Cr; f/u nephro rec  - Curbsided urology regarding asymptomatic bacteruria given recent ureteral stenting in April 2023. Will obtain Ucx to determine the need for abx; UA may just be positive due to the presence of a stent. f/u outpatient uro for further management of the stents; --> update: Ucx neg, no need to treat  - LE DVT: Resumed Eliquis; Was dx in 9/2022 per patient; given onc hx, will continue  - Per sx onc, will obtain CEA and CT staging scans while inpatient. --> CT chest non con showing 1 small nodule, repeat CT outpatient for f/u; f/u outpatient onc  d/c 5/4
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     - Ostomy output back to baseline per patient; Tolerating regular diet  - C/w malignant SBO protocol: Decadron + Reglan + Octreotide; can stop upon discharge.  - Resumed home PO meds  - MAXWELL likely due to decreased PO; improving with IVF; trend Cr; f/u nephro rec  - Curbsided urology regarding asymptomatic bacteruria given recent ureteral stenting in April 2023. Will obtain Ucx to determine the need for abx; UA may just be positive due to the presence of a stent. f/u outpatient uro for further management of the stents  - LE DVT: Resumed Eliquis; Was dx in 9/2022 per patient; given onc hx, will continue  - Per sx onc, will obtain CEA and CT staging scans while inpatient.    Likely d/c 5/4

## 2023-05-04 NOTE — PROVIDER CONTACT NOTE (OTHER) - ACTION/TREATMENT ORDERED:
MD made aware. No new interventions at this time.
MD made aware. No new interventions at this time. Continue to monitor.
MD made aware. No new interventions at this time. Continue to monitor.

## 2023-05-04 NOTE — PROGRESS NOTE ADULT - PROBLEM SELECTOR PROBLEM 1
MAXWELL (acute kidney injury)
MAXWELL (acute kidney injury)
SBO (small bowel obstruction)
MAXWELL (acute kidney injury)

## 2023-05-04 NOTE — PROGRESS NOTE ADULT - PROBLEM SELECTOR PROBLEM 2
Bilateral hydronephrosis
Hyperkalemia
Hyperkalemia
MAXWELL (acute kidney injury)
SBO (small bowel obstruction)
SBO (small bowel obstruction)

## 2023-05-04 NOTE — PROVIDER CONTACT NOTE (OTHER) - ASSESSMENT
HR 60, BP 98/59. No signs or symptoms of acute distress noted.
No signs or symptoms of acute distress noted.
HR 59, BP 96/59. No signs or symptoms of acute distress noted.

## 2023-05-04 NOTE — PROGRESS NOTE ADULT - PROBLEM SELECTOR PLAN 4
- found on imaging requested by the Surgical Oncology team for cancer staging  - to follow up with Dr. Luo for further management

## 2023-05-04 NOTE — PROGRESS NOTE ADULT - NUTRITIONAL ASSESSMENT
This patient has been assessed with a concern for Malnutrition and has been determined to have a diagnosis/diagnoses of Severe protein-calorie malnutrition and Underweight (BMI < 19).    This patient is being managed with:   Diet Regular-  No Concentrated Potassium  Entered: May  2 2023  3:21PM  
This patient has been assessed with a concern for Malnutrition and has been determined to have a diagnosis/diagnoses of Severe protein-calorie malnutrition and Underweight (BMI < 19).    This patient is being managed with:   Diet Full Liquid-  No Concentrated Potassium  Supplement Feeding Modality:  Oral  Ensure Clear Cans or Servings Per Day:  1       Frequency:  Three Times a day  Entered: May  2 2023 10:10AM  
This patient has been assessed with a concern for Malnutrition and has been determined to have a diagnosis/diagnoses of Severe protein-calorie malnutrition and Underweight (BMI < 19).    This patient is being managed with:   Diet Full Liquid-  No Concentrated Potassium  Supplement Feeding Modality:  Oral  Ensure Clear Cans or Servings Per Day:  1       Frequency:  Three Times a day  Entered: May  2 2023 10:10AM  
This patient has been assessed with a concern for Malnutrition and has been determined to have a diagnosis/diagnoses of Severe protein-calorie malnutrition and Underweight (BMI < 19).    This patient is being managed with:   Diet Regular-  No Concentrated Potassium  Entered: May  2 2023  3:21PM  
This patient has been assessed with a concern for Malnutrition and has been determined to have a diagnosis/diagnoses of Severe protein-calorie malnutrition and Underweight (BMI < 19).    This patient is being managed with:   Diet Regular-  No Concentrated Potassium  Supplement Feeding Modality:  Oral  Ensure Clear Cans or Servings Per Day:  1       Frequency:  Three Times a day  Entered: May  3 2023 11:02AM  
This patient has been assessed with a concern for Malnutrition and has been determined to have a diagnosis/diagnoses of Severe protein-calorie malnutrition and Underweight (BMI < 19).    This patient is being managed with:   Diet NPO-  Entered: May  1 2023  1:21AM

## 2023-05-04 NOTE — PROGRESS NOTE ADULT - SUBJECTIVE AND OBJECTIVE BOX
[facility] DIVISION OF KIDNEY DISEASES AND HYPERTENSION   FOLLOW UP NOTE    --------------------------------------------------------------------------------  HPI: 57-year-old male with PMH of colon cancer (ileostomy placement in 2022) initially presented to [facility] with nausea and vomiting. Pt underwent CT A/P which showed SBO and was transferred to [facility] for further evaluation. ER labs at [facility] showed Scr of 2.83. Pt being seen for MAXWELL.     Pt. gives history of right kidney stone and required ureteral stent placement during his recent admission at [facility] in April 2023. Scr was 3.23 on 4/6/23 and improved to 1.25 on 4/12/23. Scr was elevated at 1.61 on 4/13/23. Pt. noted to have elevated Scr of 2.83 during current hospital stay on 4/30/23. UA done on 5/1/23 showed hematuria, proteinuria and pyuria. Vivien <20. CT A/P done on 4/30/23 showed bilateral non-obstructing kidney stones. Pt. with mild B/L hydronephrosis on kidney sonogram done on 5/1/23. Pt. was also noted to have elevated serum potassium of 6.3 on presentation (4/30/23) and was treated medically. Last serum potassium improved to 4.3 today (5/4/23).    Pt. seen and examined today, reports feeling well. No fever, chills, nausea, vomiting, CP or SOB during evaluation. Pt. currently on IVF. Scr decreased to 1.60. No urine output documented.     PAST HISTORY  --------------------------------------------------------------------------------  No significant changes to PMH, PSH, FHx, SHx, unless otherwise noted    ALLERGIES & MEDICATIONS  --------------------------------------------------------------------------------  Allergies    lactose (Unknown)  No Known Drug Allergies    Intolerances    Standing Inpatient Medications  apixaban 5 milliGRAM(s) Oral every 12 hours  dexAMETHasone     Tablet 4 milliGRAM(s) Oral every 12 hours  metoclopramide 10 milliGRAM(s) Oral every 8 hours  octreotide  Injectable 100 MICROGram(s) SubCutaneous every 8 hours  sodium bicarbonate 1300 milliGRAM(s) Oral two times a day  sodium chloride 0.9%. 1000 milliLiter(s) IV Continuous <Continuous>  tamsulosin 0.4 milliGRAM(s) Oral at bedtime    PRN Inpatient Medications  benzocaine/menthol Lozenge 1 Lozenge Oral every 6 hours PRN    REVIEW OF SYSTEMS  --------------------------------------------------------------------------------  Gen: No fever  Head/Eyes/Ears: No HA  Respiratory: No dyspnea, cough  CV: No chest pain  GI: No abdominal pain, see HPI  : See HPI  MSK: No LE swelling  Skin: No rash  Heme: See HPI    VITALS/PHYSICAL EXAM  --------------------------------------------------------------------------------  T(C): 36.4 (05-04-23 @ 05:38), Max: 36.6 (05-03-23 @ 13:50)  HR: 60 (05-04-23 @ 05:38) (55 - 60)  BP: 103/60 (05-04-23 @ 05:38) (98/59 - 103/60)  RR: 16 (05-04-23 @ 05:38) (16 - 18)  SpO2: 100% (05-04-23 @ 05:38) (100% - 100%)  Wt(kg): --    Physical Exam:  	Gen: resting in bed, ill appearing   	HEENT: Anicteric  	Pulm: CTA B/L  	CV: S1S2+  	Abd: Soft, +BS, ostomy bag seen    	Ext: No LE edema B/L  	Neuro: Awake, alert  	Skin: Warm and dry    LABS/STUDIES  --------------------------------------------------------------------------------              12.2   5.19  >-----------<  304      [05-04-23 @ 06:50]              38.8     132  |  99  |  38  ----------------------------<  98      [05-04-23 @ 06:50]  4.3   |  22  |  1.60        Ca     9.0     [05-04-23 @ 06:50]      Mg     2.00     [05-04-23 @ 06:50]      Phos  2.8     [05-04-23 @ 06:50]    Creatinine Trend:  SCr 1.60 [05-04 @ 06:50]  SCr 1.78 [05-03 @ 07:05]  SCr 2.50 [05-02 @ 06:20]  SCr 2.75 [05-01 @ 15:17]  SCr 2.75 [05-01 @ 06:38]    Urinalysis - [05-01-23 @ 15:41]      Color Yellow / Appearance Slightly Turbid / SG 1.019 / pH 6.0      Gluc 500 mg/dL / Ketone Negative  / Bili Negative / Urobili <2 mg/dL       Blood Large / Protein 100 mg/dL / Leuk Est Large / Nitrite Negative       / WBC 42 / Hyaline 0 / Gran  / Sq Epi  / Non Sq Epi  / Bacteria Few    Urine Creatinine 178      [05-01-23 @ 15:00]  Urine Sodium <20      [05-01-23 @ 15:00]  Urine Potassium 80.3      [05-01-23 @ 15:00]  Urine Chloride <20      [05-01-23 @ 15:00] [facility] DIVISION OF KIDNEY DISEASES AND HYPERTENSION   FOLLOW UP NOTE    --------------------------------------------------------------------------------  HPI: 57-year-old male with PMH of colon cancer (ileostomy placement in 2022) initially presented to [facility] with nausea and vomiting. Pt underwent CT A/P which showed SBO and was transferred to [facility] for further evaluation. ER labs at [facility] showed Scr of 2.83. Pt being seen for MAXWELL.     Pt. gives history of right kidney stone and required ureteral stent placement during his recent admission at [facility] in April 2023. Scr was 3.23 on 4/6/23 and improved to 1.25 on 4/12/23. Scr was elevated at 1.61 on 4/13/23. Pt. noted to have elevated Scr of 2.83 during current hospital stay on 4/30/23. UA done on 5/1/23 showed hematuria, proteinuria and pyuria. Vivien <20. CT A/P done on 4/30/23 showed bilateral non-obstructing kidney stones. Pt. with mild B/L hydronephrosis on kidney sonogram done on 5/1/23. Pt. was also noted to have elevated serum potassium of 6.3 on presentation (4/30/23) and was treated medically. Serum potassium improved to 4.3 today (5/4/23).    Pt. seen and examined today, reports feeling well. No fever, chills, nausea, vomiting, CP or SOB during evaluation. Pt. currently on IVF. Scr decreased to 1.60 today. No urine output documented.     PAST HISTORY  --------------------------------------------------------------------------------  No significant changes to PMH, PSH, FHx, SHx, unless otherwise noted    ALLERGIES & MEDICATIONS  --------------------------------------------------------------------------------  Allergies    lactose (Unknown)  No Known Drug Allergies    Intolerances    Standing Inpatient Medications  apixaban 5 milliGRAM(s) Oral every 12 hours  dexAMETHasone     Tablet 4 milliGRAM(s) Oral every 12 hours  metoclopramide 10 milliGRAM(s) Oral every 8 hours  octreotide  Injectable 100 MICROGram(s) SubCutaneous every 8 hours  sodium bicarbonate 1300 milliGRAM(s) Oral two times a day  sodium chloride 0.9%. 1000 milliLiter(s) IV Continuous <Continuous>  tamsulosin 0.4 milliGRAM(s) Oral at bedtime    PRN Inpatient Medications  benzocaine/menthol Lozenge 1 Lozenge Oral every 6 hours PRN    REVIEW OF SYSTEMS  --------------------------------------------------------------------------------  Gen: No fever  Head/Eyes/Ears: No HA  Respiratory: No dyspnea, cough  CV: No chest pain  GI: No abdominal pain, see HPI  : See HPI  MSK: No LE swelling  Skin: No rash  Heme: See HPI    VITALS/PHYSICAL EXAM  --------------------------------------------------------------------------------  T(C): 36.4 (05-04-23 @ 05:38), Max: 36.6 (05-03-23 @ 13:50)  HR: 60 (05-04-23 @ 05:38) (55 - 60)  BP: 103/60 (05-04-23 @ 05:38) (98/59 - 103/60)  RR: 16 (05-04-23 @ 05:38) (16 - 18)  SpO2: 100% (05-04-23 @ 05:38) (100% - 100%)  Wt(kg): --    Physical Exam:  	Gen: resting in bed, ill appearing   	HEENT: Anicteric  	Pulm: CTA B/L  	CV: S1S2+  	Abd: Soft, +BS, ostomy bag seen    	Ext: No LE edema B/L  	Neuro: Awake, alert  	Skin: Warm and dry    LABS/STUDIES  --------------------------------------------------------------------------------              12.2   5.19  >-----------<  304      [05-04-23 @ 06:50]              38.8     132  |  99  |  38  ----------------------------<  98      [05-04-23 @ 06:50]  4.3   |  22  |  1.60        Ca     9.0     [05-04-23 @ 06:50]      Mg     2.00     [05-04-23 @ 06:50]      Phos  2.8     [05-04-23 @ 06:50]    Creatinine Trend:  SCr 1.60 [05-04 @ 06:50]  SCr 1.78 [05-03 @ 07:05]  SCr 2.50 [05-02 @ 06:20]  SCr 2.75 [05-01 @ 15:17]  SCr 2.75 [05-01 @ 06:38]    Urinalysis - [05-01-23 @ 15:41]      Color Yellow / Appearance Slightly Turbid / SG 1.019 / pH 6.0      Gluc 500 mg/dL / Ketone Negative  / Bili Negative / Urobili <2 mg/dL       Blood Large / Protein 100 mg/dL / Leuk Est Large / Nitrite Negative       / WBC 42 / Hyaline 0 / Gran  / Sq Epi  / Non Sq Epi  / Bacteria Few    Urine Creatinine 178      [05-01-23 @ 15:00]  Urine Sodium <20      [05-01-23 @ 15:00]  Urine Potassium 80.3      [05-01-23 @ 15:00]  Urine Chloride <20      [05-01-23 @ 15:00]

## 2023-05-04 NOTE — PROGRESS NOTE ADULT - PROBLEM SELECTOR PLAN 2
Pt. with hyperkalemia in setting of MAXWELL and obstructive uropathy. Pt. received medical management. Last serum potassium WNL at 4.3 today. Low potassium diet. Monitor serum potassium.    Nephrology will discontinue follow up, reconsult as needed. Pt. with hyperkalemia in setting of MAXWELL and obstructive uropathy. Hyperkalemia resolved with medical management. Last serum potassium WNL at 4.3 today. Low potassium diet. Monitor serum potassium.    Will discontinue follow up, reconsult as needed.

## 2023-05-04 NOTE — PROGRESS NOTE ADULT - PROBLEM SELECTOR PLAN 1
Pt. with non-oliguric MAXWELL in setting of decreased PO intake, GI losses, and obstructive uropathy/hydronephrosis. Pt. with history of nephrolithiasis. Pt. with MAXWELL during his recent hospital stay at [facility] in April 2023 for right kidney stone. Scr was 3.23 on 4/6/23 and improved to 1.25 on 4/12/23. Scr was elevated at 1.61 on 4/13/23. Pt. presented with nausea, vomiting and decreased PO intake at [facility] and was found to have SBO. Scr was elevated 2.83 on presentation on current admission. UA done on 5/1 shows hematuria, proteinuria and pyuria. Vivien was <20. CT A/P done on 4/30/23 showed bilateral non-obstructing kidney stones. US kidney and bladder done on 5/1/23 showed mild bilateral hydronephrosis. Mass near bladder dome also noted on CT study and sonogram. Recommend urology evaluation for hydronephrosis and above CT/sonogram findings. Pt. currently on IVF. Scr decreased to 1.60 today. Monitor labs and urine output. Strict I/O. Avoid any potential nephrotoxins. Dose medications as per eGFR Pt. with non-oliguric MAXWELL in setting of decreased PO intake, GI losses, and obstructive uropathy/hydronephrosis. Pt. with history of nephrolithiasis. Pt. with MAXWELL during his recent hospital stay at [facility] in April 2023 for right kidney stone. Scr was 3.23 on 4/6/23 and improved to 1.25 on 4/12/23. Scr was elevated at 1.61 on 4/13/23. Pt. presented with nausea, vomiting and decreased PO intake at [facility] and was found to have SBO. Scr was elevated 2.83 on presentation on current admission. UA done on 5/1 shows hematuria, proteinuria and pyuria. Vivien was <20. CT A/P done on 4/30/23 showed bilateral non-obstructing kidney stones. US kidney and bladder done on 5/1/23 showed mild bilateral hydronephrosis. Mass near bladder dome also noted on CT study and sonogram. Recommend urology evaluation for hydronephrosis and above CT/sonogram findings. Pt. currently on IVF. Scr decreased to 1.60 today. Monitor labs and urine output. Avoid any potential nephrotoxins. Dose medications as per eGFR.

## 2023-05-04 NOTE — DISCHARGE NOTE NURSING/CASE MANAGEMENT/SOCIAL WORK - NSDCPEFALRISK_GEN_ALL_CORE
For information on Fall & Injury Prevention, visit: [email or web address] OR  [email or web address] OR  https://www.cdc.gov/steadi/patient.html

## 2023-05-04 NOTE — PROGRESS NOTE ADULT - PROBLEM SELECTOR PROBLEM 7
Glucosuria with normal serum glucose
Proteinuria
Glucosuria with normal serum glucose
Glucosuria with normal serum glucose

## 2023-05-04 NOTE — DISCHARGE NOTE NURSING/CASE MANAGEMENT/SOCIAL WORK - PATIENT PORTAL LINK FT
You can access the FollowMyHealth Patient Portal offered by [facility] by registering at the following website: [email or web address]. By joining [facility]’s FollowMyHealth portal, you will also be able to view your health information using other applications (apps) compatible with our system.

## 2023-05-04 NOTE — PROGRESS NOTE ADULT - PROBLEM SELECTOR PLAN 2
- iso BL nephrolithiasis   - BL stents in place and noted to be stable  - will continue to monitor urine output  - patient to follow up with Urology

## 2023-05-04 NOTE — PROGRESS NOTE ADULT - PROVIDER SPECIALTY LIST ADULT
Surgery
Nephrology
Surgery
Surgery
Nephrology
Internal Medicine

## 2023-05-04 NOTE — PROGRESS NOTE ADULT - SUBJECTIVE AND OBJECTIVE BOX
PROGRESS NOTE:   Patient is a 57y old  Male who presents with a chief complaint of sbo     SUBJECTIVE / OVERNIGHT EVENTS:  No acute events overnight. No nausea, still passing stool into ostomy bag    MEDICATIONS  (STANDING):  apixaban 5 milliGRAM(s) Oral every 12 hours  dexAMETHasone     Tablet 4 milliGRAM(s) Oral every 12 hours  metoclopramide 10 milliGRAM(s) Oral every 8 hours  octreotide  Injectable 100 MICROGram(s) SubCutaneous every 8 hours  sodium bicarbonate 1300 milliGRAM(s) Oral two times a day  sodium chloride 0.9%. 1000 milliLiter(s) (100 mL/Hr) IV Continuous <Continuous>  tamsulosin 0.4 milliGRAM(s) Oral at bedtime    MEDICATIONS  (PRN):  benzocaine/menthol Lozenge 1 Lozenge Oral every 6 hours PRN Sore Throat    PHYSICAL EXAM:  Vital Signs Last 24 Hrs  T(C): 36.4 (04 May 2023 05:38), Max: 36.6 (03 May 2023 13:50)  T(F): 97.6 (04 May 2023 05:38), Max: 97.9 (03 May 2023 13:50)  HR: 60 (04 May 2023 05:38) (55 - 60)  BP: 103/60 (04 May 2023 05:38) (98/59 - 103/60)  RR: 16 (04 May 2023 05:38) (16 - 18)  SpO2: 100% (04 May 2023 05:38) (100% - 100%) on room air    Constitutional: NAD, well-groomed, well-developed  Back: Normal spine flexure, No CVA tenderness  Respiratory: CTAB  Cardiovascular: S1 and S2, RRR  Gastrointestinal: BS+, soft, NT/ND, ostomy draining with air and brown stool in bag  Extremities: No peripheral edema  Vascular: 2+ peripheral pulses  Neurological: A/O x 3, no focal deficits  Psychiatric: Normal mood, normal affect  Musculoskeletal: 5/5 strength b/l upper and lower extremities    LABS:             12.2   5.19  )-----------( 304                   38.8     132<L>  |  99  |  38<H>  ----------------------------<  98  4.3   |  22  |  1.60<H>    Ca    9.0      04 May 2023 06:50  Phos  2.8     05-04  Mg     2.00     05-04    Culture - Urine (collected 02 May 2023 20:14)  Source: Clean Catch Clean Catch (Midstream)  Final Report (03 May 2023 22:29):    <10,000 CFU/mL Normal Urogenital Heidy    RADIOLOGY & ADDITIONAL TESTS:  Results Reviewed: Y    COORDINATION OF CARE:  Care Discussed with Consultants/Other Providers [Y/N]: Y  Prior or Outpatient Records Reviewed [Y/N]: MACKENZIE Guerra PGY1  Can be reached on MS teams

## 2023-05-04 NOTE — PROGRESS NOTE ADULT - PROBLEM SELECTOR PLAN 8
- noted on prior labs but milder  - known obstructive disease with mild hydronephrosis   - will repeat s/p hydration but likely have patient follow up with Nephro outpt

## 2023-05-08 ENCOUNTER — APPOINTMENT (OUTPATIENT)
Dept: UROLOGY | Facility: CLINIC | Age: 58
End: 2023-05-08
Payer: MEDICAID

## 2023-05-08 VITALS
HEIGHT: 67 IN | HEART RATE: 67 BPM | TEMPERATURE: 98.3 F | DIASTOLIC BLOOD PRESSURE: 71 MMHG | SYSTOLIC BLOOD PRESSURE: 114 MMHG | RESPIRATION RATE: 17 BRPM | BODY MASS INDEX: 20.09 KG/M2 | WEIGHT: 128 LBS

## 2023-05-08 PROCEDURE — 99214 OFFICE O/P EST MOD 30 MIN: CPT

## 2023-05-08 NOTE — PHYSICAL EXAM
[General Appearance - Well Developed] : well developed [General Appearance - Well Nourished] : well nourished [Normal Appearance] : normal appearance [Well Groomed] : well groomed [General Appearance - In No Acute Distress] : no acute distress [Abdomen Soft] : soft [Abdomen Tenderness] : non-tender [Costovertebral Angle Tenderness] : no ~M costovertebral angle tenderness [Skin Color & Pigmentation] : normal skin color and pigmentation [] : no respiratory distress [Respiration, Rhythm And Depth] : normal respiratory rhythm and effort [Exaggerated Use Of Accessory Muscles For Inspiration] : no accessory muscle use [Oriented To Time, Place, And Person] : oriented to person, place, and time [Normal Station and Gait] : the gait and station were normal for the patient's age

## 2023-05-08 NOTE — ASSESSMENT
[FreeTextEntry1] : Suggest bilateral ureteroscopy laser lithotripsy, possible Right ureteral stent stent removal, left ureteral stent insertion\par Risks, benefits and alternatives discussed.\par Risk of infection, multiple procedures, ureteral injury, ureteral stricture, incomplete stones clearance, sepsis, bleeding, retention, all discussed.\par Will schedule. \par No need to stop Eliquis.

## 2023-05-08 NOTE — HISTORY OF PRESENT ILLNESS
[FreeTextEntry1] : 56 y/o man\par Here with his wife\par Kidney stones\par Has Ileostomy\par \par See notes from Dr. Meadows \par First episode of kidney stones\par Reviewed CT with the patient: has small stones bilaterally, looks more like milk of calcium in both kidneys and small fragments. Right UVJ stone.\par \par Right ureteral stent placed about a month ago\par

## 2023-05-09 ENCOUNTER — OUTPATIENT (OUTPATIENT)
Dept: OUTPATIENT SERVICES | Facility: HOSPITAL | Age: 58
LOS: 1 days | End: 2023-05-09
Payer: MEDICAID

## 2023-05-09 VITALS
HEART RATE: 73 BPM | OXYGEN SATURATION: 100 % | TEMPERATURE: 99 F | RESPIRATION RATE: 15 BRPM | WEIGHT: 121.92 LBS | SYSTOLIC BLOOD PRESSURE: 105 MMHG | HEIGHT: 67 IN | DIASTOLIC BLOOD PRESSURE: 66 MMHG

## 2023-05-09 DIAGNOSIS — Z90.49 ACQUIRED ABSENCE OF OTHER SPECIFIED PARTS OF DIGESTIVE TRACT: Chronic | ICD-10-CM

## 2023-05-09 DIAGNOSIS — Z01.818 ENCOUNTER FOR OTHER PREPROCEDURAL EXAMINATION: ICD-10-CM

## 2023-05-09 DIAGNOSIS — Z98.890 OTHER SPECIFIED POSTPROCEDURAL STATES: Chronic | ICD-10-CM

## 2023-05-09 DIAGNOSIS — N20.0 CALCULUS OF KIDNEY: ICD-10-CM

## 2023-05-09 LAB
ANION GAP SERPL CALC-SCNC: 11 MMOL/L — SIGNIFICANT CHANGE UP (ref 5–17)
BUN SERPL-MCNC: 17 MG/DL — SIGNIFICANT CHANGE UP (ref 7–23)
CALCIUM SERPL-MCNC: 8.5 MG/DL — SIGNIFICANT CHANGE UP (ref 8.4–10.5)
CHLORIDE SERPL-SCNC: 103 MMOL/L — SIGNIFICANT CHANGE UP (ref 96–108)
CO2 SERPL-SCNC: 24 MMOL/L — SIGNIFICANT CHANGE UP (ref 22–31)
CREAT SERPL-MCNC: 1.43 MG/DL — HIGH (ref 0.5–1.3)
EGFR: 57 ML/MIN/1.73M2 — LOW
GLUCOSE SERPL-MCNC: 124 MG/DL — HIGH (ref 70–99)
HCT VFR BLD CALC: 34.2 % — LOW (ref 39–50)
HGB BLD-MCNC: 11.1 G/DL — LOW (ref 13–17)
MCHC RBC-ENTMCNC: 28.4 PG — SIGNIFICANT CHANGE UP (ref 27–34)
MCHC RBC-ENTMCNC: 32.5 GM/DL — SIGNIFICANT CHANGE UP (ref 32–36)
MCV RBC AUTO: 87.5 FL — SIGNIFICANT CHANGE UP (ref 80–100)
NRBC # BLD: 0 /100 WBCS — SIGNIFICANT CHANGE UP (ref 0–0)
PLATELET # BLD AUTO: 215 K/UL — SIGNIFICANT CHANGE UP (ref 150–400)
POTASSIUM SERPL-MCNC: 4.1 MMOL/L — SIGNIFICANT CHANGE UP (ref 3.5–5.3)
POTASSIUM SERPL-SCNC: 4.1 MMOL/L — SIGNIFICANT CHANGE UP (ref 3.5–5.3)
RBC # BLD: 3.91 M/UL — LOW (ref 4.2–5.8)
RBC # FLD: 13.1 % — SIGNIFICANT CHANGE UP (ref 10.3–14.5)
SODIUM SERPL-SCNC: 138 MMOL/L — SIGNIFICANT CHANGE UP (ref 135–145)
WBC # BLD: 4.49 K/UL — SIGNIFICANT CHANGE UP (ref 3.8–10.5)
WBC # FLD AUTO: 4.49 K/UL — SIGNIFICANT CHANGE UP (ref 3.8–10.5)

## 2023-05-09 PROCEDURE — 93971 EXTREMITY STUDY: CPT | Mod: 26,RT

## 2023-05-09 NOTE — H&P PST ADULT - FUNCTIONAL STATUS
DASI score-6.70- Able to walk for 1 hour, 3 flights of stairs, ADL, supermarket shopping- Denies SOB/4-10 METS

## 2023-05-09 NOTE — H&P PST ADULT - HISTORY OF PRESENT ILLNESS
56 y/o M with PMHx of left sided colon ca s/p resection with ileostomy 2022, possible recurrence per March MRI based on pelvic mass adjacent to bladder, DVT (2022 on Eliquis). Obstructing renal stone s/p ureteral stent 4/8/23 . Recently hospitalized 4/30-5/4/2023 for SBO. 5.1 x 3.7 cm mass seen near urinary bladder, similar to March MRI. Stable ureteral stent. SCr 2.7 compared to 1.6 April 13th. Transferred to [facility], NGT placed, and ostomy began draining. No acute surgical intervention given return of ostomy output. His urine studies revealed pre-renal MAXWELL, US renal showed mild bilateral hydronephrosis, BL nonobstructive calculi, and mass at urinary done (known). UA was positive and a urine culture was sent; which was negative. He was monitored off antibiotics. The NGT was removed on 5/1. On 5/2, his diet was advanced to regular, which he tolerated. He was seen by both Surgical Oncology and Urology while inpatient. A CEA was obtained, for cancer staging, which was elevated, as well as a CT chest which showed an indeterminate 2 mm right lower lobe nodule.  Patient is planning to follow up with surgical oncology after urology procedure for treatment options for pelvic mass.  Presents to PST prior to B/L ureteroscopy, right ureteral stent removal, laser lithotripsy insertion of left ureteral stent on 5/11/23.  Patient denies SOB, CP, palpitation, blood in the stool or urine, N/V/D/C, HA, dizziness, seizures. Denies clotting or bleeding disorders.   56 y/o M with PMHx of left sided colon ca s/p resection with ileostomy 2022, possible recurrence per March MRI based on pelvic mass adjacent to bladder, DVT (2022 on Eliquis). Obstructing renal stone s/p ureteral stent 4/8/23 . Recently hospitalized 4/30-5/4/2023 for SBO. 5.1 x 3.7 cm mass seen near urinary bladder, similar to March MRI. Stable ureteral stent. SCr 2.7 compared to 1.6 April 13th. Transferred to [facility], NGT placed, and ostomy began draining. No acute surgical intervention given return of ostomy output. His urine studies revealed pre-renal MAXWELL, US renal showed mild bilateral hydronephrosis, BL nonobstructive calculi, and mass at urinary done (known). UA was positive and a urine culture was sent; which was negative. He was monitored off antibiotics. The NGT was removed on 5/1. On 5/2, his diet was advanced to regular, which he tolerated. He was seen by both Surgical Oncology and Urology while inpatient. A CEA was obtained, for cancer staging, which was elevated, as well as a CT chest which showed an indeterminate 2 mm right lower lobe nodule.  Patient is planning to follow up with surgical oncology after urology procedure for treatment options for pelvic mass.  Presents to PST prior to B/L ureteroscopy, right ureteral stent removal, laser lithotripsy insertion of left ureteral stent on 5/11/23.  Patient denies SOB, CP, palpitation, blood in the stool or urine, N/V/D/C, HA, dizziness, seizures. Denies clotting or bleeding disorders. Patient endorses RLE swelling noted 2 days after discharged from the hospital (5/6/23).

## 2023-05-09 NOTE — H&P PST ADULT - MUSCULOSKELETAL COMMENTS
RLE swelling with no pitting edema or erythema noted on PE- Homans sign negative LLE swelling for 3 days

## 2023-05-09 NOTE — H&P PST ADULT - MUSCULOSKELETAL
ROM intact/no joint swelling/no joint erythema/no joint warmth/normal gait/strength 5/5 bilateral upper extremities/strength 5/5 bilateral lower extremities negative ROM intact/no joint swelling/no joint erythema/no joint warmth/no calf tenderness/normal gait/strength 5/5 bilateral upper extremities/strength 5/5 bilateral lower extremities details…

## 2023-05-09 NOTE — H&P PST ADULT - PROBLEM SELECTOR PLAN 1
Procedure: B/L ureteroscopy, right ureteral stent removal, laser lithotripsy insertion of left ureteral stent on 5/11/23  PST labs pending  AC: Eliquis- may continue per Dr. Nino  Medication changes: None Procedure: B/L ureteroscopy, right ureteral stent removal, laser lithotripsy insertion of left ureteral stent on 5/11/23  PST labs pending  AC: Eliquis- may continue per Dr. Nino  Medication changes: None  Patient endorses RLE swelling noted 2 days after discharged from the hospital (5/6/23). Patient with Hx of UE and LE DVT 9/2022 on Eliquis.   PE: RLE swelling with no pitting edema or erythema noted- Homans sign negative  RLE doppler will be done at [facility] to r/o DVT  Case discussed with Dr. Kendrick

## 2023-05-10 ENCOUNTER — TRANSCRIPTION ENCOUNTER (OUTPATIENT)
Age: 58
End: 2023-05-10

## 2023-05-11 ENCOUNTER — RESULT REVIEW (OUTPATIENT)
Age: 58
End: 2023-05-11

## 2023-05-11 ENCOUNTER — APPOINTMENT (OUTPATIENT)
Dept: UROLOGY | Facility: HOSPITAL | Age: 58
End: 2023-05-11

## 2023-05-11 ENCOUNTER — TRANSCRIPTION ENCOUNTER (OUTPATIENT)
Age: 58
End: 2023-05-11

## 2023-05-11 ENCOUNTER — OUTPATIENT (OUTPATIENT)
Dept: OUTPATIENT SERVICES | Facility: HOSPITAL | Age: 58
LOS: 1 days | End: 2023-05-11
Payer: MEDICAID

## 2023-05-11 VITALS
SYSTOLIC BLOOD PRESSURE: 119 MMHG | OXYGEN SATURATION: 98 % | RESPIRATION RATE: 17 BRPM | HEART RATE: 63 BPM | HEIGHT: 67 IN | TEMPERATURE: 98 F | DIASTOLIC BLOOD PRESSURE: 76 MMHG | WEIGHT: 121.92 LBS

## 2023-05-11 VITALS
TEMPERATURE: 97 F | SYSTOLIC BLOOD PRESSURE: 118 MMHG | HEART RATE: 74 BPM | RESPIRATION RATE: 17 BRPM | DIASTOLIC BLOOD PRESSURE: 70 MMHG | OXYGEN SATURATION: 99 %

## 2023-05-11 DIAGNOSIS — N20.0 CALCULUS OF KIDNEY: ICD-10-CM

## 2023-05-11 DIAGNOSIS — Z98.890 OTHER SPECIFIED POSTPROCEDURAL STATES: Chronic | ICD-10-CM

## 2023-05-11 DIAGNOSIS — Z90.49 ACQUIRED ABSENCE OF OTHER SPECIFIED PARTS OF DIGESTIVE TRACT: Chronic | ICD-10-CM

## 2023-05-11 PROCEDURE — 74420 UROGRAPHY RTRGR +-KUB: CPT | Mod: 26

## 2023-05-11 PROCEDURE — 82365 CALCULUS SPECTROSCOPY: CPT

## 2023-05-11 PROCEDURE — 88300 SURGICAL PATH GROSS: CPT

## 2023-05-11 PROCEDURE — C1766: CPT

## 2023-05-11 PROCEDURE — 85027 COMPLETE CBC AUTOMATED: CPT

## 2023-05-11 PROCEDURE — C1889: CPT

## 2023-05-11 PROCEDURE — 52356 CYSTO/URETERO W/LITHOTRIPSY: CPT | Mod: 50

## 2023-05-11 PROCEDURE — 88300 SURGICAL PATH GROSS: CPT | Mod: 26

## 2023-05-11 PROCEDURE — 76000 FLUOROSCOPY <1 HR PHYS/QHP: CPT

## 2023-05-11 PROCEDURE — C1769: CPT

## 2023-05-11 PROCEDURE — G0463: CPT

## 2023-05-11 PROCEDURE — 52332 CYSTOSCOPY AND TREATMENT: CPT | Mod: LT,59

## 2023-05-11 PROCEDURE — 93971 EXTREMITY STUDY: CPT

## 2023-05-11 PROCEDURE — 52352 CYSTOURETERO W/STONE REMOVE: CPT | Mod: 50,22

## 2023-05-11 PROCEDURE — C1758: CPT

## 2023-05-11 PROCEDURE — 80048 BASIC METABOLIC PNL TOTAL CA: CPT

## 2023-05-11 PROCEDURE — C2625: CPT

## 2023-05-11 DEVICE — URETERAL STENT INLAY OPTIMA 8FR 26CM: Type: IMPLANTABLE DEVICE | Status: FUNCTIONAL

## 2023-05-11 DEVICE — GUIDEWIRE AMPLATZ SUPER-STIFF STRAIGHT .035" X 145CM: Type: IMPLANTABLE DEVICE | Status: FUNCTIONAL

## 2023-05-11 DEVICE — URETERAL SHEATH NAVIGATOR HD 12/14FR X 46CM: Type: IMPLANTABLE DEVICE | Status: FUNCTIONAL

## 2023-05-11 DEVICE — STONE BASKET ZEROTIP NITINOL 4-WIRE 1.9FR 120CM X 12MM: Type: IMPLANTABLE DEVICE | Status: FUNCTIONAL

## 2023-05-11 DEVICE — GUIDEWIRE SENSOR DUAL-FLEX NITINOL STRAIGHT .035" X 150CM: Type: IMPLANTABLE DEVICE | Status: FUNCTIONAL

## 2023-05-11 DEVICE — URETERAL CATH DUAL LUMEN 10FR 54CM: Type: IMPLANTABLE DEVICE | Status: FUNCTIONAL

## 2023-05-11 DEVICE — URETERAL CATH SOF-FLEX OPEN END 6FR .040" X 70CM: Type: IMPLANTABLE DEVICE | Status: FUNCTIONAL

## 2023-05-11 DEVICE — CATH STEERABLE IRR ASPIRATION 70CM: Type: IMPLANTABLE DEVICE | Status: FUNCTIONAL

## 2023-05-11 DEVICE — URETERAL SHEATH NAVIGATOR HD 11/13FR X 46CM: Type: IMPLANTABLE DEVICE | Status: FUNCTIONAL

## 2023-05-11 RX ORDER — ONDANSETRON 8 MG/1
4 TABLET, FILM COATED ORAL EVERY 6 HOURS
Refills: 0 | Status: DISCONTINUED | OUTPATIENT
Start: 2023-05-11 | End: 2023-05-11

## 2023-05-11 RX ORDER — SODIUM CHLORIDE 9 MG/ML
3 INJECTION INTRAMUSCULAR; INTRAVENOUS; SUBCUTANEOUS EVERY 8 HOURS
Refills: 0 | Status: DISCONTINUED | OUTPATIENT
Start: 2023-05-11 | End: 2023-05-11

## 2023-05-11 RX ORDER — LIDOCAINE HCL 20 MG/ML
0.2 VIAL (ML) INJECTION ONCE
Refills: 0 | Status: DISCONTINUED | OUTPATIENT
Start: 2023-05-11 | End: 2023-05-11

## 2023-05-11 RX ORDER — SODIUM CHLORIDE 9 MG/ML
1000 INJECTION, SOLUTION INTRAVENOUS
Refills: 0 | Status: DISCONTINUED | OUTPATIENT
Start: 2023-05-11 | End: 2023-05-25

## 2023-05-11 RX ORDER — HYDROMORPHONE HYDROCHLORIDE 2 MG/ML
0.25 INJECTION INTRAMUSCULAR; INTRAVENOUS; SUBCUTANEOUS
Refills: 0 | Status: DISCONTINUED | OUTPATIENT
Start: 2023-05-11 | End: 2023-05-11

## 2023-05-11 RX ORDER — CEFAZOLIN SODIUM 1 G
2000 VIAL (EA) INJECTION ONCE
Refills: 0 | Status: COMPLETED | OUTPATIENT
Start: 2023-05-11 | End: 2023-05-11

## 2023-05-11 NOTE — PATIENT PROFILE ADULT - FUNCTIONAL ASSESSMENT - BASIC MOBILITY ASSESSMENT TYPE
0750-report received, call bell within reach, no distress noted, voiced no complaints at this time. 0850-Assessment completed, resting quietly in bed. 1047-am medications given, medicated for pain per patient request. Bladder scanner performed, had 234 ml of urine  1230-no change in condition, no distress noted. 1400-resting quietly in bed, no distress noted, medicated for pain per patient request.  Informed Dr. Haleigh Martinez that patient has not voided since before 1am .  6141-Bfri-pudhn set up in room. 1630-no change in condition, no distress noted. 1800-neurologist into see patient. 1835- Bladder scanner performed, patient 435 ml in bladder. Will straight cath. 1855-did not get a chance to straight cath, patient was taken to MRI. 1925-Bedside and Verbal shift change report given to Drew Gallo (oncoming nurse) by Kailee Herr (offgoing nurse).  Report included the following information SBAR, MAR, Recent Results and Cardiac Rhythm SR. Admission

## 2023-05-11 NOTE — ASU DISCHARGE PLAN (ADULT/PEDIATRIC) - ASU DC SPECIAL INSTRUCTIONSFT
STENT: You have an internal stent (a hollow tube that runs from the kidney to your bladder) after your procedure, which helps urine drain from the kidney to your bladder. Some patients experience urinary frequency, burning, or even back pain (especially with urination). These sensations will gradually get better. Increasing your fluid intake can also improve these symptoms. While the stent is in place, your urine may continue to be bloody. This stent is temporary and must be removed by your urologist as an outpatient.  GENERAL: It is common to have blood in your urine after your procedure. It may be pink or even red; inform your doctor if you have a significant amount of clot in the urine or if you are unable to void at all. The urine may clear and then become bloody again especially as you are more physically active.  BATHING: You may shower or bathe.  DIET: You may resume your regular diet and regular medication regimen.  PAIN: You may take Tylenol (acetaminophen) 650-975mg and/or Motrin (ibuprofen) 400-600mg, both available over the counter, for pain every 6 hours as needed. Do not exceed 4000mg of Tylenol (acetaminophen) daily. You may alternate these medications such that you take one or the other every 3 hours for around the clock pain coverage. If you have a stent, the following medications may have been sent to your pharmacy for stent related discomfort: Flomax (tamsulosin) 0.4mg at bedtime.  ANTIBIOTICS: You may be given a prescription for an antibiotic, please take this medication as instructed and be sure to complete the entire course.  STOOL SOFTENERS: Do not allow yourself to become constipated as straining may cause bleeding. Take stool softeners or a laxative (ex. Miralax, Colace, Senokot, ExLax, etc), available over the counter, if needed.  ACTIVITY: No heavy lifting or strenuous exercise until you are evaluated at your post-operative appointment. Otherwise, you may return to your usual level of physical activity.  ANTICOAGULATION: Continue taking eliquis  MEDICATIONS: Start taking sodium bicarbonate 2 tabs twice per day and potassium citrate 10 meq 2 tabs twice per day. These medications have been sent to your pharmacy.  ULTRASOUND: Obtain an ultrasound in roughly 3 weeks, prior to your follow up appointment.  FOLLOW-UP: If you did not already schedule your post-operative appointment, please call your urologist to schedule a follow-up appointment.  CALL YOUR UROLOGIST IF: You have any bleeding that does not stop, inability to void >8 hours, fever over 100.4 F, chills, persistent nausea/vomiting, changes in your incision concerning for infection, or if your pain is not controlled on your discharge pain medications.

## 2023-05-11 NOTE — ASU DISCHARGE PLAN (ADULT/PEDIATRIC) - NS MD DC FALL RISK RISK
For information on Fall & Injury Prevention, visit: [email or web address] OR  [email or web address] OR  https://www.cdc.gov/steadi/patient.html

## 2023-05-11 NOTE — PRE-OP CHECKLIST - WEIGHT IN LBS
[place] History





-  Admission Detail


Date of Service: 18


[place] Admission Detail: 





3.59 kg  39 plus  2 day  male 


 born  by  nvd  with  light  mec. stained  amniotic  fluid 


to  a gbs  neg. b pos.    female 


  born   by  nvd with delivery  at  0610 without  complications.


 apgars 8/9 and  bs  stable and breast fed   already 


 pe  normal 


vss


 assess/ term   male   born   by  nvd  without  complications 


routine  care 


  circ   desired


Infant Delivery Method: Spontaneous Vaginal Delivery-Single


Infant Delivery Mode: Spontaneous





 Nursery Information


Gestation Age (Weeks,Days): Weeks (39), Days (2)


Sex, Infant: Male


Cry Description: Strong, Lusty


Olimpia Reflex: Normal Response


Suck Reflex: Normal Response


Bed Type: Isolette, Open Crib





 Physician Exam





- Exam


Exam: See Below


Activity: Sleeping, Active


Resting Posture: Flexion


Head: Face Symmetrical, Atraumatic, Normocephalic


Eyes: Bilateral: Normal Inspection


Ears: Normal Appearance, Symmetrical


Nose: Normal Inspection, Normal Mucosa


Mouth: Nnormal Inspection, Palate Intact


Neck: Normal Inspection, Supple, Trachea Midline


Chest/Cardiovascular: Normal Appearance, Normal Peripheral Pulses, Regular 

Heart Rate, Symmetrical


Respiratory: Lungs Clear, Normal Breath Sounds, No Respiratoy Distress


Abdomen/GI: Normal Bowel Sounds, No Mass, Symmetrical, Soft


Rectal: Normal Exam


Genitalia (Male): Normal Inspection


Spine/Skeletal: Normal Inspection, Normal Range of Motion


Extremities: Normal Inspection, Normal Capillary Refill, Normal Range of Motion


Skin: Dry, Intact, Normal Color, Warm





 Assessment and Plan


(1) Thick meconium stained amniotic fluid


SNOMED Code(s): 824232038


   Code(s): P96.83 - MECONIUM STAINING   Status: Acute   Priority: Medium   

Current Visit: Yes   Onset Date: 18   





(2) Liveborn infant by vaginal delivery


SNOMED Code(s): 707891629, 097722502


   Code(s): Z38.00 - SINGLE LIVEBORN INFANT, DELIVERED VAGINALLY   Status: 

Acute   Priority: Medium   Current Visit: Yes   Onset Date: 18   


Problem List Initiated/Reviewed/Updated: Yes


Orders (Last 24 Hours): 


 Active Orders 24 hr











 Category Date Time Status


 


 Patient Status [ADT] Routine ADT  18 07:34 Active


 


 Blood Glucose Check, Bedside [RC] ASDIRECTED Care  18 07:36 Active


 


 Circumcision Care [RC] ASDIRECTED Care  18 07:34 Active


 


 Communication Order [RC] ASDIRECTED Care  18 07:34 Active


 


 Intake and Output [RC] QSHIFT Care  18 07:34 Active


 


  Hearing Screen [RC] ROUTINE Care  18 07:34 Active


 


 Notify Provider [RC] PRN Care  18 07:34 Active


 


 Verify Patient Consent Obtain [RC] ASDIRECTED Care  18 07:34 Active


 


 Vital Measures,  [RC] Per Unit Routine Care  18 07:34 Active


 


 Breast Milk [DIET] Diet  18 Lunch Active


 


  SCREENING (STATE) [POC] Routine Lab  18 07:34 Ordered


 


 Bacitracin/Neomycin/Polymyxin [Neosporin Oint] Med  18 07:34 Active





 See Dose Instructions  TOP ASDIRECTED PRN   


 


 Lidocaine 1% [Xylocaine-MPF 1%] Med  18 07:34 Active





 See Dose Instructions  INJECT ONETIME PRN   


 


 Resuscitation Status Routine Resus Stat  18 07:34 Ordered








 Medication Orders





Lidocaine HCl (Xylocaine-Mpf 1%)  0 ml INJECT ONETIME PRN


   PRN Reason: Circumcision


Neomycin/Polymyxin/Bacitracin (Neosporin Oint)  0 gm TOP ASDIRECTED PRN


   PRN Reason: CIRC SITE








Plan: 





see  admission  note 


  pe  normal


 plan   level one  care and observe   


  no sighns  of  mec.  asp.  syndrome 


 breast feeding  already  going   well 


 circ.  desired     boh
121.9

## 2023-05-14 LAB — SURGICAL PATHOLOGY STUDY: SIGNIFICANT CHANGE UP

## 2023-05-16 ENCOUNTER — APPOINTMENT (OUTPATIENT)
Dept: ENDOCRINOLOGY | Facility: CLINIC | Age: 58
End: 2023-05-16
Payer: MEDICAID

## 2023-05-16 VITALS
BODY MASS INDEX: 19.62 KG/M2 | HEIGHT: 67 IN | WEIGHT: 125 LBS | DIASTOLIC BLOOD PRESSURE: 70 MMHG | OXYGEN SATURATION: 98 % | SYSTOLIC BLOOD PRESSURE: 120 MMHG | HEART RATE: 77 BPM

## 2023-05-16 PROCEDURE — 99214 OFFICE O/P EST MOD 30 MIN: CPT

## 2023-05-16 RX ORDER — DIPHENOXYLATE HYDROCHLORIDE AND ATROPINE SULFATE 2.5; .025 MG/1; MG/1
2.5-0.025 TABLET ORAL
Qty: 90 | Refills: 3 | Status: DISCONTINUED | COMMUNITY
Start: 2022-10-26 | End: 2023-05-16

## 2023-05-16 RX ORDER — DIPHENOXYLATE HYDROCHLORIDE AND ATROPINE SULFATE 2.5; .025 MG/1; MG/1
2.5-0.025 TABLET ORAL
Qty: 30 | Refills: 4 | Status: DISCONTINUED | COMMUNITY
Start: 2022-10-02 | End: 2023-05-16

## 2023-05-16 RX ORDER — SODIUM HYPOCHLORITE 2.5 MG/ML
0.25 SOLUTION TOPICAL
Qty: 1 | Refills: 0 | Status: DISCONTINUED | COMMUNITY
Start: 2022-09-28 | End: 2023-05-16

## 2023-05-16 RX ORDER — AMOXICILLIN AND CLAVULANATE POTASSIUM 875; 125 MG/1; MG/1
875-125 TABLET, COATED ORAL
Qty: 4 | Refills: 0 | Status: DISCONTINUED | COMMUNITY
Start: 2023-05-11 | End: 2023-05-16

## 2023-05-16 NOTE — REVIEW OF SYSTEMS
[Swelling] : swelling [Fatigue] : no fatigue [Decreased Appetite] : appetite not decreased [Recent Weight Gain (___ Lbs)] : no recent weight gain [Recent Weight Loss (___ Lbs)] : no recent weight loss [Visual Field Defect] : no visual field defect [Blurred Vision] : no blurred vision [Dysphagia] : no dysphagia [Neck Pain] : no neck pain [Dysphonia] : no dysphonia [Chest Pain] : no chest pain [Palpitations] : no palpitations [Constipation] : no constipation [Diarrhea] : no diarrhea [Muscle Weakness] : no muscle weakness [Muscle Cramps] : no muscle cramps [Headaches] : no headaches [Tremors] : no tremors [Depression] : no depression [Anxiety] : no anxiety [Polydipsia] : no polydipsia [FreeTextEntry2] : weight stable [FreeTextEntry8] : ileostomy bag in place [de-identified] : right leg, improving

## 2023-05-16 NOTE — ASSESSMENT
[FreeTextEntry1] : Hyponatremia, low cortisol - hospital labs revealed  - Initial AM cortisol low, 2.2 - ACTH stim test performed and within normal limits, baseline 10---18---21 - Hydrocortisone discontinued - No signs of SIADH, serum osm and urine osm within normal limits - Repeat 8AM cortisol and check full thyroid panel/LH, FSH, testosterone prolactin now. \par \par RTO in 4 months with Dr. Martinez, patient wanted to wait for lab results before scheduling next visit.

## 2023-05-16 NOTE — PHYSICAL EXAM
[Alert] : alert [Well Nourished] : well nourished [No Acute Distress] : no acute distress [Well Developed] : well developed [Normal Sclera/Conjunctiva] : normal sclera/conjunctiva [No Proptosis] : no proptosis [No LAD] : no lymphadenopathy [Thyroid Not Enlarged] : the thyroid was not enlarged [No Thyroid Nodules] : no palpable thyroid nodules [No Respiratory Distress] : no respiratory distress [No Accessory Muscle Use] : no accessory muscle use [Normal Rate and Effort] : normal respiratory rate and effort [Clear to Auscultation] : lungs were clear to auscultation bilaterally [Normal S1, S2] : normal S1 and S2 [Normal Rate] : heart rate was normal [Regular Rhythm] : with a regular rhythm [Normal Bowel Sounds] : normal bowel sounds [Not Tender] : non-tender [Soft] : abdomen soft [No Stigmata of Cushings Syndrome] : no stigmata of Cushings Syndrome [Normal Gait] : normal gait [No Rash] : no rash [Acanthosis Nigricans] : no acanthosis nigricans [No Tremors] : no tremors [Oriented x3] : oriented to person, place, and time [Normal Affect] : the affect was normal [Normal Insight/Judgement] : insight and judgment were intact [Normal Mood] : the mood was normal

## 2023-05-16 NOTE — HISTORY OF PRESENT ILLNESS
[FreeTextEntry1] : Patient is here for a hospital follow up. \par 56 yo m h/o left sided colon ca s/p resection, ileostomy 2022, possible \par recurrence per March MRI based on pelvic mass adjacent to bladder, DVT on \par eliquis. Obstructing renal stone s/p ureteral stent 4/8/23 . Pt presents in \par setting of nausea, vomiting, inability to tolerate po, no ostomy output  that \par began today. CT abd at [place] + SBO. 5.1 x 3.7 cm  mass seen near \par urinary bladder, similar to March MRI. Stable ureteral stent. Pt reports no \par pain or difficulty voiding, UA pending. SCr 2.7 compared to 1.6 April 13th. \par Transferred to [facility], NGT placed, and ostomy began draining. Seen by surgery, who \par recommended no acute intervention given return of ostomy output. \par He was seen by General Surgery, who determined that no operative intervention \par was needed, and started on a malignant SBO regimen. An NGT was also placed to \par LCWS. IVF was started. He started to notice air and liquid in his bag on 5/1. \par His urine studies revealed pre-renal MAXWELL, for which fluids were continued. \par Nephrology was consulted and gave recommendations. A US renal was done which \par showed mild bilateral hydronephrosis, BL nonobstructing calculi, and mass at \par urinary done (known). UA was positive and a urine culture was sent; which was \par negative. He was monitored off antibiotics. The NGT was removed on 5/1 as he \par continued to have fluid and air in his bag and was not nauseous. On 5/2, his \par diet was advanced to regular, which he tolerated. He was seen by both Surgical \par Oncology and Urology while inpatient. A CEA was obtained, for cancer staging, \par which was elevated, as awell as a CT chest which showed an indeterminate 2 mm \par right lower lobe nodule. \par He was seen by the primary team on 5/4 and determined to be amenable for \par discharge home. \par Patient seen by Dr. Martinez at [facility]. \par He was found to have hyponatremia. He is asymptomatic.

## 2023-05-17 ENCOUNTER — APPOINTMENT (OUTPATIENT)
Dept: ULTRASOUND IMAGING | Facility: CLINIC | Age: 58
End: 2023-05-17
Payer: MEDICAID

## 2023-05-17 ENCOUNTER — OUTPATIENT (OUTPATIENT)
Dept: OUTPATIENT SERVICES | Facility: HOSPITAL | Age: 58
LOS: 1 days | End: 2023-05-17
Payer: MEDICAID

## 2023-05-17 DIAGNOSIS — Z98.890 OTHER SPECIFIED POSTPROCEDURAL STATES: Chronic | ICD-10-CM

## 2023-05-17 DIAGNOSIS — Z00.8 ENCOUNTER FOR OTHER GENERAL EXAMINATION: ICD-10-CM

## 2023-05-17 DIAGNOSIS — N20.0 CALCULUS OF KIDNEY: ICD-10-CM

## 2023-05-17 DIAGNOSIS — Z90.49 ACQUIRED ABSENCE OF OTHER SPECIFIED PARTS OF DIGESTIVE TRACT: Chronic | ICD-10-CM

## 2023-05-17 PROCEDURE — 76770 US EXAM ABDO BACK WALL COMP: CPT

## 2023-05-17 PROCEDURE — 76770 US EXAM ABDO BACK WALL COMP: CPT | Mod: 26

## 2023-05-23 LAB
CELL MATERIAL STONE EST-MCNT: SIGNIFICANT CHANGE UP
LABORATORY COMMENT REPORT: SIGNIFICANT CHANGE UP
NIDUS STONE QN: SIGNIFICANT CHANGE UP

## 2023-05-31 ENCOUNTER — OUTPATIENT (OUTPATIENT)
Dept: OUTPATIENT SERVICES | Facility: HOSPITAL | Age: 58
LOS: 1 days | End: 2023-05-31
Payer: MEDICAID

## 2023-05-31 ENCOUNTER — APPOINTMENT (OUTPATIENT)
Dept: UROLOGY | Facility: CLINIC | Age: 58
End: 2023-05-31
Payer: MEDICAID

## 2023-05-31 VITALS
TEMPERATURE: 98.6 F | HEART RATE: 74 BPM | DIASTOLIC BLOOD PRESSURE: 66 MMHG | SYSTOLIC BLOOD PRESSURE: 98 MMHG | RESPIRATION RATE: 16 BRPM

## 2023-05-31 DIAGNOSIS — T19.1XXA FOREIGN BODY IN BLADDER, INITIAL ENCOUNTER: ICD-10-CM

## 2023-05-31 DIAGNOSIS — Z90.49 ACQUIRED ABSENCE OF OTHER SPECIFIED PARTS OF DIGESTIVE TRACT: Chronic | ICD-10-CM

## 2023-05-31 DIAGNOSIS — R35.0 FREQUENCY OF MICTURITION: ICD-10-CM

## 2023-05-31 DIAGNOSIS — Z98.890 OTHER SPECIFIED POSTPROCEDURAL STATES: Chronic | ICD-10-CM

## 2023-05-31 PROCEDURE — 52310 CYSTOSCOPY AND TREATMENT: CPT

## 2023-05-31 PROCEDURE — 99213 OFFICE O/P EST LOW 20 MIN: CPT | Mod: 25

## 2023-06-04 PROBLEM — T19.1XXA: Status: ACTIVE | Noted: 2023-06-04

## 2023-06-04 NOTE — PHYSICAL EXAM
[General Appearance - Well Developed] : well developed [General Appearance - Well Nourished] : well nourished [Normal Appearance] : normal appearance [Well Groomed] : well groomed [General Appearance - In No Acute Distress] : no acute distress [Abdomen Soft] : soft [Abdomen Tenderness] : non-tender [Costovertebral Angle Tenderness] : no ~M costovertebral angle tenderness [Urethral Meatus] : meatus normal [Urinary Bladder Findings] : the bladder was normal on palpation [Skin Color & Pigmentation] : normal skin color and pigmentation [] : no respiratory distress [Respiration, Rhythm And Depth] : normal respiratory rhythm and effort [Exaggerated Use Of Accessory Muscles For Inspiration] : no accessory muscle use [Oriented To Time, Place, And Person] : oriented to person, place, and time [Affect] : the affect was normal [Mood] : the mood was normal [Not Anxious] : not anxious [Normal Station and Gait] : the gait and station were normal for the patient's age

## 2023-06-04 NOTE — ASSESSMENT
[FreeTextEntry1] : Renal sono was done too soon after surgery\par \par Increase fluids intake to at least 2.5 liters per day\par Continue urinary alkalinization\par 2x24 hr urines ordered\par Renal sono at next visit also\par f/u 2 months to review results\par

## 2023-06-04 NOTE — HISTORY OF PRESENT ILLNESS
[FreeTextEntry1] : 56y/o man\par colon ca--end ileostomy, recurrence in wall of bladder dome\par s/p bilateral URS with CVAC assisted stone removal 5/11/23\par stone analysis : 100% uric acid\par \par cystoscopy stent removal completed\par \par placed on urinary alkalinization: Sodium Bicarbonate 650 MG 2 TABLETS TWICE DAILY and Potassium Citrate ER 10 MEQ 1 TABLET TWICE DAILY\par \par \par Renal sono (5/17/23): clusters of bilateral lower pole stones; large bladder mass (recurrence of colon cancer)\par

## 2023-06-05 ENCOUNTER — EMERGENCY (EMERGENCY)
Facility: HOSPITAL | Age: 58
LOS: 1 days | Discharge: HOME | End: 2023-06-05
Attending: STUDENT IN AN ORGANIZED HEALTH CARE EDUCATION/TRAINING PROGRAM | Admitting: PERSONAL EMERGENCY RESPONSE ATTENDANT
Payer: MEDICAID

## 2023-06-05 VITALS — HEIGHT: 67 IN

## 2023-06-05 DIAGNOSIS — Z93.2 ILEOSTOMY STATUS: ICD-10-CM

## 2023-06-05 DIAGNOSIS — N20.0 CALCULUS OF KIDNEY: ICD-10-CM

## 2023-06-05 DIAGNOSIS — Z98.890 OTHER SPECIFIED POSTPROCEDURAL STATES: Chronic | ICD-10-CM

## 2023-06-05 DIAGNOSIS — T19.1XXA FOREIGN BODY IN BLADDER, INITIAL ENCOUNTER: ICD-10-CM

## 2023-06-05 DIAGNOSIS — C18.9 MALIGNANT NEOPLASM OF COLON, UNSPECIFIED: ICD-10-CM

## 2023-06-05 DIAGNOSIS — Z90.49 ACQUIRED ABSENCE OF OTHER SPECIFIED PARTS OF DIGESTIVE TRACT: ICD-10-CM

## 2023-06-05 DIAGNOSIS — Z90.49 ACQUIRED ABSENCE OF OTHER SPECIFIED PARTS OF DIGESTIVE TRACT: Chronic | ICD-10-CM

## 2023-06-05 LAB
ALBUMIN SERPL ELPH-MCNC: 3.8 G/DL — SIGNIFICANT CHANGE UP (ref 3.3–5)
ALP SERPL-CCNC: 136 U/L — HIGH (ref 40–120)
ALT FLD-CCNC: 49 U/L — HIGH (ref 4–41)
ANION GAP SERPL CALC-SCNC: 12 MMOL/L — SIGNIFICANT CHANGE UP (ref 7–14)
ANION GAP SERPL CALC-SCNC: 14 MMOL/L — SIGNIFICANT CHANGE UP (ref 7–14)
AST SERPL-CCNC: 43 U/L — HIGH (ref 4–40)
BASOPHILS # BLD AUTO: 0.04 K/UL — SIGNIFICANT CHANGE UP (ref 0–0.2)
BASOPHILS NFR BLD AUTO: 0.8 % — SIGNIFICANT CHANGE UP (ref 0–2)
BILIRUB SERPL-MCNC: 1 MG/DL — SIGNIFICANT CHANGE UP (ref 0.2–1.2)
BUN SERPL-MCNC: 12 MG/DL — SIGNIFICANT CHANGE UP (ref 7–23)
BUN SERPL-MCNC: 12 MG/DL — SIGNIFICANT CHANGE UP (ref 7–23)
CALCIUM SERPL-MCNC: 6.2 MG/DL — CRITICAL LOW (ref 8.4–10.5)
CALCIUM SERPL-MCNC: 6.6 MG/DL — LOW (ref 8.4–10.5)
CHLORIDE SERPL-SCNC: 100 MMOL/L — SIGNIFICANT CHANGE UP (ref 98–107)
CHLORIDE SERPL-SCNC: 100 MMOL/L — SIGNIFICANT CHANGE UP (ref 98–107)
CO2 SERPL-SCNC: 20 MMOL/L — LOW (ref 22–31)
CO2 SERPL-SCNC: 21 MMOL/L — LOW (ref 22–31)
CREAT SERPL-MCNC: 1.21 MG/DL — SIGNIFICANT CHANGE UP (ref 0.5–1.3)
CREAT SERPL-MCNC: 1.34 MG/DL — HIGH (ref 0.5–1.3)
EGFR: 61 ML/MIN/1.73M2 — SIGNIFICANT CHANGE UP
EGFR: 69 ML/MIN/1.73M2 — SIGNIFICANT CHANGE UP
EOSINOPHIL # BLD AUTO: 0.19 K/UL — SIGNIFICANT CHANGE UP (ref 0–0.5)
EOSINOPHIL NFR BLD AUTO: 3.9 % — SIGNIFICANT CHANGE UP (ref 0–6)
GLUCOSE SERPL-MCNC: 108 MG/DL — HIGH (ref 70–99)
GLUCOSE SERPL-MCNC: 124 MG/DL — HIGH (ref 70–99)
HCT VFR BLD CALC: 35.8 % — LOW (ref 39–50)
HGB BLD-MCNC: 11.3 G/DL — LOW (ref 13–17)
IANC: 3.26 K/UL — SIGNIFICANT CHANGE UP (ref 1.8–7.4)
IMM GRANULOCYTES NFR BLD AUTO: 0.2 % — SIGNIFICANT CHANGE UP (ref 0–0.9)
LIDOCAIN IGE QN: 44 U/L — SIGNIFICANT CHANGE UP (ref 7–60)
LYMPHOCYTES # BLD AUTO: 0.98 K/UL — LOW (ref 1–3.3)
LYMPHOCYTES # BLD AUTO: 20.2 % — SIGNIFICANT CHANGE UP (ref 13–44)
MAGNESIUM SERPL-MCNC: 0.5 MG/DL — CRITICAL LOW (ref 1.6–2.6)
MAGNESIUM SERPL-MCNC: 1.2 MG/DL — LOW (ref 1.6–2.6)
MCHC RBC-ENTMCNC: 26.7 PG — LOW (ref 27–34)
MCHC RBC-ENTMCNC: 31.6 GM/DL — LOW (ref 32–36)
MCV RBC AUTO: 84.4 FL — SIGNIFICANT CHANGE UP (ref 80–100)
MONOCYTES # BLD AUTO: 0.37 K/UL — SIGNIFICANT CHANGE UP (ref 0–0.9)
MONOCYTES NFR BLD AUTO: 7.6 % — SIGNIFICANT CHANGE UP (ref 2–14)
NEUTROPHILS # BLD AUTO: 3.26 K/UL — SIGNIFICANT CHANGE UP (ref 1.8–7.4)
NEUTROPHILS NFR BLD AUTO: 67.3 % — SIGNIFICANT CHANGE UP (ref 43–77)
NRBC # BLD: 0 /100 WBCS — SIGNIFICANT CHANGE UP (ref 0–0)
NRBC # FLD: 0 K/UL — SIGNIFICANT CHANGE UP (ref 0–0)
PLATELET # BLD AUTO: 186 K/UL — SIGNIFICANT CHANGE UP (ref 150–400)
POTASSIUM SERPL-MCNC: 4 MMOL/L — SIGNIFICANT CHANGE UP (ref 3.5–5.3)
POTASSIUM SERPL-MCNC: 5.3 MMOL/L — SIGNIFICANT CHANGE UP (ref 3.5–5.3)
POTASSIUM SERPL-SCNC: 4 MMOL/L — SIGNIFICANT CHANGE UP (ref 3.5–5.3)
POTASSIUM SERPL-SCNC: 5.3 MMOL/L — SIGNIFICANT CHANGE UP (ref 3.5–5.3)
PROT SERPL-MCNC: 7.2 G/DL — SIGNIFICANT CHANGE UP (ref 6–8.3)
RBC # BLD: 4.24 M/UL — SIGNIFICANT CHANGE UP (ref 4.2–5.8)
RBC # FLD: 12.7 % — SIGNIFICANT CHANGE UP (ref 10.3–14.5)
SODIUM SERPL-SCNC: 133 MMOL/L — LOW (ref 135–145)
SODIUM SERPL-SCNC: 134 MMOL/L — LOW (ref 135–145)
TROPONIN T, HIGH SENSITIVITY RESULT: 9 NG/L — SIGNIFICANT CHANGE UP
WBC # BLD: 4.85 K/UL — SIGNIFICANT CHANGE UP (ref 3.8–10.5)
WBC # FLD AUTO: 4.85 K/UL — SIGNIFICANT CHANGE UP (ref 3.8–10.5)

## 2023-06-05 PROCEDURE — 99223 1ST HOSP IP/OBS HIGH 75: CPT

## 2023-06-05 PROCEDURE — 70450 CT HEAD/BRAIN W/O DYE: CPT | Mod: 26,MA

## 2023-06-05 PROCEDURE — 93010 ELECTROCARDIOGRAM REPORT: CPT

## 2023-06-05 PROCEDURE — 71046 X-RAY EXAM CHEST 2 VIEWS: CPT | Mod: 26

## 2023-06-05 RX ORDER — APIXABAN 2.5 MG/1
5 TABLET, FILM COATED ORAL
Refills: 0 | Status: DISCONTINUED | OUTPATIENT
Start: 2023-06-06 | End: 2023-06-08

## 2023-06-05 RX ORDER — SODIUM BICARBONATE 1 MEQ/ML
1300 SYRINGE (ML) INTRAVENOUS ONCE
Refills: 0 | Status: COMPLETED | OUTPATIENT
Start: 2023-06-05 | End: 2023-06-05

## 2023-06-05 RX ORDER — CALCIUM GLUCONATE 100 MG/ML
2 VIAL (ML) INTRAVENOUS ONCE
Refills: 0 | Status: COMPLETED | OUTPATIENT
Start: 2023-06-05 | End: 2023-06-05

## 2023-06-05 RX ORDER — POTASSIUM CHLORIDE 20 MEQ
20 PACKET (EA) ORAL
Refills: 0 | Status: DISCONTINUED | OUTPATIENT
Start: 2023-06-06 | End: 2023-06-08

## 2023-06-05 RX ORDER — POTASSIUM CHLORIDE 20 MEQ
20 PACKET (EA) ORAL ONCE
Refills: 0 | Status: DISCONTINUED | OUTPATIENT
Start: 2023-06-05 | End: 2023-06-08

## 2023-06-05 RX ORDER — SODIUM BICARBONATE 1 MEQ/ML
1300 SYRINGE (ML) INTRAVENOUS
Refills: 0 | Status: DISCONTINUED | OUTPATIENT
Start: 2023-06-06 | End: 2023-06-08

## 2023-06-05 RX ORDER — APIXABAN 2.5 MG/1
5 TABLET, FILM COATED ORAL ONCE
Refills: 0 | Status: DISCONTINUED | OUTPATIENT
Start: 2023-06-05 | End: 2023-06-08

## 2023-06-05 RX ORDER — CALCIUM GLUCONATE 100 MG/ML
1 VIAL (ML) INTRAVENOUS ONCE
Refills: 0 | Status: COMPLETED | OUTPATIENT
Start: 2023-06-06 | End: 2023-06-06

## 2023-06-05 RX ORDER — MAGNESIUM SULFATE 500 MG/ML
2 VIAL (ML) INJECTION ONCE
Refills: 0 | Status: COMPLETED | OUTPATIENT
Start: 2023-06-05 | End: 2023-06-05

## 2023-06-05 RX ORDER — MAGNESIUM SULFATE 500 MG/ML
2 VIAL (ML) INJECTION ONCE
Refills: 0 | Status: COMPLETED | OUTPATIENT
Start: 2023-06-06 | End: 2023-06-06

## 2023-06-05 RX ADMIN — Medication 200 GRAM(S): at 19:42

## 2023-06-05 RX ADMIN — Medication 25 GRAM(S): at 16:00

## 2023-06-05 RX ADMIN — Medication 200 GRAM(S): at 15:26

## 2023-06-05 RX ADMIN — Medication 25 GRAM(S): at 21:48

## 2023-06-05 NOTE — ED CDU PROVIDER INITIAL DAY NOTE - OBJECTIVE STATEMENT
56 yo m h/o left sided colon ca s/p resection, ileostomy 2022, possible recurrence per March MRI based on pelvic mass adjacent to bladder, DVT on eliquis. Obstructing renal stone s/p ureteral stent 4/8/23 chest tightness that has now resolved, better with exertion, nonradiating, no diaphoresis associated.  Also has been having intermittent dizziness with blurry vision lasting seconds to minutes x2 days.  Patient states that he feels unsteady on his feet when this happens but gait disturbances resolved after episode finishes.  Denies any headache, difficulty swallowing, changes in sensation of the face, neck pain, difficulty ambulating in between events, changes in sensation in the upper or lower extremities. No current symptoms. Initial Magnesium 0.5>1.2 Calcium 6.2>6.6. Pt notes an improvement of his sx, plan is observation for continued repletion, repeat labwork.

## 2023-06-05 NOTE — ED ADULT NURSE REASSESSMENT NOTE - NSFALLUNIVINTERV_ED_ALL_ED
Bed/Stretcher in lowest position, wheels locked, appropriate side rails in place/Call bell, personal items and telephone in reach/Instruct patient to call for assistance before getting out of bed/chair/stretcher/Non-slip footwear applied when patient is off stretcher/[place] to call system/Physically safe environment - no spills, clutter or unnecessary equipment/Purposeful proactive rounding/Room/bathroom lighting operational, light cord in reach

## 2023-06-05 NOTE — ED PROVIDER NOTE - PROGRESS NOTE DETAILS
Status post magnesium and calcium, repeat labs show moderate improvement but electrolytes still decreased.  Will give second round and have discussed with CDU

## 2023-06-05 NOTE — ED ADULT NURSE NOTE - NSFALLUNIVINTERV_ED_ALL_ED
Bed/Stretcher in lowest position, wheels locked, appropriate side rails in place/Call bell, personal items and telephone in reach/Instruct patient to call for assistance before getting out of bed/chair/stretcher/Non-slip footwear applied when patient is off stretcher/[place] to call system/Physically safe environment - no spills, clutter or unnecessary equipment/Purposeful proactive rounding/Room/bathroom lighting operational, light cord in reach

## 2023-06-05 NOTE — ED CDU PROVIDER INITIAL DAY NOTE - CLINICAL SUMMARY MEDICAL DECISION MAKING FREE TEXT BOX
58 yo m h/o left sided colon ca s/p resection, ileostomy 2022, possible recurrence per March MRI based on pelvic mass adjacent to bladder, DVT on eliquis. Obstructing renal stone s/p ureteral stent 4/8/23 chest tightness that has now resolved, better with exertion, nonradiating, no diaphoresis associated. Pt has been stable while in CDU, receiving continued repletion, planning to trend labs, likely D/C in the AM if labwork stable, etiology may be due to short gut, pt is S/P colectomy with ileostomy due to CA.

## 2023-06-05 NOTE — ED ADULT TRIAGE NOTE - CHIEF COMPLAINT QUOTE
p.t with hx colon ca, post resection with ileostomy bag, c/o of chest discomfort for 2 days, denies any sob, denies recent travel,

## 2023-06-05 NOTE — ED PROVIDER NOTE - OBJECTIVE STATEMENT
56 yo m h/o left sided colon ca s/p resection, ileostomy 2022, possible recurrence per March MRI based on pelvic mass adjacent to bladder, DVT on eliquis. Obstructing renal stone s/p ureteral stent 4/8/23 chest tightness that has now resolved, better with exertion, nonradiating, no diaphoresis associated.  Also has been having intermittent dizziness with blurry vision lasting seconds to minutes x2 days.  Patient states that he feels unsteady on his feet when this happens but gait disturbances resolved after episode finishes.  Denies any headache, difficulty swallowing, changes in sensation of the face, neck pain, difficulty ambulating in between events, changes in sensation in the upper or lower extremities. No current symptoms.

## 2023-06-05 NOTE — ED PROVIDER NOTE - CLINICAL SUMMARY MEDICAL DECISION MAKING FREE TEXT BOX
Ranulfo Beavers, DO: 58 yo m pmh left sided colon ca s/p resection, ileostomy 2022, possible recurrence per March MRI based on pelvic mass adjacent to bladder, DVT on eliquis. Obstructing renal stone s/p ureteral stent 4/8/23 , pw cp and blurry vision. PW wife bedside provides collateral.  Patient reports 1 week of symptoms.  No acute inciting event.  Patient does not have any acute blurry vision currently.  Denies using corrective lenses, has not seen ophthalmology in several years due to pandemic.  Denies trauma, N/V, paresthesias, F/C, SOB, exertional symptoms, diaphoresis.  Patient arrives HDS, well-appearing, EKG NSR, nonischemic, intervals appropriate, independently reviewed by me.  Do not SPECT ACS, PE, or catastrophe.  Do not SPECT TIA.  Patient has no blurry vision.  Visual acuity intact in ED.  Labs, imaging, reassess.

## 2023-06-05 NOTE — ED PROVIDER NOTE - CARE PLAN
Principal Discharge DX:	Dizziness   1 Principal Discharge DX:	Hypocalcemia  Secondary Diagnosis:	Hypomagnesemia

## 2023-06-05 NOTE — ED ADULT NURSE NOTE - OBJECTIVE STATEMENT
Pt received in rm 4, hx of colon cancer with ileostomy, kidney stones, multiple hospitalizations for same and renal stent placement. Pt reports waking up with chest tightness, states he burped and the tightness went away. Pt also reports that for the past 2 days he had bilateral blurred vision. Reports bilateral tingling to fingers, but states he's been having it before and it's not new. Pt states that both, the blurry vision and the chest tightness resolved before arrival to ED. Denies any symptoms at this time.

## 2023-06-05 NOTE — ED PROVIDER NOTE - NS ED ROS FT
Gen: No fever, normal appetite  Eyes: No eye irritation or discharge  ENT: No ear pain, congestion, sore throat  Resp: No cough or trouble breathing  Cardiovascular: No chest pain or palpitation  Gastroenteric: No nausea/vomiting, diarrhea, constipation  :  No change in urine output; no dysuria  MS: No joint or muscle pain  Skin: No rashes  Neuro: No headache; no abnormal movements, (+) blurriness, dizziness  Remainder negative, except as per the HPI

## 2023-06-05 NOTE — ED ADULT NURSE REASSESSMENT NOTE - NS ED NURSE REASSESS COMMENT FT1
Break RN: Patient awake and resting in stretcher; respirations even and unlabored, no signs/symptoms of acute distress. Patient denies dyspnea, shortness of breath, and chest pain. Patient denies pain and offers no complaints at this time. Medications administered per eMAR, safety measures in place and family at bedside.
Pt received on stretcher from previous shift A/Ox4, answer all questions appropriately  Pt currently denies chest pain or sob during reassessment. Breathing is even and unlabored on room air  Abdomin is soft and non distended. Ileostomy bag noted with no drainage.  Pt ambulates w/ steady gait, moves all four extremities on command, skin is intact  Not in any apparent distress at time of reassessment  Vitals signs stable  Pending Mag 2g and CDU bed assignment

## 2023-06-05 NOTE — ED CDU PROVIDER INITIAL DAY NOTE - ATTENDING APP SHARED VISIT CONTRIBUTION OF CARE
Ranulfo Beavers, DO: 58 yo m pmh left sided colon ca s/p resection, ileostomy 2022, possible recurrence per March MRI based on pelvic mass adjacent to bladder, DVT on eliquis. Obstructing renal stone s/p ureteral stent 4/8/23 , pw cp and blurry vision. PW wife bedside provides collateral.  Patient reports 1 week of symptoms.  No acute inciting event.  Patient does not have any acute blurry vision currently.  Denies using corrective lenses, has not seen ophthalmology in several years due to pandemic.  Denies trauma, N/V, paresthesias, F/C, SOB, exertional symptoms, diaphoresis.  Patient arrives HDS, well-appearing, EKG NSR, nonischemic, intervals appropriate, independently reviewed by me.  found to have Severe electrolyte normalities, agrees for CDU for repletion.  Do not suspect ACS, PE, aortic catastrophe.

## 2023-06-06 VITALS
DIASTOLIC BLOOD PRESSURE: 60 MMHG | RESPIRATION RATE: 18 BRPM | SYSTOLIC BLOOD PRESSURE: 112 MMHG | HEART RATE: 65 BPM | OXYGEN SATURATION: 100 %

## 2023-06-06 LAB
ANION GAP SERPL CALC-SCNC: 12 MMOL/L — SIGNIFICANT CHANGE UP (ref 7–14)
BASOPHILS # BLD AUTO: 0.05 K/UL — SIGNIFICANT CHANGE UP (ref 0–0.2)
BASOPHILS NFR BLD AUTO: 0.8 % — SIGNIFICANT CHANGE UP (ref 0–2)
BUN SERPL-MCNC: 12 MG/DL — SIGNIFICANT CHANGE UP (ref 7–23)
CALCIUM SERPL-MCNC: 8 MG/DL — LOW (ref 8.4–10.5)
CHLORIDE SERPL-SCNC: 100 MMOL/L — SIGNIFICANT CHANGE UP (ref 98–107)
CO2 SERPL-SCNC: 22 MMOL/L — SIGNIFICANT CHANGE UP (ref 22–31)
CREAT SERPL-MCNC: 1.16 MG/DL — SIGNIFICANT CHANGE UP (ref 0.5–1.3)
EGFR: 73 ML/MIN/1.73M2 — SIGNIFICANT CHANGE UP
EOSINOPHIL # BLD AUTO: 0.23 K/UL — SIGNIFICANT CHANGE UP (ref 0–0.5)
EOSINOPHIL NFR BLD AUTO: 3.9 % — SIGNIFICANT CHANGE UP (ref 0–6)
GLUCOSE SERPL-MCNC: 98 MG/DL — SIGNIFICANT CHANGE UP (ref 70–99)
HCT VFR BLD CALC: 39.1 % — SIGNIFICANT CHANGE UP (ref 39–50)
HGB BLD-MCNC: 12.4 G/DL — LOW (ref 13–17)
IANC: 3.83 K/UL — SIGNIFICANT CHANGE UP (ref 1.8–7.4)
IMM GRANULOCYTES NFR BLD AUTO: 0.5 % — SIGNIFICANT CHANGE UP (ref 0–0.9)
LYMPHOCYTES # BLD AUTO: 1.44 K/UL — SIGNIFICANT CHANGE UP (ref 1–3.3)
LYMPHOCYTES # BLD AUTO: 24.2 % — SIGNIFICANT CHANGE UP (ref 13–44)
MAGNESIUM SERPL-MCNC: 2.3 MG/DL — SIGNIFICANT CHANGE UP (ref 1.6–2.6)
MCHC RBC-ENTMCNC: 26.6 PG — LOW (ref 27–34)
MCHC RBC-ENTMCNC: 31.7 GM/DL — LOW (ref 32–36)
MCV RBC AUTO: 83.7 FL — SIGNIFICANT CHANGE UP (ref 80–100)
MONOCYTES # BLD AUTO: 0.37 K/UL — SIGNIFICANT CHANGE UP (ref 0–0.9)
MONOCYTES NFR BLD AUTO: 6.2 % — SIGNIFICANT CHANGE UP (ref 2–14)
NEUTROPHILS # BLD AUTO: 3.83 K/UL — SIGNIFICANT CHANGE UP (ref 1.8–7.4)
NEUTROPHILS NFR BLD AUTO: 64.4 % — SIGNIFICANT CHANGE UP (ref 43–77)
NRBC # BLD: 0 /100 WBCS — SIGNIFICANT CHANGE UP (ref 0–0)
NRBC # FLD: 0 K/UL — SIGNIFICANT CHANGE UP (ref 0–0)
PHOSPHATE SERPL-MCNC: 3.6 MG/DL — SIGNIFICANT CHANGE UP (ref 2.5–4.5)
PLATELET # BLD AUTO: 211 K/UL — SIGNIFICANT CHANGE UP (ref 150–400)
POTASSIUM SERPL-MCNC: 4.6 MMOL/L — SIGNIFICANT CHANGE UP (ref 3.5–5.3)
POTASSIUM SERPL-SCNC: 4.6 MMOL/L — SIGNIFICANT CHANGE UP (ref 3.5–5.3)
RBC # BLD: 4.67 M/UL — SIGNIFICANT CHANGE UP (ref 4.2–5.8)
RBC # FLD: 12.8 % — SIGNIFICANT CHANGE UP (ref 10.3–14.5)
SODIUM SERPL-SCNC: 134 MMOL/L — LOW (ref 135–145)
WBC # BLD: 5.95 K/UL — SIGNIFICANT CHANGE UP (ref 3.8–10.5)
WBC # FLD AUTO: 5.95 K/UL — SIGNIFICANT CHANGE UP (ref 3.8–10.5)

## 2023-06-06 PROCEDURE — 99238 HOSP IP/OBS DSCHRG MGMT 30/<: CPT

## 2023-06-06 RX ORDER — CALCIUM CARBONATE 500(1250)
1 TABLET ORAL
Qty: 7 | Refills: 0
Start: 2023-06-06 | End: 2023-06-12

## 2023-06-06 RX ORDER — CALCIUM GLUCONATE 100 MG/ML
1 VIAL (ML) INTRAVENOUS ONCE
Refills: 0 | Status: DISCONTINUED | OUTPATIENT
Start: 2023-06-06 | End: 2023-06-08

## 2023-06-06 RX ADMIN — Medication 20 MILLIEQUIVALENT(S): at 05:45

## 2023-06-06 RX ADMIN — Medication 100 GRAM(S): at 01:12

## 2023-06-06 RX ADMIN — Medication 25 GRAM(S): at 01:45

## 2023-06-06 RX ADMIN — Medication 1300 MILLIGRAM(S): at 05:45

## 2023-06-06 RX ADMIN — Medication 1300 MILLIGRAM(S): at 00:46

## 2023-06-06 RX ADMIN — APIXABAN 5 MILLIGRAM(S): 2.5 TABLET, FILM COATED ORAL at 05:45

## 2023-06-06 NOTE — ED CDU PROVIDER SUBSEQUENT DAY NOTE - ATTENDING APP SHARED VISIT CONTRIBUTION OF CARE
Attending MD Villagomez.  Pt seen by me in the CDU/obs unit on the morning of 6/6/23.  Pt is a 56 yo male hx L colon CA s/p resection/ileostomy (2022), poss recurrence, DVT on eliquis, stones prev req stenting.  Presented to ED with chest tightness that resolved but was sent to CDU for sig electrolyte deficiency (Ca/Mg).  S/p several rounds repletion, pt found to be sufficiently improved/trending, additional gram calcium administered prior to arrival.  Planned PCP f/u.  Close follow-up.  Will recommend outpt f/u with PCP within 48 hrs for repeat electrolyte testing, potential vitamin testing (vit D) and ongoing electrolyte management +/- repletion.

## 2023-06-06 NOTE — ED CDU PROVIDER DISPOSITION NOTE - CLINICAL COURSE
This is a 57-year-old male with a history of left-sided colon CA status post resection, ileostomy in 2022, possible recurrence per March MRI based on pelvic mass adjacent to bladder, DVT on Eliquis presented to the ED for having chest tightness which has resolved.  Not having any chest pain at this time however was found to have hypocalcemia and hypomagnesia patient discussed several rounds of calcium and magnesium.  Now is trending up.  Patient currently states that he does not have any chest pain at this time denies having any shortness of breath or trouble breathing denies having any fever chills body aches headaches or dizziness denies having any exertional dyspnea.

## 2023-06-06 NOTE — ED CDU PROVIDER SUBSEQUENT DAY NOTE - HISTORY
56 yo m h/o left sided colon ca s/p resection, ileostomy 2022, possible recurrence per March MRI based on pelvic mass adjacent to bladder, DVT on eliquis. Obstructing renal stone s/p ureteral stent 4/8/23 chest tightness that has now resolved, better with exertion, nonradiating, no diaphoresis associated.  Also has been having intermittent dizziness with blurry vision lasting seconds to minutes x2 days.  Patient states that he feels unsteady on his feet when this happens but gait disturbances resolved after episode finishes.  Denies any headache, difficulty swallowing, changes in sensation of the face, neck pain, difficulty ambulating in between events, changes in sensation in the upper or lower extremities. No current symptoms. Initial Magnesium 0.5>1.2 Calcium 6.2>6.6. Pt notes an improvement of his sx, plan is observation for continued repletion, repeat labwork. EKG reviewed and is stable in spite of electrolyte derangements, will continue to repeat, pending AM repeat labwork.

## 2023-06-06 NOTE — ED CDU PROVIDER DISPOSITION NOTE - PATIENT PORTAL LINK FT
You can access the FollowMyHealth Patient Portal offered by [facility] by registering at the following website: [email or web address]. By joining [facility]’s FollowMyHealth portal, you will also be able to view your health information using other applications (apps) compatible with our system.

## 2023-06-06 NOTE — ED CDU PROVIDER DISPOSITION NOTE - NSFOLLOWUPINSTRUCTIONS_ED_ALL_ED_FT
Rest, drink plenty of fluids.  Advance activity as tolerated.  Continue all previously prescribed medications as directed.  Follow up with your primary care physician in 48-72 hours- bring copies of your results.  Return to the ER for worsening or persistent symptoms, and/or ANY NEW OR CONCERNING SYMPTOMS. If you have issues obtaining follow up, please call: [phone] to obtain a doctor or specialist who takes your insurance in your area.  You may call [phone] to make an appointment with the internal medicine clinic.    Please follow up with your primary care doctor to trend the blood results.

## 2023-06-06 NOTE — ED CDU PROVIDER SUBSEQUENT DAY NOTE - NS ED ATTENDING STATEMENT MOD
This was a shared visit with the JUD. I reviewed and verified the documentation and independently performed the documented: Attending with

## 2023-06-10 NOTE — PATIENT PROFILE ADULT - FALL HARM RISK - HARM RISK INTERVENTIONS
Home
Assistance OOB with selected safe patient handling equipment/Communicate Risk of Fall with Harm to all staff/Discuss with provider need for PT consult/Monitor gait and stability/Provide patient with walking aids - walker, cane, crutches/Reinforce activity limits and safety measures with patient and family/Tailored Fall Risk Interventions/Visual Cue: Yellow wristband and red socks/Bed in lowest position, wheels locked, appropriate side rails in place/Call bell, personal items and telephone in reach/Instruct patient to call for assistance before getting out of bed or chair/Non-slip footwear when patient is out of bed/[place] to call system/Physically safe environment - no spills, clutter or unnecessary equipment/Purposeful Proactive Rounding/Room/bathroom lighting operational, light cord in reach

## 2023-06-16 ENCOUNTER — APPOINTMENT (OUTPATIENT)
Dept: SURGICAL ONCOLOGY | Facility: CLINIC | Age: 58
End: 2023-06-16
Payer: MEDICAID

## 2023-06-16 VITALS
RESPIRATION RATE: 16 BRPM | DIASTOLIC BLOOD PRESSURE: 88 MMHG | HEIGHT: 67 IN | BODY MASS INDEX: 19.15 KG/M2 | HEART RATE: 76 BPM | OXYGEN SATURATION: 97 % | SYSTOLIC BLOOD PRESSURE: 145 MMHG | WEIGHT: 122 LBS

## 2023-06-16 PROCEDURE — 99213 OFFICE O/P EST LOW 20 MIN: CPT

## 2023-06-16 NOTE — ASSESSMENT
[FreeTextEntry1] : 57M with obstructing sigmoid cancer that presented with colonic ischemia requiring total abdominal colectomy with end ileostomy. Initially was left in discontinuity with open abdomen given septic shock with GNR bacteremia. He has recovered very well and is home with stable/ increasing weight, tolerating a diet, and functional ileostomy. \par \par Per discussion with his surgeon, there was residual disease in the pelvis. \par \par We discussed options at this point including re-exploration versus systemic therapy. \par \par I think the best course of action at this juncture is systemic therapy with interval imaging prior to considering resection and/ or ileostomy reversal, which has the potential for considerable morbidity. I don’t think diagnostic laparoscopy will be feasible/ safe given the extent of prior operation. \par \par The patient has refused systemic therapy, not interested in meeting with medical oncology or radiation oncology. \par He clearly has residual disease on imaging. He understands that there is progressive residual malignancy. Losing weight. Was admitted to [facility] in May with bowel obstruction that resolved with nonoperative management. Now doing better. Abdominal imaging was without IV contrast\par \par Plan: \par Follow up with Urology- Dr Nino, Dr. Kruger\par Follow up with Dr. Coronado\par Strongly encouraged patient to reconsider discussion with medical and radiation oncology- he will think about it\par I do not think re-exploration is indicated at this time \par f/u after CT CAP and labs\par He refused pelvic MRI \par

## 2023-06-16 NOTE — HISTORY OF PRESENT ILLNESS
[de-identified] : 57M referred by Dr. Zach Plasencia at [facility] who presented him with large bowel obstruction and septic shock with gram negative bacteremia on 9/10/22. Dr. Plasencia performed life-saving emergent subtotal colectomy and was left in discontinuity, Required several units of blood. Vasopressors.  He returned to the OR on 9/12/22 at which point additional necrotic appearing small bowel was resected and an end ileostomy was created. Was in hospital for almost 3 weeks (initial 2 weeks and then readmission for dehydration/ high ileostomy output.) \par \par There was gross residual disease in the pelvis, which was not amenable to resection. 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 He is on eliquis for this. \par \par Currently he has recovered well and is tolerating a diet. Has gained one pound, after losing about 16-20 pounds. \par He met with Dr. Gillian Coronado from medical oncology. \par \par CT  Chest abdomen pelvis 9/19/22: no visible disease. Thromboses as above\par CT abdomen/ pelvis 10/11/22: resolving pelvic fluid. No visible disease \par \par MRI of pelvis 11/8/22 Within the wall of the dome of the bladder is a 2.1 x 2.1 x 1.8 cm enhancing abnormality. This appears to be a site of previous postoperative collection however no fluid component can be identified this time.\par \par 11/15/22 CEA 3.0 \par \par He followed up with Dr Coronado on 11/14/22, her recommendation was to begin systemic chemotherapy. \par 12/7/22- telephonic conversation: Patient declined port placement. Called him to explain necessity of port. He is obtaining medical opinions from friends/ colleagues in medicine and was told to consider a PET or biopsy. He wants to wait until after the Holidays. Reiterated that next step is port placement/ systemic therapy and encouraged him to do this as soon as possible. \par \par He followed up with Dr Muniz in February- he recommended f/u with me for reversal. \par He follows up with Dr Kruger- underwent cystoscopy this showed large area in the posterior bladder wall with a submucosal nodularity. The overlying mucosa was normal. Findings are consistent with either extrinsic compression or intramural nodule of the bladder. Plan was to undergo imaging and possible cystoscopy with biopsy. \par \par MRI 3/16/23 No evidence of intrinsic bladder lesion. Increasing size of enhancing soft tissue mass along the serosal surface of the left bladder dome measuring 4.0 x 3.7 cm (26-24), previously 2.1 x 2.1 cm. Findings are suspicious for recurrent colon cancer. Nonocclusive left portal vein thrombus.\par \par 4/28/23: He presents with his wife, still on eliquis, he denies any abdominal pain. Recently hospitalized in [facility], transferred from [facility], for kidney stones. S/p urethral stent placement, by Dr Meadows.  He has lost weight, his appetite is great, urinating freely, ostomy is working well. He is very adamant about getting chemotherapy. He explains it is because he saw his cousin, who was diagnosed with cervical cancer, undergo chemotherapy, whither away and die. \par \par Admitted to [facility] 4/30/23- 5/4/23 for a small bowel obstruction- no surgical intervention. \par CT A/P 4/30/23 Bowel; Dilated small bowel measuring up to 3.5 cm. Small bowel obstruction with transition piont in the left pelvis at the levl of prior surgery and an adjacent mass related to the dome of urinary bladder which demonstrates interval increase in size and is concerning for a metastatic lesion/recurrence. \par CT 5/3/23: Indeterminate 2 mm Right lower lobe lung nodule. \par 5/3/23 CEA 29.8\par 5/19/23 CEA 74.6\par \par 6/16/23: saw Dr Nino s/p kidney stone removal and stent replacement on 5/11/23. He still occasionally has blood in his urine, his appetite is great. \par Social: Lives with his wife. No smoking or alcohol.

## 2023-06-22 ENCOUNTER — RESULT REVIEW (OUTPATIENT)
Age: 58
End: 2023-06-22

## 2023-06-29 ENCOUNTER — EMERGENCY (EMERGENCY)
Facility: HOSPITAL | Age: 58
LOS: 1 days | Discharge: HOME | End: 2023-06-29
Attending: EMERGENCY MEDICINE | Admitting: EMERGENCY MEDICINE
Payer: MEDICAID

## 2023-06-29 VITALS
HEIGHT: 67 IN | HEART RATE: 91 BPM | SYSTOLIC BLOOD PRESSURE: 103 MMHG | OXYGEN SATURATION: 100 % | DIASTOLIC BLOOD PRESSURE: 67 MMHG | TEMPERATURE: 98 F | RESPIRATION RATE: 18 BRPM

## 2023-06-29 DIAGNOSIS — Z98.890 OTHER SPECIFIED POSTPROCEDURAL STATES: Chronic | ICD-10-CM

## 2023-06-29 DIAGNOSIS — Z90.49 ACQUIRED ABSENCE OF OTHER SPECIFIED PARTS OF DIGESTIVE TRACT: Chronic | ICD-10-CM

## 2023-06-29 LAB
ALBUMIN SERPL ELPH-MCNC: 3.5 G/DL — SIGNIFICANT CHANGE UP (ref 3.3–5)
ALBUMIN SERPL ELPH-MCNC: 4.5 G/DL — SIGNIFICANT CHANGE UP (ref 3.3–5)
ALP SERPL-CCNC: 175 U/L — HIGH (ref 40–120)
ALP SERPL-CCNC: 211 U/L — HIGH (ref 40–120)
ALT FLD-CCNC: 52 U/L — HIGH (ref 4–41)
ALT FLD-CCNC: 70 U/L — HIGH (ref 4–41)
ANION GAP SERPL CALC-SCNC: 16 MMOL/L — HIGH (ref 7–14)
ANION GAP SERPL CALC-SCNC: 17 MMOL/L — HIGH (ref 7–14)
AST SERPL-CCNC: 47 U/L — HIGH (ref 4–40)
AST SERPL-CCNC: 57 U/L — HIGH (ref 4–40)
BASE EXCESS BLDV CALC-SCNC: 11.2 MMOL/L — HIGH (ref -2–3)
BASOPHILS # BLD AUTO: 0.07 K/UL — SIGNIFICANT CHANGE UP (ref 0–0.2)
BASOPHILS NFR BLD AUTO: 0.6 % — SIGNIFICANT CHANGE UP (ref 0–2)
BILIRUB SERPL-MCNC: 1.1 MG/DL — SIGNIFICANT CHANGE UP (ref 0.2–1.2)
BILIRUB SERPL-MCNC: 1.4 MG/DL — HIGH (ref 0.2–1.2)
BLOOD GAS VENOUS COMPREHENSIVE RESULT: SIGNIFICANT CHANGE UP
BUN SERPL-MCNC: 47 MG/DL — HIGH (ref 7–23)
BUN SERPL-MCNC: 48 MG/DL — HIGH (ref 7–23)
CALCIUM SERPL-MCNC: 8.4 MG/DL — SIGNIFICANT CHANGE UP (ref 8.4–10.5)
CALCIUM SERPL-MCNC: 9.2 MG/DL — SIGNIFICANT CHANGE UP (ref 8.4–10.5)
CHLORIDE BLDV-SCNC: 88 MMOL/L — LOW (ref 96–108)
CHLORIDE SERPL-SCNC: 80 MMOL/L — LOW (ref 98–107)
CHLORIDE SERPL-SCNC: 88 MMOL/L — LOW (ref 98–107)
CO2 BLDV-SCNC: 40.7 MMOL/L — HIGH (ref 22–26)
CO2 SERPL-SCNC: 23 MMOL/L — SIGNIFICANT CHANGE UP (ref 22–31)
CO2 SERPL-SCNC: 30 MMOL/L — SIGNIFICANT CHANGE UP (ref 22–31)
CREAT SERPL-MCNC: 2.08 MG/DL — HIGH (ref 0.5–1.3)
CREAT SERPL-MCNC: 2.5 MG/DL — HIGH (ref 0.5–1.3)
EGFR: 29 ML/MIN/1.73M2 — LOW
EGFR: 36 ML/MIN/1.73M2 — LOW
EOSINOPHIL # BLD AUTO: 0.15 K/UL — SIGNIFICANT CHANGE UP (ref 0–0.5)
EOSINOPHIL NFR BLD AUTO: 1.2 % — SIGNIFICANT CHANGE UP (ref 0–6)
GAS PNL BLDV: 128 MMOL/L — LOW (ref 136–145)
GLUCOSE BLDV-MCNC: 144 MG/DL — HIGH (ref 70–99)
GLUCOSE SERPL-MCNC: 126 MG/DL — HIGH (ref 70–99)
GLUCOSE SERPL-MCNC: 128 MG/DL — HIGH (ref 70–99)
HCO3 BLDV-SCNC: 39 MMOL/L — HIGH (ref 22–29)
HCT VFR BLD CALC: 44.2 % — SIGNIFICANT CHANGE UP (ref 39–50)
HCT VFR BLDA CALC: 38 % — LOW (ref 39–51)
HGB BLD CALC-MCNC: 12.8 G/DL — SIGNIFICANT CHANGE UP (ref 12.6–17.4)
HGB BLD-MCNC: 14.7 G/DL — SIGNIFICANT CHANGE UP (ref 13–17)
IANC: 8.42 K/UL — HIGH (ref 1.8–7.4)
IMM GRANULOCYTES NFR BLD AUTO: 0.4 % — SIGNIFICANT CHANGE UP (ref 0–0.9)
LACTATE BLDV-MCNC: 2.8 MMOL/L — HIGH (ref 0.5–2)
LYMPHOCYTES # BLD AUTO: 18.8 % — SIGNIFICANT CHANGE UP (ref 13–44)
LYMPHOCYTES # BLD AUTO: 2.26 K/UL — SIGNIFICANT CHANGE UP (ref 1–3.3)
MAGNESIUM SERPL-MCNC: 1.6 MG/DL — SIGNIFICANT CHANGE UP (ref 1.6–2.6)
MCHC RBC-ENTMCNC: 27.4 PG — SIGNIFICANT CHANGE UP (ref 27–34)
MCHC RBC-ENTMCNC: 33.3 GM/DL — SIGNIFICANT CHANGE UP (ref 32–36)
MCV RBC AUTO: 82.5 FL — SIGNIFICANT CHANGE UP (ref 80–100)
MONOCYTES # BLD AUTO: 1.07 K/UL — HIGH (ref 0–0.9)
MONOCYTES NFR BLD AUTO: 8.9 % — SIGNIFICANT CHANGE UP (ref 2–14)
NEUTROPHILS # BLD AUTO: 8.42 K/UL — HIGH (ref 1.8–7.4)
NEUTROPHILS NFR BLD AUTO: 70.1 % — SIGNIFICANT CHANGE UP (ref 43–77)
NRBC # BLD: 0 /100 WBCS — SIGNIFICANT CHANGE UP (ref 0–0)
NRBC # FLD: 0 K/UL — SIGNIFICANT CHANGE UP (ref 0–0)
PCO2 BLDV: 64 MMHG — HIGH (ref 42–55)
PH BLDV: 7.39 — SIGNIFICANT CHANGE UP (ref 7.32–7.43)
PHOSPHATE SERPL-MCNC: 5 MG/DL — HIGH (ref 2.5–4.5)
PLATELET # BLD AUTO: 484 K/UL — HIGH (ref 150–400)
PO2 BLDV: 30 MMHG — SIGNIFICANT CHANGE UP (ref 25–45)
POTASSIUM BLDV-SCNC: 3 MMOL/L — LOW (ref 3.5–5.1)
POTASSIUM SERPL-MCNC: 4.2 MMOL/L — SIGNIFICANT CHANGE UP (ref 3.5–5.3)
POTASSIUM SERPL-MCNC: 4.4 MMOL/L — SIGNIFICANT CHANGE UP (ref 3.5–5.3)
POTASSIUM SERPL-SCNC: 4.2 MMOL/L — SIGNIFICANT CHANGE UP (ref 3.5–5.3)
POTASSIUM SERPL-SCNC: 4.4 MMOL/L — SIGNIFICANT CHANGE UP (ref 3.5–5.3)
PROT SERPL-MCNC: 7 G/DL — SIGNIFICANT CHANGE UP (ref 6–8.3)
PROT SERPL-MCNC: 8.3 G/DL — SIGNIFICANT CHANGE UP (ref 6–8.3)
RBC # BLD: 5.36 M/UL — SIGNIFICANT CHANGE UP (ref 4.2–5.8)
RBC # FLD: 13.6 % — SIGNIFICANT CHANGE UP (ref 10.3–14.5)
SAO2 % BLDV: 41.4 % — LOW (ref 67–88)
SODIUM SERPL-SCNC: 126 MMOL/L — LOW (ref 135–145)
SODIUM SERPL-SCNC: 128 MMOL/L — LOW (ref 135–145)
WBC # BLD: 12.02 K/UL — HIGH (ref 3.8–10.5)
WBC # FLD AUTO: 12.02 K/UL — HIGH (ref 3.8–10.5)

## 2023-06-29 PROCEDURE — 93010 ELECTROCARDIOGRAM REPORT: CPT

## 2023-06-29 PROCEDURE — 71046 X-RAY EXAM CHEST 2 VIEWS: CPT | Mod: 26

## 2023-06-29 PROCEDURE — 99223 1ST HOSP IP/OBS HIGH 75: CPT

## 2023-06-29 RX ORDER — SODIUM CHLORIDE 9 MG/ML
1000 INJECTION INTRAMUSCULAR; INTRAVENOUS; SUBCUTANEOUS ONCE
Refills: 0 | Status: COMPLETED | OUTPATIENT
Start: 2023-06-29 | End: 2023-06-29

## 2023-06-29 RX ORDER — SODIUM CHLORIDE 9 MG/ML
1000 INJECTION, SOLUTION INTRAVENOUS
Refills: 0 | Status: DISCONTINUED | OUTPATIENT
Start: 2023-06-29 | End: 2023-07-02

## 2023-06-29 RX ORDER — MAGNESIUM SULFATE 500 MG/ML
1 VIAL (ML) INJECTION ONCE
Refills: 0 | Status: COMPLETED | OUTPATIENT
Start: 2023-06-29 | End: 2023-06-29

## 2023-06-29 RX ORDER — SODIUM CHLORIDE 9 MG/ML
1000 INJECTION, SOLUTION INTRAVENOUS ONCE
Refills: 0 | Status: COMPLETED | OUTPATIENT
Start: 2023-06-29 | End: 2023-06-29

## 2023-06-29 RX ORDER — ONDANSETRON 8 MG/1
4 TABLET, FILM COATED ORAL EVERY 4 HOURS
Refills: 0 | Status: DISCONTINUED | OUTPATIENT
Start: 2023-06-29 | End: 2023-07-02

## 2023-06-29 RX ADMIN — SODIUM CHLORIDE 125 MILLILITER(S): 9 INJECTION, SOLUTION INTRAVENOUS at 23:00

## 2023-06-29 RX ADMIN — SODIUM CHLORIDE 1000 MILLILITER(S): 9 INJECTION INTRAMUSCULAR; INTRAVENOUS; SUBCUTANEOUS at 14:58

## 2023-06-29 RX ADMIN — SODIUM CHLORIDE 1000 MILLILITER(S): 9 INJECTION INTRAMUSCULAR; INTRAVENOUS; SUBCUTANEOUS at 18:14

## 2023-06-29 RX ADMIN — SODIUM CHLORIDE 1000 MILLILITER(S): 9 INJECTION, SOLUTION INTRAVENOUS at 20:34

## 2023-06-29 RX ADMIN — SODIUM CHLORIDE 1000 MILLILITER(S): 9 INJECTION INTRAMUSCULAR; INTRAVENOUS; SUBCUTANEOUS at 17:30

## 2023-06-29 RX ADMIN — Medication 100 GRAM(S): at 20:33

## 2023-06-29 RX ADMIN — SODIUM CHLORIDE 1000 MILLILITER(S): 9 INJECTION INTRAMUSCULAR; INTRAVENOUS; SUBCUTANEOUS at 15:16

## 2023-06-29 NOTE — ED CDU PROVIDER INITIAL DAY NOTE - NSICDXPASTMEDICALHX_GEN_ALL_CORE_FT
PAST MEDICAL HISTORY:  Colon cancer     H/O ileostomy     Renal stones      PAST MEDICAL HISTORY:  MAXWELL (acute kidney injury) (4/30/23)    Colon cancer (resection and ileostomy in 2022, with progressive residual metastatic dz, refusing CTX / radiation oncology consultation to date)    H/O ileostomy     History of cholelithiasis     History of ileostomy     History of small bowel obstruction (4/30/23: non-surgical tx)    Large bowel obstruction (at time of diagnosis of colon cancer)    Pulmonary nodule     Renal stones (s/p right ureteral stent placement)    Splenomegaly

## 2023-06-29 NOTE — ED CDU PROVIDER INITIAL DAY NOTE - OBJECTIVE STATEMENT
58yo man with PMH left sided colon cancer now post resection and ileostomy, DVT on eliquis, chronic electrolyte imbalances that manifest as shortness of breath/chest pain/cramping, presenting due to similar symptoms over the last 2 days.  Patient states that he has been trying to supplement his diet with multivitamins, feels that he is losing more than he can replenish.  Denies fever, chest pain, worsening of shortness of breath with exertion, changes in urination or BM. Has been feeling more dehydrated recently.    CDU CHADWICK Elias Note-----  ED Provider as above, reviewed.  Pt's PMH was explored further by this writer by correlating recent admission documentation from 4/30/23 - 5/4/23 as well as outpatient surgical oncology documentation from recent visit 6/16/23.  Pt is a 56 yo male, PMH left-sided colon cancer s/p resection, ileostomy 2022, 56yo man with PMH left sided colon cancer now post resection and ileostomy, DVT on eliquis, chronic electrolyte imbalances that manifest as shortness of breath/chest pain/cramping, presenting due to similar symptoms over the last 2 days.  Patient states that he has been trying to supplement his diet with multivitamins, feels that he is losing more than he can replenish.  Denies fever, chest pain, worsening of shortness of breath with exertion, changes in urination or BM. Has been feeling more dehydrated recently.    CDU CHADWICK Elias Note-----  ED Provider as above, reviewed.  Pt's PMH was explored further by this writer by correlating recent admission documentation from 4/30/23 - 5/4/23 as well as outpatient surgical oncology documentation from recent visit 6/16/23.  Pt is a 56 yo male, PMH left-sided colon cancer (s/p resection, ileostomy 2022, with gross residual disease in the pelvis); post-op pt developed SMV, left iliac, left radial, left ulnar vein thromboses; placed on Eliquis.  Pt delayed f/u for port / CTX as had been advised.  Pt had MRI 3/16/23 with increasing size of enhancing soft tissue mass along bladder dome, suspicious for recurrent colon cancer.  +Hx/o nephrolithiasis and right ureteral stent placement.  Pt admitted 4/30/23 - 5/4/23 for small bowel obstruction (no surgical tx), MAXWELL, CT chest showed "Indeterminate 2 mm right lower lobe nodule".  Pt has refused follow up with medical oncology or radiation oncology to this point and has declined CTX thus far, per outpatient documentation; pt has progressive residual malignancy.  Pt now presents to this ED c/o SOB / chest/muscle cramping, poor PO intake; states symptoms feel similar to recent past hx/o electrolyte imbalance.  In the ED, VSS; pt afebrile.  EKG NSR @ 81 bpm.  WBC 12.02, Hb 14.7, platelets 484.  CMP (mildly hemolyzed): sodium 126, chloride 80, anion gap 16, BUN 47, creatinine 2.50, glucose 128, Tbil 1.4, , AST 57, ALT 70; magnesium 1.60, phosphorus (mod. hemolyzed) 5.0.  CXR was officially read by radiology as "...IMPRESSION: Clear lungs..".  Pt was given 2 Liters IV fluid boluses; labs were repeated:  CMP: sodium 128, chloride 88, anion gap 17, BUN 48, creatinine 2.08, glucose 126, Tbil 1.1, , AST 47, ALT 52; VBG: pH 7.39, lactate 2.8.  Pt was sent to CDU for continued IV hydration, trend labs, correct electrolytes as needed, supportive care, general observation care / monitoring.

## 2023-06-29 NOTE — ED ADULT NURSE NOTE - NSFALLUNIVINTERV_ED_ALL_ED
Bed/Stretcher in lowest position, wheels locked, appropriate side rails in place/Call bell, personal items and telephone in reach/Instruct patient to call for assistance before getting out of bed/chair/stretcher/Non-slip footwear applied when patient is off stretcher/[place] to call system/Physically safe environment - no spills, clutter or unnecessary equipment/Purposeful proactive rounding/Room/bathroom lighting operational, light cord in reach

## 2023-06-29 NOTE — ED PROVIDER NOTE - NS ED ROS FT
GENERAL: No fever, no chills  EYES: No change in vision  HEENT: No trouble swallowing or speaking  CARDIAC: No chest pain  PULMONARY: No cough, +SOB  GI: No abdominal pain, no nausea, no vomiting, no diarrhea, no constipation  : No changes in urination  SKIN: No rashes  NEURO: No headache, no numbness  MSK: No joint pain  Otherwise as HPI or negative.

## 2023-06-29 NOTE — ED CDU PROVIDER INITIAL DAY NOTE - PHYSICAL EXAMINATION
CONSTITUTIONAL:  Pt thin, chronically ill-appearing, awake, alert, oriented to person, place, time/situation and in no apparent distress.  Pt. is objectively comfortable appearing and verbalizing in full, clear, effortless sentences.  ENMT: NC/AT.  Airway patent.  Nasal mucosa clear.  Moist mucous membranes.  Neck supple.  EYES:  Clear OU.  CARDIAC:  Normal rate, regular rhythm.  Heart sounds S1 S2.  No murmurs, gallops, or rubs.  RESPIRATORY:  Breath sounds clear and equal bilaterally.  No wheezes, no rales, no rhonchi.  GASTROINTESTINAL:  Abdomen soft, non-distended, non-tender.  No rebound, no guarding.  NEUROLOGICAL:  Alert and oriented to person/place/time/situation.  No focal deficits; no tremors noted.   MUSCULOSKELETAL:  Range of motion is not limited.    SKIN:  Skin color unremarkable.  Skin warm, dry, and intact.    PSYCHIATRIC:  Alert and oriented to person/place/time/situation.  Pt calm, cooperative.  No apparent risk to self or others.

## 2023-06-29 NOTE — ED PROVIDER NOTE - PHYSICAL EXAMINATION
Gen: NAD, AOx3, able to make needs known, non-toxic  Head: NCAT  HEENT: EOMI, normal conjunctiva  Lung: CTAB, no respiratory distress, no wheezes/rhonchi/rales B/L, speaking in full sentences, 100% on RA   CV: RRR, no M/R/G, pulses bilaterally   Abd: soft, NTND, no guarding, no CVA tenderness  MSK: no visible bony deformities  Neuro: No focal sensory or motor deficits  Skin: Warm, well perfused, no rash  Psych: normal affect

## 2023-06-29 NOTE — ED ADULT NURSE REASSESSMENT NOTE - NS ED NURSE REASSESS COMMENT FT1
PT is resting in stretcher, easily arousable to verbal stimuli. no apparent distress noted at this time. hand off will be given to primary nurse when return to area.
pt received A&Ox4 offering no complaints at this time. respirations even and unlabored. pt denies SOB, cramping, pain, N/V/D, weakness, chills, c/p at this time. no acute distress noted. VS as noted in flowsheet. ileostomy in place. medicated as per MD orders. awaiting transport to CDU. report given to DALIA Zuleta

## 2023-06-29 NOTE — ED PROVIDER NOTE - OBJECTIVE STATEMENT
56yo man with PMH left sided colon cancer now post resection and ileostomy, DVT on eliquis, chronic electrolyte imbalances that manifest as shortness of breath/chest pain/cramping, presenting due to similar symptoms over the last 2 days.  Patient states that he has been trying to supplement his diet with multivitamins, feels that he is losing more than he can replenish.  Denies fever, chest pain, worsening of shortness of breath with exertion, changes in urination or BM. Has been feeling more dehydrated recently.

## 2023-06-29 NOTE — ED ADULT NURSE NOTE - OBJECTIVE STATEMENT
Pt complains of SOB, fatigue, and intermittent cramping of BUE and BLE for two days; pt currently denies cramping; pt denies chest pain but reports some dizziness that he attributes to fatigue; pt has a right ileostomy; no apparent distress.

## 2023-06-29 NOTE — ED ADULT TRIAGE NOTE - CHIEF COMPLAINT QUOTE
c/o SOB and muscle cramping onset 2 days ago, worse today, hx of colon CA not on chemo ro radiation  states being set up by PMD.

## 2023-06-29 NOTE — ED PROVIDER NOTE - CLINICAL SUMMARY MEDICAL DECISION MAKING FREE TEXT BOX
Adolfo, PGY2 - 57-year-old man with PMH colon cancer post ileostomy, presenting with chronic signs of electrolyte imbalances.  Labs, reassess. Will supplement as needed. *The above represents an initial assessment/impression. Please refer to progress notes for potential changes in patient clinical course* Marc att: 57-year-old man with PMH colon cancer post ileostomy, presenting with chronic signs of electrolyte imbalances.  Labs, reassess. Will supplement as needed. *The above represents an initial assessment/impression. Please refer to progress notes for potential changes in patient clinical course*

## 2023-06-29 NOTE — ED CDU PROVIDER INITIAL DAY NOTE - NSICDXPASTSURGICALHX_GEN_ALL_CORE_FT
PAST SURGICAL HISTORY:  H/O ureteroscopy     S/P colon resection      PAST SURGICAL HISTORY:  H/O ureteroscopy (s/p right ureteral stent placement)    S/P colon resection

## 2023-06-29 NOTE — ED PROVIDER NOTE - PROGRESS NOTE DETAILS
KARLA Mata - elevated bun and creatinine, hyponatremia, consider dehydration resulting in these lab values. will supplement with normal saline, reassess cmp and vbg Adolfo, PGY2 - repeat blood work just sent now per nurse lytes mildly improved, will place in cdu for more fluids and rpt labs

## 2023-06-30 VITALS
HEART RATE: 57 BPM | OXYGEN SATURATION: 100 % | RESPIRATION RATE: 17 BRPM | TEMPERATURE: 98 F | SYSTOLIC BLOOD PRESSURE: 106 MMHG | DIASTOLIC BLOOD PRESSURE: 76 MMHG

## 2023-06-30 LAB
A1C WITH ESTIMATED AVERAGE GLUCOSE RESULT: 5.2 % — SIGNIFICANT CHANGE UP (ref 4–5.6)
ALBUMIN SERPL ELPH-MCNC: 3.8 G/DL — SIGNIFICANT CHANGE UP (ref 3.3–5)
ALP SERPL-CCNC: 157 U/L — HIGH (ref 40–120)
ALT FLD-CCNC: 50 U/L — HIGH (ref 4–41)
ANION GAP SERPL CALC-SCNC: 10 MMOL/L — SIGNIFICANT CHANGE UP (ref 7–14)
APPEARANCE UR: CLEAR — SIGNIFICANT CHANGE UP
AST SERPL-CCNC: 36 U/L — SIGNIFICANT CHANGE UP (ref 4–40)
BACTERIA # UR AUTO: NEGATIVE — SIGNIFICANT CHANGE UP
BASE EXCESS BLDV CALC-SCNC: 11 MMOL/L — HIGH (ref -2–3)
BASOPHILS # BLD AUTO: 0.03 K/UL — SIGNIFICANT CHANGE UP (ref 0–0.2)
BASOPHILS NFR BLD AUTO: 0.6 % — SIGNIFICANT CHANGE UP (ref 0–2)
BILIRUB SERPL-MCNC: 1.2 MG/DL — SIGNIFICANT CHANGE UP (ref 0.2–1.2)
BILIRUB UR-MCNC: NEGATIVE — SIGNIFICANT CHANGE UP
BLOOD GAS VENOUS COMPREHENSIVE RESULT: SIGNIFICANT CHANGE UP
BUN SERPL-MCNC: 34 MG/DL — HIGH (ref 7–23)
CALCIUM SERPL-MCNC: 8.8 MG/DL — SIGNIFICANT CHANGE UP (ref 8.4–10.5)
CHLORIDE BLDV-SCNC: 93 MMOL/L — LOW (ref 96–108)
CHLORIDE SERPL-SCNC: 92 MMOL/L — LOW (ref 98–107)
CO2 BLDV-SCNC: 39.4 MMOL/L — HIGH (ref 22–26)
CO2 SERPL-SCNC: 31 MMOL/L — SIGNIFICANT CHANGE UP (ref 22–31)
COLOR SPEC: YELLOW — SIGNIFICANT CHANGE UP
CREAT SERPL-MCNC: 1.86 MG/DL — HIGH (ref 0.5–1.3)
DIFF PNL FLD: NEGATIVE — SIGNIFICANT CHANGE UP
EGFR: 41 ML/MIN/1.73M2 — LOW
EOSINOPHIL # BLD AUTO: 0.13 K/UL — SIGNIFICANT CHANGE UP (ref 0–0.5)
EOSINOPHIL NFR BLD AUTO: 2.5 % — SIGNIFICANT CHANGE UP (ref 0–6)
EPI CELLS # UR: 1 /HPF — SIGNIFICANT CHANGE UP (ref 0–5)
ESTIMATED AVERAGE GLUCOSE: 103 — SIGNIFICANT CHANGE UP
GAS PNL BLDV: 129 MMOL/L — LOW (ref 136–145)
GLUCOSE BLDV-MCNC: 106 MG/DL — HIGH (ref 70–99)
GLUCOSE SERPL-MCNC: 105 MG/DL — HIGH (ref 70–99)
GLUCOSE UR QL: NEGATIVE — SIGNIFICANT CHANGE UP
HCO3 BLDV-SCNC: 38 MMOL/L — HIGH (ref 22–29)
HCT VFR BLD CALC: 37.4 % — LOW (ref 39–50)
HCT VFR BLDA CALC: 36 % — LOW (ref 39–51)
HGB BLD CALC-MCNC: 12.1 G/DL — LOW (ref 12.6–17.4)
HGB BLD-MCNC: 11.9 G/DL — LOW (ref 13–17)
HYALINE CASTS # UR AUTO: 2 /LPF — SIGNIFICANT CHANGE UP (ref 0–7)
IANC: 3.24 K/UL — SIGNIFICANT CHANGE UP (ref 1.8–7.4)
IMM GRANULOCYTES NFR BLD AUTO: 0.2 % — SIGNIFICANT CHANGE UP (ref 0–0.9)
KETONES UR-MCNC: NEGATIVE — SIGNIFICANT CHANGE UP
LACTATE BLDV-MCNC: 1.6 MMOL/L — SIGNIFICANT CHANGE UP (ref 0.5–2)
LEUKOCYTE ESTERASE UR-ACNC: NEGATIVE — SIGNIFICANT CHANGE UP
LYMPHOCYTES # BLD AUTO: 1.38 K/UL — SIGNIFICANT CHANGE UP (ref 1–3.3)
LYMPHOCYTES # BLD AUTO: 26.5 % — SIGNIFICANT CHANGE UP (ref 13–44)
MAGNESIUM SERPL-MCNC: 1.7 MG/DL — SIGNIFICANT CHANGE UP (ref 1.6–2.6)
MCHC RBC-ENTMCNC: 27.3 PG — SIGNIFICANT CHANGE UP (ref 27–34)
MCHC RBC-ENTMCNC: 31.8 GM/DL — LOW (ref 32–36)
MCV RBC AUTO: 85.8 FL — SIGNIFICANT CHANGE UP (ref 80–100)
MONOCYTES # BLD AUTO: 0.42 K/UL — SIGNIFICANT CHANGE UP (ref 0–0.9)
MONOCYTES NFR BLD AUTO: 8.1 % — SIGNIFICANT CHANGE UP (ref 2–14)
NEUTROPHILS # BLD AUTO: 3.24 K/UL — SIGNIFICANT CHANGE UP (ref 1.8–7.4)
NEUTROPHILS NFR BLD AUTO: 62.1 % — SIGNIFICANT CHANGE UP (ref 43–77)
NITRITE UR-MCNC: NEGATIVE — SIGNIFICANT CHANGE UP
NRBC # BLD: 0 /100 WBCS — SIGNIFICANT CHANGE UP (ref 0–0)
NRBC # FLD: 0 K/UL — SIGNIFICANT CHANGE UP (ref 0–0)
PCO2 BLDV: 58 MMHG — HIGH (ref 42–55)
PH BLDV: 7.42 — SIGNIFICANT CHANGE UP (ref 7.32–7.43)
PH UR: 6.5 — SIGNIFICANT CHANGE UP (ref 5–8)
PHOSPHATE SERPL-MCNC: 3.2 MG/DL — SIGNIFICANT CHANGE UP (ref 2.5–4.5)
PLATELET # BLD AUTO: 255 K/UL — SIGNIFICANT CHANGE UP (ref 150–400)
PO2 BLDV: 24 MMHG — LOW (ref 25–45)
POTASSIUM BLDV-SCNC: 3.4 MMOL/L — LOW (ref 3.5–5.1)
POTASSIUM SERPL-MCNC: 3.5 MMOL/L — SIGNIFICANT CHANGE UP (ref 3.5–5.3)
POTASSIUM SERPL-SCNC: 3.5 MMOL/L — SIGNIFICANT CHANGE UP (ref 3.5–5.3)
PROT SERPL-MCNC: 6.2 G/DL — SIGNIFICANT CHANGE UP (ref 6–8.3)
PROT UR-MCNC: ABNORMAL
RBC # BLD: 4.36 M/UL — SIGNIFICANT CHANGE UP (ref 4.2–5.8)
RBC # FLD: 13.6 % — SIGNIFICANT CHANGE UP (ref 10.3–14.5)
RBC CASTS # UR COMP ASSIST: 1 /HPF — SIGNIFICANT CHANGE UP (ref 0–4)
SAO2 % BLDV: 25.9 % — LOW (ref 67–88)
SODIUM SERPL-SCNC: 133 MMOL/L — LOW (ref 135–145)
SP GR SPEC: 1.02 — SIGNIFICANT CHANGE UP (ref 1.01–1.05)
UROBILINOGEN FLD QL: SIGNIFICANT CHANGE UP
WBC # BLD: 5.21 K/UL — SIGNIFICANT CHANGE UP (ref 3.8–10.5)
WBC # FLD AUTO: 5.21 K/UL — SIGNIFICANT CHANGE UP (ref 3.8–10.5)
WBC UR QL: 1 /HPF — SIGNIFICANT CHANGE UP (ref 0–5)

## 2023-06-30 PROCEDURE — 99238 HOSP IP/OBS DSCHRG MGMT 30/<: CPT

## 2023-06-30 RX ORDER — LOPERAMIDE HCL 2 MG
4 TABLET ORAL EVERY 6 HOURS
Refills: 0 | Status: DISCONTINUED | OUTPATIENT
Start: 2023-06-30 | End: 2023-07-02

## 2023-06-30 RX ORDER — SODIUM BICARBONATE 1 MEQ/ML
1300 SYRINGE (ML) INTRAVENOUS
Refills: 0 | Status: DISCONTINUED | OUTPATIENT
Start: 2023-06-30 | End: 2023-07-02

## 2023-06-30 RX ORDER — APIXABAN 2.5 MG/1
5 TABLET, FILM COATED ORAL
Refills: 0 | Status: DISCONTINUED | OUTPATIENT
Start: 2023-06-30 | End: 2023-07-02

## 2023-06-30 RX ORDER — POTASSIUM CHLORIDE 20 MEQ
10 PACKET (EA) ORAL
Refills: 0 | Status: DISCONTINUED | OUTPATIENT
Start: 2023-06-30 | End: 2023-07-02

## 2023-06-30 RX ADMIN — SODIUM CHLORIDE 1000 MILLILITER(S): 9 INJECTION, SOLUTION INTRAVENOUS at 05:52

## 2023-06-30 RX ADMIN — Medication 10 MILLIEQUIVALENT(S): at 05:49

## 2023-06-30 RX ADMIN — SODIUM CHLORIDE 125 MILLILITER(S): 9 INJECTION, SOLUTION INTRAVENOUS at 05:52

## 2023-06-30 RX ADMIN — Medication 1300 MILLIGRAM(S): at 05:49

## 2023-06-30 RX ADMIN — APIXABAN 5 MILLIGRAM(S): 2.5 TABLET, FILM COATED ORAL at 05:49

## 2023-06-30 NOTE — ED CDU PROVIDER SUBSEQUENT DAY NOTE - PROGRESS NOTE DETAILS
Patient signed out to me pending a.m. labs and reassessment.  Labs show improvement in creatinine which is downtrending to 1.6, BUN downtrending to 4, sodium 133, chloride 92, lactate cleared and is now 1.6.  Patient reassessed and reports he is feeling well, states muscle cramping has been resolved since yesterday.  Labs reviewed with CDU attending, plan to discharge patient home to follow-up with his primary care doctor.  Encourage oral hydration.  Patient will return to the ER with any worsening or concerning symptoms.

## 2023-06-30 NOTE — ED CDU PROVIDER SUBSEQUENT DAY NOTE - NSICDXPASTMEDICALHX_GEN_ALL_CORE_FT
PAST MEDICAL HISTORY:  MAXWELL (acute kidney injury) (4/30/23)    Colon cancer (resection and ileostomy in 2022, with progressive residual metastatic dz, refusing CTX / radiation oncology consultation to date)    H/O ileostomy     History of cholelithiasis     History of ileostomy     History of small bowel obstruction (4/30/23: non-surgical tx)    Large bowel obstruction (at time of diagnosis of colon cancer)    Pulmonary nodule     Renal stones (s/p right ureteral stent placement)    Splenomegaly

## 2023-06-30 NOTE — ED CDU PROVIDER DISPOSITION NOTE - CLINICAL COURSE
58 yo male, PMH left-sided colon cancer (s/p resection, ileostomy 2022, with gross residual disease in the pelvis); post-op pt developed SMV, left iliac, left radial, left ulnar vein thromboses; placed on Eliquis.  Pt delayed f/u for port / CTX as had been advised.  Pt had MRI 3/16/23 with increasing size of enhancing soft tissue mass along bladder dome, suspicious for recurrent colon cancer.  +Hx/o nephrolithiasis and right ureteral stent placement.  Pt admitted 4/30/23 - 5/4/23 for small bowel obstruction (no surgical tx), MAXWELL, CT chest showed "Indeterminate 2 mm right lower lobe nodule".  Pt has refused follow up with medical oncology or radiation oncology to this point and has declined CTX thus far, per outpatient documentation; pt has progressive residual malignancy. Pt now presents to this ED c/o SOB / chest/muscle cramping, poor PO intake; states symptoms feel similar to recent past hx/o electrolyte imbalance. In the ED, VSS; pt afebrile.  EKG NSR @ 81 bpm.  WBC 12.02, Hb 14.7, platelets 484.  CMP (mildly hemolyzed): sodium 126, chloride 80, anion gap 16, BUN 47, creatinine 2.50, glucose 128, Tbil 1.4, , AST 57, ALT 70; magnesium 1.60, phosphorus (mod. hemolyzed) 5.0.  Pt was given 2 Liters IV fluid boluses; labs were repeated:  CMP: sodium 128, chloride 88, anion gap 17, BUN 48, creatinine 2.08, glucose 126, Tbil 1.1, , AST 47, ALT 52; VBG: pH 7.39, lactate 2.8.  Pt was sent to CDU for continued IV hydration, trend labs, correct electrolytes as needed. This morning rpt labs show improvement, Na 133, Cl 92, BUN/Cr downtrending to 34/1.86, lactate cleared to 1.6, UA without evidence of infection. 56 yo male, PMH left-sided colon cancer (s/p resection, ileostomy 2022, with gross residual disease in the pelvis); post-op pt developed SMV, left iliac, left radial, left ulnar vein thromboses; placed on Eliquis.  Pt delayed f/u for port / CTX as had been advised.  Pt had MRI 3/16/23 with increasing size of enhancing soft tissue mass along bladder dome, suspicious for recurrent colon cancer.  +Hx/o nephrolithiasis and right ureteral stent placement.  Pt admitted 4/30/23 - 5/4/23 for small bowel obstruction (no surgical tx), MAXWELL, CT chest showed "Indeterminate 2 mm right lower lobe nodule".  Pt has refused follow up with medical oncology or radiation oncology to this point and has declined CTX thus far, per outpatient documentation; pt has progressive residual malignancy. Pt now presents to this ED c/o SOB / chest/muscle cramping, poor PO intake; states symptoms feel similar to recent past hx/o electrolyte imbalance. In the ED, VSS; pt afebrile.  EKG NSR @ 81 bpm.  WBC 12.02, Hb 14.7, platelets 484.  CMP (mildly hemolyzed): sodium 126, chloride 80, anion gap 16, BUN 47, creatinine 2.50, glucose 128, Tbil 1.4, , AST 57, ALT 70; magnesium 1.60, phosphorus (mod. hemolyzed) 5.0.  Pt was given 2 Liters IV fluid boluses; labs were repeated:  CMP: sodium 128, chloride 88, anion gap 17, BUN 48, creatinine 2.08, glucose 126, Tbil 1.1, , AST 47, ALT 52; VBG: pH 7.39, lactate 2.8.  Pt was sent to CDU for continued IV hydration, trend labs, correct electrolytes as needed. This morning rpt labs show improvement, Na 133, Cl 92, BUN/Cr downtrending to 34/1.86, lactate cleared to 1.6, UA without evidence of infection. Pt reports muscles cramps resolved yesterday, no complaints at present. At time of discharge pt is clinically well appearing, vitals within normal. He will f/u with his PMD and oncologist and will return to the ER with any worsening or concerning symptoms.

## 2023-06-30 NOTE — ED CDU PROVIDER SUBSEQUENT DAY NOTE - HISTORY
58 yo male, PMH left-sided colon cancer (s/p resection, ileostomy 2022, with gross residual disease in the pelvis); post-op pt developed SMV, left iliac, left radial, left ulnar vein thromboses; placed on Eliquis.  Pt delayed f/u for port / CTX as had been advised.  Pt had MRI 3/16/23 with increasing size of enhancing soft tissue mass along bladder dome, suspicious for recurrent colon cancer.  +Hx/o nephrolithiasis and right ureteral stent placement.  Pt admitted 4/30/23 - 5/4/23 for small bowel obstruction (no surgical tx), [facility], CT chest showed "Indeterminate 2 mm right lower lobe nodule".  Pt has refused follow up with medical oncology or radiation oncology to this point and has declined CTX thus far, per outpatient documentation; pt has progressive residual malignancy.  Pt now presents to this ED c/o SOB / chest/muscle cramping, poor PO intake; states symptoms feel similar to recent past hx/o electrolyte imbalance.  In the ED, VSS; pt afebrile.  EKG NSR @ 81 bpm.  WBC 12.02, Hb 14.7, platelets 484.  CMP (mildly hemolyzed): sodium 126, chloride 80, anion gap 16, BUN 47, creatinine 2.50, glucose 128, Tbil 1.4, , AST 57, ALT 70; magnesium 1.60, phosphorus (mod. hemolyzed) 5.0.  CXR was officially read by radiology as "...IMPRESSION: Clear lungs..".  Pt was given 2 Liters IV fluid boluses; labs were repeated:  CMP: sodium 128, chloride 88, anion gap 17, BUN 48, creatinine 2.08, glucose 126, Tbil 1.1, , AST 47, ALT 52; VBG: pH 7.39, lactate 2.8.  Pt was sent to CDU for continued IV hydration, trend labs, correct electrolytes as needed, supportive care, general observation care / monitoring.  In the interim, pt objectively noted to be resting comfortably; pt has been clinically stable; no issues thus far.

## 2023-06-30 NOTE — ED CDU PROVIDER SUBSEQUENT DAY NOTE - NSICDXPASTSURGICALHX_GEN_ALL_CORE_FT
PAST SURGICAL HISTORY:  H/O ureteroscopy (s/p right ureteral stent placement)    S/P colon resection

## 2023-06-30 NOTE — ED CDU PROVIDER DISPOSITION NOTE - PATIENT PORTAL LINK FT
You can access the FollowMyHealth Patient Portal offered by [facility] by registering at the following website: [email or web address]. By joining [employer]’s FollowMyHealth portal, you will also be able to view your health information using other applications (apps) compatible with our system.

## 2023-06-30 NOTE — ED CDU PROVIDER DISPOSITION NOTE - NSFOLLOWUPINSTRUCTIONS_ED_ALL_ED_FT
Follow up with your primary care doctor and oncologist within 1 week, show copies of your results  Drink plenty of fluids and eat a balanced diet  Return to the ER with any worsening or concerning symptoms, weakness, muscle cramping, feeling faint or any other concerns.

## 2023-07-01 LAB
CULTURE RESULTS: NO GROWTH — SIGNIFICANT CHANGE UP
SPECIMEN SOURCE: SIGNIFICANT CHANGE UP

## 2023-07-12 ENCOUNTER — APPOINTMENT (OUTPATIENT)
Dept: CT IMAGING | Facility: IMAGING CENTER | Age: 58
End: 2023-07-12
Payer: MEDICAID

## 2023-07-12 ENCOUNTER — OUTPATIENT (OUTPATIENT)
Dept: OUTPATIENT SERVICES | Facility: HOSPITAL | Age: 58
LOS: 1 days | End: 2023-07-12
Payer: MEDICAID

## 2023-07-12 DIAGNOSIS — Z98.890 OTHER SPECIFIED POSTPROCEDURAL STATES: Chronic | ICD-10-CM

## 2023-07-12 DIAGNOSIS — C18.9 MALIGNANT NEOPLASM OF COLON, UNSPECIFIED: ICD-10-CM

## 2023-07-12 DIAGNOSIS — Z90.49 ACQUIRED ABSENCE OF OTHER SPECIFIED PARTS OF DIGESTIVE TRACT: Chronic | ICD-10-CM

## 2023-07-12 PROBLEM — R91.1 SOLITARY PULMONARY NODULE: Chronic | Status: ACTIVE | Noted: 2023-06-30

## 2023-07-12 PROBLEM — K56.609 UNSPECIFIED INTESTINAL OBSTRUCTION, UNSPECIFIED AS TO PARTIAL VERSUS COMPLETE OBSTRUCTION: Chronic | Status: ACTIVE | Noted: 2023-06-30

## 2023-07-12 PROBLEM — Z87.19 PERSONAL HISTORY OF OTHER DISEASES OF THE DIGESTIVE SYSTEM: Chronic | Status: ACTIVE | Noted: 2023-06-30

## 2023-07-12 PROBLEM — N20.0 CALCULUS OF KIDNEY: Chronic | Status: ACTIVE | Noted: 2023-05-09

## 2023-07-12 PROBLEM — R16.1 SPLENOMEGALY, NOT ELSEWHERE CLASSIFIED: Chronic | Status: ACTIVE | Noted: 2023-06-30

## 2023-07-12 PROBLEM — N17.9 ACUTE KIDNEY FAILURE, UNSPECIFIED: Chronic | Status: ACTIVE | Noted: 2023-06-30

## 2023-07-12 PROCEDURE — 71250 CT THORAX DX C-: CPT

## 2023-07-12 PROCEDURE — 74176 CT ABD & PELVIS W/O CONTRAST: CPT

## 2023-07-12 PROCEDURE — 71250 CT THORAX DX C-: CPT | Mod: 26

## 2023-07-12 PROCEDURE — 74176 CT ABD & PELVIS W/O CONTRAST: CPT | Mod: 26

## 2023-07-25 ENCOUNTER — OUTPATIENT (OUTPATIENT)
Dept: OUTPATIENT SERVICES | Facility: HOSPITAL | Age: 58
LOS: 1 days | End: 2023-07-25
Payer: MEDICAID

## 2023-07-25 ENCOUNTER — APPOINTMENT (OUTPATIENT)
Dept: ULTRASOUND IMAGING | Facility: IMAGING CENTER | Age: 58
End: 2023-07-25
Payer: MEDICAID

## 2023-07-25 DIAGNOSIS — Z90.49 ACQUIRED ABSENCE OF OTHER SPECIFIED PARTS OF DIGESTIVE TRACT: Chronic | ICD-10-CM

## 2023-07-25 DIAGNOSIS — Z00.8 ENCOUNTER FOR OTHER GENERAL EXAMINATION: ICD-10-CM

## 2023-07-25 DIAGNOSIS — N20.0 CALCULUS OF KIDNEY: ICD-10-CM

## 2023-07-25 DIAGNOSIS — Z98.890 OTHER SPECIFIED POSTPROCEDURAL STATES: Chronic | ICD-10-CM

## 2023-07-25 PROCEDURE — 76770 US EXAM ABDO BACK WALL COMP: CPT | Mod: 26

## 2023-07-25 PROCEDURE — 76770 US EXAM ABDO BACK WALL COMP: CPT

## 2023-08-02 ENCOUNTER — APPOINTMENT (OUTPATIENT)
Dept: UROLOGY | Facility: CLINIC | Age: 58
End: 2023-08-02
Payer: MEDICAID

## 2023-08-02 VITALS
HEART RATE: 64 BPM | WEIGHT: 122 LBS | SYSTOLIC BLOOD PRESSURE: 130 MMHG | RESPIRATION RATE: 16 BRPM | OXYGEN SATURATION: 100 % | HEIGHT: 67 IN | BODY MASS INDEX: 19.15 KG/M2 | DIASTOLIC BLOOD PRESSURE: 84 MMHG

## 2023-08-02 PROCEDURE — 99214 OFFICE O/P EST MOD 30 MIN: CPT

## 2023-08-04 NOTE — ASSESSMENT
[FreeTextEntry1] : Continue k-citrate, NaHCO3, calcium citrate  24 hr urine before next visit Renal sono at or before next visit Follow up in 6 months

## 2023-08-08 NOTE — ED ADULT NURSE NOTE - NSIMPLEMENTINTERV_GEN_ALL_ED
No Implemented All Universal Safety Interventions:  [place] to call system. Call bell, personal items and telephone within reach. Instruct patient to call for assistance. Room bathroom lighting operational. Non-slip footwear when patient is off stretcher. Physically safe environment: no spills, clutter or unnecessary equipment. Stretcher in lowest position, wheels locked, appropriate side rails in place.

## 2023-11-27 ENCOUNTER — APPOINTMENT (OUTPATIENT)
Dept: UROLOGY | Facility: CLINIC | Age: 58
End: 2023-11-27

## 2024-01-01 ENCOUNTER — EMERGENCY (EMERGENCY)
Facility: HOSPITAL | Age: 59
LOS: 1 days | Discharge: HOME | End: 2024-01-01
Attending: EMERGENCY MEDICINE | Admitting: STUDENT IN AN ORGANIZED HEALTH CARE EDUCATION/TRAINING PROGRAM
Payer: MEDICAID

## 2024-01-01 ENCOUNTER — OUTPATIENT (OUTPATIENT)
Dept: OUTPATIENT SERVICES | Facility: HOSPITAL | Age: 59
LOS: 1 days | End: 2024-01-01
Payer: COMMERCIAL

## 2024-01-01 ENCOUNTER — OUTPATIENT (OUTPATIENT)
Dept: OUTPATIENT SERVICES | Facility: HOSPITAL | Age: 59
LOS: 1 days | Discharge: HOME | End: 2024-01-01

## 2024-01-01 ENCOUNTER — OUTPATIENT (OUTPATIENT)
Dept: OUTPATIENT SERVICES | Facility: HOSPITAL | Age: 59
LOS: 1 days | Discharge: HOME | End: 2024-01-01
Payer: COMMERCIAL

## 2024-01-01 VITALS
DIASTOLIC BLOOD PRESSURE: 64 MMHG | TEMPERATURE: 98 F | HEART RATE: 90 BPM | SYSTOLIC BLOOD PRESSURE: 104 MMHG | OXYGEN SATURATION: 100 % | RESPIRATION RATE: 18 BRPM

## 2024-01-01 VITALS
RESPIRATION RATE: 16 BRPM | DIASTOLIC BLOOD PRESSURE: 64 MMHG | TEMPERATURE: 98 F | WEIGHT: 126.99 LBS | HEART RATE: 91 BPM | HEIGHT: 67 IN | SYSTOLIC BLOOD PRESSURE: 103 MMHG | OXYGEN SATURATION: 100 %

## 2024-01-01 DIAGNOSIS — Z90.49 ACQUIRED ABSENCE OF OTHER SPECIFIED PARTS OF DIGESTIVE TRACT: Chronic | ICD-10-CM

## 2024-01-01 DIAGNOSIS — C18.2 MALIGNANT NEOPLASM OF ASCENDING COLON: ICD-10-CM

## 2024-01-01 DIAGNOSIS — Z98.890 OTHER SPECIFIED POSTPROCEDURAL STATES: Chronic | ICD-10-CM

## 2024-01-01 DIAGNOSIS — R11.2 NAUSEA WITH VOMITING, UNSPECIFIED: ICD-10-CM

## 2024-01-01 DIAGNOSIS — Z51.11 ENCOUNTER FOR ANTINEOPLASTIC CHEMOTHERAPY: ICD-10-CM

## 2024-01-01 DIAGNOSIS — C18.9 MALIGNANT NEOPLASM OF COLON, UNSPECIFIED: ICD-10-CM

## 2024-01-01 DIAGNOSIS — E86.0 DEHYDRATION: ICD-10-CM

## 2024-01-01 LAB
ALBUMIN SERPL ELPH-MCNC: 2.6 G/DL — LOW (ref 3.3–5)
ALBUMIN SERPL ELPH-MCNC: 2.7 G/DL — LOW (ref 3.3–5)
ALBUMIN SERPL ELPH-MCNC: 3.1 G/DL — LOW (ref 3.3–5)
ALP SERPL-CCNC: 103 U/L — SIGNIFICANT CHANGE UP (ref 40–120)
ALP SERPL-CCNC: 108 U/L — SIGNIFICANT CHANGE UP (ref 40–120)
ALP SERPL-CCNC: 90 U/L — SIGNIFICANT CHANGE UP (ref 40–120)
ALT FLD-CCNC: 15 U/L — SIGNIFICANT CHANGE UP (ref 4–41)
ALT FLD-CCNC: 16 U/L — SIGNIFICANT CHANGE UP (ref 10–45)
ALT FLD-CCNC: 6 U/L — LOW (ref 10–45)
ANION GAP SERPL CALC-SCNC: 10 MMOL/L — SIGNIFICANT CHANGE UP (ref 5–17)
ANION GAP SERPL CALC-SCNC: 10 MMOL/L — SIGNIFICANT CHANGE UP (ref 5–17)
ANION GAP SERPL CALC-SCNC: 12 MMOL/L — SIGNIFICANT CHANGE UP (ref 5–17)
ANION GAP SERPL CALC-SCNC: 9 MMOL/L — SIGNIFICANT CHANGE UP (ref 5–17)
ANION GAP SERPL CALC-SCNC: 9 MMOL/L — SIGNIFICANT CHANGE UP (ref 7–14)
ANISOCYTOSIS BLD QL: SLIGHT — SIGNIFICANT CHANGE UP
AST SERPL-CCNC: 17 U/L — SIGNIFICANT CHANGE UP (ref 10–40)
AST SERPL-CCNC: 21 U/L — SIGNIFICANT CHANGE UP (ref 10–40)
AST SERPL-CCNC: 23 U/L — SIGNIFICANT CHANGE UP (ref 4–40)
BASOPHILS # BLD AUTO: 0.01 K/UL — SIGNIFICANT CHANGE UP (ref 0–0.2)
BASOPHILS # BLD AUTO: 0.02 K/UL — SIGNIFICANT CHANGE UP (ref 0–0.2)
BASOPHILS # BLD AUTO: 0.03 K/UL — SIGNIFICANT CHANGE UP (ref 0–0.2)
BASOPHILS # BLD AUTO: 0.03 K/UL — SIGNIFICANT CHANGE UP (ref 0–0.2)
BASOPHILS # BLD AUTO: 0.04 K/UL — SIGNIFICANT CHANGE UP (ref 0–0.2)
BASOPHILS # BLD AUTO: 0.05 K/UL — SIGNIFICANT CHANGE UP (ref 0–0.2)
BASOPHILS NFR BLD AUTO: 0.1 % — SIGNIFICANT CHANGE UP (ref 0–2)
BASOPHILS NFR BLD AUTO: 0.2 % — SIGNIFICANT CHANGE UP (ref 0–2)
BASOPHILS NFR BLD AUTO: 0.2 % — SIGNIFICANT CHANGE UP (ref 0–2)
BASOPHILS NFR BLD AUTO: 0.3 % — SIGNIFICANT CHANGE UP (ref 0–2)
BASOPHILS NFR BLD AUTO: 0.3 % — SIGNIFICANT CHANGE UP (ref 0–2)
BASOPHILS NFR BLD AUTO: 0.8 % — SIGNIFICANT CHANGE UP (ref 0–2)
BILIRUB SERPL-MCNC: 0.5 MG/DL — SIGNIFICANT CHANGE UP (ref 0.2–1.2)
BILIRUB SERPL-MCNC: 0.6 MG/DL — SIGNIFICANT CHANGE UP (ref 0.2–1.2)
BILIRUB SERPL-MCNC: 0.6 MG/DL — SIGNIFICANT CHANGE UP (ref 0.2–1.2)
BLD GP AB SCN SERPL QL: NEGATIVE — SIGNIFICANT CHANGE UP
BUN SERPL-MCNC: 19 MG/DL — SIGNIFICANT CHANGE UP (ref 7–23)
BUN SERPL-MCNC: 19 MG/DL — SIGNIFICANT CHANGE UP (ref 7–23)
BUN SERPL-MCNC: 20 MG/DL — SIGNIFICANT CHANGE UP (ref 7–23)
BUN SERPL-MCNC: 28 MG/DL — HIGH (ref 7–23)
BUN SERPL-MCNC: 31 MG/DL — HIGH (ref 7–23)
BURR CELLS BLD QL SMEAR: PRESENT — SIGNIFICANT CHANGE UP
BURR CELLS BLD QL SMEAR: PRESENT — SIGNIFICANT CHANGE UP
CALCIUM SERPL-MCNC: 8 MG/DL — LOW (ref 8.4–10.5)
CALCIUM SERPL-MCNC: 8.2 MG/DL — LOW (ref 8.4–10.5)
CALCIUM SERPL-MCNC: 8.3 MG/DL — LOW (ref 8.4–10.5)
CALCIUM SERPL-MCNC: 8.5 MG/DL — SIGNIFICANT CHANGE UP (ref 8.4–10.5)
CALCIUM SERPL-MCNC: 8.8 MG/DL — SIGNIFICANT CHANGE UP (ref 8.4–10.5)
CEA SERPL-MCNC: 176 NG/ML — HIGH (ref 0–3.8)
CHLORIDE SERPL-SCNC: 100 MMOL/L — SIGNIFICANT CHANGE UP (ref 96–108)
CHLORIDE SERPL-SCNC: 101 MMOL/L — SIGNIFICANT CHANGE UP (ref 96–108)
CHLORIDE SERPL-SCNC: 103 MMOL/L — SIGNIFICANT CHANGE UP (ref 98–107)
CHLORIDE SERPL-SCNC: 93 MMOL/L — LOW (ref 96–108)
CHLORIDE SERPL-SCNC: 95 MMOL/L — LOW (ref 96–108)
CO2 SERPL-SCNC: 21 MMOL/L — LOW (ref 22–31)
CO2 SERPL-SCNC: 22 MMOL/L — SIGNIFICANT CHANGE UP (ref 22–31)
CO2 SERPL-SCNC: 23 MMOL/L — SIGNIFICANT CHANGE UP (ref 22–31)
CREAT SERPL-MCNC: 1.21 MG/DL — SIGNIFICANT CHANGE UP (ref 0.5–1.3)
CREAT SERPL-MCNC: 1.23 MG/DL — SIGNIFICANT CHANGE UP (ref 0.5–1.3)
CREAT SERPL-MCNC: 1.35 MG/DL — HIGH (ref 0.5–1.3)
CREAT SERPL-MCNC: 1.39 MG/DL — HIGH (ref 0.5–1.3)
CREAT SERPL-MCNC: 1.67 MG/DL — HIGH (ref 0.5–1.3)
EGFR: 47 ML/MIN/1.73M2 — LOW
EGFR: 47 ML/MIN/1.73M2 — LOW
EGFR: 58 ML/MIN/1.73M2 — LOW
EGFR: 58 ML/MIN/1.73M2 — LOW
EGFR: 60 ML/MIN/1.73M2 — SIGNIFICANT CHANGE UP
EGFR: 60 ML/MIN/1.73M2 — SIGNIFICANT CHANGE UP
EGFR: 68 ML/MIN/1.73M2 — SIGNIFICANT CHANGE UP
EGFR: 68 ML/MIN/1.73M2 — SIGNIFICANT CHANGE UP
EGFR: 69 ML/MIN/1.73M2 — SIGNIFICANT CHANGE UP
EGFR: 69 ML/MIN/1.73M2 — SIGNIFICANT CHANGE UP
EOSINOPHIL # BLD AUTO: 0.04 K/UL — SIGNIFICANT CHANGE UP (ref 0–0.5)
EOSINOPHIL # BLD AUTO: 0.11 K/UL — SIGNIFICANT CHANGE UP (ref 0–0.5)
EOSINOPHIL # BLD AUTO: 0.13 K/UL — SIGNIFICANT CHANGE UP (ref 0–0.5)
EOSINOPHIL # BLD AUTO: 0.15 K/UL — SIGNIFICANT CHANGE UP (ref 0–0.5)
EOSINOPHIL # BLD AUTO: 0.2 K/UL — SIGNIFICANT CHANGE UP (ref 0–0.5)
EOSINOPHIL # BLD AUTO: 0.51 K/UL — HIGH (ref 0–0.5)
EOSINOPHIL NFR BLD AUTO: 0.5 % — SIGNIFICANT CHANGE UP (ref 0–6)
EOSINOPHIL NFR BLD AUTO: 1 % — SIGNIFICANT CHANGE UP (ref 0–6)
EOSINOPHIL NFR BLD AUTO: 1.3 % — SIGNIFICANT CHANGE UP (ref 0–6)
EOSINOPHIL NFR BLD AUTO: 1.7 % — SIGNIFICANT CHANGE UP (ref 0–6)
EOSINOPHIL NFR BLD AUTO: 2.4 % — SIGNIFICANT CHANGE UP (ref 0–6)
EOSINOPHIL NFR BLD AUTO: 3.5 % — SIGNIFICANT CHANGE UP (ref 0–6)
GLUCOSE SERPL-MCNC: 111 MG/DL — HIGH (ref 70–99)
GLUCOSE SERPL-MCNC: 116 MG/DL — HIGH (ref 70–99)
GLUCOSE SERPL-MCNC: 140 MG/DL — HIGH (ref 70–99)
GLUCOSE SERPL-MCNC: 149 MG/DL — HIGH (ref 70–99)
GLUCOSE SERPL-MCNC: 76 MG/DL — SIGNIFICANT CHANGE UP (ref 70–99)
HCT VFR BLD CALC: 18.5 % — CRITICAL LOW (ref 39–50)
HCT VFR BLD CALC: 18.6 % — CRITICAL LOW (ref 39–50)
HCT VFR BLD CALC: 21.7 % — LOW (ref 39–50)
HCT VFR BLD CALC: 22.1 % — LOW (ref 39–50)
HCT VFR BLD CALC: 22.5 % — LOW (ref 39–50)
HCT VFR BLD CALC: 24.4 % — LOW (ref 39–50)
HCT VFR BLD CALC: 27.1 % — LOW (ref 39–50)
HCT VFR BLD CALC: 29.6 % — LOW (ref 39–50)
HCT VFR BLD CALC: 31 % — LOW (ref 39–50)
HGB BLD-MCNC: 10 G/DL — LOW (ref 13–17)
HGB BLD-MCNC: 5.6 G/DL — CRITICAL LOW (ref 13–17)
HGB BLD-MCNC: 5.9 G/DL — CRITICAL LOW (ref 13–17)
HGB BLD-MCNC: 7.1 G/DL — LOW (ref 13–17)
HGB BLD-MCNC: 7.3 G/DL — LOW (ref 13–17)
HGB BLD-MCNC: 7.4 G/DL — LOW (ref 13–17)
HGB BLD-MCNC: 8.3 G/DL — LOW (ref 13–17)
HGB BLD-MCNC: 9 G/DL — LOW (ref 13–17)
HGB BLD-MCNC: 9.2 G/DL — LOW (ref 13–17)
HYPOCHROMIA BLD QL: SLIGHT — SIGNIFICANT CHANGE UP
IANC: 9.01 K/UL — HIGH (ref 1.8–7.4)
IMM GRANULOCYTES NFR BLD AUTO: 0.5 % — SIGNIFICANT CHANGE UP (ref 0–0.9)
IMM GRANULOCYTES NFR BLD AUTO: 0.7 % — SIGNIFICANT CHANGE UP (ref 0–0.9)
IMM GRANULOCYTES NFR BLD AUTO: 0.8 % — SIGNIFICANT CHANGE UP (ref 0–0.9)
IMM GRANULOCYTES NFR BLD AUTO: 0.9 % — SIGNIFICANT CHANGE UP (ref 0–0.9)
IMM GRANULOCYTES NFR BLD AUTO: 1 % — HIGH (ref 0–0.9)
IMM GRANULOCYTES NFR BLD AUTO: 1.3 % — HIGH (ref 0–0.9)
LDH SERPL L TO P-CCNC: 213 U/L — SIGNIFICANT CHANGE UP (ref 50–242)
LYMPHOCYTES # BLD AUTO: 1.03 K/UL — SIGNIFICANT CHANGE UP (ref 1–3.3)
LYMPHOCYTES # BLD AUTO: 1.37 K/UL — SIGNIFICANT CHANGE UP (ref 1–3.3)
LYMPHOCYTES # BLD AUTO: 1.38 K/UL — SIGNIFICANT CHANGE UP (ref 1–3.3)
LYMPHOCYTES # BLD AUTO: 1.59 K/UL — SIGNIFICANT CHANGE UP (ref 1–3.3)
LYMPHOCYTES # BLD AUTO: 11 % — LOW (ref 13–44)
LYMPHOCYTES # BLD AUTO: 12.1 % — LOW (ref 13–44)
LYMPHOCYTES # BLD AUTO: 13.6 % — SIGNIFICANT CHANGE UP (ref 13–44)
LYMPHOCYTES # BLD AUTO: 14.8 % — SIGNIFICANT CHANGE UP (ref 13–44)
LYMPHOCYTES # BLD AUTO: 16.1 % — SIGNIFICANT CHANGE UP (ref 13–44)
LYMPHOCYTES # BLD AUTO: 2.17 K/UL — SIGNIFICANT CHANGE UP (ref 1–3.3)
LYMPHOCYTES # BLD AUTO: 2.38 K/UL — SIGNIFICANT CHANGE UP (ref 1–3.3)
LYMPHOCYTES # BLD AUTO: 41.1 % — SIGNIFICANT CHANGE UP (ref 13–44)
MAGNESIUM SERPL-MCNC: 1.7 MG/DL — SIGNIFICANT CHANGE UP (ref 1.6–2.6)
MCHC RBC-ENTMCNC: 24.2 PG — LOW (ref 27–34)
MCHC RBC-ENTMCNC: 24.9 PG — LOW (ref 27–34)
MCHC RBC-ENTMCNC: 25 PG — LOW (ref 27–34)
MCHC RBC-ENTMCNC: 25.7 PG — LOW (ref 27–34)
MCHC RBC-ENTMCNC: 25.7 PG — LOW (ref 27–34)
MCHC RBC-ENTMCNC: 26.5 PG — LOW (ref 27–34)
MCHC RBC-ENTMCNC: 26.5 PG — LOW (ref 27–34)
MCHC RBC-ENTMCNC: 26.6 PG — LOW (ref 27–34)
MCHC RBC-ENTMCNC: 26.9 PG — LOW (ref 27–34)
MCHC RBC-ENTMCNC: 30.3 GM/DL — LOW (ref 32–36)
MCHC RBC-ENTMCNC: 30.4 G/DL — LOW (ref 32–36)
MCHC RBC-ENTMCNC: 31.7 GM/DL — LOW (ref 32–36)
MCHC RBC-ENTMCNC: 32.3 GM/DL — SIGNIFICANT CHANGE UP (ref 32–36)
MCHC RBC-ENTMCNC: 32.7 GM/DL — SIGNIFICANT CHANGE UP (ref 32–36)
MCHC RBC-ENTMCNC: 32.9 G/DL — SIGNIFICANT CHANGE UP (ref 32–36)
MCHC RBC-ENTMCNC: 33 G/DL — SIGNIFICANT CHANGE UP (ref 32–36)
MCHC RBC-ENTMCNC: 33.9 G/DL — SIGNIFICANT CHANGE UP (ref 32–36)
MCHC RBC-ENTMCNC: 34 G/DL — SIGNIFICANT CHANGE UP (ref 32–36)
MCV RBC AUTO: 77.7 FL — LOW (ref 80–100)
MCV RBC AUTO: 78.6 FL — LOW (ref 80–100)
MCV RBC AUTO: 78.8 FL — LOW (ref 80–100)
MCV RBC AUTO: 79.2 FL — LOW (ref 80–100)
MCV RBC AUTO: 79.6 FL — LOW (ref 80–100)
MCV RBC AUTO: 79.7 FL — LOW (ref 80–100)
MCV RBC AUTO: 80.6 FL — SIGNIFICANT CHANGE UP (ref 80–100)
MCV RBC AUTO: 80.7 FL — SIGNIFICANT CHANGE UP (ref 80–100)
MCV RBC AUTO: 82.2 FL — SIGNIFICANT CHANGE UP (ref 80–100)
MICROCYTES BLD QL: SLIGHT — SIGNIFICANT CHANGE UP
MONOCYTES # BLD AUTO: 0.13 K/UL — SIGNIFICANT CHANGE UP (ref 0–0.9)
MONOCYTES # BLD AUTO: 0.42 K/UL — SIGNIFICANT CHANGE UP (ref 0–0.9)
MONOCYTES # BLD AUTO: 0.48 K/UL — SIGNIFICANT CHANGE UP (ref 0–0.9)
MONOCYTES # BLD AUTO: 0.72 K/UL — SIGNIFICANT CHANGE UP (ref 0–0.9)
MONOCYTES # BLD AUTO: 1.43 K/UL — HIGH (ref 0–0.9)
MONOCYTES # BLD AUTO: 2.19 K/UL — HIGH (ref 0–0.9)
MONOCYTES NFR BLD AUTO: 1.7 % — LOW (ref 2–14)
MONOCYTES NFR BLD AUTO: 10.1 % — SIGNIFICANT CHANGE UP (ref 2–14)
MONOCYTES NFR BLD AUTO: 12.4 % — SIGNIFICANT CHANGE UP (ref 2–14)
MONOCYTES NFR BLD AUTO: 4.9 % — SIGNIFICANT CHANGE UP (ref 2–14)
MONOCYTES NFR BLD AUTO: 6.3 % — SIGNIFICANT CHANGE UP (ref 2–14)
MONOCYTES NFR BLD AUTO: 9.8 % — SIGNIFICANT CHANGE UP (ref 2–14)
NEUTROPHILS # BLD AUTO: 1.71 K/UL — LOW (ref 1.8–7.4)
NEUTROPHILS # BLD AUTO: 10.28 K/UL — HIGH (ref 1.8–7.4)
NEUTROPHILS # BLD AUTO: 16.66 K/UL — HIGH (ref 1.8–7.4)
NEUTROPHILS # BLD AUTO: 6.25 K/UL — SIGNIFICANT CHANGE UP (ref 1.8–7.4)
NEUTROPHILS # BLD AUTO: 6.43 K/UL — SIGNIFICANT CHANGE UP (ref 1.8–7.4)
NEUTROPHILS # BLD AUTO: 9.01 K/UL — HIGH (ref 1.8–7.4)
NEUTROPHILS NFR BLD AUTO: 44.2 % — SIGNIFICANT CHANGE UP (ref 43–77)
NEUTROPHILS NFR BLD AUTO: 70.3 % — SIGNIFICANT CHANGE UP (ref 43–77)
NEUTROPHILS NFR BLD AUTO: 75.6 % — SIGNIFICANT CHANGE UP (ref 43–77)
NEUTROPHILS NFR BLD AUTO: 77.2 % — HIGH (ref 43–77)
NEUTROPHILS NFR BLD AUTO: 79.1 % — HIGH (ref 43–77)
NEUTROPHILS NFR BLD AUTO: 82.2 % — HIGH (ref 43–77)
NRBC # BLD AUTO: 0 K/UL — SIGNIFICANT CHANGE UP (ref 0–0)
NRBC # BLD: 0 /100 WBCS — SIGNIFICANT CHANGE UP (ref 0–0)
NRBC # FLD: 0 K/UL — SIGNIFICANT CHANGE UP (ref 0–0)
NRBC BLD-RTO: 0 /100 WBCS — SIGNIFICANT CHANGE UP (ref 0–0)
PLAT MORPH BLD: NORMAL — SIGNIFICANT CHANGE UP
PLAT MORPH BLD: NORMAL — SIGNIFICANT CHANGE UP
PLATELET # BLD AUTO: 130 K/UL — LOW (ref 150–400)
PLATELET # BLD AUTO: 144 K/UL — LOW (ref 150–400)
PLATELET # BLD AUTO: 148 K/UL — LOW (ref 150–400)
PLATELET # BLD AUTO: 154 K/UL — SIGNIFICANT CHANGE UP (ref 150–400)
PLATELET # BLD AUTO: 166 K/UL — SIGNIFICANT CHANGE UP (ref 150–400)
PLATELET # BLD AUTO: 213 K/UL — SIGNIFICANT CHANGE UP (ref 150–400)
PLATELET # BLD AUTO: 279 K/UL — SIGNIFICANT CHANGE UP (ref 150–400)
PLATELET # BLD AUTO: 287 K/UL — SIGNIFICANT CHANGE UP (ref 150–400)
PLATELET # BLD AUTO: 94 K/UL — LOW (ref 150–400)
POIKILOCYTOSIS BLD QL AUTO: SLIGHT — SIGNIFICANT CHANGE UP
POIKILOCYTOSIS BLD QL AUTO: SLIGHT — SIGNIFICANT CHANGE UP
POTASSIUM SERPL-MCNC: 3.8 MMOL/L — SIGNIFICANT CHANGE UP (ref 3.5–5.3)
POTASSIUM SERPL-MCNC: 4.2 MMOL/L — SIGNIFICANT CHANGE UP (ref 3.5–5.3)
POTASSIUM SERPL-MCNC: 4.2 MMOL/L — SIGNIFICANT CHANGE UP (ref 3.5–5.3)
POTASSIUM SERPL-MCNC: 4.3 MMOL/L — SIGNIFICANT CHANGE UP (ref 3.5–5.3)
POTASSIUM SERPL-MCNC: 4.8 MMOL/L — SIGNIFICANT CHANGE UP (ref 3.5–5.3)
POTASSIUM SERPL-SCNC: 3.8 MMOL/L — SIGNIFICANT CHANGE UP (ref 3.5–5.3)
POTASSIUM SERPL-SCNC: 4.2 MMOL/L — SIGNIFICANT CHANGE UP (ref 3.5–5.3)
POTASSIUM SERPL-SCNC: 4.2 MMOL/L — SIGNIFICANT CHANGE UP (ref 3.5–5.3)
POTASSIUM SERPL-SCNC: 4.3 MMOL/L — SIGNIFICANT CHANGE UP (ref 3.5–5.3)
POTASSIUM SERPL-SCNC: 4.8 MMOL/L — SIGNIFICANT CHANGE UP (ref 3.5–5.3)
PROT SERPL-MCNC: 5.5 G/DL — LOW (ref 6–8.3)
PROT SERPL-MCNC: 5.7 G/DL — LOW (ref 6–8.3)
PROT SERPL-MCNC: 6.9 G/DL — SIGNIFICANT CHANGE UP (ref 6–8.3)
RBC # BLD: 2.25 M/UL — LOW (ref 4.2–5.8)
RBC # BLD: 2.36 M/UL — LOW (ref 4.2–5.8)
RBC # BLD: 2.74 M/UL — LOW (ref 4.2–5.8)
RBC # BLD: 2.76 M/UL — LOW (ref 4.2–5.8)
RBC # BLD: 2.79 M/UL — LOW (ref 4.2–5.8)
RBC # BLD: 3.13 M/UL — LOW (ref 4.2–5.8)
RBC # BLD: 3.42 M/UL — LOW (ref 4.2–5.8)
RBC # BLD: 3.72 M/UL — LOW (ref 4.2–5.8)
RBC # BLD: 3.89 M/UL — LOW (ref 4.2–5.8)
RBC # FLD: 13.5 % — SIGNIFICANT CHANGE UP (ref 10.3–14.5)
RBC # FLD: 13.7 % — SIGNIFICANT CHANGE UP (ref 10.3–14.5)
RBC # FLD: 13.9 % — SIGNIFICANT CHANGE UP (ref 10.3–14.5)
RBC # FLD: 14.4 % — SIGNIFICANT CHANGE UP (ref 10.3–14.5)
RBC # FLD: 16.4 % — HIGH (ref 10.3–14.5)
RBC # FLD: 16.7 % — HIGH (ref 10.3–14.5)
RBC # FLD: 16.9 % — HIGH (ref 10.3–14.5)
RBC # FLD: 17.1 % — HIGH (ref 10.3–14.5)
RBC # FLD: 19.8 % — HIGH (ref 10.3–14.5)
RBC BLD AUTO: ABNORMAL
RBC BLD AUTO: ABNORMAL
RH IG SCN BLD-IMP: POSITIVE — SIGNIFICANT CHANGE UP
SCHISTOCYTES BLD QL AUTO: SLIGHT — SIGNIFICANT CHANGE UP
SCHISTOCYTES BLD QL AUTO: SLIGHT — SIGNIFICANT CHANGE UP
SODIUM SERPL-SCNC: 126 MMOL/L — LOW (ref 135–145)
SODIUM SERPL-SCNC: 127 MMOL/L — LOW (ref 135–145)
SODIUM SERPL-SCNC: 130 MMOL/L — LOW (ref 135–145)
SODIUM SERPL-SCNC: 134 MMOL/L — LOW (ref 135–145)
SODIUM SERPL-SCNC: 134 MMOL/L — LOW (ref 135–145)
TSH SERPL-MCNC: 0.52 UIU/ML — SIGNIFICANT CHANGE UP (ref 0.27–4.2)
WBC # BLD: 10.2 K/UL — SIGNIFICANT CHANGE UP (ref 3.8–10.5)
WBC # BLD: 10.42 K/UL — SIGNIFICANT CHANGE UP (ref 3.8–10.5)
WBC # BLD: 11.39 K/UL — HIGH (ref 3.8–10.5)
WBC # BLD: 12.93 K/UL — HIGH (ref 3.8–10.5)
WBC # BLD: 14.62 K/UL — HIGH (ref 3.8–10.5)
WBC # BLD: 21.6 K/UL — HIGH (ref 3.8–10.5)
WBC # BLD: 3.87 K/UL — SIGNIFICANT CHANGE UP (ref 3.8–10.5)
WBC # BLD: 7.6 K/UL — SIGNIFICANT CHANGE UP (ref 3.8–10.5)
WBC # BLD: 8.51 K/UL — SIGNIFICANT CHANGE UP (ref 3.8–10.5)
WBC # FLD AUTO: 10.2 K/UL — SIGNIFICANT CHANGE UP (ref 3.8–10.5)
WBC # FLD AUTO: 10.42 K/UL — SIGNIFICANT CHANGE UP (ref 3.8–10.5)
WBC # FLD AUTO: 11.39 K/UL — HIGH (ref 3.8–10.5)
WBC # FLD AUTO: 12.93 K/UL — HIGH (ref 3.8–10.5)
WBC # FLD AUTO: 14.62 K/UL — HIGH (ref 3.8–10.5)
WBC # FLD AUTO: 21.6 K/UL — HIGH (ref 3.8–10.5)
WBC # FLD AUTO: 3.87 K/UL — SIGNIFICANT CHANGE UP (ref 3.8–10.5)
WBC # FLD AUTO: 7.6 K/UL — SIGNIFICANT CHANGE UP (ref 3.8–10.5)
WBC # FLD AUTO: 8.51 K/UL — SIGNIFICANT CHANGE UP (ref 3.8–10.5)

## 2024-01-01 PROCEDURE — 77427 RADIATION TX MANAGEMENT X5: CPT

## 2024-01-01 PROCEDURE — 86850 RBC ANTIBODY SCREEN: CPT

## 2024-01-01 PROCEDURE — 77014: CPT | Mod: 26

## 2024-01-01 PROCEDURE — 86900 BLOOD TYPING SEROLOGIC ABO: CPT

## 2024-01-01 PROCEDURE — 99223 1ST HOSP IP/OBS HIGH 75: CPT

## 2024-01-01 PROCEDURE — 77263 THER RADIOLOGY TX PLNG CPLX: CPT

## 2024-01-01 PROCEDURE — 77387B: CUSTOM | Mod: 26

## 2024-01-01 PROCEDURE — 77300 RADIATION THERAPY DOSE PLAN: CPT | Mod: 26

## 2024-01-01 PROCEDURE — 77338 DESIGN MLC DEVICE FOR IMRT: CPT | Mod: 26

## 2024-01-01 PROCEDURE — 77301 RADIOTHERAPY DOSE PLAN IMRT: CPT | Mod: 26

## 2024-01-01 PROCEDURE — 77333 RADIATION TREATMENT AID(S): CPT | Mod: 26

## 2024-01-01 PROCEDURE — 86901 BLOOD TYPING SEROLOGIC RH(D): CPT

## 2024-01-01 PROCEDURE — 99239 HOSP IP/OBS DSCHRG MGMT >30: CPT

## 2024-02-07 ENCOUNTER — APPOINTMENT (OUTPATIENT)
Dept: UROLOGY | Facility: CLINIC | Age: 59
End: 2024-02-07
Payer: MEDICAID

## 2024-02-07 ENCOUNTER — OUTPATIENT (OUTPATIENT)
Dept: OUTPATIENT SERVICES | Facility: HOSPITAL | Age: 59
LOS: 1 days | End: 2024-02-07
Payer: MEDICAID

## 2024-02-07 VITALS
BODY MASS INDEX: 19.93 KG/M2 | DIASTOLIC BLOOD PRESSURE: 81 MMHG | SYSTOLIC BLOOD PRESSURE: 145 MMHG | HEART RATE: 76 BPM | HEIGHT: 67 IN | RESPIRATION RATE: 17 BRPM | TEMPERATURE: 98.5 F | WEIGHT: 127 LBS

## 2024-02-07 DIAGNOSIS — Z98.890 OTHER SPECIFIED POSTPROCEDURAL STATES: Chronic | ICD-10-CM

## 2024-02-07 DIAGNOSIS — Z90.49 ACQUIRED ABSENCE OF OTHER SPECIFIED PARTS OF DIGESTIVE TRACT: Chronic | ICD-10-CM

## 2024-02-07 DIAGNOSIS — R35.0 FREQUENCY OF MICTURITION: ICD-10-CM

## 2024-02-07 DIAGNOSIS — R82.993 HYPERURICOSURIA: ICD-10-CM

## 2024-02-07 DIAGNOSIS — R82.991 HYPOCITRATURIA: ICD-10-CM

## 2024-02-07 DIAGNOSIS — R82.6 ABNORMAL URINE LEVELS OF SUBSTANCES CHIEFLY NONMEDICINAL AS TO SOURCE: ICD-10-CM

## 2024-02-07 PROCEDURE — 76775 US EXAM ABDO BACK WALL LIM: CPT | Mod: 26

## 2024-02-07 PROCEDURE — 99214 OFFICE O/P EST MOD 30 MIN: CPT | Mod: 25

## 2024-02-07 PROCEDURE — 76775 US EXAM ABDO BACK WALL LIM: CPT

## 2024-02-07 RX ORDER — POTASSIUM CITRATE 10 MEQ/1
10 MEQ TABLET, EXTENDED RELEASE ORAL TWICE DAILY
Qty: 180 | Refills: 3 | Status: ACTIVE | COMMUNITY
Start: 2023-05-11 | End: 1900-01-01

## 2024-02-07 RX ORDER — SODIUM BICARBONATE 650 MG/1
650 TABLET ORAL
Qty: 360 | Refills: 3 | Status: ACTIVE | COMMUNITY
Start: 2023-05-11 | End: 1900-01-01

## 2024-02-11 PROBLEM — R82.991 HYPOCITRATURIA: Status: ACTIVE | Noted: 2023-08-02

## 2024-02-11 PROBLEM — R82.6 ABNORMAL URINE LEVELS OF SUBSTANCES CHIEFLY NONMEDICINAL AS TO SOURCE: Status: ACTIVE | Noted: 2023-08-02

## 2024-02-11 PROBLEM — R82.993 HYPERURICOSURIA: Status: ACTIVE | Noted: 2023-08-02

## 2024-02-11 NOTE — PHYSICAL EXAM
[General Appearance - Well Developed] : well developed [General Appearance - Well Nourished] : well nourished [Normal Appearance] : normal appearance [Well Groomed] : well groomed [General Appearance - In No Acute Distress] : no acute distress [Abdomen Soft] : soft [Abdomen Tenderness] : non-tender [Costovertebral Angle Tenderness] : no ~M costovertebral angle tenderness [Skin Color & Pigmentation] : normal skin color and pigmentation [] : no respiratory distress [Respiration, Rhythm And Depth] : normal respiratory rhythm and effort [Exaggerated Use Of Accessory Muscles For Inspiration] : no accessory muscle use [Oriented To Time, Place, And Person] : oriented to person, place, and time [Normal Station and Gait] : the gait and station were normal for the patient's age [Affect] : the affect was normal [Mood] : the mood was normal [Not Anxious] : not anxious

## 2024-02-11 NOTE — ASSESSMENT
[FreeTextEntry1] : new c/o hematuria urine culture sent  Continue k-citrate, NaHCO3, calcium citrate 24 hr urine before next visit Renal sono at or before next visit Urine culture

## 2024-02-11 NOTE — HISTORY OF PRESENT ILLNESS
[FreeTextEntry1] : 57 y/o man Kidney stones Colon cancer with known mass along the dome of the bladder (MRI, cystoscopy) Has Ileostomy He has had two episodes of hematuria (Pink colored urine)  hx of bilateral URS with CVAC 5/11/23 stents already removed 5/31/23   24 urines: low vol (has ileostomy), citrate ok, pH 5.9, high SSUA and SSCaOx  CT Jul 2023 reviewed: decreased stone burden and no hydronephrosis bilaterally Renal sono today: No stones or hydronephrosis

## 2024-02-12 DIAGNOSIS — R82.6 ABNORMAL URINE LEVELS OF SUBSTANCES CHIEFLY NONMEDICINAL AS TO SOURCE: ICD-10-CM

## 2024-02-12 DIAGNOSIS — R82.993 HYPERURICOSURIA: ICD-10-CM

## 2024-02-12 DIAGNOSIS — R82.991 HYPOCITRATURIA: ICD-10-CM

## 2024-02-12 DIAGNOSIS — N20.0 CALCULUS OF KIDNEY: ICD-10-CM

## 2024-02-12 DIAGNOSIS — Z93.2 ILEOSTOMY STATUS: ICD-10-CM

## 2024-02-12 DIAGNOSIS — E87.1 HYPO-OSMOLALITY AND HYPONATREMIA: ICD-10-CM

## 2024-02-14 LAB — BACTERIA UR CULT: NORMAL

## 2024-02-21 NOTE — ED ADULT TRIAGE NOTE - HAVE YOU HAD COVID IN THE LAST 60 DAYS?
ANTICOAGULATION MANAGEMENT     Valentina Olmedo 62 year old female is on warfarin with therapeutic INR result. (Goal INR 2.0-3.0)    Recent labs: (last 7 days)     02/21/24  1216   INR 2.7*       ASSESSMENT     Source(s): Chart Review and Home Care/Facility Nurse     Warfarin doses taken: Warfarin taken as instructed  Diet: No new diet changes identified  Medication/supplement changes:  Completed augmentin  and prednisone finished this week for pneumonia  New illness, injury, or hospitalization: No  Signs or symptoms of bleeding or clotting: No  Previous result: Therapeutic last 2(+) visits  Additional findings: None       PLAN     Recommended plan for temporary change(s) affecting INR     Dosing Instructions: Continue your current warfarin dose with next INR in 2 weeks       Summary  As of 2/21/2024      Full warfarin instructions:  7.5 mg every Wed; 10 mg all other days   Next INR check:  3/6/2024               Telephone call with Katelyn home care nurse who verbalizes understanding and agrees to plan    Orders given to  Homecare nurse/facility to recheck    Education provided:   Please call back if any changes to your diet, medications or how you've been taking warfarin    Plan made per [facility] anticoagulation protocol    Kyleigh Jimenes RN  Anticoagulation Clinic  2/21/2024    _______________________________________________________________________     Anticoagulation Episode Summary       Current INR goal:  2.0-3.0   TTR:  59.2% (1 y)   Target end date:  Indefinite   Send INR reminders to:  [facility]    Indications    History of pulmonary embolism (Resolved) [Z86.711]  Pulmonary embolism with infarction (H) [I26.99]  Long term (current) use of anticoagulants [Z79.01]  History of pulmonary embolism [Z86.711]             Comments:               Anticoagulation Care Providers       Provider Role Specialty Phone number    Promise Vanegas MD Referring Family Medicine [phone]    Donald Rooney MD Referring  Internal Medicine [phone]    Jose Maria Morin MD Referring Family Medicine [phone]               No

## 2024-03-11 ENCOUNTER — EMERGENCY (EMERGENCY)
Facility: HOSPITAL | Age: 59
LOS: 1 days | Discharge: HOME | End: 2024-03-11
Attending: EMERGENCY MEDICINE | Admitting: EMERGENCY MEDICINE
Payer: MEDICAID

## 2024-03-11 VITALS
RESPIRATION RATE: 18 BRPM | SYSTOLIC BLOOD PRESSURE: 141 MMHG | HEART RATE: 55 BPM | DIASTOLIC BLOOD PRESSURE: 87 MMHG | OXYGEN SATURATION: 100 % | TEMPERATURE: 98 F

## 2024-03-11 VITALS
OXYGEN SATURATION: 98 % | HEART RATE: 88 BPM | DIASTOLIC BLOOD PRESSURE: 70 MMHG | TEMPERATURE: 98 F | SYSTOLIC BLOOD PRESSURE: 121 MMHG | RESPIRATION RATE: 16 BRPM

## 2024-03-11 DIAGNOSIS — Z98.890 OTHER SPECIFIED POSTPROCEDURAL STATES: Chronic | ICD-10-CM

## 2024-03-11 DIAGNOSIS — Z90.49 ACQUIRED ABSENCE OF OTHER SPECIFIED PARTS OF DIGESTIVE TRACT: Chronic | ICD-10-CM

## 2024-03-11 LAB
ALBUMIN SERPL ELPH-MCNC: 4.3 G/DL — SIGNIFICANT CHANGE UP (ref 3.3–5)
ALP SERPL-CCNC: 156 U/L — HIGH (ref 40–120)
ALT FLD-CCNC: 65 U/L — HIGH (ref 4–41)
ANION GAP SERPL CALC-SCNC: 10 MMOL/L — SIGNIFICANT CHANGE UP (ref 7–14)
ANION GAP SERPL CALC-SCNC: 14 MMOL/L — SIGNIFICANT CHANGE UP (ref 7–14)
APPEARANCE UR: ABNORMAL
AST SERPL-CCNC: 69 U/L — HIGH (ref 4–40)
BACTERIA # UR AUTO: ABNORMAL /HPF
BASOPHILS # BLD AUTO: 0.07 K/UL — SIGNIFICANT CHANGE UP (ref 0–0.2)
BASOPHILS NFR BLD AUTO: 0.7 % — SIGNIFICANT CHANGE UP (ref 0–2)
BILIRUB SERPL-MCNC: 1.5 MG/DL — HIGH (ref 0.2–1.2)
BILIRUB UR-MCNC: ABNORMAL
BUN SERPL-MCNC: 24 MG/DL — HIGH (ref 7–23)
BUN SERPL-MCNC: 27 MG/DL — HIGH (ref 7–23)
CALCIUM SERPL-MCNC: 10.3 MG/DL — SIGNIFICANT CHANGE UP (ref 8.4–10.5)
CALCIUM SERPL-MCNC: 8.7 MG/DL — SIGNIFICANT CHANGE UP (ref 8.4–10.5)
CHLORIDE SERPL-SCNC: 94 MMOL/L — LOW (ref 98–107)
CHLORIDE SERPL-SCNC: 97 MMOL/L — LOW (ref 98–107)
CK SERPL-CCNC: 97 U/L — SIGNIFICANT CHANGE UP (ref 30–200)
CO2 SERPL-SCNC: 21 MMOL/L — LOW (ref 22–31)
CO2 SERPL-SCNC: 27 MMOL/L — SIGNIFICANT CHANGE UP (ref 22–31)
COLOR SPEC: ABNORMAL
CREAT SERPL-MCNC: 1.54 MG/DL — HIGH (ref 0.5–1.3)
CREAT SERPL-MCNC: 1.78 MG/DL — HIGH (ref 0.5–1.3)
DIFF PNL FLD: ABNORMAL
EGFR: 43 ML/MIN/1.73M2 — LOW
EGFR: 52 ML/MIN/1.73M2 — LOW
EOSINOPHIL # BLD AUTO: 0.43 K/UL — SIGNIFICANT CHANGE UP (ref 0–0.5)
EOSINOPHIL NFR BLD AUTO: 4.6 % — SIGNIFICANT CHANGE UP (ref 0–6)
GLUCOSE SERPL-MCNC: 106 MG/DL — HIGH (ref 70–99)
GLUCOSE SERPL-MCNC: 85 MG/DL — SIGNIFICANT CHANGE UP (ref 70–99)
GLUCOSE UR QL: NEGATIVE MG/DL — SIGNIFICANT CHANGE UP
HCT VFR BLD CALC: 45.8 % — SIGNIFICANT CHANGE UP (ref 39–50)
HGB BLD-MCNC: 15.1 G/DL — SIGNIFICANT CHANGE UP (ref 13–17)
IANC: 6.12 K/UL — SIGNIFICANT CHANGE UP (ref 1.8–7.4)
IMM GRANULOCYTES NFR BLD AUTO: 0.3 % — SIGNIFICANT CHANGE UP (ref 0–0.9)
KETONES UR-MCNC: NEGATIVE MG/DL — SIGNIFICANT CHANGE UP
LEUKOCYTE ESTERASE UR-ACNC: ABNORMAL
LYMPHOCYTES # BLD AUTO: 1.98 K/UL — SIGNIFICANT CHANGE UP (ref 1–3.3)
LYMPHOCYTES # BLD AUTO: 21 % — SIGNIFICANT CHANGE UP (ref 13–44)
MAGNESIUM SERPL-MCNC: 2 MG/DL — SIGNIFICANT CHANGE UP (ref 1.6–2.6)
MCHC RBC-ENTMCNC: 27.3 PG — SIGNIFICANT CHANGE UP (ref 27–34)
MCHC RBC-ENTMCNC: 33 GM/DL — SIGNIFICANT CHANGE UP (ref 32–36)
MCV RBC AUTO: 82.8 FL — SIGNIFICANT CHANGE UP (ref 80–100)
MONOCYTES # BLD AUTO: 0.81 K/UL — SIGNIFICANT CHANGE UP (ref 0–0.9)
MONOCYTES NFR BLD AUTO: 8.6 % — SIGNIFICANT CHANGE UP (ref 2–14)
NEUTROPHILS # BLD AUTO: 6.12 K/UL — SIGNIFICANT CHANGE UP (ref 1.8–7.4)
NEUTROPHILS NFR BLD AUTO: 64.8 % — SIGNIFICANT CHANGE UP (ref 43–77)
NITRITE UR-MCNC: POSITIVE
NRBC # BLD: 0 /100 WBCS — SIGNIFICANT CHANGE UP (ref 0–0)
NRBC # FLD: 0 K/UL — SIGNIFICANT CHANGE UP (ref 0–0)
PH UR: 5 — SIGNIFICANT CHANGE UP (ref 5–8)
PHOSPHATE SERPL-MCNC: 3 MG/DL — SIGNIFICANT CHANGE UP (ref 2.5–4.5)
PLATELET # BLD AUTO: 499 K/UL — HIGH (ref 150–400)
POTASSIUM SERPL-MCNC: 4.7 MMOL/L — SIGNIFICANT CHANGE UP (ref 3.5–5.3)
POTASSIUM SERPL-MCNC: 5.7 MMOL/L — HIGH (ref 3.5–5.3)
POTASSIUM SERPL-SCNC: 4.7 MMOL/L — SIGNIFICANT CHANGE UP (ref 3.5–5.3)
POTASSIUM SERPL-SCNC: 5.7 MMOL/L — HIGH (ref 3.5–5.3)
PROT SERPL-MCNC: 8.6 G/DL — HIGH (ref 6–8.3)
PROT UR-MCNC: 300 MG/DL
RBC # BLD: 5.53 M/UL — SIGNIFICANT CHANGE UP (ref 4.2–5.8)
RBC # FLD: 13.3 % — SIGNIFICANT CHANGE UP (ref 10.3–14.5)
RBC CASTS # UR COMP ASSIST: >50 /HPF — SIGNIFICANT CHANGE UP (ref 0–4)
REVIEW: SIGNIFICANT CHANGE UP
SODIUM SERPL-SCNC: 131 MMOL/L — LOW (ref 135–145)
SODIUM SERPL-SCNC: 132 MMOL/L — LOW (ref 135–145)
SP GR SPEC: 1.02 — SIGNIFICANT CHANGE UP (ref 1–1.03)
SQUAMOUS # UR AUTO: 4 /HPF — SIGNIFICANT CHANGE UP (ref 0–5)
UROBILINOGEN FLD QL: 0.2 MG/DL — SIGNIFICANT CHANGE UP (ref 0.2–1)
WBC # BLD: 9.44 K/UL — SIGNIFICANT CHANGE UP (ref 3.8–10.5)
WBC # FLD AUTO: 9.44 K/UL — SIGNIFICANT CHANGE UP (ref 3.8–10.5)
WBC UR QL: >50 /HPF — SIGNIFICANT CHANGE UP (ref 0–5)

## 2024-03-11 PROCEDURE — 99285 EMERGENCY DEPT VISIT HI MDM: CPT

## 2024-03-11 PROCEDURE — 74177 CT ABD & PELVIS W/CONTRAST: CPT | Mod: 26,MC

## 2024-03-11 RX ORDER — CEFDINIR 250 MG/5ML
1 POWDER, FOR SUSPENSION ORAL
Qty: 14 | Refills: 0
Start: 2024-03-11 | End: 2024-03-17

## 2024-03-11 RX ORDER — CEFTRIAXONE 500 MG/1
1000 INJECTION, POWDER, FOR SOLUTION INTRAMUSCULAR; INTRAVENOUS ONCE
Refills: 0 | Status: COMPLETED | OUTPATIENT
Start: 2024-03-11 | End: 2024-03-11

## 2024-03-11 RX ORDER — CEFDINIR 250 MG/5ML
1 POWDER, FOR SUSPENSION ORAL
Qty: 7 | Refills: 0
Start: 2024-03-11 | End: 2024-03-17

## 2024-03-11 RX ORDER — SODIUM CHLORIDE 9 MG/ML
1000 INJECTION INTRAMUSCULAR; INTRAVENOUS; SUBCUTANEOUS ONCE
Refills: 0 | Status: COMPLETED | OUTPATIENT
Start: 2024-03-11 | End: 2024-03-11

## 2024-03-11 RX ADMIN — SODIUM CHLORIDE 1000 MILLILITER(S): 9 INJECTION INTRAMUSCULAR; INTRAVENOUS; SUBCUTANEOUS at 10:48

## 2024-03-11 RX ADMIN — CEFTRIAXONE 100 MILLIGRAM(S): 500 INJECTION, POWDER, FOR SOLUTION INTRAMUSCULAR; INTRAVENOUS at 12:05

## 2024-03-11 NOTE — ED PROVIDER NOTE - PATIENT PORTAL LINK FT
You can access the FollowMyHealth Patient Portal offered by [facility] by registering at the following website: [email or web address]. By joining [employer]’s FollowMyHealth portal, you will also be able to view your health information using other applications (apps) compatible with our system.

## 2024-03-11 NOTE — ED PROVIDER NOTE - PROGRESS NOTE DETAILS
Adolfo, PGY3 - Aodlfo, PGY3 - Genital: No discharge or bleeding from penile meatus, no lesions seen over the penis or scrotum. Right and left testicles palpated, testicles are equal in size, no tenderness to palpation. Perineum intact, no rashes or lesions. Chaperone: medical student Adolfo, PGY3 - repeat bmp nonactionable. reinforced need to followup with oncologist and patient agreed, will call again to confirm appointment. Patient stable for discharge. Understands the Emergency Room work-up and discharge precautions. Will follow-up with onc. cefdinir sent to pharmacy

## 2024-03-11 NOTE — ED ADULT NURSE NOTE - CHIEF COMPLAINT QUOTE
pt ambulatory into ED c/o hematuria, dysuria, and fatigue x2 weeks, similar episode 1 month ago with negative work up at urologist. denies fevers, abdominal pain. Phx: colo-rectal CA with resection, ileostomy

## 2024-03-11 NOTE — ED PROVIDER NOTE - OBJECTIVE STATEMENT
59yo man with PMH left sided colon cancer now post resection and ileostomy, DVT on eliquis, chronic electrolyte imbalances that manifest as shortness of breath/chest pain/cramping, presenting due to intermittent hematuria/dysuria and recurrent weakness. 57yo man with PMH left sided colon cancer now post resection and ileostomy, DVT on eliquis, chronic electrolyte imbalances that manifest as shortness of breath/cramping/weakness, presenting due to intermittent hematuria/dysuria and recurrent weakness. Patient states that over the last month he has had hematuria last for 2 days and resolve, restarts a few days later. Saw his urologist who did a UA and UCx which were negative, kidney ultrasound was nonactionable. Was told to follow up with his oncologist due to concern for bladder mass but patient has been unable to make appointment yet. Denies fevers, chills, chest pain, N/V/D. +abd pain. Also endorsing perineal pain that is intermittent when he sits.

## 2024-03-11 NOTE — ED PROVIDER NOTE - CLINICAL SUMMARY MEDICAL DECISION MAKING FREE TEXT BOX
Adolfo, PGY3 - Adolfo, PGY3 - 57yo man with PMH colon cancer and residual bladder mass, presenting due to intermittent hematuria for 1 month, evaluated by urology, no subsequent imaging or tests done in office. +suprapubic pain, will get CT for further evaluation for possible worsening mass. labs, UA, reassess, consider uti. *The above represents an initial assessment/impression. Please refer to progress notes for potential changes in patient clinical course*

## 2024-03-11 NOTE — ED PROVIDER NOTE - PHYSICAL EXAMINATION
Gen: NAD, AOx3, able to make needs known, non-toxic  Head: NCAT  HEENT: EOMI, normal conjunctiva  Lung: CTAB, no respiratory distress, no wheezes/rhonchi/rales B/L, speaking in full sentences  CV: RRR, no M/R/G, pulses bilaterally   Abd: soft, suprapubic tenderness, ileostomy bag contains stool content with no blood. no CVA tenderness  MSK: no visible bony deformities  Neuro: No focal sensory or motor deficits  Skin: Warm, well perfused, no rash  Psych: normal affect

## 2024-03-11 NOTE — ED ADULT NURSE NOTE - OBJECTIVE STATEMENT
Received patient to room 4 for hematuria. A&O4, ambulatory, pt states noticed blood in urine and having increased fatigue x 2 weeks. Noted to have ileostomy bag, site clean and intact. No blood thinners. Denies fever, chills.  RR even and unlabored, abdomen soft and nondistended. RN at bedside for IV insertion. hx of colo-rectal CA not on chemo Received patient to room 4 for hematuria. A&O4, ambulatory, pt states noticed blood in urine with clots and having increased fatigue x 2 weeks. Noted to have ileostomy bag, site clean and intact. Stopped blood thinners for a while now. Denies fever, chills.  RR even and unlabored, abdomen soft and nondistended. RN at bedside for IV insertion. hx of colo-rectal CA not on chemo, DVT (was on Eliquis)

## 2024-03-11 NOTE — ED PROVIDER NOTE - ATTENDING CONTRIBUTION TO CARE
This is a 58-year-old male accompanied by his wife he has a past medical history of left-sided colon cancer status post resection and ileostomy, DVT had been on Eliquis states that he had stopped his this prescription, history of electrolyte imbalances.  Patient states that he has been having intermittent hematuria. He was going to come Friday but he decided to wait through the weekend he was having hematuria again yesterday so he decided to get checked out today. he denies any current chest pain, shortness of breath, nausea, vomiting, diarrhea.  Does state he has some abdominal pain mild on the lower left side of his abdomen.  Will check lab work including electrolytes will check a urinalysis that appears that he had a urinalysis and culture at the urologist that were negative as well as an ultrasound that was nonconcerning due to the mild tenderness we will also obtain CAT scans it appears that he has bladder mets on review of uro notes and this might be the cause of hematuria however we will rule out infection again.

## 2024-03-11 NOTE — ED PROVIDER NOTE - NSFOLLOWUPINSTRUCTIONS_ED_ALL_ED_FT
You were seen in the Emergency Room today because of hematuria. A copy of your results is included in your discharge paperwork.     It is important to follow-up with your Oncologist. Be sure to call them to expedite an appointment.     We have sent medication to your Pharmacy. It is called Cefdinir. This is an antibiotic.     WHAT YOU NEED TO KNOW:    A urinary tract infection (UTI) is caused by bacteria that get inside your urinary tract. Most bacteria that enter your urinary tract come out when you urinate. If the bacteria stay in your urinary tract, you may get an infection. Your urinary tract includes your kidneys, ureters, bladder, and urethra. Urine is made in your kidneys, and it flows from the ureters to the bladder. Urine leaves the bladder through the urethra. A UTI is more common in your lower urinary tract, which includes your bladder and urethra.       DISCHARGE INSTRUCTIONS:    Return to the emergency department if:   •You are urinating very little or not at all.  •You have a high fever with shaking chills.   •You have side or back pain that gets worse.    Contact your healthcare provider if:   •You have a mild fever.  •You do not feel better after 2 days of taking antibiotics.  •You are vomiting.   •You have new symptoms, such as blood or pus in your urine.   •You have questions or concerns about your condition or care.      Prevent another UTI:   •Empty your bladder often. Urinate and empty your bladder as soon as you feel the need. Do not hold your urine for long periods of time.  •Drink liquids as directed. Ask how much liquid to drink each day and which liquids are best for you. You may need to drink more liquids than usual to help flush out the bacteria. Do not drink alcohol, caffeine, or citrus juices. These can irritate your bladder and increase your symptoms. Your healthcare provider may recommend cranberry juice to help prevent a UTI.  •Urinate after you have sex. This can help flush out bacteria passed during sex.  •Do pelvic muscle exercises often. Pelvic muscle exercises may help you start and stop urinating. Strong pelvic muscles may help you empty your bladder easier. Squeeze these muscles tightly for 5 seconds like you are trying to hold back urine. Then relax for 5 seconds. Gradually work up to squeezing for 10 seconds. Do 3 sets of 15 repetitions a day, or as directed.      Follow up with your doctor as directed: Write down your questions so you remember to ask them during your visits.

## 2024-03-12 LAB
CULTURE RESULTS: NO GROWTH — SIGNIFICANT CHANGE UP
SPECIMEN SOURCE: SIGNIFICANT CHANGE UP

## 2024-03-27 ENCOUNTER — EMERGENCY (EMERGENCY)
Facility: HOSPITAL | Age: 59
LOS: 1 days | Discharge: HOME | End: 2024-03-27
Attending: EMERGENCY MEDICINE | Admitting: EMERGENCY MEDICINE
Payer: MEDICAID

## 2024-03-27 VITALS
DIASTOLIC BLOOD PRESSURE: 80 MMHG | RESPIRATION RATE: 16 BRPM | SYSTOLIC BLOOD PRESSURE: 114 MMHG | OXYGEN SATURATION: 100 % | HEART RATE: 76 BPM

## 2024-03-27 VITALS
RESPIRATION RATE: 17 BRPM | DIASTOLIC BLOOD PRESSURE: 94 MMHG | HEART RATE: 91 BPM | OXYGEN SATURATION: 99 % | SYSTOLIC BLOOD PRESSURE: 123 MMHG | TEMPERATURE: 98 F

## 2024-03-27 DIAGNOSIS — Z98.890 OTHER SPECIFIED POSTPROCEDURAL STATES: Chronic | ICD-10-CM

## 2024-03-27 DIAGNOSIS — Z90.49 ACQUIRED ABSENCE OF OTHER SPECIFIED PARTS OF DIGESTIVE TRACT: Chronic | ICD-10-CM

## 2024-03-27 LAB
ANION GAP SERPL CALC-SCNC: 11 MMOL/L — SIGNIFICANT CHANGE UP (ref 7–14)
APPEARANCE UR: ABNORMAL
BACTERIA # UR AUTO: NEGATIVE /HPF — SIGNIFICANT CHANGE UP
BILIRUB UR-MCNC: ABNORMAL
BUN SERPL-MCNC: 19 MG/DL — SIGNIFICANT CHANGE UP (ref 7–23)
CALCIUM SERPL-MCNC: 9.6 MG/DL — SIGNIFICANT CHANGE UP (ref 8.4–10.5)
CHLORIDE SERPL-SCNC: 95 MMOL/L — LOW (ref 98–107)
CO2 SERPL-SCNC: 24 MMOL/L — SIGNIFICANT CHANGE UP (ref 22–31)
COLOR SPEC: ABNORMAL
CREAT SERPL-MCNC: 1.61 MG/DL — HIGH (ref 0.5–1.3)
DIFF PNL FLD: ABNORMAL
EGFR: 49 ML/MIN/1.73M2 — LOW
EPI CELLS # UR: SIGNIFICANT CHANGE UP
GLUCOSE SERPL-MCNC: 99 MG/DL — SIGNIFICANT CHANGE UP (ref 70–99)
GLUCOSE UR QL: NEGATIVE MG/DL — SIGNIFICANT CHANGE UP
HCT VFR BLD CALC: 42.7 % — SIGNIFICANT CHANGE UP (ref 39–50)
HGB BLD-MCNC: 14.3 G/DL — SIGNIFICANT CHANGE UP (ref 13–17)
KETONES UR-MCNC: NEGATIVE MG/DL — SIGNIFICANT CHANGE UP
LEUKOCYTE ESTERASE UR-ACNC: ABNORMAL
MCHC RBC-ENTMCNC: 27 PG — SIGNIFICANT CHANGE UP (ref 27–34)
MCHC RBC-ENTMCNC: 33.5 GM/DL — SIGNIFICANT CHANGE UP (ref 32–36)
MCV RBC AUTO: 80.7 FL — SIGNIFICANT CHANGE UP (ref 80–100)
NITRITE UR-MCNC: POSITIVE
NRBC # BLD: 0 /100 WBCS — SIGNIFICANT CHANGE UP (ref 0–0)
NRBC # FLD: 0 K/UL — SIGNIFICANT CHANGE UP (ref 0–0)
PH UR: 6 — SIGNIFICANT CHANGE UP (ref 5–8)
PLATELET # BLD AUTO: 456 K/UL — HIGH (ref 150–400)
POTASSIUM SERPL-MCNC: 4.9 MMOL/L — SIGNIFICANT CHANGE UP (ref 3.5–5.3)
POTASSIUM SERPL-SCNC: 4.9 MMOL/L — SIGNIFICANT CHANGE UP (ref 3.5–5.3)
PROT UR-MCNC: 100 MG/DL
RBC # BLD: 5.29 M/UL — SIGNIFICANT CHANGE UP (ref 4.2–5.8)
RBC # FLD: 13.9 % — SIGNIFICANT CHANGE UP (ref 10.3–14.5)
RBC CASTS # UR COMP ASSIST: >50 /HPF — SIGNIFICANT CHANGE UP (ref 0–4)
SODIUM SERPL-SCNC: 130 MMOL/L — LOW (ref 135–145)
SP GR SPEC: 1.02 — SIGNIFICANT CHANGE UP (ref 1–1.03)
UROBILINOGEN FLD QL: 0.2 MG/DL — SIGNIFICANT CHANGE UP (ref 0.2–1)
WBC # BLD: 9.94 K/UL — SIGNIFICANT CHANGE UP (ref 3.8–10.5)
WBC # FLD AUTO: 9.94 K/UL — SIGNIFICANT CHANGE UP (ref 3.8–10.5)
WBC UR QL: >50 /HPF — HIGH (ref 0–5)

## 2024-03-27 PROCEDURE — 99284 EMERGENCY DEPT VISIT MOD MDM: CPT

## 2024-03-27 NOTE — ED PROVIDER NOTE - NSFOLLOWUPINSTRUCTIONS_ED_ALL_ED_FT
Hematuria WHAT YOU NEED TO KNOW:    Hematuria is blood in your urine. Your urine may be bright red to dark brown.    DISCHARGE INSTRUCTIONS:    Return to the emergency department if:    You have blood in your urine after a new injury, such as a fall.    You have severe back or side pain that does not go away with treatment.  Call your doctor if:    You are urinating very small amounts or not at all.    You feel like you cannot empty your bladder.    You have a fever that gets worse or does not go away with treatment.    You cannot keep liquids or medicines down.    Your urine gets darker, even after you drink extra liquids.    You have questions or concerns about your condition, treatment, or care.  Drink liquids as directed: You may need to drink extra liquids to help flush the blood from your body through your urine. Water is the best liquid to drink. Ask how much liquid to drink each day and which liquids are best for you.    Follow up with your doctor as directed: Write down your questions so you remember to ask them during your visits.    Hematuria    LO QUE NECESITA SABER:    La hematuria significa que hay julieta en la orina. La orina podría ponerse de color augustine brillante a café obscuro.    INSTRUCCIONES SOBRE EL LEYDA HOSPITALARIA:    Regrese a la odell de emergencias si:    Nota julieta en la orina después de jabier sufrido dhruv lesión nueva, narciso dhruv caída.    Siente un dolor lou en la espalda o el costado y no se le perlita con el tratamiento.  Llame a mcfarland médico si:    Orina cantidades muy pequeñas o nada en absoluto.    Siente que no puede vaciar la vejiga.    [place] fiebre empeora o no se jr con el tratamiento.    No puede retener líquidos o medicamentos en mcfarland estómago.    [place]cfarland orina se oscurece, aún después de jabier tomado líquidos adicionales.    Usted tiene preguntas o inquietudes acerca de mcfarland condición, tratamiento o cuidado.  [place] líquidos según karina indicaciones:Es probable que usted necesite jens líquidos adicionales para ayudar a eliminar la julieta de mcfarland cuerpo por medio de la orina. El agua es el mejor líquido para jens. Pregunte cuánto líquido debe jens cada día y cuáles líquidos son los más adecuados para usted.    Acuda a la consulta de control con mcfarland médico según las indicaciones:Anote karina preguntas para que se acuerde de hacerlas sobia karina visitas.

## 2024-03-27 NOTE — ED PROVIDER NOTE - OBJECTIVE STATEMENT
57yo man with PMH left sided colon cancer now post resection and ileostomy, DVT on eliquis w/ ?bladder mass presenting to ER c/o hematuria. Pt. was recently seen on 3/11 for hematuria - found to have UTI - placed on abx and azo - states since then dysuria has improved and stream feels better. States that since then he has noticed slightly more blood in urine since - states has noticed small clots in urine intermittently since. Denies fever chills nausea vomit weakness abdominal pain nt sweats.

## 2024-03-27 NOTE — ED PROVIDER NOTE - ATTENDING APP SHARED VISIT CONTRIBUTION OF CARE
59yo man with PMH left sided colon cancer now post resection and ileostomy, DVT on eliquis w/ ?bladder mass presenting to ER c/o hematuria. Pt. was recently seen on 3/11 for hematuria - found to have UTI - placed on abx and azo - states since then dysuria has improved and stream feels better. States that since then he has noticed slightly more blood in urine since - states has noticed small clots in urine intermittently since. Denies fever chills nausea vomit weakness abdominal pain nt sweats.

## 2024-03-27 NOTE — ED ADULT NURSE NOTE - NS ED NOTE  TALK SOMEONE YN
CM was informed by Dr Juan Perez that pt is medically stable for dc today  Pt's wife Kitty Callaway is aware that the hospital bed will be delivered today  GREGORY provided Dulce Davis with Daisha's cell phone # [phone] to coordinate hospital bed delivery  GREGORY informed Lonnie Carrillo of pt's dc to home today  No

## 2024-03-27 NOTE — ED ADULT NURSE NOTE - OBJECTIVE STATEMENT
received this 58 y/0 male alert and oriented x 4, complaining of hematuria on and off. Past medical history of ileus anita. Hematuria on and off denies any pain or discomfort, denies swelling to genitals, abdomen is soft, chest is clear. No other verbal complaints noted. IV initiated on left Ac blood work sent ot lab, waiting for results.

## 2024-03-27 NOTE — ED ADULT NURSE NOTE - NSFALLUNIVINTERV_ED_ALL_ED
Bed/Stretcher in lowest position, wheels locked, appropriate side rails in place/Call bell, personal items and telephone in reach/Instruct patient to call for assistance before getting out of bed/chair/stretcher/Non-slip footwear applied when patient is off stretcher/[place] to call system/Physically safe environment - no spills, clutter or unnecessary equipment/Purposeful proactive rounding/Room/bathroom lighting operational, light cord in reach

## 2024-03-27 NOTE — ED PROVIDER NOTE - PATIENT PORTAL LINK FT
You can access the FollowMyHealth Patient Portal offered by [facility] by registering at the following website: [email or web address]. By joining [employer]’s FollowMyHealth portal, you will also be able to view your health information using other applications (apps) compatible with our system.

## 2024-03-27 NOTE — ED PROVIDER NOTE - CLINICAL SUMMARY MEDICAL DECISION MAKING FREE TEXT BOX
59 y/o male c/o hematuria x 2-3 weeks w/ known bladder mass  -no signs of urinary retention- will r/o UTI vs anemia  -labs ua ucx  -likely dc with outpt surg onc follow up next week

## 2024-03-27 NOTE — ED ADULT TRIAGE NOTE - CHIEF COMPLAINT QUOTE
patient c/o hematuria x a couple weeks. patient states he was seen here for the same complaints x2 weeks and took a course of antibiotics however the blood has returned. past medical history of colon cancer (not on treatment)

## 2024-03-28 LAB
CULTURE RESULTS: SIGNIFICANT CHANGE UP
SPECIMEN SOURCE: SIGNIFICANT CHANGE UP

## 2024-04-01 ENCOUNTER — NON-APPOINTMENT (OUTPATIENT)
Age: 59
End: 2024-04-01

## 2024-04-02 ENCOUNTER — NON-APPOINTMENT (OUTPATIENT)
Age: 59
End: 2024-04-02

## 2024-04-02 ENCOUNTER — APPOINTMENT (OUTPATIENT)
Dept: SURGICAL ONCOLOGY | Facility: CLINIC | Age: 59
End: 2024-04-02
Payer: MEDICAID

## 2024-04-02 VITALS
RESPIRATION RATE: 17 BRPM | OXYGEN SATURATION: 99 % | BODY MASS INDEX: 19.93 KG/M2 | WEIGHT: 127 LBS | DIASTOLIC BLOOD PRESSURE: 80 MMHG | HEIGHT: 67 IN | SYSTOLIC BLOOD PRESSURE: 138 MMHG | HEART RATE: 81 BPM

## 2024-04-02 PROCEDURE — 99215 OFFICE O/P EST HI 40 MIN: CPT

## 2024-04-03 ENCOUNTER — OUTPATIENT (OUTPATIENT)
Dept: OUTPATIENT SERVICES | Facility: HOSPITAL | Age: 59
LOS: 1 days | Discharge: HOME | End: 2024-04-03

## 2024-04-03 DIAGNOSIS — Z98.890 OTHER SPECIFIED POSTPROCEDURAL STATES: Chronic | ICD-10-CM

## 2024-04-03 DIAGNOSIS — C18.2 MALIGNANT NEOPLASM OF ASCENDING COLON: ICD-10-CM

## 2024-04-04 NOTE — PROGRESS NOTE ADULT - ASSESSMENT
Severe sepsis with fever, shock, in the setting of large bowel obstruction due to colon tumor with high-grade obstruction.  Official pathology is pending.  Patient has a single blood culture positive for gram-positive cocci in pairs and change.  This is likely an alphahemolytic Streptococcus species but full identification and sensitivity is pending.  Current antibiotics are exceedingly broad.  Does not know that this patient has any recent hospitalization or antibiotic use which would place him at risk for resistant yue.    9/13: Shock improved, white blood cell count normal, cultures limited to a single positive bottle with an alphahemolytic Streptococcus species.  While this could be a contaminant, it could also be a true positive given the clinical context.  We will see a role to augment antibiotics at this juncture.  9/14: POD#2. Off pressors, WBC normal, extubated. No return of bowel function as of yet. Drop in hemoglobin over the past 48h. No concern of hemolysis. Drop in Platelets as well, suspect not on the basis of uncontrolled infection at this point in time  9/16: s/p exploratory lap, colon resection on 9/10 and Small bowel resection, Creation, enterostomy, Closure, fascia, abdomen on 9/13. The pt was downgraded from ICU, no fevers since 9/12, on RA and comfortable, WBC 12.63, Cr ok, LFTs elevated, surgical site is clean, ileostomy is functional, JPs x 2 drain serosanguineous drainage, repeat BCs remain with no growth to date, Ceftriaxone IV continued.   Pt's LFTs are elevated, unlikely that increase is related to ceftriaxone, will monitor.       Suggestions  continue Ceftriaxone 2g/day  Monitor Hb.  Monitor platelets  Monitor LFTs  Follow-up culture data  Trend temperatures and WBC  Monitor ileostomy and JAVED x 2 output  Severe sepsis with fever, shock, in the setting of large bowel obstruction due to colon tumor with high-grade obstruction.  Official pathology is pending.  Patient has a single blood culture positive for gram-positive cocci in pairs and change.  This is likely an alphahemolytic Streptococcus species but full identification and sensitivity is pending.  Current antibiotics are exceedingly broad.  Does not know that this patient has any recent hospitalization or antibiotic use which would place him at risk for resistant yue.    9/13: Shock improved, white blood cell count normal, cultures limited to a single positive bottle with an alphahemolytic Streptococcus species.  While this could be a contaminant, it could also be a true positive given the clinical context.  We will see a role to augment antibiotics at this juncture.  9/14: POD#2. Off pressors, WBC normal, extubated. No return of bowel function as of yet. Drop in hemoglobin over the past 48h. No concern of hemolysis. Drop in Platelets as well, suspect not on the basis of uncontrolled infection at this point in time  9/16: s/p exploratory lap, colon resection on 9/10 and Small bowel resection, Creation, enterostomy, Closure, fascia, abdomen on 9/13. The pt was downgraded from ICU, no fevers since 9/12, on RA and comfortable, WBC 12.63, Cr ok, LFTs elevated, surgical site is clean, ileostomy is functional, JPs x 2 drain serosanguineous drainage, repeat BCs remain with no growth to date, tolerates prescribed diet, Ceftriaxone IV continued.   Pt's LFTs are elevated, will monitor.       Suggestions  continue Ceftriaxone 2g/day  Monitor Hb.  Monitor platelets  Monitor LFTs  Follow-up culture data  Trend temperatures and WBC  Monitor ileostomy and JAVED x 2 output  Shortness of breath

## 2024-04-09 ENCOUNTER — RESULT REVIEW (OUTPATIENT)
Age: 59
End: 2024-04-09

## 2024-04-09 ENCOUNTER — APPOINTMENT (OUTPATIENT)
Dept: HEMATOLOGY ONCOLOGY | Facility: CLINIC | Age: 59
End: 2024-04-09
Payer: MEDICAID

## 2024-04-09 VITALS
OXYGEN SATURATION: 99 % | HEIGHT: 67.28 IN | BODY MASS INDEX: 19.9 KG/M2 | WEIGHT: 128.31 LBS | DIASTOLIC BLOOD PRESSURE: 81 MMHG | SYSTOLIC BLOOD PRESSURE: 127 MMHG | RESPIRATION RATE: 16 BRPM | HEART RATE: 72 BPM | TEMPERATURE: 96.6 F

## 2024-04-09 LAB
BASOPHILS # BLD AUTO: 0.04 K/UL — SIGNIFICANT CHANGE UP (ref 0–0.2)
BASOPHILS NFR BLD AUTO: 0.5 % — SIGNIFICANT CHANGE UP (ref 0–2)
EOSINOPHIL # BLD AUTO: 0.4 K/UL — SIGNIFICANT CHANGE UP (ref 0–0.5)
EOSINOPHIL NFR BLD AUTO: 4.9 % — SIGNIFICANT CHANGE UP (ref 0–6)
HCT VFR BLD CALC: 40 % — SIGNIFICANT CHANGE UP (ref 39–50)
HGB BLD-MCNC: 12.8 G/DL — LOW (ref 13–17)
IMM GRANULOCYTES NFR BLD AUTO: 0.4 % — SIGNIFICANT CHANGE UP (ref 0–0.9)
LYMPHOCYTES # BLD AUTO: 1.73 K/UL — SIGNIFICANT CHANGE UP (ref 1–3.3)
LYMPHOCYTES # BLD AUTO: 21 % — SIGNIFICANT CHANGE UP (ref 13–44)
MCHC RBC-ENTMCNC: 27.9 PG — SIGNIFICANT CHANGE UP (ref 27–34)
MCHC RBC-ENTMCNC: 32 G/DL — SIGNIFICANT CHANGE UP (ref 32–36)
MCV RBC AUTO: 87.1 FL — SIGNIFICANT CHANGE UP (ref 80–100)
MONOCYTES # BLD AUTO: 0.57 K/UL — SIGNIFICANT CHANGE UP (ref 0–0.9)
MONOCYTES NFR BLD AUTO: 6.9 % — SIGNIFICANT CHANGE UP (ref 2–14)
NEUTROPHILS # BLD AUTO: 5.47 K/UL — SIGNIFICANT CHANGE UP (ref 1.8–7.4)
NEUTROPHILS NFR BLD AUTO: 66.3 % — SIGNIFICANT CHANGE UP (ref 43–77)
NRBC # BLD: 0 /100 WBCS — SIGNIFICANT CHANGE UP (ref 0–0)
PLATELET # BLD AUTO: 381 K/UL — SIGNIFICANT CHANGE UP (ref 150–400)
RBC # BLD: 4.59 M/UL — SIGNIFICANT CHANGE UP (ref 4.2–5.8)
RBC # FLD: 13.7 % — SIGNIFICANT CHANGE UP (ref 10.3–14.5)
WBC # BLD: 8.24 K/UL — SIGNIFICANT CHANGE UP (ref 3.8–10.5)
WBC # FLD AUTO: 8.24 K/UL — SIGNIFICANT CHANGE UP (ref 3.8–10.5)

## 2024-04-09 PROCEDURE — 99215 OFFICE O/P EST HI 40 MIN: CPT | Mod: 25

## 2024-04-09 PROCEDURE — G2211 COMPLEX E/M VISIT ADD ON: CPT | Mod: NC,1L

## 2024-04-09 PROCEDURE — G2212 PROLONG OUTPT/OFFICE VIS: CPT

## 2024-04-09 RX ORDER — APIXABAN 5 MG/1
5 TABLET, FILM COATED ORAL
Qty: 90 | Refills: 3 | Status: DISCONTINUED | COMMUNITY
Start: 2022-10-26 | End: 2024-04-09

## 2024-04-09 NOTE — HISTORY OF PRESENT ILLNESS
[Disease: _____________________] : Disease: [unfilled] [AJCC Stage: ____] : AJCC Stage: [unfilled] [de-identified] : LEFT SIDED COLON ADENOCARCINOMA Patient saw Dr. Zach Plasencia at [facility] who presented him with large bowel obstruction and septic shock with gram negative bacteremia on 9/10/2022. Dr. Plasencia performed life-saving emergent subtotal colectomy and was left in discontinuity, Required several units of blood. Vasopressors. He returned to the OR on 9/12/22 at which point additional necrotic appearing small bowel was resected and an end ileostomy was created. Was in hospital for almost 3 weeks (initial 2 weeks and then readmission for dehydration/ high ileostomy output.)  There was gross residual disease in the pelvis, which was not amenable to resection. Path: 7 cm sigmoid cancer w positive margin. T4aN0 (0/11 nodes), proximal margin was positive. MMR IHC is proficient.  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 He was on Eliquis for that for 6 months, and no longer on it.  He saw Dr. Gillian Coronado from medical oncology 10/2022. CT Chest abdomen pelvis 9/19/22: no visible disease. Thromboses as above. CT abdomen/ pelvis 10/11/22: resolving pelvic fluid. No visible disease  MRI of pelvis 11/8/22 Within the wall of the dome of the bladder is a 2.1 x 2.1 x 1.8 cm enhancing abnormality. This appears to be a site of previous postoperative collection however no fluid component can be identified this time.  11/15/22 CEA 3.0  He followed up with Dr Coronado on 11/14/22, her recommendation was to begin systemic chemotherapy, and this was declined.  He follows up with Dr Kruger- underwent cystoscopy this showed large area in the posterior bladder wall with a submucosal nodularity.  MRI 3/16/23 No evidence of intrinsic bladder lesion. Increasing size of enhancing soft tissue mass along the serosal surface of the left bladder dome measuring 4.0 x 3.7 cm (26-24), previously 2.1 x 2.1 cm. Findings are suspicious for recurrent colon cancer. Nonocclusive left portal vein thrombus.  4/28/23: He presents with his wife, still on Eliquis, he denies any abdominal pain. Recently hospitalized in [facility], transferred from [facility], for kidney stones. S/p urethral stent placement, by Dr Meadows. He has lost weight, his appetite is great, urinating freely, ostomy is working well. He is very adamant about not getting chemotherapy. He explains it is because he saw his cousin, who was diagnosed with cervical cancer, undergo chemotherapy, whither away and die.  Admitted to [facility] 4/30/23- 5/4/23 for a small bowel obstruction- no surgical intervention. CT A/P 4/30/23 Bowel; Dilated small bowel measuring up to 3.5 cm. Small bowel obstruction with transition point in the left pelvis at the level of prior surgery and an adjacent mass related to the dome of urinary bladder which demonstrates interval increase in size and is concerning for a metastatic lesion/recurrence. CT 5/3/23: Indeterminate 2 mm Right lower lobe lung nodule.  5/3/23 CEA 29.8 5/19/23 CEA 74.6  6/16/23: saw Dr Nino s/p kidney stone removal and stent replacement on 5/11/23. He still occasionally has blood in his urine, his appetite is great.  CT A/P IC 3/11/24: LIVER: Peripheral atrophy and central hypertrophy of the liver. Heterogeneous attenuation and enhancement in the right hepatic lobe is likely perfusional in etiology. Sequela of chronic portal vein thrombosis with cavernous transformation. BLADDER, BOWEL: Status post subtotal colectomy with end ileostomy in the right lower quadrant. No bowel obstruction. There is a large pelvic mass with central necrosis which appears to arise from the bladder dome and is inseparable from the Joseph's pouch, measuring approximately 7.9 x 6.1 x 7.6 cm (AP x TR x CC), increase in size from CT 7/12/2023 where it measured 4.8 x 5.1 cm at a similar level. Impression: Large pelvic mass with central necrosis which appears to arise from the bladder dome and is inseparable from the Jospeh's pouch, substantially increased in size compared with prior studies, consistent with recurrent or metastatic tumor. Chronic portal vein thrombosis with cavernous transformation. Morphologic changes of chronic liver disease with heterogeneous right hepatic lobe enhancement, nonspecific and possibly perfusional.  RENAL STONES Follows with Dr Nino Continue k-citrate, NaHCO3 [de-identified] : adenocarcinoma [de-identified] : CEA [de-identified] : Surgeon: Catalina Mendosa Urology: Brayan Nino PCP: Giorgio Hinds ([place])

## 2024-04-09 NOTE — ASSESSMENT
[FreeTextEntry1] : LEFT SIDED COLON ADENOCARCINOMA Patient presented him with large bowel obstruction and septic shock with gram negative bacteremia on 9/10/2022. Dr. Plasencia performed life-saving emergent subtotal colectomy and was left in discontinuity. He returned to the OR on 9/12/22 at which point additional necrotic appearing small bowel was resected and an end ileostomy was created. Was in hospital for almost 3 weeks (initial 2 weeks and then readmission for dehydration/ high ileostomy output.)  There was gross residual disease in the pelvis, which was not amenable to resection. Path: 7 cm sigmoid cancer w positive margin. T4aN0 (0/11 nodes), proximal margin was positive. MMR IHC is proficient.  MRI of pelvis 11/8/22 Within the wall of the dome of the bladder is a 2.1 x 2.1 x 1.8 cm enhancing abnormality. This appears to be a site of previous postoperative collection however no fluid component can be identified this time. He followed up with Dr Coronado on 11/14/22, her recommendation was to begin systemic chemotherapy, and this was declined.  Bladder mass got larger over time. MRI 3/16/23 No evidence of intrinsic bladder lesion. Increasing size of enhancing soft tissue mass along the serosal surface of the left bladder dome measuring 4.0 x 3.7 cm (26-24), previously 2.1 x 2.1 cm. Findings are suspicious for recurrent colon cancer.  Admitted to [facility] 4/30/23- 5/4/23 for a small bowel obstruction- no surgical intervention. CT A/P 4/30/23 Bowel; Dilated small bowel measuring up to 3.5 cm. Small bowel obstruction with transition point in the left pelvis at the level of prior surgery and an adjacent mass related to the dome of urinary bladder which demonstrates interval increase in size and is concerning for a metastatic lesion/recurrence. CT 5/3/23: Indeterminate 2 mm Right lower lobe lung nodule.  Labs with CEA:  11/15/22 CEA = 3.0 5/3/23 CEA = 29.8 5/19/23 CEA = 74.6  CT A/P IC 3/11/24: Status post subtotal colectomy with end ileostomy in the right lower quadrant. No bowel obstruction. There is a large pelvic mass with central necrosis which appears to arise from the bladder dome and is inseparable from the Joseph's pouch, measuring approximately 7.9 x 6.1 x 7.6 cm (AP x TR x CC), increase in size from CT 7/12/2023 where it measured 4.8 x 5.1 cm at a similar level. Impression: Large pelvic mass with central necrosis which appears to arise from the bladder dome and is inseparable from the Joseph's pouch, substantially increased in size compared with prior studies, consistent with recurrent or metastatic tumor. Chronic portal vein thrombosis with cavernous transformation.   Patient will have rectal MRI asap to locally stage and plan for resectability. I will order PET to assess for all sites distant disease. Labs today, to repeat CEA and other baseline labs. Also, will want to biopsy bladder mass for new tissue, and to send for F1CDX. He will need systemic chemotherapy, and would like to personalize based on NGS testing. Case discussed with Dr Mendosa.  RENAL STONES 6/16/23: saw Dr Nino s/p kidney stone removal and stent replacement on 5/11/23. He still occasionally has blood in his urine, his appetite is great. Follows with Dr Nino Continue k-citrate, NaHCO3  Time = 80 minutes to review old chart, films, inpatient notes, pathology, and explain and coordinate care. They will call me for next steps 2-3 days after the upcoming imaging to review.

## 2024-04-09 NOTE — CONSULT LETTER
[Dear  ___] : Dear  [unfilled], [Consult Letter:] : I had the pleasure of evaluating your patient, [unfilled]. [Please see my note below.] : Please see my note below. [Consult Closing:] : Thank you very much for allowing me to participate in the care of this patient.  If you have any questions, please do not hesitate to contact me. [Sincerely,] : Sincerely, [DrIsela  ___] : Dr. FIGUEROA [DrIsela ___] : Dr. FIGUEROA

## 2024-04-09 NOTE — PHYSICAL EXAM
[Restricted in physically strenuous activity but ambulatory and able to carry out work of a light or sedentary nature] : Status 1- Restricted in physically strenuous activity but ambulatory and able to carry out work of a light or sedentary nature, e.g., light house work, office work [Thin] : thin [Normal] : affect appropriate [de-identified] : suprapubic is indurated feeling and mass like; ileostomy noted in right abdominal wall

## 2024-04-09 NOTE — ASSESSMENT
[FreeTextEntry1] : 58M with obstructing sigmoid cancer (T4AN0 9/11 nodes)  that presented with colonic ischemia requiring total abdominal colectomy with end ileostomy. Initially was left in discontinuity with open abdomen given septic shock with GNR bacteremia. He has recovered very well and is home with stable/ increasing weight, tolerating a diet, and functional ileostomy.   Per discussion with his surgeon, there was residual disease in the pelvis.  Pt opted to not pursue systemic chemo or radiation therapy.  He declined to schedule visits with above physicians despite multiple discussions.   He presents after repeat CT 3/11/24 while hospitalized with significant progression of local disease. He is now agreeable to meet with medical oncology. Dr. Coronado is still on leave. Will refer to Dr. Palafox.   We discussed rectal MRI and he is willing to consider.   Plan:  Medical oncology discussion Rectal MRI Chest CT  Labs, tumor markers  Patient diagnosed within the last 90 days with an advanced cancer (defined as a newly diagnosed stage III/IV, recurrence, or progressive tumor cancer). Patient has expected survival of at least 6 months. Patient has not received previous palliative care services.   [place] PS 69

## 2024-04-09 NOTE — HISTORY OF PRESENT ILLNESS
[de-identified] : 58M referred by Dr. Zach Plasencia at [facility] who presented him with large bowel obstruction and septic shock with gram negative bacteremia on 9/10/22. Dr. Plasencia performed life-saving emergent subtotal colectomy and was left in discontinuity, Required several units of blood. Vasopressors.  He returned to the OR on 9/12/22 at which point additional necrotic appearing small bowel was resected and an end ileostomy was created. Was in hospital for almost 3 weeks (initial 2 weeks and then readmission for dehydration/ high ileostomy output.)   There was gross residual disease in the pelvis, which was not amenable to resection.  Path 7 cm sigmoid cancer w positive margin. 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 He is on eliquis for this.   Currently he has recovered well and is tolerating a diet. Has gained one pound, after losing about 16-20 pounds.  He met with Dr. Gillian Coronado from medical oncology.   CT Chest abdomen pelvis 9/19/22: no visible disease. Thromboses as above. CT abdomen/ pelvis 10/11/22: resolving pelvic fluid. No visible disease   MRI of pelvis 11/8/22 Within the wall of the dome of the bladder is a 2.1 x 2.1 x 1.8 cm enhancing abnormality. This appears to be a site of previous postoperative collection however no fluid component can be identified this time.  11/15/22 CEA 3.0   He followed up with Dr Coronado on 11/14/22, her recommendation was to begin systemic chemotherapy.  12/7/22- telephonic conversation: Patient declined port placement. Called him to explain necessity of port. He is obtaining medical opinions from friends/ colleagues in medicine and was told to consider a PET or biopsy. He wants to wait until after the Holidays. Reiterated that next step is port placement/ systemic therapy and encouraged him to do this as soon as possible.   He followed up with Dr Muniz in February- he recommended f/u with me for reversal.  He follows up with Dr Kruger- underwent cystoscopy this showed large area in the posterior bladder wall with a submucosal nodularity. The overlying mucosa was normal. Findings are consistent with either extrinsic compression or intramural nodule of the bladder. Plan was to undergo imaging and possible cystoscopy with biopsy.   MRI 3/16/23 No evidence of intrinsic bladder lesion. Increasing size of enhancing soft tissue mass along the serosal surface of the left bladder dome measuring 4.0 x 3.7 cm (26-24), previously 2.1 x 2.1 cm. Findings are suspicious for recurrent colon cancer. Nonocclusive left portal vein thrombus.  4/28/23: He presents with his wife, still on eliquis, he denies any abdominal pain. Recently hospitalized in [facility], transferred from [facility], for kidney stones. S/p urethral stent placement, by Dr Meadows.  He has lost weight, his appetite is great, urinating freely, ostomy is working well. He is very adamant about not getting chemotherapy. He explains it is because he saw his cousin, who was diagnosed with cervical cancer, undergo chemotherapy, whither away and die.   Admitted to [facility] 4/30/23- 5/4/23 for a small bowel obstruction- no surgical intervention.  CT A/P 4/30/23 Bowel; Dilated small bowel measuring up to 3.5 cm. Small bowel obstruction with transition piont in the left pelvis at the levl of prior surgery and an adjacent mass related to the dome of urinary bladder which demonstrates interval increase in size and is concerning for a metastatic lesion/recurrence.  CT 5/3/23: Indeterminate 2 mm Right lower lobe lung nodule.  5/3/23 CEA 29.8 5/19/23 CEA 74.6  6/16/23: saw Dr Nino s/p kidney stone removal and stent replacement on 5/11/23. He still occasionally has blood in his urine, his appetite is great.   CT A/P IC 3/11/24:  LIVER: Peripheral atrophy and central hypertrophy of the liver. Heterogeneous attenuation and enhancement in the right hepatic lobe is likely perfusional in etiology. Sequela of chronic portal vein thrombosis with cavernous transformation.  BLADDER, BOWEL: Status post subtotal colectomy with end ileostomy in the right lower quadrant. No bowel obstruction. There is a large pelvic mass with central necrosis which appears to arise from the bladder dome and is inseparable from the Joseph's pouch, measuring approximately 7.9 x 6.1 x 7.6 cm (AP x TR x CC), increase in size from CT 7/12/2023 where it measured 4.8 x 5.1 cm at a similar level.  Impression:  Large pelvic mass with central necrosis which appears to arise from the bladder dome and is inseparable from the Joseph's pouch, substantially increased in size compared with prior studies, consistent with recurrent or metastatic tumor.  Chronic portal vein thrombosis with cavernous transformation. Morphologic changes of chronic liver disease with heterogeneous right hepatic lobe enhancement, nonspecific and possibly perfusional.  4/1/24:  Pt presents for follow-up visit after repeat CT.

## 2024-04-10 ENCOUNTER — NON-APPOINTMENT (OUTPATIENT)
Age: 59
End: 2024-04-10

## 2024-04-10 LAB
ALBUMIN SERPL ELPH-MCNC: 4.2 G/DL
ALP BLD-CCNC: 149 U/L
ALT SERPL-CCNC: 29 U/L
ANION GAP SERPL CALC-SCNC: 21 MMOL/L
APTT BLD: 32.2 SEC
AST SERPL-CCNC: 29 U/L
BILIRUB SERPL-MCNC: 1 MG/DL
BUN SERPL-MCNC: 19 MG/DL
CALCIUM SERPL-MCNC: 9.8 MG/DL
CEA SERPL-MCNC: 191 NG/ML
CHLORIDE SERPL-SCNC: 93 MMOL/L
CHOLEST SERPL-MCNC: 124 MG/DL
CO2 SERPL-SCNC: 24 MMOL/L
CREAT SERPL-MCNC: 1.6 MG/DL
EGFR: 49 ML/MIN/1.73M2
ESTIMATED AVERAGE GLUCOSE: 105 MG/DL
FERRITIN SERPL-MCNC: 41 NG/ML
GLUCOSE SERPL-MCNC: 73 MG/DL
HBA1C MFR BLD HPLC: 5.3 %
HBV SURFACE AB SER QL: NONREACTIVE
HBV SURFACE AG SER QL: NONREACTIVE
HCV AB SER QL: NONREACTIVE
HCV S/CO RATIO: 0.2 S/CO
HDLC SERPL-MCNC: 87 MG/DL
INR PPP: 1.27 RATIO
LDLC SERPL CALC-MCNC: 24 MG/DL
MAGNESIUM SERPL-MCNC: 1.9 MG/DL
NONHDLC SERPL-MCNC: 38 MG/DL
POTASSIUM SERPL-SCNC: 4.9 MMOL/L
PROT SERPL-MCNC: 7.6 G/DL
PSA SERPL-MCNC: 0.85 NG/ML
PT BLD: 14.2 SEC
SODIUM SERPL-SCNC: 138 MMOL/L
TRIGL SERPL-MCNC: 69 MG/DL

## 2024-04-15 ENCOUNTER — APPOINTMENT (OUTPATIENT)
Dept: CT IMAGING | Facility: IMAGING CENTER | Age: 59
End: 2024-04-15

## 2024-04-15 ENCOUNTER — OUTPATIENT (OUTPATIENT)
Dept: OUTPATIENT SERVICES | Facility: HOSPITAL | Age: 59
LOS: 1 days | End: 2024-04-15
Payer: MEDICAID

## 2024-04-15 DIAGNOSIS — Z90.49 ACQUIRED ABSENCE OF OTHER SPECIFIED PARTS OF DIGESTIVE TRACT: Chronic | ICD-10-CM

## 2024-04-15 DIAGNOSIS — Z98.890 OTHER SPECIFIED POSTPROCEDURAL STATES: Chronic | ICD-10-CM

## 2024-04-15 DIAGNOSIS — C18.9 MALIGNANT NEOPLASM OF COLON, UNSPECIFIED: ICD-10-CM

## 2024-04-15 LAB
DPYD GENOTYPE: NORMAL
DPYD PHENOTYPE: NORMAL
DPYD SPECIMEN: NORMAL
PATHOLOGY STUDY: NORMAL

## 2024-04-15 PROCEDURE — 71260 CT THORAX DX C+: CPT

## 2024-04-15 PROCEDURE — 71260 CT THORAX DX C+: CPT | Mod: 26

## 2024-04-16 ENCOUNTER — OUTPATIENT (OUTPATIENT)
Dept: OUTPATIENT SERVICES | Facility: HOSPITAL | Age: 59
LOS: 1 days | End: 2024-04-16
Payer: MEDICAID

## 2024-04-16 ENCOUNTER — NON-APPOINTMENT (OUTPATIENT)
Age: 59
End: 2024-04-16

## 2024-04-16 ENCOUNTER — APPOINTMENT (OUTPATIENT)
Dept: MRI IMAGING | Facility: CLINIC | Age: 59
End: 2024-04-16

## 2024-04-16 DIAGNOSIS — Z98.890 OTHER SPECIFIED POSTPROCEDURAL STATES: Chronic | ICD-10-CM

## 2024-04-16 DIAGNOSIS — C18.9 MALIGNANT NEOPLASM OF COLON, UNSPECIFIED: ICD-10-CM

## 2024-04-16 DIAGNOSIS — Z90.49 ACQUIRED ABSENCE OF OTHER SPECIFIED PARTS OF DIGESTIVE TRACT: Chronic | ICD-10-CM

## 2024-04-16 PROCEDURE — A9585: CPT

## 2024-04-16 PROCEDURE — 72197 MRI PELVIS W/O & W/DYE: CPT | Mod: 26

## 2024-04-16 PROCEDURE — 72197 MRI PELVIS W/O & W/DYE: CPT

## 2024-04-17 LAB
ANNOTATION COMMENT IMP: NORMAL
FULL GENE SEQUENCE RESULT: NORMAL
INTERPRETATION PGDFRB: NORMAL
Lab: NORMAL
REF LAB TEST METHOD: NORMAL
REVIEWED BY: NORMAL
TA REPEAT RESULT: NORMAL
TEST PERFORMANCE INFO SPEC: NORMAL

## 2024-04-23 ENCOUNTER — NON-APPOINTMENT (OUTPATIENT)
Age: 59
End: 2024-04-23

## 2024-05-07 ENCOUNTER — OUTPATIENT (OUTPATIENT)
Dept: OUTPATIENT SERVICES | Facility: HOSPITAL | Age: 59
LOS: 1 days | End: 2024-05-07
Payer: MEDICAID

## 2024-05-07 ENCOUNTER — APPOINTMENT (OUTPATIENT)
Dept: NUCLEAR MEDICINE | Facility: IMAGING CENTER | Age: 59
End: 2024-05-07
Payer: MEDICAID

## 2024-05-07 DIAGNOSIS — Z98.890 OTHER SPECIFIED POSTPROCEDURAL STATES: Chronic | ICD-10-CM

## 2024-05-07 DIAGNOSIS — C18.9 MALIGNANT NEOPLASM OF COLON, UNSPECIFIED: ICD-10-CM

## 2024-05-07 DIAGNOSIS — Z00.8 ENCOUNTER FOR OTHER GENERAL EXAMINATION: ICD-10-CM

## 2024-05-07 DIAGNOSIS — Z90.49 ACQUIRED ABSENCE OF OTHER SPECIFIED PARTS OF DIGESTIVE TRACT: Chronic | ICD-10-CM

## 2024-05-07 PROCEDURE — 78815 PET IMAGE W/CT SKULL-THIGH: CPT | Mod: 26,PI

## 2024-05-07 PROCEDURE — 78815 PET IMAGE W/CT SKULL-THIGH: CPT

## 2024-05-07 PROCEDURE — A9552: CPT

## 2024-05-10 ENCOUNTER — NON-APPOINTMENT (OUTPATIENT)
Age: 59
End: 2024-05-10

## 2024-05-13 ENCOUNTER — NON-APPOINTMENT (OUTPATIENT)
Age: 59
End: 2024-05-13

## 2024-05-13 NOTE — ED PROVIDER NOTE - IV ALTEPLASE ADMIN OUTSIDE HIDDEN
Vital Signs Last 24 Hrs  T(C): 37.1 (13 May 2024 20:30), Max: 37.1 (13 May 2024 20:30)  T(F): 98.7 (13 May 2024 20:30), Max: 98.7 (13 May 2024 20:30)  HR: 73 (13 May 2024 20:30) (68 - 78)  BP: 110/64 (13 May 2024 20:30) (110/64 - 136/79)  BP(mean): 80 (13 May 2024 20:30) (80 - 80)  RR: 18 (13 May 2024 20:30) (18 - 26)  SpO2: 98% (13 May 2024 20:30) (94% - 100%)    Parameters below as of 13 May 2024 20:30  Patient On (Oxygen Delivery Method): nasal cannula  O2 Flow (L/min): 3
show

## 2024-05-14 ENCOUNTER — RESULT REVIEW (OUTPATIENT)
Age: 59
End: 2024-05-14

## 2024-05-14 ENCOUNTER — APPOINTMENT (OUTPATIENT)
Dept: HEMATOLOGY ONCOLOGY | Facility: CLINIC | Age: 59
End: 2024-05-14
Payer: MEDICAID

## 2024-05-14 VITALS
TEMPERATURE: 97.9 F | WEIGHT: 127.43 LBS | BODY MASS INDEX: 19.77 KG/M2 | HEART RATE: 78 BPM | OXYGEN SATURATION: 100 % | HEIGHT: 67.28 IN | DIASTOLIC BLOOD PRESSURE: 75 MMHG | SYSTOLIC BLOOD PRESSURE: 121 MMHG | RESPIRATION RATE: 16 BRPM

## 2024-05-14 LAB
BASOPHILS # BLD AUTO: 0.04 K/UL — SIGNIFICANT CHANGE UP (ref 0–0.2)
BASOPHILS NFR BLD AUTO: 0.5 % — SIGNIFICANT CHANGE UP (ref 0–2)
EOSINOPHIL # BLD AUTO: 0.52 K/UL — HIGH (ref 0–0.5)
EOSINOPHIL NFR BLD AUTO: 6.2 % — HIGH (ref 0–6)
HCT VFR BLD CALC: 30.8 % — LOW (ref 39–50)
HGB BLD-MCNC: 9.8 G/DL — LOW (ref 13–17)
IMM GRANULOCYTES NFR BLD AUTO: 0.5 % — SIGNIFICANT CHANGE UP (ref 0–0.9)
LYMPHOCYTES # BLD AUTO: 1.25 K/UL — SIGNIFICANT CHANGE UP (ref 1–3.3)
LYMPHOCYTES # BLD AUTO: 15 % — SIGNIFICANT CHANGE UP (ref 13–44)
MCHC RBC-ENTMCNC: 28.1 PG — SIGNIFICANT CHANGE UP (ref 27–34)
MCHC RBC-ENTMCNC: 31.8 G/DL — LOW (ref 32–36)
MCV RBC AUTO: 88.3 FL — SIGNIFICANT CHANGE UP (ref 80–100)
MONOCYTES # BLD AUTO: 0.58 K/UL — SIGNIFICANT CHANGE UP (ref 0–0.9)
MONOCYTES NFR BLD AUTO: 7 % — SIGNIFICANT CHANGE UP (ref 2–14)
NEUTROPHILS # BLD AUTO: 5.91 K/UL — SIGNIFICANT CHANGE UP (ref 1.8–7.4)
NEUTROPHILS NFR BLD AUTO: 70.8 % — SIGNIFICANT CHANGE UP (ref 43–77)
NRBC # BLD: 0 /100 WBCS — SIGNIFICANT CHANGE UP (ref 0–0)
PLATELET # BLD AUTO: 330 K/UL — SIGNIFICANT CHANGE UP (ref 150–400)
RBC # BLD: 3.49 M/UL — LOW (ref 4.2–5.8)
RBC # FLD: 13.5 % — SIGNIFICANT CHANGE UP (ref 10.3–14.5)
WBC # BLD: 8.34 K/UL — SIGNIFICANT CHANGE UP (ref 3.8–10.5)
WBC # FLD AUTO: 8.34 K/UL — SIGNIFICANT CHANGE UP (ref 3.8–10.5)

## 2024-05-14 PROCEDURE — G2211 COMPLEX E/M VISIT ADD ON: CPT | Mod: NC,1L

## 2024-05-14 PROCEDURE — 99215 OFFICE O/P EST HI 40 MIN: CPT

## 2024-05-14 PROCEDURE — 93010 ELECTROCARDIOGRAM REPORT: CPT

## 2024-05-14 RX ORDER — PROCHLORPERAZINE MALEATE 10 MG/1
10 TABLET ORAL EVERY 6 HOURS
Qty: 30 | Refills: 0 | Status: ACTIVE | COMMUNITY
Start: 2024-05-14 | End: 1900-01-01

## 2024-05-14 RX ORDER — DEXAMETHASONE 4 MG/1
4 TABLET ORAL
Qty: 30 | Refills: 0 | Status: ACTIVE | COMMUNITY
Start: 2024-05-14 | End: 1900-01-01

## 2024-05-14 RX ORDER — LIDOCAINE AND PRILOCAINE 25; 25 MG/G; MG/G
2.5-2.5 CREAM TOPICAL
Qty: 1 | Refills: 0 | Status: ACTIVE | COMMUNITY
Start: 2024-05-14 | End: 1900-01-01

## 2024-05-14 NOTE — PHYSICAL EXAM
[Restricted in physically strenuous activity but ambulatory and able to carry out work of a light or sedentary nature] : Status 1- Restricted in physically strenuous activity but ambulatory and able to carry out work of a light or sedentary nature, e.g., light house work, office work [Thin] : thin [Normal] : affect appropriate [de-identified] : suprapubic is indurated feeling and mass like; ileostomy noted in right abdominal wall

## 2024-05-14 NOTE — HISTORY OF PRESENT ILLNESS
[Disease: _____________________] : Disease: [unfilled] [AJCC Stage: ____] : AJCC Stage: [unfilled] [de-identified] : LEFT SIDED COLON ADENOCARCINOMA Patient saw Dr. Zach Plasencia at [facility] who presented him with large bowel obstruction and septic shock with gram negative bacteremia on 9/10/2022. Dr. Plasencia performed life-saving emergent subtotal colectomy and was left in discontinuity, Required several units of blood. Vasopressors. He returned to the OR on 9/12/22 at which point additional necrotic appearing small bowel was resected and an end ileostomy was created. Was in hospital for almost 3 weeks (initial 2 weeks and then readmission for dehydration/ high ileostomy output.)  There was gross residual disease in the pelvis, which was not amenable to resection. Path: 7 cm sigmoid cancer w positive margin. T4aN0 (0/11 nodes), proximal margin was positive. MMR IHC is proficient.  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 He was on Eliquis for that for 6 months, and no longer on it.  He saw Dr. Gillian Coronado from medical oncology 10/2022. CT Chest abdomen pelvis 9/19/22: no visible disease. Thromboses as above. CT abdomen/ pelvis 10/11/22: resolving pelvic fluid. No visible disease  MRI of pelvis 11/8/22 Within the wall of the dome of the bladder is a 2.1 x 2.1 x 1.8 cm enhancing abnormality. This appears to be a site of previous postoperative collection however no fluid component can be identified this time.  11/15/22 CEA 3.0  He followed up with Dr Coronado on 11/14/22, her recommendation was to begin systemic chemotherapy, and this was declined.  He follows up with Dr Kruger- underwent cystoscopy this showed large area in the posterior bladder wall with a submucosal nodularity.  MRI 3/16/23 No evidence of intrinsic bladder lesion. Increasing size of enhancing soft tissue mass along the serosal surface of the left bladder dome measuring 4.0 x 3.7 cm (26-24), previously 2.1 x 2.1 cm. Findings are suspicious for recurrent colon cancer. Nonocclusive left portal vein thrombus.  4/28/23: He presents with his wife, still on Eliquis, he denies any abdominal pain. Recently hospitalized in [facility], transferred from [facility], for kidney stones. S/p urethral stent placement, by Dr Meadows. He has lost weight, his appetite is great, urinating freely, ostomy is working well. He is very adamant about not getting chemotherapy. He explains it is because he saw his cousin, who was diagnosed with cervical cancer, undergo chemotherapy, whither away and die.  Admitted to [facility] 4/30/23- 5/4/23 for a small bowel obstruction- no surgical intervention. CT A/P 4/30/23 Bowel; Dilated small bowel measuring up to 3.5 cm. Small bowel obstruction with transition point in the left pelvis at the level of prior surgery and an adjacent mass related to the dome of urinary bladder which demonstrates interval increase in size and is concerning for a metastatic lesion/recurrence. CT 5/3/23: Indeterminate 2 mm Right lower lobe lung nodule.  5/3/23 CEA 29.8 5/19/23 CEA 74.6  6/16/23: saw Dr Nino s/p kidney stone removal and stent replacement on 5/11/23. He still occasionally has blood in his urine, his appetite is great.  CT A/P IC 3/11/24: LIVER: Peripheral atrophy and central hypertrophy of the liver. Heterogeneous attenuation and enhancement in the right hepatic lobe is likely perfusional in etiology. Sequela of chronic portal vein thrombosis with cavernous transformation. BLADDER, BOWEL: Status post subtotal colectomy with end ileostomy in the right lower quadrant. No bowel obstruction. There is a large pelvic mass with central necrosis which appears to arise from the bladder dome and is inseparable from the Joseph's pouch, measuring approximately 7.9 x 6.1 x 7.6 cm (AP x TR x CC), increase in size from CT 7/12/2023 where it measured 4.8 x 5.1 cm at a similar level. Impression: Large pelvic mass with central necrosis which appears to arise from the bladder dome and is inseparable from the Joseph's pouch, substantially increased in size compared with prior studies, consistent with recurrent or metastatic tumor. Chronic portal vein thrombosis with cavernous transformation. Morphologic changes of chronic liver disease with heterogeneous right hepatic lobe enhancement, nonspecific and possibly perfusional.  RENAL STONES Follows with Dr Nino Continue k-citrate, NaHCO3  5/14/24 Patient here to followup tests and next steps. I did review with surgeon, Dr Mendosa. On PET looks like there is multifocal disease--nodes versus peritoneal.  Unlike that this will be amenable to resection.  She agrees with starting systemic asap.  [de-identified] : adenocarcinoma [de-identified] : CEA [de-identified] : Surgeon: Catalina Mendosa Urology: Brayan Nino PCP: Giorgio Hinds ([place])

## 2024-05-14 NOTE — ASSESSMENT
[FreeTextEntry1] : LEFT SIDED COLON ADENOCARCINOMA Patient presented him with large bowel obstruction and septic shock with gram negative bacteremia on 9/10/2022. Dr. Plasencia performed life-saving emergent subtotal colectomy and was left in discontinuity. He returned to the OR on 9/12/22 at which point additional necrotic appearing small bowel was resected and an end ileostomy was created. Was in hospital for almost 3 weeks (initial 2 weeks and then readmission for dehydration/ high ileostomy output.)  There was gross residual disease in the pelvis, which was not amenable to resection. Path: 7 cm sigmoid cancer w positive margin. T4aN0 (0/11 nodes), proximal margin was positive. MMR IHC is proficient.  MRI of pelvis 11/8/22 Within the wall of the dome of the bladder is a 2.1 x 2.1 x 1.8 cm enhancing abnormality. This appears to be a site of previous postoperative collection however no fluid component can be identified this time. He followed up with Dr Coronado on 11/14/22, her recommendation was to begin systemic chemotherapy, and this was declined. Bladder mass got larger over time. MRI 3/16/23 No evidence of intrinsic bladder lesion. Increasing size of enhancing soft tissue mass along the serosal surface of the left bladder dome measuring 4.0 x 3.7 cm (26-24), previously 2.1 x 2.1 cm. Findings are suspicious for recurrent colon cancer.  Admitted to [facility] 4/30/23- 5/4/23 for a small bowel obstruction- no surgical intervention. CT A/P 4/30/23 Bowel; Dilated small bowel measuring up to 3.5 cm. Small bowel obstruction with transition point in the left pelvis at the level of prior surgery and an adjacent mass related to the dome of urinary bladder which demonstrates interval increase in size and is concerning for a metastatic lesion/recurrence.  Labs with CEA:  11/15/22 CEA = 3.0 5/3/23 CEA = 29.8 5/19/23 CEA = 74.6  CT A/P IC 3/11/24: Status post subtotal colectomy with end ileostomy in the right lower quadrant. No bowel obstruction. There is a large pelvic mass with central necrosis which appears to arise from the bladder dome and is inseparable from the Joseph's pouch, measuring approximately 7.9 x 6.1 x 7.6 cm (AP x TR x CC), increase in size from CT 7/12/2023 where it measured 4.8 x 5.1 cm at a similar level. Impression: Large pelvic mass with central necrosis which appears to arise from the bladder dome and is inseparable from the Joseph's pouch, substantially increased in size compared with prior studies, consistent with recurrent or metastatic tumor. Chronic portal vein thrombosis with cavernous transformation.   Will arrange for pelvic mass biopsy and [facility] Start FOLFOX in 1 week. Send tissue for F1CDx. Chemotherapy education today, and planning.  RENAL STONES 6/16/23: saw Dr Nino s/p kidney stone removal and stent replacement on 5/11/23.  He still occasionally has blood in his urine, his appetite is great. Continues NaHCO3 Does not take the K-Citrate at present, and will reconsider.

## 2024-05-15 ENCOUNTER — APPOINTMENT (OUTPATIENT)
Dept: INTERVENTIONAL RADIOLOGY/VASCULAR | Facility: CLINIC | Age: 59
End: 2024-05-15
Payer: MEDICAID

## 2024-05-15 PROCEDURE — 99203 OFFICE O/P NEW LOW 30 MIN: CPT

## 2024-05-16 LAB
ALBUMIN SERPL ELPH-MCNC: 3.2 G/DL
ALP BLD-CCNC: 115 U/L
ALT SERPL-CCNC: 16 U/L
ANION GAP SERPL CALC-SCNC: 10 MMOL/L
APTT BLD: 30.1 SEC
AST SERPL-CCNC: 21 U/L
BILIRUB SERPL-MCNC: 0.5 MG/DL
BUN SERPL-MCNC: 16 MG/DL
CALCIUM SERPL-MCNC: 8.7 MG/DL
CHLORIDE SERPL-SCNC: 100 MMOL/L
CO2 SERPL-SCNC: 24 MMOL/L
CREAT SERPL-MCNC: 1.49 MG/DL
EGFR: 54 ML/MIN/1.73M2
GLUCOSE SERPL-MCNC: 117 MG/DL
INR PPP: 1.31 RATIO
POTASSIUM SERPL-SCNC: 5 MMOL/L
PROT SERPL-MCNC: 7.4 G/DL
PT BLD: 14.7 SEC
SODIUM SERPL-SCNC: 134 MMOL/L

## 2024-05-17 NOTE — ED CDU PROVIDER DISPOSITION NOTE - NS_OBSORDERDATE_ED_A_ED
-- DO NOT REPLY / DO NOT REPLY ALL --  -- This inbox is not monitored  -- Message is from Engagement Center Operations (ECO) --    General Patient Message: patient states that bone density and mammo request to be done by a female according to scheduling may/may not be open until July-patient wants to know if that is ok w/pcp   Caller Information         Type Contact Phone/Fax    05/17/2024 03:35 PM CDT Phone (Incoming)      05/17/2024 03:35 PM CDT Phone (Incoming) Kristie Booth (Self) [phone] (M)     [id number] is ref number provided by [facility]- Also updated insurance id/grp #            Alternative phone number: [phone]    Can a detailed message be left? Yes - Voicemail      Patient has been advised the message will be addressed within 2-3 business days.             29-Jun-2023 20:17

## 2024-05-29 ENCOUNTER — RESULT REVIEW (OUTPATIENT)
Age: 59
End: 2024-05-29

## 2024-05-29 ENCOUNTER — TRANSCRIPTION ENCOUNTER (OUTPATIENT)
Age: 59
End: 2024-05-29

## 2024-05-29 ENCOUNTER — OUTPATIENT (OUTPATIENT)
Dept: OUTPATIENT SERVICES | Facility: HOSPITAL | Age: 59
LOS: 1 days | End: 2024-05-29
Payer: MEDICAID

## 2024-05-29 VITALS
DIASTOLIC BLOOD PRESSURE: 74 MMHG | HEART RATE: 58 BPM | SYSTOLIC BLOOD PRESSURE: 112 MMHG | OXYGEN SATURATION: 100 % | RESPIRATION RATE: 15 BRPM

## 2024-05-29 VITALS
OXYGEN SATURATION: 98 % | WEIGHT: 126.99 LBS | SYSTOLIC BLOOD PRESSURE: 128 MMHG | HEIGHT: 67 IN | DIASTOLIC BLOOD PRESSURE: 79 MMHG | HEART RATE: 73 BPM | RESPIRATION RATE: 15 BRPM | TEMPERATURE: 98 F

## 2024-05-29 DIAGNOSIS — Z98.890 OTHER SPECIFIED POSTPROCEDURAL STATES: Chronic | ICD-10-CM

## 2024-05-29 DIAGNOSIS — Z90.49 ACQUIRED ABSENCE OF OTHER SPECIFIED PARTS OF DIGESTIVE TRACT: Chronic | ICD-10-CM

## 2024-05-29 DIAGNOSIS — D41.4 NEOPLASM OF UNCERTAIN BEHAVIOR OF BLADDER: ICD-10-CM

## 2024-05-29 DIAGNOSIS — C18.9 MALIGNANT NEOPLASM OF COLON, UNSPECIFIED: ICD-10-CM

## 2024-05-29 PROCEDURE — 49180 BIOPSY ABDOMINAL MASS: CPT

## 2024-05-29 PROCEDURE — 77012 CT SCAN FOR NEEDLE BIOPSY: CPT | Mod: 26

## 2024-05-29 PROCEDURE — 88307 TISSUE EXAM BY PATHOLOGIST: CPT

## 2024-05-29 PROCEDURE — 88360 TUMOR IMMUNOHISTOCHEM/MANUAL: CPT

## 2024-05-29 PROCEDURE — 88342 IMHCHEM/IMCYTCHM 1ST ANTB: CPT

## 2024-05-29 PROCEDURE — 88307 TISSUE EXAM BY PATHOLOGIST: CPT | Mod: 26

## 2024-05-29 PROCEDURE — 88360 TUMOR IMMUNOHISTOCHEM/MANUAL: CPT | Mod: 26

## 2024-05-29 PROCEDURE — 88341 IMHCHEM/IMCYTCHM EA ADD ANTB: CPT | Mod: 26,59

## 2024-05-29 PROCEDURE — 88341 IMHCHEM/IMCYTCHM EA ADD ANTB: CPT

## 2024-05-29 PROCEDURE — 88342 IMHCHEM/IMCYTCHM 1ST ANTB: CPT | Mod: 26,59

## 2024-05-29 NOTE — ASU DISCHARGE PLAN (ADULT/PEDIATRIC) - NS MD DC FALL RISK RISK
For information on Fall & Injury Prevention, visit: [email or web address] OR  [email or web address] OR  https://www.cdc.gov/steadi/patient.html

## 2024-05-29 NOTE — ASU PREOP CHECKLIST - ASSESSMENT, HISTORY & PHYSICAL COMPLETED AND ON MEDICAL RECORD
[facility],  Psychiatric Progress Note      Impression:     Misty Parham is a 36-year-old female admitted to [facility] on 2/10/2021.  She was admitted under ongoing civil commitment MI with Bhargav in [facility] after taking 4-5 tabs of Unisom, 4-5 tabs of Ibuprofen and 4-5 tabs of Tylenol twice daily for 3 months in an attempt to commit suicide.  She was experiencing auditory hallucinations of Satan's voice commanding her to commit suicide.  Upon admission, provisional discharge was not revoked.  She had stopped taking medications several weeks prior to admission.  Since admission, Seroquel XR, Effexor ER, Rozerem and Protriptyline were restarted.  A Lamictal titration was restarted.  PRNs of Klonopin, Seroquel, Hydroxyzine Zyprexa and Trazodone were initiated.  She began refusing medications on 2/23 and has taken them inconsistently since.  She was spending nearly all of her time in her room and showed little motivation for treatment, so a room lock out was initiated as it has been during previous hospitalizations.  She continues to report auditory hallucinations of Satan instructing her to commit suicide, and to not take her medications.  She continues to report suicidal ideation.  Insight is poor.  A 72-hour old was placed 2/24 after she requested discharge, and her provisional discharge was revoked.         Diagnoses:     Borderline personality disorder  Major depressive disorder, recurrent, severe with psychotic features  Generalized anxiety disorder         Plan:     Medications:  Continue Lamictal 50 mg daily; will monitor adherence closely as she has been intermittently refusing meds.  Continue Rozerem 8 mg at HS.  Change Seroquel XR to 300 mg BID with a back up of IM Zyprexa available per Bhargav.  Continue Protriptyline 10 mg with dinner.  Continue PRNs of Klonopin, Seroquel, Hydroxyzine, Zyprexa and Trazodone.     Lock out of room from  "9:30 AM - 12 PM, 1 PM - 3 PM and 5 PM to 9 PM.    She is under commitment MI with Bhargav (Zyprexa, Seroquel, Latuda, Abilify) in [facility]. Her provisional discharge was revoked.  Her mother will not allow her to return to her home.  Plan to pursue [employer] funding and adult foster care placement.  She has outpatient psychiatry, therapy and case management.      Attestation:  Patient has been seen and evaluated by me, WILLIAN Sánchez CNP  The patient was counseled on nature of illness and treatment plan/options  Care was coordinated with treatment team       Clinical Global Impressions  First:  Considering your total clinical experience with this particular patient population, how severe are the patient's symptoms at this time?: 7 (02/11/21 1842)  Compared to the patient's condition at the START of treatment, this patient's condition is: 4 (02/11/21 1842)  Most recent:  Considering your total clinical experience with this particular patient population, how severe are the patient's symptoms at this time?: 7 (03/01/21 1050)  Compared to the patient's condition at the START of treatment, this patient's condition is: 4 (03/01/21 1050)          Interim History:     The patient's care was discussed with the treatment team and chart notes were reviewed.  Pt was documented as sleeping throughout the overnight shift.  She refused her evening medications yesterday because \"I couldn't discuss it with Channing.\"  She reports that about half her suicidal ideation is due to auditory hallucinations of Channing's voice and half is from her own thoughts.  She said, \"I don't want help and I don't want to help myself.  I just want to die.\"  She contracts for safety on the unit.  Her anxiety is \"not too bad.\"  She appears disheveled but states she has been showering about every other day.  Discussed plan to increase Seroquel XR.         Medications:     Current Facility-Administered Medications   Medication     acetaminophen " "(TYLENOL) tablet 650 mg     alum & mag hydroxide-simethicone (MAALOX) suspension 30 mL     clonazePAM (klonoPIN) tablet 0.5 mg     hydrOXYzine (ATARAX) tablet 25 mg     lamoTRIgine (LaMICtal) tablet 50 mg     QUEtiapine (SEROquel XR) 24 hr tablet 200 mg    Or     OLANZapine (zyPREXA) injection 10 mg     QUEtiapine ER (SEROquel XR) 24 hr tablet 300 mg    Or     OLANZapine (zyPREXA) injection 10 mg     OLANZapine (zyPREXA) tablet 10 mg    Or     OLANZapine (zyPREXA) injection 10 mg     polyethylene glycol (MIRALAX) Packet 17 g     protriptyline (VIVACTIL) tablet 10 mg     QUEtiapine (SEROquel) tablet 25 mg     ramelteon (ROZEREM) tablet 8 mg     rOPINIRole (REQUIP) tablet 1 mg     traZODone (DESYREL) tablet 50 mg     venlafaxine (EFFEXOR-XR) 24 hr capsule 225 mg             Allergies:   No Known Allergies         Psychiatric Examination:     /82 (BP Location: Left arm)   Pulse 85   Temp 98.3  F (36.8  C) (Oral)   Resp 16   Ht 1.651 m (5' 5\")   Wt 116.2 kg (256 lb 1.6 oz)   SpO2 98%   BMI 42.62 kg/m      Appearance: awake, alert and slightly unkempt  Attitude: somewhat cooperative  Eye Contact:  fair  Mood:  depressed  Affect:  mood congruent and intensity is blunted  Speech:  low volume   Language: fluent and intact in English  Psychomotor, Gait, Musculoskeletal:  no evidence of tardive dyskinesia, dystonia, or tics  Thought Process:  goal oriented, linear  Associations:  no loose associations  Thought Content:  endorses suicidal ideation and auditory hallucinations instructing her to commit suicide via strangulation in the hospital or overdose following discharge, contracts for safety on unit, denies HI, denies visual hallucinations  Insight:  limited  Judgement:  limited  Oriented to:  month/year, time, person, and place   Attention Span and Concentration:  fair  Recent and Remote Memory:  intact  Fund of Knowledge:  appropriate         Labs:     No results found for this or any previous visit (from the " past 24 hour(s)).   done

## 2024-05-29 NOTE — ASU DISCHARGE PLAN (ADULT/PEDIATRIC) - NURSING INSTRUCTIONS
Please feel free to contact us at [phone] if any problems arise. After 6PM, Monday through Friday, on weekends and on holidays, please call [phone] and ask for the radiology resident on call to be paged.

## 2024-06-03 LAB — NON-GYNECOLOGICAL CYTOLOGY STUDY: SIGNIFICANT CHANGE UP

## 2024-06-06 ENCOUNTER — OUTPATIENT (OUTPATIENT)
Dept: OUTPATIENT SERVICES | Facility: HOSPITAL | Age: 59
LOS: 1 days | End: 2024-06-06
Payer: MEDICAID

## 2024-06-06 ENCOUNTER — TRANSCRIPTION ENCOUNTER (OUTPATIENT)
Age: 59
End: 2024-06-06

## 2024-06-06 VITALS
HEART RATE: 77 BPM | OXYGEN SATURATION: 100 % | SYSTOLIC BLOOD PRESSURE: 111 MMHG | RESPIRATION RATE: 18 BRPM | HEIGHT: 67 IN | TEMPERATURE: 98 F | WEIGHT: 117.95 LBS | DIASTOLIC BLOOD PRESSURE: 65 MMHG

## 2024-06-06 VITALS
SYSTOLIC BLOOD PRESSURE: 131 MMHG | RESPIRATION RATE: 15 BRPM | HEART RATE: 63 BPM | DIASTOLIC BLOOD PRESSURE: 78 MMHG | OXYGEN SATURATION: 100 %

## 2024-06-06 DIAGNOSIS — R19.00 INTRA-ABDOMINAL AND PELVIC SWELLING, MASS AND LUMP, UNSPECIFIED SITE: ICD-10-CM

## 2024-06-06 DIAGNOSIS — Z90.49 ACQUIRED ABSENCE OF OTHER SPECIFIED PARTS OF DIGESTIVE TRACT: Chronic | ICD-10-CM

## 2024-06-06 DIAGNOSIS — C18.9 MALIGNANT NEOPLASM OF COLON, UNSPECIFIED: ICD-10-CM

## 2024-06-06 DIAGNOSIS — Z98.890 OTHER SPECIFIED POSTPROCEDURAL STATES: Chronic | ICD-10-CM

## 2024-06-06 PROCEDURE — 77001 FLUOROGUIDE FOR VEIN DEVICE: CPT | Mod: 26

## 2024-06-06 PROCEDURE — 76937 US GUIDE VASCULAR ACCESS: CPT | Mod: 26

## 2024-06-06 PROCEDURE — 36561 INSERT TUNNELED CV CATH: CPT

## 2024-06-06 NOTE — ASU DISCHARGE PLAN (ADULT/PEDIATRIC) - NURSING INSTRUCTIONS
Please feel free to contact us at [phone] if any problems arise. After 6PM, Monday through Friday, on weekends and on holidays, please call [phone] and ask for the radiology resident on call to be paged.

## 2024-06-06 NOTE — ASU DISCHARGE PLAN (ADULT/PEDIATRIC) - ASU DC SPECIAL INSTRUCTIONSFT
Chest Port Placement    Discharge Instructions  - You have had a chest port implated in your chest.   - The port is ready for use.  - You may shower in 48 hours. No soaking or swimming for 2 weeks or until the site is completely healed.  - The skin glue will fall off on its own. DO NOT PEEL OFF THE SKIN GLUE.  - Keep the area covered and dry for the next 7 days. It may be removed by a chemotherapy nurse as needed for treatment.  - Do not perform any heavy lifting or put tension on the area for the next week or until the site is healed.  - You may resume your normal diet.  - You may resume your normal medications however you should wait 48 hours before restarting aspirin, plavix, or blood thinners.  - It is normal to experience some pain over the site for the next few days. You may take apply ice to the area (20 minutes on, 20 minutes off) and take Tylenol for that pain. Do not take more frequently than every 6 hours and do not exceed more than 3000mg of Tylenol in a 24 hour period.    - You were given conscious sedation which may make you drowsy, therefore you need someone to stay with you until the morning following the procedure.  - Do not drive, engage in heavy lifting or strenuous activity, or drink any alcoholic beverages for the next 24 hours.   - You may resume normal activity in 24 hours.    Notify your primary physician and/or Interventional Radiology IMMEDIATELY if you experience any of the following       - Fever of 100.4F or 38C       - Chills or Rigors/ Shakes       - Swelling and/or Redness in the area around the port       - Worsening Pain       - Blood soaked bandages or worsening bleeding       - Lightheadedness and/or dizziness upon standing       - Chest Pain/ Tightness       - Shortness of Breath       - Difficulty walking    If you have a problem that you believe requires IMMEDIATE attention, please go to your NEAREST Emergency Room. If you believe your problem can safely wait until you speak to a physician, please call Interventional Radiology for any concerns.    During Normal Weekday Business Hours- You can contact the Interventional Radiology department during normal business hours via telephone.  During Evenings and Weekends- If you need to contact Interventional Radiology during off hours, do so by calling the hospital and requesting to be connected to the Interventional Radiologist on call.

## 2024-06-06 NOTE — ASU DISCHARGE PLAN (ADULT/PEDIATRIC) - NS MD DC FALL RISK RISK
For information on Fall & Injury Prevention, visit: [email or web address] OR  [email or web address] OR  https://www.cdc.gov/steadi/patient.html

## 2024-06-06 NOTE — ASU DISCHARGE PLAN (ADULT/PEDIATRIC) - ACCOMPANIED BY
Serg Dominikchristiano Simmons  1959  [id number]    Patient seen initially by Mr Reddy -- patient w/ chest pain that would be appropriate typically for OP mgmt but w/ limited access to care, obs stay felt to be most appropriate.    No e/o active ACS on EKG, labs.      MD Gary Patten Joshua J, MD  02/20/24 1917     Family

## 2024-06-06 NOTE — PROCEDURE NOTE - PROCEDURE FINDINGS AND DETAILS
Successful pelvic mass biopsy. Adequacy confirmed by onsite cytologist.
Successful 6F right chest port placmeent. Tip in SVC. Ok to use immediately.

## 2024-06-09 ENCOUNTER — NON-APPOINTMENT (OUTPATIENT)
Age: 59
End: 2024-06-09

## 2024-06-10 ENCOUNTER — RESULT REVIEW (OUTPATIENT)
Age: 59
End: 2024-06-10

## 2024-06-10 ENCOUNTER — APPOINTMENT (OUTPATIENT)
Dept: INFUSION THERAPY | Facility: HOSPITAL | Age: 59
End: 2024-06-10

## 2024-06-10 ENCOUNTER — APPOINTMENT (OUTPATIENT)
Dept: HEMATOLOGY ONCOLOGY | Facility: CLINIC | Age: 59
End: 2024-06-10
Payer: MEDICAID

## 2024-06-10 ENCOUNTER — APPOINTMENT (OUTPATIENT)
Dept: HEMATOLOGY ONCOLOGY | Facility: CLINIC | Age: 59
End: 2024-06-10

## 2024-06-10 PROCEDURE — 99214 OFFICE O/P EST MOD 30 MIN: CPT

## 2024-06-10 PROCEDURE — 86900 BLOOD TYPING SEROLOGIC ABO: CPT

## 2024-06-10 PROCEDURE — C1788: CPT

## 2024-06-10 PROCEDURE — 36561 INSERT TUNNELED CV CATH: CPT

## 2024-06-10 PROCEDURE — 76937 US GUIDE VASCULAR ACCESS: CPT

## 2024-06-10 PROCEDURE — 86850 RBC ANTIBODY SCREEN: CPT

## 2024-06-10 PROCEDURE — C1769: CPT

## 2024-06-10 PROCEDURE — G2211 COMPLEX E/M VISIT ADD ON: CPT | Mod: NC

## 2024-06-10 PROCEDURE — 77001 FLUOROGUIDE FOR VEIN DEVICE: CPT

## 2024-06-10 PROCEDURE — 86901 BLOOD TYPING SEROLOGIC RH(D): CPT

## 2024-06-10 PROCEDURE — C1894: CPT

## 2024-06-10 NOTE — HISTORY OF PRESENT ILLNESS
[Disease: _____________________] : Disease: [unfilled] [AJCC Stage: ____] : AJCC Stage: [unfilled] [de-identified] : LEFT SIDED COLON ADENOCARCINOMA Patient saw Dr. Zach Plasencia at [facility] who presented him with large bowel obstruction and septic shock with gram negative bacteremia on 9/10/2022. Dr. Plasencia performed life-saving emergent subtotal colectomy and was left in discontinuity, Required several units of blood. Vasopressors. He returned to the OR on 9/12/22 at which point additional necrotic appearing small bowel was resected and an end ileostomy was created. Was in hospital for almost 3 weeks (initial 2 weeks and then readmission for dehydration/ high ileostomy output.)  There was gross residual disease in the pelvis, which was not amenable to resection. Path: 7 cm sigmoid cancer w positive margin. T4aN0 (0/11 nodes), proximal margin was positive. MMR IHC is proficient.  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 He was on Eliquis for that for 6 months, and no longer on it.  He saw Dr. Gillian Coronado from medical oncology 10/2022. CT Chest abdomen pelvis 9/19/22: no visible disease. Thromboses as above. CT abdomen/ pelvis 10/11/22: resolving pelvic fluid. No visible disease  MRI of pelvis 11/8/22 Within the wall of the dome of the bladder is a 2.1 x 2.1 x 1.8 cm enhancing abnormality. This appears to be a site of previous postoperative collection however no fluid component can be identified this time.  11/15/22 CEA 3.0  He followed up with Dr Coronado on 11/14/22, her recommendation was to begin systemic chemotherapy, and this was declined.  He follows up with Dr Kruger- underwent cystoscopy this showed large area in the posterior bladder wall with a submucosal nodularity.  MRI 3/16/23 No evidence of intrinsic bladder lesion. Increasing size of enhancing soft tissue mass along the serosal surface of the left bladder dome measuring 4.0 x 3.7 cm (26-24), previously 2.1 x 2.1 cm. Findings are suspicious for recurrent colon cancer. Nonocclusive left portal vein thrombus.  4/28/23: He presents with his wife, still on Eliquis, he denies any abdominal pain. Recently hospitalized in [facility], transferred from [facility], for kidney stones. S/p urethral stent placement, by Dr Meadows. He has lost weight, his appetite is great, urinating freely, ostomy is working well. He is very adamant about not getting chemotherapy. He explains it is because he saw his cousin, who was diagnosed with cervical cancer, undergo chemotherapy, whither away and die.  Admitted to [facility] 4/30/23- 5/4/23 for a small bowel obstruction- no surgical intervention. CT A/P 4/30/23 Bowel; Dilated small bowel measuring up to 3.5 cm. Small bowel obstruction with transition point in the left pelvis at the level of prior surgery and an adjacent mass related to the dome of urinary bladder which demonstrates interval increase in size and is concerning for a metastatic lesion/recurrence. CT 5/3/23: Indeterminate 2 mm Right lower lobe lung nodule.  5/3/23 CEA 29.8 5/19/23 CEA 74.6  6/16/23: saw Dr Nino s/p kidney stone removal and stent replacement on 5/11/23. He still occasionally has blood in his urine, his appetite is great.  CT A/P IC 3/11/24: LIVER: Peripheral atrophy and central hypertrophy of the liver. Heterogeneous attenuation and enhancement in the right hepatic lobe is likely perfusional in etiology. Sequela of chronic portal vein thrombosis with cavernous transformation. BLADDER, BOWEL: Status post subtotal colectomy with end ileostomy in the right lower quadrant. No bowel obstruction. There is a large pelvic mass with central necrosis which appears to arise from the bladder dome and is inseparable from the Joseph's pouch, measuring approximately 7.9 x 6.1 x 7.6 cm (AP x TR x CC), increase in size from CT 7/12/2023 where it measured 4.8 x 5.1 cm at a similar level. Impression: Large pelvic mass with central necrosis which appears to arise from the bladder dome and is inseparable from the Joseph's pouch, substantially increased in size compared with prior studies, consistent with recurrent or metastatic tumor. Chronic portal vein thrombosis with cavernous transformation. Morphologic changes of chronic liver disease with heterogeneous right hepatic lobe enhancement, nonspecific and possibly perfusional.  RENAL STONES Follows with Dr Nino Continue k-citrate, NaHCO3  5/14/24 Patient here to followup tests and next steps. I did review with surgeon, Dr Mendosa. On PET looks like there is multifocal disease--nodes versus peritoneal.  Unlike that this will be amenable to resection.  She agrees with starting systemic asap.   6/10 C1D1 FOLFOX today. Foundation CDx pending Port was placed a few days ago and patient admits to mild fatigue. No other complaints. We reviewed his CBC with diff. His anemia is worsening.  Hg today is 8.3/24.4 He denies blood in stool (ileostomy). Admits to mild tinge of blood while urinating however denies flank pain or dysuria. Currently on no supplements.  [de-identified] : adenocarcinoma [de-identified] : CEA [de-identified] : Surgeon: Catalina Mendosa Urology: Brayan Nino PCP: Giorgio Hinds ([place])

## 2024-06-10 NOTE — ASSESSMENT
[FreeTextEntry1] : LEFT SIDED COLON ADENOCARCINOMA Patient presented him with large bowel obstruction and septic shock with gram negative bacteremia on 9/10/2022. Dr. Plasencia performed life-saving emergent subtotal colectomy and was left in discontinuity. He returned to the OR on 9/12/22 at which point additional necrotic appearing small bowel was resected and an end ileostomy was created. Was in hospital for almost 3 weeks (initial 2 weeks and then readmission for dehydration/ high ileostomy output.)  There was gross residual disease in the pelvis, which was not amenable to resection. Path: 7 cm sigmoid cancer w positive margin. T4aN0 (0/11 nodes), proximal margin was positive. MMR IHC is proficient.  MRI of pelvis 11/8/22 Within the wall of the dome of the bladder is a 2.1 x 2.1 x 1.8 cm enhancing abnormality. This appears to be a site of previous postoperative collection however no fluid component can be identified this time. He followed up with Dr Coronado on 11/14/22, her recommendation was to begin systemic chemotherapy, and this was declined. Bladder mass got larger over time. MRI 3/16/23 No evidence of intrinsic bladder lesion. Increasing size of enhancing soft tissue mass along the serosal surface of the left bladder dome measuring 4.0 x 3.7 cm (26-24), previously 2.1 x 2.1 cm. Findings are suspicious for recurrent colon cancer.  Admitted to [facility] 4/30/23- 5/4/23 for a small bowel obstruction- no surgical intervention. CT A/P 4/30/23 Bowel; Dilated small bowel measuring up to 3.5 cm. Small bowel obstruction with transition point in the left pelvis at the level of prior surgery and an adjacent mass related to the dome of urinary bladder which demonstrates interval increase in size and is concerning for a metastatic lesion/recurrence.  Labs with CEA:  11/15/22 CEA = 3.0 5/3/23 CEA = 29.8 5/19/23 CEA = 74.6  CT A/P IC 3/11/24: Status post subtotal colectomy with end ileostomy in the right lower quadrant. No bowel obstruction. There is a large pelvic mass with central necrosis which appears to arise from the bladder dome and is inseparable from the Joseph's pouch, measuring approximately 7.9 x 6.1 x 7.6 cm (AP x TR x CC), increase in size from CT 7/12/2023 where it measured 4.8 x 5.1 cm at a similar level. Impression: Large pelvic mass with central necrosis which appears to arise from the bladder dome and is inseparable from the Joseph's pouch, substantially increased in size compared with prior studies, consistent with recurrent or metastatic tumor. Chronic portal vein thrombosis with cavernous transformation.   Foundation CDx pending  FOLFOX C1D1 6/10/24  Anemia: We reviewed his CBC with diff. His anemia is worsening.  Hg today is 8.3/24.4 He denies blood in stool (ileostomy). Admits to mild tinge of blood while urinating however denies flank pain or dysuria. Currently on no supplements (iron). Patient has follow up next Monday (6/17). Will repeat CBC with diff and type+cross.  If Hg <8 will arrange for 1-2 units PRBC. Iron studies ordered for today.  We re-reviewed chemotherapy side effects.  f/u next week for toxicity evaluation.  Dr. Palafox agrees with above plan.

## 2024-06-10 NOTE — PHYSICAL EXAM
[Restricted in physically strenuous activity but ambulatory and able to carry out work of a light or sedentary nature] : Status 1- Restricted in physically strenuous activity but ambulatory and able to carry out work of a light or sedentary nature, e.g., light house work, office work [Thin] : thin [Normal] : affect appropriate [de-identified] : suprapubic is indurated feeling and mass like; ileostomy noted in right abdominal wall

## 2024-06-11 ENCOUNTER — OUTPATIENT (OUTPATIENT)
Dept: OUTPATIENT SERVICES | Facility: HOSPITAL | Age: 59
LOS: 1 days | End: 2024-06-11
Payer: MEDICAID

## 2024-06-11 DIAGNOSIS — Z98.890 OTHER SPECIFIED POSTPROCEDURAL STATES: Chronic | ICD-10-CM

## 2024-06-11 DIAGNOSIS — Z90.49 ACQUIRED ABSENCE OF OTHER SPECIFIED PARTS OF DIGESTIVE TRACT: Chronic | ICD-10-CM

## 2024-06-12 ENCOUNTER — RESULT REVIEW (OUTPATIENT)
Age: 59
End: 2024-06-12

## 2024-06-12 ENCOUNTER — APPOINTMENT (OUTPATIENT)
Dept: INFUSION THERAPY | Facility: HOSPITAL | Age: 59
End: 2024-06-12

## 2024-06-12 ENCOUNTER — APPOINTMENT (OUTPATIENT)
Dept: HEMATOLOGY ONCOLOGY | Facility: CLINIC | Age: 59
End: 2024-06-12
Payer: MEDICAID

## 2024-06-12 LAB
ALBUMIN SERPL ELPH-MCNC: 3 G/DL
ALP BLD-CCNC: 96 U/L
ALT SERPL-CCNC: 10 U/L
ANION GAP SERPL CALC-SCNC: 13 MMOL/L
AST SERPL-CCNC: 17 U/L
BILIRUB SERPL-MCNC: 0.5 MG/DL
BUN SERPL-MCNC: 26 MG/DL
CALCIUM SERPL-MCNC: 8.6 MG/DL
CHLORIDE SERPL-SCNC: 89 MMOL/L
CO2 SERPL-SCNC: 20 MMOL/L
CREAT SERPL-MCNC: 1.56 MG/DL
EGFR: 51 ML/MIN/1.73M2
GLUCOSE SERPL-MCNC: 115 MG/DL
OSMOLALITY SERPL: 264 MOSMOL/KG
OSMOLALITY UR: 576 MOSM/KG
POTASSIUM SERPL-SCNC: 5.9 MMOL/L
PROT SERPL-MCNC: 6.5 G/DL
SODIUM ?TM SUB UR QN: 26 MMOL/L
SODIUM SERPL-SCNC: 121 MMOL/L
TRIGL SERPL-MCNC: 48 MG/DL

## 2024-06-12 PROCEDURE — G2211 COMPLEX E/M VISIT ADD ON: CPT | Mod: NC

## 2024-06-12 PROCEDURE — 99214 OFFICE O/P EST MOD 30 MIN: CPT

## 2024-06-12 NOTE — ASSESSMENT
[FreeTextEntry1] : LEFT SIDED COLON ADENOCARCINOMA Patient presented him with large bowel obstruction and septic shock with gram negative bacteremia on 9/10/2022. Dr. Plasencia performed life-saving emergent subtotal colectomy and was left in discontinuity. He returned to the OR on 9/12/22 at which point additional necrotic appearing small bowel was resected and an end ileostomy was created. Was in hospital for almost 3 weeks (initial 2 weeks and then readmission for dehydration/ high ileostomy output.)  There was gross residual disease in the pelvis, which was not amenable to resection. Path: 7 cm sigmoid cancer w positive margin. T4aN0 (0/11 nodes), proximal margin was positive. MMR IHC is proficient.  MRI of pelvis 11/8/22 Within the wall of the dome of the bladder is a 2.1 x 2.1 x 1.8 cm enhancing abnormality. This appears to be a site of previous postoperative collection however no fluid component can be identified this time. He followed up with Dr Coronado on 11/14/22, her recommendation was to begin systemic chemotherapy, and this was declined. Bladder mass got larger over time. MRI 3/16/23 No evidence of intrinsic bladder lesion. Increasing size of enhancing soft tissue mass along the serosal surface of the left bladder dome measuring 4.0 x 3.7 cm (26-24), previously 2.1 x 2.1 cm. Findings are suspicious for recurrent colon cancer.  Admitted to [facility] 4/30/23- 5/4/23 for a small bowel obstruction- no surgical intervention. CT A/P 4/30/23 Bowel; Dilated small bowel measuring up to 3.5 cm. Small bowel obstruction with transition point in the left pelvis at the level of prior surgery and an adjacent mass related to the dome of urinary bladder which demonstrates interval increase in size and is concerning for a metastatic lesion/recurrence.  Labs with CEA:  11/15/22 CEA = 3.0 5/3/23 CEA = 29.8 5/19/23 CEA = 74.6  CT A/P IC 3/11/24: Status post subtotal colectomy with end ileostomy in the right lower quadrant. No bowel obstruction. There is a large pelvic mass with central necrosis which appears to arise from the bladder dome and is inseparable from the Joseph's pouch, measuring approximately 7.9 x 6.1 x 7.6 cm (AP x TR x CC), increase in size from CT 7/12/2023 where it measured 4.8 x 5.1 cm at a similar level. Impression: Large pelvic mass with central necrosis which appears to arise from the bladder dome and is inseparable from the Joseph's pouch, substantially increased in size compared with prior studies, consistent with recurrent or metastatic tumor. Chronic portal vein thrombosis with cavernous transformation.   Foundation CDx pending  FOLFOX C1D1 6/10/24  Anemia: He is here for one-unit PRBC. Patient states since Monday (6/10) he noticed blood in ileostomy bag which has been constant since Monday, ~ 3 days.  He will be getting one -unit PRBC today. Will repeat CBC with diff and type/cross on Friday, 6/14. If Hg on 6/14 <8 will need one-two units PRBC.  Hyponatremia: Na+ on 6/11 was 126.  Today is 127. Will give NS at 500 ml/hour and repeat IVF on Friday, 6/14. Repeat BMP on Friday, 6/14. Patient was instructed to restrict his fluid intake. He will need to be seen by renal.  Messaged Dr. Hernández to expedite. There is concern of SIADH.  Patient/spouse advised monitor for N/V, h/a, fatigue, confusion, muscle weakness or cramps, seizures or coma.  Dr. Palafox agrees with above plan.

## 2024-06-12 NOTE — HISTORY OF PRESENT ILLNESS
[Disease: _____________________] : Disease: [unfilled] [AJCC Stage: ____] : AJCC Stage: [unfilled] [de-identified] : LEFT SIDED COLON ADENOCARCINOMA Patient saw Dr. Zach Plasencia at [facility] who presented him with large bowel obstruction and septic shock with gram negative bacteremia on 9/10/2022. Dr. Plasencia performed life-saving emergent subtotal colectomy and was left in discontinuity, Required several units of blood. Vasopressors. He returned to the OR on 9/12/22 at which point additional necrotic appearing small bowel was resected and an end ileostomy was created. Was in hospital for almost 3 weeks (initial 2 weeks and then readmission for dehydration/ high ileostomy output.)  There was gross residual disease in the pelvis, which was not amenable to resection. Path: 7 cm sigmoid cancer w positive margin. T4aN0 (0/11 nodes), proximal margin was positive. MMR IHC is proficient.  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 He was on Eliquis for that for 6 months, and no longer on it.  He saw Dr. Gillian Coronado from medical oncology 10/2022. CT Chest abdomen pelvis 9/19/22: no visible disease. Thromboses as above. CT abdomen/ pelvis 10/11/22: resolving pelvic fluid. No visible disease  MRI of pelvis 11/8/22 Within the wall of the dome of the bladder is a 2.1 x 2.1 x 1.8 cm enhancing abnormality. This appears to be a site of previous postoperative collection however no fluid component can be identified this time.  11/15/22 CEA 3.0  He followed up with Dr Coronado on 11/14/22, her recommendation was to begin systemic chemotherapy, and this was declined.  He follows up with Dr Kruger- underwent cystoscopy this showed large area in the posterior bladder wall with a submucosal nodularity.  MRI 3/16/23 No evidence of intrinsic bladder lesion. Increasing size of enhancing soft tissue mass along the serosal surface of the left bladder dome measuring 4.0 x 3.7 cm (26-24), previously 2.1 x 2.1 cm. Findings are suspicious for recurrent colon cancer. Nonocclusive left portal vein thrombus.  4/28/23: He presents with his wife, still on Eliquis, he denies any abdominal pain. Recently hospitalized in [facility], transferred from [facility], for kidney stones. S/p urethral stent placement, by Dr Meadows. He has lost weight, his appetite is great, urinating freely, ostomy is working well. He is very adamant about not getting chemotherapy. He explains it is because he saw his cousin, who was diagnosed with cervical cancer, undergo chemotherapy, whither away and die.  Admitted to [facility] 4/30/23- 5/4/23 for a small bowel obstruction- no surgical intervention. CT A/P 4/30/23 Bowel; Dilated small bowel measuring up to 3.5 cm. Small bowel obstruction with transition point in the left pelvis at the level of prior surgery and an adjacent mass related to the dome of urinary bladder which demonstrates interval increase in size and is concerning for a metastatic lesion/recurrence. CT 5/3/23: Indeterminate 2 mm Right lower lobe lung nodule.  5/3/23 CEA 29.8 5/19/23 CEA 74.6  6/16/23: saw Dr Nino s/p kidney stone removal and stent replacement on 5/11/23. He still occasionally has blood in his urine, his appetite is great.  CT A/P IC 3/11/24: LIVER: Peripheral atrophy and central hypertrophy of the liver. Heterogeneous attenuation and enhancement in the right hepatic lobe is likely perfusional in etiology. Sequela of chronic portal vein thrombosis with cavernous transformation. BLADDER, BOWEL: Status post subtotal colectomy with end ileostomy in the right lower quadrant. No bowel obstruction. There is a large pelvic mass with central necrosis which appears to arise from the bladder dome and is inseparable from the Joseph's pouch, measuring approximately 7.9 x 6.1 x 7.6 cm (AP x TR x CC), increase in size from CT 7/12/2023 where it measured 4.8 x 5.1 cm at a similar level. Impression: Large pelvic mass with central necrosis which appears to arise from the bladder dome and is inseparable from the Joseph's pouch, substantially increased in size compared with prior studies, consistent with recurrent or metastatic tumor. Chronic portal vein thrombosis with cavernous transformation. Morphologic changes of chronic liver disease with heterogeneous right hepatic lobe enhancement, nonspecific and possibly perfusional.  RENAL STONES Follows with Dr Nino Continue k-citrate, NaHCO3  5/14/24 Patient here to followup tests and next steps. I did review with surgeon, Dr Mendosa. On PET looks like there is multifocal disease--nodes versus peritoneal.  Unlike that this will be amenable to resection.  She agrees with starting systemic asap.   6/10 C1D1 FOLFOX today. Foundation CDx pending Port was placed a few days ago and patient admits to mild fatigue. No other complaints. We reviewed his CBC with diff. His anemia is worsening.  Hg today is 8.3/24.4 He denies blood in stool (ileostomy). Admits to mild tinge of blood while urinating however denies flank pain or dysuria. Currently on no supplements.  6/12/24 He is here for one-unit PRBC. Underwent labs on 6/11 which reveal Na+ 126. Patient states since Monday (6/10) he notice blood in ileostomy bag which has been constant since Monday, ~ 3 days. He will be getting one unit PRBC today. Hyponatremia: Na+ on 6/11 was 126.  Today is 127. Will give NS at 500 ml/hour and repeat IVF on Friday, 6/14. Will repeat CBC with diff and BMP as well. Patient was instructed to restrict his fluid intake. He will need to be seen by renal. Messaged Dr. Hernández to expedite. Despite bleeding from ileostomy and being hyponatremic, he is feeling relatively well. Denies headache, change in vision, double vision, myalgia, confusion or increase in fatigue.  [de-identified] : adenocarcinoma [de-identified] : CEA [de-identified] : Surgeon: Catalina Mendosa Urology: Brayan Nino PCP: Giorgio Hinds ([place])

## 2024-06-12 NOTE — REVIEW OF SYSTEMS
[Eyesight Problems] : eyesight problems [As Noted in HPI] : as noted in HPI [Negative] : Heme/Lymph [FreeTextEntry3] : + glasses

## 2024-06-12 NOTE — PHYSICAL EXAM
[Restricted in physically strenuous activity but ambulatory and able to carry out work of a light or sedentary nature] : Status 1- Restricted in physically strenuous activity but ambulatory and able to carry out work of a light or sedentary nature, e.g., light house work, office work [Thin] : thin [Normal] : affect appropriate [de-identified] : suprapubic is indurated feeling and mass like; ileostomy noted in right abdominal wall

## 2024-06-12 NOTE — REASON FOR VISIT
[Consultation] : a consultation visit [Home] : at home, [unfilled] , at the time of the visit. [Medical Office: ([facility])___] : at the medical office located in  [Patient] : the patient [Self] : self [FreeTextEntry1] : CT guided pelvic mass bx and chest port placement

## 2024-06-12 NOTE — ASSESSMENT
[Other: _____] : [unfilled] [FreeTextEntry1] : Mr. Diaz is a 57 y/o male with hx of post op DVT in 2022, colon adenocarcinoma s/p resection now with ileostomy, now with bladder neoplasm on most recent imaging, who presents to IR today for consultation for pelvic mass biopsy and chest port placement.   # Colon Adenocarcinoma - pt with hx of colon adenocarcinoma that presented with obstruction requiring total abdominal colectomy with end ileostomy, however was noted to have residual disease in the pelvis - he now presents with significant POD on recent imaging - referred by Dr. Palafox for outpatient chest port placement & CT guided pelvic mass biopsy, for definitive diagnosis to aid in formulating plan of care  - biopsy to be performed first followed by chest port a week or so later - procedural benefits, alternatives, risks (bleeding & infection) & expected postprocedural course were discussed at great length for both procedures -  A cytology tech will be present to evaluate bx samples to determine if adequate tissue is present for diagnosis - biopsy results typically take 3-5 business days or longer if special testing is performed & results will be sent to your referring Dr who will review them with you - Labs from 5/14 - cbc, cmp,  inr - have been reviewed - the pt expressed that he is open to exploring treatment options should bx be positive for malignancy   & Mrs. DIAZ's comprehension was confirmed & all questions were asked & answered to their satisfaction. IR contact information was reviewed with the pt should there be any questions, issues, or concerns, to be addressed.   I, Dr. Nargis Stokes, personally performed the evaluation & management (E/M) services for this new patient. The E/M includes conducting initial evaluation, assessing all conditions, and establishing the plan of care. Today, my ACP, Sydnee LEMUS-BC, was present to observe my evaluation & management services for this patient to be followed, going forward.

## 2024-06-12 NOTE — HISTORY OF PRESENT ILLNESS
[FreeTextEntry1] : Wife Naomy participated in this consultation - converted to telephonic 2/2 video sound connectivity issues on the pt's end.  Mr. Diaz is a 59 y/o male with hx of post op DVT in 2022, colon adenocarcinoma s/p resection now with ileostomy, now with bladder neoplasm on most recent imaging, who presents to IR today for consultation for pelvic mass biopsy and chest port placement.   Today, Mr Cartwright reports feeling well overall & denies abd pain, fever, chills, urologic or GI symptomatology.  Denies nsaids or a/c usage.   Hx: Patient saw Dr. Zach Plasencia at [facility] who presented him with large bowel obstruction and septic shock with gram negative bacteremia on 9/10/2022. Dr. Plasencia performed life-saving emergent subtotal colectomy and was left in discontinuity, Required several units of blood. Vasopressors. He returned to the OR on 9/12/22 at which point additional necrotic appearing small bowel was resected and an end ileostomy was created. Was in hospital for almost 3 weeks (initial 2 weeks and then readmission for dehydration/ high ileostomy output.). There was gross residual disease in the pelvis, which was not amenable to resection.  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 He was on Eliquis for that for 6 months, and no longer on it.  Most recently with PET CT with progression of disease. Planned for systemic therapy. Referred by Dr. Palafox for pelvic mass bx and mediport placement.     Onc: [place] Palafox Surgeon: Catalina Mendosa Urology: Brayan Nino PCP: Giorgio Hinds ([place]).

## 2024-06-13 ENCOUNTER — NON-APPOINTMENT (OUTPATIENT)
Age: 59
End: 2024-06-13

## 2024-06-14 ENCOUNTER — RESULT REVIEW (OUTPATIENT)
Age: 59
End: 2024-06-14

## 2024-06-14 ENCOUNTER — APPOINTMENT (OUTPATIENT)
Dept: INFUSION THERAPY | Facility: HOSPITAL | Age: 59
End: 2024-06-14

## 2024-06-17 ENCOUNTER — APPOINTMENT (OUTPATIENT)
Dept: HEMATOLOGY ONCOLOGY | Facility: CLINIC | Age: 59
End: 2024-06-17

## 2024-06-19 ENCOUNTER — APPOINTMENT (OUTPATIENT)
Dept: HEMATOLOGY ONCOLOGY | Facility: CLINIC | Age: 59
End: 2024-06-19

## 2024-06-19 ENCOUNTER — RESULT REVIEW (OUTPATIENT)
Age: 59
End: 2024-06-19

## 2024-06-19 PROCEDURE — 86850 RBC ANTIBODY SCREEN: CPT

## 2024-06-19 PROCEDURE — 86923 COMPATIBILITY TEST ELECTRIC: CPT

## 2024-06-19 PROCEDURE — 86900 BLOOD TYPING SEROLOGIC ABO: CPT

## 2024-06-19 PROCEDURE — 86901 BLOOD TYPING SEROLOGIC RH(D): CPT

## 2024-06-19 NOTE — ED PROVIDER NOTE - PATIENT'S PREFERRED PRONOUN
Initiate Treatment: spironolactone 50 mg tablet \\nQuantity: 90.0 Tablet\\nSig: take 1 po tid\\n\\ntretinoin 0.025 % topical cream \\nQuantity: 45.0 g  Days Supply: 30\\nSig: apply pea sized amount qhs
Render In Strict Bullet Format?: No
Detail Level: Zone
Initiate Treatment: Drysol Dab-O-Matic 20 % topical solution QHS\\nQuantity: 60.0 ml  Days Supply: 30\\nSig: Apply to areas of excessive sweating (ie: armpits) at bedtime.
Withheld/Decline to Answer

## 2024-06-20 ENCOUNTER — APPOINTMENT (OUTPATIENT)
Dept: INFUSION THERAPY | Facility: HOSPITAL | Age: 59
End: 2024-06-20

## 2024-06-23 ENCOUNTER — NON-APPOINTMENT (OUTPATIENT)
Age: 59
End: 2024-06-23

## 2024-06-24 ENCOUNTER — APPOINTMENT (OUTPATIENT)
Dept: HEMATOLOGY ONCOLOGY | Facility: CLINIC | Age: 59
End: 2024-06-24
Payer: MEDICAID

## 2024-06-24 ENCOUNTER — APPOINTMENT (OUTPATIENT)
Dept: INFUSION THERAPY | Facility: HOSPITAL | Age: 59
End: 2024-06-24

## 2024-06-24 ENCOUNTER — NON-APPOINTMENT (OUTPATIENT)
Age: 59
End: 2024-06-24

## 2024-06-24 ENCOUNTER — RESULT REVIEW (OUTPATIENT)
Age: 59
End: 2024-06-24

## 2024-06-24 PROCEDURE — G2211 COMPLEX E/M VISIT ADD ON: CPT | Mod: NC

## 2024-06-24 PROCEDURE — 99214 OFFICE O/P EST MOD 30 MIN: CPT

## 2024-06-24 RX ORDER — DOXYCYCLINE HYCLATE 100 MG/1
100 TABLET ORAL
Qty: 90 | Refills: 0 | Status: ACTIVE | COMMUNITY
Start: 2024-06-24 | End: 1900-01-01

## 2024-06-25 ENCOUNTER — NON-APPOINTMENT (OUTPATIENT)
Age: 59
End: 2024-06-25

## 2024-06-25 ENCOUNTER — APPOINTMENT (OUTPATIENT)
Dept: NEPHROLOGY | Facility: CLINIC | Age: 59
End: 2024-06-25
Payer: MEDICAID

## 2024-06-25 VITALS
HEART RATE: 95 BPM | OXYGEN SATURATION: 99 % | TEMPERATURE: 99.5 F | SYSTOLIC BLOOD PRESSURE: 111 MMHG | HEIGHT: 67 IN | BODY MASS INDEX: 17.47 KG/M2 | DIASTOLIC BLOOD PRESSURE: 71 MMHG | WEIGHT: 111.33 LBS

## 2024-06-25 VITALS — TEMPERATURE: 97.9 F

## 2024-06-25 DIAGNOSIS — Z90.49 ACQUIRED ABSENCE OF OTHER SPECIFIED PARTS OF DIGESTIVE TRACT: ICD-10-CM

## 2024-06-25 DIAGNOSIS — C18.9 MALIGNANT NEOPLASM OF COLON, UNSPECIFIED: ICD-10-CM

## 2024-06-25 DIAGNOSIS — Z93.2 ILEOSTOMY STATUS: ICD-10-CM

## 2024-06-25 DIAGNOSIS — E87.1 HYPO-OSMOLALITY AND HYPONATREMIA: ICD-10-CM

## 2024-06-25 DIAGNOSIS — N20.0 CALCULUS OF KIDNEY: ICD-10-CM

## 2024-06-25 DIAGNOSIS — Z78.9 OTHER SPECIFIED HEALTH STATUS: ICD-10-CM

## 2024-06-25 DIAGNOSIS — D41.4 NEOPLASM OF UNCERTAIN BEHAVIOR OF BLADDER: ICD-10-CM

## 2024-06-25 DIAGNOSIS — Z86.718 PERSONAL HISTORY OF OTHER VENOUS THROMBOSIS AND EMBOLISM: ICD-10-CM

## 2024-06-25 DIAGNOSIS — N18.30 CHRONIC KIDNEY DISEASE, STAGE 3 UNSPECIFIED: ICD-10-CM

## 2024-06-25 LAB
ALBUMIN SERPL ELPH-MCNC: 3.3 G/DL
ALP BLD-CCNC: 102 U/L
ALT SERPL-CCNC: 24 U/L
ANION GAP SERPL CALC-SCNC: 13 MMOL/L
AST SERPL-CCNC: 25 U/L
BILIRUB SERPL-MCNC: 0.8 MG/DL
BUN SERPL-MCNC: 20 MG/DL
CALCIUM SERPL-MCNC: 8.9 MG/DL
CHLORIDE SERPL-SCNC: 90 MMOL/L
CO2 SERPL-SCNC: 22 MMOL/L
CREAT SERPL-MCNC: 1.54 MG/DL
EGFR: 52 ML/MIN/1.73M2
GLUCOSE SERPL-MCNC: 121 MG/DL
LDH SERPL-CCNC: 325 U/L
MAGNESIUM SERPL-MCNC: 2 MG/DL
POTASSIUM SERPL-SCNC: 3.8 MMOL/L
PROT SERPL-MCNC: 6.5 G/DL
SODIUM SERPL-SCNC: 125 MMOL/L

## 2024-06-25 PROCEDURE — 99214 OFFICE O/P EST MOD 30 MIN: CPT

## 2024-06-25 PROCEDURE — G2211 COMPLEX E/M VISIT ADD ON: CPT | Mod: NC

## 2024-06-26 ENCOUNTER — APPOINTMENT (OUTPATIENT)
Dept: INFUSION THERAPY | Facility: HOSPITAL | Age: 59
End: 2024-06-26

## 2024-06-26 LAB
ALBUMIN SERPL ELPH-MCNC: 3.4 G/DL
ANION GAP SERPL CALC-SCNC: 14 MMOL/L
BUN SERPL-MCNC: 18 MG/DL
CALCIUM SERPL-MCNC: 8.8 MG/DL
CHLORIDE SERPL-SCNC: 92 MMOL/L
CO2 SERPL-SCNC: 22 MMOL/L
CREAT SERPL-MCNC: 1.66 MG/DL
CREAT SPEC-SCNC: 253 MG/DL
EGFR: 47 ML/MIN/1.73M2
GLUCOSE SERPL-MCNC: 130 MG/DL
OSMOLALITY SERPL: 271 MOSMOL/KG
OSMOLALITY UR: 660 MOSM/KG
PHOSPHATE SERPL-MCNC: 2.5 MG/DL
POTASSIUM SERPL-SCNC: 4.1 MMOL/L
POTASSIUM UR-SCNC: 49.3 MMOL/L
SODIUM ?TM SUB UR QN: 32 MMOL/L
SODIUM SERPL-SCNC: 129 MMOL/L
TSH SERPL-ACNC: 1.08 UIU/ML
URATE SERPL-MCNC: 6.6 MG/DL

## 2024-06-28 ENCOUNTER — APPOINTMENT (OUTPATIENT)
Dept: HEMATOLOGY ONCOLOGY | Facility: CLINIC | Age: 59
End: 2024-06-28

## 2024-06-28 ENCOUNTER — INPATIENT (INPATIENT)
Facility: HOSPITAL | Age: 59
LOS: 2 days | Discharge: HOME | End: 2024-07-01
Attending: INTERNAL MEDICINE | Admitting: INTERNAL MEDICINE
Payer: MEDICAID

## 2024-06-28 VITALS
SYSTOLIC BLOOD PRESSURE: 88 MMHG | WEIGHT: 110.89 LBS | TEMPERATURE: 98 F | HEIGHT: 67 IN | RESPIRATION RATE: 18 BRPM | DIASTOLIC BLOOD PRESSURE: 62 MMHG | OXYGEN SATURATION: 98 % | HEART RATE: 101 BPM

## 2024-06-28 DIAGNOSIS — Z90.49 ACQUIRED ABSENCE OF OTHER SPECIFIED PARTS OF DIGESTIVE TRACT: Chronic | ICD-10-CM

## 2024-06-28 DIAGNOSIS — I82.409 ACUTE EMBOLISM AND THROMBOSIS OF UNSPECIFIED DEEP VEINS OF UNSPECIFIED LOWER EXTREMITY: ICD-10-CM

## 2024-06-28 DIAGNOSIS — Z98.890 OTHER SPECIFIED POSTPROCEDURAL STATES: Chronic | ICD-10-CM

## 2024-06-28 LAB
ALBUMIN SERPL ELPH-MCNC: 3.2 G/DL — LOW (ref 3.3–5)
ALP SERPL-CCNC: 123 U/L — HIGH (ref 40–120)
ALT FLD-CCNC: 33 U/L — SIGNIFICANT CHANGE UP (ref 4–41)
ANION GAP SERPL CALC-SCNC: 12 MMOL/L — SIGNIFICANT CHANGE UP (ref 7–14)
ANION GAP SERPL CALC-SCNC: 12 MMOL/L — SIGNIFICANT CHANGE UP (ref 7–14)
ANISOCYTOSIS BLD QL: SLIGHT — SIGNIFICANT CHANGE UP
APPEARANCE UR: ABNORMAL
APTT BLD: 30.4 SEC — SIGNIFICANT CHANGE UP (ref 24.5–35.6)
APTT BLD: 46.8 SEC — HIGH (ref 24.5–35.6)
AST SERPL-CCNC: 58 U/L — HIGH (ref 4–40)
BACTERIA # UR AUTO: NEGATIVE /HPF — SIGNIFICANT CHANGE UP
BASE EXCESS BLDV CALC-SCNC: 0.1 MMOL/L — SIGNIFICANT CHANGE UP (ref -2–3)
BASE EXCESS BLDV CALC-SCNC: 1.3 MMOL/L — SIGNIFICANT CHANGE UP (ref -2–3)
BASOPHILS # BLD AUTO: 0 K/UL — SIGNIFICANT CHANGE UP (ref 0–0.2)
BASOPHILS NFR BLD AUTO: 0 % — SIGNIFICANT CHANGE UP (ref 0–2)
BILIRUB SERPL-MCNC: 1.4 MG/DL — HIGH (ref 0.2–1.2)
BILIRUB UR-MCNC: NEGATIVE — SIGNIFICANT CHANGE UP
BLD GP AB SCN SERPL QL: NEGATIVE — SIGNIFICANT CHANGE UP
BLOOD GAS COMMENTS, VENOUS: SIGNIFICANT CHANGE UP
BLOOD GAS VENOUS COMPREHENSIVE RESULT: SIGNIFICANT CHANGE UP
BUN SERPL-MCNC: 25 MG/DL — HIGH (ref 7–23)
BUN SERPL-MCNC: 26 MG/DL — HIGH (ref 7–23)
CA-I SERPL-SCNC: 1.28 MMOL/L — SIGNIFICANT CHANGE UP (ref 1.15–1.33)
CALCIUM SERPL-MCNC: 8.1 MG/DL — LOW (ref 8.4–10.5)
CALCIUM SERPL-MCNC: 9.2 MG/DL — SIGNIFICANT CHANGE UP (ref 8.4–10.5)
CAST: 1 /LPF — SIGNIFICANT CHANGE UP (ref 0–4)
CHLORIDE BLDV-SCNC: 91 MMOL/L — LOW (ref 96–108)
CHLORIDE BLDV-SCNC: 94 MMOL/L — LOW (ref 96–108)
CHLORIDE SERPL-SCNC: 89 MMOL/L — LOW (ref 98–107)
CHLORIDE SERPL-SCNC: 93 MMOL/L — LOW (ref 98–107)
CO2 BLDV-SCNC: 28.3 MMOL/L — HIGH (ref 22–26)
CO2 BLDV-SCNC: 28.6 MMOL/L — HIGH (ref 22–26)
CO2 SERPL-SCNC: 21 MMOL/L — LOW (ref 22–31)
CO2 SERPL-SCNC: 24 MMOL/L — SIGNIFICANT CHANGE UP (ref 22–31)
COLOR SPEC: SIGNIFICANT CHANGE UP
CREAT SERPL-MCNC: 1.59 MG/DL — HIGH (ref 0.5–1.3)
CREAT SERPL-MCNC: 1.72 MG/DL — HIGH (ref 0.5–1.3)
DIFF PNL FLD: ABNORMAL
EGFR: 45 ML/MIN/1.73M2 — LOW
EGFR: 50 ML/MIN/1.73M2 — LOW
EOSINOPHIL # BLD AUTO: 0.03 K/UL — SIGNIFICANT CHANGE UP (ref 0–0.5)
EOSINOPHIL NFR BLD AUTO: 0.9 % — SIGNIFICANT CHANGE UP (ref 0–6)
FLUAV AG NPH QL: SIGNIFICANT CHANGE UP
FLUBV AG NPH QL: SIGNIFICANT CHANGE UP
GAS PNL BLDV: 124 MMOL/L — LOW (ref 136–145)
GAS PNL BLDV: 124 MMOL/L — LOW (ref 136–145)
GAS PNL BLDV: SIGNIFICANT CHANGE UP
GIANT PLATELETS BLD QL SMEAR: PRESENT — SIGNIFICANT CHANGE UP
GLUCOSE BLDV-MCNC: 103 MG/DL — HIGH (ref 70–99)
GLUCOSE BLDV-MCNC: 180 MG/DL — HIGH (ref 70–99)
GLUCOSE SERPL-MCNC: 136 MG/DL — HIGH (ref 70–99)
GLUCOSE SERPL-MCNC: 181 MG/DL — HIGH (ref 70–99)
GLUCOSE UR QL: NEGATIVE MG/DL — SIGNIFICANT CHANGE UP
HCO3 BLDV-SCNC: 27 MMOL/L — SIGNIFICANT CHANGE UP (ref 22–29)
HCO3 BLDV-SCNC: 27 MMOL/L — SIGNIFICANT CHANGE UP (ref 22–29)
HCT VFR BLD CALC: 23.8 % — LOW (ref 39–50)
HCT VFR BLD CALC: 29 % — LOW (ref 39–50)
HCT VFR BLDA CALC: 25 % — LOW (ref 39–51)
HCT VFR BLDA CALC: 29 % — LOW (ref 39–51)
HGB BLD CALC-MCNC: 8.4 G/DL — LOW (ref 12.6–17.4)
HGB BLD CALC-MCNC: 9.5 G/DL — LOW (ref 12.6–17.4)
HGB BLD-MCNC: 7.5 G/DL — LOW (ref 13–17)
HGB BLD-MCNC: 9.3 G/DL — LOW (ref 13–17)
HYPOCHROMIA BLD QL: SLIGHT — SIGNIFICANT CHANGE UP
IANC: 0.67 K/UL — LOW (ref 1.8–7.4)
INR BLD: 1.73 RATIO — HIGH (ref 0.85–1.18)
KETONES UR-MCNC: NEGATIVE MG/DL — SIGNIFICANT CHANGE UP
LACTATE BLDV-MCNC: 3.3 MMOL/L — HIGH (ref 0.5–2)
LACTATE BLDV-MCNC: 5.3 MMOL/L — CRITICAL HIGH (ref 0.5–2)
LEUKOCYTE ESTERASE UR-ACNC: NEGATIVE — SIGNIFICANT CHANGE UP
LYMPHOCYTES # BLD AUTO: 1.4 K/UL — SIGNIFICANT CHANGE UP (ref 1–3.3)
LYMPHOCYTES # BLD AUTO: 38.8 % — SIGNIFICANT CHANGE UP (ref 13–44)
MAGNESIUM SERPL-MCNC: 2 MG/DL — SIGNIFICANT CHANGE UP (ref 1.6–2.6)
MCHC RBC-ENTMCNC: 24.9 PG — LOW (ref 27–34)
MCHC RBC-ENTMCNC: 25.5 PG — LOW (ref 27–34)
MCHC RBC-ENTMCNC: 31.5 GM/DL — LOW (ref 32–36)
MCHC RBC-ENTMCNC: 32.1 GM/DL — SIGNIFICANT CHANGE UP (ref 32–36)
MCV RBC AUTO: 79.1 FL — LOW (ref 80–100)
MCV RBC AUTO: 79.7 FL — LOW (ref 80–100)
METAMYELOCYTES # FLD: 1.8 % — HIGH (ref 0–1)
MICROCYTES BLD QL: SLIGHT — SIGNIFICANT CHANGE UP
MONOCYTES # BLD AUTO: 1.3 K/UL — HIGH (ref 0–0.9)
MONOCYTES NFR BLD AUTO: 36 % — HIGH (ref 2–14)
MYELOCYTES NFR BLD: 6.3 % — HIGH (ref 0–0)
NEUTROPHILS # BLD AUTO: 0.26 K/UL — LOW (ref 1.8–7.4)
NEUTROPHILS NFR BLD AUTO: 5.4 % — LOW (ref 43–77)
NEUTS BAND # BLD: 1.8 % — SIGNIFICANT CHANGE UP (ref 0–6)
NITRITE UR-MCNC: NEGATIVE — SIGNIFICANT CHANGE UP
NRBC # BLD: 0 /100 WBCS — SIGNIFICANT CHANGE UP (ref 0–0)
NRBC # FLD: 0 K/UL — SIGNIFICANT CHANGE UP (ref 0–0)
PCO2 BLDV: 48 MMHG — SIGNIFICANT CHANGE UP (ref 42–55)
PCO2 BLDV: 53 MMHG — SIGNIFICANT CHANGE UP (ref 42–55)
PH BLDV: 7.31 — LOW (ref 7.32–7.43)
PH BLDV: 7.36 — SIGNIFICANT CHANGE UP (ref 7.32–7.43)
PH UR: 6 — SIGNIFICANT CHANGE UP (ref 5–8)
PLAT MORPH BLD: ABNORMAL
PLATELET # BLD AUTO: 198 K/UL — SIGNIFICANT CHANGE UP (ref 150–400)
PLATELET # BLD AUTO: 248 K/UL — SIGNIFICANT CHANGE UP (ref 150–400)
PLATELET COUNT - ESTIMATE: NORMAL — SIGNIFICANT CHANGE UP
PO2 BLDV: 28 MMHG — SIGNIFICANT CHANGE UP (ref 25–45)
PO2 BLDV: <20 MMHG — LOW (ref 25–45)
POLYCHROMASIA BLD QL SMEAR: SIGNIFICANT CHANGE UP
POTASSIUM BLDV-SCNC: 4.5 MMOL/L — SIGNIFICANT CHANGE UP (ref 3.5–5.1)
POTASSIUM BLDV-SCNC: 4.7 MMOL/L — SIGNIFICANT CHANGE UP (ref 3.5–5.1)
POTASSIUM SERPL-MCNC: 4.4 MMOL/L — SIGNIFICANT CHANGE UP (ref 3.5–5.3)
POTASSIUM SERPL-MCNC: 5.8 MMOL/L — HIGH (ref 3.5–5.3)
POTASSIUM SERPL-SCNC: 4.4 MMOL/L — SIGNIFICANT CHANGE UP (ref 3.5–5.3)
POTASSIUM SERPL-SCNC: 5.8 MMOL/L — HIGH (ref 3.5–5.3)
PROT SERPL-MCNC: 6.6 G/DL — SIGNIFICANT CHANGE UP (ref 6–8.3)
PROT UR-MCNC: 300 MG/DL
PROTHROM AB SERPL-ACNC: 19.1 SEC — HIGH (ref 9.5–13)
RBC # BLD: 3.01 M/UL — LOW (ref 4.2–5.8)
RBC # BLD: 3.64 M/UL — LOW (ref 4.2–5.8)
RBC # FLD: 14.6 % — HIGH (ref 10.3–14.5)
RBC # FLD: 14.7 % — HIGH (ref 10.3–14.5)
RBC BLD AUTO: ABNORMAL
RBC CASTS # UR COMP ASSIST: 16 /HPF — HIGH (ref 0–4)
RH IG SCN BLD-IMP: POSITIVE — SIGNIFICANT CHANGE UP
RSV RNA NPH QL NAA+NON-PROBE: SIGNIFICANT CHANGE UP
SAO2 % BLDV: 19.6 % — LOW (ref 67–88)
SAO2 % BLDV: 35.7 % — LOW (ref 67–88)
SARS-COV-2 RNA SPEC QL NAA+PROBE: SIGNIFICANT CHANGE UP
SODIUM SERPL-SCNC: 125 MMOL/L — LOW (ref 135–145)
SODIUM SERPL-SCNC: 126 MMOL/L — LOW (ref 135–145)
SP GR SPEC: 1.02 — SIGNIFICANT CHANGE UP (ref 1–1.03)
SQUAMOUS # UR AUTO: 6 /HPF — HIGH (ref 0–5)
UROBILINOGEN FLD QL: 1 MG/DL — SIGNIFICANT CHANGE UP (ref 0.2–1)
VARIANT LYMPHS # BLD: 9 % — HIGH (ref 0–6)
WBC # BLD: 3.62 K/UL — LOW (ref 3.8–10.5)
WBC # BLD: 4.28 K/UL — SIGNIFICANT CHANGE UP (ref 3.8–10.5)
WBC # FLD AUTO: 3.62 K/UL — LOW (ref 3.8–10.5)
WBC # FLD AUTO: 4.28 K/UL — SIGNIFICANT CHANGE UP (ref 3.8–10.5)
WBC UR QL: 6 /HPF — HIGH (ref 0–5)

## 2024-06-28 PROCEDURE — 99223 1ST HOSP IP/OBS HIGH 75: CPT

## 2024-06-28 PROCEDURE — 93970 EXTREMITY STUDY: CPT | Mod: 26

## 2024-06-28 PROCEDURE — 70450 CT HEAD/BRAIN W/O DYE: CPT | Mod: 26,MC

## 2024-06-28 PROCEDURE — 71045 X-RAY EXAM CHEST 1 VIEW: CPT | Mod: 26

## 2024-06-28 PROCEDURE — 99285 EMERGENCY DEPT VISIT HI MDM: CPT

## 2024-06-28 RX ORDER — SODIUM CHLORIDE 0.9 % (FLUSH) 0.9 %
1000 SYRINGE (ML) INJECTION ONCE
Refills: 0 | Status: COMPLETED | OUTPATIENT
Start: 2024-06-28 | End: 2024-06-28

## 2024-06-28 RX ORDER — POT GLUCONATE/POTASSIUM CIT 20MEQ/15ML
10 LIQUID (ML) ORAL
Refills: 0 | DISCHARGE

## 2024-06-28 RX ORDER — SODIUM BICARBONATE 650 MG/1
1300 TABLET ORAL
Refills: 0 | Status: DISCONTINUED | OUTPATIENT
Start: 2024-06-28 | End: 2024-07-01

## 2024-06-28 RX ORDER — SODIUM CHLORIDE 0.9 % (FLUSH) 0.9 %
1 SYRINGE (ML) INJECTION
Refills: 0 | DISCHARGE

## 2024-06-28 RX ORDER — SODIUM BICARBONATE 650 MG/1
2 TABLET ORAL
Refills: 0 | DISCHARGE

## 2024-06-28 RX ORDER — SODIUM CHLORIDE 0.9 % (FLUSH) 0.9 %
1000 SYRINGE (ML) INJECTION
Refills: 0 | Status: DISCONTINUED | OUTPATIENT
Start: 2024-06-28 | End: 2024-06-29

## 2024-06-28 RX ORDER — LOPERAMIDE HCL 2 MG
0 TABLET ORAL
Refills: 0 | DISCHARGE

## 2024-06-28 RX ORDER — MAGNESIUM, ALUMINUM HYDROXIDE 400-400
30 TABLET,CHEWABLE ORAL EVERY 4 HOURS
Refills: 0 | Status: DISCONTINUED | OUTPATIENT
Start: 2024-06-28 | End: 2024-07-01

## 2024-06-28 RX ORDER — ACETAMINOPHEN 325 MG
650 TABLET ORAL EVERY 6 HOURS
Refills: 0 | Status: DISCONTINUED | OUTPATIENT
Start: 2024-06-28 | End: 2024-07-01

## 2024-06-28 RX ORDER — SODIUM CHLORIDE 0.9 % (FLUSH) 0.9 %
1 SYRINGE (ML) INJECTION THREE TIMES A DAY
Refills: 0 | Status: DISCONTINUED | OUTPATIENT
Start: 2024-06-28 | End: 2024-07-01

## 2024-06-28 RX ORDER — HEPARIN SODIUM 50 [USP'U]/ML
4000 INJECTION, SOLUTION INTRAVENOUS ONCE
Refills: 0 | Status: COMPLETED | OUTPATIENT
Start: 2024-06-28 | End: 2024-06-28

## 2024-06-28 RX ORDER — ONDANSETRON HYDROCHLORIDE 2 MG/ML
4 INJECTION INTRAMUSCULAR; INTRAVENOUS EVERY 8 HOURS
Refills: 0 | Status: DISCONTINUED | OUTPATIENT
Start: 2024-06-28 | End: 2024-07-01

## 2024-06-28 RX ORDER — SODIUM CHLORIDE 0.9 % (FLUSH) 0.9 %
500 SYRINGE (ML) INJECTION ONCE
Refills: 0 | Status: COMPLETED | OUTPATIENT
Start: 2024-06-28 | End: 2024-06-28

## 2024-06-28 RX ORDER — HEPARIN SODIUM 50 [USP'U]/ML
4000 INJECTION, SOLUTION INTRAVENOUS EVERY 6 HOURS
Refills: 0 | Status: DISCONTINUED | OUTPATIENT
Start: 2024-06-28 | End: 2024-06-29

## 2024-06-28 RX ORDER — HEPARIN SODIUM 50 [USP'U]/ML
INJECTION, SOLUTION INTRAVENOUS
Qty: 25000 | Refills: 0 | Status: DISCONTINUED | OUTPATIENT
Start: 2024-06-28 | End: 2024-06-29

## 2024-06-28 RX ORDER — HEPARIN SODIUM 50 [USP'U]/ML
2000 INJECTION, SOLUTION INTRAVENOUS EVERY 6 HOURS
Refills: 0 | Status: DISCONTINUED | OUTPATIENT
Start: 2024-06-28 | End: 2024-06-29

## 2024-06-28 RX ADMIN — HEPARIN SODIUM 1000 UNIT(S)/HR: 50 INJECTION, SOLUTION INTRAVENOUS at 22:42

## 2024-06-28 RX ADMIN — Medication 500 MILLILITER(S): at 12:32

## 2024-06-28 RX ADMIN — HEPARIN SODIUM 900 UNIT(S)/HR: 50 INJECTION, SOLUTION INTRAVENOUS at 18:26

## 2024-06-28 RX ADMIN — SODIUM BICARBONATE 1300 MILLIGRAM(S): 650 TABLET ORAL at 23:48

## 2024-06-28 RX ADMIN — Medication 1 GRAM(S): at 23:48

## 2024-06-28 RX ADMIN — Medication 75 MILLILITER(S): at 16:40

## 2024-06-28 RX ADMIN — HEPARIN SODIUM 900 UNIT(S)/HR: 50 INJECTION, SOLUTION INTRAVENOUS at 20:31

## 2024-06-28 RX ADMIN — HEPARIN SODIUM 900 UNIT(S)/HR: 50 INJECTION, SOLUTION INTRAVENOUS at 15:44

## 2024-06-28 RX ADMIN — HEPARIN SODIUM 4000 UNIT(S): 50 INJECTION, SOLUTION INTRAVENOUS at 15:43

## 2024-06-28 RX ADMIN — Medication 75 MILLILITER(S): at 22:52

## 2024-06-28 RX ADMIN — Medication 1000 MILLILITER(S): at 10:38

## 2024-06-28 RX ADMIN — HEPARIN SODIUM 2000 UNIT(S): 50 INJECTION, SOLUTION INTRAVENOUS at 22:52

## 2024-06-29 ENCOUNTER — TRANSCRIPTION ENCOUNTER (OUTPATIENT)
Age: 59
End: 2024-06-29

## 2024-06-29 DIAGNOSIS — I95.9 HYPOTENSION, UNSPECIFIED: ICD-10-CM

## 2024-06-29 DIAGNOSIS — E87.1 HYPO-OSMOLALITY AND HYPONATREMIA: ICD-10-CM

## 2024-06-29 DIAGNOSIS — R53.1 WEAKNESS: ICD-10-CM

## 2024-06-29 DIAGNOSIS — Z29.9 ENCOUNTER FOR PROPHYLACTIC MEASURES, UNSPECIFIED: ICD-10-CM

## 2024-06-29 DIAGNOSIS — I82.409 ACUTE EMBOLISM AND THROMBOSIS OF UNSPECIFIED DEEP VEINS OF UNSPECIFIED LOWER EXTREMITY: ICD-10-CM

## 2024-06-29 DIAGNOSIS — D64.9 ANEMIA, UNSPECIFIED: ICD-10-CM

## 2024-06-29 DIAGNOSIS — N18.30 CHRONIC KIDNEY DISEASE, STAGE 3 UNSPECIFIED: ICD-10-CM

## 2024-06-29 DIAGNOSIS — N17.9 ACUTE KIDNEY FAILURE, UNSPECIFIED: ICD-10-CM

## 2024-06-29 DIAGNOSIS — C18.9 MALIGNANT NEOPLASM OF COLON, UNSPECIFIED: ICD-10-CM

## 2024-06-29 LAB
ALBUMIN SERPL ELPH-MCNC: 2.3 G/DL — LOW (ref 3.3–5)
ALP SERPL-CCNC: 79 U/L — SIGNIFICANT CHANGE UP (ref 40–120)
ALT FLD-CCNC: 19 U/L — SIGNIFICANT CHANGE UP (ref 4–41)
ANION GAP SERPL CALC-SCNC: 11 MMOL/L — SIGNIFICANT CHANGE UP (ref 7–14)
ANION GAP SERPL CALC-SCNC: 12 MMOL/L — SIGNIFICANT CHANGE UP (ref 7–14)
ANION GAP SERPL CALC-SCNC: 8 MMOL/L — SIGNIFICANT CHANGE UP (ref 7–14)
APTT BLD: 39 SEC — HIGH (ref 24.5–35.6)
APTT BLD: 40.9 SEC — HIGH (ref 24.5–35.6)
APTT BLD: 78.3 SEC — HIGH (ref 24.5–35.6)
AST SERPL-CCNC: 10 U/L — SIGNIFICANT CHANGE UP (ref 4–40)
BASE EXCESS BLDV CALC-SCNC: -4.1 MMOL/L — LOW (ref -2–3)
BILIRUB SERPL-MCNC: 1 MG/DL — SIGNIFICANT CHANGE UP (ref 0.2–1.2)
BLD GP AB SCN SERPL QL: NEGATIVE — SIGNIFICANT CHANGE UP
BLOOD GAS VENOUS COMPREHENSIVE RESULT: SIGNIFICANT CHANGE UP
BUN SERPL-MCNC: 18 MG/DL — SIGNIFICANT CHANGE UP (ref 7–23)
BUN SERPL-MCNC: 24 MG/DL — HIGH (ref 7–23)
BUN SERPL-MCNC: 24 MG/DL — HIGH (ref 7–23)
CALCIUM SERPL-MCNC: 6.8 MG/DL — LOW (ref 8.4–10.5)
CALCIUM SERPL-MCNC: 8.1 MG/DL — LOW (ref 8.4–10.5)
CALCIUM SERPL-MCNC: 8.2 MG/DL — LOW (ref 8.4–10.5)
CHLORIDE BLDV-SCNC: 105 MMOL/L — SIGNIFICANT CHANGE UP (ref 96–108)
CHLORIDE SERPL-SCNC: 105 MMOL/L — SIGNIFICANT CHANGE UP (ref 98–107)
CHLORIDE SERPL-SCNC: 96 MMOL/L — LOW (ref 98–107)
CHLORIDE SERPL-SCNC: 98 MMOL/L — SIGNIFICANT CHANGE UP (ref 98–107)
CO2 BLDV-SCNC: 21.9 MMOL/L — LOW (ref 22–26)
CO2 SERPL-SCNC: 19 MMOL/L — LOW (ref 22–31)
CO2 SERPL-SCNC: 20 MMOL/L — LOW (ref 22–31)
CO2 SERPL-SCNC: 20 MMOL/L — LOW (ref 22–31)
CREAT SERPL-MCNC: 1.29 MG/DL — SIGNIFICANT CHANGE UP (ref 0.5–1.3)
CREAT SERPL-MCNC: 1.71 MG/DL — HIGH (ref 0.5–1.3)
CREAT SERPL-MCNC: 1.73 MG/DL — HIGH (ref 0.5–1.3)
CULTURE RESULTS: SIGNIFICANT CHANGE UP
EGFR: 45 ML/MIN/1.73M2 — LOW
EGFR: 46 ML/MIN/1.73M2 — LOW
EGFR: 64 ML/MIN/1.73M2 — SIGNIFICANT CHANGE UP
FERRITIN SERPL-MCNC: 38 NG/ML — SIGNIFICANT CHANGE UP (ref 30–400)
GAS PNL BLDV: 129 MMOL/L — LOW (ref 136–145)
GLUCOSE BLDC GLUCOMTR-MCNC: 127 MG/DL — HIGH (ref 70–99)
GLUCOSE BLDV-MCNC: 77 MG/DL — SIGNIFICANT CHANGE UP (ref 70–99)
GLUCOSE SERPL-MCNC: 126 MG/DL — HIGH (ref 70–99)
GLUCOSE SERPL-MCNC: 83 MG/DL — SIGNIFICANT CHANGE UP (ref 70–99)
GLUCOSE SERPL-MCNC: 86 MG/DL — SIGNIFICANT CHANGE UP (ref 70–99)
HAPTOGLOB SERPL-MCNC: 114 MG/DL — SIGNIFICANT CHANGE UP (ref 34–200)
HCO3 BLDV-SCNC: 21 MMOL/L — LOW (ref 22–29)
HCT VFR BLD CALC: 19.7 % — CRITICAL LOW (ref 39–50)
HCT VFR BLD CALC: 25.3 % — LOW (ref 39–50)
HCT VFR BLD CALC: 25.9 % — LOW (ref 39–50)
HCT VFR BLDA CALC: 20 % — CRITICAL LOW (ref 39–51)
HGB BLD CALC-MCNC: 6.7 G/DL — CRITICAL LOW (ref 12.6–17.4)
HGB BLD-MCNC: 6.3 G/DL — CRITICAL LOW (ref 13–17)
HGB BLD-MCNC: 8 G/DL — LOW (ref 13–17)
HGB BLD-MCNC: 8.3 G/DL — LOW (ref 13–17)
IRON SATN MFR SERPL: 4 % — LOW (ref 14–50)
IRON SATN MFR SERPL: 8 UG/DL — LOW (ref 45–165)
LACTATE BLDV-MCNC: 1.7 MMOL/L — SIGNIFICANT CHANGE UP (ref 0.5–2)
LDH SERPL L TO P-CCNC: 94 U/L — LOW (ref 135–225)
MAGNESIUM SERPL-MCNC: 1.4 MG/DL — LOW (ref 1.6–2.6)
MAGNESIUM SERPL-MCNC: 1.9 MG/DL — SIGNIFICANT CHANGE UP (ref 1.6–2.6)
MAGNESIUM SERPL-MCNC: 2 MG/DL — SIGNIFICANT CHANGE UP (ref 1.6–2.6)
MCHC RBC-ENTMCNC: 25.4 PG — LOW (ref 27–34)
MCHC RBC-ENTMCNC: 25.5 PG — LOW (ref 27–34)
MCHC RBC-ENTMCNC: 25.6 PG — LOW (ref 27–34)
MCHC RBC-ENTMCNC: 31.6 GM/DL — LOW (ref 32–36)
MCHC RBC-ENTMCNC: 32 GM/DL — SIGNIFICANT CHANGE UP (ref 32–36)
MCHC RBC-ENTMCNC: 32 GM/DL — SIGNIFICANT CHANGE UP (ref 32–36)
MCV RBC AUTO: 79.4 FL — LOW (ref 80–100)
MCV RBC AUTO: 80.1 FL — SIGNIFICANT CHANGE UP (ref 80–100)
MCV RBC AUTO: 80.3 FL — SIGNIFICANT CHANGE UP (ref 80–100)
NRBC # BLD: 0 /100 WBCS — SIGNIFICANT CHANGE UP (ref 0–0)
NRBC # FLD: 0 K/UL — SIGNIFICANT CHANGE UP (ref 0–0)
OB PNL STL: POSITIVE
PCO2 BLDV: 36 MMHG — LOW (ref 42–55)
PH BLDV: 7.37 — SIGNIFICANT CHANGE UP (ref 7.32–7.43)
PHOSPHATE SERPL-MCNC: 1.8 MG/DL — LOW (ref 2.5–4.5)
PHOSPHATE SERPL-MCNC: 3 MG/DL — SIGNIFICANT CHANGE UP (ref 2.5–4.5)
PHOSPHATE SERPL-MCNC: 3.7 MG/DL — SIGNIFICANT CHANGE UP (ref 2.5–4.5)
PLATELET # BLD AUTO: 160 K/UL — SIGNIFICANT CHANGE UP (ref 150–400)
PLATELET # BLD AUTO: 186 K/UL — SIGNIFICANT CHANGE UP (ref 150–400)
PLATELET # BLD AUTO: 213 K/UL — SIGNIFICANT CHANGE UP (ref 150–400)
PO2 BLDV: 31 MMHG — SIGNIFICANT CHANGE UP (ref 25–45)
POTASSIUM BLDV-SCNC: 3.2 MMOL/L — LOW (ref 3.5–5.1)
POTASSIUM SERPL-MCNC: 3.3 MMOL/L — LOW (ref 3.5–5.3)
POTASSIUM SERPL-MCNC: 4.3 MMOL/L — SIGNIFICANT CHANGE UP (ref 3.5–5.3)
POTASSIUM SERPL-MCNC: 4.7 MMOL/L — SIGNIFICANT CHANGE UP (ref 3.5–5.3)
POTASSIUM SERPL-SCNC: 3.3 MMOL/L — LOW (ref 3.5–5.3)
POTASSIUM SERPL-SCNC: 4.3 MMOL/L — SIGNIFICANT CHANGE UP (ref 3.5–5.3)
POTASSIUM SERPL-SCNC: 4.7 MMOL/L — SIGNIFICANT CHANGE UP (ref 3.5–5.3)
PROT SERPL-MCNC: 4.5 G/DL — LOW (ref 6–8.3)
RBC # BLD: 2.4 M/UL — LOW (ref 4.2–5.8)
RBC # BLD: 2.46 M/UL — LOW (ref 4.2–5.8)
RBC # BLD: 3.15 M/UL — LOW (ref 4.2–5.8)
RBC # BLD: 3.26 M/UL — LOW (ref 4.2–5.8)
RBC # FLD: 14.6 % — HIGH (ref 10.3–14.5)
RBC # FLD: 14.9 % — HIGH (ref 10.3–14.5)
RBC # FLD: 14.9 % — HIGH (ref 10.3–14.5)
RETICS #: 94.3 K/UL — SIGNIFICANT CHANGE UP (ref 25–125)
RETICS/RBC NFR: 3.9 % — HIGH (ref 0.5–2.5)
RH IG SCN BLD-IMP: POSITIVE — SIGNIFICANT CHANGE UP
SAO2 % BLDV: 43.7 % — LOW (ref 67–88)
SODIUM SERPL-SCNC: 128 MMOL/L — LOW (ref 135–145)
SODIUM SERPL-SCNC: 128 MMOL/L — LOW (ref 135–145)
SODIUM SERPL-SCNC: 133 MMOL/L — LOW (ref 135–145)
SPECIMEN SOURCE: SIGNIFICANT CHANGE UP
TIBC SERPL-MCNC: 212 UG/DL — LOW (ref 220–430)
UIBC SERPL-MCNC: 204 UG/DL — SIGNIFICANT CHANGE UP (ref 110–370)
WBC # BLD: 3.43 K/UL — LOW (ref 3.8–10.5)
WBC # BLD: 4.69 K/UL — SIGNIFICANT CHANGE UP (ref 3.8–10.5)
WBC # BLD: 5.68 K/UL — SIGNIFICANT CHANGE UP (ref 3.8–10.5)
WBC # FLD AUTO: 3.43 K/UL — LOW (ref 3.8–10.5)
WBC # FLD AUTO: 4.69 K/UL — SIGNIFICANT CHANGE UP (ref 3.8–10.5)
WBC # FLD AUTO: 5.68 K/UL — SIGNIFICANT CHANGE UP (ref 3.8–10.5)

## 2024-06-29 PROCEDURE — 99223 1ST HOSP IP/OBS HIGH 75: CPT

## 2024-06-29 PROCEDURE — 99233 SBSQ HOSP IP/OBS HIGH 50: CPT

## 2024-06-29 RX ORDER — POTASSIUM PHOSPHATE, MONOBASIC POTASSIUM PHOSPHATE, DIBASIC INJECTION, 236; 224 MG/ML; MG/ML
15 SOLUTION, CONCENTRATE INTRAVENOUS ONCE
Refills: 0 | Status: COMPLETED | OUTPATIENT
Start: 2024-06-29 | End: 2024-06-29

## 2024-06-29 RX ORDER — HEPARIN SODIUM 50 [USP'U]/ML
2000 INJECTION, SOLUTION INTRAVENOUS EVERY 6 HOURS
Refills: 0 | Status: DISCONTINUED | OUTPATIENT
Start: 2024-06-29 | End: 2024-07-01

## 2024-06-29 RX ORDER — DIPHENHYDRAMINE HYDROCHLORIDE AND LIDOCAINE HYDROCHLORIDE AND ALUMINUM HYDROXIDE AND MAGNESIUM HYDRO
15 KIT THREE TIMES A DAY
Refills: 0 | Status: DISCONTINUED | OUTPATIENT
Start: 2024-06-29 | End: 2024-07-01

## 2024-06-29 RX ORDER — PANTOPRAZOLE SODIUM 40 MG/10ML
40 INJECTION, POWDER, FOR SOLUTION INTRAVENOUS
Refills: 0 | Status: DISCONTINUED | OUTPATIENT
Start: 2024-06-29 | End: 2024-07-01

## 2024-06-29 RX ORDER — MAGNESIUM SULFATE 100 %
1 POWDER (GRAM) MISCELLANEOUS ONCE
Refills: 0 | Status: COMPLETED | OUTPATIENT
Start: 2024-06-29 | End: 2024-06-29

## 2024-06-29 RX ORDER — HEPARIN SODIUM 50 [USP'U]/ML
INJECTION, SOLUTION INTRAVENOUS
Qty: 25000 | Refills: 0 | Status: DISCONTINUED | OUTPATIENT
Start: 2024-06-29 | End: 2024-07-01

## 2024-06-29 RX ORDER — APIXABAN 5 MG/1
1 TABLET, FILM COATED ORAL
Qty: 74 | Refills: 0
Start: 2024-06-29 | End: 2024-07-28

## 2024-06-29 RX ORDER — SODIUM CHLORIDE 0.9 % (FLUSH) 0.9 %
1000 SYRINGE (ML) INJECTION
Refills: 0 | Status: DISCONTINUED | OUTPATIENT
Start: 2024-06-29 | End: 2024-06-30

## 2024-06-29 RX ORDER — HEPARIN SODIUM 50 [USP'U]/ML
4000 INJECTION, SOLUTION INTRAVENOUS EVERY 6 HOURS
Refills: 0 | Status: DISCONTINUED | OUTPATIENT
Start: 2024-06-29 | End: 2024-07-01

## 2024-06-29 RX ORDER — POTASSIUM CHLORIDE 600 MG/1
20 TABLET, FILM COATED, EXTENDED RELEASE ORAL ONCE
Refills: 0 | Status: COMPLETED | OUTPATIENT
Start: 2024-06-29 | End: 2024-06-29

## 2024-06-29 RX ORDER — CALCIUM GLUCONATE 98 MG/ML
1 INJECTION, SOLUTION INTRAVENOUS ONCE
Refills: 0 | Status: COMPLETED | OUTPATIENT
Start: 2024-06-29 | End: 2024-06-29

## 2024-06-29 RX ADMIN — CALCIUM GLUCONATE 100 GRAM(S): 98 INJECTION, SOLUTION INTRAVENOUS at 09:37

## 2024-06-29 RX ADMIN — SODIUM BICARBONATE 1300 MILLIGRAM(S): 650 TABLET ORAL at 06:55

## 2024-06-29 RX ADMIN — HEPARIN SODIUM 4000 UNIT(S): 50 INJECTION, SOLUTION INTRAVENOUS at 13:42

## 2024-06-29 RX ADMIN — SODIUM BICARBONATE 1300 MILLIGRAM(S): 650 TABLET ORAL at 17:59

## 2024-06-29 RX ADMIN — HEPARIN SODIUM 900 UNIT(S)/HR: 50 INJECTION, SOLUTION INTRAVENOUS at 13:42

## 2024-06-29 RX ADMIN — Medication 1 GRAM(S): at 15:00

## 2024-06-29 RX ADMIN — PANTOPRAZOLE SODIUM 40 MILLIGRAM(S): 40 INJECTION, POWDER, FOR SOLUTION INTRAVENOUS at 06:56

## 2024-06-29 RX ADMIN — POTASSIUM CHLORIDE 20 MILLIEQUIVALENT(S): 600 TABLET, FILM COATED, EXTENDED RELEASE ORAL at 09:37

## 2024-06-29 RX ADMIN — POTASSIUM PHOSPHATE, MONOBASIC POTASSIUM PHOSPHATE, DIBASIC INJECTION, 62.5 MILLIMOLE(S): 236; 224 SOLUTION, CONCENTRATE INTRAVENOUS at 11:34

## 2024-06-29 RX ADMIN — HEPARIN SODIUM 2000 UNIT(S): 50 INJECTION, SOLUTION INTRAVENOUS at 21:56

## 2024-06-29 RX ADMIN — HEPARIN SODIUM 1000 UNIT(S)/HR: 50 INJECTION, SOLUTION INTRAVENOUS at 07:46

## 2024-06-29 RX ADMIN — Medication 75 MILLILITER(S): at 19:29

## 2024-06-29 RX ADMIN — HEPARIN SODIUM 1000 UNIT(S)/HR: 50 INJECTION, SOLUTION INTRAVENOUS at 21:40

## 2024-06-29 RX ADMIN — Medication 100 GRAM(S): at 09:37

## 2024-06-29 RX ADMIN — Medication 1 GRAM(S): at 06:56

## 2024-06-29 RX ADMIN — HEPARIN SODIUM 900 UNIT(S)/HR: 50 INJECTION, SOLUTION INTRAVENOUS at 20:35

## 2024-06-30 LAB
ALBUMIN SERPL ELPH-MCNC: 2.6 G/DL — LOW (ref 3.3–5)
ALP SERPL-CCNC: 96 U/L — SIGNIFICANT CHANGE UP (ref 40–120)
ALT FLD-CCNC: 15 U/L — SIGNIFICANT CHANGE UP (ref 4–41)
ANION GAP SERPL CALC-SCNC: 11 MMOL/L — SIGNIFICANT CHANGE UP (ref 7–14)
ANION GAP SERPL CALC-SCNC: 14 MMOL/L — SIGNIFICANT CHANGE UP (ref 7–14)
ANION GAP SERPL CALC-SCNC: 9 MMOL/L — SIGNIFICANT CHANGE UP (ref 7–14)
ANISOCYTOSIS BLD QL: SLIGHT — SIGNIFICANT CHANGE UP
APTT BLD: 48.7 SEC — HIGH (ref 24.5–35.6)
APTT BLD: 52.6 SEC — HIGH (ref 24.5–35.6)
APTT BLD: 68.5 SEC — HIGH (ref 24.5–35.6)
AST SERPL-CCNC: 12 U/L — SIGNIFICANT CHANGE UP (ref 4–40)
BASE EXCESS BLDV CALC-SCNC: -2.8 MMOL/L — LOW (ref -2–3)
BASOPHILS # BLD AUTO: 0.04 K/UL — SIGNIFICANT CHANGE UP (ref 0–0.2)
BASOPHILS NFR BLD AUTO: 0.9 % — SIGNIFICANT CHANGE UP (ref 0–2)
BILIRUB SERPL-MCNC: 0.9 MG/DL — SIGNIFICANT CHANGE UP (ref 0.2–1.2)
BUN SERPL-MCNC: 18 MG/DL — SIGNIFICANT CHANGE UP (ref 7–23)
BUN SERPL-MCNC: 20 MG/DL — SIGNIFICANT CHANGE UP (ref 7–23)
BUN SERPL-MCNC: 21 MG/DL — SIGNIFICANT CHANGE UP (ref 7–23)
CA-I SERPL-SCNC: 1.25 MMOL/L — SIGNIFICANT CHANGE UP (ref 1.15–1.33)
CALCIUM SERPL-MCNC: 7.8 MG/DL — LOW (ref 8.4–10.5)
CALCIUM SERPL-MCNC: 8.2 MG/DL — LOW (ref 8.4–10.5)
CALCIUM SERPL-MCNC: 8.7 MG/DL — SIGNIFICANT CHANGE UP (ref 8.4–10.5)
CHLORIDE BLDV-SCNC: 95 MMOL/L — LOW (ref 96–108)
CHLORIDE SERPL-SCNC: 102 MMOL/L — SIGNIFICANT CHANGE UP (ref 98–107)
CHLORIDE SERPL-SCNC: 92 MMOL/L — LOW (ref 98–107)
CHLORIDE SERPL-SCNC: 96 MMOL/L — LOW (ref 98–107)
CO2 BLDV-SCNC: 22.9 MMOL/L — SIGNIFICANT CHANGE UP (ref 22–26)
CO2 SERPL-SCNC: 15 MMOL/L — LOW (ref 22–31)
CO2 SERPL-SCNC: 19 MMOL/L — LOW (ref 22–31)
CO2 SERPL-SCNC: 19 MMOL/L — LOW (ref 22–31)
CREAT SERPL-MCNC: 1.48 MG/DL — HIGH (ref 0.5–1.3)
CREAT SERPL-MCNC: 1.49 MG/DL — HIGH (ref 0.5–1.3)
CREAT SERPL-MCNC: 1.56 MG/DL — HIGH (ref 0.5–1.3)
EGFR: 51 ML/MIN/1.73M2 — LOW
EGFR: 54 ML/MIN/1.73M2 — LOW
EGFR: 54 ML/MIN/1.73M2 — LOW
EOSINOPHIL # BLD AUTO: 0.04 K/UL — SIGNIFICANT CHANGE UP (ref 0–0.5)
EOSINOPHIL NFR BLD AUTO: 0.9 % — SIGNIFICANT CHANGE UP (ref 0–6)
GAS PNL BLDV: 123 MMOL/L — LOW (ref 136–145)
GAS PNL BLDV: SIGNIFICANT CHANGE UP
GAS PNL BLDV: SIGNIFICANT CHANGE UP
GIANT PLATELETS BLD QL SMEAR: PRESENT — SIGNIFICANT CHANGE UP
GLUCOSE BLDV-MCNC: 89 MG/DL — SIGNIFICANT CHANGE UP (ref 70–99)
GLUCOSE SERPL-MCNC: 126 MG/DL — HIGH (ref 70–99)
GLUCOSE SERPL-MCNC: 90 MG/DL — SIGNIFICANT CHANGE UP (ref 70–99)
GLUCOSE SERPL-MCNC: 98 MG/DL — SIGNIFICANT CHANGE UP (ref 70–99)
HCO3 BLDV-SCNC: 22 MMOL/L — SIGNIFICANT CHANGE UP (ref 22–29)
HCT VFR BLD CALC: 23.1 % — LOW (ref 39–50)
HCT VFR BLD CALC: 27.3 % — LOW (ref 39–50)
HCT VFR BLDA CALC: 24 % — LOW (ref 39–51)
HGB BLD CALC-MCNC: 7.9 G/DL — LOW (ref 12.6–17.4)
HGB BLD-MCNC: 7.6 G/DL — LOW (ref 13–17)
HGB BLD-MCNC: 9 G/DL — LOW (ref 13–17)
HYPOCHROMIA BLD QL: SLIGHT — SIGNIFICANT CHANGE UP
IANC: 1.72 K/UL — LOW (ref 1.8–7.4)
LACTATE BLDV-MCNC: 1.2 MMOL/L — SIGNIFICANT CHANGE UP (ref 0.5–2)
LYMPHOCYTES # BLD AUTO: 0.96 K/UL — LOW (ref 1–3.3)
LYMPHOCYTES # BLD AUTO: 20.2 % — SIGNIFICANT CHANGE UP (ref 13–44)
MAGNESIUM SERPL-MCNC: 1.6 MG/DL — SIGNIFICANT CHANGE UP (ref 1.6–2.6)
MAGNESIUM SERPL-MCNC: 1.7 MG/DL — SIGNIFICANT CHANGE UP (ref 1.6–2.6)
MCHC RBC-ENTMCNC: 25.6 PG — LOW (ref 27–34)
MCHC RBC-ENTMCNC: 25.9 PG — LOW (ref 27–34)
MCHC RBC-ENTMCNC: 32.9 GM/DL — SIGNIFICANT CHANGE UP (ref 32–36)
MCHC RBC-ENTMCNC: 33 GM/DL — SIGNIFICANT CHANGE UP (ref 32–36)
MCV RBC AUTO: 77.8 FL — LOW (ref 80–100)
MCV RBC AUTO: 78.4 FL — LOW (ref 80–100)
METAMYELOCYTES # FLD: 1.8 % — HIGH (ref 0–1)
MICROCYTES BLD QL: SLIGHT — SIGNIFICANT CHANGE UP
MONOCYTES # BLD AUTO: 0.96 K/UL — HIGH (ref 0–0.9)
MONOCYTES NFR BLD AUTO: 20.2 % — HIGH (ref 2–14)
MYELOCYTES NFR BLD: 1.8 % — HIGH (ref 0–0)
NEUTROPHILS # BLD AUTO: 2.49 K/UL — SIGNIFICANT CHANGE UP (ref 1.8–7.4)
NEUTROPHILS NFR BLD AUTO: 45 % — SIGNIFICANT CHANGE UP (ref 43–77)
NEUTS BAND # BLD: 7.4 % — HIGH (ref 0–6)
NRBC # BLD: 0 /100 WBCS — SIGNIFICANT CHANGE UP (ref 0–0)
NRBC # FLD: 0 K/UL — SIGNIFICANT CHANGE UP (ref 0–0)
OSMOLALITY SERPL: 268 MOSM/KG — LOW (ref 275–295)
OSMOLALITY UR: 453 MOSM/KG — SIGNIFICANT CHANGE UP (ref 50–1200)
OVALOCYTES BLD QL SMEAR: SLIGHT — SIGNIFICANT CHANGE UP
PCO2 BLDV: 36 MMHG — LOW (ref 42–55)
PH BLDV: 7.39 — SIGNIFICANT CHANGE UP (ref 7.32–7.43)
PHOSPHATE SERPL-MCNC: 2.3 MG/DL — LOW (ref 2.5–4.5)
PHOSPHATE SERPL-MCNC: 3.6 MG/DL — SIGNIFICANT CHANGE UP (ref 2.5–4.5)
PLAT MORPH BLD: NORMAL — SIGNIFICANT CHANGE UP
PLATELET # BLD AUTO: 129 K/UL — LOW (ref 150–400)
PLATELET # BLD AUTO: 201 K/UL — SIGNIFICANT CHANGE UP (ref 150–400)
PLATELET COUNT - ESTIMATE: NORMAL — SIGNIFICANT CHANGE UP
PO2 BLDV: 32 MMHG — SIGNIFICANT CHANGE UP (ref 25–45)
POIKILOCYTOSIS BLD QL AUTO: SLIGHT — SIGNIFICANT CHANGE UP
POLYCHROMASIA BLD QL SMEAR: SLIGHT — SIGNIFICANT CHANGE UP
POTASSIUM BLDV-SCNC: 4.1 MMOL/L — SIGNIFICANT CHANGE UP (ref 3.5–5.1)
POTASSIUM SERPL-MCNC: 3.9 MMOL/L — SIGNIFICANT CHANGE UP (ref 3.5–5.3)
POTASSIUM SERPL-MCNC: 4.1 MMOL/L — SIGNIFICANT CHANGE UP (ref 3.5–5.3)
POTASSIUM SERPL-MCNC: 4.8 MMOL/L — SIGNIFICANT CHANGE UP (ref 3.5–5.3)
POTASSIUM SERPL-SCNC: 3.9 MMOL/L — SIGNIFICANT CHANGE UP (ref 3.5–5.3)
POTASSIUM SERPL-SCNC: 4.1 MMOL/L — SIGNIFICANT CHANGE UP (ref 3.5–5.3)
POTASSIUM SERPL-SCNC: 4.8 MMOL/L — SIGNIFICANT CHANGE UP (ref 3.5–5.3)
PROT SERPL-MCNC: 5.5 G/DL — LOW (ref 6–8.3)
RBC # BLD: 2.97 M/UL — LOW (ref 4.2–5.8)
RBC # BLD: 3.48 M/UL — LOW (ref 4.2–5.8)
RBC # FLD: 15.1 % — HIGH (ref 10.3–14.5)
RBC # FLD: 15.1 % — HIGH (ref 10.3–14.5)
RBC BLD AUTO: NORMAL — SIGNIFICANT CHANGE UP
SAO2 % BLDV: 51.3 % — LOW (ref 67–88)
SMUDGE CELLS # BLD: PRESENT — SIGNIFICANT CHANGE UP
SODIUM SERPL-SCNC: 121 MMOL/L — LOW (ref 135–145)
SODIUM SERPL-SCNC: 124 MMOL/L — LOW (ref 135–145)
SODIUM SERPL-SCNC: 132 MMOL/L — LOW (ref 135–145)
SODIUM UR-SCNC: 31 MMOL/L — SIGNIFICANT CHANGE UP
VARIANT LYMPHS # BLD: 1.8 % — SIGNIFICANT CHANGE UP (ref 0–6)
WBC # BLD: 4.76 K/UL — SIGNIFICANT CHANGE UP (ref 3.8–10.5)
WBC # BLD: 6.68 K/UL — SIGNIFICANT CHANGE UP (ref 3.8–10.5)
WBC # FLD AUTO: 4.76 K/UL — SIGNIFICANT CHANGE UP (ref 3.8–10.5)
WBC # FLD AUTO: 6.68 K/UL — SIGNIFICANT CHANGE UP (ref 3.8–10.5)

## 2024-06-30 PROCEDURE — 99233 SBSQ HOSP IP/OBS HIGH 50: CPT

## 2024-06-30 RX ORDER — SODIUM CHLORIDE 3 G/100ML
500 INJECTION, SOLUTION INTRAVENOUS
Refills: 0 | Status: DISCONTINUED | OUTPATIENT
Start: 2024-06-30 | End: 2024-07-01

## 2024-06-30 RX ADMIN — Medication 63.75 MILLIMOLE(S): at 13:04

## 2024-06-30 RX ADMIN — HEPARIN SODIUM 1200 UNIT(S)/HR: 50 INJECTION, SOLUTION INTRAVENOUS at 20:48

## 2024-06-30 RX ADMIN — PANTOPRAZOLE SODIUM 40 MILLIGRAM(S): 40 INJECTION, POWDER, FOR SOLUTION INTRAVENOUS at 06:22

## 2024-06-30 RX ADMIN — SODIUM CHLORIDE 30 MILLILITER(S): 3 INJECTION, SOLUTION INTRAVENOUS at 15:17

## 2024-06-30 RX ADMIN — HEPARIN SODIUM 1100 UNIT(S)/HR: 50 INJECTION, SOLUTION INTRAVENOUS at 15:17

## 2024-06-30 RX ADMIN — HEPARIN SODIUM 1100 UNIT(S)/HR: 50 INJECTION, SOLUTION INTRAVENOUS at 20:14

## 2024-06-30 RX ADMIN — DIPHENHYDRAMINE HYDROCHLORIDE AND LIDOCAINE HYDROCHLORIDE AND ALUMINUM HYDROXIDE AND MAGNESIUM HYDRO 15 MILLILITER(S): KIT at 06:23

## 2024-06-30 RX ADMIN — Medication 1 GRAM(S): at 21:06

## 2024-06-30 RX ADMIN — DIPHENHYDRAMINE HYDROCHLORIDE AND LIDOCAINE HYDROCHLORIDE AND ALUMINUM HYDROXIDE AND MAGNESIUM HYDRO 15 MILLILITER(S): KIT at 14:18

## 2024-06-30 RX ADMIN — Medication 1 GRAM(S): at 13:03

## 2024-06-30 RX ADMIN — Medication 1 GRAM(S): at 06:23

## 2024-06-30 RX ADMIN — SODIUM BICARBONATE 1300 MILLIGRAM(S): 650 TABLET ORAL at 06:23

## 2024-06-30 RX ADMIN — HEPARIN SODIUM 1000 UNIT(S)/HR: 50 INJECTION, SOLUTION INTRAVENOUS at 06:04

## 2024-06-30 RX ADMIN — HEPARIN SODIUM 1100 UNIT(S)/HR: 50 INJECTION, SOLUTION INTRAVENOUS at 14:17

## 2024-06-30 RX ADMIN — HEPARIN SODIUM 2000 UNIT(S): 50 INJECTION, SOLUTION INTRAVENOUS at 21:02

## 2024-06-30 RX ADMIN — HEPARIN SODIUM 1000 UNIT(S)/HR: 50 INJECTION, SOLUTION INTRAVENOUS at 08:24

## 2024-06-30 RX ADMIN — SODIUM BICARBONATE 1300 MILLIGRAM(S): 650 TABLET ORAL at 17:28

## 2024-07-01 ENCOUNTER — RESULT REVIEW (OUTPATIENT)
Age: 59
End: 2024-07-01

## 2024-07-01 ENCOUNTER — APPOINTMENT (OUTPATIENT)
Dept: ULTRASOUND IMAGING | Facility: CLINIC | Age: 59
End: 2024-07-01

## 2024-07-01 ENCOUNTER — NON-APPOINTMENT (OUTPATIENT)
Age: 59
End: 2024-07-01

## 2024-07-01 ENCOUNTER — APPOINTMENT (OUTPATIENT)
Dept: HEMATOLOGY ONCOLOGY | Facility: CLINIC | Age: 59
End: 2024-07-01

## 2024-07-01 ENCOUNTER — TRANSCRIPTION ENCOUNTER (OUTPATIENT)
Age: 59
End: 2024-07-01

## 2024-07-01 VITALS
HEART RATE: 91 BPM | SYSTOLIC BLOOD PRESSURE: 117 MMHG | RESPIRATION RATE: 18 BRPM | TEMPERATURE: 98 F | OXYGEN SATURATION: 99 % | DIASTOLIC BLOOD PRESSURE: 72 MMHG

## 2024-07-01 LAB
ALBUMIN SERPL ELPH-MCNC: 3.1 G/DL — LOW (ref 3.3–5)
ALP SERPL-CCNC: 113 U/L — SIGNIFICANT CHANGE UP (ref 40–120)
ALT FLD-CCNC: 18 U/L — SIGNIFICANT CHANGE UP (ref 4–41)
ANION GAP SERPL CALC-SCNC: 11 MMOL/L — SIGNIFICANT CHANGE UP (ref 7–14)
APTT BLD: 105.5 SEC — HIGH (ref 24.5–35.6)
APTT BLD: 78.9 SEC — HIGH (ref 24.5–35.6)
AST SERPL-CCNC: 19 U/L — SIGNIFICANT CHANGE UP (ref 4–40)
BASE EXCESS BLDV CALC-SCNC: -4.7 MMOL/L — LOW (ref -2–3)
BASE EXCESS BLDV CALC-SCNC: -6.7 MMOL/L — LOW (ref -2–3)
BASOPHILS # BLD AUTO: 0.03 K/UL — SIGNIFICANT CHANGE UP (ref 0–0.2)
BASOPHILS NFR BLD AUTO: 0.5 % — SIGNIFICANT CHANGE UP (ref 0–2)
BILIRUB SERPL-MCNC: 1 MG/DL — SIGNIFICANT CHANGE UP (ref 0.2–1.2)
BLOOD GAS VENOUS COMPREHENSIVE RESULT: SIGNIFICANT CHANGE UP
BUN SERPL-MCNC: 17 MG/DL — SIGNIFICANT CHANGE UP (ref 7–23)
CA-I SERPL-SCNC: 1.33 MMOL/L — SIGNIFICANT CHANGE UP (ref 1.15–1.33)
CALCIUM SERPL-MCNC: 8.7 MG/DL — SIGNIFICANT CHANGE UP (ref 8.4–10.5)
CHLORIDE BLDV-SCNC: 102 MMOL/L — SIGNIFICANT CHANGE UP (ref 96–108)
CHLORIDE BLDV-SCNC: 103 MMOL/L — SIGNIFICANT CHANGE UP (ref 96–108)
CHLORIDE SERPL-SCNC: 101 MMOL/L — SIGNIFICANT CHANGE UP (ref 98–107)
CO2 BLDV-SCNC: 21.6 MMOL/L — LOW (ref 22–26)
CO2 BLDV-SCNC: 23 MMOL/L — SIGNIFICANT CHANGE UP (ref 22–26)
CO2 SERPL-SCNC: 19 MMOL/L — LOW (ref 22–31)
CREAT SERPL-MCNC: 1.51 MG/DL — HIGH (ref 0.5–1.3)
EGFR: 53 ML/MIN/1.73M2 — LOW
EOSINOPHIL # BLD AUTO: 0.08 K/UL — SIGNIFICANT CHANGE UP (ref 0–0.5)
EOSINOPHIL NFR BLD AUTO: 1.3 % — SIGNIFICANT CHANGE UP (ref 0–6)
GAS PNL BLDV: 130 MMOL/L — LOW (ref 136–145)
GAS PNL BLDV: 132 MMOL/L — LOW (ref 136–145)
GAS PNL BLDV: SIGNIFICANT CHANGE UP
GLUCOSE BLDV-MCNC: 85 MG/DL — SIGNIFICANT CHANGE UP (ref 70–99)
GLUCOSE BLDV-MCNC: 99 MG/DL — SIGNIFICANT CHANGE UP (ref 70–99)
GLUCOSE SERPL-MCNC: 88 MG/DL — SIGNIFICANT CHANGE UP (ref 70–99)
HCO3 BLDV-SCNC: 20 MMOL/L — LOW (ref 22–29)
HCO3 BLDV-SCNC: 22 MMOL/L — SIGNIFICANT CHANGE UP (ref 22–29)
HCT VFR BLD CALC: 25.9 % — LOW (ref 39–50)
HCT VFR BLDA CALC: 25 % — LOW (ref 39–51)
HCT VFR BLDA CALC: 26 % — LOW (ref 39–51)
HGB BLD CALC-MCNC: 8.2 G/DL — LOW (ref 12.6–17.4)
HGB BLD CALC-MCNC: 8.6 G/DL — LOW (ref 12.6–17.4)
HGB BLD-MCNC: 8.1 G/DL — LOW (ref 13–17)
IANC: 3.46 K/UL — SIGNIFICANT CHANGE UP (ref 1.8–7.4)
IMM GRANULOCYTES NFR BLD AUTO: 13.4 % — HIGH (ref 0–0.9)
LACTATE BLDV-MCNC: 3.4 MMOL/L — HIGH (ref 0.5–2)
LACTATE BLDV-MCNC: 3.8 MMOL/L — HIGH (ref 0.5–2)
LYMPHOCYTES # BLD AUTO: 1.06 K/UL — SIGNIFICANT CHANGE UP (ref 1–3.3)
LYMPHOCYTES # BLD AUTO: 17.2 % — SIGNIFICANT CHANGE UP (ref 13–44)
MAGNESIUM SERPL-MCNC: 1.7 MG/DL — SIGNIFICANT CHANGE UP (ref 1.6–2.6)
MCHC RBC-ENTMCNC: 25 PG — LOW (ref 27–34)
MCHC RBC-ENTMCNC: 31.3 GM/DL — LOW (ref 32–36)
MCV RBC AUTO: 79.9 FL — LOW (ref 80–100)
MONOCYTES # BLD AUTO: 0.65 K/UL — SIGNIFICANT CHANGE UP (ref 0–0.9)
MONOCYTES NFR BLD AUTO: 10.5 % — SIGNIFICANT CHANGE UP (ref 2–14)
NEUTROPHILS # BLD AUTO: 3.53 K/UL — SIGNIFICANT CHANGE UP (ref 1.8–7.4)
NEUTROPHILS NFR BLD AUTO: 57.1 % — SIGNIFICANT CHANGE UP (ref 43–77)
NRBC # BLD: 0 /100 WBCS — SIGNIFICANT CHANGE UP (ref 0–0)
NRBC # FLD: 0 K/UL — SIGNIFICANT CHANGE UP (ref 0–0)
PCO2 BLDV: 45 MMHG — SIGNIFICANT CHANGE UP (ref 42–55)
PCO2 BLDV: 46 MMHG — SIGNIFICANT CHANGE UP (ref 42–55)
PH BLDV: 7.25 — LOW (ref 7.32–7.43)
PH BLDV: 7.29 — LOW (ref 7.32–7.43)
PHOSPHATE SERPL-MCNC: 2.8 MG/DL — SIGNIFICANT CHANGE UP (ref 2.5–4.5)
PLATELET # BLD AUTO: 236 K/UL — SIGNIFICANT CHANGE UP (ref 150–400)
PO2 BLDV: 33 MMHG — SIGNIFICANT CHANGE UP (ref 25–45)
PO2 BLDV: 39 MMHG — SIGNIFICANT CHANGE UP (ref 25–45)
POTASSIUM BLDV-SCNC: 3.6 MMOL/L — SIGNIFICANT CHANGE UP (ref 3.5–5.1)
POTASSIUM BLDV-SCNC: 3.9 MMOL/L — SIGNIFICANT CHANGE UP (ref 3.5–5.1)
POTASSIUM SERPL-MCNC: 3.7 MMOL/L — SIGNIFICANT CHANGE UP (ref 3.5–5.3)
POTASSIUM SERPL-SCNC: 3.7 MMOL/L — SIGNIFICANT CHANGE UP (ref 3.5–5.3)
PROT SERPL-MCNC: 6.2 G/DL — SIGNIFICANT CHANGE UP (ref 6–8.3)
RBC # BLD: 3.24 M/UL — LOW (ref 4.2–5.8)
RBC # FLD: 15.1 % — HIGH (ref 10.3–14.5)
SAO2 % BLDV: 44.9 % — LOW (ref 67–88)
SAO2 % BLDV: 56.4 % — LOW (ref 67–88)
SODIUM SERPL-SCNC: 131 MMOL/L — LOW (ref 135–145)
WBC # BLD: 6.23 K/UL — SIGNIFICANT CHANGE UP (ref 3.8–10.5)
WBC # FLD AUTO: 6.23 K/UL — SIGNIFICANT CHANGE UP (ref 3.8–10.5)

## 2024-07-01 PROCEDURE — 99239 HOSP IP/OBS DSCHRG MGMT >30: CPT

## 2024-07-01 PROCEDURE — 93306 TTE W/DOPPLER COMPLETE: CPT | Mod: 26

## 2024-07-01 PROCEDURE — 76376 3D RENDER W/INTRP POSTPROCES: CPT | Mod: 26

## 2024-07-01 RX ORDER — APIXABAN 5 MG/1
1 TABLET, FILM COATED ORAL
Qty: 1 | Refills: 0
Start: 2024-07-01 | End: 2024-07-30

## 2024-07-01 RX ORDER — APIXABAN 5 MG/1
10 TABLET, FILM COATED ORAL EVERY 12 HOURS
Refills: 0 | Status: DISCONTINUED | OUTPATIENT
Start: 2024-07-01 | End: 2024-07-01

## 2024-07-01 RX ORDER — PANTOPRAZOLE SODIUM 40 MG/10ML
1 INJECTION, POWDER, FOR SOLUTION INTRAVENOUS
Qty: 30 | Refills: 0
Start: 2024-07-01 | End: 2024-07-30

## 2024-07-01 RX ADMIN — HEPARIN SODIUM 1100 UNIT(S)/HR: 50 INJECTION, SOLUTION INTRAVENOUS at 03:21

## 2024-07-01 RX ADMIN — Medication 1 GRAM(S): at 13:08

## 2024-07-01 RX ADMIN — SODIUM BICARBONATE 1300 MILLIGRAM(S): 650 TABLET ORAL at 06:37

## 2024-07-01 RX ADMIN — APIXABAN 10 MILLIGRAM(S): 5 TABLET, FILM COATED ORAL at 17:33

## 2024-07-01 RX ADMIN — HEPARIN SODIUM 1100 UNIT(S)/HR: 50 INJECTION, SOLUTION INTRAVENOUS at 08:21

## 2024-07-01 RX ADMIN — HEPARIN SODIUM 1100 UNIT(S)/HR: 50 INJECTION, SOLUTION INTRAVENOUS at 11:51

## 2024-07-01 RX ADMIN — Medication 1 GRAM(S): at 06:37

## 2024-07-01 RX ADMIN — PANTOPRAZOLE SODIUM 40 MILLIGRAM(S): 40 INJECTION, POWDER, FOR SOLUTION INTRAVENOUS at 06:37

## 2024-07-01 RX ADMIN — SODIUM BICARBONATE 1300 MILLIGRAM(S): 650 TABLET ORAL at 17:34

## 2024-07-03 LAB
CULTURE RESULTS: SIGNIFICANT CHANGE UP
CULTURE RESULTS: SIGNIFICANT CHANGE UP
SPECIMEN SOURCE: SIGNIFICANT CHANGE UP
SPECIMEN SOURCE: SIGNIFICANT CHANGE UP

## 2024-07-08 ENCOUNTER — APPOINTMENT (OUTPATIENT)
Dept: INFUSION THERAPY | Facility: HOSPITAL | Age: 59
End: 2024-07-08

## 2024-07-08 ENCOUNTER — APPOINTMENT (OUTPATIENT)
Dept: HEMATOLOGY ONCOLOGY | Facility: CLINIC | Age: 59
End: 2024-07-08

## 2024-07-08 ENCOUNTER — APPOINTMENT (OUTPATIENT)
Dept: HEMATOLOGY ONCOLOGY | Facility: CLINIC | Age: 59
End: 2024-07-08
Payer: MEDICAID

## 2024-07-08 VITALS
BODY MASS INDEX: 20.03 KG/M2 | TEMPERATURE: 98.9 F | DIASTOLIC BLOOD PRESSURE: 74 MMHG | OXYGEN SATURATION: 100 % | RESPIRATION RATE: 16 BRPM | SYSTOLIC BLOOD PRESSURE: 116 MMHG | HEART RATE: 103 BPM | WEIGHT: 127.87 LBS

## 2024-07-08 DIAGNOSIS — C18.9 MALIGNANT NEOPLASM OF COLON, UNSPECIFIED: ICD-10-CM

## 2024-07-08 PROCEDURE — G2211 COMPLEX E/M VISIT ADD ON: CPT | Mod: NC

## 2024-07-08 PROCEDURE — 99214 OFFICE O/P EST MOD 30 MIN: CPT

## 2024-07-10 ENCOUNTER — APPOINTMENT (OUTPATIENT)
Dept: INFUSION THERAPY | Facility: HOSPITAL | Age: 59
End: 2024-07-10

## 2024-07-10 ENCOUNTER — OUTPATIENT (OUTPATIENT)
Dept: OUTPATIENT SERVICES | Facility: HOSPITAL | Age: 59
LOS: 1 days | End: 2024-07-10
Payer: MEDICAID

## 2024-07-10 ENCOUNTER — APPOINTMENT (OUTPATIENT)
Dept: ULTRASOUND IMAGING | Facility: IMAGING CENTER | Age: 59
End: 2024-07-10
Payer: MEDICAID

## 2024-07-10 ENCOUNTER — RESULT REVIEW (OUTPATIENT)
Age: 59
End: 2024-07-10

## 2024-07-10 DIAGNOSIS — N18.30 CHRONIC KIDNEY DISEASE, STAGE 3 UNSPECIFIED: ICD-10-CM

## 2024-07-10 DIAGNOSIS — Z90.49 ACQUIRED ABSENCE OF OTHER SPECIFIED PARTS OF DIGESTIVE TRACT: Chronic | ICD-10-CM

## 2024-07-10 DIAGNOSIS — Z98.890 OTHER SPECIFIED POSTPROCEDURAL STATES: Chronic | ICD-10-CM

## 2024-07-10 PROCEDURE — 76775 US EXAM ABDO BACK WALL LIM: CPT | Mod: 26

## 2024-07-10 PROCEDURE — 76775 US EXAM ABDO BACK WALL LIM: CPT

## 2024-07-22 ENCOUNTER — APPOINTMENT (OUTPATIENT)
Dept: HEMATOLOGY ONCOLOGY | Facility: CLINIC | Age: 59
End: 2024-07-22

## 2024-07-22 ENCOUNTER — APPOINTMENT (OUTPATIENT)
Dept: INFUSION THERAPY | Facility: HOSPITAL | Age: 59
End: 2024-07-22

## 2024-07-24 ENCOUNTER — NON-APPOINTMENT (OUTPATIENT)
Age: 59
End: 2024-07-24

## 2024-07-24 ENCOUNTER — APPOINTMENT (OUTPATIENT)
Dept: HEMATOLOGY ONCOLOGY | Facility: CLINIC | Age: 59
End: 2024-07-24

## 2024-07-24 ENCOUNTER — APPOINTMENT (OUTPATIENT)
Dept: INFUSION THERAPY | Facility: HOSPITAL | Age: 59
End: 2024-07-24

## 2024-07-25 ENCOUNTER — INPATIENT (INPATIENT)
Facility: HOSPITAL | Age: 59
LOS: 6 days | Discharge: HOME | End: 2024-08-01
Attending: STUDENT IN AN ORGANIZED HEALTH CARE EDUCATION/TRAINING PROGRAM | Admitting: STUDENT IN AN ORGANIZED HEALTH CARE EDUCATION/TRAINING PROGRAM
Payer: MEDICAID

## 2024-07-25 VITALS
RESPIRATION RATE: 18 BRPM | TEMPERATURE: 98 F | HEART RATE: 97 BPM | DIASTOLIC BLOOD PRESSURE: 67 MMHG | HEIGHT: 67 IN | SYSTOLIC BLOOD PRESSURE: 102 MMHG | OXYGEN SATURATION: 100 %

## 2024-07-25 DIAGNOSIS — Z98.890 OTHER SPECIFIED POSTPROCEDURAL STATES: Chronic | ICD-10-CM

## 2024-07-25 DIAGNOSIS — Z90.49 ACQUIRED ABSENCE OF OTHER SPECIFIED PARTS OF DIGESTIVE TRACT: Chronic | ICD-10-CM

## 2024-07-25 LAB
ALBUMIN SERPL ELPH-MCNC: 2.9 G/DL — LOW (ref 3.3–5)
ALP SERPL-CCNC: 112 U/L — SIGNIFICANT CHANGE UP (ref 40–120)
ALT FLD-CCNC: 11 U/L — SIGNIFICANT CHANGE UP (ref 4–41)
ANION GAP SERPL CALC-SCNC: 12 MMOL/L — SIGNIFICANT CHANGE UP (ref 7–14)
APPEARANCE UR: ABNORMAL
AST SERPL-CCNC: 31 U/L — SIGNIFICANT CHANGE UP (ref 4–40)
BACTERIA # UR AUTO: ABNORMAL /HPF
BASE EXCESS BLDV CALC-SCNC: 2.8 MMOL/L — SIGNIFICANT CHANGE UP (ref -2–3)
BASE EXCESS BLDV CALC-SCNC: 5.2 MMOL/L — HIGH (ref -2–3)
BASOPHILS # BLD AUTO: 0.07 K/UL — SIGNIFICANT CHANGE UP (ref 0–0.2)
BASOPHILS NFR BLD AUTO: 0.5 % — SIGNIFICANT CHANGE UP (ref 0–2)
BILIRUB SERPL-MCNC: 0.7 MG/DL — SIGNIFICANT CHANGE UP (ref 0.2–1.2)
BILIRUB UR-MCNC: ABNORMAL
BUN SERPL-MCNC: 17 MG/DL — SIGNIFICANT CHANGE UP (ref 7–23)
CA-I SERPL-SCNC: 1.19 MMOL/L — SIGNIFICANT CHANGE UP (ref 1.15–1.33)
CA-I SERPL-SCNC: 1.2 MMOL/L — SIGNIFICANT CHANGE UP (ref 1.15–1.33)
CALCIUM SERPL-MCNC: 8.4 MG/DL — SIGNIFICANT CHANGE UP (ref 8.4–10.5)
CAST: 0 /LPF — SIGNIFICANT CHANGE UP (ref 0–4)
CHLORIDE BLDV-SCNC: 92 MMOL/L — LOW (ref 96–108)
CHLORIDE BLDV-SCNC: 93 MMOL/L — LOW (ref 96–108)
CHLORIDE SERPL-SCNC: 90 MMOL/L — LOW (ref 98–107)
CO2 BLDV-SCNC: 30.4 MMOL/L — HIGH (ref 22–26)
CO2 BLDV-SCNC: 32.5 MMOL/L — HIGH (ref 22–26)
CO2 SERPL-SCNC: 26 MMOL/L — SIGNIFICANT CHANGE UP (ref 22–31)
COLOR SPEC: ABNORMAL
CREAT SERPL-MCNC: 1.39 MG/DL — HIGH (ref 0.5–1.3)
DIFF PNL FLD: ABNORMAL
EGFR: 58 ML/MIN/1.73M2 — LOW
EOSINOPHIL # BLD AUTO: 0.41 K/UL — SIGNIFICANT CHANGE UP (ref 0–0.5)
EOSINOPHIL NFR BLD AUTO: 2.9 % — SIGNIFICANT CHANGE UP (ref 0–6)
GAS PNL BLDV: 125 MMOL/L — LOW (ref 136–145)
GAS PNL BLDV: 128 MMOL/L — LOW (ref 136–145)
GAS PNL BLDV: SIGNIFICANT CHANGE UP
GLUCOSE BLDV-MCNC: 104 MG/DL — HIGH (ref 70–99)
GLUCOSE BLDV-MCNC: 122 MG/DL — HIGH (ref 70–99)
GLUCOSE SERPL-MCNC: 107 MG/DL — HIGH (ref 70–99)
GLUCOSE UR QL: NEGATIVE MG/DL — SIGNIFICANT CHANGE UP
HCO3 BLDV-SCNC: 29 MMOL/L — SIGNIFICANT CHANGE UP (ref 22–29)
HCO3 BLDV-SCNC: 31 MMOL/L — HIGH (ref 22–29)
HCT VFR BLD CALC: 29.3 % — LOW (ref 39–50)
HCT VFR BLDA CALC: 25 % — LOW (ref 39–51)
HCT VFR BLDA CALC: 27 % — LOW (ref 39–51)
HGB BLD CALC-MCNC: 8.4 G/DL — LOW (ref 12.6–17.4)
HGB BLD CALC-MCNC: 9.1 G/DL — LOW (ref 12.6–17.4)
HGB BLD-MCNC: 9.1 G/DL — LOW (ref 13–17)
IANC: 10.29 K/UL — HIGH (ref 1.8–7.4)
IMM GRANULOCYTES NFR BLD AUTO: 1 % — HIGH (ref 0–0.9)
KETONES UR-MCNC: NEGATIVE MG/DL — SIGNIFICANT CHANGE UP
LACTATE BLDV-MCNC: 2.1 MMOL/L — HIGH (ref 0.5–2)
LACTATE BLDV-MCNC: 3.4 MMOL/L — HIGH (ref 0.5–2)
LEUKOCYTE ESTERASE UR-ACNC: ABNORMAL
LYMPHOCYTES # BLD AUTO: 14.5 % — SIGNIFICANT CHANGE UP (ref 13–44)
LYMPHOCYTES # BLD AUTO: 2.09 K/UL — SIGNIFICANT CHANGE UP (ref 1–3.3)
MCHC RBC-ENTMCNC: 24.6 PG — LOW (ref 27–34)
MCHC RBC-ENTMCNC: 31.1 GM/DL — LOW (ref 32–36)
MCV RBC AUTO: 79.2 FL — LOW (ref 80–100)
MONOCYTES # BLD AUTO: 1.37 K/UL — HIGH (ref 0–0.9)
MONOCYTES NFR BLD AUTO: 9.5 % — SIGNIFICANT CHANGE UP (ref 2–14)
NEUTROPHILS # BLD AUTO: 10.29 K/UL — HIGH (ref 1.8–7.4)
NEUTROPHILS NFR BLD AUTO: 71.6 % — SIGNIFICANT CHANGE UP (ref 43–77)
NITRITE UR-MCNC: POSITIVE
NRBC # BLD: 0 /100 WBCS — SIGNIFICANT CHANGE UP (ref 0–0)
NRBC # FLD: 0 K/UL — SIGNIFICANT CHANGE UP (ref 0–0)
PCO2 BLDV: 51 MMHG — SIGNIFICANT CHANGE UP (ref 42–55)
PCO2 BLDV: 51 MMHG — SIGNIFICANT CHANGE UP (ref 42–55)
PH BLDV: 7.36 — SIGNIFICANT CHANGE UP (ref 7.32–7.43)
PH BLDV: 7.39 — SIGNIFICANT CHANGE UP (ref 7.32–7.43)
PH UR: 5 — SIGNIFICANT CHANGE UP (ref 5–8)
PLATELET # BLD AUTO: 321 K/UL — SIGNIFICANT CHANGE UP (ref 150–400)
PO2 BLDV: 24 MMHG — LOW (ref 25–45)
PO2 BLDV: 41 MMHG — SIGNIFICANT CHANGE UP (ref 25–45)
POTASSIUM BLDV-SCNC: 3.8 MMOL/L — SIGNIFICANT CHANGE UP (ref 3.5–5.1)
POTASSIUM BLDV-SCNC: 3.9 MMOL/L — SIGNIFICANT CHANGE UP (ref 3.5–5.1)
POTASSIUM SERPL-MCNC: 3.8 MMOL/L — SIGNIFICANT CHANGE UP (ref 3.5–5.3)
POTASSIUM SERPL-SCNC: 3.8 MMOL/L — SIGNIFICANT CHANGE UP (ref 3.5–5.3)
PROT SERPL-MCNC: 6.3 G/DL — SIGNIFICANT CHANGE UP (ref 6–8.3)
PROT UR-MCNC: 300 MG/DL
RBC # BLD: 3.7 M/UL — LOW (ref 4.2–5.8)
RBC # FLD: 17.3 % — HIGH (ref 10.3–14.5)
RBC CASTS # UR COMP ASSIST: ABNORMAL /HPF
REVIEW: SIGNIFICANT CHANGE UP
SAO2 % BLDV: 31.3 % — LOW (ref 67–88)
SAO2 % BLDV: 67.5 % — SIGNIFICANT CHANGE UP (ref 67–88)
SODIUM SERPL-SCNC: 128 MMOL/L — LOW (ref 135–145)
SP GR SPEC: 1.02 — SIGNIFICANT CHANGE UP (ref 1–1.03)
SQUAMOUS # UR AUTO: 1 /HPF — SIGNIFICANT CHANGE UP (ref 0–5)
UROBILINOGEN FLD QL: 0.2 MG/DL — SIGNIFICANT CHANGE UP (ref 0.2–1)
WBC # BLD: 14.38 K/UL — HIGH (ref 3.8–10.5)
WBC # FLD AUTO: 14.38 K/UL — HIGH (ref 3.8–10.5)
WBC UR QL: 84 /HPF — HIGH (ref 0–5)

## 2024-07-25 PROCEDURE — 74177 CT ABD & PELVIS W/CONTRAST: CPT | Mod: 26,MC

## 2024-07-25 PROCEDURE — 99291 CRITICAL CARE FIRST HOUR: CPT

## 2024-07-25 RX ORDER — BACTERIOSTATIC SODIUM CHLORIDE 0.9 %
2000 VIAL (ML) INJECTION ONCE
Refills: 0 | Status: COMPLETED | OUTPATIENT
Start: 2024-07-25 | End: 2024-07-25

## 2024-07-25 RX ORDER — ACETAMINOPHEN 500 MG
1000 TABLET ORAL ONCE
Refills: 0 | Status: COMPLETED | OUTPATIENT
Start: 2024-07-25 | End: 2024-07-25

## 2024-07-25 RX ADMIN — Medication 400 MILLIGRAM(S): at 15:37

## 2024-07-25 RX ADMIN — Medication 1000 MILLIGRAM(S): at 18:34

## 2024-07-25 RX ADMIN — Medication 100 MILLIGRAM(S): at 17:52

## 2024-07-25 RX ADMIN — Medication 2000 MILLILITER(S): at 17:02

## 2024-07-25 RX ADMIN — Medication 1000 MILLIGRAM(S): at 16:00

## 2024-07-25 RX ADMIN — Medication 2000 MILLILITER(S): at 18:34

## 2024-07-25 NOTE — ED PROVIDER NOTE - PHYSICAL EXAMINATION
General: Alert  No apparent distress.  Head: Normocephalic and atraumatic.  Eyes: PERRLA with EOMI.   ENT: MMM, Oropharynx clear  Neck: Supple. Trachea midline.   Cardiac: Normal S1 and S2 w/ RRR. No murmurs appreciated.   Pulmonary: CTA bilaterally. No increased WOB.   Abdominal: Soft, non-tender, non-distended  Neurologic: No focal sensory or motor deficits.  Extremities: No lower extremity edema, bruising, soreness   Skin: Color appropriate for race. Intact, warm, and well-perfused.  Psychiatric: Appropriate mood and affect. No apparent risk to self or others. General: Alert  No apparent distress.  Cardiac: Normal S1 and S2 w/ RRR. No murmurs rubs gallops  Abdominal: No erythema, discharge, tenderness around ostomy bag. Reddish brown stool noted in ostomy bag. Soft, non-tender, non-distended  Neurologic: No focal sensory or motor deficits.  Extremities: No lower extremity edema, bruising, soreness

## 2024-07-25 NOTE — ED PROVIDER NOTE - CLINICAL SUMMARY MEDICAL DECISION MAKING FREE TEXT BOX
58M w/ PMH metastatic colon cancer s/p ileostomy 2 years ago with ostomy bag on cetuximab, DVT on eliquis presenting for 5 days of hematuria and 2 days of blood in ostomy bag. See HPI. Vitals on arrival 58M w/ PMH metastatic colon cancer s/p ileostomy 2 years ago with ostomy bag on cetuximab, DVT on eliquis presenting for 5 days of hematuria and 2 days of blood in ostomy bag. See HPI. Vitals on arrival are WNL. On physical exam pt was alert and NAD. Abdomen showed no erythema, discharge, tenderness around ostomy bag. Reddish brown stool noted in ostomy bag. Otherwise soft, non-tender, non-distended. Cardiac/pulm exam WNL. Differentials include but not limited to UTI vs. hemorrhagic cystitis vs. nephrolithiasis vs. colovesical fistula vs. medication induced bleeding. Will get CT abdomen w/ IV contrast, CBC, CMP, UA, Ucx. 58M w/ PMH metastatic colon cancer s/p ileostomy 2 years ago with ostomy bag on cetuximab, DVT on eliquis presenting for 5 days of hematuria and 2 days of blood in ostomy bag. See HPI. Vitals on arrival are WNL. On physical exam pt was alert and NAD. Abdomen showed no erythema, discharge, tenderness around ostomy bag. Reddish brown stool noted in ostomy bag. Otherwise soft, non-tender, non-distended. Cardiac/pulm exam WNL. Differentials include but not limited to UTI vs. hemorrhagic cystitis vs. nephrolithiasis vs. colovesical fistula vs. medication induced bleeding. Will get CT abdomen w/ IV contrast, CBC, CMP, UA, Ucx.    Lisa Pires MD attending physician patient is 58-year-old man status post colon cancer as well as ileostomy 2 years ago with an ostomy bag.  Comes in has also a DVT on Eliquis has 5 days of hematuria and 2 days of some blood in the ostomy bag.  Patient with lots of weight loss abdomen is not particular tender he is alert and awake.  He is got no signs of infection around the ostomy site.  He does have stool in the ostomy bag.  Lungs are clear abdomen soft.    High level of concern for this gentleman having urine infection.  Could also be bleeding secondary to the Eliquis however he has a white count of over 14,000 with immature cells in his CBC.  Antibiotics started and patient being treated for sepsis.  Plan to get CT given that his surgical procedures is in his abdomen signed out to Dr. Valderrama awaiting results both of other lab work as well as his CT for plan for disposition

## 2024-07-25 NOTE — CONSULT NOTE ADULT - ATTENDING COMMENTS
L illiofemoral DVT, asymptomatic   swelling resolved   on A/C now with hematuria  no indication for thrombectomy since pt is asymptomatic and also has metastatic cancer  if unable to tolerate A/C 2/2 hematuria will need an IVC filter

## 2024-07-25 NOTE — ED ADULT NURSE NOTE - NSFALLHARMRISKINTERV_ED_ALL_ED

## 2024-07-25 NOTE — ED PROVIDER NOTE - OBJECTIVE STATEMENT
58M w/ PMH metastatic colon cancer s/p ileostomy 2 years ago with ostomy bag on cetuximab, DVT on eliquis presenting for 5 days of hematuria and 2 days of blood in ostomy bag.     Pt states that on saturday he noticed pink urine and severe pain when urinating. Pain is only associated with urination. Pain does not radiate. Did not take any medication for pain. On tuesday he started passing blood clots and gross hematuria. Had a CT scan 6 months ago by urologist for possible nephrolithiasis but showed nothing and was prescribed sodium bicarb with potassium citrate. Pt was told to follow up in August. Pt also endorses having blood in ostomy bag for the past 2 days. Never happened before. No abdominal pain. He started taking eliquis 2 weeks ago due to DVT. He denies chest pain, SOB, stool in urine, fevers, chills. No tobacco, alcohol or drugs. NKDA.

## 2024-07-25 NOTE — ED ADULT TRIAGE NOTE - CHIEF COMPLAINT QUOTE
Pt. c/o hematuria in ileostomy bag since Tuesday. Denies any pain. hx of colon cancer. On Eliquis daily.

## 2024-07-25 NOTE — ED ADULT NURSE NOTE - OBJECTIVE STATEMENT
Pt presents to the ED with wife due to hematuria and bleeding from ileostomy. Pt reports he has had the ileostomy x2 years. Noticed bleeding from site on Tuesday. Pt reports bleeding on Tuesday was a large amount, and has been intermittent in nature. Hematuria and dysuria started around the same time. Pt alert and oriented x4. History of Colon CA, received x1 chemotherapy on June 10th. Pt appears thin and fragile. Pt pallor in color. Pt endorses fatigue. Respirations even and unlabored. Pt denies pain. Pt has blood transfusion in the past- last one 2 weeks ago. Port-a-cath noted to R chest wall. Not accessed at this time.

## 2024-07-25 NOTE — ED PROVIDER NOTE - PROGRESS NOTE DETAILS
DO Josemanuel, EM Attending: case discussed w/ urology who will come and evaluate the patient. Pete Menjivar MD (PGY2): Received signout from outgoing resident, 58-year-old male medical history significant for metastatic colon cancer s/p ileostomy, DVT on Eliquis, presenting with dysuria and blood in his colostomy bag onset last Saturday.  Patient has been seen by vascular who notes DVT has improved and to continue with Eliquis as scheduled.  Patient also seen by neurology who has stated no intervention required at this time.  Please keep catheter and admit for IV antibiotics.  Reported hydronephrosis has been present per urology. Pt to be admitted for IV abx, medical optimization

## 2024-07-25 NOTE — ED PROVIDER NOTE - CARE PLAN
1 Principal Discharge DX:	Other specified sepsis  Secondary Diagnosis:	Hematuria  Secondary Diagnosis:	Cancer, colon

## 2024-07-25 NOTE — CONSULT NOTE ADULT - ASSESSMENT
58M w/ PMH metastatic colon cancer s/p ileostomy 2 years ago, on cetuximab, DVT on eliquis presenting for 5 days of hematuria. Patient was found to have LLE DVT extending to Left femoral vein in duplex on 06/28/2024 for LLE edema and was started on Eliquis. Today, his LLE edema has completely resolved, CT scan on work up for Hematuria shows Left common iliac thrombosis. Vascular surgery consulted for persistent LLE DVT          PLAN:  - No acute surgical intervention  - per patient, LLE edema completely improved and resolved compared to the swelling he had 2 weeks ago.   - Recommend obtaining Lower extremities duplex to assess DVT and compressibility of the vessels.   - Please obtain Ileo-Cavo duplex to assess proximal extent of the cloth.   - OK to hold Eliquis while work up of hematuria.   - Will follow       Discussed with vascular surgery fellow on behalf of Dr Shannan Love PGY3  C Team surgery  [id number]

## 2024-07-25 NOTE — ED PROVIDER NOTE - ATTENDING CONTRIBUTION TO CARE
Lisa Pires MD attending physician patient is 58-year-old man status post colon cancer as well as ileostomy 2 years ago with an ostomy bag.  Comes in has also a DVT on Eliquis has 5 days of hematuria and 2 days of some blood in the ostomy bag.  Patient with lots of weight loss abdomen is not particular tender he is alert and awake.  He is got no signs of infection around the ostomy site.  He does have stool in the ostomy bag.  Lungs are clear abdomen soft.    High level of concern for this gentleman having urine infection.  Could also be bleeding secondary to the Eliquis however he has a white count of over 14,000 with immature cells in his CBC.  Antibiotics started and patient being treated for sepsis.  Plan to get CT given that his surgical procedures is in his abdomen signed out to Dr. Valderrama awaiting results both of other lab work as well as his CT for plan for disposition    I performed a history and physical exam of the patient and discussed their management with the resident and /or advanced care provider. I personally made/approved the management plan and take responsibility for the patient management. I reviewed the resident and /or ACP's note and agree with the documented findings and plan of care. My medical decison making and observations are found above.  Upon my evaluation, this patient had a high probability of imminent or life-threatening deterioration due to sepsis_, which required my direct attention, intervention, and personal management.  The patient has a  medical condition that impairs one or more vital organ systems.  Frequent personal assessment and adjustment of medical interventions was performed.      I have personally provided 34_ minutes of critical care time exclusive of time spent on separately billable procedures. Time includes review of laboratory data, radiology results, discussion with consultants, patient and family; monitoring for potential decompensation, as well as time spent retrieving data and reviewing the chart and documenting the visit. Interventions were performed as documented above.

## 2024-07-25 NOTE — CONSULT NOTE ADULT - SUBJECTIVE AND OBJECTIVE BOX
Patient is a 58y old  Male who presents with a chief complaint of hematuria     HPI :58M w/ PMH metastatic colon cancer s/p ileostomy 2 years ago with ostomy bag on cetuximab, DVT on eliquis presenting for 5 days of hematuria. patient was found to have LLE DVT extending to Left femoral vein in duplex on 2024 for LLE edema and was started on Eliquis. Today, his LLE edema has completely resolved, CT scan on work up for Hematuria shows Left common iliac thrombosis. Vascular surgery consulted for persistent LLE DVT          	PAST MEDICAL & SURGICAL HISTORY:  Colon cancer  (resection and ileostomy in , with progressive residual metastatic dz, refusing CTX / radiation oncology consultation to date)      H/O ileostomy      Renal stones  (s/p right ureteral stent placement)      Large bowel obstruction  (at time of diagnosis of colon cancer)      History of small bowel obstruction  (23: non-surgical tx)      MAXWELL (acute kidney injury)  (23)      History of cholelithiasis      Pulmonary nodule      Splenomegaly      History of ileostomy      S/P colon resection      H/O ureteroscopy  (s/p right ureteral stent placement)      Vital Signs Last 24 Hrs  T(C): 36.8 (2024 20:35), Max: 36.8 (2024 12:50)  T(F): 98.3 (2024 20:35), Max: 98.3 (2024 20:35)  HR: 87 (2024 20:35) (75 - 98)  BP: 110/76 (2024 20:35) (101/60 - 113/75)  BP(mean): --  RR: 16 (2024 20:35) (15 - 18)  SpO2: 100% (2024 20:35) (98% - 100%)    Parameters below as of 2024 20:35  Patient On (Oxygen Delivery Method): room air        PHYSICAL EXAM:  Constitutional: well developed, well nourished, NAD  Respiratory: Unlabored breathing   Cardiovascular: RRR  Gastrointestinal: abdomen soft, nontender, nondistended. No obvious masses. No peritonitis  Extremities: Motor-sensory intact warm, no edema. Palpable pulses: fem, DP/PT        LABS:                        9.1    14.38 )-----------( 321      ( 2024 15:27 )             29.3         128<L>  |  90<L>  |  17  ----------------------------<  107<H>  3.8   |  26  |  1.39<H>    Ca    8.4      2024 15:27    TPro  6.3  /  Alb  2.9<L>  /  TBili  0.7  /  DBili  x   /  AST  31  /  ALT  11  /  AlkPhos  112        Urinalysis Basic - ( 2024 17:50 )    Color: Red / Appearance: Turbid / S.024 / pH: x  Gluc: x / Ketone: Negative mg/dL  / Bili: Moderate / Urobili: 0.2 mg/dL   Blood: x / Protein: 300 mg/dL / Nitrite: Positive   Leuk Esterase: Moderate / RBC: Too Numerous to count /HPF / WBC 84 /HPF   Sq Epi: x / Non Sq Epi: 1 /HPF / Bacteria: Moderate /HPF        RADIOLOGY & ADDITIONAL STUDIES:

## 2024-07-26 DIAGNOSIS — R31.0 GROSS HEMATURIA: ICD-10-CM

## 2024-07-26 DIAGNOSIS — A41.89 OTHER SPECIFIED SEPSIS: ICD-10-CM

## 2024-07-26 DIAGNOSIS — N13.30 UNSPECIFIED HYDRONEPHROSIS: ICD-10-CM

## 2024-07-26 LAB
ALBUMIN SERPL ELPH-MCNC: 2.3 G/DL — LOW (ref 3.3–5)
ALP SERPL-CCNC: 93 U/L — SIGNIFICANT CHANGE UP (ref 40–120)
ALT FLD-CCNC: 7 U/L — SIGNIFICANT CHANGE UP (ref 4–41)
ANION GAP SERPL CALC-SCNC: 11 MMOL/L — SIGNIFICANT CHANGE UP (ref 7–14)
ANION GAP SERPL CALC-SCNC: 11 MMOL/L — SIGNIFICANT CHANGE UP (ref 7–14)
APTT BLD: 27.5 SEC — SIGNIFICANT CHANGE UP (ref 24.5–35.6)
AST SERPL-CCNC: 14 U/L — SIGNIFICANT CHANGE UP (ref 4–40)
BASOPHILS # BLD AUTO: 0.02 K/UL — SIGNIFICANT CHANGE UP (ref 0–0.2)
BASOPHILS NFR BLD AUTO: 0.2 % — SIGNIFICANT CHANGE UP (ref 0–2)
BILIRUB SERPL-MCNC: 0.4 MG/DL — SIGNIFICANT CHANGE UP (ref 0.2–1.2)
BUN SERPL-MCNC: 17 MG/DL — SIGNIFICANT CHANGE UP (ref 7–23)
BUN SERPL-MCNC: 17 MG/DL — SIGNIFICANT CHANGE UP (ref 7–23)
CALCIUM SERPL-MCNC: 7.8 MG/DL — LOW (ref 8.4–10.5)
CALCIUM SERPL-MCNC: 7.9 MG/DL — LOW (ref 8.4–10.5)
CHLORIDE SERPL-SCNC: 97 MMOL/L — LOW (ref 98–107)
CHLORIDE SERPL-SCNC: 97 MMOL/L — LOW (ref 98–107)
CHLORIDE UR-SCNC: 50 MMOL/L — SIGNIFICANT CHANGE UP
CO2 SERPL-SCNC: 24 MMOL/L — SIGNIFICANT CHANGE UP (ref 22–31)
CO2 SERPL-SCNC: 24 MMOL/L — SIGNIFICANT CHANGE UP (ref 22–31)
CREAT ?TM UR-MCNC: 108 MG/DL — SIGNIFICANT CHANGE UP
CREAT SERPL-MCNC: 1.31 MG/DL — HIGH (ref 0.5–1.3)
CREAT SERPL-MCNC: 1.58 MG/DL — HIGH (ref 0.5–1.3)
EGFR: 50 ML/MIN/1.73M2 — LOW
EGFR: 63 ML/MIN/1.73M2 — SIGNIFICANT CHANGE UP
EOSINOPHIL # BLD AUTO: 0.19 K/UL — SIGNIFICANT CHANGE UP (ref 0–0.5)
EOSINOPHIL NFR BLD AUTO: 2.1 % — SIGNIFICANT CHANGE UP (ref 0–6)
GLUCOSE SERPL-MCNC: 114 MG/DL — HIGH (ref 70–99)
GLUCOSE SERPL-MCNC: 135 MG/DL — HIGH (ref 70–99)
HCT VFR BLD CALC: 23 % — LOW (ref 39–50)
HCT VFR BLD CALC: 23.5 % — LOW (ref 39–50)
HCT VFR BLD CALC: 23.9 % — LOW (ref 39–50)
HGB BLD-MCNC: 7.1 G/DL — LOW (ref 13–17)
HGB BLD-MCNC: 7.2 G/DL — LOW (ref 13–17)
HGB BLD-MCNC: 7.3 G/DL — LOW (ref 13–17)
IANC: 7.06 K/UL — SIGNIFICANT CHANGE UP (ref 1.8–7.4)
IMM GRANULOCYTES NFR BLD AUTO: 0.4 % — SIGNIFICANT CHANGE UP (ref 0–0.9)
LYMPHOCYTES # BLD AUTO: 1.1 K/UL — SIGNIFICANT CHANGE UP (ref 1–3.3)
LYMPHOCYTES # BLD AUTO: 12 % — LOW (ref 13–44)
MAGNESIUM SERPL-MCNC: 1.4 MG/DL — LOW (ref 1.6–2.6)
MAGNESIUM SERPL-MCNC: 1.5 MG/DL — LOW (ref 1.6–2.6)
MCHC RBC-ENTMCNC: 24.3 PG — LOW (ref 27–34)
MCHC RBC-ENTMCNC: 24.3 PG — LOW (ref 27–34)
MCHC RBC-ENTMCNC: 24.4 PG — LOW (ref 27–34)
MCHC RBC-ENTMCNC: 30.5 GM/DL — LOW (ref 32–36)
MCHC RBC-ENTMCNC: 30.6 GM/DL — LOW (ref 32–36)
MCHC RBC-ENTMCNC: 30.9 GM/DL — LOW (ref 32–36)
MCV RBC AUTO: 79 FL — LOW (ref 80–100)
MCV RBC AUTO: 79.4 FL — LOW (ref 80–100)
MCV RBC AUTO: 79.7 FL — LOW (ref 80–100)
MONOCYTES # BLD AUTO: 0.74 K/UL — SIGNIFICANT CHANGE UP (ref 0–0.9)
MONOCYTES NFR BLD AUTO: 8.1 % — SIGNIFICANT CHANGE UP (ref 2–14)
NEUTROPHILS # BLD AUTO: 7.06 K/UL — SIGNIFICANT CHANGE UP (ref 1.8–7.4)
NEUTROPHILS NFR BLD AUTO: 77.2 % — HIGH (ref 43–77)
NRBC # BLD: 0 /100 WBCS — SIGNIFICANT CHANGE UP (ref 0–0)
NRBC # FLD: 0 K/UL — SIGNIFICANT CHANGE UP (ref 0–0)
OSMOLALITY SERPL: 276 MOSM/KG — SIGNIFICANT CHANGE UP (ref 275–295)
OSMOLALITY UR: 457 MOSM/KG — SIGNIFICANT CHANGE UP (ref 50–1200)
PHOSPHATE SERPL-MCNC: 2.7 MG/DL — SIGNIFICANT CHANGE UP (ref 2.5–4.5)
PHOSPHATE SERPL-MCNC: 3 MG/DL — SIGNIFICANT CHANGE UP (ref 2.5–4.5)
PLATELET # BLD AUTO: 208 K/UL — SIGNIFICANT CHANGE UP (ref 150–400)
PLATELET # BLD AUTO: 208 K/UL — SIGNIFICANT CHANGE UP (ref 150–400)
PLATELET # BLD AUTO: 216 K/UL — SIGNIFICANT CHANGE UP (ref 150–400)
POTASSIUM SERPL-MCNC: 3.4 MMOL/L — LOW (ref 3.5–5.3)
POTASSIUM SERPL-MCNC: 3.6 MMOL/L — SIGNIFICANT CHANGE UP (ref 3.5–5.3)
POTASSIUM SERPL-SCNC: 3.4 MMOL/L — LOW (ref 3.5–5.3)
POTASSIUM SERPL-SCNC: 3.6 MMOL/L — SIGNIFICANT CHANGE UP (ref 3.5–5.3)
POTASSIUM UR-SCNC: 35.6 MMOL/L — SIGNIFICANT CHANGE UP
PROT SERPL-MCNC: 5.2 G/DL — LOW (ref 6–8.3)
RBC # BLD: 2.91 M/UL — LOW (ref 4.2–5.8)
RBC # BLD: 2.96 M/UL — LOW (ref 4.2–5.8)
RBC # BLD: 3 M/UL — LOW (ref 4.2–5.8)
RBC # FLD: 17.3 % — HIGH (ref 10.3–14.5)
RBC # FLD: 17.3 % — HIGH (ref 10.3–14.5)
RBC # FLD: 17.5 % — HIGH (ref 10.3–14.5)
SODIUM SERPL-SCNC: 132 MMOL/L — LOW (ref 135–145)
SODIUM SERPL-SCNC: 132 MMOL/L — LOW (ref 135–145)
SODIUM UR-SCNC: 76 MMOL/L — SIGNIFICANT CHANGE UP
WBC # BLD: 8.68 K/UL — SIGNIFICANT CHANGE UP (ref 3.8–10.5)
WBC # BLD: 8.97 K/UL — SIGNIFICANT CHANGE UP (ref 3.8–10.5)
WBC # BLD: 9.15 K/UL — SIGNIFICANT CHANGE UP (ref 3.8–10.5)
WBC # FLD AUTO: 8.68 K/UL — SIGNIFICANT CHANGE UP (ref 3.8–10.5)
WBC # FLD AUTO: 8.97 K/UL — SIGNIFICANT CHANGE UP (ref 3.8–10.5)
WBC # FLD AUTO: 9.15 K/UL — SIGNIFICANT CHANGE UP (ref 3.8–10.5)

## 2024-07-26 PROCEDURE — 99223 1ST HOSP IP/OBS HIGH 75: CPT

## 2024-07-26 PROCEDURE — 93970 EXTREMITY STUDY: CPT | Mod: 26

## 2024-07-26 RX ORDER — LOPERAMIDE HYDROCHLORIDE 2 MG/1
2 CAPSULE ORAL EVERY 6 HOURS
Refills: 0 | Status: DISCONTINUED | OUTPATIENT
Start: 2024-07-26 | End: 2024-08-01

## 2024-07-26 RX ORDER — PIPERACILLIN SODIUM, TAZOBACTAM SODIUM 3; .375 G/15ML; G/15ML
3.38 INJECTION, POWDER, LYOPHILIZED, FOR SOLUTION INTRAVENOUS ONCE
Refills: 0 | Status: COMPLETED | OUTPATIENT
Start: 2024-07-27 | End: 2024-07-26

## 2024-07-26 RX ORDER — PANTOPRAZOLE SODIUM 20 MG/1
40 TABLET, DELAYED RELEASE ORAL
Refills: 0 | Status: DISCONTINUED | OUTPATIENT
Start: 2024-07-26 | End: 2024-08-01

## 2024-07-26 RX ORDER — SODIUM BICARBONATE 0.9MEQ/ML
650 SYRINGE (ML) INTRAVENOUS
Refills: 0 | Status: DISCONTINUED | OUTPATIENT
Start: 2024-07-26 | End: 2024-08-01

## 2024-07-26 RX ORDER — HEPARIN SODIUM 1000 [USP'U]/ML
1000 INJECTION, SOLUTION INTRAVENOUS; SUBCUTANEOUS
Qty: 25000 | Refills: 0 | Status: DISCONTINUED | OUTPATIENT
Start: 2024-07-26 | End: 2024-07-27

## 2024-07-26 RX ORDER — BACTERIOSTATIC SODIUM CHLORIDE 0.9 %
500 VIAL (ML) INJECTION ONCE
Refills: 0 | Status: COMPLETED | OUTPATIENT
Start: 2024-07-26 | End: 2024-07-26

## 2024-07-26 RX ORDER — PANTOPRAZOLE SODIUM 20 MG/1
40 TABLET, DELAYED RELEASE ORAL
Refills: 0 | Status: DISCONTINUED | OUTPATIENT
Start: 2024-07-26 | End: 2024-07-26

## 2024-07-26 RX ORDER — PIPERACILLIN SODIUM, TAZOBACTAM SODIUM 3; .375 G/15ML; G/15ML
3.38 INJECTION, POWDER, LYOPHILIZED, FOR SOLUTION INTRAVENOUS EVERY 8 HOURS
Refills: 0 | Status: DISCONTINUED | OUTPATIENT
Start: 2024-07-27 | End: 2024-07-27

## 2024-07-26 RX ORDER — PIPERACILLIN SODIUM, TAZOBACTAM SODIUM 3; .375 G/15ML; G/15ML
3.38 INJECTION, POWDER, LYOPHILIZED, FOR SOLUTION INTRAVENOUS ONCE
Refills: 0 | Status: COMPLETED | OUTPATIENT
Start: 2024-07-26 | End: 2024-07-26

## 2024-07-26 RX ORDER — BACTERIOSTATIC SODIUM CHLORIDE 0.9 %
1 VIAL (ML) INJECTION THREE TIMES A DAY
Refills: 0 | Status: DISCONTINUED | OUTPATIENT
Start: 2024-07-26 | End: 2024-08-01

## 2024-07-26 RX ADMIN — PIPERACILLIN SODIUM, TAZOBACTAM SODIUM 25 GRAM(S): 3; .375 INJECTION, POWDER, LYOPHILIZED, FOR SOLUTION INTRAVENOUS at 18:18

## 2024-07-26 RX ADMIN — PANTOPRAZOLE SODIUM 40 MILLIGRAM(S): 20 TABLET, DELAYED RELEASE ORAL at 18:18

## 2024-07-26 RX ADMIN — PIPERACILLIN SODIUM, TAZOBACTAM SODIUM 25 GRAM(S): 3; .375 INJECTION, POWDER, LYOPHILIZED, FOR SOLUTION INTRAVENOUS at 23:22

## 2024-07-26 RX ADMIN — Medication 1 GRAM(S): at 15:15

## 2024-07-26 RX ADMIN — Medication 1 GRAM(S): at 22:14

## 2024-07-26 RX ADMIN — HEPARIN SODIUM 10 UNIT(S)/HR: 1000 INJECTION, SOLUTION INTRAVENOUS; SUBCUTANEOUS at 22:13

## 2024-07-26 RX ADMIN — Medication 650 MILLIGRAM(S): at 18:18

## 2024-07-26 RX ADMIN — Medication 100 MILLILITER(S): at 15:56

## 2024-07-26 RX ADMIN — PIPERACILLIN SODIUM, TAZOBACTAM SODIUM 200 GRAM(S): 3; .375 INJECTION, POWDER, LYOPHILIZED, FOR SOLUTION INTRAVENOUS at 15:15

## 2024-07-26 NOTE — H&P ADULT - ASSESSMENT
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 He continued to have POD with scans in 3/2024 showing growth of the bladder dome mass with invasion of the Joseph pouch and chronic PV thrombosis with cavernous malformation. Pt was subsequently started on palliative FOLFOX in 6/2024, s/p C1 on 6/10, C2 delayed. His treatment course has also been c/b hyponatremia (followed by Dr. Barton in Nephrology clinic, felt to be related to hypovolemia vs SIADH, pt on salt tabs).   Of note, pt was recently treated in the ED for anemia due to blood loss- hgb 5.6 on 7/11 for which pt received 2u prbc transfusion in the ED before being sent home.  Pt was readmitted to [facility] on 7/11 with recurrent symptomatic anemia- hgb 9.1 (from 10.0 on 7/12 after prbc transfusion on 7/11) a/w fatigue; his admission labs were also notable for acute on chronic hyponatremia, as well as leukocytosis and abnormal UA.    ACTIVE PROBLEMS  Met colon adenoca   *h/o malignant LBO s/p ileostomy, L hydro, ? colovesicular fistula based on 7/25 CT a/p  Malignant hematuria  Anemia due to acute blood loss, with underlying chronic disease (h/o ileostomy bleeding, FOBT positive in 6/2024)  h/o L iliofem DVT, PV thrombosis  Hypercoag sate  Acute cystitis  Leukocytosis  Hyponatremia  CKD3    Met colon adenoca   *h/o malignant LBO s/p ileostomy, L hydro, ? colovesicular fistula   7/25 CT a/p shows POD with possible evolving colovesicular fistula  However, pt has only had 1 cycle of palliative chemo, overdue for C2 mFOLFOX with plan for addition of Cetuximab (pt is Kras WT)  Pt to continue outpt fu with Dr. Palafox after dc to discuss plans for resuming palliative chemo  Long term prognosis: guarded; pt is full code    Malignant hematuria  Anemia due to acute blood loss, with underlying chronic disease (h/o ileostomy bleeding, FOBT positive in 6/2024)  Appreciate Urology recs: no role for CBI at this time, but will reconsult if pt has persistent hematuria with clots and/or rise in Cr  Trending BID cbcs  Continuing supportive transfusion prn (goal hgb >7; plts > 50K given active bleeding)    h/o L iliofem DVT, PV thrombosis  Hypercoag sate  Appreciate Vascular consult: benefits of AC > risks at this time  Starting heparin challenge to today:   * if hgb stable x24h on heparin gtt, will transition to t lovenox THEN   * if hgb stable x24h on t lovenox, will transition back to Eliquis    * if hgb drops precipitously or if pt develops worsening hematuria/bloody ostomy output, will dc AC and pursue IVC filter placement    Acute cystitis  Leukocytosis  Pt remains afebrile, normotensive  Admission UA grossly abnormal with acute leukocytosis which is more c/f acute cystitis than colonization  Ucx in progress  Starting empiric Zosyn with plan to tailor abx based on cx speciation and sensitivities  Abx tx duration: 7d    Hyponatremia  CKD3  Pt followed by Dr. Barton in Nephrology clinic  Hyponatremia felt to be related to hypovolemia/high ostomy output vs SIADH  CKD likely due to metastatic malignancy to the bladder with L hydro  Na 128 from baseline 131-34; Cr stable (baselin 1.2-1.5)  500cc NS bolus ordered; will fu pm bmp and urine lytes  Continuing Salt tabs 1gm TID  Continuing Na bicarb 650mg BID  Monitoring UOP  Avoiding nephrotoxins  Meds renally dosed where necessary

## 2024-07-26 NOTE — H&P ADULT - NSHPPHYSICALEXAM_GEN_ALL_CORE
Vital Signs Last 24 Hrs  T(C): 36.7 (26 Jul 2024 09:00), Max: 36.9 (26 Jul 2024 03:20)  T(F): 98.1 (26 Jul 2024 09:00), Max: 98.4 (26 Jul 2024 03:20)  HR: 68 (26 Jul 2024 09:00) (66 - 98)  BP: 112/64 (26 Jul 2024 09:00) (100/63 - 113/75)  RR: 18 (26 Jul 2024 09:00) (15 - 18)  SpO2: 100% (26 Jul 2024 09:00) (98% - 100%)  Parameters below as of 26 Jul 2024 09:00  Patient On (Oxygen Delivery Method): room air  Thin,  but non-toxic appearing man, sitting up comfortably in bed, nad  Anicteric sclera, no oral lesions/thrush  RRR, no m/r/g  CTAB, no w/c/r  Abd soft/nt/nd; ostomy intact, draining liquid brownish-green stool  No peripheral edema  Lind intact- draining clear, punch-colored urine  CN 2-12 grossly intact; no gross focal neuro deficits; pt aaox3, answering all questions appropriately and following commands

## 2024-07-26 NOTE — ED ADULT NURSE REASSESSMENT NOTE - NS ED NURSE REASSESS COMMENT FT1
Report received from DALIA Kearns. Pt A&Ox3 sitting up in stretcher resting comfortably. Respirations even and unlabored on room air. No acute distress noted. Denies headache, dizziness, chest pain and SOB at this time. VS as noted in flow sheet. Pt remains on continuous cardiac monitor, NSR noted. Pt noted to have ileostomy bag to RLQ at this time, brownish output noted, no bright red blood noted. Plan of care ongoing, comfort measures provided and safety measures maintained. Awaiting further orders.
Report received from break DALIA Betancourt. Pt A&Ox3 sleeping in stretcher, arousable to verbal and tactile stimuli. Respirations even and unlabored on room air. No acute distress noted. Denies headache, dizziness, chest pain and SOB at this time. Pt remains with 20 fr coude esquivel in place at this time, 300 mL of red output noted. Plan of care ongoing, comfort measures provided and safety measures maintained. Awaiting bed assignment.

## 2024-07-26 NOTE — CONSULT NOTE ADULT - SUBJECTIVE AND OBJECTIVE BOX
HPI: 57 y/o male with a h/o nephrolithiasis, colon ca, s/p resection/ileostomy , chronic pelvic mass, DVT on Eliquis, presenting with gross hematuria.  Patient has had intermitted hematuria in the past.  Reports current episode started 5 days ago, stated as a light pink color and has worsened to bright red.  Reports dysuria as well.  Reports intermittent retention, resulting in passage of large clots after straining.  Denies fevers.  Denies abdominal pain or flank pain.  Reports a palpable pelvic mass that has increased in size over the past 2 weeks.  A CT was performed showing a large pelvic mass with central necrosis, adjacent to the bladder dome and inseparable from the Joseph's pouch Moderate left hydronephrosis is new from prior.  Urology called for consultation.  He is currently mid chemo treatment for pelvic mass that he reports he has been poorly tolerating due to mucositis and fatigue.  He states that he was told he is not a surgical candidate. He has been following nephrology for an MAXWELL since .  He has chronic left sided hydro confirmed on imaging from .     PAST MEDICAL & SURGICAL HISTORY:  Colon cancer  (resection and ileostomy in , with progressive residual metastatic dz, refusing CTX / radiation oncology consultation to date)    Renal stones  (s/p right ureteral stent placement), follows with Dr. Nino    Large bowel obstruction  (at time of diagnosis of colon cancer)    History of small bowel obstruction  (23: non-surgical tx)    MAXWELL (acute kidney injury)  (23)    History of cholelithiasis    Pulmonary nodule    Splenomegaly    MEDICATIONS:  Home Medications:  Imodium: as needed (2024 22:26)  Potassium Citrate: 10 milliequivalent(s) orally 2 times a day (2024 22:24)  sodium bicarbonate 650 mg oral tablet: 2 tab(s) orally 2 times a day (2024 22:24)  sodium chloride 1 g oral tablet: 1 tab(s) orally 3 times a day (2024 22:24)    FAMILY HISTORY:  brain aneurysm (Mother)  CVA (father)    Allergies  No Known Allergies    SOCIAL HISTORY:  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  denies toxic habits    REVIEW OF SYSTEMS: Otherwise negative as stated in HPI    PHYSICAL EXAM:  Vital Signs Last 24 Hrs  T(C): 36.8 (2024 20:35), Max: 36.8 (2024 12:50)  T(F): 98.3 (2024 20:35), Max: 98.3 (2024 20:35)  HR: 87 (2024 20:35) (75 - 98)  BP: 110/76 (2024 20:35) (101/60 - 113/75)  BP(mean): --  RR: 16 (2024 20:35) (15 - 18)  SpO2: 100% (2024 20:35) (98% - 100%)    Parameters below as of 2024 20:35  Patient On (Oxygen Delivery Method): room air    General: Awake and Alert in no acute distress    Respiratory and Thorax: no resp distress   	  Cardiovascular: regular     Gastrointestinal: soft, no tenderness, large palpable pelvic mass, +lymphadenopathy    Genitourinary: Glans uncircumcised.  20F Six eye placed in sterile technique. ~200cc of translucent dark cranberry colored urine was drained out.   Manually irrigated with 1000ml of normal saline. No clot present, urine color improved to a clear light pink.  This catheter was removed, and a 20F coude was placed in sterile technique.                         	  Extremities:  no tenderness    Neuro: no focal deficits    Psych: appropriate  	  LABS:                        9.1    14.38 )-----------( 321      ( 2024 15:27 )             29.3     07-25    128<L>  |  90<L>  |  17  ----------------------------<  107<H>  3.8   |  26  |  1.39<H>    Ca    8.4      2024 15:27    TPro  6.3  /  Alb  2.9<L>  /  TBili  0.7  /  DBili  x   /  AST  31  /  ALT  11  /  AlkPhos  112  07-25      Urinalysis Basic - ( 2024 17:50 )  Color: Red / Appearance: Turbid / S.024 / pH: x  Gluc: x / Ketone: Negative mg/dL  / Bili: Moderate / Urobili: 0.2 mg/dL   Blood: x / Protein: 300 mg/dL / Nitrite: Positive   Leuk Esterase: Moderate / RBC: Too Numerous to count /HPF / WBC 84 /HPF   Sq Epi: x / Non Sq Epi: 1 /HPF / Bacteria: Moderate /HPF    RADIOLOGY:  < from: CT Abdomen and Pelvis w/ IV Cont (24 @ 17:20) >  IMPRESSION:  Large pelvic mass with central necrosis, adjacent to the bladder dome and   inseparable from the Joseph's pouch again noted. Please note that the   air in the pelvic mass centrally has developed since the prior   examination and communication with adjacent small bowel loops or Colby   pouch should be considered.   The inferior aspect of this pelvic mass is   larger in size and inseparable from the superior wall of the bladder.    Nonopacification of the left femoral vein extending to the left common   iliac vein, corresponds to deep venous thrombosis. Correlation with left   lower extremity venous duplex recommended.    Increase in small bowel mesenteric adenopathy.    Moderate left hydronephrosis is new from prior.    Cavernous transformation of the portal vein is unchanged.    Findings were discussed with Dr. Dotson 2024 5:51 PM by Dr. Chao   with read back confirmation.    --- End of Report ---    SUE CHAO MD; Resident Radiologist  This document has been electronically signed.  WILLIAMS EVANGELISTA MD; Attending Radiologist  This document has been electronically signed. 2024  6:47PM    < end of copied text >

## 2024-07-26 NOTE — CONSULT NOTE ADULT - PROBLEM SELECTOR RECOMMENDATION 2
-Chronic, dates back to 4/2023 on prior imaging.  -No acute intervention at this time secondary to asymptomatic nature and chronicity.    -Please alert Urology if any change in condition, (fevers, flank pain, no improvement on antibiotics) [id number].  -Follow- up with Dr. Nino at the [facility] for Urolgy, [phone]  -Discussed with Dr. Carlson.

## 2024-07-26 NOTE — H&P ADULT - TIME BILLING
30 mins-Chart and previous hospitalizations reviewed as well as labs, vitals prior to patient being seen for approximately   10 mins-Meds reviewed and prior outpatient workup   20 mins-Patient seen and examined and plan discussed, all questions were answered to the best of my ability  30 mins-Charting/documentation and orders.

## 2024-07-26 NOTE — CONSULT NOTE ADULT - PROBLEM SELECTOR RECOMMENDATION 9
-No acute urologic intervention required.  -No indication for CBI at this time.  -Continue esquivel, monitor output and color.  -follow-up urine culture  -treat UTI.  -Follow-up with Dr. Nino at the [facility] for Urology, [phone]

## 2024-07-26 NOTE — H&P ADULT - NSHPLABSRESULTS_GEN_ALL_CORE
25 Jul 2024 15:27   WBC Count : 14.38 K/uL  RBC Count : 3.70 M/uL  Hemoglobin : 9.1 g/dL  Hematocrit : 29.3 %  Platelet Count - Automated : 321 K/uL  Mean Cell Volume : 79.2 fL  Mean Cell Hemoglobin : 24.6 pg  Mean Cell Hemoglobin Concentration : 31.1 gm/dL  Auto Neutrophil # : 10.29 K/uL  Auto Lymphocyte # : 2.09 K/uL  Auto Monocyte # : 1.37 K/uL  Auto Eosinophil # : 0.41 K/uL  Auto Basophil # : 0.07 K/uL  Auto Neutrophil % : 71.6 %  Auto Lymphocyte % : 14.5 %  Auto Monocyte % : 9.5 %  Auto Eosinophil % : 2.9 %  Auto Basophil % : 0.5 %      128<L>  |  90<L>  |  17  ----------------------------<  107<H>  3.8   |  26  |  1.39<H>    Ca    8.4      25 Jul 2024 15:27    TPro  6.3  /  Alb  2.9<L>  /  TBili  0.7  /  DBili  x   /  AST  31  /  ALT  11  /  AlkPhos  112  07-25      MICROBIOLOGY  Abx: none    Cx Data  7/25 UA: turbid, red, large blood, mod LE, positive nitrite, WBC 84      PERTINENT RADIOLOGY  7/26 B/L LE Doppler: Extensive thrombus throughout the visualized left external iliac vein   extending throughout the visualized left lower extremity consistent with   DVT. Findings are grossly unchanged as compared to prior.    7/25 CT a/p with IV contrast  Large pelvic mass with central necrosis, adjacent to the bladder dome and   inseparable from the Joseph's pouch again noted. Please note that the   air in the pelvic mass centrally has developed since the prior   examination and communication with adjacent small bowel loops or [place]   pouch should be considered.   The inferior aspect of this pelvic mass is   larger in size and inseparable from the superior wall of the bladder.  Nonopacification of the left femoral vein extending to the left common   iliac vein, corresponds to deep venous thrombosis. Correlation with left   lower extremity venous duplex recommended.  Increase in small bowel mesenteric adenopathy.  Moderate left hydronephrosis is new from prior.  Cavernous transformation of the portal vein is unchanged.

## 2024-07-26 NOTE — H&P ADULT - HISTORY OF PRESENT ILLNESS
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 He continued to have POD with scans in 3/2024 showing growth of the bladder dome mass with invasion of the Joseph pouch and chronic PV thrombosis with cavernous malformation. Pt was subsequently started on palliative FOLFOX in 6/2024, s/p C1 on 6/10, C2 delayed. His treatment course has also been c/b hyponatremia (followed by Dr. Barton in Nephrology clinic, felt to be related to hypovolemia vs SIADH, pt on salt tabs).   Of note, pt was recently treated in the ED for anemia due to blood loss- hgb 5.6 on 7/11 for which pt received 2u prbc transfusion in the ED before being sent home.  He now represents with CC as above. Pt states that he was in [place] when he developed hematuria and fatigue; he called his primary Oncologist informing him that he was flying home urgently for an evaluation. On presentation to the ED, pt found to have hgb 9.1 (from 10.0 on 7/12 after prbc transfusion on 7/11); his admission labs were also notable for acute on chronic hyponatremia as well as leukocytosis and abnormal UA.    In the ED: VSS; CT a/p completed (results below); Vascular consulted to assess risks v benefits of resuming AC.

## 2024-07-26 NOTE — PATIENT PROFILE ADULT - FALL HARM RISK - HARM RISK INTERVENTIONS

## 2024-07-26 NOTE — CONSULT NOTE ADULT - ASSESSMENT
59 y/o male with a h/o nephrolithiasis, colon ca, s/p resection/ileostomy 2022, chronic pelvic mass, presenting with gross hematuria, while on Eliquis for DVT and +UTI.  CT findings showing chronic large pelvic mass, and left sided hydro that is chronic after reviewing imaging since 2023, and CKD followed by nephrology since 2023, remains asymptomatic without pain or fevers.

## 2024-07-27 LAB
ALBUMIN SERPL ELPH-MCNC: 2.5 G/DL — LOW (ref 3.3–5)
ALP SERPL-CCNC: 85 U/L — SIGNIFICANT CHANGE UP (ref 40–120)
ALT FLD-CCNC: 6 U/L — SIGNIFICANT CHANGE UP (ref 4–41)
ANION GAP SERPL CALC-SCNC: 9 MMOL/L — SIGNIFICANT CHANGE UP (ref 7–14)
APTT BLD: 43.3 SEC — HIGH (ref 24.5–35.6)
APTT BLD: 47 SEC — HIGH (ref 24.5–35.6)
APTT BLD: 53.3 SEC — HIGH (ref 24.5–35.6)
AST SERPL-CCNC: 15 U/L — SIGNIFICANT CHANGE UP (ref 4–40)
BASOPHILS # BLD AUTO: 0.04 K/UL — SIGNIFICANT CHANGE UP (ref 0–0.2)
BASOPHILS NFR BLD AUTO: 0.5 % — SIGNIFICANT CHANGE UP (ref 0–2)
BILIRUB SERPL-MCNC: 1 MG/DL — SIGNIFICANT CHANGE UP (ref 0.2–1.2)
BUN SERPL-MCNC: 18 MG/DL — SIGNIFICANT CHANGE UP (ref 7–23)
CALCIUM SERPL-MCNC: 8.1 MG/DL — LOW (ref 8.4–10.5)
CHLORIDE SERPL-SCNC: 97 MMOL/L — LOW (ref 98–107)
CO2 SERPL-SCNC: 24 MMOL/L — SIGNIFICANT CHANGE UP (ref 22–31)
CREAT SERPL-MCNC: 1.58 MG/DL — HIGH (ref 0.5–1.3)
CULTURE RESULTS: SIGNIFICANT CHANGE UP
EGFR: 50 ML/MIN/1.73M2 — LOW
EOSINOPHIL # BLD AUTO: 0.23 K/UL — SIGNIFICANT CHANGE UP (ref 0–0.5)
EOSINOPHIL NFR BLD AUTO: 2.9 % — SIGNIFICANT CHANGE UP (ref 0–6)
GLUCOSE SERPL-MCNC: 101 MG/DL — HIGH (ref 70–99)
HCT VFR BLD CALC: 22.8 % — LOW (ref 39–50)
HCT VFR BLD CALC: 23.2 % — LOW (ref 39–50)
HCT VFR BLD CALC: 25 % — LOW (ref 39–50)
HGB BLD-MCNC: 7.1 G/DL — LOW (ref 13–17)
HGB BLD-MCNC: 7.1 G/DL — LOW (ref 13–17)
HGB BLD-MCNC: 7.7 G/DL — LOW (ref 13–17)
IANC: 5.3 K/UL — SIGNIFICANT CHANGE UP (ref 1.8–7.4)
IMM GRANULOCYTES NFR BLD AUTO: 0.6 % — SIGNIFICANT CHANGE UP (ref 0–0.9)
LYMPHOCYTES # BLD AUTO: 1.67 K/UL — SIGNIFICANT CHANGE UP (ref 1–3.3)
LYMPHOCYTES # BLD AUTO: 20.9 % — SIGNIFICANT CHANGE UP (ref 13–44)
MCHC RBC-ENTMCNC: 24 PG — LOW (ref 27–34)
MCHC RBC-ENTMCNC: 24.1 PG — LOW (ref 27–34)
MCHC RBC-ENTMCNC: 24.3 PG — LOW (ref 27–34)
MCHC RBC-ENTMCNC: 30.6 GM/DL — LOW (ref 32–36)
MCHC RBC-ENTMCNC: 30.8 GM/DL — LOW (ref 32–36)
MCHC RBC-ENTMCNC: 31.1 GM/DL — LOW (ref 32–36)
MCV RBC AUTO: 78.1 FL — LOW (ref 80–100)
MCV RBC AUTO: 78.4 FL — LOW (ref 80–100)
MCV RBC AUTO: 78.4 FL — LOW (ref 80–100)
MONOCYTES # BLD AUTO: 0.69 K/UL — SIGNIFICANT CHANGE UP (ref 0–0.9)
MONOCYTES NFR BLD AUTO: 8.6 % — SIGNIFICANT CHANGE UP (ref 2–14)
NEUTROPHILS # BLD AUTO: 5.3 K/UL — SIGNIFICANT CHANGE UP (ref 1.8–7.4)
NEUTROPHILS NFR BLD AUTO: 66.5 % — SIGNIFICANT CHANGE UP (ref 43–77)
NRBC # BLD: 0 /100 WBCS — SIGNIFICANT CHANGE UP (ref 0–0)
NRBC # FLD: 0 K/UL — SIGNIFICANT CHANGE UP (ref 0–0)
PLATELET # BLD AUTO: 209 K/UL — SIGNIFICANT CHANGE UP (ref 150–400)
PLATELET # BLD AUTO: 216 K/UL — SIGNIFICANT CHANGE UP (ref 150–400)
PLATELET # BLD AUTO: 243 K/UL — SIGNIFICANT CHANGE UP (ref 150–400)
POTASSIUM SERPL-MCNC: 3.6 MMOL/L — SIGNIFICANT CHANGE UP (ref 3.5–5.3)
POTASSIUM SERPL-SCNC: 3.6 MMOL/L — SIGNIFICANT CHANGE UP (ref 3.5–5.3)
PROT SERPL-MCNC: 5.4 G/DL — LOW (ref 6–8.3)
RBC # BLD: 2.92 M/UL — LOW (ref 4.2–5.8)
RBC # BLD: 2.96 M/UL — LOW (ref 4.2–5.8)
RBC # BLD: 3.19 M/UL — LOW (ref 4.2–5.8)
RBC # FLD: 17.3 % — HIGH (ref 10.3–14.5)
RBC # FLD: 17.5 % — HIGH (ref 10.3–14.5)
RBC # FLD: 17.5 % — HIGH (ref 10.3–14.5)
SODIUM SERPL-SCNC: 130 MMOL/L — LOW (ref 135–145)
SPECIMEN SOURCE: SIGNIFICANT CHANGE UP
WBC # BLD: 7.54 K/UL — SIGNIFICANT CHANGE UP (ref 3.8–10.5)
WBC # BLD: 7.98 K/UL — SIGNIFICANT CHANGE UP (ref 3.8–10.5)
WBC # BLD: 8.16 K/UL — SIGNIFICANT CHANGE UP (ref 3.8–10.5)
WBC # FLD AUTO: 7.54 K/UL — SIGNIFICANT CHANGE UP (ref 3.8–10.5)
WBC # FLD AUTO: 7.98 K/UL — SIGNIFICANT CHANGE UP (ref 3.8–10.5)
WBC # FLD AUTO: 8.16 K/UL — SIGNIFICANT CHANGE UP (ref 3.8–10.5)

## 2024-07-27 PROCEDURE — 99232 SBSQ HOSP IP/OBS MODERATE 35: CPT

## 2024-07-27 RX ORDER — OXYBUTYNIN CHLORIDE 5 MG/1
5 TABLET, FILM COATED, EXTENDED RELEASE ORAL EVERY 8 HOURS
Refills: 0 | Status: DISCONTINUED | OUTPATIENT
Start: 2024-07-27 | End: 2024-07-31

## 2024-07-27 RX ORDER — HEPARIN SODIUM 1000 [USP'U]/ML
1050 INJECTION, SOLUTION INTRAVENOUS; SUBCUTANEOUS
Qty: 25000 | Refills: 0 | Status: DISCONTINUED | OUTPATIENT
Start: 2024-07-27 | End: 2024-07-27

## 2024-07-27 RX ORDER — HEPARIN SODIUM 1000 [USP'U]/ML
1150 INJECTION, SOLUTION INTRAVENOUS; SUBCUTANEOUS
Qty: 25000 | Refills: 0 | Status: DISCONTINUED | OUTPATIENT
Start: 2024-07-27 | End: 2024-07-28

## 2024-07-27 RX ORDER — OXYBUTYNIN CHLORIDE 5 MG/1
5 TABLET, FILM COATED, EXTENDED RELEASE ORAL DAILY
Refills: 0 | Status: DISCONTINUED | OUTPATIENT
Start: 2024-07-27 | End: 2024-07-27

## 2024-07-27 RX ADMIN — PIPERACILLIN SODIUM, TAZOBACTAM SODIUM 25 GRAM(S): 3; .375 INJECTION, POWDER, LYOPHILIZED, FOR SOLUTION INTRAVENOUS at 07:21

## 2024-07-27 RX ADMIN — Medication 650 MILLIGRAM(S): at 19:09

## 2024-07-27 RX ADMIN — Medication 1 GRAM(S): at 21:14

## 2024-07-27 RX ADMIN — PANTOPRAZOLE SODIUM 40 MILLIGRAM(S): 20 TABLET, DELAYED RELEASE ORAL at 19:09

## 2024-07-27 RX ADMIN — HEPARIN SODIUM 11.5 UNIT(S)/HR: 1000 INJECTION, SOLUTION INTRAVENOUS; SUBCUTANEOUS at 13:41

## 2024-07-27 RX ADMIN — OXYBUTYNIN CHLORIDE 5 MILLIGRAM(S): 5 TABLET, FILM COATED, EXTENDED RELEASE ORAL at 21:14

## 2024-07-27 RX ADMIN — OXYBUTYNIN CHLORIDE 5 MILLIGRAM(S): 5 TABLET, FILM COATED, EXTENDED RELEASE ORAL at 15:05

## 2024-07-27 RX ADMIN — Medication 1 GRAM(S): at 13:45

## 2024-07-27 RX ADMIN — HEPARIN SODIUM 10.5 UNIT(S)/HR: 1000 INJECTION, SOLUTION INTRAVENOUS; SUBCUTANEOUS at 05:01

## 2024-07-27 NOTE — PROGRESS NOTE ADULT - ASSESSMENT
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 He continued to have POD with scans in 3/2024 showing growth of the bladder dome mass with invasion of the Joseph pouch and chronic PV thrombosis with cavernous malformation. Pt was subsequently started on palliative FOLFOX in 6/2024, s/p C1 on 6/10, C2 delayed. His treatment course has also been c/b hyponatremia (followed by Dr. Barton in Nephrology clinic, felt to be related to hypovolemia vs SIADH, pt on salt tabs).   Of note, pt was recently treated in the ED for anemia due to blood loss- hgb 5.6 on 7/11 for which pt received 2u prbc transfusion in the ED before being sent home.  Pt was readmitted to [facility] on 7/11 with recurrent symptomatic anemia- hgb 9.1 (from 10.0 on 7/12 after prbc transfusion on 7/11) a/w fatigue; his admission labs were also notable for acute on chronic hyponatremia, as well as leukocytosis and abnormal UA.    ACTIVE PROBLEMS  Met colon adenoca   *h/o malignant LBO s/p ileostomy, L hydro, ? colovesicular fistula based on 7/25 CT a/p  Malignant hematuria  Anemia due to acute blood loss, with underlying chronic disease (h/o ileostomy bleeding, FOBT positive in 6/2024)  h/o L iliofem DVT, PV thrombosis  Hypercoag sate  Acute cystitis  Leukocytosis  Hyponatremia  CKD3    Met colon adenoca   *h/o malignant LBO s/p ileostomy, L hydro, ? colovesicular fistula   7/25 CT a/p shows POD with possible evolving colovesicular fistula  However, pt has only had 1 cycle of palliative chemo, overdue for C2 mFOLFOX with plan for addition of Cetuximab (pt is Kras WT)  Pt to continue outpt fu with Dr. Palafox after dc to discuss plans for resuming palliative chemo  Long term prognosis: guarded; pt is full code    Malignant hematuria  Anemia due to acute blood loss, with underlying chronic disease (h/o ileostomy bleeding, FOBT positive in 6/2024)  Appreciate Urology recs: no role for CBI at this time, but will reconsult if pt has persistent hematuria with clots and/or rise in Cr  Trending BID cbcs  Continuing supportive transfusion prn (goal hgb >7; plts > 50K given active bleeding)    h/o L iliofem DVT, PV thrombosis  Hypercoag sate  Appreciate Vascular consult: benefits of AC > risks at this time  Continue heparin challenge today:   * if hgb stable x24h on heparin gtt, will transition to t lovenox THEN   * if hgb stable x24h on t lovenox, will transition back to Eliquis    * if hgb drops precipitously or if pt develops worsening hematuria/bloody ostomy output, will dc AC and pursue IVC filter placement    Acute cystitis  Leukocytosis  Pt remains afebrile, normotensive  Admission UA grossly abnormal with acute leukocytosis which is more c/f acute cystitis than colonization  Ucx negative   DC  empiric Zosyn     Hyponatremia  CKD3  Pt followed by Dr. Barton in Nephrology clinic  Hyponatremia felt to be related to hypovolemia/high ostomy output vs SIADH  CKD likely due to metastatic malignancy to the bladder with L hydro  Na 128 from baseline 131-34; Cr stable (baselin 1.2-1.5)  500cc NS bolus ordered; will fu pm bmp and urine lytes  Continuing Salt tabs 1gm TID  Continuing Na bicarb 650mg BID  Monitoring UOP  Avoiding nephrotoxins  Meds renally dosed where necessary

## 2024-07-28 ENCOUNTER — OUTPATIENT (OUTPATIENT)
Dept: OUTPATIENT SERVICES | Facility: HOSPITAL | Age: 59
LOS: 1 days | Discharge: HOME | End: 2024-07-28

## 2024-07-28 DIAGNOSIS — N18.30 CHRONIC KIDNEY DISEASE, STAGE 3 UNSPECIFIED: ICD-10-CM

## 2024-07-28 DIAGNOSIS — Z97.8 PRESENCE OF OTHER SPECIFIED DEVICES: ICD-10-CM

## 2024-07-28 DIAGNOSIS — C18.2 MALIGNANT NEOPLASM OF ASCENDING COLON: ICD-10-CM

## 2024-07-28 DIAGNOSIS — I82.409 ACUTE EMBOLISM AND THROMBOSIS OF UNSPECIFIED DEEP VEINS OF UNSPECIFIED LOWER EXTREMITY: ICD-10-CM

## 2024-07-28 DIAGNOSIS — Z91.89 OTHER SPECIFIED PERSONAL RISK FACTORS, NOT ELSEWHERE CLASSIFIED: ICD-10-CM

## 2024-07-28 DIAGNOSIS — E87.1 HYPO-OSMOLALITY AND HYPONATREMIA: ICD-10-CM

## 2024-07-28 DIAGNOSIS — Z98.890 OTHER SPECIFIED POSTPROCEDURAL STATES: Chronic | ICD-10-CM

## 2024-07-28 DIAGNOSIS — Z90.49 ACQUIRED ABSENCE OF OTHER SPECIFIED PARTS OF DIGESTIVE TRACT: Chronic | ICD-10-CM

## 2024-07-28 DIAGNOSIS — Z98.890 OTHER SPECIFIED POSTPROCEDURAL STATES: ICD-10-CM

## 2024-07-28 LAB
ALBUMIN SERPL ELPH-MCNC: 2.4 G/DL — LOW (ref 3.3–5)
ALP SERPL-CCNC: 78 U/L — SIGNIFICANT CHANGE UP (ref 40–120)
ALT FLD-CCNC: 7 U/L — SIGNIFICANT CHANGE UP (ref 4–41)
ANION GAP SERPL CALC-SCNC: 9 MMOL/L — SIGNIFICANT CHANGE UP (ref 7–14)
APTT BLD: 58.4 SEC — HIGH (ref 24.5–35.6)
AST SERPL-CCNC: 12 U/L — SIGNIFICANT CHANGE UP (ref 4–40)
BASOPHILS # BLD AUTO: 0.03 K/UL — SIGNIFICANT CHANGE UP (ref 0–0.2)
BASOPHILS NFR BLD AUTO: 0.5 % — SIGNIFICANT CHANGE UP (ref 0–2)
BILIRUB SERPL-MCNC: 0.6 MG/DL — SIGNIFICANT CHANGE UP (ref 0.2–1.2)
BLD GP AB SCN SERPL QL: NEGATIVE — SIGNIFICANT CHANGE UP
BUN SERPL-MCNC: 18 MG/DL — SIGNIFICANT CHANGE UP (ref 7–23)
CALCIUM SERPL-MCNC: 8 MG/DL — LOW (ref 8.4–10.5)
CHLORIDE SERPL-SCNC: 97 MMOL/L — LOW (ref 98–107)
CO2 SERPL-SCNC: 25 MMOL/L — SIGNIFICANT CHANGE UP (ref 22–31)
CREAT SERPL-MCNC: 1.85 MG/DL — HIGH (ref 0.5–1.3)
EGFR: 41 ML/MIN/1.73M2 — LOW
EOSINOPHIL # BLD AUTO: 0.22 K/UL — SIGNIFICANT CHANGE UP (ref 0–0.5)
EOSINOPHIL NFR BLD AUTO: 3.5 % — SIGNIFICANT CHANGE UP (ref 0–6)
GLUCOSE SERPL-MCNC: 101 MG/DL — HIGH (ref 70–99)
HCT VFR BLD CALC: 20.6 % — CRITICAL LOW (ref 39–50)
HCT VFR BLD CALC: 22.8 % — LOW (ref 39–50)
HCT VFR BLD CALC: 27.2 % — LOW (ref 39–50)
HGB BLD-MCNC: 6.6 G/DL — CRITICAL LOW (ref 13–17)
HGB BLD-MCNC: 6.9 G/DL — CRITICAL LOW (ref 13–17)
HGB BLD-MCNC: 8.6 G/DL — LOW (ref 13–17)
IANC: 4.12 K/UL — SIGNIFICANT CHANGE UP (ref 1.8–7.4)
IMM GRANULOCYTES NFR BLD AUTO: 0.6 % — SIGNIFICANT CHANGE UP (ref 0–0.9)
LYMPHOCYTES # BLD AUTO: 1.23 K/UL — SIGNIFICANT CHANGE UP (ref 1–3.3)
LYMPHOCYTES # BLD AUTO: 19.7 % — SIGNIFICANT CHANGE UP (ref 13–44)
MCHC RBC-ENTMCNC: 24 PG — LOW (ref 27–34)
MCHC RBC-ENTMCNC: 24.6 PG — LOW (ref 27–34)
MCHC RBC-ENTMCNC: 24.7 PG — LOW (ref 27–34)
MCHC RBC-ENTMCNC: 30.3 GM/DL — LOW (ref 32–36)
MCHC RBC-ENTMCNC: 31.6 GM/DL — LOW (ref 32–36)
MCHC RBC-ENTMCNC: 32 GM/DL — SIGNIFICANT CHANGE UP (ref 32–36)
MCV RBC AUTO: 77.2 FL — LOW (ref 80–100)
MCV RBC AUTO: 77.9 FL — LOW (ref 80–100)
MCV RBC AUTO: 79.2 FL — LOW (ref 80–100)
MONOCYTES # BLD AUTO: 0.59 K/UL — SIGNIFICANT CHANGE UP (ref 0–0.9)
MONOCYTES NFR BLD AUTO: 9.5 % — SIGNIFICANT CHANGE UP (ref 2–14)
NEUTROPHILS # BLD AUTO: 4.12 K/UL — SIGNIFICANT CHANGE UP (ref 1.8–7.4)
NEUTROPHILS NFR BLD AUTO: 66.2 % — SIGNIFICANT CHANGE UP (ref 43–77)
NRBC # BLD: 0 /100 WBCS — SIGNIFICANT CHANGE UP (ref 0–0)
NRBC # FLD: 0 K/UL — SIGNIFICANT CHANGE UP (ref 0–0)
PLATELET # BLD AUTO: 216 K/UL — SIGNIFICANT CHANGE UP (ref 150–400)
PLATELET # BLD AUTO: 216 K/UL — SIGNIFICANT CHANGE UP (ref 150–400)
PLATELET # BLD AUTO: 276 K/UL — SIGNIFICANT CHANGE UP (ref 150–400)
POTASSIUM SERPL-MCNC: 3.9 MMOL/L — SIGNIFICANT CHANGE UP (ref 3.5–5.3)
POTASSIUM SERPL-SCNC: 3.9 MMOL/L — SIGNIFICANT CHANGE UP (ref 3.5–5.3)
PROT SERPL-MCNC: 5.2 G/DL — LOW (ref 6–8.3)
RBC # BLD: 2.67 M/UL — LOW (ref 4.2–5.8)
RBC # BLD: 2.88 M/UL — LOW (ref 4.2–5.8)
RBC # BLD: 3.49 M/UL — LOW (ref 4.2–5.8)
RBC # FLD: 16.5 % — HIGH (ref 10.3–14.5)
RBC # FLD: 17.3 % — HIGH (ref 10.3–14.5)
RBC # FLD: 17.6 % — HIGH (ref 10.3–14.5)
RH IG SCN BLD-IMP: POSITIVE — SIGNIFICANT CHANGE UP
SODIUM SERPL-SCNC: 131 MMOL/L — LOW (ref 135–145)
WBC # BLD: 6.23 K/UL — SIGNIFICANT CHANGE UP (ref 3.8–10.5)
WBC # BLD: 6.68 K/UL — SIGNIFICANT CHANGE UP (ref 3.8–10.5)
WBC # BLD: 9.7 K/UL — SIGNIFICANT CHANGE UP (ref 3.8–10.5)
WBC # FLD AUTO: 6.23 K/UL — SIGNIFICANT CHANGE UP (ref 3.8–10.5)
WBC # FLD AUTO: 6.68 K/UL — SIGNIFICANT CHANGE UP (ref 3.8–10.5)
WBC # FLD AUTO: 9.7 K/UL — SIGNIFICANT CHANGE UP (ref 3.8–10.5)

## 2024-07-28 PROCEDURE — 76770 US EXAM ABDO BACK WALL COMP: CPT | Mod: 26

## 2024-07-28 PROCEDURE — 99233 SBSQ HOSP IP/OBS HIGH 50: CPT

## 2024-07-28 RX ADMIN — OXYBUTYNIN CHLORIDE 5 MILLIGRAM(S): 5 TABLET, FILM COATED, EXTENDED RELEASE ORAL at 05:22

## 2024-07-28 RX ADMIN — PANTOPRAZOLE SODIUM 40 MILLIGRAM(S): 20 TABLET, DELAYED RELEASE ORAL at 18:18

## 2024-07-28 RX ADMIN — Medication 1 GRAM(S): at 21:59

## 2024-07-28 RX ADMIN — Medication 650 MILLIGRAM(S): at 05:21

## 2024-07-28 RX ADMIN — Medication 1 GRAM(S): at 05:21

## 2024-07-28 RX ADMIN — Medication 650 MILLIGRAM(S): at 18:18

## 2024-07-28 RX ADMIN — OXYBUTYNIN CHLORIDE 5 MILLIGRAM(S): 5 TABLET, FILM COATED, EXTENDED RELEASE ORAL at 21:59

## 2024-07-28 RX ADMIN — PANTOPRAZOLE SODIUM 40 MILLIGRAM(S): 20 TABLET, DELAYED RELEASE ORAL at 05:21

## 2024-07-28 RX ADMIN — OXYBUTYNIN CHLORIDE 5 MILLIGRAM(S): 5 TABLET, FILM COATED, EXTENDED RELEASE ORAL at 14:59

## 2024-07-28 RX ADMIN — Medication 1 GRAM(S): at 14:59

## 2024-07-28 NOTE — PROGRESS NOTE ADULT - ASSESSMENT
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 He continued to have POD with scans in 3/2024 showing growth of the bladder dome mass with invasion of the Joseph pouch and chronic PV thrombosis with cavernous malformation. Pt was subsequently started on palliative FOLFOX in 6/2024, s/p C1 on 6/10, C2 delayed. His treatment course has also been c/b hyponatremia (followed by Dr. Barton in Nephrology clinic, felt to be related to hypovolemia vs SIADH, pt on salt tabs).   Of note, pt was recently treated in the ED for anemia due to blood loss- hgb 5.6 on 7/11 for which pt received 2u prbc transfusion in the ED before being sent home.  Pt was readmitted to [facility] on 7/11 with recurrent symptomatic anemia- hgb 9.1 (from 10.0 on 7/12 after prbc transfusion on 7/11) a/w fatigue; his admission labs were also notable for acute on chronic hyponatremia, as well as leukocytosis and abnormal UA.    ACTIVE PROBLEMS  Met colon adenoca   *h/o malignant LBO s/p ileostomy, L hydro, ? colovesicular fistula based on 7/25 CT a/p  Malignant hematuria/urinary retention   Anemia due to acute blood loss, with underlying chronic disease (h/o ileostomy bleeding, FOBT positive in 6/2024)  h/o L iliofem DVT, PV thrombosis  Hypercoag sate  Acute cystitis  Leukocytosis  Hyponatremia  CKD3    Met colon adenoca   *h/o malignant LBO s/p ileostomy, L hydro, ? colovesicular fistula   7/25 CT a/p shows POD with possible evolving colovesicular fistula  However, pt has only had 1 cycle of palliative chemo, overdue for C2 mFOLFOX with plan for addition of Cetuximab (pt is Kras WT)  Pt to continue outpt fu with Dr. Palafox after dc to discuss plans for resuming palliative chemo  Long term prognosis: guarded; pt is full code    Malignant hematuria  Anemia due to acute blood loss, with underlying chronic disease (h/o ileostomy bleeding, FOBT positive in 6/2024)  Appreciate Urology recs: no role for CBI at this time, but will reconsult if pt has persistent hematuria with clots and/or rise in Cr  Trending BID cbcs  Continuing supportive transfusion prn (goal hgb >7; plts > 50K given active bleeding)  Called Urology eval as urinary retention after oxybutynin     h/o L iliofem DVT, PV thrombosis  Hypercoag sate  Appreciate Vascular consult: benefits of AC > risks at this time  Continued heparin challenge , now DC as drop in HH, bleeding    *IR consulted for  IVC filter placement    Acute cystitis  Leukocytosis  Pt remains afebrile, normotensive  Admission UA grossly abnormal with acute leukocytosis which is more c/f acute cystitis than colonization  Ucx negative   DC  empiric Zosyn     Hyponatremia  CKD3  Pt followed by Dr. Barton in Nephrology clinic  Hyponatremia felt to be related to hypovolemia/high ostomy output vs SIADH  CKD likely due to metastatic malignancy to the bladder with L hydro  Na 128 from baseline 131-34; Cr stable (baselin 1.2-1.5)  500cc NS bolus ordered; will fu pm bmp and urine lytes  Continuing Salt tabs 1gm TID  Continuing Na bicarb 650mg BID  Monitoring UOP  Avoiding nephrotoxins  Meds renally dosed where necessary

## 2024-07-28 NOTE — CONSULT NOTE ADULT - ASSESSMENT
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 He continued to have POD with scans in 3/2024 showing growth of the bladder dome mass with invasion of the Joseph pouch and chronic PV thrombosis with cavernous malformation. Pt was subsequently started on palliative FOLFOX in 6/2024, s/p C1 on 6/10, C2 delayed. His treatment course has also been c/b hyponatremia (followed by Dr. Barton in Nephrology clinic, felt to be related to hypovolemia vs SIADH, pt on salt tabs).     Allergies: No Known Allergies    Medications (Abx/Cardiac/Anticoagulation/Blood Products)    heparin  Infusion: 11.5 mL/Hr IV Continuous (07-27 @ 13:21)  heparin  Infusion: 10.5 mL/Hr IV Continuous (07-27 @ 04:35)  heparin  Infusion: 10 mL/Hr IV Continuous (07-26 @ 21:28)  piperacillin/tazobactam IVPB.-: 25 mL/Hr IV Intermittent (07-26 @ 23:22)  piperacillin/tazobactam IVPB.-: 25 mL/Hr IV Intermittent (07-26 @ 18:18)  piperacillin/tazobactam IVPB..: 25 mL/Hr IV Intermittent (07-27 @ 07:21)    Data:    T(C): 36.8  HR: 76  BP: 105/61  RR: 16  SpO2: 100%    -WBC 6.68 / HgB 6.9 / Hct 22.8 / Plt 216  -Na 131 / Cl 97 / BUN 18 / Glucose 101  -K 3.9 / CO2 25 / Cr 1.85  -ALT 7 / Alk Phos 78 / T.Bili 0.6  -INR -- / PTT 58.4      Radiology: reviewed    Assessment/Plan: 59 yo man with nephrolithiasis, CKD3, metastatic colon cancer, bladder dome mass, recently diagnosed with LLE DVT on eliquis, presenting with gross hematuria. Urology following.   Anticoagulation discontinued given worsening anemia and hematura. IR consulted for IVC filter placement.     --Case and images reviewed with IR Attending Dr. Tsang  --Pt. currently off anticoagulation iso worsening anemia and hematuria  --IVC filter placement approved for Monday 7/29  --Continue holding anticoagulation  --Obtain AM labs: CBC, CMP, Coags  --please place IR pre-procedure note  --please place IR procedure request order under Dr. Tsang    --plan D/W primary team ACP Olu Kauffman MD PGY-4  Interventional Radiology  IR Pager: [phone]  Available on Teams      - Non-emergent consults: Place IR consult order in [place]  - Emergent issues (pager): [facility] [phone]; [facility] [phone]; [phone]  - Scheduling questions: [facility] [phone]; [facility] [phone]  - Clinic/outpatient booking: [facility] [phone]; [facility] [phone] Interventional Radiology    Evaluate for Procedure: IVC filter    HPI: 57 yo man with nephrolithiasis, CKD3, and 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 He continued to have POD with scans in 3/2024 showing growth of the bladder dome mass with invasion of the Joseph pouch and chronic PV thrombosis with cavernous malformation. Pt was subsequently started on palliative FOLFOX in 6/2024, s/p C1 on 6/10, C2 delayed. His treatment course has also been c/b hyponatremia (followed by Dr. Barton in Nephrology clinic, felt to be related to hypovolemia vs SIADH, pt on salt tabs).     Allergies: No Known Allergies    Medications (Abx/Cardiac/Anticoagulation/Blood Products)    heparin  Infusion: 11.5 mL/Hr IV Continuous (07-27 @ 13:21)  heparin  Infusion: 10.5 mL/Hr IV Continuous (07-27 @ 04:35)  heparin  Infusion: 10 mL/Hr IV Continuous (07-26 @ 21:28)  piperacillin/tazobactam IVPB.-: 25 mL/Hr IV Intermittent (07-26 @ 23:22)  piperacillin/tazobactam IVPB.-: 25 mL/Hr IV Intermittent (07-26 @ 18:18)  piperacillin/tazobactam IVPB..: 25 mL/Hr IV Intermittent (07-27 @ 07:21)    Data:    T(C): 36.8  HR: 76  BP: 105/61  RR: 16  SpO2: 100%    -WBC 6.68 / HgB 6.9 / Hct 22.8 / Plt 216  -Na 131 / Cl 97 / BUN 18 / Glucose 101  -K 3.9 / CO2 25 / Cr 1.85  -ALT 7 / Alk Phos 78 / T.Bili 0.6  -INR -- / PTT 58.4      Radiology: reviewed    Assessment/Plan: 57 yo man with nephrolithiasis, CKD3, metastatic colon cancer, bladder dome mass, recently diagnosed with LLE DVT on eliquis, presenting with gross hematuria. Urology following.   Anticoagulation discontinued given worsening anemia and hematuria. IR consulted for IVC filter placement.     --Pt. currently off anticoagulation iso worsening anemia and hematuria  --IVC filter placement approved for Monday 7/29  --Continue holding anticoagulation  --Obtain AM labs: CBC, CMP, Coags  --please place IR pre-procedure note  --please place IR procedure request order under Dr. Tsang    --plan D/W primary team ACP Olu Kauffman MD PGY-4  Interventional Radiology  IR Pager: [phone]  Available on Teams      - Non-emergent consults: Place IR consult order in [place]  - Emergent issues (pager): [facility] [phone]; [facility] [phone]; [phone]  - Scheduling questions: [facility] [phone]; [facility] [phone]  - Clinic/outpatient booking: [facility] [phone]; [facility] [phone]

## 2024-07-29 LAB
ALBUMIN SERPL ELPH-MCNC: 2.5 G/DL — LOW (ref 3.3–5)
ALP SERPL-CCNC: 83 U/L — SIGNIFICANT CHANGE UP (ref 40–120)
ALT FLD-CCNC: 8 U/L — SIGNIFICANT CHANGE UP (ref 4–41)
ANION GAP SERPL CALC-SCNC: 8 MMOL/L — SIGNIFICANT CHANGE UP (ref 7–14)
APTT BLD: 27.2 SEC — SIGNIFICANT CHANGE UP (ref 24.5–35.6)
AST SERPL-CCNC: 17 U/L — SIGNIFICANT CHANGE UP (ref 4–40)
BASOPHILS # BLD AUTO: 0.03 K/UL — SIGNIFICANT CHANGE UP (ref 0–0.2)
BASOPHILS NFR BLD AUTO: 0.4 % — SIGNIFICANT CHANGE UP (ref 0–2)
BILIRUB SERPL-MCNC: 1.1 MG/DL — SIGNIFICANT CHANGE UP (ref 0.2–1.2)
BLD GP AB SCN SERPL QL: NEGATIVE — SIGNIFICANT CHANGE UP
BUN SERPL-MCNC: 16 MG/DL — SIGNIFICANT CHANGE UP (ref 7–23)
CALCIUM SERPL-MCNC: 8 MG/DL — LOW (ref 8.4–10.5)
CHLORIDE SERPL-SCNC: 97 MMOL/L — LOW (ref 98–107)
CO2 SERPL-SCNC: 25 MMOL/L — SIGNIFICANT CHANGE UP (ref 22–31)
CREAT SERPL-MCNC: 1.58 MG/DL — HIGH (ref 0.5–1.3)
EGFR: 50 ML/MIN/1.73M2 — LOW
EOSINOPHIL # BLD AUTO: 0.24 K/UL — SIGNIFICANT CHANGE UP (ref 0–0.5)
EOSINOPHIL NFR BLD AUTO: 3.1 % — SIGNIFICANT CHANGE UP (ref 0–6)
GLUCOSE BLDC GLUCOMTR-MCNC: 103 MG/DL — HIGH (ref 70–99)
GLUCOSE BLDC GLUCOMTR-MCNC: 114 MG/DL — HIGH (ref 70–99)
GLUCOSE SERPL-MCNC: 96 MG/DL — SIGNIFICANT CHANGE UP (ref 70–99)
HCT VFR BLD CALC: 24.3 % — LOW (ref 39–50)
HCT VFR BLD CALC: 26.7 % — LOW (ref 39–50)
HGB BLD-MCNC: 7.8 G/DL — LOW (ref 13–17)
HGB BLD-MCNC: 8.8 G/DL — LOW (ref 13–17)
IANC: 5.73 K/UL — SIGNIFICANT CHANGE UP (ref 1.8–7.4)
IMM GRANULOCYTES NFR BLD AUTO: 0.5 % — SIGNIFICANT CHANGE UP (ref 0–0.9)
INR BLD: 1.47 RATIO — HIGH (ref 0.85–1.18)
LYMPHOCYTES # BLD AUTO: 1.27 K/UL — SIGNIFICANT CHANGE UP (ref 1–3.3)
LYMPHOCYTES # BLD AUTO: 16.2 % — SIGNIFICANT CHANGE UP (ref 13–44)
MCHC RBC-ENTMCNC: 24.6 PG — LOW (ref 27–34)
MCHC RBC-ENTMCNC: 24.9 PG — LOW (ref 27–34)
MCHC RBC-ENTMCNC: 32.1 GM/DL — SIGNIFICANT CHANGE UP (ref 32–36)
MCHC RBC-ENTMCNC: 33 GM/DL — SIGNIFICANT CHANGE UP (ref 32–36)
MCV RBC AUTO: 75.4 FL — LOW (ref 80–100)
MCV RBC AUTO: 76.7 FL — LOW (ref 80–100)
MONOCYTES # BLD AUTO: 0.55 K/UL — SIGNIFICANT CHANGE UP (ref 0–0.9)
MONOCYTES NFR BLD AUTO: 7 % — SIGNIFICANT CHANGE UP (ref 2–14)
NEUTROPHILS # BLD AUTO: 5.73 K/UL — SIGNIFICANT CHANGE UP (ref 1.8–7.4)
NEUTROPHILS NFR BLD AUTO: 72.8 % — SIGNIFICANT CHANGE UP (ref 43–77)
NRBC # BLD: 0 /100 WBCS — SIGNIFICANT CHANGE UP (ref 0–0)
NRBC # BLD: 0 /100 WBCS — SIGNIFICANT CHANGE UP (ref 0–0)
NRBC # FLD: 0 K/UL — SIGNIFICANT CHANGE UP (ref 0–0)
NRBC # FLD: 0 K/UL — SIGNIFICANT CHANGE UP (ref 0–0)
PLATELET # BLD AUTO: 210 K/UL — SIGNIFICANT CHANGE UP (ref 150–400)
PLATELET # BLD AUTO: 253 K/UL — SIGNIFICANT CHANGE UP (ref 150–400)
POTASSIUM SERPL-MCNC: 3.7 MMOL/L — SIGNIFICANT CHANGE UP (ref 3.5–5.3)
POTASSIUM SERPL-SCNC: 3.7 MMOL/L — SIGNIFICANT CHANGE UP (ref 3.5–5.3)
PROT SERPL-MCNC: 5.3 G/DL — LOW (ref 6–8.3)
PROTHROM AB SERPL-ACNC: 16.4 SEC — HIGH (ref 9.5–13)
RBC # BLD: 3.17 M/UL — LOW (ref 4.2–5.8)
RBC # BLD: 3.54 M/UL — LOW (ref 4.2–5.8)
RBC # FLD: 16.7 % — HIGH (ref 10.3–14.5)
RBC # FLD: 16.8 % — HIGH (ref 10.3–14.5)
RH IG SCN BLD-IMP: POSITIVE — SIGNIFICANT CHANGE UP
SODIUM SERPL-SCNC: 130 MMOL/L — LOW (ref 135–145)
WBC # BLD: 7.86 K/UL — SIGNIFICANT CHANGE UP (ref 3.8–10.5)
WBC # BLD: 8.88 K/UL — SIGNIFICANT CHANGE UP (ref 3.8–10.5)
WBC # FLD AUTO: 7.86 K/UL — SIGNIFICANT CHANGE UP (ref 3.8–10.5)
WBC # FLD AUTO: 8.88 K/UL — SIGNIFICANT CHANGE UP (ref 3.8–10.5)

## 2024-07-29 PROCEDURE — 99223 1ST HOSP IP/OBS HIGH 75: CPT

## 2024-07-29 PROCEDURE — 99232 SBSQ HOSP IP/OBS MODERATE 35: CPT

## 2024-07-29 PROCEDURE — 37191 INS ENDOVAS VENA CAVA FILTR: CPT

## 2024-07-29 RX ADMIN — Medication 1 GRAM(S): at 21:25

## 2024-07-29 RX ADMIN — PANTOPRAZOLE SODIUM 40 MILLIGRAM(S): 20 TABLET, DELAYED RELEASE ORAL at 05:30

## 2024-07-29 RX ADMIN — Medication 1 GRAM(S): at 14:08

## 2024-07-29 RX ADMIN — Medication 1 GRAM(S): at 05:30

## 2024-07-29 RX ADMIN — PANTOPRAZOLE SODIUM 40 MILLIGRAM(S): 20 TABLET, DELAYED RELEASE ORAL at 18:09

## 2024-07-29 RX ADMIN — OXYBUTYNIN CHLORIDE 5 MILLIGRAM(S): 5 TABLET, FILM COATED, EXTENDED RELEASE ORAL at 14:08

## 2024-07-29 RX ADMIN — OXYBUTYNIN CHLORIDE 5 MILLIGRAM(S): 5 TABLET, FILM COATED, EXTENDED RELEASE ORAL at 21:25

## 2024-07-29 RX ADMIN — Medication 650 MILLIGRAM(S): at 18:08

## 2024-07-29 RX ADMIN — OXYBUTYNIN CHLORIDE 5 MILLIGRAM(S): 5 TABLET, FILM COATED, EXTENDED RELEASE ORAL at 05:30

## 2024-07-29 RX ADMIN — Medication 650 MILLIGRAM(S): at 05:29

## 2024-07-29 NOTE — PRE PROCEDURE NOTE - PRE PROCEDURE EVALUATION
Interventional Radiology    HPI: 59 yo man with nephrolithiasis, CKD3, metastatic colon cancer, bladder dome mass, recently diagnosed with LLE DVT on eliquis, presenting with gross hematuria. Urology following. Anticoagulation discontinued given worsening anemia and hematuria. IR consulted for IVC filter placement. Patient presents to IR for placement.    Allergies: No Known Allergies    Medications (Abx/Cardiac/Anticoagulation/Blood Products)      Data:    T(C): 36.7  HR: 69  BP: 111/63  RR: 15  SpO2: 100%    Exam  General: No acute distress  Chest: Non labored breathing  Abdomen: Non-distended  Extremities: No swelling, warm    -WBC 8.88 / HgB 8.8 / Hct 26.7 / Plt 253  -Na 130 / Cl 97 / BUN 16 / Glucose 96  -K 3.7 / CO2 25 / Cr 1.58  -ALT 8 / Alk Phos 83 / T.Bili 1.1  -INR1.47    Imaging:     Plan:   59 yo man with nephrolithiasis, CKD3, metastatic colon cancer, bladder dome mass, recently diagnosed with LLE DVT on eliquis, presenting with gross hematuria. Urology following. Anticoagulation discontinued given worsening anemia and hematuria. IR consulted for IVC filter placement. Patient presents to IR for placement.    -- Case discussed with Dr. Hendrix by Dr. Zepeda. Patient likely not a candidate for retrieval in future. As such, will place non-retrievable device.  -- Relevant imaging and labs were reviewed.   -- No additional antibiotics are indicated for this procedure.   -- Risks, benefits, and alternatives were explained to the patient and informed consent was obtained.    Cuauhtemoc Arroyo M.D.  PGY5/R4, Interventional Radiology Fellow    -Available on [employer] TEAMS for all non-urgent questions  -Emergent issues: [facility]-[phone]; [facility]-[phone] ([phone])  -Non-emergent consults: Please place a [place] order "Consult-Interventional Radiology" with an appropriate callback number  -Scheduling questions: [facility]: [phone]; [facility]: [phone]  -Clinic/Outpatient booking: [facility]: [phone]; [facility]: [phone]

## 2024-07-29 NOTE — PROCEDURE NOTE - PROCEDURE FINDINGS AND DETAILS
Successful placement of IVC filter through R groin access with placement confirmed on fluoro. No complications.

## 2024-07-29 NOTE — CONSULT NOTE ADULT - NS ATTEND AMEND GEN_ALL_CORE FT
Agree with above.  we discussed treatment to decrease hematuria. Side effects discussed as well as logistics of treatment.  Mr. Diaz agreed to treatment.  We will plan for simulation tomorrow.

## 2024-07-29 NOTE — CONSULT NOTE ADULT - SUBJECTIVE AND OBJECTIVE BOX
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 He continued to have POD with scans in 3/2024 showing growth of the bladder dome mass with invasion of the Joseph pouch and chronic PV thrombosis with cavernous malformation. Pt was subsequently started on palliative FOLFOX in 6/2024, s/p C1 on 6/10, C2 delayed. His treatment course has also been c/b hyponatremia (followed by Dr. Barton in Nephrology clinic, felt to be related to hypovolemia vs SIADH, pt on salt tabs).   Of note, pt was recently treated in the ED for anemia due to blood loss- hgb 5.6 on 7/11 for which pt received 2u prbc transfusion in the ED before being sent home.  He now represents with CC as above. Pt states that he was in [place] when he developed hematuria and fatigue; he called his primary Oncologist informing him that he was flying home urgently for an evaluation. On presentation to the ED, pt found to have hgb 9.1 (from 10.0 on 7/12 after prbc transfusion on 7/11); his admission labs were also notable for acute on chronic hyponatremia as well as leukocytosis and abnormal UA.    In the ED: VSS; CT a/p completed (results below); Vascular consulted to assess risks v benefits of resuming AC. (26 Jul 2024 13:26)      Allergies    No Known Allergies    Intolerances        ROS: [  ] Fever  [  ] Chills  [  ]Chest Pain [  ] SOB  [  ]Cough [  ] N/V  [  ] Diarrhea [  ]Constipation [  ]Other ROS:  [  ] ROS otherwise negative    PAST MEDICAL & SURGICAL HISTORY:  No pertinent past medical history    Colon cancer  (resection and ileostomy in 2022, with progressive residual metastatic dz, refusing CTX / radiation oncology consultation to date)    H/O ileostomy    Renal stones  (s/p right ureteral stent placement)    Large bowel obstruction  (at time of diagnosis of colon cancer)    History of small bowel obstruction  (4/30/23: non-surgical tx)    MAXWELL (acute kidney injury)  (4/30/23)    History of cholelithiasis    Pulmonary nodule    Splenomegaly    History of ileostomy    No significant past surgical history    S/P colon resection    H/O ureteroscopy  (s/p right ureteral stent placement)        FAMILY HISTORY:  FH: brain aneurysm (Mother)        MEDICATIONS  (STANDING):  oxybutynin 5 milliGRAM(s) Oral every 8 hours  pantoprazole    Tablet 40 milliGRAM(s) Oral two times a day  sodium bicarbonate 650 milliGRAM(s) Oral two times a day  sodium chloride 1 Gram(s) Oral three times a day    MEDICATIONS  (PRN):  loperamide 2 milliGRAM(s) Oral every 6 hours PRN high ileostomy output      PHYSICAL EXAM  Vital Signs Last 24 Hrs  T(C): 36.8 (29 Jul 2024 10:02), Max: 36.9 (29 Jul 2024 05:30)  T(F): 98.2 (29 Jul 2024 10:02), Max: 98.5 (29 Jul 2024 05:30)  HR: 65 (29 Jul 2024 10:02) (65 - 83)  BP: 109/60 (29 Jul 2024 10:02) (102/61 - 110/61)  BP(mean): --  RR: 18 (29 Jul 2024 10:02) (16 - 18)  SpO2: 100% (29 Jul 2024 10:02) (100% - 100%)    Parameters below as of 29 Jul 2024 10:02  Patient On (Oxygen Delivery Method): room air        General: appears stated age,  no acute distress  HEENT: NC/AT; EOMI, PERRL, sclera nonicteric; external ears normal; no rhinorrhea or epistaxis; mucous membranes moist; oropharynx clear and without erythema  CV: NR, RR; no appreciable r/m/g  Lungs: CTAB, no increased work of breathing  Abdomen: Bowel sounds present; soft, NTND  MSK: Vertebral spine non-tender to palpation  Neuro: AAOx3; cranial nerves II-XII intact; strength 5/5 in upper and lower extremities; sensation to light touch in tact bilaterally.  Psych: Full affect; mood congruent  Skin: no visible rashes on limited examination    IMAGING/LABS/PATHOLOGY: I have personally reviewed the relevant labs, pathology, and imaging as noted in the HPI.  In addition,                          7.8    7.86  )-----------( 210      ( 29 Jul 2024 06:11 )             24.3     07-29    130<L>  |  97<L>  |  16  ----------------------------<  96  3.7   |  25  |  1.58<H>    Ca    8.0<L>      29 Jul 2024 06:11    TPro  5.3<L>  /  Alb  2.5<L>  /  TBili  1.1  /  DBili  x   /  AST  17  /  ALT  8   /  AlkPhos  83  07-29        ASSESSMENT/PLAN    TAISHA ROGERS is a 58y man with     We discussed the use of palliative radiation in this setting, namely to improve quality of life through the reduction of symptoms.  We talked about the risks, benefits, acute and long term side effects, as well as expected treatment outcomes.  He/She was given the opportunity to ask questions, which were answered to his/her apparent satisfaction.  He/She provided written consent to proceed with radiation therapy. We will arrange for inpatient/outpatient treatment. 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 He continued to have POD with scans in 3/2024 showing growth of the bladder dome mass with invasion of the Joseph pouch and chronic PV thrombosis with cavernous malformation. Pt was subsequently started on palliative FOLFOX in 6/2024, s/p C1 on 6/10, C2 delayed. His treatment course has also been c/b hyponatremia (followed by Dr. Barton in Nephrology clinic, felt to be related to hypovolemia vs SIADH, pt on salt tabs).   Of note, pt was recently treated in the ED for anemia due to blood loss- hgb 5.6 on 7/11 for which pt received 2u prbc transfusion in the ED before being sent home.  He now represents with CC as above. Pt states that he was in [place] when he developed hematuria and fatigue; he called his primary Oncologist informing him that he was flying home urgently for an evaluation. On presentation to the ED, pt found to have hgb 9.1 (from 10.0 on 7/12 after prbc transfusion on 7/11); his admission labs were also notable for acute on chronic hyponatremia as well as leukocytosis and abnormal UA.    KPS 60.   Stable, NAD, + esquivel, urine and blood seen in tubing.  no pain,.       s/p blood transfusion x 1.     Complete Blood Count (07.29.24 @ 12:45)    Nucleated RBC: 0 /100 WBCs   WBC Count: 8.88 K/uL   RBC Count: 3.54 M/uL   Hemoglobin: 8.8 g/dL   Hematocrit: 26.7 %   Mean Cell Volume: 75.4 fL   Mean Cell Hemoglobin: 24.9 pg   Mean Cell Hemoglobin Conc: 33.0 gm/dL   Red Cell Distrib Width: 16.8 %   Platelet Count - Automated: 253 K/uL   Nucleated RBC #: 0.00 K/uL        Allergies    No Known Allergies    Intolerances        ROS: [  ] Fever  [  ] Chills  [  ]Chest Pain [  ] SOB  [  ]Cough [  ] N/V  [  ] Diarrhea [  ]Constipation [  ]Other ROS:  [  ] ROS otherwise negative    PAST MEDICAL & SURGICAL HISTORY:  No pertinent past medical history    Colon cancer  (resection and ileostomy in 2022, with progressive residual metastatic dz, refusing CTX / radiation oncology consultation to date)    H/O ileostomy    Renal stones  (s/p right ureteral stent placement)    Large bowel obstruction  (at time of diagnosis of colon cancer)    History of small bowel obstruction  (4/30/23: non-surgical tx)    MAXWELL (acute kidney injury)  (4/30/23)    History of cholelithiasis    Pulmonary nodule    Splenomegaly    History of ileostomy    No significant past surgical history    S/P colon resection    H/O ureteroscopy  (s/p right ureteral stent placement)        FAMILY HISTORY:  FH: brain aneurysm (Mother)        MEDICATIONS  (STANDING):  oxybutynin 5 milliGRAM(s) Oral every 8 hours  pantoprazole    Tablet 40 milliGRAM(s) Oral two times a day  sodium bicarbonate 650 milliGRAM(s) Oral two times a day  sodium chloride 1 Gram(s) Oral three times a day    MEDICATIONS  (PRN):  loperamide 2 milliGRAM(s) Oral every 6 hours PRN high ileostomy output      PHYSICAL EXAM  Vital Signs Last 24 Hrs  T(C): 36.8 (29 Jul 2024 10:02), Max: 36.9 (29 Jul 2024 05:30)  T(F): 98.2 (29 Jul 2024 10:02), Max: 98.5 (29 Jul 2024 05:30)  HR: 65 (29 Jul 2024 10:02) (65 - 83)  BP: 109/60 (29 Jul 2024 10:02) (102/61 - 110/61)  BP(mean): --  RR: 18 (29 Jul 2024 10:02) (16 - 18)  SpO2: 100% (29 Jul 2024 10:02) (100% - 100%)    Parameters below as of 29 Jul 2024 10:02  Patient On (Oxygen Delivery Method): room air      kPS 60.  General: appears stated age,  no acute distress  HEENT: NC/AT; EOMI, PERRL, sclera nonicteric; external ears normal; no rhinorrhea or epistaxis; mucous membranes moist; oropharynx clear and without erythema  CV: NR, RR; no appreciable r/m/g  Lungs: CTAB, no increased work of breathing  Abdomen: Bowel sounds present; soft, NTND  MSK: Vertebral spine non-tender to palpation  Neuro: AAOx3; cranial nerves II-XII intact; strength 5/5 in upper and lower extremities; sensation to light touch in tact bilaterally.  urology: + esquivel catheter with urine and also blood seen in the tubing.         IMAGING/LABS/PATHOLOGY: I have personally reviewed the relevant labs, pathology, and imaging as noted in the HPI.  In addition,                          7.8    7.86  )-----------( 210      ( 29 Jul 2024 06:11 )             24.3     07-29    130<L>  |  97<L>  |  16  ----------------------------<  96  3.7   |  25  |  1.58<H>    Ca    8.0<L>      29 Jul 2024 06:11    TPro  5.3<L>  /  Alb  2.5<L>  /  TBili  1.1  /  DBili  x   /  AST  17  /  ALT  8   /  AlkPhos  83  07-29        ASSESSMENT/PLAN    TAISHA ROGERS is a 58y man with h/o colon cancer metastatic to the bladder, prior h/o ileostomy, +Joseph pouch, now with hematuria that is painless causing anemia and required blood transfusion x 1 recently.  Seen /examined, no pain, +esquivel with urine mixed with blood seen in the catheter.  We offered palliative RT for the hematuria and he agreed.    Plan for CT simulation only tomorrow to be followed by a 5 fraction course to the pelvis/bladder.  Seen with Dr. Mora.    We discussed the use of palliative radiation in this setting, namely to improve quality of life through the reduction of symptoms.  We talked about the risks, benefits, acute and long term side effects, as well as expected treatment outcomes.  He/She was given the opportunity to ask questions, which were answered to his/her apparent satisfaction.  He/She provided written consent to proceed with radiation therapy. We will arrange for inpatient/outpatient treatment.

## 2024-07-29 NOTE — PROGRESS NOTE ADULT - ASSESSMENT
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 He continued to have POD with scans in 3/2024 showing growth of the bladder dome mass with invasion of the Joseph pouch and chronic PV thrombosis with cavernous malformation. Pt was subsequently started on palliative FOLFOX in 6/2024, s/p C1 on 6/10, C2 delayed. His treatment course has also been c/b hyponatremia (followed by Dr. Barton in Nephrology clinic, felt to be related to hypovolemia vs SIADH, pt on salt tabs).   Of note, pt was recently treated in the ED for anemia due to blood loss- hgb 5.6 on 7/11 for which pt received 2u prbc transfusion in the ED before being sent home.  Pt was readmitted to [facility] on 7/11 with recurrent symptomatic anemia- hgb 9.1 (from 10.0 on 7/12 after prbc transfusion on 7/11) a/w fatigue; his admission labs were also notable for acute on chronic hyponatremia, as well as leukocytosis and abnormal UA.    ACTIVE PROBLEMS  Met colon adenoca   *h/o malignant LBO s/p ileostomy, L hydro, ? colovesicular fistula based on 7/25 CT a/p  Malignant hematuria/urinary retention   Anemia due to acute blood loss, with underlying chronic disease (h/o ileostomy bleeding, FOBT positive in 6/2024)  h/o L iliofem DVT, PV thrombosis  Hypercoag sate  Acute cystitis  Leukocytosis  Hyponatremia  CKD3    Met colon adenoca   *h/o malignant LBO s/p ileostomy, L hydro, ? colovesicular fistula   7/25 CT a/p shows POD with possible evolving colovesicular fistula  However, pt has only had 1 cycle of palliative chemo, overdue for C2 mFOLFOX with plan for addition of Cetuximab (pt is Kras WT)  Pt to continue outpt fu with Dr. Palafox after dc to discuss plans for resuming palliative chemo  Long term prognosis: guarded; pt is full code    Malignant hematuria  Anemia due to acute blood loss, with underlying chronic disease (h/o ileostomy bleeding, FOBT positive in 6/2024)  Appreciate Urology recs: no role for CBI at this time, but will reconsult if pt has persistent hematuria with clots and/or rise in Cr  Trending BID cbcs  Continuing supportive transfusion prn (goal hgb >7; plts > 50K given active bleeding)  Called Urology eval as urinary retention after oxybutynin   Rad onc consulted, planning for palliative RT. Hopefully will improve his hematuria     h/o L iliofem DVT, PV thrombosis  Hypercoag sate  Appreciate Vascular consult: benefits of AC > risks at this time  Continued heparin challenge , now DC as drop in HH, bleeding    *IR consulted for  IVC filter placement, scheduled today    Acute cystitis  Leukocytosis  Pt remains afebrile, normotensive  Admission UA grossly abnormal with acute leukocytosis which is more c/f acute cystitis than colonization  Ucx negative   DC  empiric Zosyn     Hyponatremia  CKD3  Pt followed by Dr. Barton in Nephrology clinic  Hyponatremia felt to be related to hypovolemia/high ostomy output vs SIADH  CKD likely due to metastatic malignancy to the bladder with L hydro  Na 128 from baseline 131-34; Cr stable (baselin 1.2-1.5)  500cc NS bolus ordered; will fu pm bmp and urine lytes  Continuing Salt tabs 1gm TID  Continuing Na bicarb 650mg BID  Monitoring UOP  Avoiding nephrotoxins  Meds renally dosed where necessary

## 2024-07-30 LAB
ANION GAP SERPL CALC-SCNC: 7 MMOL/L — SIGNIFICANT CHANGE UP (ref 7–14)
BUN SERPL-MCNC: 15 MG/DL — SIGNIFICANT CHANGE UP (ref 7–23)
CALCIUM SERPL-MCNC: 7.9 MG/DL — LOW (ref 8.4–10.5)
CHLORIDE SERPL-SCNC: 101 MMOL/L — SIGNIFICANT CHANGE UP (ref 98–107)
CO2 SERPL-SCNC: 24 MMOL/L — SIGNIFICANT CHANGE UP (ref 22–31)
CREAT SERPL-MCNC: 1.52 MG/DL — HIGH (ref 0.5–1.3)
CULTURE RESULTS: SIGNIFICANT CHANGE UP
CULTURE RESULTS: SIGNIFICANT CHANGE UP
EGFR: 52 ML/MIN/1.73M2 — LOW
GLUCOSE SERPL-MCNC: 105 MG/DL — HIGH (ref 70–99)
HCT VFR BLD CALC: 25.4 % — LOW (ref 39–50)
HCT VFR BLD CALC: 25.4 % — LOW (ref 39–50)
HGB BLD-MCNC: 7.9 G/DL — LOW (ref 13–17)
HGB BLD-MCNC: 8.1 G/DL — LOW (ref 13–17)
MAGNESIUM SERPL-MCNC: 1.5 MG/DL — LOW (ref 1.6–2.6)
MCHC RBC-ENTMCNC: 24.4 PG — LOW (ref 27–34)
MCHC RBC-ENTMCNC: 24.8 PG — LOW (ref 27–34)
MCHC RBC-ENTMCNC: 31.1 GM/DL — LOW (ref 32–36)
MCHC RBC-ENTMCNC: 31.9 GM/DL — LOW (ref 32–36)
MCV RBC AUTO: 77.9 FL — LOW (ref 80–100)
MCV RBC AUTO: 78.4 FL — LOW (ref 80–100)
NRBC # BLD: 0 /100 WBCS — SIGNIFICANT CHANGE UP (ref 0–0)
NRBC # BLD: 0 /100 WBCS — SIGNIFICANT CHANGE UP (ref 0–0)
NRBC # FLD: 0 K/UL — SIGNIFICANT CHANGE UP (ref 0–0)
NRBC # FLD: 0 K/UL — SIGNIFICANT CHANGE UP (ref 0–0)
PHOSPHATE SERPL-MCNC: 3.1 MG/DL — SIGNIFICANT CHANGE UP (ref 2.5–4.5)
PLATELET # BLD AUTO: 203 K/UL — SIGNIFICANT CHANGE UP (ref 150–400)
PLATELET # BLD AUTO: 207 K/UL — SIGNIFICANT CHANGE UP (ref 150–400)
POTASSIUM SERPL-MCNC: 4.1 MMOL/L — SIGNIFICANT CHANGE UP (ref 3.5–5.3)
POTASSIUM SERPL-SCNC: 4.1 MMOL/L — SIGNIFICANT CHANGE UP (ref 3.5–5.3)
RBC # BLD: 3.24 M/UL — LOW (ref 4.2–5.8)
RBC # BLD: 3.26 M/UL — LOW (ref 4.2–5.8)
RBC # FLD: 17.2 % — HIGH (ref 10.3–14.5)
RBC # FLD: 17.3 % — HIGH (ref 10.3–14.5)
SODIUM SERPL-SCNC: 132 MMOL/L — LOW (ref 135–145)
SPECIMEN SOURCE: SIGNIFICANT CHANGE UP
SPECIMEN SOURCE: SIGNIFICANT CHANGE UP
WBC # BLD: 9.04 K/UL — SIGNIFICANT CHANGE UP (ref 3.8–10.5)
WBC # BLD: 9.72 K/UL — SIGNIFICANT CHANGE UP (ref 3.8–10.5)
WBC # FLD AUTO: 9.04 K/UL — SIGNIFICANT CHANGE UP (ref 3.8–10.5)
WBC # FLD AUTO: 9.72 K/UL — SIGNIFICANT CHANGE UP (ref 3.8–10.5)

## 2024-07-30 PROCEDURE — 99232 SBSQ HOSP IP/OBS MODERATE 35: CPT

## 2024-07-30 RX ORDER — MAGNESIUM OXIDE 253 MG/1
400 TABLET ORAL
Refills: 0 | Status: COMPLETED | OUTPATIENT
Start: 2024-07-30 | End: 2024-07-31

## 2024-07-30 RX ADMIN — MAGNESIUM OXIDE 400 MILLIGRAM(S): 253 TABLET ORAL at 17:23

## 2024-07-30 RX ADMIN — Medication 1 GRAM(S): at 21:27

## 2024-07-30 RX ADMIN — Medication 650 MILLIGRAM(S): at 17:23

## 2024-07-30 RX ADMIN — PANTOPRAZOLE SODIUM 40 MILLIGRAM(S): 20 TABLET, DELAYED RELEASE ORAL at 17:23

## 2024-07-30 RX ADMIN — Medication 650 MILLIGRAM(S): at 06:00

## 2024-07-30 RX ADMIN — MAGNESIUM OXIDE 400 MILLIGRAM(S): 253 TABLET ORAL at 12:08

## 2024-07-30 RX ADMIN — Medication 1 GRAM(S): at 06:00

## 2024-07-30 RX ADMIN — PANTOPRAZOLE SODIUM 40 MILLIGRAM(S): 20 TABLET, DELAYED RELEASE ORAL at 06:00

## 2024-07-30 RX ADMIN — OXYBUTYNIN CHLORIDE 5 MILLIGRAM(S): 5 TABLET, FILM COATED, EXTENDED RELEASE ORAL at 14:47

## 2024-07-30 RX ADMIN — OXYBUTYNIN CHLORIDE 5 MILLIGRAM(S): 5 TABLET, FILM COATED, EXTENDED RELEASE ORAL at 06:00

## 2024-07-30 RX ADMIN — OXYBUTYNIN CHLORIDE 5 MILLIGRAM(S): 5 TABLET, FILM COATED, EXTENDED RELEASE ORAL at 21:27

## 2024-07-30 RX ADMIN — LOPERAMIDE HYDROCHLORIDE 2 MILLIGRAM(S): 2 CAPSULE ORAL at 13:43

## 2024-07-30 RX ADMIN — Medication 1 GRAM(S): at 13:43

## 2024-07-30 NOTE — PROGRESS NOTE ADULT - ASSESSMENT
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 He continued to have POD with scans in 3/2024 showing growth of the bladder dome mass with invasion of the Joseph pouch and chronic PV thrombosis with cavernous malformation. Pt was subsequently started on palliative FOLFOX in 6/2024, s/p C1 on 6/10, C2 delayed. His treatment course has also been c/b hyponatremia (followed by Dr. Barton in Nephrology clinic, felt to be related to hypovolemia vs SIADH, pt on salt tabs).   Of note, pt was recently treated in the ED for anemia due to blood loss- hgb 5.6 on 7/11 for which pt received 2u prbc transfusion in the ED before being sent home.  Pt was readmitted to [facility] on 7/11 with recurrent symptomatic anemia- hgb 9.1 (from 10.0 on 7/12 after prbc transfusion on 7/11) a/w fatigue; his admission labs were also notable for acute on chronic hyponatremia, as well as leukocytosis and abnormal UA.    ACTIVE PROBLEMS  Met colon adenoca   *h/o malignant LBO s/p ileostomy, L hydro, ? colovesicular fistula based on 7/25 CT a/p  Malignant hematuria/urinary retention   Anemia due to acute blood loss, with underlying chronic disease (h/o ileostomy bleeding, FOBT positive in 6/2024)  h/o L iliofem DVT, PV thrombosis  Hypercoag sate  Acute cystitis  Leukocytosis  Hyponatremia  CKD3    Met colon adenoca   *h/o malignant LBO s/p ileostomy, L hydro, ? colovesicular fistula   7/25 CT a/p shows POD with possible evolving colovesicular fistula  However, pt has only had 1 cycle of palliative chemo, overdue for C2 mFOLFOX with plan for addition of Cetuximab (pt is Kras WT)  Pt to continue outpt fu with Dr. Palafox after dc to discuss plans for resuming palliative chemo  Long term prognosis: guarded; pt is full code    Malignant hematuria  Anemia due to acute blood loss, with underlying chronic disease (h/o ileostomy bleeding, FOBT positive in 6/2024)  Appreciate Urology recs: no role for CBI at this time, but will reconsult if pt has persistent hematuria with clots and/or rise in Cr  Trending BID cbcs  Continuing supportive transfusion prn (goal hgb >7; plts > 50K given active bleeding)  Called Urology eval as urinary retention after oxybutynin   Rad onc consulted, planning for palliative RT. Hopefully will improve his hematuria     h/o L iliofem DVT, PV thrombosis  Hypercoag sate  Appreciate Vascular consult: benefits of AC > risks at this time  Continued heparin challenge , now DC as drop in HH, bleeding    *IR consulted for  IVC filter placement, scheduled today    Acute cystitis  Leukocytosis  Pt remains afebrile, normotensive  Admission UA grossly abnormal with acute leukocytosis which is more c/f acute cystitis than colonization  Ucx negative   DC  empiric Zosyn     Hyponatremia  CKD3  Pt followed by Dr. Barton in Nephrology clinic  Hyponatremia felt to be related to hypovolemia/high ostomy output vs SIADH  CKD likely due to metastatic malignancy to the bladder with L hydro  Na 128 from baseline 131-34; Cr stable (baselin 1.2-1.5)  500cc NS bolus ordered; will fu pm bmp and urine lytes  Continuing Salt tabs 1gm TID  Continuing Na bicarb 650mg BID  Monitoring UOP  Avoiding nephrotoxins  Meds renally dosed where necessary

## 2024-07-30 NOTE — DIETITIAN NUTRITION RISK NOTIFICATION - ADDITIONAL COMMENTS/DIETITIAN RECOMMENDATIONS
Please see Dietitian Initial Assessment for complete recommendations.  Karin Pandya MS, RD, CDN, CNSC (pg #[phone])

## 2024-07-30 NOTE — DIETITIAN INITIAL EVALUATION ADULT - PERTINENT LABORATORY DATA
07-30    132<L>  |  101  |  15  ----------------------------<  105<H>  4.1   |  24  |  1.52<H>    Ca    7.9<L>      30 Jul 2024 06:00  Phos  3.1     07-30  Mg     1.50     07-30    TPro  5.3<L>  /  Alb  2.5<L>  /  TBili  1.1  /  DBili  x   /  AST  17  /  ALT  8   /  AlkPhos  83  07-29  POCT Blood Glucose.: 122 mg/dL (07-30-24 @ 12:32)

## 2024-07-30 NOTE — DIETITIAN INITIAL EVALUATION ADULT - ORAL INTAKE PTA/DIET HISTORY
Patient reported having 3 meals with good intake prior to admission, however reported being on chemo treatment 1 month prior. Stated during that time he had mouth sores and persistent poor appetite and intake. NKFA. Denied chewing or swallowing difficulty. Denied concerns for N/V/D or abdominal pain PTA. Believes wts were 127-128 lbs. Believes he has been able to gain back some weight that he prev had lost following chemo. reported taking Oral supplement (stated "vital", prev admit in Jun 2024 RD notes pt ordered for Bottlenose Vital Shake.) No Hie wts. Prev RD noted 110.1 lb/49.9 kg (6/29/24) current chart wt is noted 121 lbs/54.9 kg (7/26, dosing) reflects wt 10% wt gain.

## 2024-07-30 NOTE — DIETITIAN INITIAL EVALUATION ADULT - OTHER INFO
Patient reports no current issues with appetite and intake reported eating 100% at meals. Denied N/V or abdominal pain. reports daily ostomy output. Of note prev admit pt noted with dx of severe malnutrition in chronic context based on physical findings, Physical findings noted improving since prev admit, consistent with patient reported improvement appetite and intake PTA. Pt however still meeting criteria for severe malnutrition in acute on chronic setting (recent chemo treatment). Agreeable to take Vital Shake to support continued repletion. Additionally wants to try orgain vegan shake. Hyponatremia active noted on salt tabs.  Discussed nutritional recommendations to support repletion (inclusion of snacks between meals, high biological value protein sources such as chicken and fish, continued ONS as needed, F/V micronutrients).

## 2024-07-30 NOTE — DIETITIAN INITIAL EVALUATION ADULT - PERTINENT MEDS FT
MEDICATIONS  (STANDING):  magnesium oxide 400 milliGRAM(s) Oral three times a day with meals  oxybutynin 5 milliGRAM(s) Oral every 8 hours  pantoprazole    Tablet 40 milliGRAM(s) Oral two times a day  sodium bicarbonate 650 milliGRAM(s) Oral two times a day  sodium chloride 1 Gram(s) Oral three times a day    MEDICATIONS  (PRN):  loperamide 2 milliGRAM(s) Oral every 6 hours PRN high ileostomy output

## 2024-07-30 NOTE — DIETITIAN INITIAL EVALUATION ADULT - ENTER TO (G/KG)
Koh Intro Text (From The.....): A KOH prep was ordered and evaluated from the 82 Showing: branching hyphae Detail Level: Simple Add  To Bill: Yes 1.4

## 2024-07-30 NOTE — DIETITIAN INITIAL EVALUATION ADULT - REASON FOR ADMISSION
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 He continued to have POD with scans in 3/2024 showing growth of the bladder dome mass with invasion of the Joseph pouch and chronic PV thrombosis with cavernous malformation. Pt was subsequently started on palliative FOLFOX in 6/2024, s/p C1 on 6/10, C2 delayed. His treatment course has also been c/b hyponatremia as per chart

## 2024-07-30 NOTE — DIETITIAN INITIAL EVALUATION ADULT - NSFNSGIIOFT_GEN_A_CORE
07-29-24 @ 07:01 - 07-30-24 @ 07:00  --------------------------------------------------------  OUT:    Ileostomy (mL): 1575 mL  Total OUT: 1575 mL    Total NET: -1575 mL      07-30-24 @ 07:01 - 07-30-24 @ 17:16  --------------------------------------------------------  OUT:    Ileostomy (mL): 400 mL  Total OUT: 400 mL    Total NET: -400 mL

## 2024-07-30 NOTE — DIETITIAN INITIAL EVALUATION ADULT - ADD RECOMMEND
Monitor weights, labs, BMS, skin integrity, p.o. intake.   Please monitor % PO intake on flowsheets  Honor food preferences as able within therapeutic diet order.

## 2024-07-31 ENCOUNTER — TRANSCRIPTION ENCOUNTER (OUTPATIENT)
Age: 59
End: 2024-07-31

## 2024-07-31 LAB
ALBUMIN SERPL ELPH-MCNC: 2.5 G/DL — LOW (ref 3.3–5)
ALP SERPL-CCNC: 87 U/L — SIGNIFICANT CHANGE UP (ref 40–120)
ALT FLD-CCNC: 8 U/L — SIGNIFICANT CHANGE UP (ref 4–41)
ANION GAP SERPL CALC-SCNC: 11 MMOL/L — SIGNIFICANT CHANGE UP (ref 7–14)
AST SERPL-CCNC: 15 U/L — SIGNIFICANT CHANGE UP (ref 4–40)
BILIRUB SERPL-MCNC: 0.7 MG/DL — SIGNIFICANT CHANGE UP (ref 0.2–1.2)
BUN SERPL-MCNC: 14 MG/DL — SIGNIFICANT CHANGE UP (ref 7–23)
CALCIUM SERPL-MCNC: 8 MG/DL — LOW (ref 8.4–10.5)
CHLORIDE SERPL-SCNC: 98 MMOL/L — SIGNIFICANT CHANGE UP (ref 98–107)
CO2 SERPL-SCNC: 22 MMOL/L — SIGNIFICANT CHANGE UP (ref 22–31)
CREAT SERPL-MCNC: 1.47 MG/DL — HIGH (ref 0.5–1.3)
EGFR: 55 ML/MIN/1.73M2 — LOW
GLUCOSE SERPL-MCNC: 101 MG/DL — HIGH (ref 70–99)
HCT VFR BLD CALC: 26.4 % — LOW (ref 39–50)
HGB BLD-MCNC: 8.4 G/DL — LOW (ref 13–17)
MAGNESIUM SERPL-MCNC: 1.3 MG/DL — LOW (ref 1.6–2.6)
MCHC RBC-ENTMCNC: 25 PG — LOW (ref 27–34)
MCHC RBC-ENTMCNC: 31.8 GM/DL — LOW (ref 32–36)
MCV RBC AUTO: 78.6 FL — LOW (ref 80–100)
NRBC # BLD: 0 /100 WBCS — SIGNIFICANT CHANGE UP (ref 0–0)
NRBC # FLD: 0 K/UL — SIGNIFICANT CHANGE UP (ref 0–0)
PHOSPHATE SERPL-MCNC: 2.3 MG/DL — LOW (ref 2.5–4.5)
PLATELET # BLD AUTO: 197 K/UL — SIGNIFICANT CHANGE UP (ref 150–400)
POTASSIUM SERPL-MCNC: 4.3 MMOL/L — SIGNIFICANT CHANGE UP (ref 3.5–5.3)
POTASSIUM SERPL-SCNC: 4.3 MMOL/L — SIGNIFICANT CHANGE UP (ref 3.5–5.3)
PROT SERPL-MCNC: 5.7 G/DL — LOW (ref 6–8.3)
RBC # BLD: 3.36 M/UL — LOW (ref 4.2–5.8)
RBC # FLD: 17.2 % — HIGH (ref 10.3–14.5)
SODIUM SERPL-SCNC: 131 MMOL/L — LOW (ref 135–145)
WBC # BLD: 9.84 K/UL — SIGNIFICANT CHANGE UP (ref 3.8–10.5)
WBC # FLD AUTO: 9.84 K/UL — SIGNIFICANT CHANGE UP (ref 3.8–10.5)

## 2024-07-31 PROCEDURE — 99232 SBSQ HOSP IP/OBS MODERATE 35: CPT

## 2024-07-31 RX ORDER — SODIUM PHOSPHATE, MONOBASIC, MONOHYDRATE 276; 142 MG/ML; MG/ML
15 INJECTION, SOLUTION INTRAVENOUS ONCE
Refills: 0 | Status: COMPLETED | OUTPATIENT
Start: 2024-07-31 | End: 2024-07-31

## 2024-07-31 RX ORDER — TAMSULOSIN HCL 0.4 MG
0.4 CAPSULE ORAL AT BEDTIME
Refills: 0 | Status: DISCONTINUED | OUTPATIENT
Start: 2024-08-01 | End: 2024-08-01

## 2024-07-31 RX ORDER — TAMSULOSIN HCL 0.4 MG
0.4 CAPSULE ORAL ONCE
Refills: 0 | Status: COMPLETED | OUTPATIENT
Start: 2024-07-31 | End: 2024-07-31

## 2024-07-31 RX ORDER — MAGNESIUM SULFATE 500 MG/ML
2 VIAL (ML) INJECTION ONCE
Refills: 0 | Status: COMPLETED | OUTPATIENT
Start: 2024-07-31 | End: 2024-07-31

## 2024-07-31 RX ADMIN — Medication 1 GRAM(S): at 05:05

## 2024-07-31 RX ADMIN — LOPERAMIDE HYDROCHLORIDE 2 MILLIGRAM(S): 2 CAPSULE ORAL at 09:46

## 2024-07-31 RX ADMIN — Medication 0.4 MILLIGRAM(S): at 12:01

## 2024-07-31 RX ADMIN — PANTOPRAZOLE SODIUM 40 MILLIGRAM(S): 20 TABLET, DELAYED RELEASE ORAL at 05:05

## 2024-07-31 RX ADMIN — OXYBUTYNIN CHLORIDE 5 MILLIGRAM(S): 5 TABLET, FILM COATED, EXTENDED RELEASE ORAL at 05:04

## 2024-07-31 RX ADMIN — Medication 1 GRAM(S): at 13:57

## 2024-07-31 RX ADMIN — PANTOPRAZOLE SODIUM 40 MILLIGRAM(S): 20 TABLET, DELAYED RELEASE ORAL at 17:16

## 2024-07-31 RX ADMIN — Medication 650 MILLIGRAM(S): at 05:05

## 2024-07-31 RX ADMIN — SODIUM PHOSPHATE, MONOBASIC, MONOHYDRATE 63.75 MILLIMOLE(S): 276; 142 INJECTION, SOLUTION INTRAVENOUS at 09:44

## 2024-07-31 RX ADMIN — Medication 1 GRAM(S): at 22:00

## 2024-07-31 RX ADMIN — Medication 25 GRAM(S): at 07:43

## 2024-07-31 RX ADMIN — MAGNESIUM OXIDE 400 MILLIGRAM(S): 253 TABLET ORAL at 09:45

## 2024-07-31 RX ADMIN — Medication 650 MILLIGRAM(S): at 17:16

## 2024-07-31 NOTE — DISCHARGE NOTE PROVIDER - CARE PROVIDER_API CALL
Dayton Palafox  Medical Oncology  [address] [place] [id number]  Phone: [phone]  Fax: [phone]  Follow Up Time:     Gwendolyn Kearns  Radiation Oncology  [address] [place] [id number]  Phone: [phone]  Fax: [phone]  Follow Up Time:

## 2024-07-31 NOTE — DISCHARGE NOTE PROVIDER - HOSPITAL COURSE
58M w/ PMH metastatic colon cancer s/p ileostomy 2 years ago with ostomy bag on cetuximab, DVT on eliquis presenting for 5 days of hematuria and 2 days of blood in ostomy bag. Blood in ostomy bag has resolved. Pt states that on Saturday he noticed pink urine and severe pain when urinating. Urology recs no CBI, esquivel initiated, UCx neg, IV Zosyn d/c'd. C/b LE DVT, Hep gtt started, Hgb <7, Hep gtt d/c'd, s/p IVC placement by IR. RT consulted, planned for 5 fx, pt refused RT while in house, will f/u w/ Rad-Onc in outpatient setting. TOV in progress _____ 58M w/ PMH metastatic colon cancer s/p ileostomy 2 years ago with ostomy bag on cetuximab, DVT on eliquis presenting for 5 days of hematuria and 2 days of blood in ostomy bag. Pt states that on saturday he noticed pink urine and severe pain when urinating. Urology recs no CBI, esquivel initiated, UCx neg, IV Zosyn d/c'd. C/b LE DVT, Hep gtt started, Hgb <7, Hep gtt d/c'd, s/p IVC placement by IR. RT consulted, planned for 5 fx, however pt refused and would prefer following up as outpt. Passed ToV.  C/c/b hyponatremia, restarted salt tabs.    ---HEME/ONC---  #Met. Colon adenoca  - Planned for C2 mFOLFOX + Cetuximab given KRAS WT  - Overall plan per Dr. Palafox  - CT abd/pelv on 7/25 showed POD w/ possible evolving Colovesicular fistula    #Anemia  - 2/2 acute blood loss from hematuris and underlying chronic dx   - s/p 1U PRBC 7/29    #Hx L iliofem DVT, PV thrombosis  - Doppler showed extensive thrombus throughout the visualized L external iliac vein  - Eliquis d/c'ed, switched to Heparin gtt but noted decrease in HgB, d/c'ed AC  - Vascular consulted, no acute intervention  - IR consulted, s/p non-retrievable IVC filter 7/29    ---/ID---  #Malignant Hematuria  - Urology consulted, no role for CBI at this time  - Passed TOV 7/31 night  - Rad Onc consulted, planned for XRT, however pt deferred for outpt    #Acute cystitis  #Leukocytosis  - UA on admission noted leuk est, c/f acute cystitis, empricially started on Zosyn  - Afebrile  - UCx neg, d/c'ed Abx    ---RENAL---  #Hyponatremia  #CKD  - Fu w/ Dr. Barton in Nephrology clinic  - Cr stable  - Restarted Salt tabs  - Cont Na Bicarb

## 2024-07-31 NOTE — DISCHARGE NOTE PROVIDER - NSDCCPCAREPLAN_GEN_ALL_CORE_FT
PRINCIPAL DISCHARGE DIAGNOSIS  Diagnosis: Hematuria  Assessment and Plan of Treatment: You had presented with blood in your urine likely due to your underlying cancer.  Lind was placed and was eventually removed on 7/31.  Urology team was contacted and no acute intervention was done.  You will follow up with them as outpatient.  You were also seen by the Radiation Oncologist who reccomended for you to received Radiation to help control the bleeding, however you deferred for outpatient.      SECONDARY DISCHARGE DIAGNOSES  Diagnosis: Hyponatremia  Assessment and Plan of Treatment: Your salt level was down slightly, but not far from your baseline.  You take salt tabs at home, so you will continue it at home.    Diagnosis: Stage 3 chronic kidney disease  Assessment and Plan of Treatment: Please contiune to follow up with Dr. Barton as outpatient.  Continue sodium bicarb    Diagnosis: Hydronephrosis, left  Assessment and Plan of Treatment: Follow up with Dr. Barton    Diagnosis: DVT of leg (deep venous thrombosis)  Assessment and Plan of Treatment: You were noted to have blood clots in your leg.  However you were unable to tolerate blood thinners as it caused the drop in your redblood cell, as well as due to your blood in your urine.  Interventional radiologist placed a IVC filter to help prevent the blood clots from traveling to your lungs    Diagnosis: Cancer, colon  Assessment and Plan of Treatment: You will continue to follow up with Dr. Palafox

## 2024-07-31 NOTE — PROGRESS NOTE ADULT - ASSESSMENT
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 He continued to have POD with scans in 3/2024 showing growth of the bladder dome mass with invasion of the Joseph pouch and chronic PV thrombosis with cavernous malformation. Pt was subsequently started on palliative FOLFOX in 6/2024, s/p C1 on 6/10, C2 delayed. His treatment course has also been c/b hyponatremia (followed by Dr. Barton in Nephrology clinic, felt to be related to hypovolemia vs SIADH, pt on salt tabs).   Of note, pt was recently treated in the ED for anemia due to blood loss- hgb 5.6 on 7/11 for which pt received 2u prbc transfusion in the ED before being sent home.  Pt was readmitted to [facility] on 7/11 with recurrent symptomatic anemia- hgb 9.1 (from 10.0 on 7/12 after prbc transfusion on 7/11) a/w fatigue; his admission labs were also notable for acute on chronic hyponatremia, as well as leukocytosis and abnormal UA.    ACTIVE PROBLEMS  Met colon adenoca   *h/o malignant LBO s/p ileostomy, L hydro, ? colovesicular fistula based on 7/25 CT a/p  Malignant hematuria/urinary retention   Anemia due to acute blood loss, with underlying chronic disease (h/o ileostomy bleeding, FOBT positive in 6/2024)  h/o L iliofem DVT, PV thrombosis  Hypercoag sate  Acute cystitis  Leukocytosis  Hyponatremia  CKD3    Met colon adenoca   *h/o malignant LBO s/p ileostomy, L hydro, ? colovesicular fistula   7/25 CT a/p shows POD with possible evolving colovesicular fistula  However, pt has only had 1 cycle of palliative chemo, overdue for C2 mFOLFOX with plan for addition of Cetuximab (pt is Kras WT)  Pt to continue outpt fu with Dr. Palafox after dc to discuss plans for resuming palliative chemo  Long term prognosis: guarded; pt is full code    Malignant hematuria  Anemia due to acute blood loss, with underlying chronic disease (h/o ileostomy bleeding, FOBT positive in 6/2024)  Appreciate Urology recs: no role for CBI at this time, but will reconsult if pt has persistent hematuria with clots and/or rise in Cr  Trending BID cbcs  Continuing supportive transfusion prn (goal hgb >7; plts > 50K given active bleeding)  Rad onc consulted, planned for RT but pt refused attempt for sim several times. Pt stated that he does not need RT since hematuria has improved. Rad onc explained this was to reduce tumor burden and reduce risk of further bleeding but pt refused third attempt for sim. Will need to f/u outpt for rad onc if he is agreeable   Pt had urinary retention, esquivel catheter was placed. Pt requested TOV prior to discharge         h/o L iliofem DVT, PV thrombosis  Hypercoag sate  Appreciate Vascular consult: benefits of AC > risks at this time  Continued heparin challenge , now DC as drop in HH, bleeding   s/p IVC filter     Acute cystitis  Leukocytosis  Pt remains afebrile, normotensive  Admission UA grossly abnormal with acute leukocytosis which is more c/f acute cystitis than colonization  Ucx negative   DC  empiric Zosyn     Hyponatremia  CKD3  Pt followed by Dr. Barton in Nephrology clinic  Hyponatremia felt to be related to hypovolemia/high ostomy output vs SIADH  CKD likely due to metastatic malignancy to the bladder with L hydro  Na 128 from baseline 131-34; Cr stable (baselin 1.2-1.5)  500cc NS bolus ordered; will fu pm bmp and urine lytes  Continuing Salt tabs 1gm TID  Continuing Na bicarb 650mg BID  Monitoring UOP  Avoiding nephrotoxins  Meds renally dosed where necessary

## 2024-07-31 NOTE — DISCHARGE NOTE PROVIDER - CARE PROVIDERS DIRECT ADDRESSES
[email or web address]

## 2024-07-31 NOTE — DISCHARGE NOTE PROVIDER - NSDCMRMEDTOKEN_GEN_ALL_CORE_FT
Eliquis Starter Pack for Treatment of DVT and PE 5 mg oral tablet: 1 tab(s) orally 2 times a day per starter pack instructions  Imodium: as needed  pantoprazole 40 mg oral delayed release tablet: 1 tab(s) orally once a day (before a meal)  Potassium Citrate: 10 milliequivalent(s) orally 2 times a day  sodium bicarbonate 650 mg oral tablet: 2 tab(s) orally 2 times a day  sodium chloride 1 g oral tablet: 1 tab(s) orally 3 times a day   loperamide 2 mg oral capsule: 1 cap(s) orally every 6 hours as needed for high ileostomy output MDD: 4 doses  pantoprazole 40 mg oral delayed release tablet: 1 tab(s) orally once a day (before a meal)  sodium bicarbonate 650 mg oral tablet: 1 tab(s) orally 2 times a day  sodium chloride 1 g oral tablet: 1 tab(s) orally 3 times a day  tamsulosin 0.4 mg oral capsule: 1 cap(s) orally once a day (at bedtime)

## 2024-07-31 NOTE — DISCHARGE NOTE PROVIDER - DETAILS OF MALNUTRITION DIAGNOSIS/DIAGNOSES
This patient has been assessed with a concern for Malnutrition and was treated during this hospitalization for the following Nutrition diagnosis/diagnoses:     -  07/30/2024: Severe protein-calorie malnutrition   -  07/30/2024: Underweight (BMI < 19)

## 2024-07-31 NOTE — CHART NOTE - NSCHARTNOTEFT_GEN_A_CORE
Pre-Interventional Radiology Procedure Note    58y    Male    Procedure: IVC Filter    Diagnosis/Indication: Patient is a 58y old  Male who presents with a chief complaint of Hematuria, fatigue (28 Jul 2024 15:51)      Interventional Radiology Attending Physician:     Ordering Attending Physician:     PAST MEDICAL & SURGICAL HISTORY:  Colon cancer  (resection and ileostomy in 2022, with progressive residual metastatic dz, refusing CTX / radiation oncology consultation to date)      H/O ileostomy      Renal stones  (s/p right ureteral stent placement)      Large bowel obstruction  (at time of diagnosis of colon cancer)      History of small bowel obstruction  (4/30/23: non-surgical tx)      MAXWELL (acute kidney injury)  (4/30/23)      History of cholelithiasis      Pulmonary nodule      Splenomegaly      History of ileostomy      S/P colon resection      H/O ureteroscopy  (s/p right ureteral stent placement)           CBC Full  -  ( 28 Jul 2024 07:54 )  WBC Count : 6.68 K/uL  RBC Count : 2.88 M/uL  Hemoglobin : 6.9 g/dL  Hematocrit : 22.8 %  Platelet Count - Automated : 216 K/uL  Mean Cell Volume : 79.2 fL  Mean Cell Hemoglobin : 24.0 pg  Mean Cell Hemoglobin Concentration : 30.3 gm/dL  Auto Neutrophil # : x  Auto Lymphocyte # : x  Auto Monocyte # : x  Auto Eosinophil # : x  Auto Basophil # : x  Auto Neutrophil % : x  Auto Lymphocyte % : x  Auto Monocyte % : x  Auto Eosinophil % : x  Auto Basophil % : x    07-28    131<L>  |  97<L>  |  18  ----------------------------<  101<H>  3.9   |  25  |  1.85<H>    Ca    8.0<L>      28 Jul 2024 07:02  Phos  3.0     07-26  Mg     1.40     07-26    TPro  5.2<L>  /  Alb  2.4<L>  /  TBili  0.6  /  DBili  x   /  AST  12  /  ALT  7   /  AlkPhos  78  07-28    PTT - ( 28 Jul 2024 00:56 )  PTT:58.4 sec
Cahy Rand- refused two attempts to bring him down for CT SIM  yesterday to start palliative RT for hematuria.    When asked why he is now refusing after consenting in writing to treatment he stated "If  I am no longer bleeding, why would you radiate me?"    We explained the tumor burden, and that bleeding can recur.  Our plan is to palliate the tumor  burden which caused the bleeding and necessitated transfusion.      Today, our staff will make a 3rd attempt for CT sim.  If he refuses, we will cancel the plans  for inpatient RT.     Complete Blood Count in AM (07.31.24 @ 05:22)    Nucleated RBC: 0 /100 WBCs    WBC Count: 9.84 K/uL    RBC Count: 3.36 M/uL    Hemoglobin: 8.4 g/dL    Hematocrit: 26.4 %    Mean Cell Volume: 78.6 fL    Mean Cell Hemoglobin: 25.0 pg    Mean Cell Hemoglobin Conc: 31.8 gm/dL    Red Cell Distrib Width: 17.2 %    Platelet Count - Automated: 197 K/uL    Nucleated RBC #: 0.00 K/uL
Per Radiation Oncology, plan for simulation followed by 5 fx of RT. Pt declined to go down to simulation x2 today despite further discussion of plan of care with radiation oncology team. Will re-attempt tomorrow.
Repeat hgb today 7.2 (7.3 on stat repeat), from 9.1 on 7/25.  Hgb trend reviewed, ranges 7.3-9.5 dating back to 5/2024.    - Heparin gtt held; will fu pm bmp and plan to resume heparin gtt if hgb remains stable (> 7)

## 2024-07-31 NOTE — DISCHARGE NOTE PROVIDER - NSDCFUSCHEDAPPT_GEN_ALL_CORE_FT
Dayton Palafox  [facility]  Clini  Scheduled Appointment: 08/05/2024    [facility]  [facility]  Scheduled Appointment: 08/05/2024    [facility]  [facility]  Scheduled Appointment: 08/07/2024    Dayton Palafox  [facility]  Practic  Scheduled Appointment: 08/07/2024

## 2024-08-01 ENCOUNTER — TRANSCRIPTION ENCOUNTER (OUTPATIENT)
Age: 59
End: 2024-08-01

## 2024-08-01 VITALS
OXYGEN SATURATION: 100 % | SYSTOLIC BLOOD PRESSURE: 114 MMHG | RESPIRATION RATE: 18 BRPM | DIASTOLIC BLOOD PRESSURE: 67 MMHG | HEART RATE: 72 BPM

## 2024-08-01 LAB
ALBUMIN SERPL ELPH-MCNC: 2.5 G/DL — LOW (ref 3.3–5)
ALP SERPL-CCNC: 97 U/L — SIGNIFICANT CHANGE UP (ref 40–120)
ALT FLD-CCNC: 9 U/L — SIGNIFICANT CHANGE UP (ref 4–41)
ANION GAP SERPL CALC-SCNC: 8 MMOL/L — SIGNIFICANT CHANGE UP (ref 7–14)
AST SERPL-CCNC: 15 U/L — SIGNIFICANT CHANGE UP (ref 4–40)
BILIRUB SERPL-MCNC: 0.6 MG/DL — SIGNIFICANT CHANGE UP (ref 0.2–1.2)
BUN SERPL-MCNC: 13 MG/DL — SIGNIFICANT CHANGE UP (ref 7–23)
CALCIUM SERPL-MCNC: 8.2 MG/DL — LOW (ref 8.4–10.5)
CHLORIDE SERPL-SCNC: 99 MMOL/L — SIGNIFICANT CHANGE UP (ref 98–107)
CO2 SERPL-SCNC: 22 MMOL/L — SIGNIFICANT CHANGE UP (ref 22–31)
CREAT SERPL-MCNC: 1.42 MG/DL — HIGH (ref 0.5–1.3)
EGFR: 57 ML/MIN/1.73M2 — LOW
GLUCOSE SERPL-MCNC: 106 MG/DL — HIGH (ref 70–99)
HCT VFR BLD CALC: 27.3 % — LOW (ref 39–50)
HGB BLD-MCNC: 8.7 G/DL — LOW (ref 13–17)
MAGNESIUM SERPL-MCNC: 1.7 MG/DL — SIGNIFICANT CHANGE UP (ref 1.6–2.6)
MCHC RBC-ENTMCNC: 24.5 PG — LOW (ref 27–34)
MCHC RBC-ENTMCNC: 31.9 GM/DL — LOW (ref 32–36)
MCV RBC AUTO: 76.9 FL — LOW (ref 80–100)
NRBC # BLD: 0 /100 WBCS — SIGNIFICANT CHANGE UP (ref 0–0)
NRBC # FLD: 0 K/UL — SIGNIFICANT CHANGE UP (ref 0–0)
OSMOLALITY UR: 388 MOSM/KG — SIGNIFICANT CHANGE UP (ref 50–1200)
PHOSPHATE SERPL-MCNC: 2.9 MG/DL — SIGNIFICANT CHANGE UP (ref 2.5–4.5)
PLATELET # BLD AUTO: 157 K/UL — SIGNIFICANT CHANGE UP (ref 150–400)
POTASSIUM SERPL-MCNC: 4.3 MMOL/L — SIGNIFICANT CHANGE UP (ref 3.5–5.3)
POTASSIUM SERPL-SCNC: 4.3 MMOL/L — SIGNIFICANT CHANGE UP (ref 3.5–5.3)
PROT SERPL-MCNC: 5.7 G/DL — LOW (ref 6–8.3)
RBC # BLD: 3.55 M/UL — LOW (ref 4.2–5.8)
RBC # FLD: 17.3 % — HIGH (ref 10.3–14.5)
SODIUM SERPL-SCNC: 129 MMOL/L — LOW (ref 135–145)
SODIUM UR-SCNC: 54 MMOL/L — SIGNIFICANT CHANGE UP
WBC # BLD: 8.16 K/UL — SIGNIFICANT CHANGE UP (ref 3.8–10.5)
WBC # FLD AUTO: 8.16 K/UL — SIGNIFICANT CHANGE UP (ref 3.8–10.5)

## 2024-08-01 PROCEDURE — 99239 HOSP IP/OBS DSCHRG MGMT >30: CPT

## 2024-08-01 RX ORDER — TAMSULOSIN HCL 0.4 MG
1 CAPSULE ORAL
Qty: 30 | Refills: 0
Start: 2024-08-01 | End: 2024-08-30

## 2024-08-01 RX ORDER — BACTERIOSTATIC SODIUM CHLORIDE 0.9 %
1 VIAL (ML) INJECTION
Qty: 90 | Refills: 0
Start: 2024-08-01 | End: 2024-08-30

## 2024-08-01 RX ORDER — LOPERAMIDE HYDROCHLORIDE 2 MG/1
1 CAPSULE ORAL
Qty: 120 | Refills: 0
Start: 2024-08-01 | End: 2024-08-30

## 2024-08-01 RX ORDER — BACTERIOSTATIC SODIUM CHLORIDE 0.9 %
1 VIAL (ML) INJECTION THREE TIMES A DAY
Refills: 0 | Status: DISCONTINUED | OUTPATIENT
Start: 2024-08-01 | End: 2024-08-01

## 2024-08-01 RX ORDER — SODIUM BICARBONATE 0.9MEQ/ML
1 SYRINGE (ML) INTRAVENOUS
Qty: 60 | Refills: 0
Start: 2024-08-01 | End: 2024-08-30

## 2024-08-01 RX ORDER — PANTOPRAZOLE SODIUM 20 MG/1
1 TABLET, DELAYED RELEASE ORAL
Qty: 30 | Refills: 0
Start: 2024-08-01 | End: 2024-08-30

## 2024-08-01 RX ADMIN — Medication 1 GRAM(S): at 13:45

## 2024-08-01 RX ADMIN — Medication 650 MILLIGRAM(S): at 05:41

## 2024-08-01 RX ADMIN — PANTOPRAZOLE SODIUM 40 MILLIGRAM(S): 20 TABLET, DELAYED RELEASE ORAL at 06:14

## 2024-08-01 RX ADMIN — Medication 1 GRAM(S): at 05:40

## 2024-08-01 NOTE — PROGRESS NOTE ADULT - ASSESSMENT
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 He continued to have POD with scans in 3/2024 showing growth of the bladder dome mass with invasion of the Joseph pouch and chronic PV thrombosis with cavernous malformation. Pt was subsequently started on palliative FOLFOX in 6/2024, s/p C1 on 6/10, C2 delayed. His treatment course has also been c/b hyponatremia (followed by Dr. Barton in Nephrology clinic, felt to be related to hypovolemia vs SIADH, pt on salt tabs).   Of note, pt was recently treated in the ED for anemia due to blood loss- hgb 5.6 on 7/11 for which pt received 2u prbc transfusion in the ED before being sent home.  Pt was readmitted to [facility] on 7/11 with recurrent symptomatic anemia- hgb 9.1 (from 10.0 on 7/12 after prbc transfusion on 7/11) a/w fatigue; his admission labs were also notable for acute on chronic hyponatremia, as well as leukocytosis and abnormal UA.    ACTIVE PROBLEMS  Met colon adenoca   *h/o malignant LBO s/p ileostomy, L hydro, ? colovesicular fistula based on 7/25 CT a/p  Malignant hematuria/urinary retention   Anemia due to acute blood loss, with underlying chronic disease (h/o ileostomy bleeding, FOBT positive in 6/2024)  h/o L iliofem DVT, PV thrombosis  Hypercoag sate  Acute cystitis  Leukocytosis  Hyponatremia  CKD3    Met colon adenoca   *h/o malignant LBO s/p ileostomy, L hydro, ? colovesicular fistula   7/25 CT a/p shows POD with possible evolving colovesicular fistula  However, pt has only had 1 cycle of palliative chemo, overdue for C2 mFOLFOX with plan for addition of Cetuximab (pt is Kras WT)  Pt to continue outpt fu with Dr. Palafox after dc to discuss plans for resuming palliative chemo  Long term prognosis: guarded; pt is full code    Malignant hematuria  Anemia due to acute blood loss, with underlying chronic disease (h/o ileostomy bleeding, FOBT positive in 6/2024)  Appreciate Urology recs: no role for CBI at this time, but will reconsult if pt has persistent hematuria with clots and/or rise in Cr  Trending BID cbcs  Continuing supportive transfusion prn (goal hgb >7; plts > 50K given active bleeding)  Rad onc consulted, planned for RT but pt refused attempt for sim several times. Pt stated that he does not need RT since hematuria has improved. Rad onc explained this was to reduce tumor burden and reduce risk of further bleeding but pt refused third attempt for sim. Will need to f/u outpt for rad onc if he is agreeable   Pt had urinary retention, esquivel catheter was placed. Pt requested TOV prior to discharge         h/o L iliofem DVT, PV thrombosis  Hypercoag sate  Appreciate Vascular consult: benefits of AC > risks at this time  Continued heparin challenge , now DC as drop in HH, bleeding   s/p IVC filter     Acute cystitis  Leukocytosis  Pt remains afebrile, normotensive  Admission UA grossly abnormal with acute leukocytosis which is more c/f acute cystitis than colonization  Ucx negative   DC  empiric Zosyn     Hyponatremia  CKD3  Pt followed by Dr. 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 He continued to have POD with scans in 3/2024 showing growth of the bladder dome mass with invasion of the Joseph pouch and chronic PV thrombosis with cavernous malformation. Pt was subsequently started on palliative FOLFOX in 6/2024, s/p C1 on 6/10, C2 delayed. His treatment course has also been c/b hyponatremia (followed by Dr. Barton in Nephrology clinic, felt to be related to hypovolemia vs SIADH, pt on salt tabs).   Of note, pt was recently treated in the ED for anemia due to blood loss- hgb 5.6 on 7/11 for which pt received 2u prbc transfusion in the ED before being sent home.  Pt was readmitted to [facility] on 7/11 with recurrent symptomatic anemia- hgb 9.1 (from 10.0 on 7/12 after prbc transfusion on 7/11) a/w fatigue; his admission labs were also notable for acute on chronic hyponatremia, as well as leukocytosis and abnormal UA.    ACTIVE PROBLEMS  Met colon adenoca   *h/o malignant LBO s/p ileostomy, L hydro, ? colovesicular fistula based on 7/25 CT a/p  Malignant hematuria/urinary retention   Anemia due to acute blood loss, with underlying chronic disease (h/o ileostomy bleeding, FOBT positive in 6/2024)  h/o L iliofem DVT, PV thrombosis  Hypercoag sate  Acute cystitis  Leukocytosis  Hyponatremia  CKD3    Met colon adenoca   *h/o malignant LBO s/p ileostomy, L hydro, ? colovesicular fistula   7/25 CT a/p shows POD with possible evolving colovesicular fistula  However, pt has only had 1 cycle of palliative chemo, overdue for C2 mFOLFOX with plan for addition of Cetuximab (pt is Kras WT)  Pt to continue outpt fu with Dr. Palafox after dc to discuss plans for resuming palliative chemo  Long term prognosis: guarded; pt is full code    Malignant hematuria  Anemia due to acute blood loss, with underlying chronic disease (h/o ileostomy bleeding, FOBT positive in 6/2024)  Appreciate Urology recs: no role for CBI at this time, but will reconsult if pt has persistent hematuria with clots and/or rise in Cr  Trending BID cbcs  Continuing supportive transfusion prn (goal hgb >7; plts > 50K given active bleeding)  Rad onc consulted, planned for RT but pt refused attempt for sim several times. Pt stated that he does not need RT since hematuria has improved. Rad onc explained this was to reduce tumor burden and reduce risk of further bleeding but pt refused third attempt for sim. Will need to f/u outpt with rad onc if he is agreeable   Pt had urinary retention, esquivel catheter was placed. Passed TOV         h/o L iliofem DVT, PV thrombosis  Hypercoag sate  Appreciate Vascular consult: benefits of AC > risks at this time  Continued heparin challenge , now DC as drop in HH, bleeding   s/p IVC filter     Acute cystitis  Leukocytosis  Pt remains afebrile, normotensive  Admission UA grossly abnormal with acute leukocytosis which is more c/f acute cystitis than colonization  Ucx negative   DC  empiric Zosyn     Hyponatremia  CKD3  Pt followed by Dr. Barton in Nephrology clinic  Hyponatremia felt to be related to hypovolemia/high ostomy output vs SIADH  CKD likely due to metastatic malignancy to the bladder with L hydro  Na 128 from baseline 131-34; Cr stable (baselin 1.2-1.5)  500cc NS bolus ordered; will fu pm bmp and urine lytes  Continuing Salt tabs 1gm TID  Continuing Na bicarb 650mg BID  Monitoring UOP  Avoiding nephrotoxins  Meds renally dosed where necessary

## 2024-08-01 NOTE — DISCHARGE NOTE NURSING/CASE MANAGEMENT/SOCIAL WORK - PATIENT PORTAL LINK FT
You can access the FollowMyHealth Patient Portal offered by [facility] by registering at the following website: [email or web address]. By joining [employer]’s FollowMyHealth portal, you will also be able to view your health information using other applications (apps) compatible with our system.

## 2024-08-01 NOTE — PROGRESS NOTE ADULT - PROVIDER SPECIALTY LIST ADULT
Hospitalist
Outpatient Medications Marked as Taking for the 9/9/20 encounter (Refill) with Oneta Grounds, DO   Medication Sig Dispense Refill   â¢ LORazepam (ATIVAN) 2 MG tablet Take 1 tablet by mouth 3 times daily as needed for Anxiety. 90 tablet 2   Last refilled 5     Last Visit: 6-9-2020  Next Visit: Visit date not found    Labs:       Malia Ospina to refill?
Hospitalist

## 2024-08-01 NOTE — DISCHARGE NOTE NURSING/CASE MANAGEMENT/SOCIAL WORK - NSDCPEFALRISK_GEN_ALL_CORE
For information on Fall & Injury Prevention, visit: [email or web address] OR  [email or web address] OR  https://www.cdc.gov/steadi/patient.html

## 2024-08-01 NOTE — PROGRESS NOTE ADULT - NUTRITIONAL ASSESSMENT
This patient has been assessed with a concern for Malnutrition and has been determined to have a diagnosis/diagnoses of Severe protein-calorie malnutrition and Underweight (BMI < 19).    This patient is being managed with:   Diet DASH/TLC-  Sodium & Cholesterol Restricted  Entered: Jul 29 2024  1:35AM  
This patient has been assessed with a concern for Malnutrition and has been determined to have a diagnosis/diagnoses of Severe protein-calorie malnutrition and Underweight (BMI < 19).    This patient is being managed with:   Diet DASH/TLC-  Sodium & Cholesterol Restricted  Entered: Jul 29 2024  1:35AM

## 2024-08-01 NOTE — PROGRESS NOTE ADULT - REASON FOR ADMISSION
Hematuria, fatigue

## 2024-08-05 ENCOUNTER — APPOINTMENT (OUTPATIENT)
Dept: INFUSION THERAPY | Facility: HOSPITAL | Age: 59
End: 2024-08-05

## 2024-08-05 ENCOUNTER — APPOINTMENT (OUTPATIENT)
Dept: UROLOGY | Facility: CLINIC | Age: 59
End: 2024-08-05

## 2024-08-05 ENCOUNTER — APPOINTMENT (OUTPATIENT)
Dept: HEMATOLOGY ONCOLOGY | Facility: CLINIC | Age: 59
End: 2024-08-05

## 2024-08-07 ENCOUNTER — APPOINTMENT (OUTPATIENT)
Dept: INFUSION THERAPY | Facility: HOSPITAL | Age: 59
End: 2024-08-07

## 2024-08-07 ENCOUNTER — APPOINTMENT (OUTPATIENT)
Dept: HEMATOLOGY ONCOLOGY | Facility: CLINIC | Age: 59
End: 2024-08-07

## 2024-08-07 PROCEDURE — 99215 OFFICE O/P EST HI 40 MIN: CPT

## 2024-08-07 PROCEDURE — G2211 COMPLEX E/M VISIT ADD ON: CPT | Mod: NC

## 2024-08-07 NOTE — ASSESSMENT
[FreeTextEntry1] : Colon adenocarcinoma (153.9) (C18.9) LEFT SIDED COLON ADENOCARCINOMA Patient presented him with large bowel obstruction and septic shock with gram negative bacteremia on 9/10/2022. Dr. Plasencia performed life-saving emergent subtotal colectomy and was left in discontinuity. He returned to the OR on 9/12/22 at which point additional necrotic appearing small bowel was resected and an end ileostomy was created. Was in hospital for almost 3 weeks (initial 2 weeks and then readmission for dehydration/ high ileostomy output.)  There was gross residual disease in the pelvis, which was not amenable to resection. Path: 7 cm sigmoid cancer w positive margin. T4aN0 (0/11 nodes), proximal margin was positive. MMR IHC is proficient.  MRI of pelvis 11/8/22 Within the wall of the dome of the bladder is a 2.1 x 2.1 x 1.8 cm enhancing abnormality. This appears to be a site of previous postoperative collection however no fluid component can be identified this time. He followed up with Dr Coronado on 11/14/22, her recommendation was to begin systemic chemotherapy, and this was declined. Bladder mass got larger over time. MRI 3/16/23 No evidence of intrinsic bladder lesion. Increasing size of enhancing soft tissue mass along the serosal surface of the left bladder dome measuring 4.0 x 3.7 cm (26-24), previously 2.1 x 2.1 cm. Findings are suspicious for recurrent colon cancer.  Admitted to [facility] 4/30/23- 5/4/23 for a small bowel obstruction- no surgical intervention. CT A/P 4/30/23 Bowel; Dilated small bowel measuring up to 3.5 cm. Small bowel obstruction with transition point in the left pelvis at the level of prior surgery and an adjacent mass related to the dome of urinary bladder which demonstrates interval increase in size and is concerning for a metastatic lesion/recurrence.  Labs with CEA: 11/15/22 CEA = 3.0 5/3/23 CEA = 29.8 5/19/23 CEA = 74.6  CT A/P IC 3/11/24: Status post subtotal colectomy with end ileostomy in the right lower quadrant. No bowel obstruction. There is a large pelvic mass with central necrosis which appears to arise from the bladder dome and is inseparable from the Joseph's pouch, measuring approximately 7.9 x 6.1 x 7.6 cm (AP x TR x CC), increase in size from CT 7/12/2023 where it measured 4.8 x 5.1 cm at a similar level. Impression: Large pelvic mass with central necrosis which appears to arise from the bladder dome and is inseparable from the Joseph's pouch, substantially increased in size compared with prior studies, consistent with recurrent or metastatic tumor. Chronic portal vein thrombosis with cavernous transformation.  Foundation CDx sent. He is URIEL wildtype and will be eligible to receive EGFRi. Will add Cetuximab if necessary.  FOLFOX C1D1 6/10/24 C2 delayed due to hospitalization at [facility] 7/25/24 to 8/1/24   FOLFOX C1D1 on 6/10/24. he had grade one-two fatigue and lower extremity weakness. Also developed mucositis which has resolved.   He was admitted to [facility] from 6/28-7/1/24 for fatigue likely 2/2 poor po intake and recent chemo and was found to have hyponatremia and new DVT. He started on Eliquis PO BID. Patient is being followed by Dr. Hernández (Renal) for hyponatremia.   He was admitted to [facility] 7/25-81/24 due to fatigue, hematuria. urine culture insignificant. CT 7/25/24 showed   *Large pelvic mass with central necrosis, adjacent to the bladder dome and inseparable from the Joseph's pouch again noted. Please note that the air in the pelvic mass centrally has developed since the prior examination and communication with adjacent small bowel loops or Colby pouch should be considered.  The inferior aspect of this pelvic mass is larger in size and inseparable from the superior wall of the bladder.  *Nonopacification of the left femoral vein extending to the left common iliac vein, corresponds to DVT.  Increase in small bowel mesenteric adenopathy. Moderate left hydronephrosis is new from prior. Cavernous transformation of the portal vein is unchanged.    During the visit on 8/6/24  -Reviewed the recent CT abd on 7/25/24 with Radiologist;  -discussed with surgeon and pt about the image results; recommend resume therapy with dose reduced chemo + cetuximab.  Will schedule the chemo next week either Thursday or Tues.  - considering 30% dose reduction chemo with oxaliplatin dose 68; Leucovorin 300; 5-Fu 1700;  and cetuximab at fixed dose q2 weeks - -encourage increase food intake.  -Meanwhile, pt will also f/u RadOnc for palliative RT therapy;  -he will f/u with Urology  -will f/u Lab before the start of treatment; active type and screen   Discussed with Dr. Vivienne George PGY6 hem & onc fellow

## 2024-08-07 NOTE — HISTORY OF PRESENT ILLNESS
[de-identified] : LEFT SIDED COLON ADENOCARCINOMA Patient saw Dr. Zach Plasencia at [facility] who presented him with large bowel obstruction and septic shock with gram negative bacteremia on 9/10/2022. Dr. Plasencia performed life-saving emergent subtotal colectomy and was left in discontinuity, Required several units of blood. Vasopressors. He returned to the OR on 9/12/22 at which point additional necrotic appearing small bowel was resected and an end ileostomy was created. Was in hospital for almost 3 weeks (initial 2 weeks and then readmission for dehydration/ high ileostomy output.)  There was gross residual disease in the pelvis, which was not amenable to resection. Path: 7 cm sigmoid cancer w positive margin. T4aN0 (0/11 nodes), proximal margin was positive. MMR IHC is proficient.  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 He was on Eliquis for that for 6 months, and no longer on it.  He saw Dr. Gillian Coronado from medical oncology 10/2022. CT Chest abdomen pelvis 9/19/22: no visible disease. Thromboses as above. CT abdomen/ pelvis 10/11/22: resolving pelvic fluid. No visible disease  MRI of pelvis 11/8/22 Within the wall of the dome of the bladder is a 2.1 x 2.1 x 1.8 cm enhancing abnormality. This appears to be a site of previous postoperative collection however no fluid component can be identified this time.  11/15/22 CEA 3.0  He followed up with Dr Coronado on 11/14/22, her recommendation was to begin systemic chemotherapy, and this was declined.  He follows up with Dr Kruger- underwent cystoscopy this showed large area in the posterior bladder wall with a submucosal nodularity. MRI 3/16/23 No evidence of intrinsic bladder lesion. Increasing size of enhancing soft tissue mass along the serosal surface of the left bladder dome measuring 4.0 x 3.7 cm (26-24), previously 2.1 x 2.1 cm. Findings are suspicious for recurrent colon cancer. Nonocclusive left portal vein thrombus.  4/28/23: He presents with his wife, still on Eliquis, he denies any abdominal pain. Recently hospitalized in [facility], transferred from [facility], for kidney stones. S/p urethral stent placement, by Dr Meadows. He has lost weight, his appetite is great, urinating freely, ostomy is working well. He is very adamant about not getting chemotherapy. He explains it is because he saw his cousin, who was diagnosed with cervical cancer, undergo chemotherapy, whither away and die.  Admitted to [facility] 4/30/23- 5/4/23 for a small bowel obstruction- no surgical intervention. CT A/P 4/30/23 Bowel; Dilated small bowel measuring up to 3.5 cm. Small bowel obstruction with transition point in the left pelvis at the level of prior surgery and an adjacent mass related to the dome of urinary bladder which demonstrates interval increase in size and is concerning for a metastatic lesion/recurrence. CT 5/3/23: Indeterminate 2 mm Right lower lobe lung nodule.  5/3/23 CEA 29.8 5/19/23 CEA 74.6  6/16/23: saw Dr Nino s/p kidney stone removal and stent replacement on 5/11/23. He still occasionally has blood in his urine, his appetite is great.  CT A/P IC 3/11/24: LIVER: Peripheral atrophy and central hypertrophy of the liver. Heterogeneous attenuation and enhancement in the right hepatic lobe is likely perfusional in etiology. Sequela of chronic portal vein thrombosis with cavernous transformation. BLADDER, BOWEL: Status post subtotal colectomy with end ileostomy in the right lower quadrant. No bowel obstruction. There is a large pelvic mass with central necrosis which appears to arise from the bladder dome and is inseparable from the Joseph's pouch, measuring approximately 7.9 x 6.1 x 7.6 cm (AP x TR x CC), increase in size from CT 7/12/2023 where it measured 4.8 x 5.1 cm at a similar level. Impression: Large pelvic mass with central necrosis which appears to arise from the bladder dome and is inseparable from the Joseph's pouch, substantially increased in size compared with prior studies, consistent with recurrent or metastatic tumor. Chronic portal vein thrombosis with cavernous transformation. Morphologic changes of chronic liver disease with heterogeneous right hepatic lobe enhancement, nonspecific and possibly perfusional.  RENAL STONES Follows with Dr Nino Continue k-citrate, NaHCO3  5/14/24 Patient here to followup tests and next steps. I did review with surgeon, Dr Mendosa. On PET looks like there is multifocal disease--nodes versus peritoneal. Unlike that this will be amenable to resection. She agrees with starting systemic asap.  6/10 C1D1 FOLFOX today. Foundation CDx pending Port was placed a few days ago and patient admits to mild fatigue. No other complaints. We reviewed his CBC with diff. His anemia is worsening. Hg today is 8.3/24.4 He denies blood in stool (ileostomy). Admits to mild tinge of blood while urinating however denies flank pain or dysuria. Currently on no supplements.  6/12/24 He is here for one-unit PRBC. Underwent labs on 6/11 which reveal Na+ 126. Patient states since Monday (6/10) he notice blood in ileostomy bag which has been constant since Monday, ~ 3 days. He will be getting one unit PRBC today. Hyponatremia: Na+ on 6/11 was 126. Today is 127. Will give NS at 500 ml/hour and repeat IVF on Friday, 6/14. Will repeat CBC with diff and BMP as well. Patient was instructed to restrict his fluid intake. He will need to be seen by renal. Messaged Dr. Hernández to expedite. Despite bleeding from ileostomy and being hyponatremic, he is feeling relatively well. Denies headache, change in vision, double vision, myalgia, confusion or increase in fatigue.  6/24/24 C2 FOLFOX today. Due to difficult time with C1 patient prefers to hold today treatment. Admits to mouth sores that was very painful. He was unable to eat due to pain. Patient admits to an appetite but due to pain was unable to eat. Admits to fatigue and SOB during exertion. No bleeding noted from ileostomy. Denies neuropathy. He is s/p 2 units PRBC and now his Hg/Hct is 9.2/27.1 He is URIEL wildtype and will be eligible to receive EGFRi  7/8/24 Hospital admission: Patient admitted to [facility] from 6/28-7/1 for fatigue likely 2/2 poor po intake and recent chemo found to have hyponatremia and new DVT. Currently on Eliquis PO BID Hyponatremia: Patient is being followed by Dr. Hernández (Renal). He will require weekly IVF and currently on salt tabs PO BID. Will arrange for Wednesday, 7/10/24 Colon CA: FOLFOX C1D1 on 6/10. No other treatments afterwards due to grade one-two fatigue and lower extremity weakness. Also developed mucositis which has resolved. He is not ready to resume therapy at this time due to DVT noted in the left lower extremity. I recommended that we resume therapy at dose reduction + Cetuximab. Patient will let team know.  8/7/24 he reports esophageal sore/stomach sore, weight loss, fatigue after the 1st cycle of chemo. denies diarrhea. he was admitted to [facility] due to intermittent hematuria. He had sstent placement. He did not receive radiation therapy yet. He reports that currently he has no apparent hematuria.   -7/25/24 CT abd while he was admitted to [facility]  *Large pelvic mass with central necrosis, adjacent to the bladder dome and inseparable from the Joseph's pouch again noted. Please note that the air in the pelvic mass centrally has developed since the prior examination and communication with adjacent small bowel loops or Colby pouch should be considered.   The inferior aspect of this pelvic mass is larger in size and inseparable from the superior wall of the bladder.  *Nonopacification of the left femoral vein extending to the left common iliac vein, corresponds to deep venous thrombosis. Correlation with left lower extremity venous duplex recommended. Increase in small bowel mesenteric adenopathy. Moderate left hydronephrosis is new from prior. Cavernous transformation of the portal vein is unchanged. Suspicious for evolving colovesicular fistula based on the image but urine culture only having normal yue.      Disease: colon cancer   Pathology: adenocarcinoma AJCC Stage: 4   Tumor Markers: CEA   [de-identified] : Surgeon: Catalina Mendosa Urology: Brayan Nino PCP: Giorgio Hinds ([place]).

## 2024-08-08 NOTE — BRIEF OPERATIVE NOTE - NSICDXBRIEFPOSTOP_GEN_ALL_CORE_FT
Neck , no lymphadenopathy POST-OP DIAGNOSIS:  Ureteral stone 08-Apr-2023 08:13:24  Denilson Meadows

## 2024-08-12 ENCOUNTER — APPOINTMENT (OUTPATIENT)
Dept: UROLOGY | Facility: CLINIC | Age: 59
End: 2024-08-12

## 2024-08-13 ENCOUNTER — APPOINTMENT (OUTPATIENT)
Dept: INFUSION THERAPY | Facility: HOSPITAL | Age: 59
End: 2024-08-13

## 2024-08-13 ENCOUNTER — APPOINTMENT (OUTPATIENT)
Dept: HEMATOLOGY ONCOLOGY | Facility: CLINIC | Age: 59
End: 2024-08-13

## 2024-08-15 ENCOUNTER — APPOINTMENT (OUTPATIENT)
Dept: INFUSION THERAPY | Facility: HOSPITAL | Age: 59
End: 2024-08-15

## 2024-08-27 ENCOUNTER — APPOINTMENT (OUTPATIENT)
Dept: HEMATOLOGY ONCOLOGY | Facility: CLINIC | Age: 59
End: 2024-08-27

## 2024-08-27 ENCOUNTER — NON-APPOINTMENT (OUTPATIENT)
Age: 59
End: 2024-08-27

## 2024-08-27 ENCOUNTER — APPOINTMENT (OUTPATIENT)
Dept: INFUSION THERAPY | Facility: HOSPITAL | Age: 59
End: 2024-08-27

## 2024-08-29 ENCOUNTER — APPOINTMENT (OUTPATIENT)
Dept: INFUSION THERAPY | Facility: HOSPITAL | Age: 59
End: 2024-08-29

## 2024-09-27 NOTE — OCCUPATIONAL THERAPY INITIAL EVALUATION ADULT - NSOTDISCHREC_GEN_A_CORE
[FreeTextEntry1] : Impression: Ankle sprain, left (S93.422A) with an avulsion fracture (S82.63XA, S82.831S).  Treatment: Patient was placed into a Cam boot by the orthotist.  Limit activities.  Will reevaluate in 2 weeks.  Any questions or problems, patient is to contact the office. 
[FreeTextEntry1] : Impression: Ankle sprain, left (S93.422A) with an avulsion fracture (S82.63XA, S82.831S).  Treatment: Patient was placed into a Cam boot by the orthotist.  Limit activities.  Will reevaluate in 2 weeks.  Any questions or problems, patient is to contact the office. 
Home with home OT for home safety evaluation and to improve functional ADLs and transfers/mobility.

## 2024-10-04 ENCOUNTER — NON-APPOINTMENT (OUTPATIENT)
Age: 59
End: 2024-10-04

## 2024-10-29 ENCOUNTER — INPATIENT (INPATIENT)
Facility: HOSPITAL | Age: 59
LOS: 1 days | Discharge: HOME | End: 2024-10-31
Attending: INTERNAL MEDICINE | Admitting: INTERNAL MEDICINE
Payer: MEDICAID

## 2024-10-29 VITALS
HEART RATE: 94 BPM | OXYGEN SATURATION: 99 % | DIASTOLIC BLOOD PRESSURE: 70 MMHG | WEIGHT: 184.97 LBS | SYSTOLIC BLOOD PRESSURE: 106 MMHG | TEMPERATURE: 98 F | RESPIRATION RATE: 18 BRPM | HEIGHT: 67 IN

## 2024-10-29 DIAGNOSIS — Z90.49 ACQUIRED ABSENCE OF OTHER SPECIFIED PARTS OF DIGESTIVE TRACT: Chronic | ICD-10-CM

## 2024-10-29 DIAGNOSIS — Z98.890 OTHER SPECIFIED POSTPROCEDURAL STATES: Chronic | ICD-10-CM

## 2024-10-29 LAB
ALBUMIN SERPL ELPH-MCNC: 2.3 G/DL — LOW (ref 3.3–5)
ALP SERPL-CCNC: 95 U/L — SIGNIFICANT CHANGE UP (ref 40–120)
ALT FLD-CCNC: 10 U/L — SIGNIFICANT CHANGE UP (ref 4–41)
ANION GAP SERPL CALC-SCNC: 13 MMOL/L — SIGNIFICANT CHANGE UP (ref 7–14)
APPEARANCE UR: ABNORMAL
AST SERPL-CCNC: 20 U/L — SIGNIFICANT CHANGE UP (ref 4–40)
BACTERIA # UR AUTO: ABNORMAL /HPF
BILIRUB SERPL-MCNC: 0.4 MG/DL — SIGNIFICANT CHANGE UP (ref 0.2–1.2)
BILIRUB UR-MCNC: ABNORMAL
BUN SERPL-MCNC: 26 MG/DL — HIGH (ref 7–23)
CALCIUM SERPL-MCNC: 7.7 MG/DL — LOW (ref 8.4–10.5)
CAST: 4 /LPF — SIGNIFICANT CHANGE UP (ref 0–4)
CHLORIDE SERPL-SCNC: 100 MMOL/L — SIGNIFICANT CHANGE UP (ref 98–107)
CO2 SERPL-SCNC: 17 MMOL/L — LOW (ref 22–31)
COLOR SPEC: SIGNIFICANT CHANGE UP
CREAT SERPL-MCNC: 1.12 MG/DL — SIGNIFICANT CHANGE UP (ref 0.5–1.3)
DIFF PNL FLD: ABNORMAL
EGFR: 75 ML/MIN/1.73M2 — SIGNIFICANT CHANGE UP
GAS PNL BLDV: SIGNIFICANT CHANGE UP
GLUCOSE SERPL-MCNC: 138 MG/DL — HIGH (ref 70–99)
GLUCOSE UR QL: NEGATIVE MG/DL — SIGNIFICANT CHANGE UP
HCT VFR BLD CALC: 21 % — CRITICAL LOW (ref 39–50)
HGB BLD-MCNC: 6.4 G/DL — CRITICAL LOW (ref 13–17)
IANC: 4.38 K/UL — SIGNIFICANT CHANGE UP (ref 1.8–7.4)
KETONES UR-MCNC: NEGATIVE MG/DL — SIGNIFICANT CHANGE UP
LEUKOCYTE ESTERASE UR-ACNC: ABNORMAL
LIDOCAIN IGE QN: 45 U/L — SIGNIFICANT CHANGE UP (ref 7–60)
MCHC RBC-ENTMCNC: 25.6 PG — LOW (ref 27–34)
MCHC RBC-ENTMCNC: 30.5 G/DL — LOW (ref 32–36)
MCV RBC AUTO: 84 FL — SIGNIFICANT CHANGE UP (ref 80–100)
NITRITE UR-MCNC: POSITIVE
PH UR: 6 — SIGNIFICANT CHANGE UP (ref 5–8)
PLATELET # BLD AUTO: 427 K/UL — HIGH (ref 150–400)
POTASSIUM SERPL-MCNC: 3.9 MMOL/L — SIGNIFICANT CHANGE UP (ref 3.5–5.3)
POTASSIUM SERPL-SCNC: 3.9 MMOL/L — SIGNIFICANT CHANGE UP (ref 3.5–5.3)
PROT SERPL-MCNC: 4.6 G/DL — LOW (ref 6–8.3)
PROT UR-MCNC: 300 MG/DL
RBC # BLD: 2.5 M/UL — LOW (ref 4.2–5.8)
RBC # FLD: 21.2 % — HIGH (ref 10.3–14.5)
RBC CASTS # UR COMP ASSIST: 484 /HPF — HIGH (ref 0–4)
REVIEW: SIGNIFICANT CHANGE UP
SODIUM SERPL-SCNC: 130 MMOL/L — LOW (ref 135–145)
SP GR SPEC: 1.01 — SIGNIFICANT CHANGE UP (ref 1–1.03)
SQUAMOUS # UR AUTO: 7 /HPF — HIGH (ref 0–5)
UROBILINOGEN FLD QL: 1 MG/DL — SIGNIFICANT CHANGE UP (ref 0.2–1)
WBC # BLD: 7.34 K/UL — SIGNIFICANT CHANGE UP (ref 3.8–10.5)
WBC # FLD AUTO: 7.34 K/UL — SIGNIFICANT CHANGE UP (ref 3.8–10.5)
WBC UR QL: 1495 /HPF — HIGH (ref 0–5)

## 2024-10-29 PROCEDURE — 99285 EMERGENCY DEPT VISIT HI MDM: CPT

## 2024-10-29 PROCEDURE — 71045 X-RAY EXAM CHEST 1 VIEW: CPT | Mod: 26

## 2024-10-29 RX ORDER — SODIUM CHLORIDE 9 MG/ML
1000 INJECTION, SOLUTION INTRAMUSCULAR; INTRAVENOUS; SUBCUTANEOUS ONCE
Refills: 0 | Status: COMPLETED | OUTPATIENT
Start: 2024-10-29 | End: 2024-10-29

## 2024-10-29 RX ORDER — CEFTRIAXONE SODIUM 10 G
1000 VIAL (EA) INJECTION ONCE
Refills: 0 | Status: COMPLETED | OUTPATIENT
Start: 2024-10-29 | End: 2024-10-29

## 2024-10-29 RX ORDER — ACETAMINOPHEN 500 MG
1000 TABLET ORAL ONCE
Refills: 0 | Status: COMPLETED | OUTPATIENT
Start: 2024-10-29 | End: 2024-10-29

## 2024-10-29 RX ADMIN — Medication 400 MILLIGRAM(S): at 23:24

## 2024-10-29 NOTE — ED ADULT NURSE REASSESSMENT NOTE - NS ED NURSE REASSESS COMMENT FT1
Break RN: Received report from DALIA Mueller. Pt is A&Ox4. Respirations even and unlabored, chest rise equal b/l. VS as noted in flow sheets. No acute distress noted. Unable to obtain labs and IV access after 2 attempts. Safety maintained throughout. Report given to night-shift DALIA Pelayo.

## 2024-10-29 NOTE — ED PROVIDER NOTE - ATTENDING CONTRIBUTION TO CARE
59M h/o ileostomy, CA on chemo fatigue weakness x 1 week, burning dysuria, thinks he has a UTI, no f/C.  No CP or cough.  Ileostomy normal output.  Tender abd diffusely.  Had CT 1 month ago.  EKG SR at 99 no amber no std twi V3.   qtc 482.  LVH.  Found to be anemic.  HD stable at this time.  Transfuse after CT scan.  Given complexity of case, unclear source of bleeding would admit after that.  VS:  unremarkable except BP soft    GEN - mild discomfort abd pain, malaise;   A+O x3  Thin  HEAD - NC/AT     ENT - PEERL, EOMI, mucous membranes    moist , no discharge      NECK: Neck supple, non-tender without lymphadenopathy, no masses, no JVD  PULM - CTA b/l,  symmetric breath sounds  COR -  normal heart sounds    ABD - , ND, mild diffuse ttp, soft,   Colostomy bag with brown stool.  Stoma with pink mucosa.    BACK - no CVA tenderness, nontender spine     EXTREMS - no edema, no deformity, warm and well perfused    SKIN - no rash    or bruising      NEUROLOGIC - alert, face symmetric, speech fluent, sensation nl, motor no focal deficit.

## 2024-10-29 NOTE — ED ADULT NURSE REASSESSMENT NOTE - NS ED NURSE REASSESS COMMENT FT1
R chest wall port accessed using sterile technique, 2 RN attempt, accessed using 22G, labs drawn and sent, medicated as per EMAR, awaiting CT at this time.

## 2024-10-29 NOTE — ED ADULT TRIAGE NOTE - CHIEF COMPLAINT QUOTE
Patient has burning when urinating, extreme fatigue, blood in ileostomy bag twice today and dehydration. Had one chemo treatment for colon treatment in June that he states wiped him out. Patient had colon cancer, tumor was removed and has some cancer noted to bladder wall.

## 2024-10-29 NOTE — ED PROVIDER NOTE - PHYSICAL EXAMINATION
see mdm see mdm    DD ED ATTG:     VS:  unremarkable except BP soft    GEN - mild discomfort abd pain, malaise;   A+O x3  Thin  HEAD - NC/AT     ENT - PEERL, EOMI, mucous membranes    moist , no discharge      NECK: Neck supple, non-tender without lymphadenopathy, no masses, no JVD  PULM - CTA b/l,  symmetric breath sounds  COR -  normal heart sounds    ABD - , ND, mild diffuse ttp, soft,   Colostomy bag with brown stool.  Stoma with pink mucosa.    BACK - no CVA tenderness, nontender spine     EXTREMS - no edema, no deformity, warm and well perfused    SKIN - no rash    or bruising      NEUROLOGIC - alert, face symmetric, speech fluent, sensation nl, motor no focal deficit.

## 2024-10-29 NOTE — ED PROVIDER NOTE - CLINICAL SUMMARY MEDICAL DECISION MAKING FREE TEXT BOX
- CC: Burning sensation, blood in urine  • C/O burning sensation  • 2 outputs of blood in urine    - PMHx:  • Ileostomy  • Splenomegaly  • Pulmonary nodule  • Cholestasis  • Renal stones  • Colon CA s/p resection (10/29/2022)  • Ureteroscopy     - Meds: None mentioned  - FHx: Not provided    Objective:  - Vitals:  • /70  • HR 94  • RR 80  • Temp 98.5°F    - Exam:  • Abd: NAD. tired appearing. gen Tender to palpation    Investigations:  - Recent CT abd (1 mo ago): No significant findings    Assessment:  - Hematuria  - ? UTI  - tumor burden?   - no CP or SOB     Plan:  - CBC, CMP, PT/INR  - UA  - CT abd/pelvis  - Pain control  - Await result

## 2024-10-29 NOTE — ED ADULT NURSE NOTE - OBJECTIVE STATEMENT
Pt c/o pain and burning on urination, Phx colon and bladder CA, ileostomy. Blood in ileostomy 2x today, Pt refusing to change into hospital gown at present time. Awaiting MD dominguez. Safety maintained.

## 2024-10-29 NOTE — ED ADULT NURSE NOTE - NSFALLRISKASMTTYPE_ED_ALL_ED
Progress Notes by Danielle Echols MD at 10/05/17 09:32 AM     Author:  Danielle Echols MD Service:  (none) Author Type:  Physician     Filed:  10/05/17 09:32 AM Encounter Date:  9/28/2017 Status:  Signed     :  Danielle Echols MD (Physician)            Talked to mom last night.  Arvind had his phone taken away from him and he was not at home  Discussed results with mom  Offered counseling for him  Gave mom numbers.  Electronically Signed by:    Danielle Echols MD , 10/5/2017  [LE1.1M]    Revision History        User Key Date/Time User Provider Type Action    > LE1.1 10/05/17 09:32 AM Danielle Echols MD Physician Sign    M - Manual            
Progress Notes by Danielle Echols MD at 10/05/17 09:33 AM     Author:  Danielle Echols MD Service:  (none) Author Type:  Physician     Filed:  10/05/17 09:33 AM Encounter Date:  9/28/2017 Status:  Signed     :  Danielle Echols MD (Physician)            See note on confirmatory results.  Electronically Signed by:    aDnielle Echols MD , 10/5/2017  [LE1.1M]    Revision History        User Key Date/Time User Provider Type Action    > LE1.1 10/05/17 09:33 AM Danielle Echols MD Physician Sign    M - Manual            
Initial (On Arrival)

## 2024-10-29 NOTE — ED PROVIDER NOTE - NSICDXFAMILYHX_GEN_ALL_CORE_FT
FAMILY HISTORY:  Mother  Still living? Unknown  FH: brain aneurysm, Age at diagnosis: Age Unknown

## 2024-10-29 NOTE — ED PROVIDER NOTE - OBJECTIVE STATEMENT
see mdm see keila    DD ED ATTM h/o ileostomy, CA on chemo fatigue weakness x 1 week, burning dysuria, thinks he has a UTI, no f/C.  No CP or cough.  Ileostomy normal output.  Tender abd diffusely.  Had CT 1 month ago.  EKG SR at 99 no amber no std twi V3.   qtc 482.  LVH.  Found to be anemic.  HD stable at this time.  Transfuse after CT scan.  Given complexity of case, unclear source of bleeding would admit after that.

## 2024-10-29 NOTE — ED PROVIDER NOTE - NS ED MD DISPO SPECIAL CONSIDERATION1
VERONICA     Talked to patient , states his Tresiba \" does not completely empty \"when he administer it, describes it like \" your bladder never empty\"  He went to pharmacy and gave him sample of needle and felt the same thing . States he knows how to use pen needle since he use Lantus. I advised him to use the pen needle that he use for Lantus and update our office if he is still having the same problem, he also states he had the same issue with the same sample that Dr Perez gave him    None

## 2024-10-30 DIAGNOSIS — N13.30 UNSPECIFIED HYDRONEPHROSIS: ICD-10-CM

## 2024-10-30 DIAGNOSIS — C18.9 MALIGNANT NEOPLASM OF COLON, UNSPECIFIED: ICD-10-CM

## 2024-10-30 DIAGNOSIS — Z29.9 ENCOUNTER FOR PROPHYLACTIC MEASURES, UNSPECIFIED: ICD-10-CM

## 2024-10-30 DIAGNOSIS — N39.0 URINARY TRACT INFECTION, SITE NOT SPECIFIED: ICD-10-CM

## 2024-10-30 DIAGNOSIS — I82.409 ACUTE EMBOLISM AND THROMBOSIS OF UNSPECIFIED DEEP VEINS OF UNSPECIFIED LOWER EXTREMITY: ICD-10-CM

## 2024-10-30 DIAGNOSIS — Z79.899 OTHER LONG TERM (CURRENT) DRUG THERAPY: ICD-10-CM

## 2024-10-30 DIAGNOSIS — K92.2 GASTROINTESTINAL HEMORRHAGE, UNSPECIFIED: ICD-10-CM

## 2024-10-30 LAB
ALBUMIN SERPL ELPH-MCNC: 2.2 G/DL — LOW (ref 3.3–5)
ALP SERPL-CCNC: 90 U/L — SIGNIFICANT CHANGE UP (ref 40–120)
ALT FLD-CCNC: 11 U/L — SIGNIFICANT CHANGE UP (ref 4–41)
ANION GAP SERPL CALC-SCNC: 12 MMOL/L — SIGNIFICANT CHANGE UP (ref 7–14)
AST SERPL-CCNC: 33 U/L — SIGNIFICANT CHANGE UP (ref 4–40)
BASOPHILS # BLD AUTO: 0 K/UL — SIGNIFICANT CHANGE UP (ref 0–0.2)
BASOPHILS # BLD AUTO: 0.02 K/UL — SIGNIFICANT CHANGE UP (ref 0–0.2)
BASOPHILS NFR BLD AUTO: 0 % — SIGNIFICANT CHANGE UP (ref 0–2)
BASOPHILS NFR BLD AUTO: 0.3 % — SIGNIFICANT CHANGE UP (ref 0–2)
BILIRUB SERPL-MCNC: 0.8 MG/DL — SIGNIFICANT CHANGE UP (ref 0.2–1.2)
BLD GP AB SCN SERPL QL: NEGATIVE — SIGNIFICANT CHANGE UP
BUN SERPL-MCNC: 23 MG/DL — SIGNIFICANT CHANGE UP (ref 7–23)
CALCIUM SERPL-MCNC: 7.5 MG/DL — LOW (ref 8.4–10.5)
CHLORIDE SERPL-SCNC: 99 MMOL/L — SIGNIFICANT CHANGE UP (ref 98–107)
CO2 SERPL-SCNC: 16 MMOL/L — LOW (ref 22–31)
CREAT SERPL-MCNC: 1.09 MG/DL — SIGNIFICANT CHANGE UP (ref 0.5–1.3)
EGFR: 78 ML/MIN/1.73M2 — SIGNIFICANT CHANGE UP
EOSINOPHIL # BLD AUTO: 0.13 K/UL — SIGNIFICANT CHANGE UP (ref 0–0.5)
EOSINOPHIL # BLD AUTO: 0.26 K/UL — SIGNIFICANT CHANGE UP (ref 0–0.5)
EOSINOPHIL NFR BLD AUTO: 2.1 % — SIGNIFICANT CHANGE UP (ref 0–6)
EOSINOPHIL NFR BLD AUTO: 3.6 % — SIGNIFICANT CHANGE UP (ref 0–6)
GLUCOSE SERPL-MCNC: 116 MG/DL — HIGH (ref 70–99)
HCT VFR BLD CALC: 24.9 % — LOW (ref 39–50)
HGB BLD-MCNC: 7.6 G/DL — LOW (ref 13–17)
IANC: 4.1 K/UL — SIGNIFICANT CHANGE UP (ref 1.8–7.4)
IMM GRANULOCYTES NFR BLD AUTO: 0.5 % — SIGNIFICANT CHANGE UP (ref 0–0.9)
LYMPHOCYTES # BLD AUTO: 1.21 K/UL — SIGNIFICANT CHANGE UP (ref 1–3.3)
LYMPHOCYTES # BLD AUTO: 1.32 K/UL — SIGNIFICANT CHANGE UP (ref 1–3.3)
LYMPHOCYTES # BLD AUTO: 18 % — SIGNIFICANT CHANGE UP (ref 13–44)
LYMPHOCYTES # BLD AUTO: 19.8 % — SIGNIFICANT CHANGE UP (ref 13–44)
MAGNESIUM SERPL-MCNC: 1.4 MG/DL — LOW (ref 1.6–2.6)
MCHC RBC-ENTMCNC: 26.4 PG — LOW (ref 27–34)
MCHC RBC-ENTMCNC: 30.5 G/DL — LOW (ref 32–36)
MCV RBC AUTO: 86.5 FL — SIGNIFICANT CHANGE UP (ref 80–100)
MONOCYTES # BLD AUTO: 0.4 K/UL — SIGNIFICANT CHANGE UP (ref 0–0.9)
MONOCYTES # BLD AUTO: 0.63 K/UL — SIGNIFICANT CHANGE UP (ref 0–0.9)
MONOCYTES NFR BLD AUTO: 10.3 % — SIGNIFICANT CHANGE UP (ref 2–14)
MONOCYTES NFR BLD AUTO: 5.4 % — SIGNIFICANT CHANGE UP (ref 2–14)
MRSA PCR RESULT.: SIGNIFICANT CHANGE UP
NEUTROPHILS # BLD AUTO: 4.1 K/UL — SIGNIFICANT CHANGE UP (ref 1.8–7.4)
NEUTROPHILS # BLD AUTO: 5.03 K/UL — SIGNIFICANT CHANGE UP (ref 1.8–7.4)
NEUTROPHILS NFR BLD AUTO: 67 % — SIGNIFICANT CHANGE UP (ref 43–77)
NEUTROPHILS NFR BLD AUTO: 68.5 % — SIGNIFICANT CHANGE UP (ref 43–77)
NRBC # BLD: 0 /100 WBCS — SIGNIFICANT CHANGE UP (ref 0–0)
NRBC # FLD: 0 K/UL — SIGNIFICANT CHANGE UP (ref 0–0)
OSMOLALITY UR: 357 MOSM/KG — SIGNIFICANT CHANGE UP (ref 50–1200)
PHOSPHATE SERPL-MCNC: 3.1 MG/DL — SIGNIFICANT CHANGE UP (ref 2.5–4.5)
PLATELET # BLD AUTO: 314 K/UL — SIGNIFICANT CHANGE UP (ref 150–400)
POTASSIUM SERPL-MCNC: 4.1 MMOL/L — SIGNIFICANT CHANGE UP (ref 3.5–5.3)
POTASSIUM SERPL-SCNC: 4.1 MMOL/L — SIGNIFICANT CHANGE UP (ref 3.5–5.3)
PROT SERPL-MCNC: 4.6 G/DL — LOW (ref 6–8.3)
RBC # BLD: 2.88 M/UL — LOW (ref 4.2–5.8)
RBC # FLD: 19.2 % — HIGH (ref 10.3–14.5)
RH IG SCN BLD-IMP: POSITIVE — SIGNIFICANT CHANGE UP
RH IG SCN BLD-IMP: POSITIVE — SIGNIFICANT CHANGE UP
S AUREUS DNA NOSE QL NAA+PROBE: SIGNIFICANT CHANGE UP
SODIUM SERPL-SCNC: 127 MMOL/L — LOW (ref 135–145)
SODIUM UR-SCNC: 77 MMOL/L — SIGNIFICANT CHANGE UP
WBC # BLD: 6.12 K/UL — SIGNIFICANT CHANGE UP (ref 3.8–10.5)
WBC # FLD AUTO: 6.12 K/UL — SIGNIFICANT CHANGE UP (ref 3.8–10.5)

## 2024-10-30 PROCEDURE — 74177 CT ABD & PELVIS W/CONTRAST: CPT | Mod: 26,MC

## 2024-10-30 PROCEDURE — 99222 1ST HOSP IP/OBS MODERATE 55: CPT

## 2024-10-30 PROCEDURE — 99223 1ST HOSP IP/OBS HIGH 75: CPT | Mod: GC

## 2024-10-30 PROCEDURE — 99223 1ST HOSP IP/OBS HIGH 75: CPT

## 2024-10-30 PROCEDURE — 12345: CPT | Mod: NC,GC

## 2024-10-30 RX ORDER — ACETAMINOPHEN 500 MG
650 TABLET ORAL EVERY 6 HOURS
Refills: 0 | Status: DISCONTINUED | OUTPATIENT
Start: 2024-10-30 | End: 2024-10-31

## 2024-10-30 RX ORDER — MELATONIN 5 MG
3 TABLET ORAL AT BEDTIME
Refills: 0 | Status: DISCONTINUED | OUTPATIENT
Start: 2024-10-30 | End: 2024-10-31

## 2024-10-30 RX ORDER — MAGNESIUM SULFATE IN 0.9% NACL 2 G/50 ML
2 INTRAVENOUS SOLUTION, PIGGYBACK (ML) INTRAVENOUS ONCE
Refills: 0 | Status: COMPLETED | OUTPATIENT
Start: 2024-10-30 | End: 2024-10-30

## 2024-10-30 RX ORDER — CHLORHEXIDINE GLUCONATE 40 MG/ML
1 SOLUTION TOPICAL DAILY
Refills: 0 | Status: DISCONTINUED | OUTPATIENT
Start: 2024-10-30 | End: 2024-10-31

## 2024-10-30 RX ORDER — PANTOPRAZOLE SODIUM 40 MG/1
40 TABLET, DELAYED RELEASE ORAL
Refills: 0 | Status: DISCONTINUED | OUTPATIENT
Start: 2024-10-30 | End: 2024-10-31

## 2024-10-30 RX ORDER — PIPERACILLIN AND TAZOBACTAM .5; 4 G/20ML; G/20ML
3.38 INJECTION, POWDER, LYOPHILIZED, FOR SOLUTION INTRAVENOUS ONCE
Refills: 0 | Status: COMPLETED | OUTPATIENT
Start: 2024-10-30 | End: 2024-10-30

## 2024-10-30 RX ORDER — MAGNESIUM, ALUMINUM HYDROXIDE 200-200 MG
30 TABLET,CHEWABLE ORAL EVERY 4 HOURS
Refills: 0 | Status: DISCONTINUED | OUTPATIENT
Start: 2024-10-30 | End: 2024-10-31

## 2024-10-30 RX ORDER — PIPERACILLIN AND TAZOBACTAM .5; 4 G/20ML; G/20ML
3.38 INJECTION, POWDER, LYOPHILIZED, FOR SOLUTION INTRAVENOUS EVERY 8 HOURS
Refills: 0 | Status: DISCONTINUED | OUTPATIENT
Start: 2024-10-30 | End: 2024-10-31

## 2024-10-30 RX ORDER — ONDANSETRON HYDROCHLORIDE 2 MG/ML
4 INJECTION, SOLUTION INTRAMUSCULAR; INTRAVENOUS EVERY 8 HOURS
Refills: 0 | Status: DISCONTINUED | OUTPATIENT
Start: 2024-10-30 | End: 2024-10-31

## 2024-10-30 RX ORDER — TAMSULOSIN HCL 0.4 MG
0.4 CAPSULE ORAL AT BEDTIME
Refills: 0 | Status: DISCONTINUED | OUTPATIENT
Start: 2024-10-30 | End: 2024-10-31

## 2024-10-30 RX ADMIN — PIPERACILLIN AND TAZOBACTAM 25 GRAM(S): .5; 4 INJECTION, POWDER, LYOPHILIZED, FOR SOLUTION INTRAVENOUS at 21:23

## 2024-10-30 RX ADMIN — CHLORHEXIDINE GLUCONATE 1 APPLICATION(S): 40 SOLUTION TOPICAL at 15:19

## 2024-10-30 RX ADMIN — Medication 25 GRAM(S): at 13:50

## 2024-10-30 RX ADMIN — PANTOPRAZOLE SODIUM 40 MILLIGRAM(S): 40 TABLET, DELAYED RELEASE ORAL at 07:22

## 2024-10-30 RX ADMIN — Medication 100 MILLIGRAM(S): at 00:03

## 2024-10-30 RX ADMIN — PIPERACILLIN AND TAZOBACTAM 25 GRAM(S): .5; 4 INJECTION, POWDER, LYOPHILIZED, FOR SOLUTION INTRAVENOUS at 09:44

## 2024-10-30 RX ADMIN — PIPERACILLIN AND TAZOBACTAM 25 GRAM(S): .5; 4 INJECTION, POWDER, LYOPHILIZED, FOR SOLUTION INTRAVENOUS at 15:18

## 2024-10-30 RX ADMIN — Medication 0.4 MILLIGRAM(S): at 21:23

## 2024-10-30 RX ADMIN — PIPERACILLIN AND TAZOBACTAM 200 GRAM(S): .5; 4 INJECTION, POWDER, LYOPHILIZED, FOR SOLUTION INTRAVENOUS at 07:21

## 2024-10-30 RX ADMIN — Medication 1000 MILLIGRAM(S): at 00:38

## 2024-10-30 RX ADMIN — SODIUM CHLORIDE 1000 MILLILITER(S): 9 INJECTION, SOLUTION INTRAMUSCULAR; INTRAVENOUS; SUBCUTANEOUS at 00:03

## 2024-10-30 RX ADMIN — PANTOPRAZOLE SODIUM 40 MILLIGRAM(S): 40 TABLET, DELAYED RELEASE ORAL at 18:43

## 2024-10-30 NOTE — CONSULT NOTE ADULT - SUBJECTIVE AND OBJECTIVE BOX
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 The patient reported that he has been having dysuria with white colored urine for the past week. He has severe suprapubic pain. The patient endorsed that towards the end of the urine stream there is pink color urine noted. Denies dark red urine or clots. The patient denies fevers at home. He endorsed that in his ileostomy bag the patient had 1 episode of red colored stool then it went away. Currently it is brown colored. The patient denies lightheadedness. ROS otherwise negative.    Urology was consulted for worsening severe left hydronephrosis. Patient came to the ED due to severe dysuria for about a week not relieved by Azo, denies any fever,  flank pain, nausea, vomiting, hematuria. He had some bright red blood in his colostomy that is now resolved. Labs 7.34/21/1.12. He received 2 units PRBC's. Urine culture was sent, he received CTX in ED.          PAST MEDICAL & SURGICAL HISTORY:  Colon cancer  (resection and ileostomy in 2022, with progressive residual metastatic dz, refusing CTX / radiation oncology consultation to date)      H/O ileostomy      Renal stones  (s/p right ureteral stent placement)      Large bowel obstruction  (at time of diagnosis of colon cancer)      History of small bowel obstruction  (4/30/23: non-surgical tx)      MAXWELL (acute kidney injury)  (4/30/23)      History of cholelithiasis      Pulmonary nodule      Splenomegaly      History of ileostomy      S/P colon resection      H/O ureteroscopy  (s/p right ureteral stent placement)          MEDICATIONS  (STANDING):  pantoprazole  Injectable 40 milliGRAM(s) IV Push two times a day  piperacillin/tazobactam IVPB. 3.375 Gram(s) IV Intermittent once  piperacillin/tazobactam IVPB.- 3.375 Gram(s) IV Intermittent once  piperacillin/tazobactam IVPB.- 3.375 Gram(s) IV Intermittent once  piperacillin/tazobactam IVPB.. 3.375 Gram(s) IV Intermittent every 8 hours  tamsulosin 0.4 milliGRAM(s) Oral at bedtime    MEDICATIONS  (PRN):  acetaminophen     Tablet .. 650 milliGRAM(s) Oral every 6 hours PRN Temp greater or equal to 38C (100.4F), Mild Pain (1 - 3)  aluminum hydroxide/magnesium hydroxide/simethicone Suspension 30 milliLiter(s) Oral every 4 hours PRN Dyspepsia  melatonin 3 milliGRAM(s) Oral at bedtime PRN Insomnia  ondansetron Injectable 4 milliGRAM(s) IV Push every 8 hours PRN Nausea and/or Vomiting      FAMILY HISTORY:  FH: brain aneurysm (Mother)        Allergies    No Known Allergies    Intolerances        SOCIAL HISTORY: NC    REVIEW OF SYSTEMS: Otherwise negative as stated in HPI    Physical Exam  Vital signs  T(F): 97.8 (10-30-24 @ 05:30), Max: 98.8 (10-29-24 @ 20:00)  HR: 77 (10-30-24 @ 05:30)  BP: 107/64 (10-30-24 @ 05:30)  SpO2: 97% (10-30-24 @ 05:30)    Output    UOP    Gen:  [x] NAD [] toxic    Pulm:  [x] no resp distress [x] no substernal retractions  	  CV:  [] no JVD  [x] RRR    GI:  [x] Soft [x] ND, + suprapubic tenderness  + colostomy with stools                          	  MSK:  Edema [x]Y []N    LABS:      10-29 @ 23:28    WBC 7.34  / Hct 21.0  / SCr 1.12       Urinalysis Basic - ( 29 Oct 2024 23:28 )    Color: x / Appearance: x / SG: x / pH: x  Gluc: 138 mg/dL / Ketone: x  / Bili: x / Urobili: x   Blood: x / Protein: x / Nitrite: x   Leuk Esterase: x / RBC: x / WBC x   Sq Epi: x / Non Sq Epi: x / Bacteria: x        Urine Cx:  Blood Cx:    RADIOLOGY:  < from: CT Abdomen and Pelvis w/ IV Cont (10.30.24 @ 01:12) >    ACC: [id number] EXAM:  CT ABDOMEN AND PELVIS IC   ORDERED BY: RAULITO LARSON     PROCEDURE DATE:  10/30/2024          INTERPRETATION:  CLINICAL INFORMATION: Evaluate tumor burden in bladder    COMPARISON: CT abdomen pelvis 7/25/2024.    CONTRAST/COMPLICATIONS:  IV Contrast: Omnipaque 350  90 cc administered   10 cc discarded  Oral Contrast: NONE  Complications: None reported at time of study completion    PROCEDURE:  CT of the Abdomen and Pelvis was performed.  Sagittal and coronal reformats were performed.    FINDINGS:  LOWER CHEST: Within normal limits.    LIVER: Within normal limits.  BILE DUCTS: Normal caliber.  GALLBLADDER: Within normal limits.  SPLEEN: Splenomegaly.  PANCREAS: Within normal limits.  ADRENALS: Within normal limits.  KIDNEYS/URETERS: Subcentimeter left hydronephrosis to the level of the   necrotic left external iliac lymph node which has progressed since the   previous exam. Nonobstructing right renal calcifications.    BLADDER: Bladder mass measuring 8.3 x 8.1 cm previously 7.2 x 7.0 cm. A   mass superior to the bladder dome and adjacent to the Joseph's pouch   measures approximately 5.6 x 7.0 x 3.6 cm previously 5.6 x 6.3 x 4.1 cm  REPRODUCTIVE ORGANS: Prostate is enlarged.    BOWEL: Status post subtotal colectomy with ileostomy in right lower   quadrant and Joseph's pouch. No bowel obstruction.  PERITONEUM/RETROPERITONEUM: Within normal limits.  VESSELS: IVC filter. Probable deep vein thrombosis in the right external   iliac and femoral veins withextension into the superficial greater   saphenous vein. Extension into the distal inferior vena cava.  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  LYMPH NODES: Enlarging diffuse lymphadenopathy. For example a left   external iliac necrotic appearing lymph node measures 3.8 x 2.8 cm,   previously 2.9 x 1.8 cm.  ABDOMINAL WALL: Cachectic body habitus. Right lower lobe colostomy.   Anasarca.  BONES: Degenerative changes.    IMPRESSION:    Bladder mass measuring 8.3 x 8.1 cm previously 7.2 x 7.0 cm. A mass   superior to the bladder dome and adjacent to the Joseph's pouch has also   increased in size.    Worsening lymphadenopathy.    Probable deep vein thrombosis in the right external iliac and femoral   veins with extension into the superficial greater saphenous vein.   Extension into the distal inferior vena cava.    Worsening severe left hydronephrosis.        --- End of Report ---            ILA AMBROSE MD; Attending Radiologist  This document has been electronically signed. Oct 30 2024  2:27AM    < end of copied text >

## 2024-10-30 NOTE — PROGRESS NOTE ADULT - ATTENDING COMMENTS
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 UTI is being managed on zosyn, follow up urine and blood cultures. GI consulted for bleeding via ileostomy bag, however, pt deferring endoscopic eval currently. Continuing IV PPID BID for now.     Patient was also noted to have Hydronephrosis and urology was consulted. No acute intervention. MAXWELL resolving spontaneously. If worsens, may need to consider NT placement.     Onc and RT consulted for bladder mass. 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 UTI is being managed on zosyn, follow up urine and blood cultures. GI consulted for bleeding via ileostomy bag, however, pt deferring endoscopic eval currently. Continuing IV PPID BID for now. s/p 1 unit pRBC    Patient was also noted to have Hydronephrosis and urology was consulted. No acute intervention. MAXWELL resolving spontaneously. If worsens, may need to consider NT placement.     Onc and RT consulted for bladder mass. Patient unlikely a candidate for chemo. Pall consult     Hyponatremia, trended down to 127 on 10/30. 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 UTI is being managed on zosyn, follow up urine and blood cultures. GI consulted for bleeding via ileostomy bag, however, pt deferring endoscopic eval currently. Continuing IV PPID BID for now. s/p 1 unit pRBC    Patient was also noted to have worsening left hydronephrosis and urology was consulted. No acute intervention. MAXWELL resolving spontaneously. If worsens, may need to consider NT placement.     Onc and RT consulted for bladder mass. Patient unlikely a candidate for chemo. Pall consult     Hyponatremia, trended down to 127 on 10/30. Send urine ltyes     Patient with known history of L DVT s/p IVC filter due to anemia and hematuria not on AC. CTAP with probable deep vein thrombosis in the right external iliac and femoral veins with extension into the superficial greater saphenous vein. Extension into the distal inferior vena cava. Given inability to anticoagulate and IVC filter already placed, no sig change in mgmt.     Rest as Above

## 2024-10-30 NOTE — H&P ADULT - PROBLEM SELECTOR PLAN 2
- patient found to have possible GI bleed from illeostomy bag  - will start protonix 40mg BID IV push - patient found to have possible GI bleed from ileostomy bag  - will start protonix 40mg BID IV push  - GI consult  - currently getting prbc transfusion, will get CBC once completed  - 2 large bore IVs

## 2024-10-30 NOTE — CONSULT NOTE ADULT - CONSULT REASON
Bladder cancer with worsening left hydronephrosis
metastatic disease
mets bladder cancer, hematuria
Hematochezia

## 2024-10-30 NOTE — H&P ADULT - PROBLEM SELECTOR PLAN 1
- patient has findings of UTI  - will start zosyn  - no signs of sepsis at this time  - f/u urine culture and blood cultures - patient has findings of UTI  - will start zosyn  - no signs of sepsis at this time  - f/u urine culture and blood cultures  - no severe hematuria history noted as per patient, unclear where the ED got that history from

## 2024-10-30 NOTE — H&P ADULT - HISTORY OF PRESENT ILLNESS
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 The patient reported that he has been having dysuria with white colored urine for the past week. He has severe suprapubic pain. The patient endorsed that towards the end of the urine stream there is pink color urine noted. Denies dark red urine or clots. The patient denies fevers at home. He endorsed that in his ileostomy bag the patient had 1 episode of red colored stool then it went away. Currently it is brown colored. The patient denies lightheadedness. ROS otherwise negative.

## 2024-10-30 NOTE — CONSULT NOTE ADULT - ATTENDING COMMENTS
L malignant obstruction 2/2 large colorectal mass invading bladder.  Cr wnl  Urine color clear  No indication for acute  Intervention  Given disease, if Cr worsens or needs urgent decompression, rec IR consult for NT placement
60 y/o M h/o stage IV colon cancer s/p colectomy and ileostomy (2022), c/b mets to bladder with intermittent hematuria and worsening hydronephrosis, anemia requiring multiple transfusions, LLE DVT s/p IVC stent (previously on Eliquis), presents with UTI, anemia, and acute on chronic hyponatremia. Patient received 1 cycle of FOLFOX in June 2024 but did not tolerate so opted not to continue. His bladder mass continues to progress and he has the option of palliative radiation if he would like to pursue that as an outpatient. We discussed with him following up with Dr. Palafox at [facility] to discuss further treatment options vs palliative care.
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  GI consulted for red blood in ostomy x 1 day, resolved, and currently with brown stool.  May have stomal bleeding, ulcer, angioectasia, polyp, or neoplasm.  Monitor Hb.  If patient with recurrent bleeding can perform inpatient colonoscopy through stoma for further evaluation.  Patient would prefer to follow up as outpatient if he has no further bleeding.

## 2024-10-30 NOTE — CONSULT NOTE ADULT - ASSESSMENT
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 He initially presented to the ED for dysuria, however then proceeded to have two episodes of bright red blood filling the ostomy 10/29, which subsequently resolved and is now having brown output.  Here, he has been hemodynamically normal, with acute anemia, Hgb 6.4, down from 8.7 8/1, s/p 1 unit prbc with uptrending of Hgb to 7.6, normocytic, BUN 26, Cr 1.12. CT demonstrated worsening hydronephrosis with enlarging mass invading the bladder. Differential for bright red blood in the ostomy bag includes lower gastrointestinal bleed related to known cancer, AVM, vs. upper bleed related to gastric ulceration, AVM, dieulafoy lesion, gastritis.     Recommendation:  -PPI BID   -NPO at midnight for potential EGD tomorrow*  -Hgb >7 goal  -2 large bore IVs  -please ensure morning labs are drawn at 2am, electrolytes repleted as necessary, and Hg>7    Note incomplete until finalized by attending signature/attestation.    Naomy Castillo MD  GI/Hepatology Fellow, PGY-4  Long Range Pager: [phone], Short Range Pager: [phone]    MONDAY-FRIDAY 8AM-5PM:  Please message via [employer] or email [email or web address]   On Weekends/Holidays (All day) and Weekdays after 5 PM to 8 AM  For nonurgent consults please email:  Please email [email or web address]    URGENT CONSULTS:  Please contact on call GI team. 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 He initially presented to the ED for dysuria, however then proceeded to have two episodes of bright red blood filling the ostomy 10/29, which subsequently resolved and is now having brown output.  Here, he has been hemodynamically normal, with acute anemia, Hgb 6.4, down from 8.7 8/1, s/p 1 unit prbc with uptrending of Hgb to 7.6, normocytic, BUN 26, Cr 1.12. CT demonstrated worsening hydronephrosis with enlarging mass invading the bladder. Differential for bright red blood in the ostomy bag includes lower gastrointestinal bleed related to known cancer, AVM, vs. upper bleed related to gastric ulceration, AVM, dieulafoy lesion, gastritis.     Recommendation:  -PPI BID   -Keep NPO for now  -Hgb >7 goal  -2 large bore IVs  -please ensure morning labs are drawn at 2am, electrolytes repleted as necessary, and Hg>7    Note incomplete until finalized by attending signature/attestation.    Naomy Castillo MD  GI/Hepatology Fellow, PGY-4  Long Range Pager: [phone], Short Range Pager: [phone]    MONDAY-FRIDAY 8AM-5PM:  Please message via [employer] or email [email or web address]   On Weekends/Holidays (All day) and Weekdays after 5 PM to 8 AM  For nonurgent consults please email:  Please email [email or web address]    URGENT CONSULTS:  Please contact on call GI team. 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 He initially presented to the ED for dysuria, however then proceeded to have two episodes of bright red blood filling the ostomy 10/29, which subsequently resolved and is now having brown output.  Here, he has been hemodynamically normal, with acute anemia, Hgb 6.4, down from 8.7 8/1, s/p 1 unit prbc with uptrending of Hgb to 7.6, normocytic, BUN 26, Cr 1.12. CT demonstrated worsening hydronephrosis with enlarging mass invading the bladder. Differential for bright red blood in the ostomy bag includes lower gastrointestinal bleed related to known cancer, AVM, vs. upper bleed related to gastric ulceration, AVM, dieulafoy lesion, gastritis.     Recommendation:  -PPI BID   -Patient can have regular diet- after discussion about the risks and benefits of endoscopy, patient elected to monitor blood counts while hospitalized and then consider inpatient vs. outpatient evaluation.   -Hgb >7 goal  -2 large bore IVs    Note incomplete until finalized by attending signature/attestation.    Naomy Castillo MD  GI/Hepatology Fellow, PGY-4  Long Range Pager: [phone], Short Range Pager: [phone]    MONDAY-FRIDAY 8AM-5PM:  Please message via [employer] or email [email or web address]   On Weekends/Holidays (All day) and Weekdays after 5 PM to 8 AM  For nonurgent consults please email:  Please email [email or web address]    URGENT CONSULTS:  Please contact on call GI team. See Amion schedule ([facility]), [employer] system ([facility]), or call hospital  ([facility])

## 2024-10-30 NOTE — ED ADULT NURSE REASSESSMENT NOTE - NS ED NURSE REASSESS COMMENT FT1
R chest wall port reaccessed using 20G for PRBC infusion, sterile technique utilized, repeat blood work sent, safety maintained, comfort provided, awaiting result.

## 2024-10-30 NOTE — CONSULT NOTE ADULT - ASSESSMENT
60 y/o M h/o stage IV colon cancer s/p colectomy and ileostomy (2022), c/b mets to bladder with intermittent hematuria and worsening hydronephrosis, anemia requiring multiple transfusions, LLE DVT s/p IVC stent (previously on Eliquis), presents with UTI, anemia, and acute on chronic hyponatremia. Patient received 1 cycle of Folfox in June 2024 and subsequently off chemo treatment due to intolerable adverse effects.           60 y/o M h/o stage IV colon cancer s/p colectomy and ileostomy (2022), c/b mets to bladder with intermittent hematuria and worsening hydronephrosis, anemia requiring multiple transfusions, LLE DVT s/p IVC stent (previously on Eliquis), presents with UTI, anemia, and acute on chronic hyponatremia. Patient received 1 cycle of Folfox in June 2024 and subsequently off chemo treatment due to intolerable adverse effects. Onc was consulted for further management of metastatic disease    #colon cancer with mets to bladder s/p colectomy  Pt received 1 cycle of Folfox in June. Current off chemo treatment due to adverse effects and has refused palliative RT previously  Imaging showed progressive bladder mass c/b worsening hydronephrosis  Discussed with patient regarding outpatient chemo with reduced dose of Folfox and/or Cetuximab (EGFRi) and recommended palliative RT given progressive bladder mass    - Rad/onc consulted for possible palliative RT, appreciated recs  - No plans for inpatient chemo treatment  - Would benefit from Palliative c/s given his tumor burden, not a surgical candidate, and not so amenable to systemic therapy at this time  - May need follow up appointment with Dr. Palafox as outpatient to discuss possible resume of systemic therapy (Patient states that he considers seeing Dr. Palafox in 1-2 months, but for the time being, just wants to give his body time to recover). Please follow up and coordinate with Onc team prior to discharge.      #Anemia #hematuria #GI bleed  #extensive thrombosis #LLE DVT s/p IVC filter  Anemia required multiple transfusions since July. Possibly multifactorial - cancer sequelae, CKD, malnutrition/malabsorption, hematuria, GI bleed  Pt with extensive DVT in LLE and LUE and chronic portal thrombosis . CT this admission showed Probable deep vein thrombosis in the right external iliac and femoral veins with extension into the superficial greater saphenous vein. Extension into the distal inferior vena cava.    - Goc conversation regarding risk and benefit of resuming AC  - Recommend nutrition and PT evaluation        *****NOTE IT INCOMPLETE UNTIL ATTENDING ATTESTATION*****               58 y/o M h/o stage IV colon cancer s/p colectomy and ileostomy (2022), c/b mets to bladder with intermittent hematuria and worsening hydronephrosis, anemia requiring multiple transfusions, LLE DVT s/p IVC stent (previously on Eliquis), presents with UTI, anemia, and acute on chronic hyponatremia. Patient received 1 cycle of Folfox in June 2024 and subsequently off chemo treatment due to intolerable adverse effects. Onc was consulted for further management of metastatic disease    #colon cancer with mets to bladder s/p colectomy  Pt received 1 cycle of Folfox in June. Current off chemo treatment due to adverse effects and has refused palliative RT previously  Imaging showed progressive bladder mass c/b worsening hydronephrosis  Discussed with patient regarding outpatient chemo with Dr. Palafox (previously mentioned to switch to reduced dose of Folfox and/or Cetuximab) and recommended palliative RT given progressive, symptomatic bladder mass    - Rad/onc consulted for possible palliative RT, appreciated recs. Patient is to have RT with rad/onc as outpatient  - appreciated Urology input  - No plans for inpatient chemo treatment  - Will need follow up appointment with Dr. Palafox outpatient to discuss chemo treatment and Palliative care, given his tumor burden, not a surgical candidate, and not so amenable to systemic therapy at this time. Please follow up and coordinate with Onc team prior to discharge.      #Anemia #hematuria #GI bleed  #extensive thrombosis #LLE DVT s/p IVC filter  Anemia required multiple transfusions since July. Possibly multifactorial - cancer sequelae (ACD), CKD, malnutrition/malabsorption, hematuria, GI bleed  Pt with extensive DVT in LLE and LUE and chronic portal thrombosis . CT this admission showed probable deep vein thrombosis in the right external iliac and femoral veins with extension into the superficial greater saphenous vein. Extension into the distal inferior vena cava.    - given newly developed thrombosis, would recommend GOC conversation regarding risk and benefit of resuming AC when hgb stabilizes  - would benefit from nutrition and PT evaluation      *****NOTE IT INCOMPLETE UNTIL ATTENDING ATTESTATION*****               60 y/o M h/o stage IV colon cancer s/p colectomy and ileostomy (2022), c/b mets to bladder with intermittent hematuria and worsening hydronephrosis, anemia requiring multiple transfusions, LLE DVT s/p IVC stent (previously on Eliquis), presents with UTI, anemia, and acute on chronic hyponatremia. Patient received 1 cycle of Folfox in June 2024 and subsequently off chemo treatment due to intolerable adverse effects. Onc was consulted for further management of metastatic disease    #colon cancer with mets to bladder s/p colectomy  Pt received 1 cycle of Folfox in June. Current off chemo treatment due to adverse effects and has refused palliative RT previously  Imaging showed progressive bladder mass c/b worsening hydronephrosis  Discussed with patient regarding outpatient chemo with Dr. Palafox (previously mentioned to switch to reduced dose of Folfox and/or Cetuximab) and recommended palliative RT given progressive, symptomatic bladder mass    - Rad/onc consulted for possible palliative RT, appreciated recs. Patient will follow up with rad/onc as outpatient to discuss palliative radiation.  - appreciated Urology input  - No plans for inpatient chemo treatment  - Recommend follow up appointment with Dr. Palafox at [facility] outpatient to discuss chemo options vs Palliative care. Please follow up and coordinate with Onc team prior to discharge.      #Anemia #hematuria #GI bleed  #extensive thrombosis #LLE DVT s/p IVC filter  Anemia required multiple transfusions since July. Possibly multifactorial - cancer sequelae (ACD), CKD, malnutrition/malabsorption, hematuria, GI bleed  Pt with extensive DVT in LLE and LUE and chronic portal thrombosis . CT this admission showed probable deep vein thrombosis in the right external iliac and femoral veins with extension into the superficial greater saphenous vein. Extension into the distal inferior vena cava.    - given newly developed thrombosis, would recommend GOC conversation regarding risk and benefit of resuming AC when hgb stabilizes  - would benefit from nutrition and PT evaluation                 60 y/o M h/o stage IV colon cancer s/p colectomy and ileostomy (2022), c/b mets to bladder with intermittent hematuria and worsening hydronephrosis, anemia requiring multiple transfusions, LLE DVT s/p IVC stent (previously on Eliquis), presents with UTI, anemia, and acute on chronic hyponatremia. Patient received 1 cycle of Folfox in June 2024 and subsequently off chemo treatment due to intolerable adverse effects. Onc was consulted for further management of metastatic disease    #colon cancer with mets to bladder s/p colectomy  Pt received 1 cycle of Folfox in June. Current off chemo treatment due to adverse effects and has refused palliative RT previously  Imaging showed progressive bladder mass c/b worsening hydronephrosis  Discussed with patient regarding outpatient chemo with Dr. Palafox (previously mentioned to switch to reduced dose of Folfox and/or Cetuximab) and recommended palliative RT given progressive, symptomatic bladder mass    - Rad/onc consulted for possible palliative RT, appreciated recs. Patient will follow up with rad/onc as outpatient to discuss palliative radiation.  - appreciated Urology input  - No plans for inpatient chemo treatment  - Recommend follow up appointment with Dr. Palafox at [facility] outpatient to discuss chemo options including supportive/ Palliative care. Please follow up and coordinate with Onc team prior to discharge.      #Anemia #hematuria #GI bleed  #extensive thrombosis #LLE DVT s/p IVC filter  Anemia required multiple transfusions since July. Possibly multifactorial - cancer sequelae (ACD), CKD, malnutrition/malabsorption, hematuria, GI bleed  Pt with extensive DVT in LLE and LUE and chronic portal thrombosis . CT this admission showed probable deep vein thrombosis in the right external iliac and femoral veins with extension into the superficial greater saphenous vein. Extension into the distal inferior vena cava.    - given newly developed thrombosis, would recommend GOC conversation regarding risk and benefit of resuming AC when hgb stabilizes  - would benefit from nutrition and PT evaluation      Oncology to sign off for now. Please re-consult for any questions or concerns.

## 2024-10-30 NOTE — PHARMACOTHERAPY INTERVENTION NOTE - COMMENTS
Medication history is incomplete. Unable to verify patient's medication list with two sources. Discrepancies noted in provider handoff. Please call spectra [phone] if you have any questions.

## 2024-10-30 NOTE — H&P ADULT - NSHPLABSRESULTS_GEN_ALL_CORE
6.4    7.34  )-----------( 427      ( 29 Oct 2024 23:28 )             21.0       10-29    130[L]  |  100  |  26[H]  ----------------------------<  138[H]  3.9   |  17[L]  |  1.12    Ca    7.7[L]      29 Oct 2024 23:28    TPro  4.6[L]  /  Alb  2.3[L]  /  TBili  0.4  /  DBili  x   /  AST  20  /  ALT  10  /  AlkPhos  95  10-29                23:28 - VBG - pH: 7.39  | pCO2: 36    | pO2: 39    | Lactate: 2.3        Urinalysis Basic - ( 29 Oct 2024 23:28 )    Color: x / Appearance: x / SG: x / pH: x  Gluc: 138 mg/dL / Ketone: x  / Bili: x / Urobili: x   Blood: x / Protein: x / Nitrite: x   Leuk Esterase: x / RBC: x / WBC x   Sq Epi: x / Non Sq Epi: x / Bacteria: x            CXR: As per my read - no opacities noted, Will wait for prelim/official read    EKG: As per my read - NSR QTc 482 ms    CT:  IMPRESSION:    Bladder mass measuring 8.3 x 8.1 cm previously 7.2 x 7.0 cm. A mass   superior to the bladder dome and adjacent to the Joseph's pouch has also   increased in size.    Worsening lymphadenopathy.    Probable deep vein thrombosis in the right external iliac and femoral   veins with extension into the superficial greater saphenous vein.   Extension into the distal inferior vena cava.    Worsening severe left hydronephrosis.

## 2024-10-30 NOTE — PROGRESS NOTE ADULT - PROBLEM SELECTOR PLAN 3
- urology consulted - will f/u  - hydronephrosis worsened on the L side, likely from bladder mass - Uro consulted and following, appreciate recs  - hydronephrosis worsened on the L side, likely from bladder mass

## 2024-10-30 NOTE — H&P ADULT - PROBLEM SELECTOR PLAN 5
- patient with hx of prior R sided DVT, now presents with new L side DVT on CT scan  - patient already had IVC filter placed from prior DVT due to anemia and bleeding, cannot tolerate AC  - will monitor for now  - does not need dopplers to confirm as it will not  and cannot tolerate AC - patient with hx of prior L sided DVT, now presents with new R side DVT on CT scan  - patient already had IVC filter placed from prior DVT due to anemia and bleeding, cannot tolerate AC  - will monitor for now  - does not need dopplers to confirm as it will not  and cannot tolerate AC

## 2024-10-30 NOTE — CONSULT NOTE ADULT - SUBJECTIVE AND OBJECTIVE BOX
Last seen by rad onc 7/31/24 for same complaint now: hematuria,  We attempted 3x to bring him down for palliative RT and he refused all treatments with us,  we ended up canceling him for RT.     Now returns with hematuria this hospitalization.     10/29/24 ED Triage date:   Patient has burning when urinating, extreme fatigue, blood in ileostomy bag twice today and dehydration. Had one chemo treatment for colon treatment in June that he states wiped him out. Patient had colon cancer, tumor was removed and has some cancer noted to bladder wall.  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 The patient reported that he has been having dysuria with white colored urine for the past week. He has severe suprapubic pain. The patient endorsed that towards the end of the urine stream there is pink color urine noted. Denies dark red urine or clots. The patient denies fevers at home. He endorsed that in his ileostomy bag the patient had 1 episode of red colored stool then it went away. Currently it is brown colored. The patient denies lightheadedness. ROS otherwise negative. (30 Oct 2024 05:57)    5/29/24  _____________________________________________________________    Fine Needle Aspiration Report - Auth (Verified)  Specimen(s) Submitted  PELVIC, CT/US GUIDED CORE BIOPSY    Final Diagnosis    PELVIC, CT/US GUIDED CORE BIOPSY  POSITIVE FOR MALIGNANT CELLS.  Consistent with metastatic colon carcinoma.        Hemoglobin: 7.6 g/dL (10.30.24 @ 07:15)  Total Hemoglobin, Calculated: 6.6: TYPE:(C=Critical, N=Notification, A=Abnormal) N,C  TESTS: _LACT, HGB, HCT  DATE/TIME CALLED: _10/29/2024 23:42:03 EDT      < from: CT Abdomen and Pelvis w/ IV Cont (10.30.24 @ 01:12) >  FINDINGS:  LOWER CHEST: Within normal limits.    LIVER: Within normal limits.  BILE DUCTS: Normal caliber.  GALLBLADDER: Within normal limits.  SPLEEN: Splenomegaly.  PANCREAS: Within normal limits.  ADRENALS: Within normal limits.  KIDNEYS/URETERS: Subcentimeter left hydronephrosis to the level of the   necrotic left external iliac lymph node which has progressed since the   previous exam. Nonobstructing right renal calcifications.    BLADDER: Bladder mass measuring 8.3 x 8.1 cm previously 7.2 x 7.0 cm. A   mass superior to the bladder dome and adjacent to the Joseph's pouch   measures approximately 5.6 x 7.0 x 3.6 cm previously 5.6 x 6.3 x 4.1 cm  REPRODUCTIVE ORGANS: Prostate is enlarged.    BOWEL: Status post subtotal colectomy with ileostomy in right lower   quadrant and Joseph's pouch. No bowel obstruction.  PERITONEUM/RETROPERITONEUM: Within normal limits.  VESSELS: IVC filter. Probable deep vein thrombosis in the right external   iliac and femoral veins withextension into the superficial greater   saphenous vein. Extension into the distal inferior vena cava.  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  LYMPH NODES: Enlarging diffuse lymphadenopathy. For example a left   external iliac necrotic appearing lymph node measures 3.8 x 2.8 cm,   previously 2.9 x 1.8 cm.  ABDOMINAL WALL: Cachectic body habitus. Right lower lobe colostomy.   Anasarca.  BONES: Degenerative changes.    IMPRESSION:    Bladder mass measuring 8.3 x 8.1 cm previously 7.2 x 7.0 cm. A mass   superior to the bladder dome and adjacent to the Joseph's pouch has also   increased in size.    < end of copied text >        Allergies    No Known Allergies    Intolerances        ROS: [  ] Fever  [  ] Chills  [  ]Chest Pain [  ] SOB  [  ]Cough [  ] N/V  [  ] Diarrhea [  ]Constipation [  ]Other ROS:  [  ] ROS otherwise negative    PAST MEDICAL & SURGICAL HISTORY:  No pertinent past medical history    Colon cancer  (resection and ileostomy in 2022, with progressive residual metastatic dz, refusing CTX / radiation oncology consultation to date)    H/O ileostomy    Renal stones  (s/p right ureteral stent placement)    Large bowel obstruction  (at time of diagnosis of colon cancer)    History of small bowel obstruction  (4/30/23: non-surgical tx)    MAXWELL (acute kidney injury)  (4/30/23)    History of cholelithiasis    Pulmonary nodule    Splenomegaly    History of ileostomy    No significant past surgical history    S/P colon resection    H/O ureteroscopy  (s/p right ureteral stent placement)        FAMILY HISTORY:  FH: brain aneurysm (Mother)        MEDICATIONS  (STANDING):  chlorhexidine 2% Cloths 1 Application(s) Topical daily  magnesium sulfate  IVPB 2 Gram(s) IV Intermittent once  pantoprazole  Injectable 40 milliGRAM(s) IV Push two times a day  piperacillin/tazobactam IVPB.- 3.375 Gram(s) IV Intermittent once  piperacillin/tazobactam IVPB.. 3.375 Gram(s) IV Intermittent every 8 hours  tamsulosin 0.4 milliGRAM(s) Oral at bedtime    MEDICATIONS  (PRN):  acetaminophen     Tablet .. 650 milliGRAM(s) Oral every 6 hours PRN Temp greater or equal to 38C (100.4F), Mild Pain (1 - 3)  aluminum hydroxide/magnesium hydroxide/simethicone Suspension 30 milliLiter(s) Oral every 4 hours PRN Dyspepsia  melatonin 3 milliGRAM(s) Oral at bedtime PRN Insomnia  ondansetron Injectable 4 milliGRAM(s) IV Push every 8 hours PRN Nausea and/or Vomiting      PHYSICAL EXAM  Vital Signs Last 24 Hrs  T(C): 36.6 (30 Oct 2024 05:30), Max: 37.1 (29 Oct 2024 20:00)  T(F): 97.8 (30 Oct 2024 05:30), Max: 98.8 (29 Oct 2024 20:00)  HR: 77 (30 Oct 2024 05:30) (77 - 99)  BP: 107/64 (30 Oct 2024 05:30) (96/69 - 107/70)  BP(mean): --  RR: 16 (30 Oct 2024 05:30) (16 - 18)  SpO2: 97% (30 Oct 2024 05:30) (96% - 100%)    Parameters below as of 30 Oct 2024 05:30  Patient On (Oxygen Delivery Method): room air        General: appears stated age,  no acute distress  HEENT: NC/AT; EOMI, PERRL, sclera nonicteric; external ears normal; no rhinorrhea or epistaxis; mucous membranes moist; oropharynx clear and without erythema  CV: NR, RR; no appreciable r/m/g  Lungs: CTAB, no increased work of breathing  Abdomen: Bowel sounds present; soft, NTND  MSK: Vertebral spine non-tender to palpation  Neuro: AAOx3; cranial nerves II-XII intact; strength 5/5 in upper and lower extremities; sensation to light touch in tact bilaterally.  Psych: Full affect; mood congruent  Skin: no visible rashes on limited examination    IMAGING/LABS/PATHOLOGY: I have personally reviewed the relevant labs, pathology, and imaging as noted in the HPI.  In addition,                          7.6    6.12  )-----------( 314      ( 30 Oct 2024 07:15 )             24.9     10-30    127[L]  |  99  |  23  ----------------------------<  116[H]  4.1   |  16[L]  |  1.09    Ca    7.5[L]      30 Oct 2024 07:15  Phos  3.1     10-30  Mg     1.40     10-30    TPro  4.6[L]  /  Alb  2.2[L]  /  TBili  0.8  /  DBili  x   /  AST  33  /  ALT  11  /  AlkPhos  90  10-30        ASSESSMENT/PLAN    TAISHA ROGERS is a 59y man with     We discussed the use of palliative radiation in this setting, namely to improve quality of life through the reduction of symptoms.  We talked about the risks, benefits, acute and long term side effects, as well as expected treatment outcomes.  He/She was given the opportunity to ask questions, which were answered to his/her apparent satisfaction.  He/She provided written consent to proceed with radiation therapy. We will arrange for inpatient/outpatient treatment.     Last seen by rad onc 7/31/24 for same complaint now: hematuria,  We attempted 3x to bring him down for palliative RT and he refused all treatments with us,  we ended up canceling him for RT.     Now returns with hematuria this hospitalization.     10/29/24 ED Triage date:   Patient has burning when urinating, extreme fatigue, blood in ileostomy bag twice today and dehydration. Had one chemo treatment for colon treatment in June that he states wiped him out. Patient had colon cancer, tumor was removed and has some cancer noted to bladder wall.  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 The patient reported that he has been having dysuria with white colored urine for the past week. He has severe suprapubic pain. The patient endorsed that towards the end of the urine stream there is pink color urine noted. Denies dark red urine or clots. The patient denies fevers at home. He endorsed that in his ileostomy bag the patient had 1 episode of red colored stool then it went away. Currently it is brown colored. The patient denies lightheadedness. ROS otherwise negative. (30 Oct 2024 05:57)    states he sees "a little pink" after urinating but no goldy blood.  otherwise, feels okay and does not want his quality of life affected,  has wished to defer RT for outpatient only.       5/29/24  _____________________________________________________________    Fine Needle Aspiration Report - Auth (Verified)  Specimen(s) Submitted  PELVIC, CT/US GUIDED CORE BIOPSY    Final Diagnosis    PELVIC, CT/US GUIDED CORE BIOPSY  POSITIVE FOR MALIGNANT CELLS.  Consistent with metastatic colon carcinoma.        Hemoglobin: 7.6 g/dL (10.30.24 @ 07:15)  Total Hemoglobin, Calculated: 6.6: TYPE:(C=Critical, N=Notification, A=Abnormal) N,C  TESTS: _LACT, HGB, HCT  DATE/TIME CALLED: _10/29/2024 23:42:03 EDT      < from: CT Abdomen and Pelvis w/ IV Cont (10.30.24 @ 01:12) >  FINDINGS:  LOWER CHEST: Within normal limits.    LIVER: Within normal limits.  BILE DUCTS: Normal caliber.  GALLBLADDER: Within normal limits.  SPLEEN: Splenomegaly.  PANCREAS: Within normal limits.  ADRENALS: Within normal limits.  KIDNEYS/URETERS: Subcentimeter left hydronephrosis to the level of the   necrotic left external iliac lymph node which has progressed since the   previous exam. Nonobstructing right renal calcifications.    BLADDER: Bladder mass measuring 8.3 x 8.1 cm previously 7.2 x 7.0 cm. A   mass superior to the bladder dome and adjacent to the Joseph's pouch   measures approximately 5.6 x 7.0 x 3.6 cm previously 5.6 x 6.3 x 4.1 cm  REPRODUCTIVE ORGANS: Prostate is enlarged.    BOWEL: Status post subtotal colectomy with ileostomy in right lower   quadrant and Joseph's pouch. No bowel obstruction.  PERITONEUM/RETROPERITONEUM: Within normal limits.  VESSELS: IVC filter. Probable deep vein thrombosis in the right external   iliac and femoral veins withextension into the superficial greater   saphenous vein. Extension into the distal inferior vena cava.  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  LYMPH NODES: Enlarging diffuse lymphadenopathy. For example a left   external iliac necrotic appearing lymph node measures 3.8 x 2.8 cm,   previously 2.9 x 1.8 cm.  ABDOMINAL WALL: Cachectic body habitus. Right lower lobe colostomy.   Anasarca.  BONES: Degenerative changes.    IMPRESSION:    Bladder mass measuring 8.3 x 8.1 cm previously 7.2 x 7.0 cm. A mass   superior to the bladder dome and adjacent to the Joseph's pouch has also   increased in size.    < end of copied text >        Allergies    No Known Allergies    Intolerances        ROS: [  ] Fever  [  ] Chills  [  ]Chest Pain [  ] SOB  [  ]Cough [  ] N/V  [  ] Diarrhea [  ]Constipation [  ]Other ROS:  [  ] ROS otherwise negative    PAST MEDICAL & SURGICAL HISTORY:  No pertinent past medical history    Colon cancer  (resection and ileostomy in 2022, with progressive residual metastatic dz, refusing CTX / radiation oncology consultation to date)    H/O ileostomy    Renal stones  (s/p right ureteral stent placement)    Large bowel obstruction  (at time of diagnosis of colon cancer)    History of small bowel obstruction  (4/30/23: non-surgical tx)    MAXWELL (acute kidney injury)  (4/30/23)    History of cholelithiasis    Pulmonary nodule    Splenomegaly    History of ileostomy    No significant past surgical history    S/P colon resection    H/O ureteroscopy  (s/p right ureteral stent placement)        FAMILY HISTORY:  FH: brain aneurysm (Mother)        MEDICATIONS  (STANDING):  chlorhexidine 2% Cloths 1 Application(s) Topical daily  magnesium sulfate  IVPB 2 Gram(s) IV Intermittent once  pantoprazole  Injectable 40 milliGRAM(s) IV Push two times a day  piperacillin/tazobactam IVPB.- 3.375 Gram(s) IV Intermittent once  piperacillin/tazobactam IVPB.. 3.375 Gram(s) IV Intermittent every 8 hours  tamsulosin 0.4 milliGRAM(s) Oral at bedtime    MEDICATIONS  (PRN):  acetaminophen     Tablet .. 650 milliGRAM(s) Oral every 6 hours PRN Temp greater or equal to 38C (100.4F), Mild Pain (1 - 3)  aluminum hydroxide/magnesium hydroxide/simethicone Suspension 30 milliLiter(s) Oral every 4 hours PRN Dyspepsia  melatonin 3 milliGRAM(s) Oral at bedtime PRN Insomnia  ondansetron Injectable 4 milliGRAM(s) IV Push every 8 hours PRN Nausea and/or Vomiting      PHYSICAL EXAM  Vital Signs Last 24 Hrs  T(C): 36.6 (30 Oct 2024 05:30), Max: 37.1 (29 Oct 2024 20:00)  T(F): 97.8 (30 Oct 2024 05:30), Max: 98.8 (29 Oct 2024 20:00)  HR: 77 (30 Oct 2024 05:30) (77 - 99)  BP: 107/64 (30 Oct 2024 05:30) (96/69 - 107/70)  BP(mean): --  RR: 16 (30 Oct 2024 05:30) (16 - 18)  SpO2: 97% (30 Oct 2024 05:30) (96% - 100%)    Parameters below as of 30 Oct 2024 05:30  Patient On (Oxygen Delivery Method): room air          IMAGING/LABS/PATHOLOGY: I have personally reviewed the relevant labs, pathology, and imaging as noted in the HPI.  In addition,                          7.6    6.12  )-----------( 314      ( 30 Oct 2024 07:15 )             24.9     10-30    127[L]  |  99  |  23  ----------------------------<  116[H]  4.1   |  16[L]  |  1.09    Ca    7.5[L]      30 Oct 2024 07:15  Phos  3.1     10-30  Mg     1.40     10-30    TPro  4.6[L]  /  Alb  2.2[L]  /  TBili  0.8  /  DBili  x   /  AST  33  /  ALT  11  /  AlkPhos  90  10-30        ASSESSMENT/PLAN    TAISHA ROGERS is a 59y man with metastatic colon cancer to bladder mass, with hematuria requesting rad onc consult by floor team.  He was last seen 7/31/24 for the same complaint  and had refused RT.  Spoke with him again today and offered RT with his floor nurse present (Beth) and he again, refused RT wishing to defer for outpatient treatment in a few weeks  post discharge.  He is fully aware of the risks and has been transfused prior to our conversation today.  d/w Dr. Lawson.

## 2024-10-30 NOTE — CONSULT NOTE ADULT - SUBJECTIVE AND OBJECTIVE BOX
HPI:    Patient is a 59 year old male with a history of colon cancer diagnosed 2022 s/p subtotal colectomy and ileostomy who was previously on chemotherapy treatement in July 2024, known DVTs with IVC filter in place, could not tolerate chemotherapy and is now off treatment with 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 He initially presented to the ED for dysuria, however then proceeded to have two episodes of bright red blood filling the ostomy 10/29, which subsequently resolved and is now having brown output. He said he had a similar episode one month prior, that resolved on its own. He has chronic abdominal cramping, which he has had >1 year, denies that it is any worse than normal. He denies any hematemesis.   He has had recent weight loss, denies any NSAID use. He says he has not had colonoscopy/endoscopy in the past year.    Here, he has been hemodynamically normal, with acute anemia, Hgb 6.4, down from 8.7 8/1, s/p 1 unit prbc with uptrending of Hgb to 7.6, normocytic, BUN 26, Cr 1.12. Otherwise labs largely normal. CT demonstrated worsening hydronephrosis with enlarging mass invading the bladder.     Allergies:  No Known Allergies      Home Medications:    Hospital Medications:  acetaminophen     Tablet .. 650 milliGRAM(s) Oral every 6 hours PRN  aluminum hydroxide/magnesium hydroxide/simethicone Suspension 30 milliLiter(s) Oral every 4 hours PRN  chlorhexidine 2% Cloths 1 Application(s) Topical daily  magnesium sulfate  IVPB 2 Gram(s) IV Intermittent once  melatonin 3 milliGRAM(s) Oral at bedtime PRN  ondansetron Injectable 4 milliGRAM(s) IV Push every 8 hours PRN  pantoprazole  Injectable 40 milliGRAM(s) IV Push two times a day  piperacillin/tazobactam IVPB.- 3.375 Gram(s) IV Intermittent once  piperacillin/tazobactam IVPB.- 3.375 Gram(s) IV Intermittent once  piperacillin/tazobactam IVPB.. 3.375 Gram(s) IV Intermittent every 8 hours  tamsulosin 0.4 milliGRAM(s) Oral at bedtime      PMHX/PSHX:  No pertinent past medical history    Colon cancer    H/O ileostomy    Renal stones    Large bowel obstruction    History of small bowel obstruction    MAXWELL (acute kidney injury)    History of cholelithiasis    Pulmonary nodule    Splenomegaly    History of ileostomy    No significant past surgical history    S/P colon resection    H/O ureteroscopy        Family history:  No pertinent family history in first degree relatives    No pertinent family history in first degree relatives    FH: brain aneurysm (Mother)        Denies family history of colon cancer/polyps, stomach cancer/polyps, pancreatic cancer/masses, liver cancer/disease, ovarian cancer and endometrial cancer.    Social History:   Tob: Denies  EtOH: Denies  Illicit Drugs: Denies    ROS:     General:  No wt loss, fevers, chills, night sweats, fatigue  Eyes:  Good vision, no reported pain  ENT:  No sore throat, pain, runny nose, dysphagia  CV:  No pain, palpitations, hypo/hypertension  Pulm:  No dyspnea, cough, tachypnea, wheezing  GI:  see HPI  :  No pain, bleeding, incontinence, nocturia  Muscle:  No pain, weakness  Neuro:  No weakness, tingling, memory problems  Psych:  No fatigue, insomnia, mood problems, depression  Endocrine:  No polyuria, polydipsia, cold/heat intolerance  Heme:  No petechiae, ecchymosis, easy bruisability  Skin:  No rash, tattoos, scars, edema    PHYSICAL EXAM:     GENERAL:  No acute distress  HEENT:  NCAT, no scleral icterus   CHEST:  no respiratory distress  HEART:  Regular rate and rhythm  ABDOMEN:  Soft, tenderness to palpation in the suprapubic region, non-distended, normoactive bowel sounds,  no masses, ileostomy in place with brown output  EXTREMITIES: No edema  SKIN:  No rash/erythema/ecchymoses/petechiae/wounds/abscess/warm/dry  NEURO:  Alert and oriented x 3, no asterixis    Vital Signs:  Vital Signs Last 24 Hrs  T(C): 36.6 (30 Oct 2024 05:30), Max: 37.1 (29 Oct 2024 20:00)  T(F): 97.8 (30 Oct 2024 05:30), Max: 98.8 (29 Oct 2024 20:00)  HR: 77 (30 Oct 2024 05:30) (77 - 99)  BP: 107/64 (30 Oct 2024 05:30) (96/69 - 107/70)  BP(mean): --  RR: 16 (30 Oct 2024 05:30) (16 - 18)  SpO2: 97% (30 Oct 2024 05:30) (96% - 100%)    Parameters below as of 30 Oct 2024 05:30  Patient On (Oxygen Delivery Method): room air      Daily Height in cm: 170.18 (30 Oct 2024 05:30)    Daily     LABS:                        7.6    6.12  )-----------( 314      ( 30 Oct 2024 07:15 )             24.9     Mean Cell Volume: 86.5 fL (10-30-24 @ 07:15)    10-30    127[L]  |  99  |  23  ----------------------------<  116[H]  4.1   |  16[L]  |  1.09    Ca    7.5[L]      30 Oct 2024 07:15  Phos  3.1     10-30  Mg     1.40     10-30    TPro  4.6[L]  /  Alb  2.2[L]  /  TBili  0.8  /  DBili  x   /  AST  33  /  ALT  11  /  AlkPhos  90  10-30    LIVER FUNCTIONS - ( 30 Oct 2024 07:15 )  Alb: 2.2 g/dL / Pro: 4.6 g/dL / ALK PHOS: 90 U/L / ALT: 11 U/L / AST: 33 U/L / GGT: x             Urinalysis Basic - ( 30 Oct 2024 07:15 )    Color: x / Appearance: x / SG: x / pH: x  Gluc: 116 mg/dL / Ketone: x  / Bili: x / Urobili: x   Blood: x / Protein: x / Nitrite: x   Leuk Esterase: x / RBC: x / WBC x   Sq Epi: x / Non Sq Epi: x / Bacteria: x      Amylase Serum--      Lipase serum45       Ammonia--                          7.6    6.12  )-----------( 314      ( 30 Oct 2024 07:15 )             24.9                         6.4    7.34  )-----------( 427      ( 29 Oct 2024 23:28 )             21.0       Imaging:           HPI:    Patient is a 59 year old male with a history of colon cancer diagnosed 2022 s/p subtotal colectomy and ileostomy who was previously on chemotherapy treatement in July 2024, known DVTs with IVC filter in place, could not tolerate chemotherapy and is now off treatment with 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 He initially presented to the ED for dysuria, however then proceeded to have two episodes of bright red blood filling the ostomy 10/29, which subsequently resolved and is now having brown output. He said he had a similar episode one month prior, that resolved on its own. He has chronic abdominal cramping, which he has had >1 year, denies that it is any worse than normal. He denies any hematemesis.   He has had recent weight loss, denies any NSAID use. He says he has not had colonoscopy/endoscopy in the past year.    Here, he has been hemodynamically normal, with acute anemia, Hgb 6.4, down from 8.7 8/1, s/p 1 unit prbc with uptrending of Hgb to 7.6, normocytic, BUN 26, Cr 1.12. Otherwise labs largely normal. CT demonstrated worsening hydronephrosis with enlarging mass invading the bladder.     Allergies:  No Known Allergies      Home Medications:    Hospital Medications:  acetaminophen     Tablet .. 650 milliGRAM(s) Oral every 6 hours PRN  aluminum hydroxide/magnesium hydroxide/simethicone Suspension 30 milliLiter(s) Oral every 4 hours PRN  chlorhexidine 2% Cloths 1 Application(s) Topical daily  magnesium sulfate  IVPB 2 Gram(s) IV Intermittent once  melatonin 3 milliGRAM(s) Oral at bedtime PRN  ondansetron Injectable 4 milliGRAM(s) IV Push every 8 hours PRN  pantoprazole  Injectable 40 milliGRAM(s) IV Push two times a day  piperacillin/tazobactam IVPB.- 3.375 Gram(s) IV Intermittent once  piperacillin/tazobactam IVPB.- 3.375 Gram(s) IV Intermittent once  piperacillin/tazobactam IVPB.. 3.375 Gram(s) IV Intermittent every 8 hours  tamsulosin 0.4 milliGRAM(s) Oral at bedtime      PMHX/PSHX:  No pertinent past medical history    Colon cancer    H/O ileostomy    Renal stones    Large bowel obstruction    History of small bowel obstruction    MAXWELL (acute kidney injury)    History of cholelithiasis    Pulmonary nodule    Splenomegaly    History of ileostomy    No significant past surgical history    S/P colon resection    H/O ureteroscopy        Family history:  No pertinent family history in first degree relatives    No pertinent family history in first degree relatives    FH: brain aneurysm (Mother)        Denies family history of colon cancer/polyps, stomach cancer/polyps, pancreatic cancer/masses, liver cancer/disease, ovarian cancer and endometrial cancer.    Social History:   Tob: Denies  EtOH: Denies  Illicit Drugs: Denies    ROS:     General:  No wt loss, fevers, chills, night sweats, fatigue  Eyes:  Good vision, no reported pain  ENT:  No sore throat, pain, runny nose, dysphagia  CV:  No pain, palpitations, hypo/hypertension  Pulm:  No dyspnea, cough, tachypnea, wheezing  GI:  see HPI  :  No pain, bleeding, incontinence, nocturia  Muscle:  No pain, weakness  Neuro:  No weakness, tingling, memory problems  Psych:  No fatigue, insomnia, mood problems, depression  Endocrine:  No polyuria, polydipsia, cold/heat intolerance  Heme:  No petechiae, ecchymosis, easy bruisability  Skin:  No rash, tattoos, scars, edema    PHYSICAL EXAM:     GENERAL:  No acute distress  HEENT:  NCAT, no scleral icterus   NECK: supple  CHEST:  no respiratory distress  HEART:  Regular rate and rhythm  ABDOMEN:  Soft, tenderness to palpation in the suprapubic region, non-distended, normoactive bowel sounds,  no masses, ileostomy in place with brown output  EXTREMITIES: No edema  SKIN:  No rash/erythema/ecchymoses/petechiae/wounds/abscess/warm/dry  NEURO:  Alert and oriented x 3, no asterixis  PSYCH: normal affect    Vital Signs:  Vital Signs Last 24 Hrs  T(C): 36.6 (30 Oct 2024 05:30), Max: 37.1 (29 Oct 2024 20:00)  T(F): 97.8 (30 Oct 2024 05:30), Max: 98.8 (29 Oct 2024 20:00)  HR: 77 (30 Oct 2024 05:30) (77 - 99)  BP: 107/64 (30 Oct 2024 05:30) (96/69 - 107/70)  BP(mean): --  RR: 16 (30 Oct 2024 05:30) (16 - 18)  SpO2: 97% (30 Oct 2024 05:30) (96% - 100%)    Parameters below as of 30 Oct 2024 05:30  Patient On (Oxygen Delivery Method): room air      Daily Height in cm: 170.18 (30 Oct 2024 05:30)    Daily     LABS:                        7.6    6.12  )-----------( 314      ( 30 Oct 2024 07:15 )             24.9     Mean Cell Volume: 86.5 fL (10-30-24 @ 07:15)    10-30    127[L]  |  99  |  23  ----------------------------<  116[H]  4.1   |  16[L]  |  1.09    Ca    7.5[L]      30 Oct 2024 07:15  Phos  3.1     10-30  Mg     1.40     10-30    TPro  4.6[L]  /  Alb  2.2[L]  /  TBili  0.8  /  DBili  x   /  AST  33  /  ALT  11  /  AlkPhos  90  10-30    LIVER FUNCTIONS - ( 30 Oct 2024 07:15 )  Alb: 2.2 g/dL / Pro: 4.6 g/dL / ALK PHOS: 90 U/L / ALT: 11 U/L / AST: 33 U/L / GGT: x             Urinalysis Basic - ( 30 Oct 2024 07:15 )    Color: x / Appearance: x / SG: x / pH: x  Gluc: 116 mg/dL / Ketone: x  / Bili: x / Urobili: x   Blood: x / Protein: x / Nitrite: x   Leuk Esterase: x / RBC: x / WBC x   Sq Epi: x / Non Sq Epi: x / Bacteria: x      Amylase Serum--      Lipase serum45       Ammonia--                          7.6    6.12  )-----------( 314      ( 30 Oct 2024 07:15 )             24.9                         6.4    7.34  )-----------( 427      ( 29 Oct 2024 23:28 )             21.0       Imaging:

## 2024-10-30 NOTE — CONSULT NOTE ADULT - SUBJECTIVE AND OBJECTIVE BOX
Oncology Consult Note    HPI  60 y/o M h/o stage IV colon cancer s/p colectomy, ileostomy, and C1 Folfox, c/b mets to bladder with intermittent hematuria and worsening hydronephrosis, anemia requiring multiple transfusions, LLE DVT s/p IVC stent (previously on Eliquis), presents initially with dysuria x1 week. Reports blood in ileostomy bag x1 day, which has since resolved. Endorses suprapubic pain. Denies f/c, cp, sob, hematuria, weight loss, ha, d/n/v.    Hospital couse: found to have acute UTI, anemia, acute on chronic hyponatremia.   CT showed Bladder mass measuring 8.3 x 8.1 cm previously 7.2 x 7.0 cm. A mass superior to the bladder dome and adjacent to the Joseph's pouch has also increased in size. Worsening lymphadenopathy and severe left hydronephrosis    Onc was consulted for further mgmt of metastatic disease.      Onc history  Stage IV colon cancer dx in 9/2022; [facility]; disease course c/b malignant LBO s/p subtotal colectomy followed by ileostomy on 9/12/22, with gross residual disease in the pelvis, which was not amenable to resection.  Path: 7 cm sigmoid cancer w positive margin. T4aN0 (0/11 nodes), proximal margin was positive. MMR IHC is proficient.    10/2022 - Saw Dr. Gillian Coronado from medical oncology  MRI of pelvis (11/8/22) - Within the wall of the dome of the bladder is a 2.1 x 2.1 x 1.8 cm enhancing abnormality. Dr Coronado recommended systemic chemotherapy at this time, however patient declined.  11/15/22 CEA 3.0     4/30/23- 5/4/23 Admitted to [facility] for a small bowel obstruction- no surgical intervention.  CT A/P 4/30/23 Bowel; Dilated small bowel measuring up to 3.5 cm. Small bowel obstruction with transition point in the left pelvis at the level of prior surgery and an adjacent mass related to the dome of urinary bladder which demonstrates interval increase in size and is concerning for a metastatic lesion/recurrence.  CT 5/3/23: Indeterminate 2 mm Right lower lobe lung nodule.    5/3/23 CEA 29.8  5/19/23 CEA 74.6    3/11/24 CT A/P   LIVER: Peripheral atrophy and central hypertrophy of the liver. Heterogeneous attenuation and enhancement in the right hepatic lobe is likely perfusional in etiology. Sequela of chronic portal vein thrombosis with cavernous transformation.   BLADDER, BOWEL: There is a large pelvic mass with central necrosis which appears to arise from the bladder dome and is inseparable from the Joseph's pouch, measuring approximately 7.9 x 6.1 x 7.6 cm (AP x TR x CC), increase in size from CT 7/12/2023 where it measured 4.8 x 5.1 cm at a similar level, consistent with recurrent or metastatic tumor.    5/14/24  PET scan showed multifocal disease--nodes versus peritoneal. Unlikely be amenable to resection. Recommended systemic therapy    6/10/24 saw Dr. Palafox for C1D1 FOLFOX   6/24/24 C2 FOLFOX delayed due to intolerance of side effects (esophageal sore/stomach sore, weight loss, fatigue, sob, anemic, decreased po intake)    6/28/24-7/1/24 admitted to [facility] for fatigue likely 2/2 poor po intake and recent chemo found to have hyponatremia and new DVT.  Patient was recommended to resume therapy at dose reduction + Cetuximab, however stated he was not ready and would let the team know    7/11-7/12 brief ED visit for anemia s/p pRBC x2    7/25/24 - 8/1/24 admitted to [facility] for fatigue iso hematuria x5 days  CT A/P - Large pelvic mass with central necrosis, adjacent to the bladder dome and inseparable from the Joseph's pouch again noted. The inferior aspect of this pelvic mass is larger in size and inseparable from the superior wall of the bladder. Increase in small bowel mesenteric adenopathy. Suspicious for evolving colovesicular fistula  Patient was offered palliative RT with Rad/onc for hematuria during this admission, however patient refused     Patient subsequently failed to follow up with Dr. Palafox outpatient in Aug/24, thinking that chemo could worsen his anemia      REVIEW OF SYSTEMS:    CONSTITUTIONAL: No fevers or chills. Endorses gaining some weight back. Continued to feel weak, however improved since last chemo in June  EYES/ENT: No visual changes;  No vertigo or throat pain   NECK: No pain or stiffness  RESPIRATORY: No cough, wheezing, hemoptysis; No shortness of breath  CARDIOVASCULAR: No chest pain or palpitations  GASTROINTESTINAL: Endorses suprapubic pain. No nausea, vomiting, or hematemesis; No diarrhea or constipation. No melena or hematochezia.  GENITOURINARY: Endorses dysuria, frequency, and blood-tinged urine  NEUROLOGICAL: No numbness or weakness  SKIN: No itching, burning, rashes, or lesions   All other review of systems is negative unless indicated above.    PAST MEDICAL & SURGICAL HISTORY:  Colon cancer  (resection and ileostomy in 2022, with progressive residual metastatic dz, refusing CTX / radiation oncology consultation to date)  H/O ileostomy  Renal stones  (s/p right ureteral stent placement)  Large bowel obstruction  (at time of diagnosis of colon cancer)  History of small bowel obstruction  (4/30/23: non-surgical tx)  MAXWELL (acute kidney injury)  (4/30/23)  History of cholelithiasis  Pulmonary nodule  Splenomegaly  History of ileostomy  S/P colon resection  H/O ureteroscopy  (s/p right ureteral stent placement)      FAMILY HISTORY:  FH: brain aneurysm (Mother)      SOCIAL HISTORY:   No Known Allergies      MEDICATIONS  (STANDING):  chlorhexidine 2% Cloths 1 Application(s) Topical daily  magnesium sulfate  IVPB 2 Gram(s) IV Intermittent once  pantoprazole  Injectable 40 milliGRAM(s) IV Push two times a day  piperacillin/tazobactam IVPB.- 3.375 Gram(s) IV Intermittent once  piperacillin/tazobactam IVPB.. 3.375 Gram(s) IV Intermittent every 8 hours  tamsulosin 0.4 milliGRAM(s) Oral at bedtime    MEDICATIONS  (PRN):  acetaminophen     Tablet .. 650 milliGRAM(s) Oral every 6 hours PRN Temp greater or equal to 38C (100.4F), Mild Pain (1 - 3)  aluminum hydroxide/magnesium hydroxide/simethicone Suspension 30 milliLiter(s) Oral every 4 hours PRN Dyspepsia  melatonin 3 milliGRAM(s) Oral at bedtime PRN Insomnia  ondansetron Injectable 4 milliGRAM(s) IV Push every 8 hours PRN Nausea and/or Vomiting      OBJECTIVE   Height (cm): 170.2 (10-30 @ 05:30)  Weight (kg): 48.1 (10-30 @ 05:30)  BMI (kg/m2): 16.6 (10-30 @ 05:30)  BSA (m2): 1.54 (10-30 @ 05:30)    T(F): 97.8 (10-30-24 @ 05:30), Max: 98.8 (10-29-24 @ 20:00)  HR: 77 (10-30-24 @ 05:30)  BP: 107/64 (10-30-24 @ 05:30)  RR: 16 (10-30-24 @ 05:30)  SpO2: 97% (10-30-24 @ 05:30)  Wt(kg): --    PHYSICAL EXAM   GENERAL: cachetic, NAD, sitting comfortably on bed  HEAD:  Atraumatic, Normocephalic  EYES: EOMI, PERRLA, conjunctiva and sclera clear  NECK: Supple, No JVD  CHEST/LUNG: Clear to auscultation bilaterally; No w/r/r  HEART: Regular rate and rhythm; No murmurs, rubs, or gallops  ABDOMEN: Bowel sounds present. Firm, tender on palpation in suprapubic area. No hepatosplenomegaly appreciated   EXTREMITIES:  2+ Peripheral Pulses, No clubbing, cyanosis. +1 edema in LE bilaterally  NEUROLOGY: non-focal  SKIN: No rashes or lesions                          7.6    6.12  )-----------( 314      ( 30 Oct 2024 07:15 )             24.9       10-30    127[L]  |  99  |  23  ----------------------------<  116[H]  4.1   |  16[L]  |  1.09    Ca    7.5[L]      30 Oct 2024 07:15  Phos  3.1     10-30  Mg     1.40     10-30    TPro  4.6[L]  /  Alb  2.2[L]  /  TBili  0.8  /  DBili  x   /  AST  33  /  ALT  11  /  AlkPhos  90  10-30      Magnesium: 1.40 mg/dL (10-30 @ 07:15)  Phosphorus: 3.1 mg/dL (10-30 @ 07:15)

## 2024-10-30 NOTE — H&P ADULT - NSHPPHYSICALEXAM_GEN_ALL_CORE
PHYSICAL EXAM:  VITALS: Vital Signs Last 24 Hrs  T(C): 36.6 (30 Oct 2024 05:30), Max: 37.1 (29 Oct 2024 20:00)  T(F): 97.8 (30 Oct 2024 05:30), Max: 98.8 (29 Oct 2024 20:00)  HR: 77 (30 Oct 2024 05:30) (77 - 99)  BP: 107/64 (30 Oct 2024 05:30) (96/69 - 107/70)  BP(mean): --  RR: 16 (30 Oct 2024 05:30) (16 - 18)  SpO2: 97% (30 Oct 2024 05:30) (96% - 100%)    Parameters below as of 30 Oct 2024 05:30  Patient On (Oxygen Delivery Method): room air      GENERAL: NAD, comfortable at bedside  HEAD:  Atraumatic, Normocephalic  EYES: EOMI, PERRL, conjunctiva and sclera clear  ENT: Moist Mucus Membranes present, no ulcers appreciated  NECK: Supple, No JVD  CHEST/LUNG: Clear to auscultation bilaterally; No wheezes, rales or rhonchi, no accessory muscle use  HEART: Regular rate and rhythm; No murmurs, rubs, or gallops, (+)S1, S2  ABDOMEN: Soft, Nontender, Nondistended; Normal Bowel sounds, + suprapubic tenderness, + brown stool in the illeostomy bag  EXTREMITIES: No clubbing, cyanosis, +1 pitting edema noted b/l LE  PSYCH: normal mood and affect  NEUROLOGY: AAOx3, non-focal  SKIN: No rashes or lesions

## 2024-10-30 NOTE — PATIENT PROFILE ADULT - FALL HARM RISK - HARM RISK INTERVENTIONS
Assistance with ambulation/Assistance OOB with selected safe patient handling equipment/Communicate Risk of Fall with Harm to all staff/Monitor for mental status changes/Monitor gait and stability/Provide patient with walking aids - walker, cane, crutches/Reinforce activity limits and safety measures with patient and family/Review medications for side effects contributing to fall risk/Tailored Fall Risk Interventions/Use of alarms - bed, chair and/or voice tab/Visual Cue: Yellow wristband and red socks/Bed in lowest position, wheels locked, appropriate side rails in place/Call bell, personal items and telephone in reach/Instruct patient to call for assistance before getting out of bed or chair/Non-slip footwear when patient is out of bed/[place] to call system/Physically safe environment - no spills, clutter or unnecessary equipment/Purposeful Proactive Rounding/Room/bathroom lighting operational, light cord in reach

## 2024-10-30 NOTE — H&P ADULT - ASSESSMENT
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 Patient findings concerning for possible GI bleed with UTI. Admit for IV abx and GI eval.

## 2024-10-30 NOTE — PROGRESS NOTE ADULT - PROBLEM SELECTOR PLAN 7
- cannot get SCDs due to DVT, cannot get heparin due to GI bleeding - cannot get SCDs due to DVT, cannot get heparin due to GI bleeding  - GOC this admission

## 2024-10-30 NOTE — H&P ADULT - NSHPREVIEWOFSYSTEMS_GEN_ALL_CORE
REVIEW OF SYSTEMS:    CONSTITUTIONAL: No weakness, fevers or chills  EYES/ENT: No visual changes;  No dysphagia; No sore throat; No rhinorrhea; No sinus pain/pressure  NECK: No pain or stiffness  RESPIRATORY: No cough, wheezing, hemoptysis; No shortness of breath  CARDIOVASCULAR: No chest pain or palpitations; No lower extremity edema  GASTROINTESTINAL: No abdominal or epigastric pain. No nausea, vomiting, or hematemesis; No diarrhea or constipation. + bright red blood in ileostomy bag, + suprapubic tenderness  GENITOURINARY: + dysuria, + cloudy urine  NEUROLOGICAL: No numbness or focal weakness  MSK: ambulates with cane  SKIN: No itching, burning, rashes, or lesions   All other review of systems is negative unless indicated above.

## 2024-10-30 NOTE — CONSULT NOTE ADULT - ASSESSMENT
59 y.o male with PMHx of bladder cancer diagnosed in 2022, received chemotherapy in July but could not tolerate it, presented to the ED with worsening dysuria, no fever, no flank pain, no nausea, no  vomiting. He voided clear urine 60m w/ colon cancer s/p resection w/ Joseph's procedure, w/ metastasis to bladder, on chemo, L DVT s/p IVC filter, not on AC, CKD followed by nephrology, presented to ED w/ new onset anemia. Urology consulted to evaluate hydro found on workup imaging.     Pt w/ known left hydroureter 2/2 malignant obstruction with progression, however patient w/o flank pain, CYU and normal kidney function.     Recommendations  -No acute urologic intervention indicated at this time  -Continue to monitor kidney function and symptoms  -Should patient require left urinary drainage, would recommend IR for nephrostomy placement, however, not indicated at this time.    Plan discussed w/ Dr. Strong  #[id number] Urology

## 2024-10-30 NOTE — PROGRESS NOTE ADULT - SUBJECTIVE AND OBJECTIVE BOX
PROGRESS NOTE:   Patient is a 59y old  Male who presents with a chief complaint of uti (30 Oct 2024 06:24)      SUBJECTIVE / OVERNIGHT EVENTS:  No acute events overnight.     ADDITIONAL REVIEW OF SYSTEMS:    MEDICATIONS  (STANDING):  pantoprazole  Injectable 40 milliGRAM(s) IV Push two times a day  piperacillin/tazobactam IVPB. 3.375 Gram(s) IV Intermittent once  piperacillin/tazobactam IVPB.- 3.375 Gram(s) IV Intermittent once  piperacillin/tazobactam IVPB.- 3.375 Gram(s) IV Intermittent once  piperacillin/tazobactam IVPB.. 3.375 Gram(s) IV Intermittent every 8 hours  tamsulosin 0.4 milliGRAM(s) Oral at bedtime    MEDICATIONS  (PRN):  acetaminophen     Tablet .. 650 milliGRAM(s) Oral every 6 hours PRN Temp greater or equal to 38C (100.4F), Mild Pain (1 - 3)  aluminum hydroxide/magnesium hydroxide/simethicone Suspension 30 milliLiter(s) Oral every 4 hours PRN Dyspepsia  melatonin 3 milliGRAM(s) Oral at bedtime PRN Insomnia  ondansetron Injectable 4 milliGRAM(s) IV Push every 8 hours PRN Nausea and/or Vomiting      CAPILLARY BLOOD GLUCOSE        I&O's Summary      PHYSICAL EXAM:  Vital Signs Last 24 Hrs  T(C): 36.6 (30 Oct 2024 05:30), Max: 37.1 (29 Oct 2024 20:00)  T(F): 97.8 (30 Oct 2024 05:30), Max: 98.8 (29 Oct 2024 20:00)  HR: 77 (30 Oct 2024 05:30) (77 - 99)  BP: 107/64 (30 Oct 2024 05:30) (96/69 - 107/70)  BP(mean): --  RR: 16 (30 Oct 2024 05:30) (16 - 18)  SpO2: 97% (30 Oct 2024 05:30) (96% - 100%)    Parameters below as of 30 Oct 2024 05:30  Patient On (Oxygen Delivery Method): room air        GENERAL: No apparent distress.   HEAD:  Atraumatic, Normocephalic  EYES: EOMI, PERRLA, conjunctiva and sclera clear  NECK: Supple, no lymphadenopathy, no elevated JVP  CHEST/LUNG: Clear to auscultation bilateral and symmetric; No wheezes, rales, or rhonchi  HEART: S1 and S2 normal. Regular rate and rhythm; No murmurs, rubs, or gallops  ABDOMEN: Soft, non-tender, non-distended; normal bowel sounds  EXTREMITIES:  2+ peripheral pulses b/l, No clubbing, cyanosis, or edema  NEUROLOGY: A&O x 3, no focal deficits  SKIN: No rashes or lesions    LABS:                        6.4    7.34  )-----------( 427      ( 29 Oct 2024 23:28 )             21.0     10-29    130[L]  |  100  |  26[H]  ----------------------------<  138[H]  3.9   |  17[L]  |  1.12    Ca    7.7[L]      29 Oct 2024 23:28    TPro  4.6[L]  /  Alb  2.3[L]  /  TBili  0.4  /  DBili  x   /  AST  20  /  ALT  10  /  AlkPhos  95  10-29          Urinalysis Basic - ( 29 Oct 2024 23:28 )    Color: x / Appearance: x / SG: x / pH: x  Gluc: 138 mg/dL / Ketone: x  / Bili: x / Urobili: x   Blood: x / Protein: x / Nitrite: x   Leuk Esterase: x / RBC: x / WBC x   Sq Epi: x / Non Sq Epi: x / Bacteria: x          RADIOLOGY & ADDITIONAL TESTS:  Lab Results Reviewed   Imaging Reviewed  Electrocardiogram Reviewed   PROGRESS NOTE:   Patient is a 59y old  Male who presents with a chief complaint of uti (30 Oct 2024 06:24)    58 y/o M with pmhx of colon cancer s/p colectomy and ileostomy bag placement, L DVT s/p IVC filter due to anemia and hematuria not on AC, 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 Patient findings concerning for possible GI bleed with UTI. Admit for IV abx and GI eval.      SUBJECTIVE / OVERNIGHT EVENTS:  No acute events overnight.     ADDITIONAL REVIEW OF SYSTEMS:    MEDICATIONS  (STANDING):  pantoprazole  Injectable 40 milliGRAM(s) IV Push two times a day  piperacillin/tazobactam IVPB. 3.375 Gram(s) IV Intermittent once  piperacillin/tazobactam IVPB.- 3.375 Gram(s) IV Intermittent once  piperacillin/tazobactam IVPB.- 3.375 Gram(s) IV Intermittent once  piperacillin/tazobactam IVPB.. 3.375 Gram(s) IV Intermittent every 8 hours  tamsulosin 0.4 milliGRAM(s) Oral at bedtime    MEDICATIONS  (PRN):  acetaminophen     Tablet .. 650 milliGRAM(s) Oral every 6 hours PRN Temp greater or equal to 38C (100.4F), Mild Pain (1 - 3)  aluminum hydroxide/magnesium hydroxide/simethicone Suspension 30 milliLiter(s) Oral every 4 hours PRN Dyspepsia  melatonin 3 milliGRAM(s) Oral at bedtime PRN Insomnia  ondansetron Injectable 4 milliGRAM(s) IV Push every 8 hours PRN Nausea and/or Vomiting      CAPILLARY BLOOD GLUCOSE        I&O's Summary      PHYSICAL EXAM:  Vital Signs Last 24 Hrs  T(C): 36.6 (30 Oct 2024 05:30), Max: 37.1 (29 Oct 2024 20:00)  T(F): 97.8 (30 Oct 2024 05:30), Max: 98.8 (29 Oct 2024 20:00)  HR: 77 (30 Oct 2024 05:30) (77 - 99)  BP: 107/64 (30 Oct 2024 05:30) (96/69 - 107/70)  BP(mean): --  RR: 16 (30 Oct 2024 05:30) (16 - 18)  SpO2: 97% (30 Oct 2024 05:30) (96% - 100%)    Parameters below as of 30 Oct 2024 05:30  Patient On (Oxygen Delivery Method): room air        GENERAL: No apparent distress.   HEAD:  Atraumatic, Normocephalic  EYES: EOMI, PERRLA, conjunctiva and sclera clear  NECK: Supple, no lymphadenopathy, no elevated JVP  CHEST/LUNG: Clear to auscultation bilateral and symmetric; No wheezes, rales, or rhonchi  HEART: S1 and S2 normal. Regular rate and rhythm; No murmurs, rubs, or gallops  ABDOMEN: Soft, non-tender, non-distended; normal bowel sounds  EXTREMITIES:  2+ peripheral pulses b/l, No clubbing, cyanosis, or edema  NEUROLOGY: A&O x 3, no focal deficits  SKIN: No rashes or lesions    LABS:                        6.4    7.34  )-----------( 427      ( 29 Oct 2024 23:28 )             21.0     10-29    130[L]  |  100  |  26[H]  ----------------------------<  138[H]  3.9   |  17[L]  |  1.12    Ca    7.7[L]      29 Oct 2024 23:28    TPro  4.6[L]  /  Alb  2.3[L]  /  TBili  0.4  /  DBili  x   /  AST  20  /  ALT  10  /  AlkPhos  95  10-29          Urinalysis Basic - ( 29 Oct 2024 23:28 )    Color: x / Appearance: x / SG: x / pH: x  Gluc: 138 mg/dL / Ketone: x  / Bili: x / Urobili: x   Blood: x / Protein: x / Nitrite: x   Leuk Esterase: x / RBC: x / WBC x   Sq Epi: x / Non Sq Epi: x / Bacteria: x          RADIOLOGY & ADDITIONAL TESTS:  Lab Results Reviewed   Imaging Reviewed  Electrocardiogram Reviewed   PROGRESS NOTE:   Patient is a 59y old  Male who presents with a chief complaint of uti (30 Oct 2024 06:24)    58 y/o M with pmhx of colon cancer s/p colectomy and ileostomy bag placement, L DVT s/p IVC filter due to anemia and hematuria not on AC, 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 Patient findings concerning for possible GI bleed with UTI. Admit for IV abx and GI eval.    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 He continued to have POD with scans in 3/2024  showing growth of the bladder dome mass with invasion of the Joseph pouch and  chronic PV thrombosis with cavernous malformation. Pt was subsequently started  on palliative FOLFOX in 6/2024, s/p C1 on 6/10, C2 delayed. His treatment  course has also been c/b hyponatremia (followed by Dr. Barton in Nephrology  clinic, felt to be related to hypovolemia vs SIADH, pt on salt tabs).  Of note, pt was recently treated in the ED for anemia due to blood loss- hgb  5.6 on 7/11 for which pt received 2u prbc transfusion in the ED before being  sent home.  Pt was readmitted to [facility] on 7/11 with recurrent symptomatic anemia- hgb 9.1  (from 10.0 on 7/12 after prbc transfusion on 7/11) a/w fatigue; his admission  labs were also notable for acute on chronic hyponatremia, as well as  leukocytosis and abnormal UA.        SUBJECTIVE / OVERNIGHT EVENTS:  No acute events overnight.     ADDITIONAL REVIEW OF SYSTEMS:    MEDICATIONS  (STANDING):  pantoprazole  Injectable 40 milliGRAM(s) IV Push two times a day  piperacillin/tazobactam IVPB. 3.375 Gram(s) IV Intermittent once  piperacillin/tazobactam IVPB.- 3.375 Gram(s) IV Intermittent once  piperacillin/tazobactam IVPB.- 3.375 Gram(s) IV Intermittent once  piperacillin/tazobactam IVPB.. 3.375 Gram(s) IV Intermittent every 8 hours  tamsulosin 0.4 milliGRAM(s) Oral at bedtime    MEDICATIONS  (PRN):  acetaminophen     Tablet .. 650 milliGRAM(s) Oral every 6 hours PRN Temp greater or equal to 38C (100.4F), Mild Pain (1 - 3)  aluminum hydroxide/magnesium hydroxide/simethicone Suspension 30 milliLiter(s) Oral every 4 hours PRN Dyspepsia  melatonin 3 milliGRAM(s) Oral at bedtime PRN Insomnia  ondansetron Injectable 4 milliGRAM(s) IV Push every 8 hours PRN Nausea and/or Vomiting      CAPILLARY BLOOD GLUCOSE        I&O's Summary      PHYSICAL EXAM:  Vital Signs Last 24 Hrs  T(C): 36.6 (30 Oct 2024 05:30), Max: 37.1 (29 Oct 2024 20:00)  T(F): 97.8 (30 Oct 2024 05:30), Max: 98.8 (29 Oct 2024 20:00)  HR: 77 (30 Oct 2024 05:30) (77 - 99)  BP: 107/64 (30 Oct 2024 05:30) (96/69 - 107/70)  BP(mean): --  RR: 16 (30 Oct 2024 05:30) (16 - 18)  SpO2: 97% (30 Oct 2024 05:30) (96% - 100%)    Parameters below as of 30 Oct 2024 05:30  Patient On (Oxygen Delivery Method): room air        GENERAL: No apparent distress.   HEAD:  Atraumatic, Normocephalic  EYES: EOMI, PERRLA, conjunctiva and sclera clear  NECK: Supple, no lymphadenopathy, no elevated JVP  CHEST/LUNG: Clear to auscultation bilateral and symmetric; No wheezes, rales, or rhonchi  HEART: S1 and S2 normal. Regular rate and rhythm; No murmurs, rubs, or gallops  ABDOMEN: Soft, non-tender, non-distended; normal bowel sounds  EXTREMITIES:  2+ peripheral pulses b/l, No clubbing, cyanosis, or edema  NEUROLOGY: A&O x 3, no focal deficits  SKIN: No rashes or lesions    LABS:                        6.4    7.34  )-----------( 427      ( 29 Oct 2024 23:28 )             21.0     10-29    130[L]  |  100  |  26[H]  ----------------------------<  138[H]  3.9   |  17[L]  |  1.12    Ca    7.7[L]      29 Oct 2024 23:28    TPro  4.6[L]  /  Alb  2.3[L]  /  TBili  0.4  /  DBili  x   /  AST  20  /  ALT  10  /  AlkPhos  95  10-29          Urinalysis Basic - ( 29 Oct 2024 23:28 )    Color: x / Appearance: x / SG: x / pH: x  Gluc: 138 mg/dL / Ketone: x  / Bili: x / Urobili: x   Blood: x / Protein: x / Nitrite: x   Leuk Esterase: x / RBC: x / WBC x   Sq Epi: x / Non Sq Epi: x / Bacteria: x          RADIOLOGY & ADDITIONAL TESTS:  Lab Results Reviewed   Imaging Reviewed  Electrocardiogram Reviewed   PROGRESS NOTE:   Patient is a 59y old  Male who presents with a chief complaint of uti (30 Oct 2024 06:24)    58 y/o M with pmhx of colon cancer s/p colectomy and ileostomy bag placement, L DVT s/p IVC filter due to anemia and hematuria not on AC, 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 Patient findings concerning for possible GI bleed with UTI. Admit for IV abx and GI eval.        SUBJECTIVE / OVERNIGHT EVENTS:  No acute events overnight.   Endorses burning with urination, no hematuria.  No bloody output from ileostomy.    ADDITIONAL REVIEW OF SYSTEMS:    MEDICATIONS  (STANDING):  pantoprazole  Injectable 40 milliGRAM(s) IV Push two times a day  piperacillin/tazobactam IVPB. 3.375 Gram(s) IV Intermittent once  piperacillin/tazobactam IVPB.- 3.375 Gram(s) IV Intermittent once  piperacillin/tazobactam IVPB.- 3.375 Gram(s) IV Intermittent once  piperacillin/tazobactam IVPB.. 3.375 Gram(s) IV Intermittent every 8 hours  tamsulosin 0.4 milliGRAM(s) Oral at bedtime    MEDICATIONS  (PRN):  acetaminophen     Tablet .. 650 milliGRAM(s) Oral every 6 hours PRN Temp greater or equal to 38C (100.4F), Mild Pain (1 - 3)  aluminum hydroxide/magnesium hydroxide/simethicone Suspension 30 milliLiter(s) Oral every 4 hours PRN Dyspepsia  melatonin 3 milliGRAM(s) Oral at bedtime PRN Insomnia  ondansetron Injectable 4 milliGRAM(s) IV Push every 8 hours PRN Nausea and/or Vomiting      CAPILLARY BLOOD GLUCOSE        I&O's Summary      PHYSICAL EXAM:  Vital Signs Last 24 Hrs  T(C): 36.6 (30 Oct 2024 05:30), Max: 37.1 (29 Oct 2024 20:00)  T(F): 97.8 (30 Oct 2024 05:30), Max: 98.8 (29 Oct 2024 20:00)  HR: 77 (30 Oct 2024 05:30) (77 - 99)  BP: 107/64 (30 Oct 2024 05:30) (96/69 - 107/70)  BP(mean): --  RR: 16 (30 Oct 2024 05:30) (16 - 18)  SpO2: 97% (30 Oct 2024 05:30) (96% - 100%)    Parameters below as of 30 Oct 2024 05:30  Patient On (Oxygen Delivery Method): room air          GENERAL: No apparent distress.   HEAD:  Atraumatic, Normocephalic  EYES: EOMI, PERRLA, conjunctiva and sclera clear  NECK: Supple, no lymphadenopathy, no elevated JVP  CHEST/LUNG: Clear to auscultation bilateral and symmetric; No wheezes, rales, or rhonchi  HEART: S1 and S2 normal. Regular rate and rhythm; No murmurs, rubs, or gallops  ABDOMEN: Soft, non-tender, non-distended; normal bowel sounds  EXTREMITIES:  2+ peripheral pulses b/l, No clubbing, cyanosis, or edema  NEUROLOGY: A&O x 3, no focal deficits  SKIN: No rashes or lesions    .  LABS:                         7.6    6.12  )-----------( 314      ( 30 Oct 2024 07:15 )             24.9     10-30    127[L]  |  99  |  23  ----------------------------<  116[H]  4.1   |  16[L]  |  1.09    Ca    7.5[L]      30 Oct 2024 07:15  Phos  3.1     10-30  Mg     1.40     10-30    TPro  4.6[L]  /  Alb  2.2[L]  /  TBili  0.8  /  DBili  x   /  AST  33  /  ALT  11  /  AlkPhos  90  10-30      Urinalysis Basic - ( 30 Oct 2024 07:15 )    Color: x / Appearance: x / SG: x / pH: x  Gluc: 116 mg/dL / Ketone: x  / Bili: x / Urobili: x   Blood: x / Protein: x / Nitrite: x   Leuk Esterase: x / RBC: x / WBC x   Sq Epi: x / Non Sq Epi: x / Bacteria: x            RADIOLOGY, EKG & ADDITIONAL TESTS: Reviewed.

## 2024-10-30 NOTE — PROGRESS NOTE ADULT - PROBLEM SELECTOR PLAN 1
- patient has findings of UTI  - will start zosyn  - no signs of sepsis at this time  - f/u urine culture and blood cultures  - no severe hematuria history noted as per patient, unclear where the ED got that history from

## 2024-10-30 NOTE — H&P ADULT - PROBLEM SELECTOR PLAN 3
- urology consulted - will f/u  - hydronephrosis worsened on the L side, likely from bladder mass - urology consulted - will f/u, seen with urology at bedside  - hydronephrosis worsened on the L side, likely from bladder mass

## 2024-10-30 NOTE — PROGRESS NOTE ADULT - PROBLEM SELECTOR PLAN 2
- patient found to have possible GI bleed from ileostomy bag  - will start protonix 40mg BID IV push  - GI consult  - currently getting prbc transfusion, will get CBC once completed  - 2 large bore IVs

## 2024-10-31 ENCOUNTER — TRANSCRIPTION ENCOUNTER (OUTPATIENT)
Age: 59
End: 2024-10-31

## 2024-10-31 VITALS
TEMPERATURE: 98 F | DIASTOLIC BLOOD PRESSURE: 68 MMHG | OXYGEN SATURATION: 100 % | HEART RATE: 84 BPM | RESPIRATION RATE: 18 BRPM | SYSTOLIC BLOOD PRESSURE: 100 MMHG

## 2024-10-31 LAB
ALBUMIN SERPL ELPH-MCNC: 2.3 G/DL — LOW (ref 3.3–5)
ALP SERPL-CCNC: 86 U/L — SIGNIFICANT CHANGE UP (ref 40–120)
ALT FLD-CCNC: 10 U/L — SIGNIFICANT CHANGE UP (ref 4–41)
ANION GAP SERPL CALC-SCNC: 10 MMOL/L — SIGNIFICANT CHANGE UP (ref 7–14)
APTT BLD: 29.5 SEC — SIGNIFICANT CHANGE UP (ref 24.5–35.6)
AST SERPL-CCNC: 16 U/L — SIGNIFICANT CHANGE UP (ref 4–40)
BASOPHILS # BLD AUTO: 0.02 K/UL — SIGNIFICANT CHANGE UP (ref 0–0.2)
BASOPHILS NFR BLD AUTO: 0.4 % — SIGNIFICANT CHANGE UP (ref 0–2)
BILIRUB SERPL-MCNC: 0.6 MG/DL — SIGNIFICANT CHANGE UP (ref 0.2–1.2)
BUN SERPL-MCNC: 16 MG/DL — SIGNIFICANT CHANGE UP (ref 7–23)
CALCIUM SERPL-MCNC: 7.6 MG/DL — LOW (ref 8.4–10.5)
CHLORIDE SERPL-SCNC: 102 MMOL/L — SIGNIFICANT CHANGE UP (ref 98–107)
CO2 SERPL-SCNC: 18 MMOL/L — LOW (ref 22–31)
CREAT SERPL-MCNC: 1.23 MG/DL — SIGNIFICANT CHANGE UP (ref 0.5–1.3)
EGFR: 67 ML/MIN/1.73M2 — SIGNIFICANT CHANGE UP
EOSINOPHIL # BLD AUTO: 0.19 K/UL — SIGNIFICANT CHANGE UP (ref 0–0.5)
EOSINOPHIL NFR BLD AUTO: 3.4 % — SIGNIFICANT CHANGE UP (ref 0–6)
GLUCOSE SERPL-MCNC: 112 MG/DL — HIGH (ref 70–99)
HCT VFR BLD CALC: 23.6 % — LOW (ref 39–50)
HGB BLD-MCNC: 7.5 G/DL — LOW (ref 13–17)
IANC: 3.1 K/UL — SIGNIFICANT CHANGE UP (ref 1.8–7.4)
IMM GRANULOCYTES NFR BLD AUTO: 1.1 % — HIGH (ref 0–0.9)
INR BLD: 1.85 RATIO — HIGH (ref 0.85–1.16)
LYMPHOCYTES # BLD AUTO: 1.58 K/UL — SIGNIFICANT CHANGE UP (ref 1–3.3)
LYMPHOCYTES # BLD AUTO: 28.6 % — SIGNIFICANT CHANGE UP (ref 13–44)
MAGNESIUM SERPL-MCNC: 1.7 MG/DL — SIGNIFICANT CHANGE UP (ref 1.6–2.6)
MCHC RBC-ENTMCNC: 26 PG — LOW (ref 27–34)
MCHC RBC-ENTMCNC: 31.8 G/DL — LOW (ref 32–36)
MCV RBC AUTO: 81.7 FL — SIGNIFICANT CHANGE UP (ref 80–100)
MONOCYTES # BLD AUTO: 0.57 K/UL — SIGNIFICANT CHANGE UP (ref 0–0.9)
MONOCYTES NFR BLD AUTO: 10.3 % — SIGNIFICANT CHANGE UP (ref 2–14)
NEUTROPHILS # BLD AUTO: 3.1 K/UL — SIGNIFICANT CHANGE UP (ref 1.8–7.4)
NEUTROPHILS NFR BLD AUTO: 56.2 % — SIGNIFICANT CHANGE UP (ref 43–77)
NRBC # BLD: 0 /100 WBCS — SIGNIFICANT CHANGE UP (ref 0–0)
NRBC # FLD: 0 K/UL — SIGNIFICANT CHANGE UP (ref 0–0)
PHOSPHATE SERPL-MCNC: 2.5 MG/DL — SIGNIFICANT CHANGE UP (ref 2.5–4.5)
PLATELET # BLD AUTO: 320 K/UL — SIGNIFICANT CHANGE UP (ref 150–400)
POTASSIUM SERPL-MCNC: 3.6 MMOL/L — SIGNIFICANT CHANGE UP (ref 3.5–5.3)
POTASSIUM SERPL-SCNC: 3.6 MMOL/L — SIGNIFICANT CHANGE UP (ref 3.5–5.3)
PROT SERPL-MCNC: 4.3 G/DL — LOW (ref 6–8.3)
PROTHROM AB SERPL-ACNC: 21.3 SEC — HIGH (ref 9.9–13.4)
RBC # BLD: 2.89 M/UL — LOW (ref 4.2–5.8)
RBC # FLD: 19.9 % — HIGH (ref 10.3–14.5)
SODIUM SERPL-SCNC: 130 MMOL/L — LOW (ref 135–145)
WBC # BLD: 5.52 K/UL — SIGNIFICANT CHANGE UP (ref 3.8–10.5)
WBC # FLD AUTO: 5.52 K/UL — SIGNIFICANT CHANGE UP (ref 3.8–10.5)

## 2024-10-31 PROCEDURE — 99239 HOSP IP/OBS DSCHRG MGMT >30: CPT | Mod: GC

## 2024-10-31 PROCEDURE — 99232 SBSQ HOSP IP/OBS MODERATE 35: CPT | Mod: GC

## 2024-10-31 RX ORDER — HEPARIN SODIUM,PORCINE/PF 10 UNIT/ML
500 SYRINGE (ML) INTRAVENOUS ONCE
Refills: 0 | Status: DISCONTINUED | OUTPATIENT
Start: 2024-10-31 | End: 2024-10-31

## 2024-10-31 RX ORDER — CIPROFLOXACIN LACTATE 200MG/20ML
1 VIAL (ML) INTRAVENOUS
Qty: 4 | Refills: 0
Start: 2024-10-31 | End: 2024-11-01

## 2024-10-31 RX ORDER — PANTOPRAZOLE SODIUM 40 MG/1
1 TABLET, DELAYED RELEASE ORAL
Qty: 60 | Refills: 0
Start: 2024-10-31 | End: 2024-11-29

## 2024-10-31 RX ORDER — HEPARIN SODIUM,PORCINE/PF 10 UNIT/ML
300 SYRINGE (ML) INTRAVENOUS ONCE
Refills: 0 | Status: COMPLETED | OUTPATIENT
Start: 2024-10-31 | End: 2024-10-31

## 2024-10-31 RX ADMIN — PANTOPRAZOLE SODIUM 40 MILLIGRAM(S): 40 TABLET, DELAYED RELEASE ORAL at 05:08

## 2024-10-31 RX ADMIN — CHLORHEXIDINE GLUCONATE 1 APPLICATION(S): 40 SOLUTION TOPICAL at 13:54

## 2024-10-31 RX ADMIN — PIPERACILLIN AND TAZOBACTAM 25 GRAM(S): .5; 4 INJECTION, POWDER, LYOPHILIZED, FOR SOLUTION INTRAVENOUS at 05:08

## 2024-10-31 RX ADMIN — Medication 300 UNIT(S): at 16:37

## 2024-10-31 NOTE — DIETITIAN INITIAL EVALUATION ADULT - PERTINENT LABORATORY DATA
10-31    130[L]  |  102  |  16  ----------------------------<  112[H]  3.6   |  18[L]  |  1.23    Ca    7.6[L]      31 Oct 2024 01:38  Phos  2.5     10-31  Mg     1.70     10-31    TPro  4.3[L]  /  Alb  2.3[L]  /  TBili  0.6  /  DBili  x   /  AST  16  /  ALT  10  /  AlkPhos  86  10-31

## 2024-10-31 NOTE — PROGRESS NOTE ADULT - SUBJECTIVE AND OBJECTIVE BOX
Gastroenterology/Hepatology Progress Note      Interval Events:   - Patient feels well this am. Denies any further blood coming from the ostomy.   - Hgb stable this AM    Allergies:  No Known Allergies    Hospital Medications:  acetaminophen     Tablet .. 650 milliGRAM(s) Oral every 6 hours PRN  aluminum hydroxide/magnesium hydroxide/simethicone Suspension 30 milliLiter(s) Oral every 4 hours PRN  chlorhexidine 2% Cloths 1 Application(s) Topical daily  melatonin 3 milliGRAM(s) Oral at bedtime PRN  ondansetron Injectable 4 milliGRAM(s) IV Push every 8 hours PRN  pantoprazole  Injectable 40 milliGRAM(s) IV Push two times a day  piperacillin/tazobactam IVPB.. 3.375 Gram(s) IV Intermittent every 8 hours  tamsulosin 0.4 milliGRAM(s) Oral at bedtime    ROS: 14 point ROS negative unless otherwise state in subjective    PHYSICAL EXAM:   Vital Signs:  Vital Signs Last 24 Hrs  T(C): 36.6 (31 Oct 2024 05:18), Max: 36.6 (30 Oct 2024 14:15)  T(F): 97.9 (31 Oct 2024 05:18), Max: 97.9 (31 Oct 2024 05:18)  HR: 84 (31 Oct 2024 05:18) (73 - 84)  BP: 104/77 (31 Oct 2024 05:18) (104/77 - 106/70)  BP(mean): --  RR: 17 (31 Oct 2024 05:18) (16 - 17)  SpO2: 100% (31 Oct 2024 05:18) (100% - 100%)    Parameters below as of 31 Oct 2024 05:18  Patient On (Oxygen Delivery Method): room air    Daily     Daily     GENERAL:  No acute distress  HEENT:  NCAT, no scleral icterus  CHEST: no resp distress  HEART:  RRR  ABDOMEN:  Soft, ttp in lower quadrants, non-distended. Brown stool coming from ostomy.   EXTREMITIES:  No cyanosis, clubbing, or edema  SKIN:  No rash/erythema/ecchymoses   NEURO:  Alert and oriented x 3     LABS:                        7.5    5.52  )-----------( 320      ( 31 Oct 2024 06:43 )             23.6     Mean Cell Volume: 81.7 fL (10-31-24 @ 06:43)    10-31    130[L]  |  102  |  16  ----------------------------<  112[H]  3.6   |  18[L]  |  1.23    Ca    7.6[L]      31 Oct 2024 01:38  Phos  2.5     10-31  Mg     1.70     10-31    TPro  4.3[L]  /  Alb  2.3[L]  /  TBili  0.6  /  DBili  x   /  AST  16  /  ALT  10  /  AlkPhos  86  10-31    LIVER FUNCTIONS - ( 31 Oct 2024 01:38 )  Alb: 2.3 g/dL / Pro: 4.3 g/dL / ALK PHOS: 86 U/L / ALT: 10 U/L / AST: 16 U/L / GGT: x           PT/INR - ( 31 Oct 2024 01:38 )   PT: 21.3 sec;   INR: 1.85 ratio         PTT - ( 31 Oct 2024 01:38 )  PTT:29.5 sec  Urinalysis Basic - ( 31 Oct 2024 01:38 )    Color: x / Appearance: x / SG: x / pH: x  Gluc: 112 mg/dL / Ketone: x  / Bili: x / Urobili: x   Blood: x / Protein: x / Nitrite: x   Leuk Esterase: x / RBC: x / WBC x   Sq Epi: x / Non Sq Epi: x / Bacteria: x

## 2024-10-31 NOTE — DISCHARGE NOTE PROVIDER - ATTENDING DISCHARGE PHYSICAL EXAMINATION:
Constitutional: NAD, resting comfortably   Head: NC/AT  Eyes: anicteric sclera  Respiratory: clear to ascultation b/l, No wheezing or rhonchi, no retractions   Cardiac: +S1/S2; RRR; no M/R/G  Gastrointestinal: abdomen soft, non-distended, no rebound or guarding; +BSx4, ostomy in place with brown liquid stools   Extremities: no peripheral edema  Musculoskeletal: LLE > RLE, chronic and stable   Vascular: 2+ radial, DP pulses B/L  Neurologic: AAOx3; moving all extremities   Psychiatric: affect and characteristics of appearance, verbalizations, behaviors are appropriate

## 2024-10-31 NOTE — DISCHARGE NOTE PROVIDER - NSDCCPCAREPLAN_GEN_ALL_CORE_FT
PRINCIPAL DISCHARGE DIAGNOSIS  Diagnosis: GI bleed  Assessment and Plan of Treatment: You were admitted due to concern for a bleed. You were evaluated by our GI team. You received a blood transfusion but your bleeding stopped. Please follow up with the GI team after leaving the hospital. Please return to the hospital if you experience a recurrence of bleeding.      SECONDARY DISCHARGE DIAGNOSES  Diagnosis: Acute UTI  Assessment and Plan of Treatment: You were found to have a UTI. You were treated with antibiotics. Please take your antibiotics as prescribed TO COMPLETION. Please follow up with your urologist after leaving the hospital.     PRINCIPAL DISCHARGE DIAGNOSIS  Diagnosis: GI bleed  Assessment and Plan of Treatment: You were admitted due to concern for a bleed. You were evaluated by our GI team. You received a blood transfusion but your bleeding stopped. Please follow up with the GI team after leaving the hospital. Please return to the hospital if you experience a recurrence of bleeding.      SECONDARY DISCHARGE DIAGNOSES  Diagnosis: Acute UTI  Assessment and Plan of Treatment: You were found to have a UTI. You were treated with antibiotics. Please take your antibiotics as prescribed TO COMPLETION. Please follow up with your urologist after leaving the hospital.    Diagnosis: Hyponatremia  Assessment and Plan of Treatment: Please follow up with your pcp within 1 week to redraw labs

## 2024-10-31 NOTE — DISCHARGE NOTE PROVIDER - HOSPITAL COURSE
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 The patient reported that he has been having dysuria with white colored urine for the past week. He has severe suprapubic pain. The patient endorsed that towards the end of the urine stream there is pink color urine noted. Denies dark red urine or clots. The patient denies fevers at home. He endorsed that in his ileostomy bag the patient had 1 episode of red colored stool then it went away. Currently it is brown colored. The patient denies lightheadedness. ROS otherwise negative. (30 Oct 2024 05:57)    Hospital course:     Patient admitted. GI consulted, patient received 1u pRBC and CBC has been stable. Bleeding appears to resolved. GI deferred intervention due to resolved bleeding and GOC with patient. Patient was also seen by rad onc and urology. Urology deferred intervention and recommended IR consult if Cr increased, which it did not. Rad onc spoke to patient about treatment options and patient deferred. Med onc consulted, spoke with patient, and recommended outpatient follow-up. Patient was then deemed medically ready for discharge. Patient was started on PPI BID and Zosyn due to UTI. Zosyn was transitioned to Cipro on dc.     Follow-up:   - Med Onc   - Rad Onc   - Urology   - GI     New Medications:   - Ciprofloxacin   - Protonix BID

## 2024-10-31 NOTE — PROGRESS NOTE ADULT - ASSESSMENT
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 Patient findings concerning for possible GI bleed with UTI. Admit for IV abx and GI eval.
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 He initially presented to the ED for dysuria, however then proceeded to have two episodes of bright red blood filling the ostomy 10/29, which subsequently resolved and is now having brown output.  Here, he has been hemodynamically normal, with acute anemia, Hgb 6.4, down from 8.7 8/1, s/p 1 unit prbc with uptrending of Hgb to 7.6, normocytic, BUN 26, Cr 1.12. CT demonstrated worsening hydronephrosis with enlarging mass invading the bladder. Differential for bright red blood in the ostomy bag includes lower gastrointestinal bleed related to known cancer, AVM, vs. upper bleed related to gastric ulceration, AVM, dieulafoy lesion, gastritis.     Recommendation:  - transfuse for Hgb>7  - PPI BID   - given resolution of bleeding, patient requests to be discharged. Can follow up with GI as an outpatient. Please provide patient with Gastroenterology Clinic number to confirm/arrange appointment; [phone] ([facility] at [address] [place] [address]) or [phone] ([facility] at [address] [place] [address]) or [phone] ([facility] at [facility]).     Erica Gloria PGY7  GI/Hepatology Fellow      
 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 Patient findings concerning for possible GI bleed with UTI. Admit for IV abx and GI eval.

## 2024-10-31 NOTE — DISCHARGE NOTE PROVIDER - DETAILS OF MALNUTRITION DIAGNOSIS/DIAGNOSES
Hypertension This patient has been assessed with a concern for Malnutrition and was treated during this hospitalization for the following Nutrition diagnosis/diagnoses:     -  10/31/2024: Severe protein-calorie malnutrition   -  10/31/2024: Underweight (BMI < 19)

## 2024-10-31 NOTE — DISCHARGE NOTE NURSING/CASE MANAGEMENT/SOCIAL WORK - FINANCIAL ASSISTANCE
[facility] provides services at a reduced cost to those who are determined to be eligible through [facility]’s financial assistance program. Information regarding [facility]’s financial assistance program can be found by going to [email or web address] or by calling [phone].

## 2024-10-31 NOTE — DISCHARGE NOTE PROVIDER - NSFOLLOWUPCLINICS_GEN_ALL_ED_FT
[facility] at [facility]  Urology  [address] [place] [address]  Phone: [phone]  Fax:     Gastroenterology at [facility]  Gastroenterology  [address] [place] [address]  Phone: [phone]  Fax:

## 2024-10-31 NOTE — DISCHARGE NOTE PROVIDER - CARE PROVIDER_API CALL
Dayton Palafox  Medical Oncology  [address] [place] [address]  Phone: [phone]  Fax: [phone]  Established Patient  Follow Up Time:

## 2024-10-31 NOTE — DISCHARGE NOTE PROVIDER - NSDCFUADDAPPT_GEN_ALL_CORE_FT
APPTS ARE READY TO BE MADE: [X] YES    Best Family or Patient Contact (if needed):    Additional Information about above appointments (if needed):    1: Urology   2: Radiation oncology   3: Medical Oncology   4: GI   5: PCP     Other comments or requests:

## 2024-10-31 NOTE — DISCHARGE NOTE PROVIDER - NSRESEARCHGRANT_OVERRIDEREC_GEN_A_CORE
Active cancer (i.e. undergoing acute, in-hospital cancer treatment)/Active gastroduodenal ulcer in the three months prior to treatment/IMPROVE-DD Application Not Available

## 2024-10-31 NOTE — PROGRESS NOTE ADULT - SUBJECTIVE AND OBJECTIVE BOX
PROGRESS NOTE:     Patient is a 59y old  Male who presents with a chief complaint of uti (31 Oct 2024 08:54)      SUBJECTIVE / OVERNIGHT EVENTS:    No acute events overnight. Patient examined at bedside with no acute complaints.     Pain:  Bowel Movements:  Urination:  OOB:  PT:    REVIEW OF SYSTEMS:    CONSTITUTIONAL: No weakness, fevers or chills  EYES/ENT: No visual changes;  No vertigo or throat pain   NECK: No pain or stiffness  RESPIRATORY: No cough, wheezing, hemoptysis; No shortness of breath  CARDIOVASCULAR: No chest pain or palpitations  GASTROINTESTINAL: No abdominal or epigastric pain. No nausea, vomiting, or hematemesis; No diarrhea or constipation. No melena or hematochezia.  GENITOURINARY: No dysuria, frequency or hematuria  NEUROLOGICAL: No numbness or weakness  SKIN: No itching, rashes      MEDICATIONS  (STANDING):  chlorhexidine 2% Cloths 1 Application(s) Topical daily  pantoprazole  Injectable 40 milliGRAM(s) IV Push two times a day  piperacillin/tazobactam IVPB.. 3.375 Gram(s) IV Intermittent every 8 hours  tamsulosin 0.4 milliGRAM(s) Oral at bedtime    MEDICATIONS  (PRN):  acetaminophen     Tablet .. 650 milliGRAM(s) Oral every 6 hours PRN Temp greater or equal to 38C (100.4F), Mild Pain (1 - 3)  aluminum hydroxide/magnesium hydroxide/simethicone Suspension 30 milliLiter(s) Oral every 4 hours PRN Dyspepsia  melatonin 3 milliGRAM(s) Oral at bedtime PRN Insomnia  ondansetron Injectable 4 milliGRAM(s) IV Push every 8 hours PRN Nausea and/or Vomiting      CAPILLARY BLOOD GLUCOSE        I&O's Summary      VITALS:   T(C): 36.6 (10-31-24 @ 05:18), Max: 36.6 (10-30-24 @ 14:15)  HR: 84 (10-31-24 @ 05:18) (73 - 84)  BP: 104/77 (10-31-24 @ 05:18) (104/77 - 106/70)  RR: 17 (10-31-24 @ 05:18) (16 - 17)  SpO2: 100% (10-31-24 @ 05:18) (100% - 100%)    GENERAL: NAD, lying in bed comfortably  HEAD:  Atraumatic, normocephalic  EYES: EOMI, PERRLA, conjunctiva and sclera clear  ENT: Moist mucous membranes  NECK: Supple, no JVD  HEART: Regular rate and rhythm, no murmurs, rubs, or gallops  LUNGS: Unlabored respirations.  Clear to auscultation bilaterally, no crackles, wheezing, or rhonchi  ABDOMEN: Soft, nontender, nondistended, +BS  EXTREMITIES: 2+ peripheral pulses bilaterally. No clubbing, cyanosis, or edema  NERVOUS SYSTEM:  A&Ox3, no focal deficits   SKIN: No rashes or lesions    LABS:                        7.5    5.52  )-----------( 320      ( 31 Oct 2024 06:43 )             23.6     10-31    130[L]  |  102  |  16  ----------------------------<  112[H]  3.6   |  18[L]  |  1.23    Ca    7.6[L]      31 Oct 2024 01:38  Phos  2.5     10-31  Mg     1.70     10-31    TPro  4.3[L]  /  Alb  2.3[L]  /  TBili  0.6  /  DBili  x   /  AST  16  /  ALT  10  /  AlkPhos  86  10-31    PT/INR - ( 31 Oct 2024 01:38 )   PT: 21.3 sec;   INR: 1.85 ratio         PTT - ( 31 Oct 2024 01:38 )  PTT:29.5 sec      Urinalysis Basic - ( 31 Oct 2024 01:38 )    Color: x / Appearance: x / SG: x / pH: x  Gluc: 112 mg/dL / Ketone: x  / Bili: x / Urobili: x   Blood: x / Protein: x / Nitrite: x   Leuk Esterase: x / RBC: x / WBC x   Sq Epi: x / Non Sq Epi: x / Bacteria: x          RADIOLOGY & ADDITIONAL TESTS:  Results Reviewed:   Imaging Personally Reviewed:  Electrocardiogram Personally Reviewed:    COORDINATION OF CARE:  Care Discussed with Consultants/Other Providers [Y/N]:  Prior or Outpatient Records Reviewed [Y/N]:   PROGRESS NOTE:     Patient is a 59y old  Male who presents with a chief complaint of uti (31 Oct 2024 08:54)      SUBJECTIVE / OVERNIGHT EVENTS:    No acute events overnight. Patient examined at bedside with no acute complaints. No episodes of melena or bleeding reported.     Pain: controlled soreness at lower abd   Bowel Movements: regular   OOB: regular     REVIEW OF SYSTEMS:    CONSTITUTIONAL: No weakness, fevers or chills  EYES/ENT: No visual changes;  No vertigo or throat pain   NECK: No pain or stiffness  RESPIRATORY: No cough, wheezing, hemoptysis; No shortness of breath  CARDIOVASCULAR: No chest pain or palpitations  GASTROINTESTINAL: see subjective   GENITOURINARY: No dysuria, frequency or hematuria  NEUROLOGICAL: No numbness or weakness  SKIN: No itching, rashes      MEDICATIONS  (STANDING):  chlorhexidine 2% Cloths 1 Application(s) Topical daily  pantoprazole  Injectable 40 milliGRAM(s) IV Push two times a day  piperacillin/tazobactam IVPB.. 3.375 Gram(s) IV Intermittent every 8 hours  tamsulosin 0.4 milliGRAM(s) Oral at bedtime    MEDICATIONS  (PRN):  acetaminophen     Tablet .. 650 milliGRAM(s) Oral every 6 hours PRN Temp greater or equal to 38C (100.4F), Mild Pain (1 - 3)  aluminum hydroxide/magnesium hydroxide/simethicone Suspension 30 milliLiter(s) Oral every 4 hours PRN Dyspepsia  melatonin 3 milliGRAM(s) Oral at bedtime PRN Insomnia  ondansetron Injectable 4 milliGRAM(s) IV Push every 8 hours PRN Nausea and/or Vomiting      CAPILLARY BLOOD GLUCOSE        I&O's Summary      VITALS:   T(C): 36.6 (10-31-24 @ 05:18), Max: 36.6 (10-30-24 @ 14:15)  HR: 84 (10-31-24 @ 05:18) (73 - 84)  BP: 104/77 (10-31-24 @ 05:18) (104/77 - 106/70)  RR: 17 (10-31-24 @ 05:18) (16 - 17)  SpO2: 100% (10-31-24 @ 05:18) (100% - 100%)    GENERAL: NAD, lying in bed comfortably  HEAD:  Atraumatic, normocephalic  EYES: EOMI, PERRLA, conjunctiva and sclera clear  ENT: Moist mucous membranes  NECK: Supple, no JVD  HEART: Regular rate and rhythm, no murmurs, rubs, or gallops  LUNGS: Unlabored respirations.  Clear to auscultation bilaterally, no crackles, wheezing, or rhonchi  ABDOMEN: Soft, tender to palpation at lower abd   EXTREMITIES: 2+ peripheral pulses bilaterally. No clubbing, cyanosis, or edema  NERVOUS SYSTEM:  A&Ox3, no focal deficits   SKIN: No rashes or lesions    LABS:                        7.5    5.52  )-----------( 320      ( 31 Oct 2024 06:43 )             23.6     10-31    130[L]  |  102  |  16  ----------------------------<  112[H]  3.6   |  18[L]  |  1.23    Ca    7.6[L]      31 Oct 2024 01:38  Phos  2.5     10-31  Mg     1.70     10-31    TPro  4.3[L]  /  Alb  2.3[L]  /  TBili  0.6  /  DBili  x   /  AST  16  /  ALT  10  /  AlkPhos  86  10-31    PT/INR - ( 31 Oct 2024 01:38 )   PT: 21.3 sec;   INR: 1.85 ratio         PTT - ( 31 Oct 2024 01:38 )  PTT:29.5 sec      Urinalysis Basic - ( 31 Oct 2024 01:38 )    Color: x / Appearance: x / SG: x / pH: x  Gluc: 112 mg/dL / Ketone: x  / Bili: x / Urobili: x   Blood: x / Protein: x / Nitrite: x   Leuk Esterase: x / RBC: x / WBC x   Sq Epi: x / Non Sq Epi: x / Bacteria: x          RADIOLOGY & ADDITIONAL TESTS:  Results Reviewed:   Imaging Personally Reviewed:  Electrocardiogram Personally Reviewed:    COORDINATION OF CARE:  Care Discussed with Consultants/Other Providers [Y/N]:  Prior or Outpatient Records Reviewed [Y/N]:

## 2024-10-31 NOTE — DIETITIAN INITIAL EVALUATION ADULT - PERTINENT MEDS FT
MEDICATIONS  (STANDING):  chlorhexidine 2% Cloths 1 Application(s) Topical daily  pantoprazole  Injectable 40 milliGRAM(s) IV Push two times a day  piperacillin/tazobactam IVPB.. 3.375 Gram(s) IV Intermittent every 8 hours  tamsulosin 0.4 milliGRAM(s) Oral at bedtime    MEDICATIONS  (PRN):  acetaminophen     Tablet .. 650 milliGRAM(s) Oral every 6 hours PRN Temp greater or equal to 38C (100.4F), Mild Pain (1 - 3)  aluminum hydroxide/magnesium hydroxide/simethicone Suspension 30 milliLiter(s) Oral every 4 hours PRN Dyspepsia  melatonin 3 milliGRAM(s) Oral at bedtime PRN Insomnia  ondansetron Injectable 4 milliGRAM(s) IV Push every 8 hours PRN Nausea and/or Vomiting

## 2024-10-31 NOTE — DIETITIAN INITIAL EVALUATION ADULT - PROBLEM SELECTOR PLAN 5
- patient with hx of prior L sided DVT, now presents with new R side DVT on CT scan  - patient already had IVC filter placed from prior DVT due to anemia and bleeding, cannot tolerate AC  - will monitor for now  - does not need dopplers to confirm as it will not  and cannot tolerate AC

## 2024-10-31 NOTE — PROGRESS NOTE ADULT - PROBLEM SELECTOR PLAN 2
- patient found to have possible GI bleed from ileostomy bag  - will start protonix 40mg BID IV push  - GI consult  - currently getting prbc transfusion, will get CBC once completed  - 2 large bore IVs - patient found to have possible GI bleed from ileostomy bag, now appears to have resolved   - c/w protonix 40mg BID IV push  - GI consulted, recs appreciated. Per GI, no indication for intervention as bleed has resolved   - 2 large bore IVs

## 2024-10-31 NOTE — DISCHARGE NOTE NURSING/CASE MANAGEMENT/SOCIAL WORK - PATIENT PORTAL LINK FT
You can access the FollowMyHealth Patient Portal offered by [facility] by registering at the following website: [email or web address]. By joining [facility]’s FollowMyHealth portal, you will also be able to view your health information using other applications (apps) compatible with our system.

## 2024-10-31 NOTE — DIETITIAN INITIAL EVALUATION ADULT - OTHER INFO
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 Patient findings concerning for possible GI bleed with UTI per chart.    Patient reports good appetite in house. No GI distress reported. No chewing/swallowing difficulties reported at home. Has no food allergies. Reports gaining weight at one points a few months back, but has been losing now. Per previous RD note (7/30), noted w/ weight 54.9 kg. ABW is 48.1 kg (10/30) per chart indicating a -12.3% weight loss x 3 months, significant. Noted w/ +1 R ankle edema and no pressure injuries per RN flow sheet.    Patient declined nutrition education at this time. 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 Patient findings concerning for possible GI bleed with UTI per chart.    Patient reports good appetite in house. No GI distress reported. No chewing/swallowing difficulties reported at home. Has no food allergies. Reports gaining weight at one points a few months back, but has been losing now. Per previous RD note (7/30), noted w/ weight 54.9 kg. ABW is 48.1 kg (10/30) per chart indicating a -12.3% weight loss x 3 months, significant. Likely due to increased nutritional needs in setting of cancer. Noted w/ +1 R ankle edema and no pressure injuries per RN flow sheet.    Patient declined nutrition education at this time.

## 2024-10-31 NOTE — DIETITIAN INITIAL EVALUATION ADULT - REASON FOR ADMISSION

## 2024-10-31 NOTE — PROGRESS NOTE ADULT - PROBLEM SELECTOR PLAN 1
- patient has findings of UTI  - will start zosyn  - no signs of sepsis at this time  - f/u urine culture and blood cultures  - no severe hematuria history noted as per patient, unclear where the ED got that history from - patient has findings of UTI  - will start zosyn  - no signs of sepsis at this time  - f/u blood cultures  - no severe hematuria history noted as per patient, unclear where the ED got that history from  - Transition from zosyn to cipro on d/c

## 2024-10-31 NOTE — DIETITIAN NUTRITION RISK NOTIFICATION - TREATMENT: THE FOLLOWING DIET HAS BEEN RECOMMENDED
Diet, Soft and Bite Sized:   1200mL Fluid Restriction ([id number]) (10-31-24 @ 09:23) [Active]

## 2024-10-31 NOTE — DIETITIAN INITIAL EVALUATION ADULT - ORAL INTAKE PTA/DIET HISTORY
Patient seen for assessment. No issues w/ appetite reported at home. States previously not having much of an appetite back in June due to chemo treatment. Follows a high calorie/protein diet at home. Consumes Orgain supplement.

## 2024-10-31 NOTE — PROGRESS NOTE ADULT - ATTENDING COMMENTS
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  GI consulted for red blood in ostomy x 1 day, resolved, and currently with brown stool.  May have stomal bleeding, ulcer, angioectasia, polyp, or neoplasm.  Patient would prefer to follow up as outpatient if he has no further bleeding.

## 2024-10-31 NOTE — PROGRESS NOTE ADULT - PROBLEM SELECTOR PLAN 3
- Uro consulted and following, appreciate recs  - hydronephrosis worsened on the L side, likely from bladder mass

## 2024-10-31 NOTE — PROGRESS NOTE ADULT - PROBLEM SELECTOR PLAN 7
- cannot get SCDs due to DVT, cannot get heparin due to GI bleeding  - GOC this admission - cannot get SCDs due to DVT, cannot get heparin due to GI bleeding  - GOC this admission, patient deferring all workup and treatment to outpatient

## 2024-10-31 NOTE — DIETITIAN INITIAL EVALUATION ADULT - ADD RECOMMEND
- Swallow evaluation to assess if diet consistency upgrade is appropriate  - Nutrition department will provide Orgain BID (480 kcal, 32 g pro)  - Monitor PO intake, tolerance to diet/supplement, nutrition related lab values, weight trends, BMs/GI distress, hydration status, skin integrity

## 2024-10-31 NOTE — DIETITIAN INITIAL EVALUATION ADULT - PROBLEM SELECTOR PLAN 3
- urology consulted - will f/u, seen with urology at bedside  - hydronephrosis worsened on the L side, likely from bladder mass

## 2024-10-31 NOTE — PROGRESS NOTE ADULT - ATTENDING COMMENTS
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 UTI is being managed on zosyn, can deescalate to cipro on discharge. GI consulted for bleeding via ileostomy bag, however, pt deferring endoscopic eval currently. Continuing PPID BID on dc. s/p 1 unit pRBC    Patient was also noted to have worsening left hydronephrosis and urology was consulted. No acute intervention. MAXWELL resolving spontaneously.   Onc and RT consulted for bladder mass. Patient unlikely a candidate for chemo. Pall consult     Hyponatremia, trended down to 127 on 10/30, which improved to 130 on 10/31.     Patient with known history of L DVT s/p IVC filter due to anemia and hematuria not on AC. CTAP with probable deep vein thrombosis in the right external iliac and femoral veins with extension into the superficial greater saphenous vein. Extension into the distal inferior vena cava. Given inability to anticoagulate and IVC filter already placed, no sig change in mgmt.     Rest as Above

## 2024-10-31 NOTE — PROGRESS NOTE ADULT - PROBLEM SELECTOR PLAN 5
- patient with hx of prior L sided DVT, now presents with new R side DVT on CT scan  - patient already had IVC filter placed from prior DVT due to anemia and bleeding, cannot tolerate AC  - will monitor for now  - does not need dopplers to confirm as it will not  and cannot tolerate AC
- patient with hx of prior L sided DVT, now presents with new R side DVT on CT scan  - patient already had IVC filter placed from prior DVT due to anemia and bleeding, cannot tolerate AC  - will monitor for now  - does not need dopplers to confirm as it will not  and cannot tolerate AC

## 2024-10-31 NOTE — DISCHARGE NOTE PROVIDER - NSDCMRMEDTOKEN_GEN_ALL_CORE_FT
ciprofloxacin 250 mg oral tablet: 1 tab(s) orally 2 times a day  pantoprazole 40 mg oral delayed release tablet: 1 tab(s) orally 2 times a day  sodium bicarbonate 650 mg oral tablet: 1 tab(s) orally 2 times a day  sodium chloride 1 g oral tablet: 1 tab(s) orally 3 times a day  tamsulosin 0.4 mg oral capsule: 1 cap(s) orally once a day (at bedtime)

## 2024-10-31 NOTE — PROGRESS NOTE ADULT - TIME BILLING
Time-based billing (NON-critical care).     35 minutes spent on total encounter; more than 50% of the visit was spent counseling and / or coordinating care by the attending physician.  The necessity of the time spent during the encounter on this date of service was due to:     review of laboratory data, radiology results, consultants' recommendations, documentation in [place], discussion with patient/ACP and interdisciplinary staff (such as , social workers, etc). Interventions were performed as documented above.
Time-based billing (NON-critical care).     35 minutes spent on total encounter; more than 50% of the visit was spent counseling and / or coordinating care by the attending physician.  The necessity of the time spent during the encounter on this date of service was due to:     review of laboratory data, radiology results, consultants' recommendations, documentation in [place], discussion with patient/ACP and interdisciplinary staff (such as , social workers, etc). Interventions were performed as documented above.

## 2024-11-04 LAB
CULTURE RESULTS: SIGNIFICANT CHANGE UP
SPECIMEN SOURCE: SIGNIFICANT CHANGE UP

## 2024-11-10 ENCOUNTER — INPATIENT (INPATIENT)
Facility: HOSPITAL | Age: 59
LOS: 2 days | Discharge: HOME | End: 2024-11-13
Attending: INTERNAL MEDICINE | Admitting: INTERNAL MEDICINE
Payer: COMMERCIAL

## 2024-11-10 VITALS
SYSTOLIC BLOOD PRESSURE: 101 MMHG | DIASTOLIC BLOOD PRESSURE: 64 MMHG | RESPIRATION RATE: 18 BRPM | HEIGHT: 67 IN | TEMPERATURE: 98 F | OXYGEN SATURATION: 100 % | WEIGHT: 102.07 LBS | HEART RATE: 84 BPM

## 2024-11-10 DIAGNOSIS — D64.9 ANEMIA, UNSPECIFIED: ICD-10-CM

## 2024-11-10 DIAGNOSIS — E87.8 OTHER DISORDERS OF ELECTROLYTE AND FLUID BALANCE, NOT ELSEWHERE CLASSIFIED: ICD-10-CM

## 2024-11-10 DIAGNOSIS — Z98.890 OTHER SPECIFIED POSTPROCEDURAL STATES: Chronic | ICD-10-CM

## 2024-11-10 DIAGNOSIS — Z29.9 ENCOUNTER FOR PROPHYLACTIC MEASURES, UNSPECIFIED: ICD-10-CM

## 2024-11-10 DIAGNOSIS — Z90.49 ACQUIRED ABSENCE OF OTHER SPECIFIED PARTS OF DIGESTIVE TRACT: Chronic | ICD-10-CM

## 2024-11-10 DIAGNOSIS — C18.9 MALIGNANT NEOPLASM OF COLON, UNSPECIFIED: ICD-10-CM

## 2024-11-10 DIAGNOSIS — R55 SYNCOPE AND COLLAPSE: ICD-10-CM

## 2024-11-10 DIAGNOSIS — N39.0 URINARY TRACT INFECTION, SITE NOT SPECIFIED: ICD-10-CM

## 2024-11-10 DIAGNOSIS — A41.9 SEPSIS, UNSPECIFIED ORGANISM: ICD-10-CM

## 2024-11-10 LAB
ALBUMIN SERPL ELPH-MCNC: 2.5 G/DL — LOW (ref 3.3–5)
ALP SERPL-CCNC: 106 U/L — SIGNIFICANT CHANGE UP (ref 40–120)
ALT FLD-CCNC: 7 U/L — SIGNIFICANT CHANGE UP (ref 4–41)
ANION GAP SERPL CALC-SCNC: 9 MMOL/L — SIGNIFICANT CHANGE UP (ref 7–14)
APPEARANCE UR: ABNORMAL
AST SERPL-CCNC: 14 U/L — SIGNIFICANT CHANGE UP (ref 4–40)
BASOPHILS # BLD AUTO: 0.03 K/UL — SIGNIFICANT CHANGE UP (ref 0–0.2)
BASOPHILS NFR BLD AUTO: 0.1 % — SIGNIFICANT CHANGE UP (ref 0–2)
BILIRUB SERPL-MCNC: 0.2 MG/DL — SIGNIFICANT CHANGE UP (ref 0.2–1.2)
BILIRUB UR-MCNC: NEGATIVE — SIGNIFICANT CHANGE UP
BUN SERPL-MCNC: 25 MG/DL — HIGH (ref 7–23)
CALCIUM SERPL-MCNC: 7.9 MG/DL — LOW (ref 8.4–10.5)
CHLORIDE SERPL-SCNC: 101 MMOL/L — SIGNIFICANT CHANGE UP (ref 98–107)
CO2 SERPL-SCNC: 19 MMOL/L — LOW (ref 22–31)
COLOR SPEC: ABNORMAL
CREAT SERPL-MCNC: 1.2 MG/DL — SIGNIFICANT CHANGE UP (ref 0.5–1.3)
DIFF PNL FLD: ABNORMAL
EGFR: 69 ML/MIN/1.73M2 — SIGNIFICANT CHANGE UP
EOSINOPHIL # BLD AUTO: 0.05 K/UL — SIGNIFICANT CHANGE UP (ref 0–0.5)
EOSINOPHIL NFR BLD AUTO: 0.2 % — SIGNIFICANT CHANGE UP (ref 0–6)
GAS PNL BLDV: SIGNIFICANT CHANGE UP
GLUCOSE SERPL-MCNC: 115 MG/DL — HIGH (ref 70–99)
GLUCOSE UR QL: NEGATIVE MG/DL — SIGNIFICANT CHANGE UP
HCT VFR BLD CALC: 25.8 % — LOW (ref 39–50)
HGB BLD-MCNC: 7.5 G/DL — LOW (ref 13–17)
IANC: 19.9 K/UL — HIGH (ref 1.8–7.4)
IMM GRANULOCYTES NFR BLD AUTO: 1 % — HIGH (ref 0–0.9)
KETONES UR-MCNC: NEGATIVE MG/DL — SIGNIFICANT CHANGE UP
LEUKOCYTE ESTERASE UR-ACNC: ABNORMAL
LYMPHOCYTES # BLD AUTO: 1.35 K/UL — SIGNIFICANT CHANGE UP (ref 1–3.3)
LYMPHOCYTES # BLD AUTO: 5.9 % — LOW (ref 13–44)
MAGNESIUM SERPL-MCNC: 1.4 MG/DL — LOW (ref 1.6–2.6)
MCHC RBC-ENTMCNC: 24.1 PG — LOW (ref 27–34)
MCHC RBC-ENTMCNC: 29.1 G/DL — LOW (ref 32–36)
MCV RBC AUTO: 83 FL — SIGNIFICANT CHANGE UP (ref 80–100)
MONOCYTES # BLD AUTO: 1.3 K/UL — HIGH (ref 0–0.9)
MONOCYTES NFR BLD AUTO: 5.7 % — SIGNIFICANT CHANGE UP (ref 2–14)
NEUTROPHILS # BLD AUTO: 19.9 K/UL — HIGH (ref 1.8–7.4)
NEUTROPHILS NFR BLD AUTO: 87.1 % — HIGH (ref 43–77)
NITRITE UR-MCNC: POSITIVE
NRBC # BLD: 0 /100 WBCS — SIGNIFICANT CHANGE UP (ref 0–0)
NRBC # FLD: 0 K/UL — SIGNIFICANT CHANGE UP (ref 0–0)
NT-PROBNP SERPL-SCNC: 76 PG/ML — SIGNIFICANT CHANGE UP
PH UR: 6 — SIGNIFICANT CHANGE UP (ref 5–8)
PHOSPHATE SERPL-MCNC: 2.8 MG/DL — SIGNIFICANT CHANGE UP (ref 2.5–4.5)
PLATELET # BLD AUTO: 365 K/UL — SIGNIFICANT CHANGE UP (ref 150–400)
POTASSIUM SERPL-MCNC: 4.1 MMOL/L — SIGNIFICANT CHANGE UP (ref 3.5–5.3)
POTASSIUM SERPL-SCNC: 4.1 MMOL/L — SIGNIFICANT CHANGE UP (ref 3.5–5.3)
PROT SERPL-MCNC: 5.1 G/DL — LOW (ref 6–8.3)
PROT UR-MCNC: >=1000 MG/DL
RBC # BLD: 3.11 M/UL — LOW (ref 4.2–5.8)
RBC # FLD: 20.7 % — HIGH (ref 10.3–14.5)
SODIUM SERPL-SCNC: 129 MMOL/L — LOW (ref 135–145)
SP GR SPEC: 1.02 — SIGNIFICANT CHANGE UP (ref 1–1.03)
TROPONIN T, HIGH SENSITIVITY RESULT: 8 NG/L — SIGNIFICANT CHANGE UP
UROBILINOGEN FLD QL: 1 MG/DL — SIGNIFICANT CHANGE UP (ref 0.2–1)
WBC # BLD: 22.86 K/UL — HIGH (ref 3.8–10.5)
WBC # FLD AUTO: 22.86 K/UL — HIGH (ref 3.8–10.5)

## 2024-11-10 PROCEDURE — 99285 EMERGENCY DEPT VISIT HI MDM: CPT

## 2024-11-10 PROCEDURE — 71046 X-RAY EXAM CHEST 2 VIEWS: CPT | Mod: 26

## 2024-11-10 PROCEDURE — 99223 1ST HOSP IP/OBS HIGH 75: CPT

## 2024-11-10 RX ORDER — PIPERACILLIN AND TAZOBACTAM .5; 4 G/20ML; G/20ML
3.38 INJECTION, POWDER, LYOPHILIZED, FOR SOLUTION INTRAVENOUS ONCE
Refills: 0 | Status: COMPLETED | OUTPATIENT
Start: 2024-11-10 | End: 2024-11-10

## 2024-11-10 RX ORDER — CEFTRIAXONE SODIUM 10 G
1000 VIAL (EA) INJECTION ONCE
Refills: 0 | Status: COMPLETED | OUTPATIENT
Start: 2024-11-10 | End: 2024-11-10

## 2024-11-10 RX ORDER — ACETAMINOPHEN 500 MG
650 TABLET ORAL EVERY 6 HOURS
Refills: 0 | Status: DISCONTINUED | OUTPATIENT
Start: 2024-11-10 | End: 2024-11-11

## 2024-11-10 RX ORDER — ONDANSETRON HYDROCHLORIDE 2 MG/ML
4 INJECTION, SOLUTION INTRAMUSCULAR; INTRAVENOUS EVERY 8 HOURS
Refills: 0 | Status: DISCONTINUED | OUTPATIENT
Start: 2024-11-10 | End: 2024-11-13

## 2024-11-10 RX ORDER — PANTOPRAZOLE SODIUM 40 MG/1
40 TABLET, DELAYED RELEASE ORAL
Refills: 0 | Status: DISCONTINUED | OUTPATIENT
Start: 2024-11-10 | End: 2024-11-13

## 2024-11-10 RX ORDER — MAGNESIUM, ALUMINUM HYDROXIDE 200-200 MG
30 TABLET,CHEWABLE ORAL EVERY 4 HOURS
Refills: 0 | Status: DISCONTINUED | OUTPATIENT
Start: 2024-11-10 | End: 2024-11-13

## 2024-11-10 RX ORDER — PIPERACILLIN AND TAZOBACTAM .5; 4 G/20ML; G/20ML
3.38 INJECTION, POWDER, LYOPHILIZED, FOR SOLUTION INTRAVENOUS ONCE
Refills: 0 | Status: COMPLETED | OUTPATIENT
Start: 2024-11-11 | End: 2024-11-11

## 2024-11-10 RX ORDER — SODIUM BICARBONATE 1 MEQ/ML
650 VIAL (ML) INTRAVENOUS
Refills: 0 | Status: DISCONTINUED | OUTPATIENT
Start: 2024-11-10 | End: 2024-11-12

## 2024-11-10 RX ORDER — PIPERACILLIN AND TAZOBACTAM .5; 4 G/20ML; G/20ML
3.38 INJECTION, POWDER, LYOPHILIZED, FOR SOLUTION INTRAVENOUS EVERY 8 HOURS
Refills: 0 | Status: DISCONTINUED | OUTPATIENT
Start: 2024-11-11 | End: 2024-11-12

## 2024-11-10 RX ORDER — TAMSULOSIN HCL 0.4 MG
0.4 CAPSULE ORAL AT BEDTIME
Refills: 0 | Status: DISCONTINUED | OUTPATIENT
Start: 2024-11-10 | End: 2024-11-13

## 2024-11-10 RX ORDER — HEPARIN SODIUM 10000 [USP'U]/ML
5000 INJECTION INTRAVENOUS; SUBCUTANEOUS EVERY 12 HOURS
Refills: 0 | Status: DISCONTINUED | OUTPATIENT
Start: 2024-11-10 | End: 2024-11-11

## 2024-11-10 RX ORDER — MAGNESIUM SULFATE IN 0.9% NACL 2 G/50 ML
2 INTRAVENOUS SOLUTION, PIGGYBACK (ML) INTRAVENOUS ONCE
Refills: 0 | Status: COMPLETED | OUTPATIENT
Start: 2024-11-10 | End: 2024-11-10

## 2024-11-10 RX ORDER — MELATONIN 5 MG
3 TABLET ORAL AT BEDTIME
Refills: 0 | Status: DISCONTINUED | OUTPATIENT
Start: 2024-11-10 | End: 2024-11-13

## 2024-11-10 RX ADMIN — Medication 25 GRAM(S): at 16:35

## 2024-11-10 RX ADMIN — Medication 0.4 MILLIGRAM(S): at 21:22

## 2024-11-10 RX ADMIN — HEPARIN SODIUM 5000 UNIT(S): 10000 INJECTION INTRAVENOUS; SUBCUTANEOUS at 17:52

## 2024-11-10 RX ADMIN — PIPERACILLIN AND TAZOBACTAM 200 GRAM(S): .5; 4 INJECTION, POWDER, LYOPHILIZED, FOR SOLUTION INTRAVENOUS at 16:03

## 2024-11-10 RX ADMIN — Medication 100 MILLIGRAM(S): at 12:56

## 2024-11-10 RX ADMIN — Medication 1000 MILLILITER(S): at 10:30

## 2024-11-10 RX ADMIN — Medication 650 MILLIGRAM(S): at 17:52

## 2024-11-10 RX ADMIN — PANTOPRAZOLE SODIUM 40 MILLIGRAM(S): 40 TABLET, DELAYED RELEASE ORAL at 17:52

## 2024-11-10 NOTE — H&P ADULT - NSHPPHYSICALEXAM_GEN_ALL_CORE
CONSTITUTIONAL: NAD  EYES:  non-icteric  ENMT: Oral mucosa with moist membranes  NECK: Supple  RESP: CTABL, No respiratory distress, no use of accessory muscles  CV: RRR, ns1/s2  GI: +ostomy with brown stool, Soft, ND, no rebound, no guarding  :  tenderness to suprapubic mass on bladder, No CVA tenderness   MSK: Normal ROM without pain, no gross deformity   SKIN: No rashes or ulcers noted  NEURO: No focal deficits   PSYCH: Appropriate insight/judgment; A+O x 3

## 2024-11-10 NOTE — ED ADULT NURSE REASSESSMENT NOTE - NS ED NURSE REASSESS COMMENT FT1
Pt status unchanged, pt disoriented but responsive when reorientated, A&O x1. Fluids completed as per EMAR, purewick suction active. pt resting peacefully, awaiting bed, no change to status, on cardiac monitoring.

## 2024-11-10 NOTE — ED ADULT TRIAGE NOTE - CHIEF COMPLAINT QUOTE
pt coming from airport.  pt was on a flight to [place] when he had a syncopal episode.  plane landed and pt was sent to [facility].  SL by EMS, NS 250cc bolus given.  pt c/o pelvic pain.  Hx:  bladder ca.

## 2024-11-10 NOTE — H&P ADULT - PROBLEM SELECTOR PLAN 6
DVT ppx: increased VTE risk, has IVC filter in place, will use heparin sub q  GI ppx: on home PPI  Code: full  Diet: regular    DISPO: pending clinical course

## 2024-11-10 NOTE — H&P ADULT - PROBLEM SELECTOR PLAN 3
Hb since 07/2024 around 7.5  To follow up GI outpatient  - no overt bleeding   - likely related to underlying malignancy   - can follow up heme/onc OP  - monitor H/H, transfusion goal Hb>7

## 2024-11-10 NOTE — H&P ADULT - PROBLEM SELECTOR PLAN 4
S/p resection/colectomy with ileostomy  Metastasis to bladder  - follow up heme/onc OP  - per Urology last admission: in setting of hydronephrosis, if Cr worsens or patient needs urgent decompression, rec IR consult for NT placement

## 2024-11-10 NOTE — H&P ADULT - PROBLEM SELECTOR PLAN 5
#hypomagnesemia  - monitor and replace as indicated    #hyponatremia  - has h/o NaCl use, but has not been using recently  - baseline around 130s   - continue to monitor

## 2024-11-10 NOTE — ED PROVIDER NOTE - ATTENDING APP SHARED VISIT CONTRIBUTION OF CARE
Agree with PA note  59-year-old male with past medical history of colon cancer with colonic resection with ileostomy bag, active bladder cancer presents with near syncopal episode today on flight.  Patient denies any change in p.o. intake but does state has been lower over the past few months.  Denies any fevers or vomiting or diarrhea.  Patient does state likely overexerted himself and the whole process of boarding flight.  Was seated when had this near syncopal episode.  Does state recently treated for UTI due to dysuria.  Physical exam  Gen: pt well appearing in no respiratory distress  vital signs stable  NCAT  Lungs: CTAB/L  Cardiac: s1 s2 no m/r/g  abdomen: soft/NT/ND  ext: no edema  Neuro: CNs intact 5/5 motor UE and LE; sensation intact; gait stable  skin: no rash  Impression  Labs are returned showing a white blood cell count of 22, given recent UTI likely urosepsis given altered mental status with signs of UA being positive (leuk esterase, nitrites, WBCs)  Will start patient antibiotics and admit patient to hospital

## 2024-11-10 NOTE — ED ADULT NURSE REASSESSMENT NOTE - NS ED NURSE REASSESS COMMENT FT1
Pt status unchanged, pt denies pain, Fluids completed as per EMAR, pt resting comfortably. Blood cultures pending.

## 2024-11-10 NOTE — H&P ADULT - PROBLEM SELECTOR PLAN 2
Recently treated with Zosyn and Cipro total of ~5 days  UA grossly positive this visit, was given Ceftriaxone 1g in ED  - follow up Ucx  - follow up Bcx  - broaden ABX to Zosyn  - ID consult

## 2024-11-10 NOTE — H&P ADULT - BIRTH SEX
Problem: Occupational Therapy Goal  Goal: Occupational Therapy Goal  Description: Goals to be met by: 11/24/2020     Patient will increase functional independence with ADLs by performing:    UE Dressing with Set-up Assistance.-MET  LE Dressing with Supervision.-Carol to don brief, Max A to don/doff socks  Grooming while seated with Set-up Assistance.-Bathing while seated at sink with SBA  Toileting from toilet with Supervision for hygiene and clothing management. -MET  Toilet transfer to toilet with Supervision.-MET    Outcome: Ongoing, Progressing; Patient is making progress. She is going to receive 1U PRBC today and should be able to d/c tomorrow (pending no further complications). Goals remain appropriate. Continue OT plan of care.        Male

## 2024-11-10 NOTE — H&P ADULT - TIME BILLING
(including, but not limited, to)  - preparing to see the patient (eg, review of tests)   - obtaining and/or reviewing separately obtained history  - performing a medically appropriate examination and/or evaluation   - counseling and educating the patient/family/caregiver    - ordering medications, tests, or procedures   - referring and communicating with other health care professionals (when not separately reported)   - documenting clinical information in the electronic or other health record   - independently interpreting results (not separately reported) and communicating results to the patient/family/caregiver   - care coordination (not separately reported)

## 2024-11-10 NOTE — H&P ADULT - HISTORY OF PRESENT ILLNESS
Mr. Diaz is a 59 year old gentleman with PMHx colon CA s/p colectomy and ileostomy bag placement with metastasis to bladder, L DVT s/p IVC filter due to anemia and hematuria not on AC, who presents after a pre-syncopal event while on airplane earlier today. Patient states he was just discharged from hospital after undergoing treatment for UTI last week, finished his course of cipro. A few days thereafter, patient started to have recurrence of burning with urination, increased frequency and also describes dribbling of "pink" urine. On the plane today, patient started to feel lightheaded, dizzy and was hyperventilating as the plane was taking off. Luckily, there was a nurse on the plane who was able to assess the patient. At that time, BP was around 60s/40s per spouse. Patient was given a bolus of IVF as the plane landed, and he was brought to ED.     In the ED, initial vitals were stable, labs significant for: WBC 22.86 (predominantly neutrophils with L shift), Hb 7.5 (around baseline), Na 129, BUN/Cr 25/1.2, Mg 1.4. UA grossly positive with proteinuria, hematuria, LE, nitrite, WBCs and bacteria. CXR was negative. Patient had received Ceftriaxone 1g and 1L NS bolus.

## 2024-11-10 NOTE — ED ADULT NURSE NOTE - NSFALLUNIVINTERV_ED_ALL_ED
Bed/Stretcher in lowest position, wheels locked, appropriate side rails in place/Call bell, personal items and telephone in reach/Instruct patient to call for assistance before getting out of bed/chair/stretcher/Non-slip footwear applied when patient is off stretcher/[place] to call system/Physically safe environment - no spills, clutter or unnecessary equipment/Purposeful proactive rounding/Room/bathroom lighting operational, light cord in reach

## 2024-11-10 NOTE — ED PROVIDER NOTE - PHYSICAL EXAMINATION
On Physical Exam:  General: cachectic, in NAD, speaking clearly in full sentences and without difficulty; cooperative with exam  HEENT: PERRL, MMM  Neck: no neck tenderness, no nuchal rigidity  Cardiac: normal s1, s2; RRR; no MGR  Lungs: CTABL  Abdomen: soft nontender/nondistended  : no bladder tenderness or distension  Skin: warm, intact, no rash  Extremities: no peripheral edema, no gross deformities  Neuro: no gross neurologic deficits

## 2024-11-10 NOTE — ED ADULT NURSE NOTE - OBJECTIVE STATEMENT
59y Male received after syncopal episode and SOB during flight today, pt denies head trauma. Pt has Hx of colon cancer, R chest wall port present,  and colostomy bag in RLQ. Pt A&Ox4, ambulatory. PERRLA, no current changes in vision, no dizziness. Lungs clear bilaterally, no respiratory distress. S1&S2 present, cap refill <2 sec, pt has nonpitting edema of lower right leg, pt states that is normal for him. Abdomen has a colostomy bag present in right lower quadrant, bowel sounds present in other quadrants. Pt endorses tenderness in lower abdomen, palpable mass present, MD at bedside noted presence. Pt denies dysuria but reports "slightly pink urine at the end of urination". Skin warm and dry with no skin breakdown present. Pt denies CP/Nausea/vomiting/HA. Pt received with IV in left AC, fluids administered per orders, pending results of Chest X-ray. 59y Male received after syncopal episode and SOB during flight today, pt denies head trauma. Pt has Hx of colon cancer, R chest wall port present,  and Ileostomy bag in RLQ. Pt A&Ox4, ambulatory. PERRLA, no current changes in vision, no dizziness. Lungs clear bilaterally, no respiratory distress. S1&S2 present, cap refill <2 sec, pt has nonpitting edema of lower right leg, pt states that is normal for him. Abdomen has a ileostomy bag present in right lower quadrant, bowel sounds present in other quadrants. Pt endorses tenderness in lower abdomen, palpable mass present, MD at bedside noted presence. Pt denies dysuria but reports "slightly pink urine at the end of urination". Skin warm and dry with no skin breakdown present. Pt denies CP/Nausea/vomiting/HA. Pt received with IV in left AC, fluids administered per orders, pending results of Chest X-ray.

## 2024-11-10 NOTE — H&P ADULT - NSHPSOCIALHISTORY_GEN_ALL_CORE
Denies smoking, alcohol or recreational drug use  Lives at home with wife, Lawrence, their 2 children and his mother-in-law

## 2024-11-10 NOTE — PATIENT PROFILE ADULT - FALL HARM RISK - HARM RISK INTERVENTIONS

## 2024-11-10 NOTE — H&P ADULT - NSHPREVIEWOFSYSTEMS_GEN_ALL_CORE
CONSTITUTIONAL: No fever, weight loss  EYES: No eye pain, visual disturbances, or discharge  ENMT:  No difficulty hearing, tinnitus, vertigo; No sinus or throat pain  RESPIRATORY: No SOB. No cough, wheezing, chills or hemoptysis  CARDIOVASCULAR: +dizziness, No chest pain, palpitations, or leg swelling  GASTROINTESTINAL: +suprapubic pain. No nausea, vomiting, or hematemesis; No diarrhea or constipation. No melena or hematochezia.  GENITOURINARY: + dysuria, frequency, hematuria, No incontinence  NEUROLOGICAL: No headaches, memory loss, loss of strength, numbness, or tremors  SKIN: No itching, burning, rashes, or lesions   MUSCULOSKELETAL: No joint pain or swelling; No muscle, back pain  PSYCHIATRIC: No AH/VH
appropriate for situation

## 2024-11-10 NOTE — ED ADULT NURSE NOTE - NSICDXFAMILYHX_GEN_ALL_CORE_FT
Chief Complaint(s) and History of Present Illness(es)       Retinal Dystrophy Hereditary Follow Up              Laterality: both eyes              Comments    s/p [facility] in 9/2016 - found some variants of unknown signif   Cerebellar atrophy  Ataxia and dysarthria  Staring spells possibly concerning for seizure activity    Multiple VUS ANK3   ADHD and Intellectual disability   Steve-Cone retinal dystrophy             History was obtained from the following independent historians: Patient & Mom     11/2014  ERG (Dr Zayas)  This ERG is abnormal, showing severe loss of steve photo receptors and severe dysfunction of the cone photoreceptors (without significant loss of them). These findings are most consistent with a steve-cone retinal dystrophy, as the changes are more severe than can be attributed to anesthesia. I would recommend a repeat ERG in a couple of years.    Primary care: Maddi Garcia   Assessment & Plan   Etelvina Burden is a 10 year old female who presents with:     Steve-cone dystrophy   ERG 11/2014, unable to tolerate awake ERG In 2017 at [facility]   Whole exome sequencing was negative.    s/p [facility] in 9/2016 - found some variants of unknown signif    Multiple VUS ANK3   ADHD and Intellectual disability     Stable exam. Reassured. No ERG unless there are changes.     Hyperopia with astigmatism  - New glasses prescribed. Ok to continue current glasses.     Progressive neurological deficit, ataxia, dysarthria  Cerebral atrophy  Cerebellar atrophy  White matter abnormality on MRI of brain       Return in about 1 year (around 1/4/2025) for DFE & CRx.    There are no Patient Instructions on file for this visit.    Visit Diagnoses & Orders    ICD-10-CM    1. Steve-cone dystrophy  H35.53       2. Hyperopia of both eyes with astigmatism  H52.03     H52.203       3. 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  I personally obtained the chief complaint(s) and history of present illness.  I confirmed and edited as necessary the review of systems, past medical/surgical history, family history, social history, and examination findings as documented by others; and I examined the patient myself.  I personally reviewed the relevant tests, images, and reports as documented above.  I formulated and edited as necessary the assessment and plan and discussed the findings and management plan with the patient and family. - Alejo Armendariz Jr., MD      FAMILY HISTORY:  Mother  Still living? Unknown  FH: brain aneurysm, Age at diagnosis: Age Unknown

## 2024-11-10 NOTE — ED ADULT NURSE NOTE - CHIEF COMPLAINT QUOTE
pt coming from airport.  pt was on a flight to [place] when he had a syncopal episode.  plane landed and pt was sent to [facility].  SL by EMS, NS 250cc bolus given.  pt c/o pelvic pain.  Hx:  bladder ca.

## 2024-11-10 NOTE — H&P ADULT - ASSESSMENT
59M PMHx colon CA s/p colectomy and ileostomy bag placement with metastasis to bladder, L DVT s/p IVC filter due to anemia and hematuria not on AC, who presents after a pre-syncopal event while on airplane earlier today, found to have UTI.

## 2024-11-10 NOTE — H&P ADULT - PROBLEM SELECTOR PLAN 1
Near syncopal episode on plane with lightheadedness/dizziness, almost blacking out and low BP  Improved with IVF  In setting of UTI  Reviewed TTE from 07/2024: LVEF 74% without WMA  Reviewed CT H from 06/2024: negative for ICH or metastasis  - continue telemetry monitoring  - orthostatic VS  - PT eval

## 2024-11-11 LAB
ALBUMIN SERPL ELPH-MCNC: 2.6 G/DL — LOW (ref 3.3–5)
ALP SERPL-CCNC: 126 U/L — HIGH (ref 40–120)
ALT FLD-CCNC: 13 U/L — SIGNIFICANT CHANGE UP (ref 4–41)
ANION GAP SERPL CALC-SCNC: 13 MMOL/L — SIGNIFICANT CHANGE UP (ref 7–14)
AST SERPL-CCNC: 24 U/L — SIGNIFICANT CHANGE UP (ref 4–40)
BASOPHILS # BLD AUTO: 0.06 K/UL — SIGNIFICANT CHANGE UP (ref 0–0.2)
BASOPHILS NFR BLD AUTO: 0.4 % — SIGNIFICANT CHANGE UP (ref 0–2)
BILIRUB SERPL-MCNC: 0.3 MG/DL — SIGNIFICANT CHANGE UP (ref 0.2–1.2)
BLD GP AB SCN SERPL QL: NEGATIVE — SIGNIFICANT CHANGE UP
BUN SERPL-MCNC: 20 MG/DL — SIGNIFICANT CHANGE UP (ref 7–23)
CALCIUM SERPL-MCNC: 8.3 MG/DL — LOW (ref 8.4–10.5)
CHLORIDE SERPL-SCNC: 102 MMOL/L — SIGNIFICANT CHANGE UP (ref 98–107)
CO2 SERPL-SCNC: 18 MMOL/L — LOW (ref 22–31)
CREAT SERPL-MCNC: 1.16 MG/DL — SIGNIFICANT CHANGE UP (ref 0.5–1.3)
EGFR: 72 ML/MIN/1.73M2 — SIGNIFICANT CHANGE UP
EOSINOPHIL # BLD AUTO: 0.11 K/UL — SIGNIFICANT CHANGE UP (ref 0–0.5)
EOSINOPHIL NFR BLD AUTO: 0.8 % — SIGNIFICANT CHANGE UP (ref 0–6)
FERRITIN SERPL-MCNC: 52 NG/ML — SIGNIFICANT CHANGE UP (ref 30–400)
GLUCOSE SERPL-MCNC: 99 MG/DL — SIGNIFICANT CHANGE UP (ref 70–99)
HCT VFR BLD CALC: 21.4 % — LOW (ref 39–50)
HCT VFR BLD CALC: 26 % — LOW (ref 39–50)
HCT VFR BLD CALC: 26.5 % — LOW (ref 39–50)
HGB BLD-MCNC: 6.4 G/DL — CRITICAL LOW (ref 13–17)
HGB BLD-MCNC: 7.6 G/DL — LOW (ref 13–17)
HGB BLD-MCNC: 8 G/DL — LOW (ref 13–17)
IANC: 10.92 K/UL — HIGH (ref 1.8–7.4)
IMM GRANULOCYTES NFR BLD AUTO: 0.6 % — SIGNIFICANT CHANGE UP (ref 0–0.9)
IRON SATN MFR SERPL: 13 UG/DL — LOW (ref 45–165)
IRON SATN MFR SERPL: 5 % — LOW (ref 14–50)
LYMPHOCYTES # BLD AUTO: 1.98 K/UL — SIGNIFICANT CHANGE UP (ref 1–3.3)
LYMPHOCYTES # BLD AUTO: 13.8 % — SIGNIFICANT CHANGE UP (ref 13–44)
MAGNESIUM SERPL-MCNC: 2 MG/DL — SIGNIFICANT CHANGE UP (ref 1.6–2.6)
MCHC RBC-ENTMCNC: 24 PG — LOW (ref 27–34)
MCHC RBC-ENTMCNC: 24.1 PG — LOW (ref 27–34)
MCHC RBC-ENTMCNC: 24.3 PG — LOW (ref 27–34)
MCHC RBC-ENTMCNC: 29.2 G/DL — LOW (ref 32–36)
MCHC RBC-ENTMCNC: 29.9 G/DL — LOW (ref 32–36)
MCHC RBC-ENTMCNC: 30.2 G/DL — LOW (ref 32–36)
MCV RBC AUTO: 80.1 FL — SIGNIFICANT CHANGE UP (ref 80–100)
MCV RBC AUTO: 80.5 FL — SIGNIFICANT CHANGE UP (ref 80–100)
MCV RBC AUTO: 82.3 FL — SIGNIFICANT CHANGE UP (ref 80–100)
MONOCYTES # BLD AUTO: 1.23 K/UL — HIGH (ref 0–0.9)
MONOCYTES NFR BLD AUTO: 8.5 % — SIGNIFICANT CHANGE UP (ref 2–14)
NEUTROPHILS # BLD AUTO: 10.92 K/UL — HIGH (ref 1.8–7.4)
NEUTROPHILS NFR BLD AUTO: 75.9 % — SIGNIFICANT CHANGE UP (ref 43–77)
NRBC # BLD: 0 /100 WBCS — SIGNIFICANT CHANGE UP (ref 0–0)
NRBC # FLD: 0 K/UL — SIGNIFICANT CHANGE UP (ref 0–0)
PHOSPHATE SERPL-MCNC: 3.2 MG/DL — SIGNIFICANT CHANGE UP (ref 2.5–4.5)
PLATELET # BLD AUTO: 288 K/UL — SIGNIFICANT CHANGE UP (ref 150–400)
PLATELET # BLD AUTO: 329 K/UL — SIGNIFICANT CHANGE UP (ref 150–400)
PLATELET # BLD AUTO: 335 K/UL — SIGNIFICANT CHANGE UP (ref 150–400)
POTASSIUM SERPL-MCNC: 4.8 MMOL/L — SIGNIFICANT CHANGE UP (ref 3.5–5.3)
POTASSIUM SERPL-SCNC: 4.8 MMOL/L — SIGNIFICANT CHANGE UP (ref 3.5–5.3)
PROCALCITONIN SERPL-MCNC: 0.14 NG/ML — HIGH (ref 0.02–0.1)
PROT SERPL-MCNC: 5.7 G/DL — LOW (ref 6–8.3)
RBC # BLD: 2.67 M/UL — LOW (ref 4.2–5.8)
RBC # BLD: 3.16 M/UL — LOW (ref 4.2–5.8)
RBC # BLD: 3.29 M/UL — LOW (ref 4.2–5.8)
RBC # FLD: 20.6 % — HIGH (ref 10.3–14.5)
RBC # FLD: 20.7 % — HIGH (ref 10.3–14.5)
RBC # FLD: 21.2 % — HIGH (ref 10.3–14.5)
RH IG SCN BLD-IMP: POSITIVE — SIGNIFICANT CHANGE UP
SODIUM SERPL-SCNC: 133 MMOL/L — LOW (ref 135–145)
TIBC SERPL-MCNC: 241 UG/DL — SIGNIFICANT CHANGE UP (ref 220–430)
UIBC SERPL-MCNC: 228 UG/DL — SIGNIFICANT CHANGE UP (ref 110–370)
WBC # BLD: 14.39 K/UL — HIGH (ref 3.8–10.5)
WBC # BLD: 15.06 K/UL — HIGH (ref 3.8–10.5)
WBC # BLD: 16.49 K/UL — HIGH (ref 3.8–10.5)
WBC # FLD AUTO: 14.39 K/UL — HIGH (ref 3.8–10.5)
WBC # FLD AUTO: 15.06 K/UL — HIGH (ref 3.8–10.5)
WBC # FLD AUTO: 16.49 K/UL — HIGH (ref 3.8–10.5)

## 2024-11-11 PROCEDURE — 70450 CT HEAD/BRAIN W/O DYE: CPT | Mod: 26

## 2024-11-11 PROCEDURE — 99233 SBSQ HOSP IP/OBS HIGH 50: CPT

## 2024-11-11 RX ORDER — DIPHENHYDRAMINE HCL 12.5MG/5ML
25 ELIXIR ORAL ONCE
Refills: 0 | Status: COMPLETED | OUTPATIENT
Start: 2024-11-11 | End: 2024-11-11

## 2024-11-11 RX ORDER — ACETAMINOPHEN 500 MG
650 TABLET ORAL ONCE
Refills: 0 | Status: COMPLETED | OUTPATIENT
Start: 2024-11-11 | End: 2024-11-11

## 2024-11-11 RX ADMIN — Medication 0.4 MILLIGRAM(S): at 21:11

## 2024-11-11 RX ADMIN — Medication 650 MILLIGRAM(S): at 17:29

## 2024-11-11 RX ADMIN — PIPERACILLIN AND TAZOBACTAM 25 GRAM(S): .5; 4 INJECTION, POWDER, LYOPHILIZED, FOR SOLUTION INTRAVENOUS at 01:11

## 2024-11-11 RX ADMIN — PANTOPRAZOLE SODIUM 40 MILLIGRAM(S): 40 TABLET, DELAYED RELEASE ORAL at 17:30

## 2024-11-11 RX ADMIN — PIPERACILLIN AND TAZOBACTAM 25 GRAM(S): .5; 4 INJECTION, POWDER, LYOPHILIZED, FOR SOLUTION INTRAVENOUS at 17:29

## 2024-11-11 RX ADMIN — PIPERACILLIN AND TAZOBACTAM 25 GRAM(S): .5; 4 INJECTION, POWDER, LYOPHILIZED, FOR SOLUTION INTRAVENOUS at 09:01

## 2024-11-11 RX ADMIN — HEPARIN SODIUM 5000 UNIT(S): 10000 INJECTION INTRAVENOUS; SUBCUTANEOUS at 05:37

## 2024-11-11 RX ADMIN — PANTOPRAZOLE SODIUM 40 MILLIGRAM(S): 40 TABLET, DELAYED RELEASE ORAL at 05:37

## 2024-11-11 RX ADMIN — Medication 650 MILLIGRAM(S): at 05:37

## 2024-11-11 NOTE — PROVIDER CONTACT NOTE (CRITICAL VALUE NOTIFICATION) - DATE AND TIME:
11-Nov-2024 18:55 11-Nov-2024 19:00 bilateral shoulder flex, elbow flex WFL; left hip flex, knee ext, ankle df WFL: right hip flex 2/5, knee flex 2/5, ankle df 3+/5 Olumiant Counseling: I discussed with the patient the risks of Olumiant therapy including but not limited to upper respiratory tract infections, shingles, cold sores, and nausea. Live vaccines should be avoided.  This medication has been linked to serious infections; higher rate of mortality; malignancy and lymphoproliferative disorders; major adverse cardiovascular events; thrombosis; gastrointestinal perforations; neutropenia; lymphopenia; anemia; liver enzyme elevations; and lipid elevations.

## 2024-11-11 NOTE — PHYSICAL THERAPY INITIAL EVALUATION ADULT - PERTINENT HX OF CURRENT PROBLEM, REHAB EVAL
Patient is 59 year old gentleman with PMHx colon CA s/p colectomy and ileostomy bag placement with metastasis to bladder, L DVT s/p IVC filter due to anemia and hematuria not on AC, who presents after a pre-syncopal event while on airplane earlier today.

## 2024-11-11 NOTE — CHART NOTE - NSCHARTNOTEFT_GEN_A_CORE
Notified by RN, evening CBC resulted in Hg 6.4 ( drop from 8 earlier today) , pt with active hematuria, stat repeat CBC done -> Hg now 7.6, pt seen and assessed at  bedside, resting comfortable NAD noted, states he feels tired and weak, VS Stable at this time, SBP runs in low 100s. Patient reports multiple transfusions done in past as well, informed pt will transfuse 1U PRBC, consent obtained and placed in chart- case d/w Dr Hendrix, will c/t monitor.   Vital Signs Last 24 Hrs  T(C): 36.3 (11 Nov 2024 17:12), Max: 36.8 (11 Nov 2024 01:15)  T(F): 97.3 (11 Nov 2024 17:12), Max: 98.2 (11 Nov 2024 01:15)  HR: 80 (11 Nov 2024 17:50) (80 - 87)  BP: 100/56 (11 Nov 2024 17:50) (99/62 - 104/68)  RR: 18 (11 Nov 2024 17:50) (17 - 18)  SpO2: 100% (11 Nov 2024 17:50) (100% - 100%)

## 2024-11-11 NOTE — PROVIDER CONTACT NOTE (CRITICAL VALUE NOTIFICATION) - ACTION/TREATMENT ORDERED:
ACP notified and made aware. Type and screen, repeat CBC ordered. Provider coming to bedside to confirm consent

## 2024-11-12 ENCOUNTER — RESULT REVIEW (OUTPATIENT)
Age: 59
End: 2024-11-12

## 2024-11-12 LAB
ALBUMIN SERPL ELPH-MCNC: 2 G/DL — LOW (ref 3.3–5)
ALP SERPL-CCNC: 92 U/L — SIGNIFICANT CHANGE UP (ref 40–120)
ALT FLD-CCNC: 7 U/L — SIGNIFICANT CHANGE UP (ref 4–41)
ANION GAP SERPL CALC-SCNC: 11 MMOL/L — SIGNIFICANT CHANGE UP (ref 7–14)
AST SERPL-CCNC: 12 U/L — SIGNIFICANT CHANGE UP (ref 4–40)
BASOPHILS # BLD AUTO: 0.03 K/UL — SIGNIFICANT CHANGE UP (ref 0–0.2)
BASOPHILS NFR BLD AUTO: 0.2 % — SIGNIFICANT CHANGE UP (ref 0–2)
BILIRUB SERPL-MCNC: 1 MG/DL — SIGNIFICANT CHANGE UP (ref 0.2–1.2)
BUN SERPL-MCNC: 18 MG/DL — SIGNIFICANT CHANGE UP (ref 7–23)
CALCIUM SERPL-MCNC: 7.8 MG/DL — LOW (ref 8.4–10.5)
CHLORIDE SERPL-SCNC: 100 MMOL/L — SIGNIFICANT CHANGE UP (ref 98–107)
CO2 SERPL-SCNC: 17 MMOL/L — LOW (ref 22–31)
CREAT SERPL-MCNC: 1.27 MG/DL — SIGNIFICANT CHANGE UP (ref 0.5–1.3)
EGFR: 65 ML/MIN/1.73M2 — SIGNIFICANT CHANGE UP
EOSINOPHIL # BLD AUTO: 0.11 K/UL — SIGNIFICANT CHANGE UP (ref 0–0.5)
EOSINOPHIL NFR BLD AUTO: 0.8 % — SIGNIFICANT CHANGE UP (ref 0–6)
GLUCOSE SERPL-MCNC: 231 MG/DL — HIGH (ref 70–99)
HCT VFR BLD CALC: 26 % — LOW (ref 39–50)
HGB BLD-MCNC: 8.1 G/DL — LOW (ref 13–17)
IANC: 10.28 K/UL — HIGH (ref 1.8–7.4)
IMM GRANULOCYTES NFR BLD AUTO: 0.8 % — SIGNIFICANT CHANGE UP (ref 0–0.9)
LYMPHOCYTES # BLD AUTO: 1.78 K/UL — SIGNIFICANT CHANGE UP (ref 1–3.3)
LYMPHOCYTES # BLD AUTO: 13.2 % — SIGNIFICANT CHANGE UP (ref 13–44)
MAGNESIUM SERPL-MCNC: 1.6 MG/DL — SIGNIFICANT CHANGE UP (ref 1.6–2.6)
MCHC RBC-ENTMCNC: 25 PG — LOW (ref 27–34)
MCHC RBC-ENTMCNC: 31.2 G/DL — LOW (ref 32–36)
MCV RBC AUTO: 80.2 FL — SIGNIFICANT CHANGE UP (ref 80–100)
MONOCYTES # BLD AUTO: 1.16 K/UL — HIGH (ref 0–0.9)
MONOCYTES NFR BLD AUTO: 8.6 % — SIGNIFICANT CHANGE UP (ref 2–14)
NEUTROPHILS # BLD AUTO: 10.28 K/UL — HIGH (ref 1.8–7.4)
NEUTROPHILS NFR BLD AUTO: 76.4 % — SIGNIFICANT CHANGE UP (ref 43–77)
NRBC # BLD: 0 /100 WBCS — SIGNIFICANT CHANGE UP (ref 0–0)
NRBC # FLD: 0 K/UL — SIGNIFICANT CHANGE UP (ref 0–0)
PHOSPHATE SERPL-MCNC: 3.1 MG/DL — SIGNIFICANT CHANGE UP (ref 2.5–4.5)
PLATELET # BLD AUTO: 254 K/UL — SIGNIFICANT CHANGE UP (ref 150–400)
POTASSIUM SERPL-MCNC: 4.4 MMOL/L — SIGNIFICANT CHANGE UP (ref 3.5–5.3)
POTASSIUM SERPL-SCNC: 4.4 MMOL/L — SIGNIFICANT CHANGE UP (ref 3.5–5.3)
PROT SERPL-MCNC: 4.4 G/DL — LOW (ref 6–8.3)
RBC # BLD: 3.24 M/UL — LOW (ref 4.2–5.8)
RBC # FLD: 19.3 % — HIGH (ref 10.3–14.5)
SODIUM SERPL-SCNC: 128 MMOL/L — LOW (ref 135–145)
WBC # BLD: 13.47 K/UL — HIGH (ref 3.8–10.5)
WBC # FLD AUTO: 13.47 K/UL — HIGH (ref 3.8–10.5)

## 2024-11-12 PROCEDURE — 99233 SBSQ HOSP IP/OBS HIGH 50: CPT

## 2024-11-12 PROCEDURE — 93306 TTE W/DOPPLER COMPLETE: CPT | Mod: 26

## 2024-11-12 RX ORDER — MAGNESIUM SULFATE IN 0.9% NACL 2 G/50 ML
2 INTRAVENOUS SOLUTION, PIGGYBACK (ML) INTRAVENOUS ONCE
Refills: 0 | Status: COMPLETED | OUTPATIENT
Start: 2024-11-12 | End: 2024-11-12

## 2024-11-12 RX ORDER — CHLORHEXIDINE GLUCONATE 40 MG/ML
1 SOLUTION TOPICAL
Refills: 0 | Status: DISCONTINUED | OUTPATIENT
Start: 2024-11-12 | End: 2024-11-13

## 2024-11-12 RX ORDER — CEFPODOXIME PROXETIL 200 MG/1
200 TABLET, FILM COATED ORAL EVERY 12 HOURS
Refills: 0 | Status: DISCONTINUED | OUTPATIENT
Start: 2024-11-12 | End: 2024-11-13

## 2024-11-12 RX ORDER — SODIUM BICARBONATE 1 MEQ/ML
0.16 VIAL (ML) INTRAVENOUS
Qty: 75 | Refills: 0 | Status: DISCONTINUED | OUTPATIENT
Start: 2024-11-12 | End: 2024-11-13

## 2024-11-12 RX ORDER — SODIUM BICARBONATE 1 MEQ/ML
650 VIAL (ML) INTRAVENOUS THREE TIMES A DAY
Refills: 0 | Status: DISCONTINUED | OUTPATIENT
Start: 2024-11-12 | End: 2024-11-13

## 2024-11-12 RX ORDER — SODIUM CHLORIDE 9 MG/ML
1 INJECTION, SOLUTION INTRAMUSCULAR; INTRAVENOUS; SUBCUTANEOUS
Refills: 0 | Status: DISCONTINUED | OUTPATIENT
Start: 2024-11-12 | End: 2024-11-13

## 2024-11-12 RX ADMIN — Medication 0.4 MILLIGRAM(S): at 22:55

## 2024-11-12 RX ADMIN — Medication 25 GRAM(S): at 07:31

## 2024-11-12 RX ADMIN — CEFPODOXIME PROXETIL 200 MILLIGRAM(S): 200 TABLET, FILM COATED ORAL at 17:10

## 2024-11-12 RX ADMIN — SODIUM CHLORIDE 1 GRAM(S): 9 INJECTION, SOLUTION INTRAMUSCULAR; INTRAVENOUS; SUBCUTANEOUS at 07:31

## 2024-11-12 RX ADMIN — PANTOPRAZOLE SODIUM 40 MILLIGRAM(S): 40 TABLET, DELAYED RELEASE ORAL at 17:10

## 2024-11-12 RX ADMIN — Medication 650 MILLIGRAM(S): at 14:10

## 2024-11-12 RX ADMIN — PIPERACILLIN AND TAZOBACTAM 25 GRAM(S): .5; 4 INJECTION, POWDER, LYOPHILIZED, FOR SOLUTION INTRAVENOUS at 09:54

## 2024-11-12 RX ADMIN — Medication 650 MILLIGRAM(S): at 22:55

## 2024-11-12 RX ADMIN — Medication 650 MILLIGRAM(S): at 05:16

## 2024-11-12 RX ADMIN — CHLORHEXIDINE GLUCONATE 1 APPLICATION(S): 40 SOLUTION TOPICAL at 14:11

## 2024-11-12 RX ADMIN — SODIUM CHLORIDE 1 GRAM(S): 9 INJECTION, SOLUTION INTRAMUSCULAR; INTRAVENOUS; SUBCUTANEOUS at 17:11

## 2024-11-12 RX ADMIN — PIPERACILLIN AND TAZOBACTAM 25 GRAM(S): .5; 4 INJECTION, POWDER, LYOPHILIZED, FOR SOLUTION INTRAVENOUS at 01:05

## 2024-11-12 RX ADMIN — PANTOPRAZOLE SODIUM 40 MILLIGRAM(S): 40 TABLET, DELAYED RELEASE ORAL at 05:16

## 2024-11-12 RX ADMIN — Medication 100 MEQ/KG/HR: at 09:54

## 2024-11-12 NOTE — DIETITIAN INITIAL EVALUATION ADULT - OTHER INFO
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     Patient is currently ordered for a PO diet. Patient reports a good appetite and PO intake at meals during course of admission. Documented on RN flowsheet as consuming % of meals. Amenable to receive Ensure Plus High Protein supplementation to optimize nutrition status. Patient denies any chewing or swallowing difficulty with current diet order. No report of GI distress (nausea, vomiting, diarrhea, constipation). Patient with an ileostomy.    Writer provided verbal education regarding current diet order and nutrition recommendations for after discharge. Patient verbalized understanding to the discussion.

## 2024-11-12 NOTE — DIETITIAN INITIAL EVALUATION ADULT - ORAL INTAKE PTA/DIET HISTORY
Patient reports no known food allergies or food intolerances. Nutrition supplementation at home includes magnesium and vitamin B12. Patient endorses he maintains a good appetite at home, consuming three meals daily with no recent changes. Though, notes he continues to lose weight despite his good PO intake.

## 2024-11-12 NOTE — DIETITIAN NUTRITION RISK NOTIFICATION - ADDITIONAL COMMENTS/DIETITIAN RECOMMENDATIONS
Please see Dietitian Initial Assessment for complete recommendations.     Flaquita Sanchez MS RDN CDN  On [employer] Teams (Preferred), Pager #[phone]

## 2024-11-12 NOTE — PROGRESS NOTE ADULT - ASSESSMENT
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 He continued to have POD with scans in 3/2024 showing growth of the bladder dome mass with invasion of the Joseph pouch and chronic PV thrombosis with cavernous malformation. Pt was subsequently started on palliative FOLFOX in 6/2024, s/p C1 on 6/10, and pt has not resumed systemic cancer treatment since then given his overwhelming c/f experiencing chemo-related toxicities. His treatment course has also been c/b SIADH for which pt has been on salt tabs. Of note pt was hospitalized in 10/30-31 for mgmt of acute-on-chronic anemia a/w acute cystitis (Ucx negative, pt discharged on empiric Cipro).  Pt was readmitted to [facility] on 11/10 with pre-syncopal episode a/w septic shock (WBC 22.86, BP 60/40s) 2/2 E coli UTI. His admission labs were notable for chronic hyponatremia and hypomagnesemia.    ACTIVE PROBLEMS  Met colon adenoca  Sepsis 2/2 E coli UTI  Pre-syncope  Anemia of chronic disease, exacerbated by intermittent blood loss  Hematuria  h/o L iliofem DVT, PV thrombosis  Hypercoag sate  SIADH  CKD3  Hypomagnesemia  Severe prot irene malnutrition    Met colon adenoca (with bladder met)  10/30 CT a/p demonstrates POD (growing bladder mass a/w worsening L hydro, worsening LN)  Pt has only had 1 cycle of palliative chemo, overdue for C2 mFOLFOX with plan for addition of Cetuximab (pt is Kras WT)- he is very apprehensive about resuming systemic cancer tx due to toxicity risks  Pt to continue outpt fu with Dr. Palafox after dc to discuss plans for resuming palliative chemo  Pt to also continue outpt fu with Rad Onc to discuss plan for palliative RT to bladder mass  Long term prognosis: guarded; pt is full code    Sepsis 2/2 E coli UTI  Pt remains afebrile, BPs improved/normotensive  Continuing empiric Zosyn; projected tx duration: 7 days  Will fu cx sensitivities and tailor abx accordingly    Pre-syncope  Likely developed i/s/o septic shock- symptoms now resolved  11/10 [facility]CT normal  TTE still pending  Treating infection as above  Pt is ambulatory; appreciate PT eval: no skilled needs    Anemia of chronic disease, exacerbated by intermittent blood loss  h/o Malignant hematuria  Pt s/p 1u prbc transfusion for symptomatic anemia (pm cbc showed Hgb 6.4 > 7.6 on stat repeat cbc, without intervention)  Hgb improved to 8.1 today and pt indicated that hematuria is resolving  Trending daily cbcs  Continuing supportive transfusions prn (goal hgb > 7; plts > 10k or > 50K if pt has s/s of active bleeding)    h/o L iliofem DVT, PV thrombosis  Hypercoag sate  Pt is a poor candidate for pharm AC due to bleeding risks  S/p IVC filter in 7/2024    SIADH  CKD3  Na, CO2 downtrending and Cr uptrending slightly today  Starting Salt tabs 1gm BID  Increasing NaHCO3 tabs 650mg BID > TID  1L Na bicarb infusion at 100cc/hr ordered today  Will fu pm bmp  Monitoring UOP  Avoiding nephrotoxins    Severe protein malnutrition  Hypomag  Pt with poor po intake/generalized muscle wasting in the setting of progressing malignancy  Continuing lyte repletion prn (goal K 4, Phos 3, Mag 2)  RD eval pending
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 He continued to have POD with scans in 3/2024 showing growth of the bladder dome mass with invasion of the Joseph pouch and chronic PV thrombosis with cavernous malformation. Pt was subsequently started on palliative FOLFOX in 6/2024, s/p C1 on 6/10, and pt has not resumed systemic cancer treatment since then given his overwhelming c/f experiencing chemo-related toxicities. His treatment course has also been c/b SIADH for which pt has been on salt tabs. Of note pt was hospitalized in 10/30-31 for mgmt of acute-on-chronic anemia a/w acute cystitis (Ucx negative, pt discharged on empiric Cipro).  Pt was readmitted to [facility] on 11/10 with pre-syncopal episode a/w septic shock (WBC 22.86, BP 60/40s) 2/2 E coli UTI. His admission labs were notable for chronic hyponatremia and hypomagnesemia.    ACTIVE PROBLEMS  Met colon adenoca  Sepsis 2/2 E coli UTI  Pre-syncope  h/o L iliofem DVT, PV thrombosis  Hypercoag sate  SIADH  CKD3  Hypomagnesemia  Severe prot irene malnutrition    Met colon adenoca  10/30 CT a/p demonstrates POD (growing bladder mass a/w worsening L hydro, worsening LN)  Pt has only had 1 cycle of palliative chemo, overdue for C2 mFOLFOX with plan for addition of Cetuximab (pt is Kras WT)- he is very apprehensive about resuming systemic cancer tx due to toxicity risks  Pt to continue outpt fu with Dr. Palafox after dc to discuss plans for resuming palliative chemo  Pt to also continue outpt fu with Rad Onc to discuss plan for palliative RT to bladder mass  Long term prognosis: guarded; pt is full code    Sepsis 2/2 E coli UTI  Pt remains afebrile, BPs improved/normotensive  Continuing empiric Zosyn; projected tx duration: 7 days  Will fu cx sensitivities and tailor abx accordingly    Pre-syncope  Likely developed i/s/o septic shock- symptoms now resolved  11/10 [facility]CT normal  TTE pending  Treating infection as above  Pt is ambulatory, PT eval pending    h/o L iliofem DVT, PV thrombosis  Hypercoag sate  Pt is a poor candidate for pharm AC due to bleeding risks  S/p IVC filter in 7/2024  Will continue to monitor    SIADH  CKD3  Na and Cr stable, within pt's baseline range  Co2 improving  Continuing NaHCO3 tabs 650mg BID  Monitoring UOP  Avoiding nephrotoxins    Hypomag  Severe protein malnutrition  Pt with poor po intake/generalized muscle wasting in the setting of progressing malignancy  Continuing lyte repletion prn (goal K 4, Phos 3, Mag 2)  RD eval pending

## 2024-11-12 NOTE — DIETITIAN INITIAL EVALUATION ADULT - PERTINENT MEDS FT
MEDICATIONS  (STANDING):  cefpodoxime 200 milliGRAM(s) Oral every 12 hours  chlorhexidine 2% Cloths 1 Application(s) Topical <User Schedule>  pantoprazole    Tablet 40 milliGRAM(s) Oral two times a day  sodium bicarbonate 650 milliGRAM(s) Oral three times a day  sodium bicarbonate  Infusion 0.162 mEq/kG/Hr (100 mL/Hr) IV Continuous <Continuous>  sodium chloride 1 Gram(s) Oral two times a day  tamsulosin 0.4 milliGRAM(s) Oral at bedtime    MEDICATIONS  (PRN):  aluminum hydroxide/magnesium hydroxide/simethicone Suspension 30 milliLiter(s) Oral every 4 hours PRN Dyspepsia  melatonin 3 milliGRAM(s) Oral at bedtime PRN Insomnia  ondansetron Injectable 4 milliGRAM(s) IV Push every 8 hours PRN Nausea and/or Vomiting

## 2024-11-12 NOTE — DIETITIAN INITIAL EVALUATION ADULT - OTHER CALCULATIONS
IBW: 148 lb +/- 10%, %IBW 69%  Per Benigno DALTON, noted the following weight history: 46.3kg (11/10), 52kg (8/7)  Objective weight measurements suggest 5.7kg (11%) weight loss x3 months period of time (significant)

## 2024-11-12 NOTE — PROGRESS NOTE ADULT - SUBJECTIVE AND OBJECTIVE BOX
SOLID TUMOR ONCOLOGY HOSPITALIST PROGRESS NOTE    S: Pt transfused 1u prbc overnight for symptomatic anemia i/s/o intermittent hematuria (hgb 6.4 > 7.6 on stat repeat, without intervention).  This morning, pt had no new complaints.  He denied chest pain, dyspnea, weakness; he also reported that his urine was clearing up this morning.    CURRENT MEDICATIONS  MEDICATIONS  (STANDING):  pantoprazole    Tablet 40 milliGRAM(s) Oral two times a day  piperacillin/tazobactam IVPB.. 3.375 Gram(s) IV Intermittent every 8 hours  sodium bicarbonate 650 milliGRAM(s) Oral three times a day  sodium bicarbonate  Infusion 0.162 mEq/kG/Hr (100 mL/Hr) IV Continuous <Continuous>  sodium chloride 1 Gram(s) Oral two times a day  tamsulosin 0.4 milliGRAM(s) Oral at bedtime  MEDICATIONS  (PRN):  aluminum hydroxide/magnesium hydroxide/simethicone Suspension 30 milliLiter(s) Oral every 4 hours PRN Dyspepsia  melatonin 3 milliGRAM(s) Oral at bedtime PRN Insomnia  ondansetron Injectable 4 milliGRAM(s) IV Push every 8 hours PRN Nausea and/or Vomiting    PHYSICAL EXAM  T(C): 36.4 (11-12-24 @ 06:25), Max: 36.9 (11-11-24 @ 22:35)  HR: 70 (11-12-24 @ 06:25) (70 - 87)  BP: 99/58 (11-12-24 @ 06:25) (94/62 - 104/68)  RR: 17 (11-12-24 @ 06:25) (17 - 18)  SpO2: 100% (11-12-24 @ 06:25) (100% - 100%)  Cachectic, non-toxic-appearing middle-aged male, sitting up in bed, appears comfortable  Aaox3, answering all questions appropriately and following commands  Anicteric sclera, no oral lesions/thrush  RRR, no m/r/g  CTAB, no w/c/r  ABd soft/nt/nd, hypoactive bowel sounds; ostomy in tact, bag continuing liquid brown fecal matter  Trace non-pitting edema of the RLE is present; LE skin is dry, hyperpigmented  CN 2-12 grossly intact; no gross focal neuro deficits; pt moves all extremities spontaneously    LABS                        8.1    13.47 )-----------( 254      ( 12 Nov 2024 03:24 )             26.0     11-12    128[L]  |  100  |  18  ----------------------------<  231[H]  4.4   |  17[L]  |  1.27    Ca    7.8[L]      12 Nov 2024 03:24  Phos  3.1     11-12  Mg     1.60     11-12    TPro  4.4[L]  /  Alb  2.0[L]  /  TBili  1.0  /  DBili  x   /  AST  12  /  ALT  7   /  AlkPhos  92  11-12      MICROBIOLOGY  Procal 0.14    Abx  Zosyn 11/10-present  $CTX x 1 dose    Cx Data  11/10 Ucx: +ve for > 100K E coli (sensitivities pending)  11/10 UA:  Turbid, Orange, >1000 Protein, Large blood, Large Leuk est, + Nitrite      PERTINENT RADIOLOGY  11/12 TTE pending.  11/11 CTH w/o: No acute intracranial hemorrhage, mass effect, or shift of   the midline structures.  11/10 CXR: Clear lungs.  10/30 CT a/p: Bladder mass measuring 8.3 x 8.1 cm previously 7.2 x 7.0 cm. A mass   superior to the bladder dome and adjacent to the Joseph's pouch has also   increased in size. Worsening lymphadenopathy. Probable deep vein thrombosis in the right external iliac and femoral veins with extension into the superficial greater saphenous vein.   Extension into the distal inferior vena cava. Worsening severe left hydronephrosis.  
SOLID TUMOR ONCOLOGY HOSPITALIST PROGRESS NOTE    S: No acute events overnight.  Pt had no new complaints currently.  He indicated that he feels better compared to when he first got admitted to the hospital.  He denied pain and indicated that he continues to be self-sufficient in managing his ostomy.  He also reiterated that he is still apprehensive about receiving any futures systemic cancer treatment because he tolerated the first cycle back in 6/2024 very poorly, although he intends to keep his appt with Dr. Palafox next week to discuss this further. However, how noted that he would still like to proceed with palliative RT to the bladder mass.    CURRENT MEDICATIONS  MEDICATIONS  (STANDING):  pantoprazole    Tablet 40 milliGRAM(s) Oral two times a day  piperacillin/tazobactam IVPB.. 3.375 Gram(s) IV Intermittent every 8 hours  sodium bicarbonate 650 milliGRAM(s) Oral two times a day  tamsulosin 0.4 milliGRAM(s) Oral at bedtime  MEDICATIONS  (PRN):  aluminum hydroxide/magnesium hydroxide/simethicone Suspension 30 milliLiter(s) Oral every 4 hours PRN Dyspepsia  melatonin 3 milliGRAM(s) Oral at bedtime PRN Insomnia  ondansetron Injectable 4 milliGRAM(s) IV Push every 8 hours PRN Nausea and/or Vomiting      PHYSICAL EXAM  T(C): 36.5 (11-11-24 @ 05:15), Max: 36.8 (11-10-24 @ 17:12)  HR: 82 (11-11-24 @ 05:15) (80 - 88)  BP: 101/66 (11-11-24 @ 05:15) (98/63 - 114/74)  RR: 18 (11-11-24 @ 05:15) (15 - 18)  SpO2: 100% (11-11-24 @ 05:15) (98% - 100%)  Cachectic, non-toxic-appearing middle-aged male, sitting up in bed, appears comfortable  Aaox3, answering all questions appropriately and following commands  Anicteric sclera, no oral lesions/thrush  RRR, no m/r/g  CTAB, no w/c/r  ABd soft/nt/nd, hypoactive bowel sounds; ostomy in tact, bag continuing liquid brown fecal matter  Trace non-pitting edema of the RLE is present; LE skin is dry, hyperpigmented  CN 2-12 grossly intact; no gross focal neuro deficits; pt moves all extremities spontaneously    LABS                        8.0    14.39 )-----------( 288      ( 11 Nov 2024 04:50 )             26.5     11-11    133[L]  |  102  |  20  ----------------------------<  99  4.8   |  18[L]  |  1.16    Ca    8.3[L]      11 Nov 2024 04:50  Phos  3.2     11-11  Mg     2.00     11-11    TPro  5.7[L]  /  Alb  2.6[L]  /  TBili  0.3  /  DBili  x   /  AST  24  /  ALT  13  /  AlkPhos  126[H]  11-11      MICROBIOLOGY  Procal 0.14    Abx  Zosyn 11/10-present  $CTX x 1 dose    Cx Data  11/10 Ucx: +ve for E coli (sensitivities pending)  11/10 UA:  Turbid, Orange, >1000 Protein, Large blood, Large Leuk est, + Nitrite      PERTINENT RADIOLOGY  11/11 CTH w/o: No acute intracranial hemorrhage, mass effect, or shift of   the midline structures.  11/10 CXR: Clear lungs.  10/30 CT a/p: Bladder mass measuring 8.3 x 8.1 cm previously 7.2 x 7.0 cm. A mass   superior to the bladder dome and adjacent to the Joseph's pouch has also   increased in size. Worsening lymphadenopathy. Probable deep vein thrombosis in the right external iliac and femoral veins with extension into the superficial greater saphenous vein.   Extension into the distal inferior vena cava. Worsening severe left hydronephrosis.

## 2024-11-12 NOTE — DIETITIAN INITIAL EVALUATION ADULT - PERTINENT LABORATORY DATA
11-12    128[L]  |  100  |  18  ----------------------------<  231[H]  4.4   |  17[L]  |  1.27    Ca    7.8[L]      12 Nov 2024 03:24  Phos  3.1     11-12  Mg     1.60     11-12    TPro  4.4[L]  /  Alb  2.0[L]  /  TBili  1.0  /  DBili  x   /  AST  12  /  ALT  7   /  AlkPhos  92  11-12

## 2024-11-12 NOTE — DIETITIAN INITIAL EVALUATION ADULT - ORAL NUTRITION SUPPLEMENTS
Recommend Ensure Plus High Protein (provides 350kcal, 20gms protein per serving) BID to support nutrition status

## 2024-11-12 NOTE — DIETITIAN INITIAL EVALUATION ADULT - PROBLEM SELECTOR PROBLEM 3
Caller: Nelson Fong    Relationship: Self    Best call back number: [phone]    Requested Prescriptions:   Requested Prescriptions     Pending Prescriptions Disp Refills   • rosuvastatin (CRESTOR) 10 MG tablet 90 tablet 0     Sig: Take 1 tablet by mouth Daily.   • metoprolol succinate XL (TOPROL-XL) 50 MG 24 hr tablet 90 tablet 0     Sig: Take 1 tablet by mouth Daily.   • ezetimibe (ZETIA) 10 MG tablet 90 tablet 0     Sig: Take 1 tablet by mouth Daily.   • amLODIPine-valsartan (EXFORGE) 5-320 MG per tablet 90 tablet 0     Sig: Take 1 tablet by mouth Daily.        Pharmacy where request should be sent: [facility] [address] [place] [address] [phone] [facility] [phone] FX     Additional details provided by patient: THE LAST REFILL REQUEST WAS SENT TO THE WRONG PHARMACY, IT'S SUPPOSED TO GO TO [facility].    Does the patient have less than a 3 day supply:  [x] Yes  [] No    Would you like a call back once the refill request has been completed: [x] Yes [] No    If the office needs to give you a call back, can they leave a voicemail: [x] Yes [] No    Leah Gunderson Rep   12/01/22 13:55 EST   Chronic anemia

## 2024-11-12 NOTE — PROGRESS NOTE ADULT - TIME BILLING
20 mins-Vitals, meds, new results/radiology, interdisciplinary charting reviewed   20 mins-Patient seen and examined and plan discussed, all questions were answered to the best of my ability  20 mins-Charting/documentation and orders.
20 mins-Vitals, meds, new results/radiology, interdisciplinary charting reviewed   20 mins-Patient seen and examined and plan discussed, all questions were answered to the best of my ability  20 mins-Charting/documentation and orders.

## 2024-11-13 ENCOUNTER — TRANSCRIPTION ENCOUNTER (OUTPATIENT)
Age: 59
End: 2024-11-13

## 2024-11-13 VITALS
RESPIRATION RATE: 18 BRPM | HEART RATE: 89 BPM | SYSTOLIC BLOOD PRESSURE: 106 MMHG | OXYGEN SATURATION: 98 % | DIASTOLIC BLOOD PRESSURE: 69 MMHG | TEMPERATURE: 98 F

## 2024-11-13 LAB
ALBUMIN SERPL ELPH-MCNC: 2.1 G/DL — LOW (ref 3.3–5)
ALP SERPL-CCNC: 95 U/L — SIGNIFICANT CHANGE UP (ref 40–120)
ALT FLD-CCNC: 8 U/L — SIGNIFICANT CHANGE UP (ref 4–41)
ANION GAP SERPL CALC-SCNC: 9 MMOL/L — SIGNIFICANT CHANGE UP (ref 7–14)
AST SERPL-CCNC: 14 U/L — SIGNIFICANT CHANGE UP (ref 4–40)
BASOPHILS # BLD AUTO: 0.03 K/UL — SIGNIFICANT CHANGE UP (ref 0–0.2)
BASOPHILS NFR BLD AUTO: 0.2 % — SIGNIFICANT CHANGE UP (ref 0–2)
BILIRUB SERPL-MCNC: 0.4 MG/DL — SIGNIFICANT CHANGE UP (ref 0.2–1.2)
BUN SERPL-MCNC: 19 MG/DL — SIGNIFICANT CHANGE UP (ref 7–23)
CALCIUM SERPL-MCNC: 8.1 MG/DL — LOW (ref 8.4–10.5)
CHLORIDE SERPL-SCNC: 106 MMOL/L — SIGNIFICANT CHANGE UP (ref 98–107)
CO2 SERPL-SCNC: 19 MMOL/L — LOW (ref 22–31)
CREAT SERPL-MCNC: 1.26 MG/DL — SIGNIFICANT CHANGE UP (ref 0.5–1.3)
EGFR: 65 ML/MIN/1.73M2 — SIGNIFICANT CHANGE UP
EOSINOPHIL # BLD AUTO: 0.06 K/UL — SIGNIFICANT CHANGE UP (ref 0–0.5)
EOSINOPHIL NFR BLD AUTO: 0.4 % — SIGNIFICANT CHANGE UP (ref 0–6)
GLUCOSE SERPL-MCNC: 97 MG/DL — SIGNIFICANT CHANGE UP (ref 70–99)
HCT VFR BLD CALC: 27.2 % — LOW (ref 39–50)
HGB BLD-MCNC: 8.4 G/DL — LOW (ref 13–17)
IANC: 10.92 K/UL — HIGH (ref 1.8–7.4)
IMM GRANULOCYTES NFR BLD AUTO: 1.2 % — HIGH (ref 0–0.9)
LYMPHOCYTES # BLD AUTO: 1.39 K/UL — SIGNIFICANT CHANGE UP (ref 1–3.3)
LYMPHOCYTES # BLD AUTO: 10.2 % — LOW (ref 13–44)
MAGNESIUM SERPL-MCNC: 1.7 MG/DL — SIGNIFICANT CHANGE UP (ref 1.6–2.6)
MCHC RBC-ENTMCNC: 25.1 PG — LOW (ref 27–34)
MCHC RBC-ENTMCNC: 30.9 G/DL — LOW (ref 32–36)
MCV RBC AUTO: 81.2 FL — SIGNIFICANT CHANGE UP (ref 80–100)
MONOCYTES # BLD AUTO: 1.01 K/UL — HIGH (ref 0–0.9)
MONOCYTES NFR BLD AUTO: 7.4 % — SIGNIFICANT CHANGE UP (ref 2–14)
MRSA PCR RESULT.: SIGNIFICANT CHANGE UP
NEUTROPHILS # BLD AUTO: 10.92 K/UL — HIGH (ref 1.8–7.4)
NEUTROPHILS NFR BLD AUTO: 80.6 % — HIGH (ref 43–77)
NRBC # BLD: 0 /100 WBCS — SIGNIFICANT CHANGE UP (ref 0–0)
NRBC # FLD: 0 K/UL — SIGNIFICANT CHANGE UP (ref 0–0)
PHOSPHATE SERPL-MCNC: 2.8 MG/DL — SIGNIFICANT CHANGE UP (ref 2.5–4.5)
PLATELET # BLD AUTO: 270 K/UL — SIGNIFICANT CHANGE UP (ref 150–400)
POTASSIUM SERPL-MCNC: 4.6 MMOL/L — SIGNIFICANT CHANGE UP (ref 3.5–5.3)
POTASSIUM SERPL-SCNC: 4.6 MMOL/L — SIGNIFICANT CHANGE UP (ref 3.5–5.3)
PROT SERPL-MCNC: 4.8 G/DL — LOW (ref 6–8.3)
RBC # BLD: 3.35 M/UL — LOW (ref 4.2–5.8)
RBC # FLD: 19.5 % — HIGH (ref 10.3–14.5)
S AUREUS DNA NOSE QL NAA+PROBE: SIGNIFICANT CHANGE UP
SODIUM SERPL-SCNC: 134 MMOL/L — LOW (ref 135–145)
WBC # BLD: 13.57 K/UL — HIGH (ref 3.8–10.5)
WBC # FLD AUTO: 13.57 K/UL — HIGH (ref 3.8–10.5)

## 2024-11-13 PROCEDURE — 99239 HOSP IP/OBS DSCHRG MGMT >30: CPT

## 2024-11-13 RX ORDER — SODIUM BICARBONATE 1 MEQ/ML
1 VIAL (ML) INTRAVENOUS
Qty: 90 | Refills: 0
Start: 2024-11-13 | End: 2024-12-12

## 2024-11-13 RX ORDER — MAGNESIUM SULFATE IN 0.9% NACL 2 G/50 ML
2 INTRAVENOUS SOLUTION, PIGGYBACK (ML) INTRAVENOUS ONCE
Refills: 0 | Status: COMPLETED | OUTPATIENT
Start: 2024-11-13 | End: 2024-11-13

## 2024-11-13 RX ORDER — CEFPODOXIME PROXETIL 200 MG/1
1 TABLET, FILM COATED ORAL
Qty: 8 | Refills: 0
Start: 2024-11-13 | End: 2024-11-16

## 2024-11-13 RX ORDER — SODIUM CHLORIDE 9 MG/ML
1 INJECTION, SOLUTION INTRAMUSCULAR; INTRAVENOUS; SUBCUTANEOUS
Qty: 0 | Refills: 0 | DISCHARGE
Start: 2024-11-13

## 2024-11-13 RX ORDER — SODIUM BICARBONATE 84 MG/ML
2 INJECTION, SOLUTION INTRAVENOUS
Qty: 90 | Refills: 0 | DISCHARGE
Start: 2024-11-13 | End: 2024-12-12

## 2024-11-13 RX ADMIN — PANTOPRAZOLE SODIUM 40 MILLIGRAM(S): 40 TABLET, DELAYED RELEASE ORAL at 05:33

## 2024-11-13 RX ADMIN — CHLORHEXIDINE GLUCONATE 1 APPLICATION(S): 40 SOLUTION TOPICAL at 05:36

## 2024-11-13 RX ADMIN — Medication 25 GRAM(S): at 08:13

## 2024-11-13 RX ADMIN — SODIUM CHLORIDE 1 GRAM(S): 9 INJECTION, SOLUTION INTRAMUSCULAR; INTRAVENOUS; SUBCUTANEOUS at 05:33

## 2024-11-13 RX ADMIN — Medication 650 MILLIGRAM(S): at 05:33

## 2024-11-13 RX ADMIN — CEFPODOXIME PROXETIL 200 MILLIGRAM(S): 200 TABLET, FILM COATED ORAL at 05:33

## 2024-11-13 NOTE — DISCHARGE NOTE PROVIDER - HOSPITAL COURSE
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 CTH unremarkable, echo unremarkable. Of note, pt recently hospitalized w/ UTI s/p Cipro. Found + E. Coli UTI on Zosyn, narrowed to Cefpodoxime for total of 7 days course. C/c/b hematuria which resolved without intervention, s/p 1U PRBC.    ---HEME/ONC---  #Met. L sided colon adenoca s/p colectomy/ileostomy  - S/p C1 FOLFOX 6/10/2024, has not been on systemic therapies since  - CT a/p 10/30 noted POD  - Plan for palliative RT to bladder mass  - Overall plan per Dr. Palafox    #Anemia  - Stable throughout  - Anemia workup c/w AOCD. PT also w intermittent hematuria i/s/o bladder mets  - S/p 1U PRBC 11/12, bumped appropriately    ---CV/ID---  #Near syncope episode  #E. Coli UTI  - Likely in the setting of active infection and dehydration w/ the pressure change in the plane  - Improved with IVF  - Procal 0.14  - Dirty UA, found + E. Coli, empirically on Zosyn, narrowed to Cefpodoxime for total of 7 days course until 11/16   - CTH w/o normal  - Echo nl EF, unremarkable    ---RENAL---  #hyponatremia  #Metabolic acidosis  - Was on Salt tabs, restarted 1g BID  - On Fluid restriction  - Increased Sodium Bicarb to TID 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; Kras WT; disease course c/b malignant LBO s/p subtotal colectomy followed by ileostomy on 9/12/22, path: [id number], +ve margin, 0/11 LNs+  (post-op course c/b high ileostomy output requring hospitalization for hydration, and SMV, L iliac/radial/ulnar vein thrombosis for which he took Eliquis for 6 months); 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 He continued to have POD with scans in 3/2024 showing growth of the bladder dome mass with invasion of the Joseph pouch and chronic PV thrombosis with cavernous malformation. Pt was subsequently started on palliative FOLFOX in 6/2024, s/p C1 on 6/10, and pt has not resumed systemic cancer treatment since then given his overwhelming c/f experiencing chemo-related toxicities. His treatment course has also been c/b SIADH for which pt has been on salt tabs. Of note pt was hospitalized in 10/30-31 for mgmt of acute-on-chronic anemia a/w acute cystitis (Ucx negative, pt discharged on empiric Cipro).  Pt was readmitted to [facility] on 11/10 with pre-syncopal episode a/w septic shock (WBC 22.86, BP 60/40s); admission UA abnormal. CTH unremarkable, echo unremarkable. Of note, pt recently hospitalized w/ UTI s/p Cipro. Found + E. Coli UTI on Zosyn, narrowed to Cefpodoxime for total of 7 days course. C/c/b hematuria which resolved without intervention, s/p 1U PRBC.    ---HEME/ONC---  #Met. L sided colon adenoca s/p colectomy/ileostomy  - S/p C1 FOLFOX 6/10/2024, has not been on systemic therapies since  - CT a/p 10/30 noted POD  - Plan for palliative RT to bladder mass  - Overall plan per Dr. Palafox    #Anemia  - Stable throughout  - Anemia workup c/w AOCD. PT also w intermittent hematuria i/s/o bladder mets  - S/p 1U PRBC 11/12, bumped appropriately    ---CV/ID---  #Near syncope episode  #E. Coli UTI  - Likely in the setting of active infection and dehydration w/ the pressure change in the plane  - Improved with IVF  - Procal 0.14  - Dirty UA, found + E. Coli, empirically on Zosyn, narrowed to Cefpodoxime for total of 7 days course until 11/16   - CT w/o normal  - Echo nl EF, unremarkable    ---RENAL---  #hyponatremia  #Metabolic acidosis  - Was on Salt tabs, restarted 1g BID  - On Fluid restriction  - Increased Sodium Bicarb to TID

## 2024-11-13 NOTE — DISCHARGE NOTE NURSING/CASE MANAGEMENT/SOCIAL WORK - FINANCIAL ASSISTANCE
[facility] provides services at a reduced cost to those who are determined to be eligible through [facility]’s financial assistance program. Information regarding [facility]’s financial assistance program can be found by going to [email or web address] or by calling [phone].

## 2024-11-13 NOTE — DISCHARGE NOTE PROVIDER - NSDCMRMEDTOKEN_GEN_ALL_CORE_FT
cefpodoxime 200 mg oral tablet: 1 tab(s) orally every 12 hours  pantoprazole 40 mg oral delayed release tablet: 1 tab(s) orally 2 times a day  sodium bicarbonate 650 mg oral tablet: 1 tab(s) orally 3 times a day  sodium chloride 1 g oral tablet: 1 tab(s) orally 2 times a day  tamsulosin 0.4 mg oral capsule: 1 cap(s) orally once a day (at bedtime)

## 2024-11-13 NOTE — DISCHARGE NOTE NURSING/CASE MANAGEMENT/SOCIAL WORK - PATIENT PORTAL LINK FT
You can access the FollowMyHealth Patient Portal offered by [facility] by registering at the following website: [email or web address]. By joining [employer] ([employer]’s FollowMyHealth portal, you will also be able to view your health information using other applications (apps) compatible with our system.

## 2024-11-13 NOTE — DISCHARGE NOTE PROVIDER - CARE PROVIDER_API CALL
Dayton Palafox  Medical Oncology  [address] [place], [place] [id number]  Phone: [phone]  Fax: [phone]  Follow Up Time:

## 2024-11-13 NOTE — DISCHARGE NOTE PROVIDER - NSDCFUSCHEDAPPT_GEN_ALL_CORE_FT
Dayton Palafox  [facility]  [facility]  Scheduled Appointment: 11/19/2024    [facility]  UROLOGY [address] [place] [address] R  Scheduled Appointment: 02/03/2025    Brayan Nino  [facility]  UROLOGY [address] [place] [address] R  Scheduled Appointment: 02/03/2025

## 2024-11-13 NOTE — DISCHARGE NOTE PROVIDER - NSDCCPCAREPLAN_GEN_ALL_CORE_FT
PRINCIPAL DISCHARGE DIAGNOSIS  Diagnosis: Sepsis secondary to UTI  Assessment and Plan of Treatment: You were found to have a urinary tract infection due to E. Coli. You're on antibiotics.      SECONDARY DISCHARGE DIAGNOSES  Diagnosis: Primary colon cancer with metastasis to other site  Assessment and Plan of Treatment: Continue outpatient follow up with Dr. Palafox    Diagnosis: Acute UTI  Assessment and Plan of Treatment: You were found to have a urinary tract infection due to E. Coli. You're on antibiotics.    Diagnosis: Near syncope  Assessment and Plan of Treatment: You had a near faint experience while airborne. Your CT of the head was normal, but you were found to have a urinary tract infection. You were started on Zosyn and transitioned to oral cefpodoxime. Continue antibiotics until 11/16.    Diagnosis: Chronic hyponatremia  Assessment and Plan of Treatment: You were restarted on salt tabs. Take 1g twice a day.    Diagnosis: Low bicarbonate level  Assessment and Plan of Treatment: Your sodium bicarb tablets were increased to 3 times a day.

## 2024-11-13 NOTE — DISCHARGE NOTE PROVIDER - DETAILS OF MALNUTRITION DIAGNOSIS/DIAGNOSES
This patient has been assessed with a concern for Malnutrition and was treated during this hospitalization for the following Nutrition diagnosis/diagnoses:     -  11/12/2024: Severe protein-calorie malnutrition   -  11/12/2024: Underweight (BMI < 19)

## 2024-11-13 NOTE — DISCHARGE NOTE PROVIDER - ATTENDING DISCHARGE PHYSICAL EXAMINATION:
Cachectic, non-toxic-appearing middle-aged male, sitting up in bed, appears comfortable  Aaox3, answering all questions appropriately and following commands  Anicteric sclera, no oral lesions/thrush  RRR, no m/r/g  CTAB, no w/c/r  ABd soft/nt/nd, hypoactive bowel sounds; ostomy in tact, bag continuing liquid brown fecal matter  Trace non-pitting edema of the RLE is present; LE skin is dry, hyperpigmented  CN 2-12 grossly intact; no gross focal neuro deficits; pt moves all extremities spontaneously Vital Signs Last 24 Hrs  T(C): 36.4 (13 Nov 2024 06:36), Max: 36.7 (12 Nov 2024 12:15)  T(F): 97.5 (13 Nov 2024 06:36), Max: 98.1 (12 Nov 2024 12:15)  HR: 78 (13 Nov 2024 06:36) (78 - 93)  BP: 101/71 (13 Nov 2024 06:36) (100/69 - 105/75)  RR: 17 (13 Nov 2024 06:36) (17 - 18)  SpO2: 100% (13 Nov 2024 06:36) (100% - 100%)  Parameters below as of 13 Nov 2024 06:36  Patient On (Oxygen Delivery Method): room air  Cachectic, non-toxic-appearing middle-aged male, sitting up in bed, appears comfortable  Aaox3, answering all questions appropriately and following commands  Anicteric sclera, no oral lesions/thrush  RRR, no m/r/g  CTAB, no w/c/r  ABd soft/nt/nd, hypoactive bowel sounds; ostomy in tact, bag continuing liquid brown fecal matter  Trace non-pitting edema of the RLE is present; LE skin is dry, hyperpigmented  CN 2-12 grossly intact; no gross focal neuro deficits; pt moves all extremities spontaneously

## 2024-11-19 ENCOUNTER — RESULT REVIEW (OUTPATIENT)
Age: 59
End: 2024-11-19

## 2024-11-19 ENCOUNTER — APPOINTMENT (OUTPATIENT)
Dept: HEMATOLOGY ONCOLOGY | Facility: CLINIC | Age: 59
End: 2024-11-19
Payer: COMMERCIAL

## 2024-11-19 ENCOUNTER — APPOINTMENT (OUTPATIENT)
Dept: RADIATION ONCOLOGY | Facility: CLINIC | Age: 59
End: 2024-11-19
Payer: COMMERCIAL

## 2024-11-19 VITALS
RESPIRATION RATE: 16 BRPM | DIASTOLIC BLOOD PRESSURE: 65 MMHG | WEIGHT: 98 LBS | BODY MASS INDEX: 15.35 KG/M2 | TEMPERATURE: 97.7 F | HEART RATE: 100 BPM | OXYGEN SATURATION: 100 % | SYSTOLIC BLOOD PRESSURE: 96 MMHG

## 2024-11-19 DIAGNOSIS — C18.9 MALIGNANT NEOPLASM OF COLON, UNSPECIFIED: ICD-10-CM

## 2024-11-19 PROCEDURE — 99214 OFFICE O/P EST MOD 30 MIN: CPT

## 2024-11-19 PROCEDURE — 99215 OFFICE O/P EST HI 40 MIN: CPT

## 2024-11-19 PROCEDURE — G2211 COMPLEX E/M VISIT ADD ON: CPT

## 2024-11-20 LAB
ALBUMIN SERPL ELPH-MCNC: 2.6 G/DL
ALP BLD-CCNC: 138 U/L
ALT SERPL-CCNC: 11 U/L
ANION GAP SERPL CALC-SCNC: 14 MMOL/L
AST SERPL-CCNC: 22 U/L
BILIRUB SERPL-MCNC: 0.5 MG/DL
BUN SERPL-MCNC: 26 MG/DL
CALCIUM SERPL-MCNC: 8.4 MG/DL
CEA SERPL-MCNC: 367 NG/ML
CHLORIDE SERPL-SCNC: 100 MMOL/L
CO2 SERPL-SCNC: 18 MMOL/L
CREAT SERPL-MCNC: 1.13 MG/DL
EGFR: 74 ML/MIN/1.73M2
FERRITIN SERPL-MCNC: 31 NG/ML
GLUCOSE SERPL-MCNC: 104 MG/DL
LDH SERPL-CCNC: 368 U/L
MAGNESIUM SERPL-MCNC: 1.5 MG/DL
POTASSIUM SERPL-SCNC: 4.4 MMOL/L
PROT SERPL-MCNC: 5.4 G/DL
SODIUM SERPL-SCNC: 131 MMOL/L

## 2024-11-21 ENCOUNTER — NON-APPOINTMENT (OUTPATIENT)
Age: 59
End: 2024-11-21

## 2024-11-25 ENCOUNTER — INPATIENT (INPATIENT)
Facility: HOSPITAL | Age: 59
LOS: 7 days | Discharge: HOME | End: 2024-12-03
Attending: INTERNAL MEDICINE | Admitting: INTERNAL MEDICINE
Payer: COMMERCIAL

## 2024-11-25 VITALS
SYSTOLIC BLOOD PRESSURE: 76 MMHG | OXYGEN SATURATION: 99 % | RESPIRATION RATE: 18 BRPM | DIASTOLIC BLOOD PRESSURE: 59 MMHG | HEART RATE: 106 BPM | TEMPERATURE: 98 F | HEIGHT: 67 IN

## 2024-11-25 DIAGNOSIS — N39.0 URINARY TRACT INFECTION, SITE NOT SPECIFIED: ICD-10-CM

## 2024-11-25 DIAGNOSIS — A41.9 SEPSIS, UNSPECIFIED ORGANISM: ICD-10-CM

## 2024-11-25 DIAGNOSIS — N17.9 ACUTE KIDNEY FAILURE, UNSPECIFIED: ICD-10-CM

## 2024-11-25 DIAGNOSIS — E87.1 HYPO-OSMOLALITY AND HYPONATREMIA: ICD-10-CM

## 2024-11-25 DIAGNOSIS — Z90.49 ACQUIRED ABSENCE OF OTHER SPECIFIED PARTS OF DIGESTIVE TRACT: Chronic | ICD-10-CM

## 2024-11-25 DIAGNOSIS — Z86.718 PERSONAL HISTORY OF OTHER VENOUS THROMBOSIS AND EMBOLISM: ICD-10-CM

## 2024-11-25 DIAGNOSIS — C18.9 MALIGNANT NEOPLASM OF COLON, UNSPECIFIED: ICD-10-CM

## 2024-11-25 DIAGNOSIS — Z98.890 OTHER SPECIFIED POSTPROCEDURAL STATES: Chronic | ICD-10-CM

## 2024-11-25 LAB
ALBUMIN SERPL ELPH-MCNC: 2.4 G/DL — LOW (ref 3.3–5)
ALP SERPL-CCNC: 119 U/L — SIGNIFICANT CHANGE UP (ref 40–120)
ALT FLD-CCNC: 10 U/L — SIGNIFICANT CHANGE UP (ref 4–41)
ANION GAP SERPL CALC-SCNC: 13 MMOL/L — SIGNIFICANT CHANGE UP (ref 7–14)
ANION GAP SERPL CALC-SCNC: 14 MMOL/L — SIGNIFICANT CHANGE UP (ref 7–14)
ANISOCYTOSIS BLD QL: SIGNIFICANT CHANGE UP
APPEARANCE UR: ABNORMAL
APTT BLD: 32.7 SEC — SIGNIFICANT CHANGE UP (ref 24.5–35.6)
AST SERPL-CCNC: 27 U/L — SIGNIFICANT CHANGE UP (ref 4–40)
BASOPHILS # BLD AUTO: 0 K/UL — SIGNIFICANT CHANGE UP (ref 0–0.2)
BASOPHILS NFR BLD AUTO: 0 % — SIGNIFICANT CHANGE UP (ref 0–2)
BILIRUB SERPL-MCNC: 0.3 MG/DL — SIGNIFICANT CHANGE UP (ref 0.2–1.2)
BILIRUB UR-MCNC: NEGATIVE — SIGNIFICANT CHANGE UP
BLD GP AB SCN SERPL QL: NEGATIVE — SIGNIFICANT CHANGE UP
BLOOD GAS VENOUS COMPREHENSIVE RESULT: SIGNIFICANT CHANGE UP
BLOOD GAS VENOUS COMPREHENSIVE RESULT: SIGNIFICANT CHANGE UP
BUN SERPL-MCNC: 44 MG/DL — HIGH (ref 7–23)
BUN SERPL-MCNC: 45 MG/DL — HIGH (ref 7–23)
CALCIUM SERPL-MCNC: 8.3 MG/DL — LOW (ref 8.4–10.5)
CALCIUM SERPL-MCNC: 8.5 MG/DL — SIGNIFICANT CHANGE UP (ref 8.4–10.5)
CHLORIDE SERPL-SCNC: 96 MMOL/L — LOW (ref 98–107)
CHLORIDE SERPL-SCNC: 97 MMOL/L — LOW (ref 98–107)
CO2 SERPL-SCNC: 13 MMOL/L — LOW (ref 22–31)
CO2 SERPL-SCNC: 15 MMOL/L — LOW (ref 22–31)
COLOR SPEC: SIGNIFICANT CHANGE UP
CREAT ?TM UR-MCNC: 78 MG/DL — SIGNIFICANT CHANGE UP
CREAT SERPL-MCNC: 1.52 MG/DL — HIGH (ref 0.5–1.3)
CREAT SERPL-MCNC: 1.56 MG/DL — HIGH (ref 0.5–1.3)
DIFF PNL FLD: ABNORMAL
EGFR: 51 ML/MIN/1.73M2 — LOW
EGFR: 52 ML/MIN/1.73M2 — LOW
EOSINOPHIL # BLD AUTO: 0 K/UL — SIGNIFICANT CHANGE UP (ref 0–0.5)
EOSINOPHIL NFR BLD AUTO: 0 % — SIGNIFICANT CHANGE UP (ref 0–6)
FLUAV AG NPH QL: SIGNIFICANT CHANGE UP
FLUBV AG NPH QL: SIGNIFICANT CHANGE UP
GLUCOSE SERPL-MCNC: 132 MG/DL — HIGH (ref 70–99)
GLUCOSE SERPL-MCNC: 151 MG/DL — HIGH (ref 70–99)
GLUCOSE UR QL: NEGATIVE MG/DL — SIGNIFICANT CHANGE UP
HCT VFR BLD CALC: 27.9 % — LOW (ref 39–50)
HGB BLD-MCNC: 8.4 G/DL — LOW (ref 13–17)
HYPOCHROMIA BLD QL: SLIGHT — SIGNIFICANT CHANGE UP
IANC: 21.99 K/UL — HIGH (ref 1.8–7.4)
INR BLD: 1.37 RATIO — HIGH (ref 0.85–1.16)
KETONES UR-MCNC: NEGATIVE MG/DL — SIGNIFICANT CHANGE UP
LEUKOCYTE ESTERASE UR-ACNC: ABNORMAL
LYMPHOCYTES # BLD AUTO: 1.39 K/UL — SIGNIFICANT CHANGE UP (ref 1–3.3)
LYMPHOCYTES # BLD AUTO: 5.2 % — LOW (ref 13–44)
MAGNESIUM SERPL-MCNC: 1.8 MG/DL — SIGNIFICANT CHANGE UP (ref 1.6–2.6)
MCHC RBC-ENTMCNC: 23.2 PG — LOW (ref 27–34)
MCHC RBC-ENTMCNC: 30.1 G/DL — LOW (ref 32–36)
MCV RBC AUTO: 77.1 FL — LOW (ref 80–100)
MONOCYTES # BLD AUTO: 0.91 K/UL — HIGH (ref 0–0.9)
MONOCYTES NFR BLD AUTO: 3.4 % — SIGNIFICANT CHANGE UP (ref 2–14)
NEUTROPHILS # BLD AUTO: 24.5 K/UL — HIGH (ref 1.8–7.4)
NEUTROPHILS NFR BLD AUTO: 91.4 % — HIGH (ref 43–77)
NITRITE UR-MCNC: POSITIVE
NT-PROBNP SERPL-SCNC: 170 PG/ML — SIGNIFICANT CHANGE UP
OVALOCYTES BLD QL SMEAR: SLIGHT — SIGNIFICANT CHANGE UP
PH UR: 5.5 — SIGNIFICANT CHANGE UP (ref 5–8)
PHOSPHATE SERPL-MCNC: 4 MG/DL — SIGNIFICANT CHANGE UP (ref 2.5–4.5)
PLAT MORPH BLD: NORMAL — SIGNIFICANT CHANGE UP
PLATELET # BLD AUTO: 535 K/UL — HIGH (ref 150–400)
PLATELET COUNT - ESTIMATE: ABNORMAL
POIKILOCYTOSIS BLD QL AUTO: SLIGHT — SIGNIFICANT CHANGE UP
POLYCHROMASIA BLD QL SMEAR: SIGNIFICANT CHANGE UP
POTASSIUM SERPL-MCNC: 4.9 MMOL/L — SIGNIFICANT CHANGE UP (ref 3.5–5.3)
POTASSIUM SERPL-MCNC: 5.2 MMOL/L — SIGNIFICANT CHANGE UP (ref 3.5–5.3)
POTASSIUM SERPL-SCNC: 4.9 MMOL/L — SIGNIFICANT CHANGE UP (ref 3.5–5.3)
POTASSIUM SERPL-SCNC: 5.2 MMOL/L — SIGNIFICANT CHANGE UP (ref 3.5–5.3)
POTASSIUM UR-SCNC: 26 MMOL/L — SIGNIFICANT CHANGE UP
PROT ?TM UR-MCNC: 460 MG/DL — SIGNIFICANT CHANGE UP
PROT SERPL-MCNC: 5.8 G/DL — LOW (ref 6–8.3)
PROT UR-MCNC: 300 MG/DL
PROT/CREAT UR-RTO: 5.9 RATIO — HIGH (ref 0–0.2)
PROTHROM AB SERPL-ACNC: 15.8 SEC — HIGH (ref 9.9–13.4)
RBC # BLD: 3.62 M/UL — LOW (ref 4.2–5.8)
RBC # FLD: 20.1 % — HIGH (ref 10.3–14.5)
RBC BLD AUTO: ABNORMAL
RH IG SCN BLD-IMP: POSITIVE — SIGNIFICANT CHANGE UP
RSV RNA NPH QL NAA+NON-PROBE: SIGNIFICANT CHANGE UP
SARS-COV-2 RNA SPEC QL NAA+PROBE: SIGNIFICANT CHANGE UP
SODIUM SERPL-SCNC: 124 MMOL/L — LOW (ref 135–145)
SODIUM SERPL-SCNC: 124 MMOL/L — LOW (ref 135–145)
SODIUM UR-SCNC: 54 MMOL/L — SIGNIFICANT CHANGE UP
SP GR SPEC: 1.02 — SIGNIFICANT CHANGE UP (ref 1–1.03)
TROPONIN T, HIGH SENSITIVITY RESULT: 10 NG/L — SIGNIFICANT CHANGE UP
UROBILINOGEN FLD QL: 1 MG/DL — SIGNIFICANT CHANGE UP (ref 0.2–1)
UUN UR-MCNC: 571 MG/DL — SIGNIFICANT CHANGE UP
WBC # BLD: 26.8 K/UL — HIGH (ref 3.8–10.5)
WBC # FLD AUTO: 26.8 K/UL — HIGH (ref 3.8–10.5)

## 2024-11-25 PROCEDURE — 99497 ADVNCD CARE PLAN 30 MIN: CPT | Mod: 25

## 2024-11-25 PROCEDURE — 99223 1ST HOSP IP/OBS HIGH 75: CPT | Mod: 25

## 2024-11-25 PROCEDURE — 71045 X-RAY EXAM CHEST 1 VIEW: CPT | Mod: 26

## 2024-11-25 PROCEDURE — 99291 CRITICAL CARE FIRST HOUR: CPT

## 2024-11-25 RX ORDER — HEPARIN SODIUM,PORCINE 1000/ML
5000 VIAL (ML) INJECTION EVERY 12 HOURS
Refills: 0 | Status: DISCONTINUED | OUTPATIENT
Start: 2024-11-26 | End: 2024-11-26

## 2024-11-25 RX ORDER — ACETAMINOPHEN, DIPHENHYDRAMINE HCL, PHENYLEPHRINE HCL 325; 25; 5 MG/1; MG/1; MG/1
3 TABLET ORAL AT BEDTIME
Refills: 0 | Status: DISCONTINUED | OUTPATIENT
Start: 2024-11-25 | End: 2024-12-03

## 2024-11-25 RX ORDER — CEFTRIAXONE SODIUM 1 G
1000 VIAL (EA) INJECTION ONCE
Refills: 0 | Status: COMPLETED | OUTPATIENT
Start: 2024-11-25 | End: 2024-11-25

## 2024-11-25 RX ORDER — PANTOPRAZOLE SODIUM 40 MG/1
40 TABLET, DELAYED RELEASE ORAL
Refills: 0 | Status: DISCONTINUED | OUTPATIENT
Start: 2024-11-25 | End: 2024-12-03

## 2024-11-25 RX ORDER — MAGNESIUM, ALUMINUM HYDROXIDE 200-225/5
30 SUSPENSION, ORAL (FINAL DOSE FORM) ORAL EVERY 4 HOURS
Refills: 0 | Status: DISCONTINUED | OUTPATIENT
Start: 2024-11-25 | End: 2024-12-03

## 2024-11-25 RX ORDER — CEFTRIAXONE SODIUM 1 G
1000 VIAL (EA) INJECTION EVERY 24 HOURS
Refills: 0 | Status: COMPLETED | OUTPATIENT
Start: 2024-11-25 | End: 2024-11-27

## 2024-11-25 RX ORDER — TAMSULOSIN HYDROCHLORIDE 0.4 MG/1
0.4 CAPSULE ORAL AT BEDTIME
Refills: 0 | Status: DISCONTINUED | OUTPATIENT
Start: 2024-11-25 | End: 2024-12-03

## 2024-11-25 RX ORDER — SODIUM CHLORIDE 9 MG/ML
1 INJECTION, SOLUTION INTRAMUSCULAR; INTRAVENOUS; SUBCUTANEOUS
Refills: 0 | Status: DISCONTINUED | OUTPATIENT
Start: 2024-11-26 | End: 2024-11-26

## 2024-11-25 RX ORDER — SODIUM CHLORIDE 9 MG/ML
1000 INJECTION, SOLUTION INTRAMUSCULAR; INTRAVENOUS; SUBCUTANEOUS
Refills: 0 | Status: DISCONTINUED | OUTPATIENT
Start: 2024-11-25 | End: 2024-11-26

## 2024-11-25 RX ORDER — ACETAMINOPHEN 500MG 500 MG/1
650 TABLET, COATED ORAL EVERY 6 HOURS
Refills: 0 | Status: DISCONTINUED | OUTPATIENT
Start: 2024-11-25 | End: 2024-11-28

## 2024-11-25 RX ORDER — ONDANSETRON HYDROCHLORIDE 4 MG/1
4 TABLET, FILM COATED ORAL EVERY 8 HOURS
Refills: 0 | Status: DISCONTINUED | OUTPATIENT
Start: 2024-11-25 | End: 2024-12-03

## 2024-11-25 RX ORDER — SODIUM CHLORIDE 9 MG/ML
1000 INJECTION, SOLUTION INTRAMUSCULAR; INTRAVENOUS; SUBCUTANEOUS ONCE
Refills: 0 | Status: COMPLETED | OUTPATIENT
Start: 2024-11-25 | End: 2024-11-25

## 2024-11-25 RX ADMIN — Medication 100 MILLIGRAM(S): at 15:15

## 2024-11-25 RX ADMIN — Medication 100 MILLIGRAM(S): at 18:59

## 2024-11-25 RX ADMIN — SODIUM CHLORIDE 75 MILLILITER(S): 9 INJECTION, SOLUTION INTRAMUSCULAR; INTRAVENOUS; SUBCUTANEOUS at 23:30

## 2024-11-25 RX ADMIN — SODIUM CHLORIDE 1000 MILLILITER(S): 9 INJECTION, SOLUTION INTRAMUSCULAR; INTRAVENOUS; SUBCUTANEOUS at 15:18

## 2024-11-25 RX ADMIN — TAMSULOSIN HYDROCHLORIDE 0.4 MILLIGRAM(S): 0.4 CAPSULE ORAL at 23:00

## 2024-11-25 NOTE — H&P ADULT - NSHPLABSRESULTS_GEN_ALL_CORE
8.4    26.80 )-----------( 535      ( 2024 10:56 )             27.9     124[L]  |  97[L]  |  45[H]  ----------------------------<  151[H]       5.2   |  13[L]  |  1.56[H]    Ca    8.5      2024 16:00  Phos  4.0       Mg     1.80         TPro  5.8[L]  /  Alb  2.4[L]  /  TBili  0.3  /  DBili  x   /  AST  27  /  ALT  10  /  AlkPhos  119      PT/INR: 15.8/1.37 (24 @ 10:56)  PTT: 32.7 (24 @ 10:56)      13:00 - VBG - pH: 7.39  | pCO2: 23    | pO2: 189   | Lactate: 2.9    10:55 - VBG - pH: 7.30  | pCO2: 33    | pO2: <20   | Lactate: 4.1            hs Troponin, T - 10 ng/L (24 @ 10:56)      Pro-BNP: 170 (24 @ 10:56)          Urinalysis Basic - ( 2024 14:55 )  Color: Dark Yellow / Appearance: Turbid / S.016 / pH: 5.5  Gluc: Negative mg/dL / Ketone: Negative mg/dL  / Bili: Negative / Urobili: 1.0 mg/dL   Blood: Large / Protein: 300 mg/dL / Nitrite: Positive   Leuk Esterase: Large / RBC: 998 /HPF / WBC 1562 /HPF   Sq Epi: 16 /HPF / Bacteria: Many /HPF  Hyaline Casts: x/WBC Casts: x          Urinalysis with Rflx Culture (collected 2024 14:55)    CXR interpreted by myself: clear lungs

## 2024-11-25 NOTE — H&P ADULT - NSHPREVIEWOFSYSTEMS_GEN_ALL_CORE
REVIEW OF SYSTEMS:    CONSTITUTIONAL: +lethargy/ weakness, no fevers or chills  EYES/ENT: No visual changes;  No dysphagia; No sore throat; No rhinorrhea; No sinus pain/pressure  NECK: No pain or stiffness  RESPIRATORY: No cough, wheezing, hemoptysis; + shortness of breath  CARDIOVASCULAR: No chest pain or palpitations; No lower extremity edema  GASTROINTESTINAL: +chronic abdominal  pain. No nausea, vomiting, or hematemesis; No diarrhea or constipation. No melena or hematochezia.  GENITOURINARY: + dysuria, no frequency or hematuria  NEUROLOGICAL: No numbness or weakness  MSK: ambulates with a cane PRN  SKIN: No itching, burning, rashes, or lesions   All other review of systems is negative unless indicated above.

## 2024-11-25 NOTE — PATIENT PROFILE ADULT - NSPROMEDSADMININFO_GEN_A_NUR
Teddy Mary was admitted to room 3312 from ED via wheelchair. Upon arrival, patient is stable.   Admission orders released at this time.  See Epic documentation for patient individualized nursing care plan. Patient with aphasia but attempting to type responses on phone, declined notifying family of admission. Skin check completed with LUPILLO Pinedo.    no concerns

## 2024-11-25 NOTE — H&P ADULT - CONVERSATION DETAILS
Pt would like to remain FULL CODE.  If required, the pt would like the following interventions: CPR and/or intubation with mechanical ventilation.     He would like his wife to make medical decisions for him if he would not be able to

## 2024-11-25 NOTE — PATIENT PROFILE ADULT - FALL HARM RISK - HARM RISK INTERVENTIONS

## 2024-11-25 NOTE — H&P ADULT - NSHPSOCIALHISTORY_GEN_ALL_CORE
Does not use tobacco products, consume alcohol or partake in illicit drug use   Lives with wife and children

## 2024-11-25 NOTE — ED PROCEDURE NOTE - PROCEDURE ADDITIONAL DETAILS
Peripheral vein access in the Emergency Department obtained under dynamic ultrasound guidance with dark nonpulsatile blood return.

## 2024-11-25 NOTE — ED PROVIDER NOTE - PHYSICAL EXAMINATION
CONSTITUTIONAL: awake; in no acute distress.   HEAD: Normocephalic; atraumatic.  EYES: no conjunctival injection. +Pallor  ENT: No nasal discharge; airway clear.  ABD: soft, mild central tenderness, no guarding, no distention, no rigidity. Ostomy bag in place with no melena/goldy blood  EXT: Ambulates independently.  No cyanosis or edema.   NEURO: Alert, oriented, grossly unremarkable  PSYCH: Cooperative, appropriate.

## 2024-11-25 NOTE — ED ADULT NURSE NOTE - NSFALLUNIVINTERV_ED_ALL_ED
Bed/Stretcher in lowest position, wheels locked, appropriate side rails in place/Call bell, personal items and telephone in reach/Instruct patient to call for assistance before getting out of bed/chair/stretcher/Non-slip footwear applied when patient is off stretcher/[place] to call system/Physically safe environment - no spills, clutter or unnecessary equipment/Purposeful proactive rounding/Room/bathroom lighting operational, light cord in reach

## 2024-11-25 NOTE — PROCEDURE NOTE - SEDATION
Thank you.  Than 250mg x 4 number 120 will be sent to pharmacy, please confirm that pharmacy attached to RX, I will remove 500mg RX from plan    
Provided by Anesthesia Department
Provided by Anesthesia Department

## 2024-11-25 NOTE — H&P ADULT - NSICDXPASTMEDICALHX_GEN_ALL_CORE_FT
PAST MEDICAL HISTORY:  Colon cancer (resection and ileostomy in 2022, with progressive residual metastatic dz, refusing CTX / radiation oncology consultation to date)    H/O ileostomy     History of cholelithiasis     History of DVT in adulthood     History of ileostomy     History of small bowel obstruction (4/30/23: non-surgical tx)    Large bowel obstruction (at time of diagnosis of colon cancer)    Pulmonary nodule     Renal stones (s/p right ureteral stent placement)    Splenomegaly

## 2024-11-25 NOTE — ED PROVIDER NOTE - CLINICAL SUMMARY MEDICAL DECISION MAKING FREE TEXT BOX
59-year-old male past medical history significant for colon adenocarcinoma complicated by LBO status post colectomy/ileostomy, CKD, anemia presenting for hypotension. With triage vital signs notable for hypotension to 70s over 50s but in room improved to 100s over 70s) on chart review baseline at last discharge was 100s over 70s. Concern for recurrence of anemia in the setting of malignancy, to evaluate labs, reassess.

## 2024-11-25 NOTE — H&P ADULT - PROBLEM SELECTOR PLAN 4
acute on chronic   Na 124 on presentation  on salt tabs at home  s/p 1L NS -> continue NS 75ml/hr for 10 hrs due to hypotension/volume depletion   Will need to be on fluid restriction once volume resuscitated   Restart salt tabs 1g BID as per last d/c   BMP in AM

## 2024-11-25 NOTE — ED ADULT NURSE NOTE - OBJECTIVE STATEMENT
pt brought to area#TRB pt A&ox4 c/o abd pain that has been intermittent and right leg pain pt noted to have illeostomy and, R ACW infusa port that is not accessed at this time c/o fatigue and generalized weakness v/s charted md dominguez in progress

## 2024-11-25 NOTE — ED ADULT NURSE REASSESSMENT NOTE - NS ED NURSE REASSESS COMMENT FT1
Report to Eugenia GÓMEZ. Pt awake and alert, A&OX4, resp even and unlabored. Denies CP, SOB, HA, dizziness, palpitations, blurry vision. Resting comfortably.

## 2024-11-25 NOTE — ED ADULT NURSE NOTE - NS ED PATIENT SAFETY CONCERN
Patient c/o pain w/ urination; describes urgency and burning. Symptom onset 72 hours PTA. Taking pyridium with no relief, rates pain 10/10.    No

## 2024-11-25 NOTE — H&P ADULT - PROBLEM SELECTOR PLAN 1
New  T 98.3, -> 86, BP 76/59->98/72, RR 18 satting 100% RA  WBC 26.8, LA 4.1-> 2.9, CXR: clear lungs  UA: LE: large, WBC 1562, Nitrite +, , Bacteria: Many   Ucx hx: e. coli  s/p rocephin-> continue   s/p 1L NS -> continue NS 75ml/hr for 10 hrs   bcx and ucx pending

## 2024-11-25 NOTE — ED PROVIDER NOTE - OBJECTIVE STATEMENT
59-year-old male past medical history significant for colon adenocarcinoma complicated by LBO status post colectomy/ileostomy, CKD, anemia presenting for hypotension.  Patient states he has had gradually worsening fatigue, similar to when he is needed blood transfusions in the past, last transfusion 2 weeks ago.  Blood pressure was noted to be low including in the 70s over 50s in triage.  Denies bleeding to ileostomy bag.  No chest pain or shortness of breath.

## 2024-11-25 NOTE — H&P ADULT - PROBLEM SELECTOR PLAN 3
New  Cr 1.5- baseline 1.1-1.2  likely 2/2 hypotension/volume depletion  s/p 1L NS -> continue NS 75ml/hr for 10 hrs   renally dose medications and avoid nephrotoxic agents  strict i/o  BMP in AM

## 2024-11-25 NOTE — ED PROVIDER NOTE - CARE PLAN
Principal Discharge DX:	Urinary tract infection  Secondary Diagnosis:	Weakness   1 Principal Discharge DX:	Septic shock  Secondary Diagnosis:	Urinary tract infection  Secondary Diagnosis:	Weakness

## 2024-11-25 NOTE — H&P ADULT - PROBLEM SELECTOR PLAN 5
Active  Per recent admission documentation:  Met. L sided colon adenoca s/p colectomy/ileostomy  - S/p C1 FOLFOX 6/10/2024, has not been on systemic therapies since  - CT a/p 10/30 noted POD  - Plan for palliative RT to bladder mass  - Overall plan per Dr. Palafox

## 2024-11-25 NOTE — H&P ADULT - HISTORY OF PRESENT ILLNESS
59 yr old male with a pmh significant for colon adenocarcinoma complicated by LBO status post colectomy/ileostomy, dvt s/p ivc, anemia who presents with a 1 week hx of lethargy/generalized weakness, lightheadedness, sob, dysuria since discharge. States he was supposed to have more abx sent to his pharmacy but never received them.  Denies  headache, chest pain, palpitations, new abdominal pain, joint pain, diarrhea/constipation.  Vitals: T 98.3, -> 86, BP 76/59->98/72, RR 18 satting 100% RA

## 2024-11-25 NOTE — ED PROVIDER NOTE - ATTENDING CONTRIBUTION TO CARE
The patient presented to the ed with generalized weakness with with past medical history of colonic adenocarcinoma s/p resection with ilieostomy and recent admission for urinary tract infection. The patient has had required transfusions in the past. No chest pain, but some shortness of breat associated with the fatigue. the patient did have conjunctival pallor, cachectic, moving all extremities, abdomen soft, non-tender, ileostomy with brown liquid stool. Initially with tachycardia, but sepsis criteria not met until result of complete blood count showing leukocytosis, at which point ceftriaxone was administered and patient required admission to the hospital for possible recurrent urosepsis. Previous cultures were sensitive to ctx.        Additional time as above spent by myself, separate from billable procedures, included coordination of patient care with consultants/admitting team, performing reassessments on the patient, documentation, and counseling patient/family members on the care provided.     The patient's condition was not amenable to outpatient treatment due to either the lack of feasibility of outpatient care coordination, possibility for further decompensation with adverse outcome if discharge, or treatments and diagnostic  modalities only available during an inpatient hospitalization.

## 2024-11-25 NOTE — H&P ADULT - NSHPPHYSICALEXAM_GEN_ALL_CORE
PHYSICAL EXAM:  GENERAL: NAD, comfortable at bedside   HEAD:  Atraumatic, Normocephalic  EYES: EOMI, PERRL, conjunctiva and sclera clear  NECK: Supple, No JVD  CHEST/LUNG: Clear to auscultation bilaterally; No wheezes, rales or rhonchi  HEART: Regular rate and rhythm; No murmurs, rubs, or gallops, (+)S1, S2  ABDOMEN: Soft, generalized tenderness (baseline per pt), Nondistended; Normal Bowel sounds + ileostomy site clean/without erythema  EXTREMITIES:  2+ Peripheral Pulses, No clubbing, cyanosis, LLE > RLE (known dvt)  PSYCH: normal mood and affect  NEUROLOGY: AAOx3, non-focal  SKIN: No rashes or lesions

## 2024-11-25 NOTE — ED ADULT TRIAGE NOTE - CHIEF COMPLAINT QUOTE
Pt c/o fatigue, hypotension and generalized weakness x2 days. Pt states "I feel like I need a blood transfusion." Last transfusion was 2 wks ago.  PMHx  colon CA (last chemo in June), ileostomy bag. Pt hypotensive in triage - Charge RN made aware, pt taken directly to [facility].

## 2024-11-26 DIAGNOSIS — Z29.9 ENCOUNTER FOR PROPHYLACTIC MEASURES, UNSPECIFIED: ICD-10-CM

## 2024-11-26 LAB
ANION GAP SERPL CALC-SCNC: 13 MMOL/L — SIGNIFICANT CHANGE UP (ref 7–14)
BASOPHILS # BLD AUTO: 0.04 K/UL — SIGNIFICANT CHANGE UP (ref 0–0.2)
BASOPHILS NFR BLD AUTO: 0.2 % — SIGNIFICANT CHANGE UP (ref 0–2)
BUN SERPL-MCNC: 46 MG/DL — HIGH (ref 7–23)
CALCIUM SERPL-MCNC: 8.4 MG/DL — SIGNIFICANT CHANGE UP (ref 8.4–10.5)
CHLORIDE SERPL-SCNC: 98 MMOL/L — SIGNIFICANT CHANGE UP (ref 98–107)
CO2 SERPL-SCNC: 12 MMOL/L — LOW (ref 22–31)
CREAT SERPL-MCNC: 1.61 MG/DL — HIGH (ref 0.5–1.3)
EGFR: 49 ML/MIN/1.73M2 — LOW
EOSINOPHIL # BLD AUTO: 0.09 K/UL — SIGNIFICANT CHANGE UP (ref 0–0.5)
EOSINOPHIL NFR BLD AUTO: 0.4 % — SIGNIFICANT CHANGE UP (ref 0–6)
GLUCOSE SERPL-MCNC: 93 MG/DL — SIGNIFICANT CHANGE UP (ref 70–99)
HCT VFR BLD CALC: 26.9 % — LOW (ref 39–50)
HGB BLD-MCNC: 8 G/DL — LOW (ref 13–17)
IANC: 18.95 K/UL — HIGH (ref 1.8–7.4)
IMM GRANULOCYTES NFR BLD AUTO: 1.5 % — HIGH (ref 0–0.9)
LYMPHOCYTES # BLD AUTO: 2.25 K/UL — SIGNIFICANT CHANGE UP (ref 1–3.3)
LYMPHOCYTES # BLD AUTO: 9.5 % — LOW (ref 13–44)
MCHC RBC-ENTMCNC: 23.3 PG — LOW (ref 27–34)
MCHC RBC-ENTMCNC: 29.7 G/DL — LOW (ref 32–36)
MCV RBC AUTO: 78.2 FL — LOW (ref 80–100)
MONOCYTES # BLD AUTO: 1.99 K/UL — HIGH (ref 0–0.9)
MONOCYTES NFR BLD AUTO: 8.4 % — SIGNIFICANT CHANGE UP (ref 2–14)
NEUTROPHILS # BLD AUTO: 18.95 K/UL — HIGH (ref 1.8–7.4)
NEUTROPHILS NFR BLD AUTO: 80 % — HIGH (ref 43–77)
NRBC # BLD: 0 /100 WBCS — SIGNIFICANT CHANGE UP (ref 0–0)
NRBC # FLD: 0 K/UL — SIGNIFICANT CHANGE UP (ref 0–0)
OSMOLALITY UR: 415 MOSM/KG — SIGNIFICANT CHANGE UP (ref 50–1200)
PLATELET # BLD AUTO: 347 K/UL — SIGNIFICANT CHANGE UP (ref 150–400)
POTASSIUM SERPL-MCNC: 5.2 MMOL/L — SIGNIFICANT CHANGE UP (ref 3.5–5.3)
POTASSIUM SERPL-SCNC: 5.2 MMOL/L — SIGNIFICANT CHANGE UP (ref 3.5–5.3)
RBC # BLD: 3.44 M/UL — LOW (ref 4.2–5.8)
RBC # FLD: 20.5 % — HIGH (ref 10.3–14.5)
SODIUM SERPL-SCNC: 123 MMOL/L — LOW (ref 135–145)
WBC # BLD: 23.67 K/UL — HIGH (ref 3.8–10.5)
WBC # FLD AUTO: 23.67 K/UL — HIGH (ref 3.8–10.5)

## 2024-11-26 PROCEDURE — 99233 SBSQ HOSP IP/OBS HIGH 50: CPT

## 2024-11-26 PROCEDURE — 76770 US EXAM ABDO BACK WALL COMP: CPT | Mod: 26

## 2024-11-26 PROCEDURE — 99232 SBSQ HOSP IP/OBS MODERATE 35: CPT

## 2024-11-26 RX ORDER — SODIUM CHLORIDE 3 G/100ML
500 INJECTION, SOLUTION INTRAVENOUS
Refills: 0 | Status: DISCONTINUED | OUTPATIENT
Start: 2024-11-26 | End: 2024-11-28

## 2024-11-26 RX ORDER — SODIUM CHLORIDE 9 MG/ML
1000 INJECTION, SOLUTION INTRAMUSCULAR; INTRAVENOUS; SUBCUTANEOUS
Refills: 0 | Status: DISCONTINUED | OUTPATIENT
Start: 2024-11-26 | End: 2024-11-26

## 2024-11-26 RX ORDER — SODIUM CHLORIDE 9 MG/ML
2 INJECTION, SOLUTION INTRAMUSCULAR; INTRAVENOUS; SUBCUTANEOUS
Refills: 0 | Status: DISCONTINUED | OUTPATIENT
Start: 2024-11-26 | End: 2024-12-01

## 2024-11-26 RX ORDER — SODIUM BICARBONATE 84 MG/ML
650 INJECTION, SOLUTION INTRAVENOUS
Refills: 0 | Status: DISCONTINUED | OUTPATIENT
Start: 2024-11-26 | End: 2024-12-01

## 2024-11-26 RX ADMIN — TAMSULOSIN HYDROCHLORIDE 0.4 MILLIGRAM(S): 0.4 CAPSULE ORAL at 21:26

## 2024-11-26 RX ADMIN — PANTOPRAZOLE SODIUM 40 MILLIGRAM(S): 40 TABLET, DELAYED RELEASE ORAL at 05:28

## 2024-11-26 RX ADMIN — SODIUM CHLORIDE 2 GRAM(S): 9 INJECTION, SOLUTION INTRAMUSCULAR; INTRAVENOUS; SUBCUTANEOUS at 17:51

## 2024-11-26 RX ADMIN — SODIUM CHLORIDE 50 MILLILITER(S): 3 INJECTION, SOLUTION INTRAVENOUS at 17:52

## 2024-11-26 RX ADMIN — SODIUM CHLORIDE 1 GRAM(S): 9 INJECTION, SOLUTION INTRAMUSCULAR; INTRAVENOUS; SUBCUTANEOUS at 05:28

## 2024-11-26 RX ADMIN — SODIUM BICARBONATE 650 MILLIGRAM(S): 84 INJECTION, SOLUTION INTRAVENOUS at 17:52

## 2024-11-26 RX ADMIN — Medication 100 MILLIGRAM(S): at 18:11

## 2024-11-26 RX ADMIN — PANTOPRAZOLE SODIUM 40 MILLIGRAM(S): 40 TABLET, DELAYED RELEASE ORAL at 17:52

## 2024-11-26 NOTE — CONSULT NOTE ADULT - SUBJECTIVE AND OBJECTIVE BOX
Urology Consult    HPI: 59 yr old male with a pmh significant for colon adenocarcinoma complicated by LBO status post colectomy/ileostomy, dvt s/p ivc, anemia who presents with a 1 week hx of lethargy/generalized weakness, lightheadedness, sob, dysuria since discharge. States he was supposed to have more abx sent to his pharmacy but never received them.    Urology consulted for LT severe hydronephrosis iso Malignant obstruction. Previously seen by Dr Ryan for Kidney stone management. Offers no complaints of pain. Voiding w/o difficulty. Experiencing intermittent episodes of hematuria. During clinical course found to have WBC count 24K, Cr 1.61 elevated from 1.27 (10/30) and grossly positive UA.    PAST MEDICAL & SURGICAL HISTORY:  Colon cancer  (resection and ileostomy in 2022, with progressive residual metastatic dz, refusing CTX / radiation oncology consultation to date)  H/O ileostomy  Renal stones (s/p right ureteral stent placement)  Large bowel obstruction (at time of diagnosis of colon cancer)   History of small bowel obstruction (4/30/23: non-surgical tx)  History of cholelithiasis  Pulmonary nodule  Splenomegaly  History of ileostomy  History of DVT in adulthood S/P colon resection  H/O ureteroscopy (s/p right ureteral stent placement)    FAMILY HISTORY:  FH: brain aneurysm (Mother)    SOCIAL HISTORY:   Tobacco hx:    MEDICATIONS  (STANDING):  cefTRIAXone   IVPB 1000 milliGRAM(s) IV Intermittent every 24 hours  heparin   Injectable 5000 Unit(s) SubCutaneous every 12 hours  pantoprazole    Tablet 40 milliGRAM(s) Oral two times a day  sodium bicarbonate 650 milliGRAM(s) Oral two times a day  sodium chloride 2 Gram(s) Oral two times a day  tamsulosin 0.4 milliGRAM(s) Oral at bedtime    MEDICATIONS  (PRN):  acetaminophen     Tablet .. 650 milliGRAM(s) Oral every 6 hours PRN Temp greater or equal to 38C (100.4F), Mild Pain (1 - 3)  aluminum hydroxide/magnesium hydroxide/simethicone Suspension 30 milliLiter(s) Oral every 4 hours PRN Dyspepsia  melatonin 3 milliGRAM(s) Oral at bedtime PRN Insomnia  ondansetron Injectable 4 milliGRAM(s) IV Push every 8 hours PRN Nausea and/or Vomiting    Allergies    No Known Allergies    Intolerances    REVIEW OF SYSTEMS: Pertinent positives and negatives as stated in HPI, otherwise negative    Vital signs  T(C): 36.4 (11-26-24 @ 06:00), Max: 36.8 (11-25-24 @ 17:05)  HR: 89 (11-26-24 @ 07:30)  BP: 99/62 (11-26-24 @ 07:30)  SpO2: 100% (11-26-24 @ 07:30)  Wt(kg): --    Vital signs reviewed.   CONSTITUTIONAL: Well-appearing; well-nourished; in no apparent distress. Non-toxic appearing.   HEAD: Normocephalic, atraumatic.  EYES: Normal conjunctiva and no sclera injection noted  ENT: Normal nose; no rhinorrhea  CARD: Normal S1, S2  RESP: Normal chest excursion with respiration; breath sounds clear and equal bilaterally  ABD/GI: soft, non-distended; non-tender. Ileostomy site clean/without erythema  EXT/MS: Moves all extremities  SKIN: Normal for age and race; warm; dry; good turgor; no apparent lesions or exudate noted.  NEURO: Awake, alert, oriented x 3  PSYCH: Normal mood; appropriate affect.    LABS:    11-26 @ 06:30    WBC 23.67 / Hct 26.9  / SCr 1.61     11-25 @ 16:00    WBC --    / Hct --    / SCr 1.56     11-26    123[L]  |  98  |  46[H]  ----------------------------<  93  5.2   |  12[L]  |  1.61[H]    Ca    8.4      26 Nov 2024 06:30  Phos  4.0     11-25  Mg     1.80     11-25    TPro  5.8[L]  /  Alb  2.4[L]  /  TBili  0.3  /  DBili  x   /  AST  27  /  ALT  10  /  AlkPhos  119  11-25    PT/INR - ( 25 Nov 2024 10:56 )   PT: 15.8 sec;   INR: 1.37 ratio         PTT - ( 25 Nov 2024 10:56 )  PTT:32.7 sec  Urinalysis Basic - ( 26 Nov 2024 06:30 )    Color: x / Appearance: x / SG: x / pH: x  Gluc: 93 mg/dL / Ketone: x  / Bili: x / Urobili: x   Blood: x / Protein: x / Nitrite: x   Leuk Esterase: x / RBC: x / WBC x   Sq Epi: x / Non Sq Epi: x / Bacteria: x        Urine Cx:   Blood Cx:    RADIOLOGY:      ACC: [id number] EXAM:  US KIDNEYS AND BLADDER   ORDERED BY: ALMA LUQUE     PROCEDURE DATE:  11/26/2024          INTERPRETATION:  CLINICAL INFORMATION: Known bladder mass. Acute kidney   injury    COMPARISON: 7/28/2024 and CT abdomen and pelvis 10/30/2024    TECHNIQUE: Sonography of the kidneys and bladder.    FINDINGS:  Right kidney: 10.3 cm. No renal mass or hydronephrosis. Lower pole   calculus appreciated.    Left kidney: 13.2 cm. No renal . Severe hydronephrosis with some debris   within the collecting system similar to the prior study multiple small   stones are also suggested within the lower pole collecting system    Urinary bladder: Large bladder mass is again appreciated with internal   flow. This mass measures 8.9 x 8.0 x 9.3 cm    IMPRESSION:  The known large bladder mass is again identified  Persistent severe left hydronephrosis    --- End of Report ---            VICENTE BELTRAN MD; Attending Radiologist  This document has been electronically signed. Nov 26 2024  1:33PM     Urology Consult    HPI: 59 yr old male with a pmh significant for colon adenocarcinoma complicated by LBO status post colectomy/ileostomy, dvt s/p ivc, anemia who presents with a 1 week hx of lethargy/generalized weakness, lightheadedness, sob, dysuria since discharge. States he was supposed to have more abx sent to his pharmacy but never received them.    Urology consulted for LT severe hydronephrosis iso Malignant obstruction. Previously seen by Dr Ryan for Kidney stone management. Offers no complaints of pain. Voiding w/o difficulty. Experiencing intermittent episodes of hematuria. During clinical course found to have WBC count 24K, Cr 1.61 elevated from 1.27 (10/30) and grossly positive UA.    PAST MEDICAL & SURGICAL HISTORY:  Colon cancer  (resection and ileostomy in 2022, with progressive residual metastatic dz, refusing CTX / radiation oncology consultation to date)  H/O ileostomy  Renal stones (s/p right ureteral stent placement)  Large bowel obstruction (at time of diagnosis of colon cancer)   History of small bowel obstruction (4/30/23: non-surgical tx)  History of cholelithiasis  Pulmonary nodule  Splenomegaly  History of ileostomy  History of DVT in adulthood S/P colon resection  H/O ureteroscopy (s/p right ureteral stent placement)    FAMILY HISTORY:  FH: brain aneurysm (Mother)    SOCIAL HISTORY:   Tobacco hx:    MEDICATIONS  (STANDING):  cefTRIAXone   IVPB 1000 milliGRAM(s) IV Intermittent every 24 hours  heparin   Injectable 5000 Unit(s) SubCutaneous every 12 hours  pantoprazole    Tablet 40 milliGRAM(s) Oral two times a day  sodium bicarbonate 650 milliGRAM(s) Oral two times a day  sodium chloride 2 Gram(s) Oral two times a day  tamsulosin 0.4 milliGRAM(s) Oral at bedtime    MEDICATIONS  (PRN):  acetaminophen     Tablet .. 650 milliGRAM(s) Oral every 6 hours PRN Temp greater or equal to 38C (100.4F), Mild Pain (1 - 3)  aluminum hydroxide/magnesium hydroxide/simethicone Suspension 30 milliLiter(s) Oral every 4 hours PRN Dyspepsia  melatonin 3 milliGRAM(s) Oral at bedtime PRN Insomnia  ondansetron Injectable 4 milliGRAM(s) IV Push every 8 hours PRN Nausea and/or Vomiting    Allergies    No Known Allergies    Intolerances    REVIEW OF SYSTEMS: Pertinent positives and negatives as stated in HPI, otherwise negative    Vital signs  T(C): 36.4 (11-26-24 @ 06:00), Max: 36.8 (11-25-24 @ 17:05)  HR: 89 (11-26-24 @ 07:30)  BP: 99/62 (11-26-24 @ 07:30)  SpO2: 100% (11-26-24 @ 07:30)  Wt(kg): --    Vital signs reviewed.   CONSTITUTIONAL: Well-appearing; well-nourished; in no apparent distress. Non-toxic appearing.   HEAD: Normocephalic, atraumatic.  EYES: Normal conjunctiva and no sclera injection noted  ENT: Normal nose; no rhinorrhea  CARD: Normal S1, S2  RESP: Normal chest excursion with respiration; breath sounds clear and equal bilaterally  ABD/GI: soft, non-distended; non-tender. Ileostomy site clean/without erythema  EXT/MS: Moves all extremities  SKIN: Normal for age and race; warm; dry; good turgor; no apparent lesions or exudate noted.  NEURO: Awake, alert, oriented x 3  PSYCH: Normal mood; appropriate affect.    LABS:    11-26 @ 06:30    WBC 23.67 / Hct 26.9  / SCr 1.61     11-25 @ 16:00    WBC --    / Hct --    / SCr 1.56     11-26    123[L]  |  98  |  46[H]  ----------------------------<  93  5.2   |  12[L]  |  1.61[H]    Ca    8.4      26 Nov 2024 06:30  Phos  4.0     11-25  Mg     1.80     11-25    TPro  5.8[L]  /  Alb  2.4[L]  /  TBili  0.3  /  DBili  x   /  AST  27  /  ALT  10  /  AlkPhos  119  11-25    PT/INR - ( 25 Nov 2024 10:56 )   PT: 15.8 sec;   INR: 1.37 ratio         PTT - ( 25 Nov 2024 10:56 )  PTT:32.7 sec  Urinalysis Basic - ( 26 Nov 2024 06:30 )    Color: x / Appearance: x / SG: x / pH: x  Gluc: 93 mg/dL / Ketone: x  / Bili: x / Urobili: x   Blood: x / Protein: x / Nitrite: x   Leuk Esterase: x / RBC: x / WBC x   Sq Epi: x / Non Sq Epi: x / Bacteria: x            RADIOLOGY:      ACC: [id number] EXAM:  US KIDNEYS AND BLADDER   ORDERED BY: ALMA LUQUE     PROCEDURE DATE:  11/26/2024          INTERPRETATION:  CLINICAL INFORMATION: Known bladder mass. Acute kidney   injury    COMPARISON: 7/28/2024 and CT abdomen and pelvis 10/30/2024    TECHNIQUE: Sonography of the kidneys and bladder.    FINDINGS:  Right kidney: 10.3 cm. No renal mass or hydronephrosis. Lower pole   calculus appreciated.    Left kidney: 13.2 cm. No renal . Severe hydronephrosis with some debris   within the collecting system similar to the prior study multiple small   stones are also suggested within the lower pole collecting system    Urinary bladder: Large bladder mass is again appreciated with internal   flow. This mass measures 8.9 x 8.0 x 9.3 cm    IMPRESSION:  The known large bladder mass is again identified  Persistent severe left hydronephrosis    --- End of Report ---            VICENTE BELTRAN MD; Attending Radiologist  This document has been electronically signed. Nov 26 2024  1:33PM     Urology Consult    HPI: 59 yr old male with a pmh significant for colon adenocarcinoma complicated by LBO status post colectomy/ileostomy, dvt s/p ivc, anemia who presents with a 1 week hx of lethargy/generalized weakness, lightheadedness, sob, dysuria since discharge. States he was supposed to have more abx sent to his pharmacy but never received them.    Urology consulted for LT severe hydronephrosis iso Malignant obstruction. Previously seen by Dr Ryan for Kidney stone management. Offers no complaints of pain. Voiding w/o difficulty. Experiencing intermittent episodes of hematuria. During clinical course found to have WBC count 24K, Cr 1.61 elevated from 1.27 (10/30) and grossly positive UA.    PAST MEDICAL & SURGICAL HISTORY:  Colon cancer  (resection and ileostomy in 2022, with progressive residual metastatic dz, refusing CTX / radiation oncology consultation to date)  H/O ileostomy  Renal stones (s/p right ureteral stent placement)  Large bowel obstruction (at time of diagnosis of colon cancer)   History of small bowel obstruction (4/30/23: non-surgical tx)  History of cholelithiasis  Pulmonary nodule  Splenomegaly  History of ileostomy  History of DVT in adulthood S/P colon resection  H/O ureteroscopy (s/p right ureteral stent placement)    FAMILY HISTORY:  FH: brain aneurysm (Mother)    SOCIAL HISTORY:   Tobacco hx:    MEDICATIONS  (STANDING):  cefTRIAXone   IVPB 1000 milliGRAM(s) IV Intermittent every 24 hours  heparin   Injectable 5000 Unit(s) SubCutaneous every 12 hours  pantoprazole    Tablet 40 milliGRAM(s) Oral two times a day  sodium bicarbonate 650 milliGRAM(s) Oral two times a day  sodium chloride 2 Gram(s) Oral two times a day  tamsulosin 0.4 milliGRAM(s) Oral at bedtime    MEDICATIONS  (PRN):  acetaminophen     Tablet .. 650 milliGRAM(s) Oral every 6 hours PRN Temp greater or equal to 38C (100.4F), Mild Pain (1 - 3)  aluminum hydroxide/magnesium hydroxide/simethicone Suspension 30 milliLiter(s) Oral every 4 hours PRN Dyspepsia  melatonin 3 milliGRAM(s) Oral at bedtime PRN Insomnia  ondansetron Injectable 4 milliGRAM(s) IV Push every 8 hours PRN Nausea and/or Vomiting    Allergies    No Known Allergies    Intolerances    REVIEW OF SYSTEMS: Pertinent positives and negatives as stated in HPI, otherwise negative    Vital signs  T(C): 36.4 (11-26-24 @ 06:00), Max: 36.8 (11-25-24 @ 17:05)  HR: 89 (11-26-24 @ 07:30)  BP: 99/62 (11-26-24 @ 07:30)  SpO2: 100% (11-26-24 @ 07:30)  Wt(kg): --    Vital signs reviewed.   CONSTITUTIONAL: Well-appearing; well-nourished; in no apparent distress. Non-toxic appearing.   HEAD: Normocephalic, atraumatic.  EYES: Normal conjunctiva and no sclera injection noted  ENT: Normal nose; no rhinorrhea  CARD: Normal S1, S2  RESP: Normal chest excursion with respiration; breath sounds clear and equal bilaterally  ABD/GI: soft, non-distended; non-tender. Ileostomy site clean/without erythema  EXT/MS: Moves all extremities  SKIN: Normal for age and race; warm; dry; good turgor; no apparent lesions or exudate noted.  NEURO: Awake, alert, oriented x 3  PSYCH: Normal mood; appropriate affect.    LABS:    11-26 @ 06:30    WBC 23.67 / Hct 26.9  / SCr 1.61     11-25 @ 16:00    WBC --    / Hct --    / SCr 1.56     11-26    123[L]  |  98  |  46[H]  ----------------------------<  93  5.2   |  12[L]  |  1.61[H]    Ca    8.4      26 Nov 2024 06:30  Phos  4.0     11-25  Mg     1.80     11-25    TPro  5.8[L]  /  Alb  2.4[L]  /  TBili  0.3  /  DBili  x   /  AST  27  /  ALT  10  /  AlkPhos  119  11-25    PT/INR - ( 25 Nov 2024 10:56 )   PT: 15.8 sec;   INR: 1.37 ratio         PTT - ( 25 Nov 2024 10:56 )  PTT:32.7 sec  Urinalysis Basic - ( 26 Nov 2024 06:30 )    Color: x / Appearance: x / SG: x / pH: x  Gluc: 93 mg/dL / Ketone: x  / Bili: x / Urobili: x   Blood: x / Protein: x / Nitrite: x   Leuk Esterase: x / RBC: x / WBC x   Sq Epi: x / Non Sq Epi: x / Bacteria: x            RADIOLOGY:      ACC: [id number] EXAM:  US KIDNEYS AND BLADDER   ORDERED BY: ALMA LUQUE     PROCEDURE DATE:  11/26/2024          INTERPRETATION:  CLINICAL INFORMATION: Known bladder mass. Acute kidney   injury    COMPARISON: 7/28/2024 and CT abdomen and pelvis 10/30/2024    TECHNIQUE: Sonography of the kidneys and bladder.    FINDINGS:  Right kidney: 10.3 cm. No renal mass or hydronephrosis. Lower pole   calculus appreciated.    Left kidney: 13.2 cm. No renal mass. Severe hydronephrosis with some debris   within the collecting system similar to the prior study multiple small   stones are also suggested within the lower pole collecting system    Urinary bladder: Large bladder mass is again appreciated with internal   flow. This mass measures 8.9 x 8.0 x 9.3 cm    IMPRESSION:  The known large bladder mass is again identified  Persistent severe left hydronephrosis    --- End of Report ---            VICENTE BELTRAN MD; Attending Radiologist  This document has been electronically signed. Nov 26 2024  1:33PM

## 2024-11-26 NOTE — PROGRESS NOTE ADULT - PROBLEM SELECTOR PLAN 4
Met. L sided colon adenoca s/p colectomy/ileostomy  - S/p C1 FOLFOX 6/10/2024, per outpt onc note, not candidate for further systemic therapy due to functional status   - CT a/p 10/30 noted POD  - Plan for palliative RT to bladder mass- pt states that he is too weak to go for outpt RT. will contact Rad-onc for possible RT inpt

## 2024-11-26 NOTE — CONSULT NOTE ADULT - ATTENDING COMMENTS
Pt seen and examined  films reviewed  goals of care appropriate as well,  but agree with recommendation of left nephrostomy as best option to recover and preserve left renal function  will follow

## 2024-11-26 NOTE — PROGRESS NOTE ADULT - SUBJECTIVE AND OBJECTIVE BOX
[facility] Division of Hospital Medicine  Yamileth Strong MD  Pager [phone]      Patient is a 59y old  Male who presents with a chief complaint of severe sepsis 2/2 uti, halley (26 Nov 2024 15:18)      SUBJECTIVE / OVERNIGHT EVENTS:    no fever o/n. BP soft. cont to report dysuria and generalized weakness. denies abd pain     ADDITIONAL REVIEW OF SYSTEMS:    RESPIRATORY: No cough, wheezing, chills or hemoptysis; No shortness of breath  CARDIOVASCULAR: No chest pain, palpitations, dizziness, or leg swelling  GASTROINTESTINAL: No abdominal or epigastric pain. No nausea, vomiting, or hematemesis; No diarrhea or constipation. No melena or hematochezia.    MEDICATIONS  (STANDING):  cefTRIAXone   IVPB 1000 milliGRAM(s) IV Intermittent every 24 hours  heparin   Injectable 5000 Unit(s) SubCutaneous every 12 hours  pantoprazole    Tablet 40 milliGRAM(s) Oral two times a day  sodium bicarbonate 650 milliGRAM(s) Oral two times a day  sodium chloride 2 Gram(s) Oral two times a day  tamsulosin 0.4 milliGRAM(s) Oral at bedtime    MEDICATIONS  (PRN):  acetaminophen     Tablet .. 650 milliGRAM(s) Oral every 6 hours PRN Temp greater or equal to 38C (100.4F), Mild Pain (1 - 3)  aluminum hydroxide/magnesium hydroxide/simethicone Suspension 30 milliLiter(s) Oral every 4 hours PRN Dyspepsia  melatonin 3 milliGRAM(s) Oral at bedtime PRN Insomnia  ondansetron Injectable 4 milliGRAM(s) IV Push every 8 hours PRN Nausea and/or Vomiting      CAPILLARY BLOOD GLUCOSE        I&O's Summary      PHYSICAL EXAM:  Vital Signs Last 24 Hrs  T(C): 36.4 (26 Nov 2024 06:00), Max: 36.8 (25 Nov 2024 17:05)  T(F): 97.6 (26 Nov 2024 06:00), Max: 98.2 (25 Nov 2024 17:05)  HR: 89 (26 Nov 2024 07:30) (86 - 93)  BP: 99/62 (26 Nov 2024 07:30) (87/63 - 103/72)  BP(mean): 73 (25 Nov 2024 17:05) (73 - 73)  RR: 18 (26 Nov 2024 07:30) (17 - 18)  SpO2: 100% (26 Nov 2024 07:30) (100% - 100%)    Parameters below as of 26 Nov 2024 07:30  Patient On (Oxygen Delivery Method): room air        CONSTITUTIONAL: NAD,  EYES: PERRLA; conjunctiva and sclera clear  ENMT: Moist oral mucosa, no pharyngeal injection or exudates;   NECK: Supple, no palpable masses;  RESPIRATORY: Normal respiratory effort; lungs are clear to auscultation bilaterally  CARDIOVASCULAR: Regular rate and rhythm, normal S1 and S2, no murmur/rub/gallop; No lower extremity edema; Peripheral pulses are 2+ bilaterally  ABDOMEN: Nontender to palpation, normoactive bowel sounds, no rebound/guarding; colostomy in place   MUSCLOSKELETAL:   no clubbing or cyanosis of digits; no joint swelling or tenderness to palpation  PSYCH: A+O to person, place, and time; affect appropriate  NEUROLOGY: CN 2-12 are intact and symmetric; no gross sensory deficits;   SKIN: No rashes;     LABS:                        8.0    23.67 )-----------( 347      ( 26 Nov 2024 06:30 )             26.9     11-26    123[L]  |  98  |  46[H]  ----------------------------<  93  5.2   |  12[L]  |  1.61[H]    Ca    8.4      26 Nov 2024 06:30  Phos  4.0     11-25  Mg     1.80     11-25    TPro  5.8[L]  /  Alb  2.4[L]  /  TBili  0.3  /  DBili  x   /  AST  27  /  ALT  10  /  AlkPhos  119  11-25    PT/INR - ( 25 Nov 2024 10:56 )   PT: 15.8 sec;   INR: 1.37 ratio         PTT - ( 25 Nov 2024 10:56 )  PTT:32.7 sec      Urinalysis Basic - ( 26 Nov 2024 06:30 )    Color: x / Appearance: x / SG: x / pH: x  Gluc: 93 mg/dL / Ketone: x  / Bili: x / Urobili: x   Blood: x / Protein: x / Nitrite: x   Leuk Esterase: x / RBC: x / WBC x   Sq Epi: x / Non Sq Epi: x / Bacteria: x        Urinalysis with Rflx Culture (collected 25 Nov 2024 14:55)        RADIOLOGY & ADDITIONAL TESTS:  Results Reviewed:   Imaging Personally Reviewed:  Electrocardiogram Personally Reviewed:    COORDINATION OF CARE:  Care Discussed with Consultants/Other Providers [Y/N]:  Prior or Outpatient Records Reviewed [Y/N]:

## 2024-11-26 NOTE — PROGRESS NOTE ADULT - ASSESSMENT
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

## 2024-11-26 NOTE — PROGRESS NOTE ADULT - PROBLEM SELECTOR PLAN 3
hx of SIADH. Na 123 worse than baseline low 130s  -increased salt tab to 2g bid  -trend Na hx of SIADH. Na 123 worse than baseline low 130s  -increased salt tab to 2g bid  -start 1.5% NS x 6hr. repeat BMP after. if Na<128 will continue for another 6 hrs   -trend Na

## 2024-11-26 NOTE — CONSULT NOTE ADULT - ASSESSMENT
HPI: 59 yr old male with a pmh significant for colon adenocarcinoma complicated by LBO status post colectomy/ileostomy, dvt s/p ivc, anemia who presents with a 1 week hx of lethargy/generalized weakness, lightheadedness, sob, dysuria since discharge. States he was supposed to have more abx sent to his pharmacy but never received them.    Urology consulted for LT severe hydronephrosis iso Malignant obstruction. Previously seen by Dr Ryan for Kidney stone management. Offers no complaints of pain. Voiding w/o difficulty. Experiencing intermittent episodes of hematuria. During clinical course found to have WBC count 24K, Cr 1.61 elevated from 1.27 (10/30) and grossly positive UA.    No acute urologic intervention  Recommend IR consult for NT given likely stent failure due to Malignant obstruction  Follow up urine culture  Trend Cr  Case Discussed w/ Dr Hoenig   HPI: 59 yr old male with a pmh significant for colon adenocarcinoma complicated by LBO status post colectomy/ileostomy, dvt s/p ivc, anemia who presents with a 1 week hx of lethargy/generalized weakness, lightheadedness, sob, dysuria since discharge. States he was supposed to have more abx sent to his pharmacy but never received them.    Urology consulted for LT severe hydronephrosis iso Malignant obstruction. Previously seen by Dr Ryan for Kidney stone management. Offers no complaints of pain. Voiding w/o difficulty. Experiencing intermittent episodes of hematuria. During clinical course found to have WBC count 24K, Cr 1.61 elevated from 1.27 (10/30) and grossly positive UA.    Recommendations:   -No acute urological intervention  -Recommend IR consult for NT given high likelihood of stent failure due to Malignant obstruction and large bladder mass  -Follow up urine culture  -Trend Cr  Case Discussed w/ Dr Hoenig

## 2024-11-26 NOTE — CONSULT NOTE ADULT - ASSESSMENT
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 CT on 10/30 and ultrasound on 11/26 show left sided hydronephrosis. IR consulted for left sided nephrostomy tube placement.     Allergies: No Known Allergies    Medications (Abx/Cardiac/Anticoagulation/Blood Products)    cefTRIAXone   IVPB: 100 mL/Hr IV Intermittent (11-25 @ 15:15)  cefTRIAXone   IVPB: 100 mL/Hr IV Intermittent (11-25 @ 18:59)    Data:  170.2  46.2  T(C): 36.4  HR: 89  BP: 99/62  RR: 18  SpO2: 100%    -WBC 23.67 / HgB 8.0 / Hct 26.9 / Plt 347  -Na 123 / Cl 98 / BUN 46 / Glucose 93  -K 5.2 / CO2 12 / Cr 1.61  -ALT -- / Alk Phos -- / T.Bili --  -INR 1.37 / PTT 32.7      Radiology:     Assessment/Plan:     -- IR will plan to perform left nephrostomy placement 11/27  -- please correct electrolytes  -- NPO at midnight on 11/26  -- hold anticoagulation   -- hold NSAIDs  -- please complete IR pre-procedure note  -- please place IR procedure request order under Dr. Tsang    --  Mony Mancini, PGY-4  Interventional Radiology   Available on Microsoft Teams    - Non-emergent consults: Place IR consult order in [place]  - Emergent issues (pager): [facility] [place] [phone]; [facility] [phone]; [phone]  - Scheduling questions: [facility] [place] [phone]; [facility] [phone]  - Clinic/outpatient booking: [facility] [place] [phone]; [facility] [phone] Interventional Radiology    Evaluate for Procedure:     HPI: 59y Male with hx of nephrolithiasis, CKD3 (baseline Cr 1.1-1.2), and colon adenoca met to LN, bladder, 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 CT on 10/30 and ultrasound on 11/26 show left sided hydronephrosis. IR consulted for left sided nephrostomy tube placement.     Allergies: No Known Allergies    Medications (Abx/Cardiac/Anticoagulation/Blood Products)    cefTRIAXone   IVPB: 100 mL/Hr IV Intermittent (11-25 @ 15:15)  cefTRIAXone   IVPB: 100 mL/Hr IV Intermittent (11-25 @ 18:59)    Data:  170.2  46.2  T(C): 36.4  HR: 89  BP: 99/62  RR: 18  SpO2: 100%    -WBC 23.67 / HgB 8.0 / Hct 26.9 / Plt 347  -Na 123 / Cl 98 / BUN 46 / Glucose 93  -K 5.2 / CO2 12 / Cr 1.61  -ALT -- / Alk Phos -- / T.Bili --  -INR 1.37 / PTT 32.7      Radiology: reviewed.    Assessment/Plan:     -- IR will plan to perform left nephrostomy placement tomorrow, 11/27  -- please correct electrolytes  -- early AM labs (CBC, BMP, Coags)  -- NPO at midnight tonight  -- HOLD anticoagulation   -- HOLD NSAIDs  -- please complete IR pre-procedure note  -- please place IR procedure request order under Dr. Tsang    --  Mony Mancini, PGY-4  Interventional Radiology   Available on [employer] Teams    - Non-emergent consults: Place IR consult order in [place]  - Emergent issues (pager): [facility] [phone]; [facility] [phone]; [phone]  - Scheduling questions: [facility] [phone]; [facility] [phone]  - Clinic/outpatient booking: [facility] [phone]; [facility] [phone]

## 2024-11-27 LAB
ANION GAP SERPL CALC-SCNC: 12 MMOL/L — SIGNIFICANT CHANGE UP (ref 7–14)
APTT BLD: 28.4 SEC — SIGNIFICANT CHANGE UP (ref 24.5–35.6)
BASOPHILS # BLD AUTO: 0.03 K/UL — SIGNIFICANT CHANGE UP (ref 0–0.2)
BASOPHILS NFR BLD AUTO: 0.1 % — SIGNIFICANT CHANGE UP (ref 0–2)
BLD GP AB SCN SERPL QL: NEGATIVE — SIGNIFICANT CHANGE UP
BUN SERPL-MCNC: 40 MG/DL — HIGH (ref 7–23)
CALCIUM SERPL-MCNC: 8.1 MG/DL — LOW (ref 8.4–10.5)
CHLORIDE SERPL-SCNC: 100 MMOL/L — SIGNIFICANT CHANGE UP (ref 98–107)
CO2 SERPL-SCNC: 13 MMOL/L — LOW (ref 22–31)
CREAT SERPL-MCNC: 1.71 MG/DL — HIGH (ref 0.5–1.3)
EGFR: 45 ML/MIN/1.73M2 — LOW
EOSINOPHIL # BLD AUTO: 0.11 K/UL — SIGNIFICANT CHANGE UP (ref 0–0.5)
EOSINOPHIL NFR BLD AUTO: 0.5 % — SIGNIFICANT CHANGE UP (ref 0–6)
GLUCOSE SERPL-MCNC: 102 MG/DL — HIGH (ref 70–99)
HCT VFR BLD CALC: 24.3 % — LOW (ref 39–50)
HGB BLD-MCNC: 7.1 G/DL — LOW (ref 13–17)
IANC: 15.97 K/UL — HIGH (ref 1.8–7.4)
IMM GRANULOCYTES NFR BLD AUTO: 1 % — HIGH (ref 0–0.9)
INR BLD: 1.47 RATIO — HIGH (ref 0.85–1.16)
LYMPHOCYTES # BLD AUTO: 10.9 % — LOW (ref 13–44)
LYMPHOCYTES # BLD AUTO: 2.2 K/UL — SIGNIFICANT CHANGE UP (ref 1–3.3)
MAGNESIUM SERPL-MCNC: 1.6 MG/DL — SIGNIFICANT CHANGE UP (ref 1.6–2.6)
MCHC RBC-ENTMCNC: 22.5 PG — LOW (ref 27–34)
MCHC RBC-ENTMCNC: 29.2 G/DL — LOW (ref 32–36)
MCV RBC AUTO: 76.9 FL — LOW (ref 80–100)
MONOCYTES # BLD AUTO: 1.73 K/UL — HIGH (ref 0–0.9)
MONOCYTES NFR BLD AUTO: 8.5 % — SIGNIFICANT CHANGE UP (ref 2–14)
NEUTROPHILS # BLD AUTO: 15.97 K/UL — HIGH (ref 1.8–7.4)
NEUTROPHILS NFR BLD AUTO: 79 % — HIGH (ref 43–77)
NRBC # BLD: 0 /100 WBCS — SIGNIFICANT CHANGE UP (ref 0–0)
NRBC # FLD: 0 K/UL — SIGNIFICANT CHANGE UP (ref 0–0)
PHOSPHATE SERPL-MCNC: 3.3 MG/DL — SIGNIFICANT CHANGE UP (ref 2.5–4.5)
PLATELET # BLD AUTO: 272 K/UL — SIGNIFICANT CHANGE UP (ref 150–400)
POTASSIUM SERPL-MCNC: 5 MMOL/L — SIGNIFICANT CHANGE UP (ref 3.5–5.3)
POTASSIUM SERPL-SCNC: 5 MMOL/L — SIGNIFICANT CHANGE UP (ref 3.5–5.3)
PROTHROM AB SERPL-ACNC: 17 SEC — HIGH (ref 9.9–13.4)
RBC # BLD: 3.16 M/UL — LOW (ref 4.2–5.8)
RBC # FLD: 20.2 % — HIGH (ref 10.3–14.5)
RH IG SCN BLD-IMP: POSITIVE — SIGNIFICANT CHANGE UP
SODIUM SERPL-SCNC: 125 MMOL/L — LOW (ref 135–145)
WBC # BLD: 20.25 K/UL — HIGH (ref 3.8–10.5)
WBC # FLD AUTO: 20.25 K/UL — HIGH (ref 3.8–10.5)

## 2024-11-27 PROCEDURE — 50432 PLMT NEPHROSTOMY CATHETER: CPT | Mod: LT

## 2024-11-27 PROCEDURE — 99233 SBSQ HOSP IP/OBS HIGH 50: CPT

## 2024-11-27 RX ADMIN — SODIUM BICARBONATE 650 MILLIGRAM(S): 84 INJECTION, SOLUTION INTRAVENOUS at 17:10

## 2024-11-27 RX ADMIN — SODIUM CHLORIDE 2 GRAM(S): 9 INJECTION, SOLUTION INTRAMUSCULAR; INTRAVENOUS; SUBCUTANEOUS at 17:09

## 2024-11-27 RX ADMIN — TAMSULOSIN HYDROCHLORIDE 0.4 MILLIGRAM(S): 0.4 CAPSULE ORAL at 21:13

## 2024-11-27 RX ADMIN — PANTOPRAZOLE SODIUM 40 MILLIGRAM(S): 40 TABLET, DELAYED RELEASE ORAL at 05:46

## 2024-11-27 RX ADMIN — SODIUM BICARBONATE 650 MILLIGRAM(S): 84 INJECTION, SOLUTION INTRAVENOUS at 05:48

## 2024-11-27 RX ADMIN — PANTOPRAZOLE SODIUM 40 MILLIGRAM(S): 40 TABLET, DELAYED RELEASE ORAL at 17:11

## 2024-11-27 RX ADMIN — SODIUM CHLORIDE 2 GRAM(S): 9 INJECTION, SOLUTION INTRAMUSCULAR; INTRAVENOUS; SUBCUTANEOUS at 05:46

## 2024-11-27 RX ADMIN — Medication 100 MILLIGRAM(S): at 17:06

## 2024-11-27 NOTE — DIETITIAN INITIAL EVALUATION ADULT - PERTINENT MEDS FT
MEDICATIONS  (STANDING):  cefTRIAXone   IVPB 1000 milliGRAM(s) IV Intermittent every 24 hours  pantoprazole    Tablet 40 milliGRAM(s) Oral two times a day  sodium bicarbonate 650 milliGRAM(s) Oral two times a day  sodium chloride 2 Gram(s) Oral two times a day  sodium chloride 1.5%. 500 milliLiter(s) (50 mL/Hr) IV Continuous <Continuous>  tamsulosin 0.4 milliGRAM(s) Oral at bedtime    MEDICATIONS  (PRN):  acetaminophen     Tablet .. 650 milliGRAM(s) Oral every 6 hours PRN Temp greater or equal to 38C (100.4F), Mild Pain (1 - 3)  aluminum hydroxide/magnesium hydroxide/simethicone Suspension 30 milliLiter(s) Oral every 4 hours PRN Dyspepsia  melatonin 3 milliGRAM(s) Oral at bedtime PRN Insomnia  ondansetron Injectable 4 milliGRAM(s) IV Push every 8 hours PRN Nausea and/or Vomiting

## 2024-11-27 NOTE — PROCEDURE NOTE - PLAN
monitor for signs of bleeding  monitor for signs of sepsis  4 hr bedrest  record nephrostomy tube output  follow up cx  await at least 24 hrs before starting dvt ppx and only if no signs of bleeding

## 2024-11-27 NOTE — PROGRESS NOTE ADULT - PROBLEM SELECTOR PLAN 3
hx of SIADH. Na 125 worse than baseline low 130s  -increased salt tab to 2g bid  -s/p 1.5 %NS 11/26  -trend Na

## 2024-11-27 NOTE — DIETITIAN INITIAL EVALUATION ADULT - ETIOLOGY
predicted inadequate energy intake  Pt meets criteria for severe malnutrition in the context of chronic illness

## 2024-11-27 NOTE — DIETITIAN INITIAL EVALUATION ADULT - OTHER INFO
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 Of note, plan for NT placement w/ IR; Pt NPO pMN.     At time of visit, Pt out to procedure; no family at bed side. Case discussed with nurse. Per nurse, Pt went down to IR for scheduled procedure. Unable to discuss food preferences or weight history. Of note, Pt's weights: 46.2 kg (dosing weight - 11/25/24); 57.6 kg (HIW 5/29/24) --> weight loss/wt change: 11.4 kg/1979%  x 6 months. +Ileostomy in place with liquid/watery out put, per nurse. Case discussed with nurse. RD remains available.       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 Of note, plan for NT placement w/ IR; Pt NPO pMN.     At time of visit, Pt awake, alert but weak. Of note, Pt was NPO past midnight for IR procedure. Pt s/p IR procedure; PO diet to resume. Pt usually eats well "three meals with snacks in between, daily" reported. No report of chewing or swallowing difficulty; no report of nausea, vomiting @ this time. +Ileostomy in place with liquid/watery out put, per nurse.    Pt's height: 67" & Pt's weight: 98# "2 weeks ago"; "probably gained few pounds" since then reported. Pt also stated, his UBW: 144#, "in October 2022". Of note, Pt's weights: 46.2 kg (dosing weight - 11/25/24); 57.6 kg (HIE -  5/29/24) --> weight loss/wt change: 11.4 kg/19.79% x 6 months. Nutrition focused physical exam conducted, Pt found with signs of subcutaneous fat loss & muscle wasting. Pt amenable to try PO supplement: Ensure Clear only -> discussed with ACP RD answered Pt's concerns related to diet. RD remains available; Pt made aware.

## 2024-11-27 NOTE — DIETITIAN INITIAL EVALUATION ADULT - NS FNS DIET ORDER
NPO after Midnight:    NPO Start Date: 26-Nov-2024,   NPO Start Time: 23:59 (11-26-24 @ 16:52)   Regular: 1000mL Fluid Restriction ([id number])

## 2024-11-27 NOTE — DIETITIAN INITIAL EVALUATION ADULT - SIGNS/SYMPTOMS
Pt with weight change: 19.7% x 6 months  Pt with physical findings described above; weight change: 19.7% x 6 months

## 2024-11-27 NOTE — PROGRESS NOTE ADULT - SUBJECTIVE AND OBJECTIVE BOX
[facility] Division of [facility] Medicine  Yamileth Strong MD  Pager [phone]      Patient is a 59y old  Male who presents with a chief complaint of Urinary tract infection     (27 Nov 2024 10:40)      SUBJECTIVE / OVERNIGHT EVENTS:    pt is now s/p L nephrostomy placement. tolerated procedure well. no new complaints     ADDITIONAL REVIEW OF SYSTEMS:    RESPIRATORY: No cough, wheezing, chills or hemoptysis; No shortness of breath  CARDIOVASCULAR: No chest pain, palpitations, dizziness, or leg swelling  GASTROINTESTINAL: No abdominal or epigastric pain. No nausea, vomiting, or hematemesis; No diarrhea or constipation. No melena or hematochezia.      MEDICATIONS  (STANDING):  cefTRIAXone   IVPB 1000 milliGRAM(s) IV Intermittent every 24 hours  pantoprazole    Tablet 40 milliGRAM(s) Oral two times a day  sodium bicarbonate 650 milliGRAM(s) Oral two times a day  sodium chloride 2 Gram(s) Oral two times a day  sodium chloride 1.5%. 500 milliLiter(s) (50 mL/Hr) IV Continuous <Continuous>  tamsulosin 0.4 milliGRAM(s) Oral at bedtime    MEDICATIONS  (PRN):  acetaminophen     Tablet .. 650 milliGRAM(s) Oral every 6 hours PRN Temp greater or equal to 38C (100.4F), Mild Pain (1 - 3)  aluminum hydroxide/magnesium hydroxide/simethicone Suspension 30 milliLiter(s) Oral every 4 hours PRN Dyspepsia  melatonin 3 milliGRAM(s) Oral at bedtime PRN Insomnia  ondansetron Injectable 4 milliGRAM(s) IV Push every 8 hours PRN Nausea and/or Vomiting      CAPILLARY BLOOD GLUCOSE        I&O's Summary    26 Nov 2024 07:01  -  27 Nov 2024 07:00  --------------------------------------------------------  IN: 450 mL / OUT: 0 mL / NET: 450 mL        PHYSICAL EXAM:  Vital Signs Last 24 Hrs  T(C): 36.4 (27 Nov 2024 11:20), Max: 36.7 (27 Nov 2024 05:04)  T(F): 97.5 (27 Nov 2024 11:20), Max: 98 (27 Nov 2024 05:04)  HR: 78 (27 Nov 2024 13:44) (75 - 97)  BP: 130/78 (27 Nov 2024 13:44) (89/64 - 130/78)  BP(mean): --  RR: 16 (27 Nov 2024 13:44) (10 - 18)  SpO2: 99% (27 Nov 2024 13:44) (97% - 100%)    Parameters below as of 27 Nov 2024 13:44  Patient On (Oxygen Delivery Method): room air        CONSTITUTIONAL: NAD,  EYES: PERRLA; conjunctiva and sclera clear  ENMT: Moist oral mucosa, no pharyngeal injection or exudates;   NECK: Supple, no palpable masses;  RESPIRATORY: Normal respiratory effort; lungs are clear to auscultation bilaterally  CARDIOVASCULAR: Regular rate and rhythm, normal S1 and S2, no murmur/rub/gallop; No lower extremity edema; Peripheral pulses are 2+ bilaterally  ABDOMEN: Nontender to palpation, normoactive bowel sounds, no rebound/guarding; L nephrostomy in place draining yellow urine   MUSCLOSKELETAL:   no clubbing or cyanosis of digits; no joint swelling or tenderness to palpation  PSYCH: A+O to person, place, and time; affect appropriate  NEUROLOGY: CN 2-12 are intact and symmetric; no gross sensory deficits;   SKIN: No rashes;     LABS:                        7.1    20.25 )-----------( 272      ( 27 Nov 2024 04:23 )             24.3     11-27    125[L]  |  100  |  40[H]  ----------------------------<  102[H]  5.0   |  13[L]  |  1.71[H]    Ca    8.1[L]      27 Nov 2024 04:23  Phos  3.3     11-27  Mg     1.60     11-27      PT/INR - ( 27 Nov 2024 04:23 )   PT: 17.0 sec;   INR: 1.47 ratio         PTT - ( 27 Nov 2024 04:23 )  PTT:28.4 sec      Urinalysis Basic - ( 27 Nov 2024 04:23 )    Color: x / Appearance: x / SG: x / pH: x  Gluc: 102 mg/dL / Ketone: x  / Bili: x / Urobili: x   Blood: x / Protein: x / Nitrite: x   Leuk Esterase: x / RBC: x / WBC x   Sq Epi: x / Non Sq Epi: x / Bacteria: x        Urinalysis with Rflx Culture (collected 25 Nov 2024 14:55)    Culture - Blood (collected 25 Nov 2024 13:15)  Source: .Blood BLOOD  Preliminary Report (26 Nov 2024 17:02):    No growth at 24 hours    Culture - Blood (collected 25 Nov 2024 13:00)  Source: .Blood BLOOD  Preliminary Report (26 Nov 2024 17:02):    No growth at 24 hours        RADIOLOGY & ADDITIONAL TESTS:  Results Reviewed:   Imaging Personally Reviewed:  Electrocardiogram Personally Reviewed:    COORDINATION OF CARE:  Care Discussed with Consultants/Other Providers [Y/N]:  Prior or Outpatient Records Reviewed [Y/N]:

## 2024-11-27 NOTE — DIETITIAN INITIAL EVALUATION ADULT - PERTINENT LABORATORY DATA
7.1    20.25 )-----------( 272      ( 27 Nov 2024 04:23 )             24.3   11-27  125[L]  |  100  |  40[H]  ----------------------------<  102[H]  5.0   |  13[L]  |  1.71[H]  Ca    8.1[L]      27 Nov 2024 04:23  Phos  3.3     11-27  Mg     1.60     11-27

## 2024-11-27 NOTE — PROGRESS NOTE ADULT - PROBLEM SELECTOR PLAN 4
Met. L sided colon adenoca s/p colectomy/ileostomy  - S/p C1 FOLFOX 6/10/2024, per outpt onc note, not candidate for further systemic therapy due to functional status   - CT a/p 10/30 noted POD  - Rad-onc consulted- no longer candidate for RT  - palliative care for GOC- possible hospice

## 2024-11-27 NOTE — PROCEDURE NOTE - PROCEDURE FINDINGS AND DETAILS
Technically successful placement of left nephrostomy tube with return of clear yellow urine. Drain attached to gravity bag.

## 2024-11-27 NOTE — DIETITIAN INITIAL EVALUATION ADULT - ADD RECOMMEND
1. Once clinically indicates, resume PO diet: Regular;   2. Once PO diet resumes, Encourage & assist Pt with meals as needed; Monitor PO diet tolerance;  3. Add Nephro-lio 1 cap daily for micronutrient coverage;   4. Monitor labs, hydration status;  1. Encourage & assist Pt with meals as needed; Monitor PO diet tolerance;  3. Add Nephro-ilo 1 cap daily for micronutrient coverage;   4. Monitor labs, hydration status;

## 2024-11-28 DIAGNOSIS — E43 UNSPECIFIED SEVERE PROTEIN-CALORIE MALNUTRITION: ICD-10-CM

## 2024-11-28 LAB
ANION GAP SERPL CALC-SCNC: 14 MMOL/L — SIGNIFICANT CHANGE UP (ref 7–14)
BASOPHILS # BLD AUTO: 0.02 K/UL — SIGNIFICANT CHANGE UP (ref 0–0.2)
BASOPHILS NFR BLD AUTO: 0.1 % — SIGNIFICANT CHANGE UP (ref 0–2)
BUN SERPL-MCNC: 42 MG/DL — HIGH (ref 7–23)
CALCIUM SERPL-MCNC: 8.3 MG/DL — LOW (ref 8.4–10.5)
CHLORIDE SERPL-SCNC: 100 MMOL/L — SIGNIFICANT CHANGE UP (ref 98–107)
CO2 SERPL-SCNC: 13 MMOL/L — LOW (ref 22–31)
CREAT SERPL-MCNC: 2 MG/DL — HIGH (ref 0.5–1.3)
EGFR: 38 ML/MIN/1.73M2 — LOW
EOSINOPHIL # BLD AUTO: 0.05 K/UL — SIGNIFICANT CHANGE UP (ref 0–0.5)
EOSINOPHIL NFR BLD AUTO: 0.2 % — SIGNIFICANT CHANGE UP (ref 0–6)
GLUCOSE SERPL-MCNC: 103 MG/DL — HIGH (ref 70–99)
HCT VFR BLD CALC: 25.2 % — LOW (ref 39–50)
HGB BLD-MCNC: 7.2 G/DL — LOW (ref 13–17)
IANC: 19.26 K/UL — HIGH (ref 1.8–7.4)
IMM GRANULOCYTES NFR BLD AUTO: 1.4 % — HIGH (ref 0–0.9)
LYMPHOCYTES # BLD AUTO: 2.02 K/UL — SIGNIFICANT CHANGE UP (ref 1–3.3)
LYMPHOCYTES # BLD AUTO: 8.4 % — LOW (ref 13–44)
MAGNESIUM SERPL-MCNC: 1.6 MG/DL — SIGNIFICANT CHANGE UP (ref 1.6–2.6)
MCHC RBC-ENTMCNC: 22.8 PG — LOW (ref 27–34)
MCHC RBC-ENTMCNC: 28.6 G/DL — LOW (ref 32–36)
MCV RBC AUTO: 79.7 FL — LOW (ref 80–100)
MONOCYTES # BLD AUTO: 2.35 K/UL — HIGH (ref 0–0.9)
MONOCYTES NFR BLD AUTO: 9.8 % — SIGNIFICANT CHANGE UP (ref 2–14)
NEUTROPHILS # BLD AUTO: 19.26 K/UL — HIGH (ref 1.8–7.4)
NEUTROPHILS NFR BLD AUTO: 80.1 % — HIGH (ref 43–77)
NRBC # BLD: 0 /100 WBCS — SIGNIFICANT CHANGE UP (ref 0–0)
NRBC # FLD: 0 K/UL — SIGNIFICANT CHANGE UP (ref 0–0)
PHOSPHATE SERPL-MCNC: 3.7 MG/DL — SIGNIFICANT CHANGE UP (ref 2.5–4.5)
PLATELET # BLD AUTO: 244 K/UL — SIGNIFICANT CHANGE UP (ref 150–400)
POTASSIUM SERPL-MCNC: 5 MMOL/L — SIGNIFICANT CHANGE UP (ref 3.5–5.3)
POTASSIUM SERPL-SCNC: 5 MMOL/L — SIGNIFICANT CHANGE UP (ref 3.5–5.3)
RBC # BLD: 3.16 M/UL — LOW (ref 4.2–5.8)
RBC # FLD: 20.4 % — HIGH (ref 10.3–14.5)
SODIUM SERPL-SCNC: 127 MMOL/L — LOW (ref 135–145)
WBC # BLD: 24.03 K/UL — HIGH (ref 3.8–10.5)
WBC # FLD AUTO: 24.03 K/UL — HIGH (ref 3.8–10.5)

## 2024-11-28 PROCEDURE — 99233 SBSQ HOSP IP/OBS HIGH 50: CPT

## 2024-11-28 RX ORDER — CEFTRIAXONE SODIUM 1 G
1000 VIAL (EA) INJECTION EVERY 24 HOURS
Refills: 0 | Status: DISCONTINUED | OUTPATIENT
Start: 2024-11-28 | End: 2024-11-29

## 2024-11-28 RX ORDER — SODIUM CHLORIDE 9 MG/ML
500 INJECTION, SOLUTION INTRAMUSCULAR; INTRAVENOUS; SUBCUTANEOUS ONCE
Refills: 0 | Status: COMPLETED | OUTPATIENT
Start: 2024-11-28 | End: 2024-11-28

## 2024-11-28 RX ADMIN — TAMSULOSIN HYDROCHLORIDE 0.4 MILLIGRAM(S): 0.4 CAPSULE ORAL at 21:08

## 2024-11-28 RX ADMIN — PANTOPRAZOLE SODIUM 40 MILLIGRAM(S): 40 TABLET, DELAYED RELEASE ORAL at 05:02

## 2024-11-28 RX ADMIN — Medication 100 MILLIGRAM(S): at 17:18

## 2024-11-28 RX ADMIN — PANTOPRAZOLE SODIUM 40 MILLIGRAM(S): 40 TABLET, DELAYED RELEASE ORAL at 17:17

## 2024-11-28 RX ADMIN — SODIUM BICARBONATE 650 MILLIGRAM(S): 84 INJECTION, SOLUTION INTRAVENOUS at 17:17

## 2024-11-28 RX ADMIN — SODIUM CHLORIDE 2 GRAM(S): 9 INJECTION, SOLUTION INTRAMUSCULAR; INTRAVENOUS; SUBCUTANEOUS at 17:17

## 2024-11-28 RX ADMIN — SODIUM CHLORIDE 250 MILLILITER(S): 9 INJECTION, SOLUTION INTRAMUSCULAR; INTRAVENOUS; SUBCUTANEOUS at 12:01

## 2024-11-28 RX ADMIN — SODIUM BICARBONATE 650 MILLIGRAM(S): 84 INJECTION, SOLUTION INTRAVENOUS at 05:02

## 2024-11-28 RX ADMIN — SODIUM CHLORIDE 2 GRAM(S): 9 INJECTION, SOLUTION INTRAMUSCULAR; INTRAVENOUS; SUBCUTANEOUS at 05:02

## 2024-11-28 NOTE — PROGRESS NOTE ADULT - SUBJECTIVE AND OBJECTIVE BOX
SOLID TUMOR ONC- HOSPITALIST    Patient is a 59y old  Male who presents with a chief complaint of severe sepsis 2/2 uti, halley (27 Nov 2024 15:47)      SUBJECTIVE / OVERNIGHT EVENTS:    MEDICATIONS  (STANDING):  pantoprazole    Tablet 40 milliGRAM(s) Oral two times a day  sodium bicarbonate 650 milliGRAM(s) Oral two times a day  sodium chloride 2 Gram(s) Oral two times a day  sodium chloride 1.5%. 500 milliLiter(s) (50 mL/Hr) IV Continuous <Continuous>  tamsulosin 0.4 milliGRAM(s) Oral at bedtime    MEDICATIONS  (PRN):  acetaminophen     Tablet .. 650 milliGRAM(s) Oral every 6 hours PRN Temp greater or equal to 38C (100.4F), Mild Pain (1 - 3)  aluminum hydroxide/magnesium hydroxide/simethicone Suspension 30 milliLiter(s) Oral every 4 hours PRN Dyspepsia  melatonin 3 milliGRAM(s) Oral at bedtime PRN Insomnia  ondansetron Injectable 4 milliGRAM(s) IV Push every 8 hours PRN Nausea and/or Vomiting        CAPILLARY BLOOD GLUCOSE        I&O's Summary    27 Nov 2024 07:01  -  28 Nov 2024 07:00  --------------------------------------------------------  IN: 0 mL / OUT: 290 mL / NET: -290 mL        PHYSICAL EXAM:  GENERAL: NAD,   HEAD:  Atraumatic, Normocephalic  EYES: EOMI, PERRLA, conjunctiva and sclera clear  NECK: Supple, No JVD  CHEST/LUNG: Clear to auscultation bilaterally; No wheeze  HEART: Regular rate and rhythm; No murmurs, rubs, or gallops  ABDOMEN: Soft, Nontender, Nondistended; Bowel sounds present  EXTREMITIES:  2+ Peripheral Pulses, No clubbing, cyanosis, or edema  PSYCH: AAOx3  NEUROLOGY: non-focal  SKIN: No rashes or lesions  ONE TUBE     LABS:                        7.2    24.03 )-----------( 244      ( 28 Nov 2024 06:19 )             25.2     11-28    127[L]  |  100  |  42[H]  ----------------------------<  103[H]  5.0   |  13[L]  |  2.00[H]    Ca    8.3[L]      28 Nov 2024 06:19  Phos  3.7     11-28  Mg     1.60     11-28      PT/INR - ( 27 Nov 2024 04:23 )   PT: 17.0 sec;   INR: 1.47 ratio         PTT - ( 27 Nov 2024 04:23 )  PTT:28.4 sec        RADIOLOGY & ADDITIONAL TESTS:  < from: US Kidney and Bladder (11.26.24 @ 11:03) >  IMPRESSION:  The known large bladder mass is again identified  Persistent severe left hydronephrosis          Care Discussed with Consultants/Other Providers:   SOLID TUMOR ONC- HOSPITALIST    Patient is a 59y old  Male who presents with a chief complaint of severe sepsis 2/2 uti, halley (27 Nov 2024 15:47)      SUBJECTIVE / OVERNIGHT EVENTS:  patient seen with Onc team and with wife at bedside.  He notes he is always tired and "wants to sleep"  Wife is very pleasant, notes this is the 3 rd admission for UTI to hospital    MEDICATIONS  (STANDING):  pantoprazole    Tablet 40 milliGRAM(s) Oral two times a day  sodium bicarbonate 650 milliGRAM(s) Oral two times a day  sodium chloride 2 Gram(s) Oral two times a day  sodium chloride 1.5%. 500 milliLiter(s) (50 mL/Hr) IV Continuous <Continuous>  tamsulosin 0.4 milliGRAM(s) Oral at bedtime    MEDICATIONS  (PRN):  acetaminophen     Tablet .. 650 milliGRAM(s) Oral every 6 hours PRN Temp greater or equal to 38C (100.4F), Mild Pain (1 - 3)  aluminum hydroxide/magnesium hydroxide/simethicone Suspension 30 milliLiter(s) Oral every 4 hours PRN Dyspepsia  melatonin 3 milliGRAM(s) Oral at bedtime PRN Insomnia  ondansetron Injectable 4 milliGRAM(s) IV Push every 8 hours PRN Nausea and/or Vomiting        I&O's Summary    27 Nov 2024 07:01  -  28 Nov 2024 07:00  --------------------------------------------------------  IN: 0 mL / OUT: 290 mL / NET: -290 mL      Vital Signs Last 24 Hrs    T(F): 98 (28 Nov 2024 04:53), Max: 98 (28 Nov 2024 04:53)  HR: 89 (28 Nov 2024 04:53) (76 - 97)  BP: 100/69 (28 Nov 2024 04:53) (96/72 - 130/78)  RR: 18 (28 Nov 2024 04:53) (11 - 18)  SpO2: 96%  nasal cannula      PHYSICAL EXAM:  GENERAL: NAD, LOOKS comfortable and sleepy under multiple blankets  HEAD:  Atraumatic, Normocephalic  EYES: EOMI, PERRLA, conjunctiva and sclera clear  NECK: Supple, No JVD  CHEST/LUNG: Clear to auscultation bilaterally; No wheeze  HEART: Regular rate and rhythm; No murmurs, rubs, or gallops  ABDOMEN: Soft, Nontender, Nondistended; Bowel sounds present  L Perc NT . Dry dressing, clear liquid in bag  EXTREMITIES:  2+ Peripheral Pulses, No clubbing, cyanosis, or edema  PSYCH: Alert and talking, wife gives most of the history       LABS:                        7.2    24.03 )-----------( 244      ( 28 Nov 2024 06:19 )             25.2     11-28    127[L]  |  100  |  42[H]  ----------------------------<  103[H]  5.0   |  13[L]  |  2.00[H]    Ca    8.3[L]      28 Nov 2024 06:19  Phos  3.7     11-28  Mg     1.60     11-28      PT/INR - ( 27 Nov 2024 04:23 )   PT: 17.0 sec;   INR: 1.47 ratio         PTT - ( 27 Nov 2024 04:23 )  PTT:28.4 sec        RADIOLOGY & ADDITIONAL TESTS:  < from: US Kidney and Bladder (11.26.24 @ 11:03) >  IMPRESSION:  The known large bladder mass is again identified  Persistent severe left hydronephrosis          Care Discussed with Consultants/Other Providers:  Patient/ wife/ Onc -team

## 2024-11-28 NOTE — PROGRESS NOTE ADULT - ASSESSMENT
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 Mets   Anemia  HEMATURIA with bleed , no AC, IVC Filter   hypercoagulable state but no AC due to h/o hematuria  h/o DVT  Severe protein irene malnutrition

## 2024-11-28 NOTE — PROGRESS NOTE ADULT - PROBLEM SELECTOR PLAN 3
hx of SIADH. Na 125 worse than baseline low 130s  -increased salt tab to 2g bid  -s/p 1.5 %NS 11/26  -trend Na hx of SIADH. Na 125 worse than baseline low 130s  -increased salt tab to 2g bid  -s/p 1.5 %NS 11/26  Na 124-----> 127

## 2024-11-28 NOTE — PROGRESS NOTE ADULT - PROBLEM SELECTOR PLAN 1
met sepsis criteria on admission   -UA+, urine cx pending.  blood cx ngtd  -c/w empiric CTX met sepsis criteria on admission   -UA+, urine cx pending!!!!!.   11/25 urine culture --> Cancelled  11/25 Blood culture --> neg x2  -c/w empiric CTX  11/25 WBC 26-----> 11/28 WBC 24  Will send urine culture 11/28 met sepsis criteria on admission   -UA+,  11/27 urine cx --> sent in lab --> pending.   11/25 urine culture --> Cancelled  11/25 Blood culture --> neg x2  -c/w empiric CTX  11/25 WBC 26-----> 11/28 WBC 24

## 2024-11-29 LAB
ACANTHOCYTES BLD QL SMEAR: SLIGHT — SIGNIFICANT CHANGE UP
ANION GAP SERPL CALC-SCNC: 15 MMOL/L — HIGH (ref 7–14)
ANISOCYTOSIS BLD QL: SLIGHT — SIGNIFICANT CHANGE UP
BASOPHILS # BLD AUTO: 0 K/UL — SIGNIFICANT CHANGE UP (ref 0–0.2)
BASOPHILS NFR BLD AUTO: 0 % — SIGNIFICANT CHANGE UP (ref 0–2)
BUN SERPL-MCNC: 48 MG/DL — HIGH (ref 7–23)
BURR CELLS BLD QL SMEAR: PRESENT — SIGNIFICANT CHANGE UP
CALCIUM SERPL-MCNC: 8.5 MG/DL — SIGNIFICANT CHANGE UP (ref 8.4–10.5)
CHLORIDE SERPL-SCNC: 99 MMOL/L — SIGNIFICANT CHANGE UP (ref 98–107)
CO2 SERPL-SCNC: 12 MMOL/L — LOW (ref 22–31)
CREAT SERPL-MCNC: 2.01 MG/DL — HIGH (ref 0.5–1.3)
EGFR: 37 ML/MIN/1.73M2 — LOW
EOSINOPHIL # BLD AUTO: 0 K/UL — SIGNIFICANT CHANGE UP (ref 0–0.5)
EOSINOPHIL NFR BLD AUTO: 0 % — SIGNIFICANT CHANGE UP (ref 0–6)
GIANT PLATELETS BLD QL SMEAR: PRESENT — SIGNIFICANT CHANGE UP
GLUCOSE SERPL-MCNC: 102 MG/DL — HIGH (ref 70–99)
HCT VFR BLD CALC: 29.7 % — LOW (ref 39–50)
HGB BLD-MCNC: 8.6 G/DL — LOW (ref 13–17)
HYPOCHROMIA BLD QL: SLIGHT — SIGNIFICANT CHANGE UP
IANC: 21.47 K/UL — HIGH (ref 1.8–7.4)
LYMPHOCYTES # BLD AUTO: 1.85 K/UL — SIGNIFICANT CHANGE UP (ref 1–3.3)
LYMPHOCYTES # BLD AUTO: 7 % — LOW (ref 13–44)
MAGNESIUM SERPL-MCNC: 1.6 MG/DL — SIGNIFICANT CHANGE UP (ref 1.6–2.6)
MCHC RBC-ENTMCNC: 22.3 PG — LOW (ref 27–34)
MCHC RBC-ENTMCNC: 29 G/DL — LOW (ref 32–36)
MCV RBC AUTO: 76.9 FL — LOW (ref 80–100)
MICROCYTES BLD QL: SLIGHT — SIGNIFICANT CHANGE UP
MONOCYTES # BLD AUTO: 1.37 K/UL — HIGH (ref 0–0.9)
MONOCYTES NFR BLD AUTO: 5.2 % — SIGNIFICANT CHANGE UP (ref 2–14)
MYELOCYTES NFR BLD: 1.7 % — HIGH (ref 0–0)
NEUTROPHILS # BLD AUTO: 22.73 K/UL — HIGH (ref 1.8–7.4)
NEUTROPHILS NFR BLD AUTO: 86.1 % — HIGH (ref 43–77)
OVALOCYTES BLD QL SMEAR: SLIGHT — SIGNIFICANT CHANGE UP
PHOSPHATE SERPL-MCNC: 3.5 MG/DL — SIGNIFICANT CHANGE UP (ref 2.5–4.5)
PLAT MORPH BLD: NORMAL — SIGNIFICANT CHANGE UP
PLATELET # BLD AUTO: 219 K/UL — SIGNIFICANT CHANGE UP (ref 150–400)
PLATELET COUNT - ESTIMATE: NORMAL — SIGNIFICANT CHANGE UP
POIKILOCYTOSIS BLD QL AUTO: SIGNIFICANT CHANGE UP
POLYCHROMASIA BLD QL SMEAR: SIGNIFICANT CHANGE UP
POTASSIUM SERPL-MCNC: 4.8 MMOL/L — SIGNIFICANT CHANGE UP (ref 3.5–5.3)
POTASSIUM SERPL-SCNC: 4.8 MMOL/L — SIGNIFICANT CHANGE UP (ref 3.5–5.3)
RBC # BLD: 3.86 M/UL — LOW (ref 4.2–5.8)
RBC # FLD: 20.2 % — HIGH (ref 10.3–14.5)
RBC BLD AUTO: ABNORMAL
SCHISTOCYTES BLD QL AUTO: SLIGHT — SIGNIFICANT CHANGE UP
SMUDGE CELLS # BLD: PRESENT — SIGNIFICANT CHANGE UP
SODIUM SERPL-SCNC: 126 MMOL/L — LOW (ref 135–145)
WBC # BLD: 26.4 K/UL — HIGH (ref 3.8–10.5)
WBC # FLD AUTO: 26.4 K/UL — HIGH (ref 3.8–10.5)

## 2024-11-29 PROCEDURE — 99233 SBSQ HOSP IP/OBS HIGH 50: CPT

## 2024-11-29 RX ORDER — PIPERACILLIN SODIUM AND TAZOBACTAM SODIUM 4; .5 G/20ML; G/20ML
3.38 INJECTION, POWDER, LYOPHILIZED, FOR SOLUTION INTRAVENOUS ONCE
Refills: 0 | Status: COMPLETED | OUTPATIENT
Start: 2024-11-29 | End: 2024-11-29

## 2024-11-29 RX ORDER — SODIUM CHLORIDE 9 MG/ML
300 INJECTION, SOLUTION INTRAMUSCULAR; INTRAVENOUS; SUBCUTANEOUS ONCE
Refills: 0 | Status: COMPLETED | OUTPATIENT
Start: 2024-11-29 | End: 2024-11-29

## 2024-11-29 RX ORDER — PIPERACILLIN SODIUM AND TAZOBACTAM SODIUM 4; .5 G/20ML; G/20ML
3.38 INJECTION, POWDER, LYOPHILIZED, FOR SOLUTION INTRAVENOUS EVERY 8 HOURS
Refills: 0 | Status: DISCONTINUED | OUTPATIENT
Start: 2024-11-29 | End: 2024-12-03

## 2024-11-29 RX ADMIN — PANTOPRAZOLE SODIUM 40 MILLIGRAM(S): 40 TABLET, DELAYED RELEASE ORAL at 05:10

## 2024-11-29 RX ADMIN — SODIUM BICARBONATE 650 MILLIGRAM(S): 84 INJECTION, SOLUTION INTRAVENOUS at 05:10

## 2024-11-29 RX ADMIN — SODIUM CHLORIDE 300 MILLILITER(S): 9 INJECTION, SOLUTION INTRAMUSCULAR; INTRAVENOUS; SUBCUTANEOUS at 08:59

## 2024-11-29 RX ADMIN — PIPERACILLIN SODIUM AND TAZOBACTAM SODIUM 25 GRAM(S): 4; .5 INJECTION, POWDER, LYOPHILIZED, FOR SOLUTION INTRAVENOUS at 13:54

## 2024-11-29 RX ADMIN — PIPERACILLIN SODIUM AND TAZOBACTAM SODIUM 200 GRAM(S): 4; .5 INJECTION, POWDER, LYOPHILIZED, FOR SOLUTION INTRAVENOUS at 09:07

## 2024-11-29 RX ADMIN — SODIUM BICARBONATE 650 MILLIGRAM(S): 84 INJECTION, SOLUTION INTRAVENOUS at 18:03

## 2024-11-29 RX ADMIN — PANTOPRAZOLE SODIUM 40 MILLIGRAM(S): 40 TABLET, DELAYED RELEASE ORAL at 18:03

## 2024-11-29 RX ADMIN — PIPERACILLIN SODIUM AND TAZOBACTAM SODIUM 25 GRAM(S): 4; .5 INJECTION, POWDER, LYOPHILIZED, FOR SOLUTION INTRAVENOUS at 22:01

## 2024-11-29 RX ADMIN — SODIUM CHLORIDE 2 GRAM(S): 9 INJECTION, SOLUTION INTRAMUSCULAR; INTRAVENOUS; SUBCUTANEOUS at 05:10

## 2024-11-29 RX ADMIN — SODIUM CHLORIDE 2 GRAM(S): 9 INJECTION, SOLUTION INTRAMUSCULAR; INTRAVENOUS; SUBCUTANEOUS at 18:02

## 2024-11-29 RX ADMIN — TAMSULOSIN HYDROCHLORIDE 0.4 MILLIGRAM(S): 0.4 CAPSULE ORAL at 21:21

## 2024-11-29 NOTE — PROGRESS NOTE ADULT - PROBLEM SELECTOR PLAN 3
hx of SIADH. Na 125 worse than baseline low 130s  -increased salt tab to 2g bid  -s/p 1.5 %NS 11/26  Na 124-----> 127--> 126  will trend , if dec more , may ask nephrology to consult

## 2024-11-29 NOTE — PROGRESS NOTE ADULT - SUBJECTIVE AND OBJECTIVE BOX
60yo Male s/p Left percutaneous nephrostomy tube placement on 11/27 in Interventional Radiology.     Patient seen and examined at bedside, resting comfortably. No complaints offered.    T(F): 97.6 (11-29-24 @ 09:20), Max: 97.6 (11-28-24 @ 11:40)  HR: 90 (11-29-24 @ 09:20) (90 - 99)  BP: 94/64 (11-29-24 @ 09:20) (86/61 - 99/63)  RR: 17 (11-29-24 @ 09:20) (17 - 18)  SpO2: 100% (11-29-24 @ 09:20) (100% - 100%)    LABS:                    8.6    26.40 )-----------( 219      ( 29 Nov 2024 05:00 )             29.7     11-29  126[L]  |  99  |  48[H]  ----------------------------<  102[H]  4.8   |  12[L]  |  2.01[H]    Ca    8.5      29 Nov 2024 05:00  Phos  3.5     11-29  Mg     1.60     11-29      I&O's Detail  28 Nov 2024 07:01  -  29 Nov 2024 07:00  --------------------------------------------------------  IN:    IV PiggyBack: 50 mL    Sodium Chloride 0.9% Bolus: 500 mL  Total IN: 550 mL    OUT:    Nephrostomy Tube (mL): 140 mL  Total OUT: 140 mL  Total NET: 410 mL      PHYSICAL EXAM:  General: Nontoxic, resting comfortably, in NAD  Left Nephrostomy Tube: Dressing C/D/I. Drainage bag with clear yellow, urine with small amount of blood streaking. Flushed w/ 5cc NS at bedside.

## 2024-11-29 NOTE — PROGRESS NOTE ADULT - PROBLEM SELECTOR PLAN 4
Met. L sided colon adenoca s/p colectomy/ileostomy  - S/p C1 FOLFOX 6/10/2024, per outpt onc note, not candidate for further systemic therapy due to functional status   - CT a/p 10/30 noted POD  - Rad-onc consulted- no longer candidate for RT  - palliative care for GOC- possible hospice???--> will d/w Onc team before calling.

## 2024-11-29 NOTE — PROGRESS NOTE ADULT - ASSESSMENT
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 Mets   Anemia  HEMATURIA with bleed , no AC, IVC Filter   hypercoagulable state but no AC due to h/o hematuria  h/o DVT  Severe protein irene malnutrition

## 2024-11-29 NOTE — PROGRESS NOTE ADULT - ASSESSMENT
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 CT on 10/30 and ultrasound on 11/26 show left sided hydronephrosis. IR consulted for left sided nephrostomy tube placement. Patient is now s/p Left percutaneous nephrostomy tube placement on 11/27 in IR.    Plan:  - Continue drainage, monitor output  - Flush drain w/ 5cc NS qd  - Follow-up fluid studies (gram stain/culture)  - Await at least 24 hrs before starting dvt ppx and only if no signs of bleeding  - If Left nephrostomy tube is needed for long-term management of Left hydronephrosis, will need routine nephrostomy tube exchange every 3 months. Can call Outpatient IR office [phone] to schedule.     [id number]

## 2024-11-29 NOTE — PROGRESS NOTE ADULT - SUBJECTIVE AND OBJECTIVE BOX
8.6    26.40 )-----------( 219      ( 29 Nov 2024 05:00 )             29.7   wbc inc  bp low  d/c ceftriaxone   Start iv zosyn  IV NS bolus                       8.6    26.40 )-----------( 219      ( 29 Nov 2024 05:00 )             29.7   wbc inc  bp low  d/c ceftriaxone   Start iv zosyn  IV NS bolus.  SOLID TUMOR ONC - HOSPITALIST  Patient is a 59y old  Male who presents with a chief complaint of severe sepsis 2/2 uti, halley (29 Nov 2024 08:17)      SUBJECTIVE / OVERNIGHT EVENTS:  Patient seen more alert and eating breakfast with wife present.  Patient is extremely thin,      MEDICATIONS  (STANDING):  pantoprazole    Tablet 40 milliGRAM(s) Oral two times a day  piperacillin/tazobactam IVPB.- 3.375 Gram(s) IV Intermittent once  piperacillin/tazobactam IVPB.. 3.375 Gram(s) IV Intermittent every 8 hours  sodium bicarbonate 650 milliGRAM(s) Oral two times a day  sodium chloride 2 Gram(s) Oral two times a day  tamsulosin 0.4 milliGRAM(s) Oral at bedtime    MEDICATIONS  (PRN):  aluminum hydroxide/magnesium hydroxide/simethicone Suspension 30 milliLiter(s) Oral every 4 hours PRN Dyspepsia  melatonin 3 milliGRAM(s) Oral at bedtime PRN Insomnia  ondansetron Injectable 4 milliGRAM(s) IV Push every 8 hours PRN Nausea and/or Vomiting    I&O's Summary    28 Nov 2024 07:01  -  29 Nov 2024 07:00  --------------------------------------------------------  IN: 550 mL / OUT: 140 mL / NET: 410 mL      Vital Signs Last 24 Hrs    T(F): 97.6 (29 Nov 2024 09:20), Max: 97.6 (28 Nov 2024 11:40)  HR: 90 (29 Nov 2024 09:20) (90 - 99)  BP: 94/64 (29 Nov 2024 09:20) (86/61 - 99/63)  BP(mean): --  RR: 17 (29 Nov 2024 09:20) (17 - 18)  SpO2: 100% room air      PHYSICAL EXAM:  GENERAL: NAD,   HEAD:  Atraumatic, Normocephalic  EYES: EOMI, PERRLA, conjunctiva and sclera clear  NECK: Supple, No JVD  CHEST/LUNG: Clear to auscultation bilaterally; very distant BS b/l, poor ins effort  HEART: Regular rate and rhythm; No murmurs, rubs, or gallops  ABDOMEN: Soft, Nontender, Nondistended; Bowel sounds present  L PERC NT  EXTREMITIES:  2+ Peripheral Pulses, No clubbing, cyanosis,   B/L lE edema 2 plus  ( h/o dvt and IVC filter)  PSYCH: Alert    LABS:                        8.6    26.40 )-----------( 219      ( 29 Nov 2024 05:00 )             29.7     11-29    126[L]  |  99  |  48[H]  ----------------------------<  102[H]  4.8   |  12[L]  |  2.01[H]    Ca    8.5      29 Nov 2024 05:00  Phos  3.5     11-29  Mg     1.60     11-29         Care Discussed with Consultants/Other Providers:                    8.6    26.40 )-----------( 219      ( 29 Nov 2024 05:00 )             29.7   wbc inc  bp low  d/c ceftriaxone   Start iv zosyn  IV NS bolus.

## 2024-11-29 NOTE — PROGRESS NOTE ADULT - PROBLEM SELECTOR PLAN 1
SESPIS Sec to UTI  -UA+,  11/27 urine cx --> sent in lab --> pending.   11/25 urine culture --> Cancelled  11/25 Blood culture --> neg x2  -c/w empiric CTX  11/25 WBC 26-----> 11/28 WBC 24---> 11/29 wbc 26  11/29 increased wbc and decreased  SBP 91, d/c Ceftriaxone, start Iv Zosyn, NS bolus IVF, patient alert and aeting and talking today---> CLINICALLY BETTER!

## 2024-11-30 DIAGNOSIS — D63.8 ANEMIA IN OTHER CHRONIC DISEASES CLASSIFIED ELSEWHERE: ICD-10-CM

## 2024-11-30 LAB
ADD ON TEST-SPECIMEN IN LAB: SIGNIFICANT CHANGE UP
ANION GAP SERPL CALC-SCNC: 13 MMOL/L — SIGNIFICANT CHANGE UP (ref 7–14)
BASOPHILS # BLD AUTO: 0.03 K/UL — SIGNIFICANT CHANGE UP (ref 0–0.2)
BASOPHILS NFR BLD AUTO: 0.1 % — SIGNIFICANT CHANGE UP (ref 0–2)
BLD GP AB SCN SERPL QL: NEGATIVE — SIGNIFICANT CHANGE UP
BUN SERPL-MCNC: 44 MG/DL — HIGH (ref 7–23)
CALCIUM SERPL-MCNC: 8 MG/DL — LOW (ref 8.4–10.5)
CHLORIDE SERPL-SCNC: 100 MMOL/L — SIGNIFICANT CHANGE UP (ref 98–107)
CO2 SERPL-SCNC: 13 MMOL/L — LOW (ref 22–31)
CREAT SERPL-MCNC: 1.82 MG/DL — HIGH (ref 0.5–1.3)
CULTURE RESULTS: NO GROWTH — SIGNIFICANT CHANGE UP
CULTURE RESULTS: SIGNIFICANT CHANGE UP
CULTURE RESULTS: SIGNIFICANT CHANGE UP
EGFR: 42 ML/MIN/1.73M2 — LOW
EOSINOPHIL # BLD AUTO: 0.07 K/UL — SIGNIFICANT CHANGE UP (ref 0–0.5)
EOSINOPHIL NFR BLD AUTO: 0.3 % — SIGNIFICANT CHANGE UP (ref 0–6)
FERRITIN SERPL-MCNC: 33 NG/ML — SIGNIFICANT CHANGE UP (ref 30–400)
GLUCOSE SERPL-MCNC: 102 MG/DL — HIGH (ref 70–99)
HCT VFR BLD CALC: 22.8 % — LOW (ref 39–50)
HCT VFR BLD CALC: 23.2 % — LOW (ref 39–50)
HGB BLD-MCNC: 7 G/DL — CRITICAL LOW (ref 13–17)
HGB BLD-MCNC: 7 G/DL — CRITICAL LOW (ref 13–17)
IANC: 16.56 K/UL — HIGH (ref 1.8–7.4)
IMM GRANULOCYTES NFR BLD AUTO: 1.6 % — HIGH (ref 0–0.9)
IRON SATN MFR SERPL: 10 UG/DL — LOW (ref 45–165)
IRON SATN MFR SERPL: 5 % — LOW (ref 14–50)
LYMPHOCYTES # BLD AUTO: 10.8 % — LOW (ref 13–44)
LYMPHOCYTES # BLD AUTO: 2.25 K/UL — SIGNIFICANT CHANGE UP (ref 1–3.3)
MAGNESIUM SERPL-MCNC: 1.4 MG/DL — LOW (ref 1.6–2.6)
MCHC RBC-ENTMCNC: 22.8 PG — LOW (ref 27–34)
MCHC RBC-ENTMCNC: 23.3 PG — LOW (ref 27–34)
MCHC RBC-ENTMCNC: 30.2 G/DL — LOW (ref 32–36)
MCHC RBC-ENTMCNC: 30.7 G/DL — LOW (ref 32–36)
MCV RBC AUTO: 75.6 FL — LOW (ref 80–100)
MCV RBC AUTO: 75.7 FL — LOW (ref 80–100)
MONOCYTES # BLD AUTO: 1.57 K/UL — HIGH (ref 0–0.9)
MONOCYTES NFR BLD AUTO: 7.5 % — SIGNIFICANT CHANGE UP (ref 2–14)
NEUTROPHILS # BLD AUTO: 16.56 K/UL — HIGH (ref 1.8–7.4)
NEUTROPHILS NFR BLD AUTO: 79.7 % — HIGH (ref 43–77)
NRBC # BLD: 0 /100 WBCS — SIGNIFICANT CHANGE UP (ref 0–0)
NRBC # BLD: 0 /100 WBCS — SIGNIFICANT CHANGE UP (ref 0–0)
NRBC # FLD: 0.02 K/UL — HIGH (ref 0–0)
NRBC # FLD: 0.02 K/UL — HIGH (ref 0–0)
PHOSPHATE SERPL-MCNC: 3.2 MG/DL — SIGNIFICANT CHANGE UP (ref 2.5–4.5)
PLATELET # BLD AUTO: 194 K/UL — SIGNIFICANT CHANGE UP (ref 150–400)
PLATELET # BLD AUTO: 209 K/UL — SIGNIFICANT CHANGE UP (ref 150–400)
POTASSIUM SERPL-MCNC: 4.1 MMOL/L — SIGNIFICANT CHANGE UP (ref 3.5–5.3)
POTASSIUM SERPL-SCNC: 4.1 MMOL/L — SIGNIFICANT CHANGE UP (ref 3.5–5.3)
RBC # BLD: 3.01 M/UL — LOW (ref 4.2–5.8)
RBC # BLD: 3.07 M/UL — LOW (ref 4.2–5.8)
RBC # FLD: 19.9 % — HIGH (ref 10.3–14.5)
RBC # FLD: 19.9 % — HIGH (ref 10.3–14.5)
RH IG SCN BLD-IMP: POSITIVE — SIGNIFICANT CHANGE UP
SODIUM SERPL-SCNC: 126 MMOL/L — LOW (ref 135–145)
SPECIMEN SOURCE: SIGNIFICANT CHANGE UP
TIBC SERPL-MCNC: 190 UG/DL — LOW (ref 220–430)
UIBC SERPL-MCNC: 180 UG/DL — SIGNIFICANT CHANGE UP (ref 110–370)
WBC # BLD: 20.02 K/UL — HIGH (ref 3.8–10.5)
WBC # BLD: 20.81 K/UL — HIGH (ref 3.8–10.5)
WBC # FLD AUTO: 20.02 K/UL — HIGH (ref 3.8–10.5)
WBC # FLD AUTO: 20.81 K/UL — HIGH (ref 3.8–10.5)

## 2024-11-30 PROCEDURE — 99233 SBSQ HOSP IP/OBS HIGH 50: CPT

## 2024-11-30 RX ORDER — ACETAMINOPHEN 500MG 500 MG/1
650 TABLET, COATED ORAL ONCE
Refills: 0 | Status: COMPLETED | OUTPATIENT
Start: 2024-11-30 | End: 2024-11-30

## 2024-11-30 RX ORDER — CETIRIZINE HYDROCHLORIDE 10 MG/1
10 TABLET ORAL ONCE
Refills: 0 | Status: COMPLETED | OUTPATIENT
Start: 2024-11-30 | End: 2024-11-30

## 2024-11-30 RX ORDER — SODIUM CHLORIDE 9 MG/ML
500 INJECTION, SOLUTION INTRAMUSCULAR; INTRAVENOUS; SUBCUTANEOUS ONCE
Refills: 0 | Status: COMPLETED | OUTPATIENT
Start: 2024-11-30 | End: 2024-11-30

## 2024-11-30 RX ADMIN — SODIUM CHLORIDE 2 GRAM(S): 9 INJECTION, SOLUTION INTRAMUSCULAR; INTRAVENOUS; SUBCUTANEOUS at 05:34

## 2024-11-30 RX ADMIN — PANTOPRAZOLE SODIUM 40 MILLIGRAM(S): 40 TABLET, DELAYED RELEASE ORAL at 05:34

## 2024-11-30 RX ADMIN — PIPERACILLIN SODIUM AND TAZOBACTAM SODIUM 25 GRAM(S): 4; .5 INJECTION, POWDER, LYOPHILIZED, FOR SOLUTION INTRAVENOUS at 14:22

## 2024-11-30 RX ADMIN — Medication 25 GRAM(S): at 10:03

## 2024-11-30 RX ADMIN — ACETAMINOPHEN 500MG 650 MILLIGRAM(S): 500 TABLET, COATED ORAL at 17:54

## 2024-11-30 RX ADMIN — ACETAMINOPHEN 500MG 650 MILLIGRAM(S): 500 TABLET, COATED ORAL at 17:00

## 2024-11-30 RX ADMIN — CETIRIZINE HYDROCHLORIDE 10 MILLIGRAM(S): 10 TABLET ORAL at 17:54

## 2024-11-30 RX ADMIN — TAMSULOSIN HYDROCHLORIDE 0.4 MILLIGRAM(S): 0.4 CAPSULE ORAL at 23:58

## 2024-11-30 RX ADMIN — PANTOPRAZOLE SODIUM 40 MILLIGRAM(S): 40 TABLET, DELAYED RELEASE ORAL at 17:55

## 2024-11-30 RX ADMIN — SODIUM CHLORIDE 250 MILLILITER(S): 9 INJECTION, SOLUTION INTRAMUSCULAR; INTRAVENOUS; SUBCUTANEOUS at 23:58

## 2024-11-30 RX ADMIN — SODIUM BICARBONATE 650 MILLIGRAM(S): 84 INJECTION, SOLUTION INTRAVENOUS at 17:54

## 2024-11-30 RX ADMIN — SODIUM CHLORIDE 2 GRAM(S): 9 INJECTION, SOLUTION INTRAMUSCULAR; INTRAVENOUS; SUBCUTANEOUS at 17:54

## 2024-11-30 RX ADMIN — SODIUM BICARBONATE 650 MILLIGRAM(S): 84 INJECTION, SOLUTION INTRAVENOUS at 05:34

## 2024-11-30 RX ADMIN — PIPERACILLIN SODIUM AND TAZOBACTAM SODIUM 25 GRAM(S): 4; .5 INJECTION, POWDER, LYOPHILIZED, FOR SOLUTION INTRAVENOUS at 05:33

## 2024-11-30 NOTE — PROVIDER CONTACT NOTE (CRITICAL VALUE NOTIFICATION) - ACTION/TREATMENT ORDERED:
ACP Bonny Armendariz made aware. As per ACP, repeat CBC at 1800. Monitor s/s of bleeding
1 unit of PRCB will be ordered as per RACHEL Draper and will continue to monitor

## 2024-11-30 NOTE — PROVIDER CONTACT NOTE (CRITICAL VALUE NOTIFICATION) - ASSESSMENT
patient noted pale and weak
Patient is AAOx4. Vital signs as charted. Unlabored breathing on room air. Denied chest pain, SOB, numbness and tingling in upper and lower extremities. Normal sinus rhythm on telemetry monitoring. No s/s of bleeding at this time.

## 2024-11-30 NOTE — PROGRESS NOTE ADULT - PROBLEM SELECTOR PLAN 1
SEPSIS  Sec to UTI  -UA+,  11/27 urine cx --> sent in lab --> pending.   11/25 urine culture --> Cancelled  11/25 Blood culture --> neg x2  -c/w empiric CTX  11/25 WBC 26-----> 11/28 WBC 24---> 11/29 wbc 26-->20   11/29 increased wbc and decreased  SBP 91, d/c Ceftriaxone, start Iv Zosyn, NS bolus IVF, patient alert and eating   Sepsis , slowly resolving, c/w IV Zosyn

## 2024-11-30 NOTE — PROGRESS NOTE ADULT - SUBJECTIVE AND OBJECTIVE BOX
Hgb 7  CBC stat and t/s SOLID TUMOR Onc- HOSP  Patient is a 59y old  Male who presents with a chief complaint of severe sepsis 2/2 uti, halley (30 Nov 2024 08:40)      SUBJECTIVE / OVERNIGHT EVENTS:  Patient seen with Onc-team, resting in bed.  Explain he may need 1 unit of blood, consent obtained,    MEDICATIONS  (STANDING):  pantoprazole    Tablet 40 milliGRAM(s) Oral two times a day  piperacillin/tazobactam IVPB.. 3.375 Gram(s) IV Intermittent every 8 hours  sodium bicarbonate 650 milliGRAM(s) Oral two times a day  sodium chloride 2 Gram(s) Oral two times a day  tamsulosin 0.4 milliGRAM(s) Oral at bedtime    MEDICATIONS  (PRN):  aluminum hydroxide/magnesium hydroxide/simethicone Suspension 30 milliLiter(s) Oral every 4 hours PRN Dyspepsia  melatonin 3 milliGRAM(s) Oral at bedtime PRN Insomnia  ondansetron Injectable 4 milliGRAM(s) IV Push every 8 hours PRN Nausea and/or Vomiting      I&O's Summary    29 Nov 2024 07:01  -  30 Nov 2024 07:00  --------------------------------------------------------  IN: 410 mL / OUT: 700 mL / NET: -290 mL      Vital Signs Last 24 Hrs  T(C): 36.6 (30 Nov 2024 05:30), Max: 36.6 (30 Nov 2024 05:30)  T(F): 97.8 (30 Nov 2024 05:30), Max: 97.8 (30 Nov 2024 05:30)  HR: 90 (30 Nov 2024 05:30) (90 - 94)  BP: 93/66 (30 Nov 2024 05:30) (91/61 - 93/66)  RR: 18 (30 Nov 2024 05:30) (18 - 18)  SpO2: 100% room air      PHYSICAL EXAM:  GENERAL: NAD, Thin man  HEAD:  Atraumatic, Normocephalic  EYES: EOMI, PERRLA, conjunctiva and sclera clear  NECK: Supple, No JVD  CHEST/LUNG: Clear to auscultation bilaterally; No wheeze  HEART: Regular rate and rhythm; No murmurs, rubs, or gallops  ABDOMEN: Soft, Nontender, Nondistended; Bowel sounds present  L PERC NT with clear urine in bag  EXTREMITIES:  2+ Peripheral Pulses, No clubbing, cyanosis,   B/L leg 2 plus edema  PSYCH: AAOx3  NEUROLOGY: non-focal    LABS:                        7.0    20.02 )-----------( 194      ( 30 Nov 2024 10:55 )             22.8     11-30    126[L]  |  100  |  44[H]  ----------------------------<  102[H]  4.1   |  13[L]  |  1.82[H]    Ca    8.0[L]      30 Nov 2024 05:47  Phos  3.2     11-30  Mg     1.40     11-30    Care Discussed with Consultants/Other Providers:    Hgb 7  CBC stat and t/s

## 2024-11-30 NOTE — PROVIDER CONTACT NOTE (CRITICAL VALUE NOTIFICATION) - BACKGROUND
patient hemoglobin levels have been fluctuated between 8.6 to 7.0 during this admission. This morning level noted to be 7.0 and provider order a repeat to confirm.
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 Renal US showed severe left hydronephrosis; IR consulted, s/p L PCN placement 11/27.

## 2024-11-30 NOTE — PROGRESS NOTE ADULT - ASSESSMENT
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 Mets   Anemia  HEMATURIA with bleed , no AC, IVC Filter   hypercoagulable state but no AC due to h/o hematuria  h/o DVT  Severe protein irene malnutrition

## 2024-11-30 NOTE — PROGRESS NOTE ADULT - PROBLEM SELECTOR PLAN 3
baseline 1.1-1.2, now 1.7uptrending   -kidney US showed severe L hydro   -urology consulted- Rec nephrostomy by IR. nephrostomy placed 11/27  -c/w sodium bicarb tabs for metabolic acidosis   -cont to trend Cr, UOP    11/29 creat 2. f/u bMP in am with ivf bolus  11/30 creat 1.82, c/w IVF f/u BMP in AM

## 2024-11-30 NOTE — PROVIDER CONTACT NOTE (CRITICAL VALUE NOTIFICATION) - SITUATION
Hgb 7.0
lab called and reported H/H level of 7.0/22.8
89 y.o. M with skin tear right dorsal hand from sharp object - irrigated, steristripped, dressed, to f/u pcp for wound check 2d

## 2024-12-01 LAB
ADD ON TEST-SPECIMEN IN LAB: SIGNIFICANT CHANGE UP
ALBUMIN SERPL ELPH-MCNC: 2.3 G/DL — LOW (ref 3.3–5)
ALP SERPL-CCNC: 86 U/L — SIGNIFICANT CHANGE UP (ref 40–120)
ALT FLD-CCNC: 5 U/L — SIGNIFICANT CHANGE UP (ref 4–41)
ANION GAP SERPL CALC-SCNC: 14 MMOL/L — SIGNIFICANT CHANGE UP (ref 7–14)
APPEARANCE UR: ABNORMAL
AST SERPL-CCNC: 10 U/L — SIGNIFICANT CHANGE UP (ref 4–40)
BACTERIA # UR AUTO: NEGATIVE /HPF — SIGNIFICANT CHANGE UP
BASOPHILS # BLD AUTO: 0.04 K/UL — SIGNIFICANT CHANGE UP (ref 0–0.2)
BASOPHILS NFR BLD AUTO: 0.2 % — SIGNIFICANT CHANGE UP (ref 0–2)
BILIRUB SERPL-MCNC: 1.2 MG/DL — SIGNIFICANT CHANGE UP (ref 0.2–1.2)
BILIRUB UR-MCNC: NEGATIVE — SIGNIFICANT CHANGE UP
BUN SERPL-MCNC: 37 MG/DL — HIGH (ref 7–23)
CALCIUM SERPL-MCNC: 7.6 MG/DL — LOW (ref 8.4–10.5)
CAST: 6 /LPF — HIGH (ref 0–4)
CHLORIDE SERPL-SCNC: 98 MMOL/L — SIGNIFICANT CHANGE UP (ref 98–107)
CO2 SERPL-SCNC: 14 MMOL/L — LOW (ref 22–31)
COLOR SPEC: ABNORMAL
CREAT ?TM UR-MCNC: 81 MG/DL — SIGNIFICANT CHANGE UP
CREAT SERPL-MCNC: 1.9 MG/DL — HIGH (ref 0.5–1.3)
DIFF PNL FLD: ABNORMAL
EGFR: 40 ML/MIN/1.73M2 — LOW
EOSINOPHIL # BLD AUTO: 0.15 K/UL — SIGNIFICANT CHANGE UP (ref 0–0.5)
EOSINOPHIL NFR BLD AUTO: 0.7 % — SIGNIFICANT CHANGE UP (ref 0–6)
FOLATE SERPL-MCNC: 11.9 NG/ML — SIGNIFICANT CHANGE UP (ref 3.1–17.5)
GLUCOSE SERPL-MCNC: 107 MG/DL — HIGH (ref 70–99)
GLUCOSE UR QL: NEGATIVE MG/DL — SIGNIFICANT CHANGE UP
HCT VFR BLD CALC: 25.3 % — LOW (ref 39–50)
HGB BLD-MCNC: 7.7 G/DL — LOW (ref 13–17)
IANC: 15.95 K/UL — HIGH (ref 1.8–7.4)
IMM GRANULOCYTES NFR BLD AUTO: 1.8 % — HIGH (ref 0–0.9)
KETONES UR-MCNC: NEGATIVE MG/DL — SIGNIFICANT CHANGE UP
LEUKOCYTE ESTERASE UR-ACNC: ABNORMAL
LYMPHOCYTES # BLD AUTO: 12.5 % — LOW (ref 13–44)
LYMPHOCYTES # BLD AUTO: 2.6 K/UL — SIGNIFICANT CHANGE UP (ref 1–3.3)
MAGNESIUM SERPL-MCNC: 1.8 MG/DL — SIGNIFICANT CHANGE UP (ref 1.6–2.6)
MCHC RBC-ENTMCNC: 23.5 PG — LOW (ref 27–34)
MCHC RBC-ENTMCNC: 30.4 G/DL — LOW (ref 32–36)
MCV RBC AUTO: 77.1 FL — LOW (ref 80–100)
MONOCYTES # BLD AUTO: 1.72 K/UL — HIGH (ref 0–0.9)
MONOCYTES NFR BLD AUTO: 8.3 % — SIGNIFICANT CHANGE UP (ref 2–14)
NEUTROPHILS # BLD AUTO: 15.95 K/UL — HIGH (ref 1.8–7.4)
NEUTROPHILS NFR BLD AUTO: 76.5 % — SIGNIFICANT CHANGE UP (ref 43–77)
NITRITE UR-MCNC: NEGATIVE — SIGNIFICANT CHANGE UP
NRBC # BLD: 0 /100 WBCS — SIGNIFICANT CHANGE UP (ref 0–0)
NRBC # FLD: 0.03 K/UL — HIGH (ref 0–0)
OSMOLALITY UR: 478 MOSM/KG — SIGNIFICANT CHANGE UP (ref 50–1200)
PH UR: 5.5 — SIGNIFICANT CHANGE UP (ref 5–8)
PHOSPHATE SERPL-MCNC: 2.6 MG/DL — SIGNIFICANT CHANGE UP (ref 2.5–4.5)
PLATELET # BLD AUTO: 196 K/UL — SIGNIFICANT CHANGE UP (ref 150–400)
POTASSIUM SERPL-MCNC: 3.5 MMOL/L — SIGNIFICANT CHANGE UP (ref 3.5–5.3)
POTASSIUM SERPL-SCNC: 3.5 MMOL/L — SIGNIFICANT CHANGE UP (ref 3.5–5.3)
POTASSIUM UR-SCNC: 26.5 MMOL/L — SIGNIFICANT CHANGE UP
PROCALCITONIN SERPL-MCNC: 0.35 NG/ML — HIGH (ref 0.02–0.1)
PROT ?TM UR-MCNC: 186 MG/DL — SIGNIFICANT CHANGE UP
PROT SERPL-MCNC: 5.3 G/DL — LOW (ref 6–8.3)
PROT UR-MCNC: 300 MG/DL
PROT/CREAT UR-RTO: 2.3 RATIO — HIGH (ref 0–0.2)
RBC # BLD: 3.28 M/UL — LOW (ref 4.2–5.8)
RBC # FLD: 19 % — HIGH (ref 10.3–14.5)
RBC CASTS # UR COMP ASSIST: 491 /HPF — HIGH (ref 0–4)
REVIEW: SIGNIFICANT CHANGE UP
SODIUM SERPL-SCNC: 126 MMOL/L — LOW (ref 135–145)
SODIUM UR-SCNC: <20 MMOL/L — SIGNIFICANT CHANGE UP
SP GR SPEC: 1.02 — SIGNIFICANT CHANGE UP (ref 1–1.03)
SQUAMOUS # UR AUTO: 2 /HPF — SIGNIFICANT CHANGE UP (ref 0–5)
URATE CRY FLD QL MICRO: PRESENT
UROBILINOGEN FLD QL: 0.2 MG/DL — SIGNIFICANT CHANGE UP (ref 0.2–1)
VIT B12 SERPL-MCNC: > 2000 PG/ML — SIGNIFICANT CHANGE UP (ref 200–900)
WBC # BLD: 20.84 K/UL — HIGH (ref 3.8–10.5)
WBC # FLD AUTO: 20.84 K/UL — HIGH (ref 3.8–10.5)
WBC UR QL: 10 /HPF — HIGH (ref 0–5)

## 2024-12-01 PROCEDURE — 99233 SBSQ HOSP IP/OBS HIGH 50: CPT

## 2024-12-01 RX ORDER — ACETAMINOPHEN 500MG 500 MG/1
700 TABLET, COATED ORAL ONCE
Refills: 0 | Status: COMPLETED | OUTPATIENT
Start: 2024-12-01 | End: 2024-12-01

## 2024-12-01 RX ORDER — SODIUM CHLORIDE 9 MG/ML
1000 INJECTION, SOLUTION INTRAMUSCULAR; INTRAVENOUS; SUBCUTANEOUS
Refills: 0 | Status: DISCONTINUED | OUTPATIENT
Start: 2024-12-01 | End: 2024-12-01

## 2024-12-01 RX ORDER — POTASSIUM CHLORIDE 600 MG/1
20 TABLET, EXTENDED RELEASE ORAL
Refills: 0 | Status: COMPLETED | OUTPATIENT
Start: 2024-12-01 | End: 2024-12-01

## 2024-12-01 RX ORDER — SODIUM CHLORIDE 3 G/100ML
500 INJECTION, SOLUTION INTRAVENOUS
Refills: 0 | Status: DISCONTINUED | OUTPATIENT
Start: 2024-12-01 | End: 2024-12-02

## 2024-12-01 RX ORDER — SODIUM BICARBONATE 84 MG/ML
1300 INJECTION, SOLUTION INTRAVENOUS THREE TIMES A DAY
Refills: 0 | Status: DISCONTINUED | OUTPATIENT
Start: 2024-12-01 | End: 2024-12-03

## 2024-12-01 RX ADMIN — PIPERACILLIN SODIUM AND TAZOBACTAM SODIUM 25 GRAM(S): 4; .5 INJECTION, POWDER, LYOPHILIZED, FOR SOLUTION INTRAVENOUS at 06:23

## 2024-12-01 RX ADMIN — SODIUM BICARBONATE 1300 MILLIGRAM(S): 84 INJECTION, SOLUTION INTRAVENOUS at 20:39

## 2024-12-01 RX ADMIN — PANTOPRAZOLE SODIUM 40 MILLIGRAM(S): 40 TABLET, DELAYED RELEASE ORAL at 06:15

## 2024-12-01 RX ADMIN — PIPERACILLIN SODIUM AND TAZOBACTAM SODIUM 25 GRAM(S): 4; .5 INJECTION, POWDER, LYOPHILIZED, FOR SOLUTION INTRAVENOUS at 13:59

## 2024-12-01 RX ADMIN — SODIUM BICARBONATE 650 MILLIGRAM(S): 84 INJECTION, SOLUTION INTRAVENOUS at 06:15

## 2024-12-01 RX ADMIN — TAMSULOSIN HYDROCHLORIDE 0.4 MILLIGRAM(S): 0.4 CAPSULE ORAL at 20:40

## 2024-12-01 RX ADMIN — PIPERACILLIN SODIUM AND TAZOBACTAM SODIUM 25 GRAM(S): 4; .5 INJECTION, POWDER, LYOPHILIZED, FOR SOLUTION INTRAVENOUS at 00:05

## 2024-12-01 RX ADMIN — POTASSIUM CHLORIDE 20 MILLIEQUIVALENT(S): 600 TABLET, EXTENDED RELEASE ORAL at 11:36

## 2024-12-01 RX ADMIN — SODIUM BICARBONATE 1300 MILLIGRAM(S): 84 INJECTION, SOLUTION INTRAVENOUS at 14:01

## 2024-12-01 RX ADMIN — POTASSIUM CHLORIDE 20 MILLIEQUIVALENT(S): 600 TABLET, EXTENDED RELEASE ORAL at 06:15

## 2024-12-01 RX ADMIN — ACETAMINOPHEN 500MG 280 MILLIGRAM(S): 500 TABLET, COATED ORAL at 02:44

## 2024-12-01 RX ADMIN — PANTOPRAZOLE SODIUM 40 MILLIGRAM(S): 40 TABLET, DELAYED RELEASE ORAL at 18:48

## 2024-12-01 RX ADMIN — SODIUM CHLORIDE 2 GRAM(S): 9 INJECTION, SOLUTION INTRAMUSCULAR; INTRAVENOUS; SUBCUTANEOUS at 06:15

## 2024-12-01 RX ADMIN — Medication 25 GRAM(S): at 04:32

## 2024-12-01 NOTE — PROGRESS NOTE ADULT - ASSESSMENT
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 Mets   Anemia  HEMATURIA with bleed , no AC, IVC Filter   hypercoagulable state but no AC due to h/o hematuria  h/o DVT  Severe protein irene malnutrition

## 2024-12-01 NOTE — PROGRESS NOTE ADULT - PROBLEM SELECTOR PROBLEM 5
Colon cancer
History of DVT in adulthood
Colon cancer

## 2024-12-01 NOTE — PROGRESS NOTE ADULT - PROBLEM SELECTOR PLAN 2
Hgb 7  will give 1 unit PRBC  Add iron/tibc/ferritin to labs from today , Vit b12 in AM labs.  12/1 Hgb 7.7
Hgb 7  will give 1 unit PRBC  Add iron/tibc/ferritin to labs from today , Vit b12 in AM labs.
baseline 1.1-1.2, now 1.7uptrending   -kidney US showed severe L hydro   -urology consulted- Rec nephrostomy by IR. nephrostomy placed 11/27  -c/w sodium bicarb tabs for metabolic acidosis   -cont to trend Cr, UOP
baseline 1.1-1.2, now 1.7uptrending   -kidney US showed severe L hydro   -urology consulted- Rec nephrostomy by IR. nephrostomy placed 11/27  -c/w sodium bicarb tabs for metabolic acidosis   -cont to trend Cr, UOP
baseline 1.1-1.2, now 1.6 uptrending   -s/p 1L NS in ER without improvement. hold further isotonic fluids given worsening hyponatremia in the setting of SIADH  -added sodium bicarb tabs for metabolic acidosis   -kidney US showed severe L hydro   -urology consulted- Rec nephrostomy by IR
baseline 1.1-1.2, now 1.7uptrending   -kidney US showed severe L hydro   -urology consulted- Rec nephrostomy by IR. nephrostomy placed 11/27  -c/w sodium bicarb tabs for metabolic acidosis   -cont to trend Cr, UOP    11/29 creat 2. f/u bMP in am with ivf bolus

## 2024-12-01 NOTE — PROGRESS NOTE ADULT - PROBLEM SELECTOR PROBLEM 7
Jose Davis is a 76 y.o. male for audio call for 2 week post op wound check. Patient unable to get on portal.     Surgery: ACD    Symptoms: right ear- cannot hear- this if from original surgery- soreness to back between shoulder blades. Patient has home health PT,OT and nurse evaluating incision. Patient states no drainage, redness or swelling to incision and incision healing well.    DME: none    Brace: cervical brace- compliant    Pain: 4     Medication Refills: none requested at this time.            PostOP written instructions discussed with  patient.     Patient verbalizes understanding. Patient to followup with Dr. Hines for 6 week followup with/without imaging. Mailed out appointments.    Future Appointments   Date Time Provider Department [place]   9/30/2022 11:30 AM REAL XR1 REAL HERNÁNDEZ [place]   10/6/2022 11:30 AM [facility]                 
Severe protein-calorie malnutrition
Prophylactic measure
Prophylactic measure
Severe protein-calorie malnutrition

## 2024-12-01 NOTE — PROGRESS NOTE ADULT - PROBLEM SELECTOR PLAN 1
SEPSIS  Sec to UTI  -UA+,  11/27 urine cx --> sent in lab --> pending.   11/25 urine culture --> Cancelled  11/25 Blood culture --> neg x2  -c/w empiric CTX  11/25 WBC 26-----> 11/28 WBC 24---> 11/29 wbc 26-->20   11/29 increased wbc and decreased  SBP 91, d/c Ceftriaxone, start Iv Zosyn, NS bolus IVF, patient alert and eating   Sepsis , slowly resolving, c/w IV Zosyn SEPSIS  Sec to UTI  -UA+,  11/27 urine cx --> sent in lab --> no growth  11/25 Blood culture --> neg x2, still neg to date  11/25 WBC 26-----> 11/28 WBC 24---> 11/29 wbc 26-->20   11/29 increased wbc and decreased  SBP 91, d/c Ceftriaxone, start Iv Zosyn, NS bolus IVF, patient alert and eating   Sepsis , slowly resolving, c/w IV Zosyn  RESOLVING Sepsis, day 7/10 of antibiotics.  Complicated UTI due to need for Pec NT

## 2024-12-01 NOTE — PROGRESS NOTE ADULT - PROBLEM SELECTOR PROBLEM 3
MAXWELL (acute kidney injury)
MAXWELL (acute kidney injury)
Hyponatremia

## 2024-12-01 NOTE — PROGRESS NOTE ADULT - PROBLEM SELECTOR PLAN 3
baseline 1.1-1.2, now 1.7uptrending   -kidney US showed severe L hydro   -urology consulted- Rec nephrostomy by IR. nephrostomy placed 11/27  -c/w sodium bicarb tabs for metabolic acidosis   -cont to trend Cr, UOP    11/29 creat 2. f/u bMP in am with ivf bolus  11/30 creat 1.82, ---> 1.9  12/1  Restart IVF NS 80 cc/hr x 24 hrs and f/u in AM baseline 1.1-1.2, now 1.7uptrending   -kidney US showed severe L hydro   -urology consulted- Rec nephrostomy by IR. nephrostomy placed 11/27  -c/w sodium bicarb tabs for metabolic acidosis   -cont to trend Cr, UOP    11/29 creat 2. f/u bMP in am with ivf bolus  11/30 creat 1.82, ---> 1.9  12/1  Restart IVF NS 80 cc/hr x 12  Nephrology consult requested with Dr Hinds

## 2024-12-01 NOTE — PROGRESS NOTE ADULT - SUBJECTIVE AND OBJECTIVE BOX
[facility] TUMOR [facility]C- [facility]OSP  Patient is a 59y old  Male who presents with a chief complaint of severe sepsis 2/2 uti, halley (30 Nov 2024 08:40)      SUBJECTIVE / OVERNIGHT EVENTS:      MEDICATIONS  (STANDING):  pantoprazole    Tablet 40 milliGRAM(s) Oral two times a day  piperacillin/tazobactam IVPB.. 3.375 Gram(s) IV Intermittent every 8 hours  potassium chloride    Tablet ER 20 milliEquivalent(s) Oral every 2 hours  sodium bicarbonate 650 milliGRAM(s) Oral two times a day  sodium chloride 2 Gram(s) Oral two times a day  sodium chloride 0.9%. 1000 milliLiter(s) (80 mL/Hr) IV Continuous <Continuous>  tamsulosin 0.4 milliGRAM(s) Oral at bedtime    MEDICATIONS  (PRN):  aluminum hydroxide/magnesium hydroxide/simethicone Suspension 30 milliLiter(s) Oral every 4 hours PRN Dyspepsia  melatonin 3 milliGRAM(s) Oral at bedtime PRN Insomnia  ondansetron Injectable 4 milliGRAM(s) IV Push every 8 hours PRN Nausea and/or Vomiting        I&O's Summary    30 Nov 2024 07:01  -  01 Dec 2024 07:00  --------------------------------------------------------  IN: 0 mL / OUT: 450 mL / NET: -450 mL      Vital Signs Last 24 Hrs    T(F): 97.5 (01 Dec 2024 06:36), Max: 98.3 (01 Dec 2024 03:11)  HR: 66 (01 Dec 2024 06:36) (66 - 100)  BP: 94/59 (01 Dec 2024 06:36) (87/61 - 96/64)  RR: 18 (01 Dec 2024 06:36) (16 - 18)  SpO2: 98%  room air      PHYSICAL EXAM:  GENERAL: NAD,  HEAD:  Atraumatic, Normocephalic  EYES: EOMI, PERRLA, conjunctiva and sclera clear  NECK: Supple, No JVD  CHEST/LUNG: Clear to auscultation bilaterally; No wheeze  HEART: Regular rate and rhythm; No murmurs, rubs, or gallops  ABDOMEN: Soft, Nontender, Nondistended; Bowel sounds present  L Perc NT  EXTREMITIES:  2+ Peripheral Pulses, No clubbing, cyanosis, or edema  PSYCH: AAOx3  NEUROLOGY: non-focal  SKIN: No rashes or lesions    LABS:                        7.7    20.84 )-----------( 196      ( 01 Dec 2024 03:16 )             25.3     12-01    126[L]  |  98  |  37[H]  ----------------------------<  107[H]  3.5   |  14[L]  |  1.90[H]    Ca    7.6[L]      01 Dec 2024 03:16  Phos  2.6     12-01  Mg     1.80     12-01    TPro  5.3[L]  /  Alb  2.3[L]  /  TBili  1.2  /  DBili  x   /  AST  10  /  ALT  5   /  AlkPhos  86  12-01          RADIOLOGY & ADDITIONAL TESTS:  < from: US Kidney and Bladder (11.26.24 @ 11:03) >  IMPRESSION:  The known large bladder mass is again identified  Persistent severe left hydronephrosis    < from: Xray Chest 1 View AP/PA (11.25.24 @ 13:09) >  IMPRESSION:  Clear lungs.          Care Discussed with Consultants/Other Providers:   [facility] TUMOR ONC- HOSP--> Off service note.  Patient is a 59y old  Male who presents with a chief complaint of severe sepsis 2/2 uti, halley (30 Nov 2024 08:40)      SUBJECTIVE / OVERNIGHT EVENTS:  Patient seen with Onc- Team Pa and brother in room.  Patient is feeling better but still with inc creat and low Na. Will get Nephrology to consult.  Still with Pec NT      MEDICATIONS  (STANDING):  pantoprazole    Tablet 40 milliGRAM(s) Oral two times a day  piperacillin/tazobactam IVPB.. 3.375 Gram(s) IV Intermittent every 8 hours  potassium chloride    Tablet ER 20 milliEquivalent(s) Oral every 2 hours  sodium bicarbonate 650 milliGRAM(s) Oral two times a day  sodium chloride 2 Gram(s) Oral two times a day  sodium chloride 0.9%. 1000 milliLiter(s) (80 mL/Hr) IV Continuous <Continuous>  tamsulosin 0.4 milliGRAM(s) Oral at bedtime    MEDICATIONS  (PRN):  aluminum hydroxide/magnesium hydroxide/simethicone Suspension 30 milliLiter(s) Oral every 4 hours PRN Dyspepsia  melatonin 3 milliGRAM(s) Oral at bedtime PRN Insomnia  ondansetron Injectable 4 milliGRAM(s) IV Push every 8 hours PRN Nausea and/or Vomiting        I&O's Summary    30 Nov 2024 07:01  -  01 Dec 2024 07:00  --------------------------------------------------------  IN: 0 mL / OUT: 450 mL / NET: -450 mL      Vital Signs Last 24 Hrs    T(F): 97.5 (01 Dec 2024 06:36), Max: 98.3 (01 Dec 2024 03:11)  HR: 66 (01 Dec 2024 06:36) (66 - 100)  BP: 94/59 (01 Dec 2024 06:36) (87/61 - 96/64)  RR: 18 (01 Dec 2024 06:36) (16 - 18)  SpO2: 98%  room air      PHYSICAL EXAM:  GENERAL: NAD,  HEAD:  Atraumatic, Normocephalic  EYES: EOMI, PERRLA, conjunctiva and sclera clear  NECK: Supple, No JVD  CHEST/LUNG: Clear to auscultation bilaterally; No wheeze  HEART: Regular rate and rhythm; No murmurs, rubs, or gallops  ABDOMEN: Soft, Nontender, Nondistended; Bowel sounds present  L Perc NT  EXTREMITIES:  2+ Peripheral Pulses, No clubbing, cyanosis, or edema  PSYCH: AAOx3  NEUROLOGY: non-focal  SKIN: No rashes or lesions    LABS:                        7.7    20.84 )-----------( 196      ( 01 Dec 2024 03:16 )             25.3     12-01    126[L]  |  98  |  37[H]  ----------------------------<  107[H]  3.5   |  14[L]  |  1.90[H]    Ca    7.6[L]      01 Dec 2024 03:16  Phos  2.6     12-01  Mg     1.80     12-01    TPro  5.3[L]  /  Alb  2.3[L]  /  TBili  1.2  /  DBili  x   /  AST  10  /  ALT  5   /  AlkPhos  86  12-01      RADIOLOGY & ADDITIONAL TESTS:  < from: US Kidney and Bladder (11.26.24 @ 11:03) >  IMPRESSION:  The known large bladder mass is again identified  Persistent severe left hydronephrosis    < from: Xray Chest 1 View AP/PA (11.25.24 @ 13:09) >  IMPRESSION:  Clear lungs.      Care Discussed with Consultants/Other Providers:  Patient/ brother/Onc pa

## 2024-12-01 NOTE — PROGRESS NOTE ADULT - PROBLEM SELECTOR PLAN 4
hx of SIADH. Na 125 worse than baseline low 130s  -increased salt tab to 2g bid  -s/p 1.5 %NS 11/26  Na 124-----> 127--> 126  will trend , if dec more , may ask nephrology to consult hx of SIADH. Na 125 worse than baseline low 130s  -increased salt tab to 2g bid  -s/p 1.5 %NS 11/26  Na 124-----> 127--> 126  12/1 Na 126, Nephrology consult called to Dr Hinds

## 2024-12-01 NOTE — PROGRESS NOTE ADULT - PROBLEM SELECTOR PLAN 5
Met. L sided colon adenoca s/p colectomy/ileostomy  - S/p C1 FOLFOX 6/10/2024, per outpt onc note, not candidate for further systemic therapy due to functional status   - CT a/p 10/30 noted POD  - Rad-onc consulted- no longer candidate for RT  - palliative care for GOC- possible hospice???--> will d/w Onc team before calling. Met. L sided colon adenoca s/p colectomy/ileostomy  - S/p C1 FOLFOX 6/10/2024, per outpt onc note, not candidate for further systemic therapy due to functional status   - CT a/p 10/30 noted POD  - Rad-onc consulted- no longer candidate for RT  - palliative care for GOC- possible hospice???--> Onc attending will determine.

## 2024-12-01 NOTE — CONSULT NOTE ADULT - SUBJECTIVE AND OBJECTIVE BOX
Patient is a 59y old  Male who presents with a chief complaint of severe sepsis 2/2 uti, maxwell (01 Dec 2024 08:26)      HPI:  59 yr old male with a pmh significant for colon adenocarcinoma complicated by LBO status post colectomy/ileostomy, dvt s/p ivc, anemia who presents with a 1 week hx of lethargy/generalized weakness, lightheadedness, sob, dysuria since discharge. States he was supposed to have more abx sent to his pharmacy but never received them.  Denies  headache, chest pain, palpitations, new abdominal pain, joint pain, diarrhea/constipation.  Vitals: T 98.3, -> 86, BP 76/59->98/72, RR 18 satting 100% RA (2024 18:56)    Has had a rather tumultuous hospital course thus far which involves sepsis 2/2 UTI, L hydronephrosis, 2/2 bladder mass now s/p L PCN 24 for whom nephrology is now consulted for MAXWELL as well as Hyponatremia. His BL Cr looks to be approx 1.1-1.2, as of late it has been in the low 2/s, high 1's but slowly improving.     He has been on salt tabs 2g BIUD as well as bicarb 650 BID as of late. Cr has been improving slowly with Peak of 2.01.     He feels okay today with some fatigue.     At home he occasionally takes ibuprofen for headaches.     PAST MEDICAL & SURGICAL HISTORY:  Colon cancer  (resection and ileostomy in , with progressive residual metastatic dz, refusing CTX / radiation oncology consultation to date)      H/O ileostomy      Renal stones  (s/p right ureteral stent placement)      Large bowel obstruction  (at time of diagnosis of colon cancer)      History of small bowel obstruction  (23: non-surgical tx)      History of cholelithiasis      Pulmonary nodule      Splenomegaly      History of ileostomy      History of DVT in adulthood      S/P colon resection      H/O ureteroscopy  (s/p right ureteral stent placement)          MEDICATIONS  (STANDING):  pantoprazole    Tablet 40 milliGRAM(s) Oral two times a day  piperacillin/tazobactam IVPB.. 3.375 Gram(s) IV Intermittent every 8 hours  sodium bicarbonate 1300 milliGRAM(s) Oral three times a day  sodium chloride 2 Gram(s) Oral two times a day  tamsulosin 0.4 milliGRAM(s) Oral at bedtime      Allergies    No Known Allergies    Intolerances        SOCIAL HISTORY:  Denies ETOh,Smoking,     FAMILY HISTORY:  FH: brain aneurysm (Mother)        REVIEW OF SYSTEMS:  negative unless stated otherwise in subjective    VITAL:  T(C): , Max: 36.8 (24 @ 03:11)  T(F): , Max: 98.3 (24 @ 03:11)  HR: 90 (24 @ 12:26)  BP: 91/58 (24 @ 12:26)  BP(mean): --  RR: 18 (24 @ 12:26)  SpO2: 99% (24 @ 12:26)  Wt(kg): --    PHYSICAL EXAM:  Constitutional: NAD, Alert  HEENT: NCAT, MMM  Neck: Supple, No JVD  Respiratory: CTA-b/l  Cardiovascular: RRR s1s2, no m/r/g  Gastrointestinal: BS+, soft, NT/ND  Extremities: + edema BL LE   Neurological: no focal deficits; strength grossly intact  Back: no CVAT b/l  Skin: No rashes, no nevi    LABS:                        7.7    20.84 )-----------( 196      ( 01 Dec 2024 03:16 )             25.3     Na(126)/K(3.5)/Cl(98)/HCO3(14)/BUN(37)/Cr(1.90)Glu(107)/Ca(7.6)/Mg(1.80)/PO4(2.6)     @ 03:16  Na(126)/K(4.1)/Cl(100)/HCO3(13)/BUN(44)/Cr(1.82)Glu(102)/Ca(8.0)/Mg(1.40)/PO4(3.2)     @ 05:47  Na(126)/K(4.8)/Cl(99)/HCO3(12)/BUN(48)/Cr(2.01)Glu(102)/Ca(8.5)/Mg(1.60)/PO4(3.5)     @ 05:00    Urinalysis Basic - ( 01 Dec 2024 14:06 )    Color: Orange / Appearance: Turbid / S.022 / pH: x  Gluc: x / Ketone: Negative mg/dL  / Bili: Negative / Urobili: 0.2 mg/dL   Blood: x / Protein: 300 mg/dL / Nitrite: Negative   Leuk Esterase: Small / RBC: 491 /HPF / WBC 10 /HPF   Sq Epi: x / Non Sq Epi: 2 /HPF / Bacteria: Negative /HPF      Sodium, Random Urine: <20 mmol/L ( @ 14:06)  Potassium, Random Urine: 26.5 mmol/L ( @ :)  Creatinine, Random Urine: 81 mg/dL ( @ :)  Protein/Creatinine Ratio Calculation: 2.3 Ratio ( @ :)  Osmolality, Random Urine: 478 mosm/kg ( @ :)        IMAGING:    ASSESSMENT:  59 yr old male with a pmh significant for colon adenocarcinoma complicated by LBO status post colectomy/ileostomy, dvt s/p ivc, anemia who presents with a 1 week hx of lethargy/generalized weakness, lightheadedness, sob, dysuria since discharge. States he was supposed to have more abx sent to his pharmacy but never received them, returns with urosepsis, now improving on IV Abx. He was also noted to have severe L hydro s/p PCN on 24 2/2 bladder mass. Nephrology consulted for MAXWELL and Hyponatremia    NonOliguric MAXWELL stage I  -BL Cr 1.1-1.2  -Suspect MAXWELL may be multifactorial and 2/2 obstructive uropathy + hypotensive ATN   -improving slowly    Hyponatremia  -initially 2/2 SIADH now suspect 2/2 low solute + inc ADH release  -updated urine studies reviewed    Severe L hydro  -s/p L PCN 24    Colon Adenocarcinoma  -s/p colectomy/ileostomy    sepsis  -2/2 UTI  -on zosyn    RECOMMEND:  -repeat UA, Vivien, Upcr, Uosm, Vivien, UK, Sosm  -stop salt tabs for now  -increase sodium bicarb to 1300mg TID  -STOP NS  -START 3% saline @ 25cc/hr x 12h to start  -trend Na q8h while on 3% saline  -Na okay up to 132-134 by AM labs tomorrow.  -if overcorrecting pls stop 3% and start D5w  -promote protein intake  -Dose Rx for Crcl 35-40mL/min    Thank you for involving [facility] in this patient's care.    With warm regards,    Kale Liu DO   [facility]  Office: [phone]           Patient is a 59y old  Male who presents with a chief complaint of severe sepsis 2/2 uti, maxwell (01 Dec 2024 08:26)      HPI:  59 yr old male with a pmh significant for colon adenocarcinoma complicated by LBO status post colectomy/ileostomy, dvt s/p ivc, anemia who presents with a 1 week hx of lethargy/generalized weakness, lightheadedness, sob, dysuria since discharge. States he was supposed to have more abx sent to his pharmacy but never received them.  Denies  headache, chest pain, palpitations, new abdominal pain, joint pain, diarrhea/constipation.  Vitals: T 98.3, -> 86, BP 76/59->98/72, RR 18 satting 100% RA (2024 18:56)    Has had a rather tumultuous hospital course thus far which involves sepsis 2/2 UTI, L hydronephrosis, 2/2 bladder mass now s/p L PCN 24 for whom nephrology is now consulted for MAXWELL as well as Hyponatremia. His BL Cr looks to be approx 1.1-1.2, as of late it has been in the low 2/s, high 1's but slowly improving.     He has been on salt tabs 2g BIUD as well as bicarb 650 BID as of late. Cr has been improving slowly with Peak of 2.01.     He feels okay today with some fatigue.     At home he occasionally takes ibuprofen for headaches.     PAST MEDICAL & SURGICAL HISTORY:  Colon cancer  (resection and ileostomy in , with progressive residual metastatic dz, refusing CTX / radiation oncology consultation to date)      H/O ileostomy      Renal stones  (s/p right ureteral stent placement)      Large bowel obstruction  (at time of diagnosis of colon cancer)      History of small bowel obstruction  (23: non-surgical tx)      History of cholelithiasis      Pulmonary nodule      Splenomegaly      History of ileostomy      History of DVT in adulthood      S/P colon resection      H/O ureteroscopy  (s/p right ureteral stent placement)          MEDICATIONS  (STANDING):  pantoprazole    Tablet 40 milliGRAM(s) Oral two times a day  piperacillin/tazobactam IVPB.. 3.375 Gram(s) IV Intermittent every 8 hours  sodium bicarbonate 1300 milliGRAM(s) Oral three times a day  sodium chloride 2 Gram(s) Oral two times a day  tamsulosin 0.4 milliGRAM(s) Oral at bedtime      Allergies    No Known Allergies    Intolerances        SOCIAL HISTORY:  Denies ETOh,Smoking,     FAMILY HISTORY:  FH: brain aneurysm (Mother)        REVIEW OF SYSTEMS:  negative unless stated otherwise in subjective    VITAL:  T(C): , Max: 36.8 (24 @ 03:11)  T(F): , Max: 98.3 (24 @ 03:11)  HR: 90 (24 @ 12:26)  BP: 91/58 (24 @ 12:26)  BP(mean): --  RR: 18 (24 @ 12:26)  SpO2: 99% (24 @ 12:26)  Wt(kg): --    PHYSICAL EXAM:  Constitutional: NAD, Alert  HEENT: NCAT, MMM  Neck: Supple, No JVD  Respiratory: CTA-b/l  Cardiovascular: RRR s1s2, no m/r/g  Gastrointestinal: BS+, soft, NT/ND  Extremities: + edema BL LE   Neurological: no focal deficits; strength grossly intact  Back: no CVAT b/l  Skin: No rashes, no nevi    LABS:                        7.7    20.84 )-----------( 196      ( 01 Dec 2024 03:16 )             25.3     Na(126)/K(3.5)/Cl(98)/HCO3(14)/BUN(37)/Cr(1.90)Glu(107)/Ca(7.6)/Mg(1.80)/PO4(2.6)     @ 03:16  Na(126)/K(4.1)/Cl(100)/HCO3(13)/BUN(44)/Cr(1.82)Glu(102)/Ca(8.0)/Mg(1.40)/PO4(3.2)     @ 05:47  Na(126)/K(4.8)/Cl(99)/HCO3(12)/BUN(48)/Cr(2.01)Glu(102)/Ca(8.5)/Mg(1.60)/PO4(3.5)     @ 05:00    Urinalysis Basic - ( 01 Dec 2024 14:06 )    Color: Orange / Appearance: Turbid / S.022 / pH: x  Gluc: x / Ketone: Negative mg/dL  / Bili: Negative / Urobili: 0.2 mg/dL   Blood: x / Protein: 300 mg/dL / Nitrite: Negative   Leuk Esterase: Small / RBC: 491 /HPF / WBC 10 /HPF   Sq Epi: x / Non Sq Epi: 2 /HPF / Bacteria: Negative /HPF      Sodium, Random Urine: <20 mmol/L ( @ 14:06)  Potassium, Random Urine: 26.5 mmol/L ( @ :)  Creatinine, Random Urine: 81 mg/dL ( @ :)  Protein/Creatinine Ratio Calculation: 2.3 Ratio ( @ :)  Osmolality, Random Urine: 478 mosm/kg ( @ :)        IMAGING:    ASSESSMENT:  59 yr old male with a pmh significant for colon adenocarcinoma complicated by LBO status post colectomy/ileostomy, dvt s/p ivc, anemia who presents with a 1 week hx of lethargy/generalized weakness, lightheadedness, sob, dysuria since discharge. States he was supposed to have more abx sent to his pharmacy but never received them, returns with urosepsis, now improving on IV Abx. He was also noted to have severe L hydro s/p PCN on 24 2/2 bladder mass. Nephrology consulted for MAXWELL and Hyponatremia    NonOliguric MAXWELL stage I  -BL Cr 1.1-1.2  -Suspect MAXWELL may be multifactorial and 2/2 obstructive uropathy + hypotensive ATN   -improving slowly    Hyponatremia  -initially 2/2 SIADH now suspect 2/2 low solute + inc ADH release  -updated urine studies reviewed    Severe L hydro  -s/p L PCN 24    Colon Adenocarcinoma  -s/p colectomy/ileostomy    sepsis  -2/2 UTI  -on zosyn    RECOMMEND:  -repeat UA, Vivien, Upcr, Uosm, Vivien, UK, Sosm  -stop salt tabs for now  -increase sodium bicarb to 1300mg TID  -STOP NS  -START 3% saline @ 20cc/hr x 12h to start  -trend Na q8h while on 3% saline  -Na okay up to 132-134 by AM labs tomorrow.  -if overcorrecting pls stop 3% and start D5w  -promote protein intake  -Dose Rx for Crcl 35-40mL/min    Thank you for involving [facility] in this patient's care.    With warm regards,    Kale Liu DO   [facility]  Office: [phone]

## 2024-12-01 NOTE — PROGRESS NOTE ADULT - PROBLEM SELECTOR PROBLEM 2
MAXWELL (acute kidney injury)
MAXWELL (acute kidney injury)
Anemia of chronic disease
Anemia of chronic disease
MAXWELL (acute kidney injury)
MAXWELL (acute kidney injury)

## 2024-12-02 LAB
ALBUMIN SERPL ELPH-MCNC: 2.2 G/DL — LOW (ref 3.3–5)
ALP SERPL-CCNC: 83 U/L — SIGNIFICANT CHANGE UP (ref 40–120)
ALT FLD-CCNC: 5 U/L — SIGNIFICANT CHANGE UP (ref 4–41)
ANION GAP SERPL CALC-SCNC: 10 MMOL/L — SIGNIFICANT CHANGE UP (ref 7–14)
ANION GAP SERPL CALC-SCNC: 12 MMOL/L — SIGNIFICANT CHANGE UP (ref 7–14)
AST SERPL-CCNC: 12 U/L — SIGNIFICANT CHANGE UP (ref 4–40)
BASOPHILS # BLD AUTO: 0.03 K/UL — SIGNIFICANT CHANGE UP (ref 0–0.2)
BASOPHILS NFR BLD AUTO: 0.2 % — SIGNIFICANT CHANGE UP (ref 0–2)
BILIRUB SERPL-MCNC: 0.4 MG/DL — SIGNIFICANT CHANGE UP (ref 0.2–1.2)
BUN SERPL-MCNC: 28 MG/DL — HIGH (ref 7–23)
BUN SERPL-MCNC: 30 MG/DL — HIGH (ref 7–23)
CALCIUM SERPL-MCNC: 7.7 MG/DL — LOW (ref 8.4–10.5)
CALCIUM SERPL-MCNC: 7.8 MG/DL — LOW (ref 8.4–10.5)
CHLORIDE SERPL-SCNC: 106 MMOL/L — SIGNIFICANT CHANGE UP (ref 98–107)
CHLORIDE SERPL-SCNC: 108 MMOL/L — HIGH (ref 98–107)
CO2 SERPL-SCNC: 14 MMOL/L — LOW (ref 22–31)
CO2 SERPL-SCNC: 16 MMOL/L — LOW (ref 22–31)
CREAT SERPL-MCNC: 1.31 MG/DL — HIGH (ref 0.5–1.3)
CREAT SERPL-MCNC: 1.49 MG/DL — HIGH (ref 0.5–1.3)
EGFR: 53 ML/MIN/1.73M2 — LOW
EGFR: 62 ML/MIN/1.73M2 — SIGNIFICANT CHANGE UP
EOSINOPHIL # BLD AUTO: 0.08 K/UL — SIGNIFICANT CHANGE UP (ref 0–0.5)
EOSINOPHIL NFR BLD AUTO: 0.4 % — SIGNIFICANT CHANGE UP (ref 0–6)
GLUCOSE SERPL-MCNC: 95 MG/DL — SIGNIFICANT CHANGE UP (ref 70–99)
GLUCOSE SERPL-MCNC: 98 MG/DL — SIGNIFICANT CHANGE UP (ref 70–99)
HCT VFR BLD CALC: 22.6 % — LOW (ref 39–50)
HCT VFR BLD CALC: 23.9 % — LOW (ref 39–50)
HGB BLD-MCNC: 7.1 G/DL — LOW (ref 13–17)
HGB BLD-MCNC: 7.2 G/DL — LOW (ref 13–17)
IANC: 14.7 K/UL — HIGH (ref 1.8–7.4)
IMM GRANULOCYTES NFR BLD AUTO: 1.6 % — HIGH (ref 0–0.9)
LYMPHOCYTES # BLD AUTO: 1.91 K/UL — SIGNIFICANT CHANGE UP (ref 1–3.3)
LYMPHOCYTES # BLD AUTO: 10.3 % — LOW (ref 13–44)
MAGNESIUM SERPL-MCNC: 1.8 MG/DL — SIGNIFICANT CHANGE UP (ref 1.6–2.6)
MAGNESIUM SERPL-MCNC: 2 MG/DL — SIGNIFICANT CHANGE UP (ref 1.6–2.6)
MCHC RBC-ENTMCNC: 23.2 PG — LOW (ref 27–34)
MCHC RBC-ENTMCNC: 23.4 PG — LOW (ref 27–34)
MCHC RBC-ENTMCNC: 30.1 G/DL — LOW (ref 32–36)
MCHC RBC-ENTMCNC: 31.4 G/DL — LOW (ref 32–36)
MCV RBC AUTO: 74.6 FL — LOW (ref 80–100)
MCV RBC AUTO: 77.1 FL — LOW (ref 80–100)
MONOCYTES # BLD AUTO: 1.51 K/UL — HIGH (ref 0–0.9)
MONOCYTES NFR BLD AUTO: 8.2 % — SIGNIFICANT CHANGE UP (ref 2–14)
NEUTROPHILS # BLD AUTO: 14.7 K/UL — HIGH (ref 1.8–7.4)
NEUTROPHILS NFR BLD AUTO: 79.3 % — HIGH (ref 43–77)
NRBC # BLD: 0 /100 WBCS — SIGNIFICANT CHANGE UP (ref 0–0)
NRBC # BLD: 0 /100 WBCS — SIGNIFICANT CHANGE UP (ref 0–0)
NRBC # FLD: 0.02 K/UL — HIGH (ref 0–0)
NRBC # FLD: 0.02 K/UL — HIGH (ref 0–0)
PHOSPHATE SERPL-MCNC: 2 MG/DL — LOW (ref 2.5–4.5)
PHOSPHATE SERPL-MCNC: 2.1 MG/DL — LOW (ref 2.5–4.5)
PLATELET # BLD AUTO: 198 K/UL — SIGNIFICANT CHANGE UP (ref 150–400)
PLATELET # BLD AUTO: 271 K/UL — SIGNIFICANT CHANGE UP (ref 150–400)
POTASSIUM SERPL-MCNC: 3.6 MMOL/L — SIGNIFICANT CHANGE UP (ref 3.5–5.3)
POTASSIUM SERPL-MCNC: 4 MMOL/L — SIGNIFICANT CHANGE UP (ref 3.5–5.3)
POTASSIUM SERPL-SCNC: 3.6 MMOL/L — SIGNIFICANT CHANGE UP (ref 3.5–5.3)
POTASSIUM SERPL-SCNC: 4 MMOL/L — SIGNIFICANT CHANGE UP (ref 3.5–5.3)
PROT SERPL-MCNC: 4.9 G/DL — LOW (ref 6–8.3)
RBC # BLD: 3.03 M/UL — LOW (ref 4.2–5.8)
RBC # BLD: 3.1 M/UL — LOW (ref 4.2–5.8)
RBC # FLD: 19.5 % — HIGH (ref 10.3–14.5)
RBC # FLD: 20.4 % — HIGH (ref 10.3–14.5)
SODIUM SERPL-SCNC: 132 MMOL/L — LOW (ref 135–145)
SODIUM SERPL-SCNC: 134 MMOL/L — LOW (ref 135–145)
WBC # BLD: 18.52 K/UL — HIGH (ref 3.8–10.5)
WBC # BLD: 26.06 K/UL — HIGH (ref 3.8–10.5)
WBC # FLD AUTO: 18.52 K/UL — HIGH (ref 3.8–10.5)
WBC # FLD AUTO: 26.06 K/UL — HIGH (ref 3.8–10.5)

## 2024-12-02 PROCEDURE — 99233 SBSQ HOSP IP/OBS HIGH 50: CPT

## 2024-12-02 RX ORDER — ACETAMINOPHEN 500MG 500 MG/1
650 TABLET, COATED ORAL EVERY 6 HOURS
Refills: 0 | Status: DISCONTINUED | OUTPATIENT
Start: 2024-12-02 | End: 2024-12-03

## 2024-12-02 RX ORDER — POTASSIUM PHOSPHATE, MONOBASIC POTASSIUM PHOSPHATE, DIBASIC INJECTION, 236; 224 MG/ML; MG/ML
15 SOLUTION, CONCENTRATE INTRAVENOUS ONCE
Refills: 0 | Status: COMPLETED | OUTPATIENT
Start: 2024-12-02 | End: 2024-12-02

## 2024-12-02 RX ORDER — POTASSIUM CHLORIDE 600 MG/1
20 TABLET, EXTENDED RELEASE ORAL ONCE
Refills: 0 | Status: COMPLETED | OUTPATIENT
Start: 2024-12-02 | End: 2024-12-02

## 2024-12-02 RX ORDER — 0.9 % SODIUM CHLORIDE 0.9 %
1000 INTRAVENOUS SOLUTION INTRAVENOUS
Refills: 0 | Status: DISCONTINUED | OUTPATIENT
Start: 2024-12-02 | End: 2024-12-03

## 2024-12-02 RX ORDER — SODIUM,POTASSIUM PHOSPHATES 278-250MG
2 POWDER IN PACKET (EA) ORAL ONCE
Refills: 0 | Status: COMPLETED | OUTPATIENT
Start: 2024-12-02 | End: 2024-12-02

## 2024-12-02 RX ADMIN — Medication 2 TABLET(S): at 08:39

## 2024-12-02 RX ADMIN — Medication 60 MILLILITER(S): at 18:46

## 2024-12-02 RX ADMIN — PIPERACILLIN SODIUM AND TAZOBACTAM SODIUM 25 GRAM(S): 4; .5 INJECTION, POWDER, LYOPHILIZED, FOR SOLUTION INTRAVENOUS at 20:46

## 2024-12-02 RX ADMIN — PANTOPRAZOLE SODIUM 40 MILLIGRAM(S): 40 TABLET, DELAYED RELEASE ORAL at 17:49

## 2024-12-02 RX ADMIN — PIPERACILLIN SODIUM AND TAZOBACTAM SODIUM 25 GRAM(S): 4; .5 INJECTION, POWDER, LYOPHILIZED, FOR SOLUTION INTRAVENOUS at 13:37

## 2024-12-02 RX ADMIN — PIPERACILLIN SODIUM AND TAZOBACTAM SODIUM 25 GRAM(S): 4; .5 INJECTION, POWDER, LYOPHILIZED, FOR SOLUTION INTRAVENOUS at 06:31

## 2024-12-02 RX ADMIN — SODIUM BICARBONATE 1300 MILLIGRAM(S): 84 INJECTION, SOLUTION INTRAVENOUS at 13:37

## 2024-12-02 RX ADMIN — ACETAMINOPHEN 500MG 650 MILLIGRAM(S): 500 TABLET, COATED ORAL at 20:45

## 2024-12-02 RX ADMIN — SODIUM BICARBONATE 1300 MILLIGRAM(S): 84 INJECTION, SOLUTION INTRAVENOUS at 06:31

## 2024-12-02 RX ADMIN — PANTOPRAZOLE SODIUM 40 MILLIGRAM(S): 40 TABLET, DELAYED RELEASE ORAL at 06:31

## 2024-12-02 RX ADMIN — POTASSIUM CHLORIDE 20 MILLIEQUIVALENT(S): 600 TABLET, EXTENDED RELEASE ORAL at 23:19

## 2024-12-02 RX ADMIN — POTASSIUM PHOSPHATE, MONOBASIC POTASSIUM PHOSPHATE, DIBASIC INJECTION, 62.5 MILLIMOLE(S): 236; 224 SOLUTION, CONCENTRATE INTRAVENOUS at 23:19

## 2024-12-02 RX ADMIN — Medication 25 GRAM(S): at 08:39

## 2024-12-02 RX ADMIN — TAMSULOSIN HYDROCHLORIDE 0.4 MILLIGRAM(S): 0.4 CAPSULE ORAL at 20:46

## 2024-12-02 RX ADMIN — SODIUM BICARBONATE 1300 MILLIGRAM(S): 84 INJECTION, SOLUTION INTRAVENOUS at 20:46

## 2024-12-02 RX ADMIN — SODIUM CHLORIDE 20 MILLILITER(S): 3 INJECTION, SOLUTION INTRAVENOUS at 01:21

## 2024-12-02 RX ADMIN — PIPERACILLIN SODIUM AND TAZOBACTAM SODIUM 25 GRAM(S): 4; .5 INJECTION, POWDER, LYOPHILIZED, FOR SOLUTION INTRAVENOUS at 00:02

## 2024-12-02 NOTE — PROGRESS NOTE ADULT - ASSESSMENT
59 yr old male with a pmh significant for colon adenocarcinoma complicated by LBO status post colectomy/ileostomy, dvt s/p ivc, anemia who presents with a 1 week hx of lethargy/generalized weakness, lightheadedness returns with urosepsis, now improving on IV Abx. He was also noted to have severe L hydro s/p PCN on 11/27/24 2/2 bladder mass. Nephrology consulted for MAXWELL and Hyponatremia    Renal: MAXWELL, nonoliguric, Suspect MAXWELL may be multifactorial and 2/2 obstructive uropathy + hypotensive ATN   -Renal fxn improving with PCN   -Hypophosphatemia- a/w kphos 2 tabs x 1 today   -Continue bicarb tabs     Hyponatremia- initially 2/2 SIADH now suspect 2/2 low solute + inc ADH release - NA+ improving -->>stop 3%NS     - Severe L hydro, s/p L PCN 11/27/24    ID- UTI on zosyn       Sayed Guzman   [facility]  Office: [phone]       59 yr old male with a pmh significant for colon adenocarcinoma complicated by LBO status post colectomy/ileostomy, dvt s/p ivc, anemia who presents with a 1 week hx of lethargy/generalized weakness, lightheadedness returns with urosepsis, now improving on IV Abx. He was also noted to have severe L hydro s/p PCN on 11/27/24 2/2 bladder mass. Nephrology consulted for MAXWELL and Hyponatremia    Renal: MAXWELL, nonoliguric, Suspect MAXWELL may be multifactorial and 2/2 obstructive uropathy + hypotensive ATN   -Renal fxn improving with PCN   -Hypophosphatemia- a/w kphos 2 tabs x 1 today   -Continue bicarb tabs     Hyponatremia- initially 2/2 SIADH now suspect 2/2 low solute + inc ADH release - NA+ improving -->>stop 3%NS   Add dextrose with 150meq of nahco3/liter at 60cc/hr     - Severe L hydro, s/p L PCN 11/27/24    ID- UTI on zosyn       Sayed Guzman   [facility]  Office: [phone]

## 2024-12-02 NOTE — PROGRESS NOTE ADULT - TIME BILLING
20 mins-Vitals, meds, new results/radiology, interdisciplinary charting reviewed   20 mins-Patient seen and examined and plan discussed, all questions were answered to the best of my ability  20 mins-Charting/documentation and orders.
preparing to see the patient (eg. review of tests), obtaining and/or reviewing separately obtained history, obtaining/reviewing vitals, performing a medically appropriate examination and/or evaluation, counseling and educating the patient/family/caregiver, reviewing previous notes and test results, and procedures, communicating with other health professionals (when not separately reported), and documenting clinical information in the electronic health record.
Preparing to see the patient including review of tests and other providers' notes, confirming history with patient/family member, performing medical examination and evaluation, counseling and educating the patient/family/caregiver, ordering medications, tests and procedures, communicating with other health care professionals, documenting clinical information in the EMR, independently interpreting results and communicating results to the patient/family/caregiver, care coordination
preparing to see the patient (eg. review of tests), obtaining and/or reviewing separately obtained history, obtaining/reviewing vitals, performing a medically appropriate examination and/or evaluation, counseling and educating the patient/family/caregiver, reviewing previous notes and test results, and procedures, communicating with other health professionals (when not separately reported), and documenting clinical information in the electronic health record.
Preparing to see the patient including review of tests and other providers' notes, confirming history with patient/family member, performing medical examination and evaluation, counseling and educating the patient/family/caregiver, ordering medications, tests and procedures, communicating with other health care professionals, documenting clinical information in the EMR, independently interpreting results and communicating results to the patient/family/caregiver, care coordination

## 2024-12-02 NOTE — PHYSICAL THERAPY INITIAL EVALUATION ADULT - PERTINENT HX OF CURRENT PROBLEM, REHAB EVAL
Patient is 59 year old female, history of nephrolithiasis, CKD3 (baseline Cr 1.1-1.2), and colon adenoca met to LN, bladder, complicated by malignant LBO s/p subtotal colectomy/ ileostomy, DVT not on AC due to recurrent hematuria/anemia s/p IVC filter, hyponatremia secondary to SIADH, recent admission for UTI.

## 2024-12-02 NOTE — PHYSICAL THERAPY INITIAL EVALUATION ADULT - ADDITIONAL COMMENTS
Patient lives with wife in house, 2 steps to enter, 13 stairs indoor, ambulates with out assistive device.

## 2024-12-02 NOTE — PROGRESS NOTE ADULT - ASSESSMENT
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 He continued to have POD with scans in 3/2024 showing growth of the bladder dome mass with invasion of the Joseph pouch and chronic PV thrombosis with cavernous malformation. Pt was subsequently started on palliative FOLFOX in 6/2024, s/p C1 on 6/10, and pt has not resumed systemic cancer treatment since then given his overwhelming c/f experiencing chemo-related toxicities. His treatment course has also been c/b SIADH for which pt has been on salt tabs. Of note pt was hospitalized in 10/30-31 for mgmt of acute-on-chronic anemia a/w acute cystitis (Ucx negative, pt discharged on empiric Cipro). He was re-hospitalized on 11/10-13 for mgmt of septic shock a/w pre-syncope 2/2 E colit UTI (completed 7d course of abx).  Pt was readmitted to [facility] on 11/25 with severe sepsis with admission labs notable for abnormal UA, acute-on-chronic hyponatremia, and MAXWELL on CKD; renal U/S showing severe L hydronephrosis.      ACTIVE PROBLEMS  Met colon adenoca  Sepsis 2/2 UTI  MAXWELL on CKD3  Severe L hydronephrosis  SIADH  Anemia of chronic disease, exacerbated by intermittent blood loss  h/o Malignant hematuria  h/o L iliofem DVT, PV thrombosis  Hypercoag state  Severe prot irene malnutrition  Hypomagnesemia  Hypophosphatemia    Met colon adenoca (with bladder met)  10/30 CT a/p demonstrates POD (growing bladder mass a/w worsening L hydro, worsening LN)  Pt has only had 1 cycle of palliative chemo, overdue for C2 mFOLFOX with plan for addition of Cetuximab (pt is Kras WT)  Pt to continue outpt fu with Dr. Palafox after dc to discuss plans for resuming palliative chemo  Pt to also continue outpt fu with Rad Onc to discuss plan for palliative RT to bladder mass  Long term prognosis: guarded; pt is full code    Severe sepsis 2/2 UTI  Pt remains afebrile, BPs improved/normotensive  11/27 Ucx negative  Completing 7d course of empiric Zosyn (11/29-12/5)    MAXWELL on CKD3  Severe L hydronephrosis  Cr continues to improve s/p L PCN placement on 11/27  Continuing Na bicarb tabs 1300mg TID  Monitoring UOP for post-obstructive diuresis  Avoiding nephrotoxins  Meds renally dosed where necessary    SIADH  Na improved to 132 today after HTS overnight  Pt remains asymptomatic  Will fu with Nephrology re: resuming salt tabs  Continuing 1L fluid restriction    Anemia of chronic disease, exacerbated by intermittent blood loss  h/o Malignant hematuria  Hgb 7.1 today  Last prbc transfusion given on 11/30  Will f/u pm cbc  Continuing supportive transfusions prn (goal hgb > 7; plts > 10k or > 50K if pt has s/s of active bleeding)    h/o L iliofem DVT, PV thrombosis  Hypercoag sate  Pt is a poor candidate for pharm AC due to bleeding risks  S/p IVC filter in 7/2024    Severe protein malnutrition  Hypomag  Hypophos  Pt with poor po intake/generalized muscle wasting in the setting of progressing malignancy  Continuing lyte repletion prn (goal K 4, Phos 3, Mag 2)  Appreciate RD recs: continuing regular diet with Ensure clear 2 cans daily

## 2024-12-02 NOTE — PROGRESS NOTE ADULT - NUTRITIONAL ASSESSMENT
This patient has been assessed with a concern for Malnutrition and has been determined to have a diagnosis/diagnoses of Severe protein-calorie malnutrition and Underweight (BMI < 19).    This patient is being managed with:   Diet Regular-  1000mL Fluid Restriction ([id number])  Supplement Feeding Modality:  Oral  Ensure Clear Cans or Servings Per Day:  2       Frequency:  Daily  Entered: Nov 27 2024  2:27PM  
This patient has been assessed with a concern for Malnutrition and has been determined to have a diagnosis/diagnoses of Severe protein-calorie malnutrition and Underweight (BMI < 19).    This patient is being managed with:   Diet Regular-  1000mL Fluid Restriction ([id number])  Supplement Feeding Modality:  Oral  Ensure Clear Cans or Servings Per Day:  2       Frequency:  Daily  Entered: Nov 27 2024  2:27PM  
This patient has been assessed with a concern for Malnutrition and has been determined to have a diagnosis/diagnoses of Severe protein-calorie malnutrition and Underweight (BMI < 19).    This patient is being managed with:   Diet Regular-  1000mL Fluid Restriction ([id number])  Supplement Feeding Modality:  Oral  Ensure Clear Cans or Servings Per Day:  2       Frequency:  Daily  Entered: Nov 27 2024  2:27PM  
This patient has been assessed with a concern for Malnutrition and has been determined to have a diagnosis/diagnoses of Severe protein-calorie malnutrition and Underweight (BMI < 19).    This patient is being managed with:   Diet Regular-  1000mL Fluid Restriction ([id number])  Supplement Feeding Modality:  Oral  Ensure Clear Cans or Servings Per Day:  2       Frequency:  Daily  Entered: Nov 27 2024  2:27PM  
This patient has been assessed with a concern for Malnutrition and has been determined to have a diagnosis/diagnoses of Severe protein-calorie malnutrition and Underweight (BMI < 19).    This patient is being managed with:   Diet Regular-  1000mL Fluid Restriction ([id number])  Supplement Feeding Modality:  Oral  Ensure Clear Cans or Servings Per Day:  2       Frequency:  Daily  Entered: Nov 27 2024  2:27PM  
This patient has been assessed with a concern for Malnutrition and has been determined to have a diagnosis/diagnoses of Severe protein-calorie malnutrition and Underweight (BMI < 19).    This patient is being managed with:   Diet Regular-  1000mL Fluid Restriction ([id number])  Supplement Feeding Modality:  Oral  Ensure Clear Cans or Servings Per Day:  2       Frequency:  Daily  Entered: Nov 27 2024  2:27PM

## 2024-12-02 NOTE — PROGRESS NOTE ADULT - SUBJECTIVE AND OBJECTIVE BOX
NEPHROLOGY-[facility] [phone]        Patient seen and examined no new complaints, feels edema is improving.         MEDICATIONS  (STANDING):  pantoprazole    Tablet 40 milliGRAM(s) Oral two times a day  piperacillin/tazobactam IVPB.. 3.375 Gram(s) IV Intermittent every 8 hours  sodium bicarbonate 1300 milliGRAM(s) Oral three times a day  tamsulosin 0.4 milliGRAM(s) Oral at bedtime      VITAL:  T(C): , Max: 36.9 (12-02-24 @ 06:38)  T(F): , Max: 98.5 (12-02-24 @ 06:38)  HR: 90 (12-02-24 @ 11:35)  BP: 97/59 (12-02-24 @ 11:35)  BP(mean): 70 (12-02-24 @ 06:38)  RR: 18 (12-02-24 @ 11:35)  SpO2: 100% (12-02-24 @ 11:35)  Wt(kg): --    I and O's:        PHYSICAL EXAM:    Constitutional: NAD  Neck:  No JVD  Respiratory: CTAB/L  Cardiovascular: S1 and S2  Gastrointestinal: BS+, soft, NT/ND  Extremities: + peripheral edema  Neurological: A/O x 3, no focal deficits  Psychiatric: Normal mood, normal affect  : No Lind +PCN  Skin: No rashes  Access: Not applicable    LABS:                        7.1    18.52 )-----------( 198      ( 02 Dec 2024 07:03 )             22.6     12-02    132[L]  |  108[H]  |  30[H]  ----------------------------<  95  4.0   |  14[L]  |  1.49[H]    Ca    7.8[L]      02 Dec 2024 07:03  Phos  2.1     12-02  Mg     1.80     12-02    TPro  4.9[L]  /  Alb  2.2[L]  /  TBili  0.4  /  DBili  x   /  AST  12  /  ALT  5   /  AlkPhos  83  12-02          Urine Studies:  Urinalysis Basic - ( 02 Dec 2024 07:03 )    Color: x / Appearance: x / SG: x / pH: x  Gluc: 95 mg/dL / Ketone: x  / Bili: x / Urobili: x   Blood: x / Protein: x / Nitrite: x   Leuk Esterase: x / RBC: x / WBC x   Sq Epi: x / Non Sq Epi: x / Bacteria: x      Sodium, Random Urine: <20 mmol/L (12-01 @ 14:06)  Potassium, Random Urine: 26.5 mmol/L (12-01 @ 14:06)  Creatinine, Random Urine: 81 mg/dL (12-01 @ 14:06)  Protein/Creatinine Ratio Calculation: 2.3 Ratio (12-01 @ 14:06)  Osmolality, Random Urine: 478 mosm/kg (12-01 @ 14:06)        ASSESSMENT:  59 yr old male with a pmh significant for colon adenocarcinoma complicated by LBO status post colectomy/ileostomy, dvt s/p ivc, anemia who presents with a 1 week hx of lethargy/generalized weakness, lightheadedness, sob, dysuria since discharge. States he was supposed to have more abx sent to his pharmacy but never received them, returns with urosepsis, now improving on IV Abx. He was also noted to have severe L hydro s/p PCN on 11/27/24 2/2 bladder mass. Nephrology consulted for MAXWELL and Hyponatremia    Renal: MAXWELL, nonoliguric, Suspect MAXWELL may be multifactorial and 2/2 obstructive uropathy + hypotensive ATN   -Renal fxn improving with PCN   -Hypophosphatemia- a/w kphos 2 tabs x 1 today   -Continue bicarb tabs     Hyponatremia- initially 2/2 SIADH now suspect 2/2 low solute + inc ADH release - NA+ improving stop 3%NS     - Severe L hydro, s/p L PCN 11/27/24    ID- UTI on zosyn       Sayed [facility]  Office: [phone]           NEPHROLOGY-[facility] [phone]        Patient seen and examined no new complaints, feels edema is improving.         MEDICATIONS  (STANDING):  pantoprazole    Tablet 40 milliGRAM(s) Oral two times a day  piperacillin/tazobactam IVPB.. 3.375 Gram(s) IV Intermittent every 8 hours  sodium bicarbonate 1300 milliGRAM(s) Oral three times a day  tamsulosin 0.4 milliGRAM(s) Oral at bedtime      VITAL:  T(C): , Max: 36.9 (12-02-24 @ 06:38)  T(F): , Max: 98.5 (12-02-24 @ 06:38)  HR: 90 (12-02-24 @ 11:35)  BP: 97/59 (12-02-24 @ 11:35)  BP(mean): 70 (12-02-24 @ 06:38)  RR: 18 (12-02-24 @ 11:35)  SpO2: 100% (12-02-24 @ 11:35)  Wt(kg): --    I and O's:        PHYSICAL EXAM:    Constitutional: NAD  Neck:  No JVD  Respiratory: CTAB/L  Cardiovascular: S1 and S2  Gastrointestinal: BS+, soft, NT/ND  Extremities: + peripheral edema  Neurological: A/O x 3, no focal deficits  Psychiatric: Normal mood, normal affect  : No Lind +PCN  Skin: No rashes  Access: Not applicable    LABS:                        7.1    18.52 )-----------( 198      ( 02 Dec 2024 07:03 )             22.6     12-02    132[L]  |  108[H]  |  30[H]  ----------------------------<  95  4.0   |  14[L]  |  1.49[H]    Ca    7.8[L]      02 Dec 2024 07:03  Phos  2.1     12-02  Mg     1.80     12-02    TPro  4.9[L]  /  Alb  2.2[L]  /  TBili  0.4  /  DBili  x   /  AST  12  /  ALT  5   /  AlkPhos  83  12-02          Urine Studies:  Urinalysis Basic - ( 02 Dec 2024 07:03 )    Color: x / Appearance: x / SG: x / pH: x  Gluc: 95 mg/dL / Ketone: x  / Bili: x / Urobili: x   Blood: x / Protein: x / Nitrite: x   Leuk Esterase: x / RBC: x / WBC x   Sq Epi: x / Non Sq Epi: x / Bacteria: x      Sodium, Random Urine: <20 mmol/L (12-01 @ 14:06)  Potassium, Random Urine: 26.5 mmol/L (12-01 @ 14:06)  Creatinine, Random Urine: 81 mg/dL (12-01 @ 14:06)  Protein/Creatinine Ratio Calculation: 2.3 Ratio (12-01 @ 14:06)  Osmolality, Random Urine: 478 mosm/kg (12-01 @ 14:06)            NEPHROLOGY-[facility] [phone]        Patient seen and examined no new complaints, feels edema is improving.         MEDICATIONS  (STANDING):  pantoprazole    Tablet 40 milliGRAM(s) Oral two times a day  piperacillin/tazobactam IVPB.. 3.375 Gram(s) IV Intermittent every 8 hours  sodium bicarbonate 1300 milliGRAM(s) Oral three times a day  tamsulosin 0.4 milliGRAM(s) Oral at bedtime      VITAL:  T(C): , Max: 36.9 (12-02-24 @ 06:38)  T(F): , Max: 98.5 (12-02-24 @ 06:38)  HR: 90 (12-02-24 @ 11:35)  BP: 97/59 (12-02-24 @ 11:35)  BP(mean): 70 (12-02-24 @ 06:38)  RR: 18 (12-02-24 @ 11:35)  SpO2: 100% (12-02-24 @ 11:35)  Wt(kg): --    I and O's:        PHYSICAL EXAM:    Constitutional: NAD' cachetic   Neck:  No JVD  Respiratory: CTAB/L  Cardiovascular: S1 and S2  Gastrointestinal: BS+, soft, NT/ND + ostomy   Extremities: + peripheral edema  Neurological: A/O x 3, no focal deficits  Psychiatric: Normal mood, normal affect  : No Lind +PCN  Skin: No rashes  Access: Not applicable    LABS:                        7.1    18.52 )-----------( 198      ( 02 Dec 2024 07:03 )             22.6     12-02    132[L]  |  108[H]  |  30[H]  ----------------------------<  95  4.0   |  14[L]  |  1.49[H]    Ca    7.8[L]      02 Dec 2024 07:03  Phos  2.1     12-02  Mg     1.80     12-02    TPro  4.9[L]  /  Alb  2.2[L]  /  TBili  0.4  /  DBili  x   /  AST  12  /  ALT  5   /  AlkPhos  83  12-02          Urine Studies:  Urinalysis Basic - ( 02 Dec 2024 07:03 )    Color: x / Appearance: x / SG: x / pH: x  Gluc: 95 mg/dL / Ketone: x  / Bili: x / Urobili: x   Blood: x / Protein: x / Nitrite: x   Leuk Esterase: x / RBC: x / WBC x   Sq Epi: x / Non Sq Epi: x / Bacteria: x      Sodium, Random Urine: <20 mmol/L (12-01 @ 14:06)  Potassium, Random Urine: 26.5 mmol/L (12-01 @ 14:06)  Creatinine, Random Urine: 81 mg/dL (12-01 @ 14:06)  Protein/Creatinine Ratio Calculation: 2.3 Ratio (12-01 @ 14:06)  Osmolality, Random Urine: 478 mosm/kg (12-01 @ 14:06)

## 2024-12-02 NOTE — PROGRESS NOTE ADULT - SUBJECTIVE AND OBJECTIVE BOX
SOLID TUMOR ONCOLOGY HOSPITALIST PROGRESS NOTE    S: No acute events overnight.  Pt reports feeling well, and indicated that he didn't feel bad prior to getting his PCN placed.  He confirmed that he's been ambulating in his room without dizziness or unsteady gait.  He also mentioned that his urine flow has been fine and he denied significant hematuria.    CURRENT MEDICATIONS  MEDICATIONS  (STANDING):  pantoprazole    Tablet 40 milliGRAM(s) Oral two times a day  piperacillin/tazobactam IVPB.. 3.375 Gram(s) IV Intermittent every 8 hours  sodium bicarbonate 1300 milliGRAM(s) Oral three times a day  tamsulosin 0.4 milliGRAM(s) Oral at bedtime  MEDICATIONS  (PRN):  aluminum hydroxide/magnesium hydroxide/simethicone Suspension 30 milliLiter(s) Oral every 4 hours PRN Dyspepsia  melatonin 3 milliGRAM(s) Oral at bedtime PRN Insomnia  ondansetron Injectable 4 milliGRAM(s) IV Push every 8 hours PRN Nausea and/or Vomiting      PHYSICAL EXAM  T(C): 36.7 (12-02-24 @ 11:35), Max: 36.9 (12-02-24 @ 06:38)  HR: 90 (12-02-24 @ 11:35) (83 - 91)  BP: 97/59 (12-02-24 @ 11:35) (80/52 - 97/59)  RR: 18 (12-02-24 @ 11:35) (17 - 18)  SpO2: 100% (12-02-24 @ 11:35) (98% - 100%)  Cachectic, non-toxic-appearing middle-aged male, sitting up in bed, appears comfortable  Aaox3, answering all questions appropriately and following commands  Anicteric sclera, no oral lesions/thrush  RRR, no m/r/g  CTAB, no w/c/r  L PCN is in tact and draining clear yellow urine  Abd soft/nt/nd, hypoactive bowel sounds; ostomy in tact, bag continuing liquid brown fecal matter  Improving RLE edema is noted (skin of RLE is wrinkled)  CN 2-12 grossly intact; no gross focal neuro deficits; pt moves all extremities spontaneously    LABS                        7.1    18.52 )-----------( 198      ( 02 Dec 2024 07:03 )             22.6     12-02    132[L]  |  108[H]  |  30[H]  ----------------------------<  95  4.0   |  14[L]  |  1.49[H]    Ca    7.8[L]      02 Dec 2024 07:03  Phos  2.1     12-02  Mg     1.80     12-02    TPro  4.9[L]  /  Alb  2.2[L]  /  TBili  0.4  /  DBili  x   /  AST  12  /  ALT  5   /  AlkPhos  83  12-02      MICROBIOLOGY  12/1 Procal 0.35     Abx:   Zosyn 11/29-present  $Ceftriaxone 11/25-28    Cx Data  12/1 UA: small LE, neg nitrite, large bloodn uric acid crystals  12/1 Urine K 26.5  12/1 Urine Na: <20  11/27 UCx: no growth  11/25 BCx: NTD  11/25 Ua: LE: large, WBC 1562, Nitrite +, , Bacteria: Many       PERTINENT RADIOLOGY  11/26 US Renal: The known large bladder mass is again identified  Persistent severe left hydronephrosis  11/25 Chest Xray: Clear lungs.

## 2024-12-03 ENCOUNTER — TRANSCRIPTION ENCOUNTER (OUTPATIENT)
Age: 59
End: 2024-12-03

## 2024-12-03 VITALS
DIASTOLIC BLOOD PRESSURE: 68 MMHG | SYSTOLIC BLOOD PRESSURE: 103 MMHG | HEART RATE: 84 BPM | TEMPERATURE: 97 F | RESPIRATION RATE: 17 BRPM | OXYGEN SATURATION: 97 %

## 2024-12-03 DIAGNOSIS — R53.1 WEAKNESS: ICD-10-CM

## 2024-12-03 DIAGNOSIS — A41.9 SEPSIS, UNSPECIFIED ORGANISM: ICD-10-CM

## 2024-12-03 DIAGNOSIS — N39.0 URINARY TRACT INFECTION, SITE NOT SPECIFIED: ICD-10-CM

## 2024-12-03 LAB
ALBUMIN SERPL ELPH-MCNC: 2.6 G/DL — LOW (ref 3.3–5)
ALP SERPL-CCNC: 99 U/L — SIGNIFICANT CHANGE UP (ref 40–120)
ALT FLD-CCNC: 6 U/L — SIGNIFICANT CHANGE UP (ref 4–41)
ANION GAP SERPL CALC-SCNC: 13 MMOL/L — SIGNIFICANT CHANGE UP (ref 7–14)
AST SERPL-CCNC: 14 U/L — SIGNIFICANT CHANGE UP (ref 4–40)
BASOPHILS # BLD AUTO: 0.01 K/UL — SIGNIFICANT CHANGE UP (ref 0–0.2)
BASOPHILS NFR BLD AUTO: 0.1 % — SIGNIFICANT CHANGE UP (ref 0–2)
BILIRUB SERPL-MCNC: 0.4 MG/DL — SIGNIFICANT CHANGE UP (ref 0.2–1.2)
BLD GP AB SCN SERPL QL: NEGATIVE — SIGNIFICANT CHANGE UP
BUN SERPL-MCNC: 26 MG/DL — HIGH (ref 7–23)
CALCIUM SERPL-MCNC: 8.3 MG/DL — LOW (ref 8.4–10.5)
CHLORIDE SERPL-SCNC: 105 MMOL/L — SIGNIFICANT CHANGE UP (ref 98–107)
CO2 SERPL-SCNC: 17 MMOL/L — LOW (ref 22–31)
CREAT SERPL-MCNC: 1.17 MG/DL — SIGNIFICANT CHANGE UP (ref 0.5–1.3)
EGFR: 71 ML/MIN/1.73M2 — SIGNIFICANT CHANGE UP
EOSINOPHIL # BLD AUTO: 0.08 K/UL — SIGNIFICANT CHANGE UP (ref 0–0.5)
EOSINOPHIL NFR BLD AUTO: 0.6 % — SIGNIFICANT CHANGE UP (ref 0–6)
GLUCOSE BLDC GLUCOMTR-MCNC: 96 MG/DL — SIGNIFICANT CHANGE UP (ref 70–99)
GLUCOSE SERPL-MCNC: 75 MG/DL — SIGNIFICANT CHANGE UP (ref 70–99)
HCT VFR BLD CALC: 16 % — CRITICAL LOW (ref 39–50)
HCT VFR BLD CALC: 27 % — LOW (ref 39–50)
HGB BLD-MCNC: 4.8 G/DL — CRITICAL LOW (ref 13–17)
HGB BLD-MCNC: 8.1 G/DL — LOW (ref 13–17)
IANC: 9.73 K/UL — HIGH (ref 1.8–7.4)
IMM GRANULOCYTES NFR BLD AUTO: 1.7 % — HIGH (ref 0–0.9)
LYMPHOCYTES # BLD AUTO: 1.76 K/UL — SIGNIFICANT CHANGE UP (ref 1–3.3)
LYMPHOCYTES # BLD AUTO: 13.9 % — SIGNIFICANT CHANGE UP (ref 13–44)
MAGNESIUM SERPL-MCNC: 1.9 MG/DL — SIGNIFICANT CHANGE UP (ref 1.6–2.6)
MCHC RBC-ENTMCNC: 23.1 PG — LOW (ref 27–34)
MCHC RBC-ENTMCNC: 23.2 PG — LOW (ref 27–34)
MCHC RBC-ENTMCNC: 30 G/DL — LOW (ref 32–36)
MCHC RBC-ENTMCNC: 30 G/DL — LOW (ref 32–36)
MCV RBC AUTO: 76.9 FL — LOW (ref 80–100)
MCV RBC AUTO: 77.4 FL — LOW (ref 80–100)
MONOCYTES # BLD AUTO: 0.85 K/UL — SIGNIFICANT CHANGE UP (ref 0–0.9)
MONOCYTES NFR BLD AUTO: 6.7 % — SIGNIFICANT CHANGE UP (ref 2–14)
NEUTROPHILS # BLD AUTO: 9.73 K/UL — HIGH (ref 1.8–7.4)
NEUTROPHILS NFR BLD AUTO: 77 % — SIGNIFICANT CHANGE UP (ref 43–77)
NRBC # BLD: 0 /100 WBCS — SIGNIFICANT CHANGE UP (ref 0–0)
NRBC # BLD: 0 /100 WBCS — SIGNIFICANT CHANGE UP (ref 0–0)
NRBC # FLD: 0 K/UL — SIGNIFICANT CHANGE UP (ref 0–0)
NRBC # FLD: 0 K/UL — SIGNIFICANT CHANGE UP (ref 0–0)
PHOSPHATE SERPL-MCNC: 2.3 MG/DL — LOW (ref 2.5–4.5)
PLATELET # BLD AUTO: 165 K/UL — SIGNIFICANT CHANGE UP (ref 150–400)
PLATELET # BLD AUTO: 271 K/UL — SIGNIFICANT CHANGE UP (ref 150–400)
POTASSIUM SERPL-MCNC: 4.2 MMOL/L — SIGNIFICANT CHANGE UP (ref 3.5–5.3)
POTASSIUM SERPL-SCNC: 4.2 MMOL/L — SIGNIFICANT CHANGE UP (ref 3.5–5.3)
PROCALCITONIN SERPL-MCNC: 0.12 NG/ML — HIGH (ref 0.02–0.1)
PROT SERPL-MCNC: 5.9 G/DL — LOW (ref 6–8.3)
RBC # BLD: 2.08 M/UL — LOW (ref 4.2–5.8)
RBC # BLD: 3.49 M/UL — LOW (ref 4.2–5.8)
RBC # FLD: 20.3 % — HIGH (ref 10.3–14.5)
RBC # FLD: 20.7 % — HIGH (ref 10.3–14.5)
RH IG SCN BLD-IMP: POSITIVE — SIGNIFICANT CHANGE UP
SODIUM SERPL-SCNC: 135 MMOL/L — SIGNIFICANT CHANGE UP (ref 135–145)
WBC # BLD: 12.64 K/UL — HIGH (ref 3.8–10.5)
WBC # BLD: 19.22 K/UL — HIGH (ref 3.8–10.5)
WBC # FLD AUTO: 12.64 K/UL — HIGH (ref 3.8–10.5)
WBC # FLD AUTO: 19.22 K/UL — HIGH (ref 3.8–10.5)

## 2024-12-03 PROCEDURE — 99239 HOSP IP/OBS DSCHRG MGMT >30: CPT

## 2024-12-03 RX ORDER — SODIUM CHLORIDE 9 MG/ML
1 INJECTION, SOLUTION INTRAMUSCULAR; INTRAVENOUS; SUBCUTANEOUS
Refills: 0 | Status: DISCONTINUED | OUTPATIENT
Start: 2024-12-03 | End: 2024-12-03

## 2024-12-03 RX ORDER — AMOXICILLIN/POTASSIUM CLAV 250-125 MG
875 TABLET ORAL
Qty: 4 | Refills: 0
Start: 2024-12-03 | End: 2024-12-04

## 2024-12-03 RX ORDER — SODIUM BICARBONATE 84 MG/ML
2 INJECTION, SOLUTION INTRAVENOUS
Qty: 180 | Refills: 0
Start: 2024-12-03 | End: 2025-01-01

## 2024-12-03 RX ADMIN — PANTOPRAZOLE SODIUM 40 MILLIGRAM(S): 40 TABLET, DELAYED RELEASE ORAL at 05:07

## 2024-12-03 RX ADMIN — SODIUM BICARBONATE 1300 MILLIGRAM(S): 84 INJECTION, SOLUTION INTRAVENOUS at 13:57

## 2024-12-03 RX ADMIN — PIPERACILLIN SODIUM AND TAZOBACTAM SODIUM 25 GRAM(S): 4; .5 INJECTION, POWDER, LYOPHILIZED, FOR SOLUTION INTRAVENOUS at 13:57

## 2024-12-03 RX ADMIN — SODIUM BICARBONATE 1300 MILLIGRAM(S): 84 INJECTION, SOLUTION INTRAVENOUS at 05:07

## 2024-12-03 RX ADMIN — PIPERACILLIN SODIUM AND TAZOBACTAM SODIUM 25 GRAM(S): 4; .5 INJECTION, POWDER, LYOPHILIZED, FOR SOLUTION INTRAVENOUS at 05:07

## 2024-12-03 NOTE — PROGRESS NOTE ADULT - ASSESSMENT
59 yr old male with a pmh significant for colon adenocarcinoma complicated by LBO status post colectomy/ileostomy, dvt s/p ivc, anemia who presents with a 1 week hx of lethargy/generalized weakness, lightheadedness returns with urosepsis, now improving on IV Abx. He was also noted to have severe L hydro s/p PCN on 11/27/24 2/2 bladder mass. Nephrology consulted for MAXWELL and Hyponatremia    Renal: MAXWELL, nonoliguric - Improving   -Renal fxn improving--> PCN   -Hypophosphatemia- improving s/p repletion, give phosnak 2 packets po x 1   -Continue bicarb tabs     Hyponatremia- initially 2/2 SIADH now suspect 2/2 low solute + inc ADH release - NA+ improving s/p 3% saline  S/p dextrose with 150meq of nahco3/liter at 60cc/hr   On salt tabs 1g po bid    - Severe L hydro, s/p L PCN 11/27/24    ID- UTI on zosyn     Sayed [facility]   [phone]

## 2024-12-03 NOTE — DISCHARGE NOTE PROVIDER - NSDCMRMEDTOKEN_GEN_ALL_CORE_FT
pantoprazole 40 mg oral delayed release tablet: 1 tab(s) orally 2 times a day  sodium chloride 1 g oral tablet: 1 tab(s) orally 2 times a day  tamsulosin 0.4 mg oral capsule: 1 cap(s) orally once a day (at bedtime)   amoxicillin-clavulanate 875 mg-125 mg oral tablet: 875 milligram(s) orally 2 times a day Take from 12/4 - 12/5  pantoprazole 40 mg oral delayed release tablet: 1 tab(s) orally 2 times a day  sodium bicarbonate 650 mg oral tablet: 2 tab(s) orally 3 times a day  sodium chloride 1 g oral tablet: 1 tab(s) orally 2 times a day  tamsulosin 0.4 mg oral capsule: 1 cap(s) orally once a day (at bedtime)

## 2024-12-03 NOTE — DISCHARGE NOTE NURSING/CASE MANAGEMENT/SOCIAL WORK - PATIENT PORTAL LINK FT
You can access the FollowMyHealth Patient Portal offered by [facility] by registering at the following website: [email or web address]. By joining [employer]’s FollowMyHealth portal, you will also be able to view your health information using other applications (apps) compatible with our system.

## 2024-12-03 NOTE — DISCHARGE NOTE NURSING/CASE MANAGEMENT/SOCIAL WORK - NSDCPEFALRISK_GEN_ALL_CORE
For information on Fall & Injury Prevention, visit: [email or web address] OR  [email or web address] OR  https://www.cdc.gov/steadi/patient.html

## 2024-12-03 NOTE — DISCHARGE NOTE PROVIDER - CARE PROVIDER_API CALL
Dayton Palafox  Medical Oncology  [address] [place], [place] [id number]  Phone: [phone]  Fax: [phone]  Follow Up Time:

## 2024-12-03 NOTE — DISCHARGE NOTE PROVIDER - DETAILS OF MALNUTRITION DIAGNOSIS/DIAGNOSES
This patient has been assessed with a concern for Malnutrition and was treated during this hospitalization for the following Nutrition diagnosis/diagnoses:     -  11/27/2024: Severe protein-calorie malnutrition   -  11/27/2024: Underweight (BMI < 19)

## 2024-12-03 NOTE — DISCHARGE NOTE NURSING/CASE MANAGEMENT/SOCIAL WORK - FINANCIAL ASSISTANCE
[facility] provides services at a reduced cost to those who are determined to be eligible through [facility]’s financial assistance program. Information regarding [facility]’s financial assistance program can be found by going to [email or web address] or by calling [phone].

## 2024-12-03 NOTE — DISCHARGE NOTE PROVIDER - ATTENDING DISCHARGE PHYSICAL EXAMINATION:
Vital Signs Last 24 Hrs  T(C): 36.5 (03 Dec 2024 04:49), Max: 36.7 (02 Dec 2024 20:37)  T(F): 97.7 (03 Dec 2024 04:49), Max: 98 (02 Dec 2024 20:37)  HR: 95 (03 Dec 2024 04:49) (89 - 95)  BP: 95/66 (03 Dec 2024 04:49) (91/59 - 95/66)  RR: 17 (03 Dec 2024 04:49) (17 - 18)  SpO2: 100% (03 Dec 2024 04:49) (99% - 100%)  Parameters below as of 03 Dec 2024 04:49  Patient On (Oxygen Delivery Method): room air  Cachectic, non-toxic-appearing middle-aged male, sitting up in bed, appears comfortable  Aaox3, answering all questions appropriately and following commands  Anicteric sclera, no oral lesions/thrush  RRR, no m/r/g  CTAB, no w/c/r  L PCN is in tact and draining clear yellow urine  Abd soft/nt/nd, hypoactive bowel sounds; ostomy in tact, bag continuing liquid brown fecal matter  Improving RLE edema is noted (skin of RLE is wrinkled)  CN 2-12 grossly intact; no gross focal neuro deficits; pt moves all extremities spontaneously

## 2024-12-03 NOTE — PROVIDER CONTACT NOTE (OTHER) - ACTION/TREATMENT ORDERED:
Provider came to bedside. Repeat CBC and CMP ordered and completed. Labs resulted, hgb 8.1. No further actions.

## 2024-12-03 NOTE — PROGRESS NOTE ADULT - SUBJECTIVE AND OBJECTIVE BOX
NEPHROLOGY     Patient seen and examined sitting on bed, comfortable on room air, denies pain, no sob, in no acute distress.     MEDICATIONS  (STANDING):  dextrose 5% 1000 milliLiter(s) (60 mL/Hr) IV Continuous <Continuous>  pantoprazole    Tablet 40 milliGRAM(s) Oral two times a day  piperacillin/tazobactam IVPB.. 3.375 Gram(s) IV Intermittent every 8 hours  sodium bicarbonate 1300 milliGRAM(s) Oral three times a day  tamsulosin 0.4 milliGRAM(s) Oral at bedtime    VITALS:  T(C): , Max: 36.7 (12-02-24 @ 20:37)  T(F): , Max: 98 (12-02-24 @ 20:37)  HR: 95 (12-03-24 @ 04:49)  BP: 95/66 (12-03-24 @ 04:49)  RR: 17 (12-03-24 @ 04:49)  SpO2: 100% (12-03-24 @ 04:49)    I and O's:    12-02 @ 07:01  -  12-03 @ 07:00  --------------------------------------------------------  IN: 400 mL / OUT: 150 mL / NET: 250 mL    PHYSICAL EXAM:  Constitutional: NAD' cachetic   Neck:  No JVD  Respiratory: CTAB/L  Cardiovascular: S1 and S2  Gastrointestinal: BS+, soft, NT/ND + ostomy   Extremities: + peripheral edema  Neurological: A/O x 3, no focal deficits  Psychiatric: Normal mood, normal affect  : No Lind +PCN  Skin: No rashes    LABS:                        4.8    12.64 )-----------( 165      ( 03 Dec 2024 07:40 )             16.0     12-02    134[L]  |  106  |  28[H]  ----------------------------<  98  3.6   |  16[L]  |  1.31[H]    Ca    7.7[L]      02 Dec 2024 20:34  Phos  2.0     12-02  Mg     2.00     12-02    TPro  4.9[L]  /  Alb  2.2[L]  /  TBili  0.4  /  DBili  x   /  AST  12  /  ALT  5   /  AlkPhos  83  12-02      Urine Studies:  Urinalysis Basic - ( 02 Dec 2024 20:34 )    Color: x / Appearance: x / SG: x / pH: x  Gluc: 98 mg/dL / Ketone: x  / Bili: x / Urobili: x   Blood: x / Protein: x / Nitrite: x   Leuk Esterase: x / RBC: x / WBC x   Sq Epi: x / Non Sq Epi: x / Bacteria: x    Sodium, Random Urine: <20 mmol/L (12-01 @ 14:06)  Potassium, Random Urine: 26.5 mmol/L (12-01 @ 14:06)  Creatinine, Random Urine: 81 mg/dL (12-01 @ 14:06)  Protein/Creatinine Ratio Calculation: 2.3 Ratio (12-01 @ 14:06)  Osmolality, Random Urine: 478 mosm/kg (12-01 @ 14:06)    ASSESSMENT/PLAN:  59 yr old male with a pmh significant for colon adenocarcinoma complicated by LBO status post colectomy/ileostomy, dvt s/p ivc, anemia who presents with a 1 week hx of lethargy/generalized weakness, lightheadedness returns with urosepsis, now improving on IV Abx. He was also noted to have severe L hydro s/p PCN on 11/27/24 2/2 bladder mass. Nephrology consulted for MAXWELL and Hyponatremia    Renal: MAXWELL, nonoliguric, Suspect MAXWELL may be multifactorial and 2/2 obstructive uropathy + hypotensive ATN   -Renal fxn improving as of late with PCN   -Hypophosphatemia- s/p repletion yesterday   -Continue bicarb tabs     Hyponatremia- initially 2/2 SIADH now suspect 2/2 low solute + inc ADH release - NA+ improving s/p 3% saline  S/p dextrose with 150meq of nahco3/liter at 60cc/hr   On salt tabs 1g po bid    Anemia - h/h low, repeat pending     - Severe L hydro, s/p L PCN 11/27/24    ID- UTI on zosyn        NEPHROLOGY     Patient seen and examined sitting on bed, comfortable on room air, denies pain, no sob, in no acute distress.     MEDICATIONS  (STANDING):  dextrose 5% 1000 milliLiter(s) (60 mL/Hr) IV Continuous <Continuous>  pantoprazole    Tablet 40 milliGRAM(s) Oral two times a day  piperacillin/tazobactam IVPB.. 3.375 Gram(s) IV Intermittent every 8 hours  sodium bicarbonate 1300 milliGRAM(s) Oral three times a day  tamsulosin 0.4 milliGRAM(s) Oral at bedtime    VITALS:  T(C): , Max: 36.7 (12-02-24 @ 20:37)  T(F): , Max: 98 (12-02-24 @ 20:37)  HR: 95 (12-03-24 @ 04:49)  BP: 95/66 (12-03-24 @ 04:49)  RR: 17 (12-03-24 @ 04:49)  SpO2: 100% (12-03-24 @ 04:49)    I and O's:    12-02 @ 07:01  -  12-03 @ 07:00  --------------------------------------------------------  IN: 400 mL / OUT: 150 mL / NET: 250 mL    PHYSICAL EXAM:  Constitutional: NAD' cachetic   Neck:  No JVD  Respiratory: CTAB/L  Cardiovascular: S1 and S2  Gastrointestinal: BS+, soft, NT/ND + ostomy   Extremities: + peripheral edema  Neurological: A/O x 3, no focal deficits  Psychiatric: Normal mood, normal affect  : No Lind +PCN  Skin: No rashes    LABS:                        4.8    12.64 )-----------( 165      ( 03 Dec 2024 07:40 )             16.0     12-02    134[L]  |  106  |  28[H]  ----------------------------<  98  3.6   |  16[L]  |  1.31[H]    Ca    7.7[L]      02 Dec 2024 20:34  Phos  2.0     12-02  Mg     2.00     12-02    TPro  4.9[L]  /  Alb  2.2[L]  /  TBili  0.4  /  DBili  x   /  AST  12  /  ALT  5   /  AlkPhos  83  12-02      Urine Studies:  Urinalysis Basic - ( 02 Dec 2024 20:34 )    Color: x / Appearance: x / SG: x / pH: x  Gluc: 98 mg/dL / Ketone: x  / Bili: x / Urobili: x   Blood: x / Protein: x / Nitrite: x   Leuk Esterase: x / RBC: x / WBC x   Sq Epi: x / Non Sq Epi: x / Bacteria: x    Sodium, Random Urine: <20 mmol/L (12-01 @ 14:06)  Potassium, Random Urine: 26.5 mmol/L (12-01 @ 14:06)  Creatinine, Random Urine: 81 mg/dL (12-01 @ 14:06)  Protein/Creatinine Ratio Calculation: 2.3 Ratio (12-01 @ 14:06)  Osmolality, Random Urine: 478 mosm/kg (12-01 @ 14:06)    ASSESSMENT/PLAN:  59 yr old male with a pmh significant for colon adenocarcinoma complicated by LBO status post colectomy/ileostomy, dvt s/p ivc, anemia who presents with a 1 week hx of lethargy/generalized weakness, lightheadedness returns with urosepsis, now improving on IV Abx. He was also noted to have severe L hydro s/p PCN on 11/27/24 2/2 bladder mass. Nephrology consulted for MAXWELL and Hyponatremia    Renal: MAXWELL, nonoliguric, Suspect MAXWELL may be multifactorial and 2/2 obstructive uropathy + hypotensive ATN   -Renal fxn improving with PCN   -Hypophosphatemia- improving s/p repletion, give phosnak 2 packets po x 1   -Continue bicarb tabs     Hyponatremia- initially 2/2 SIADH now suspect 2/2 low solute + inc ADH release - NA+ improving s/p 3% saline  S/p dextrose with 150meq of nahco3/liter at 60cc/hr   On salt tabs 1g po bid    - Severe L hydro, s/p L PCN 11/27/24    ID- UTI on zosyn      NEPHROLOGY     Patient seen and examined sitting on bed, comfortable on room air, denies pain, no sob, in no acute distress.     MEDICATIONS  (STANDING):  dextrose 5% 1000 milliLiter(s) (60 mL/Hr) IV Continuous <Continuous>  pantoprazole    Tablet 40 milliGRAM(s) Oral two times a day  piperacillin/tazobactam IVPB.. 3.375 Gram(s) IV Intermittent every 8 hours  sodium bicarbonate 1300 milliGRAM(s) Oral three times a day  tamsulosin 0.4 milliGRAM(s) Oral at bedtime    VITALS:  T(C): , Max: 36.7 (12-02-24 @ 20:37)  T(F): , Max: 98 (12-02-24 @ 20:37)  HR: 95 (12-03-24 @ 04:49)  BP: 95/66 (12-03-24 @ 04:49)  RR: 17 (12-03-24 @ 04:49)  SpO2: 100% (12-03-24 @ 04:49)    I and O's:    12-02 @ 07:01  -  12-03 @ 07:00  --------------------------------------------------------  IN: 400 mL / OUT: 150 mL / NET: 250 mL    PHYSICAL EXAM:  Constitutional: NAD' cachetic   Neck:  No JVD  Respiratory: CTAB/L  Cardiovascular: S1 and S2  Gastrointestinal: BS+, soft, NT/ND + ostomy   Extremities: + peripheral edema  Neurological: A/O x 3, no focal deficits  Psychiatric: Normal mood, normal affect  : No Lind +PCN  Skin: No rashes    LABS:                        4.8    12.64 )-----------( 165      ( 03 Dec 2024 07:40 )             16.0     12-02    134[L]  |  106  |  28[H]  ----------------------------<  98  3.6   |  16[L]  |  1.31[H]    Ca    7.7[L]      02 Dec 2024 20:34  Phos  2.0     12-02  Mg     2.00     12-02    TPro  4.9[L]  /  Alb  2.2[L]  /  TBili  0.4  /  DBili  x   /  AST  12  /  ALT  5   /  AlkPhos  83  12-02      Urine Studies:  Urinalysis Basic - ( 02 Dec 2024 20:34 )    Color: x / Appearance: x / SG: x / pH: x  Gluc: 98 mg/dL / Ketone: x  / Bili: x / Urobili: x   Blood: x / Protein: x / Nitrite: x   Leuk Esterase: x / RBC: x / WBC x   Sq Epi: x / Non Sq Epi: x / Bacteria: x    Sodium, Random Urine: <20 mmol/L (12-01 @ 14:06)  Potassium, Random Urine: 26.5 mmol/L (12-01 @ 14:06)  Creatinine, Random Urine: 81 mg/dL (12-01 @ 14:06)  Protein/Creatinine Ratio Calculation: 2.3 Ratio (12-01 @ 14:06)  Osmolality, Random Urine: 478 mosm/kg (12-01 @ 14:06)

## 2024-12-03 NOTE — DISCHARGE NOTE PROVIDER - NSDCCPCAREPLAN_GEN_ALL_CORE_FT
PRINCIPAL DISCHARGE DIAGNOSIS  Diagnosis: Septic shock  Assessment and Plan of Treatment: You had a urinary infection that spread to your blood causing a drop in blood pressure. You were treated with antibiotics and blood and urine cultures showed no bacteria. Please take the antibiotics until 12/5.      SECONDARY DISCHARGE DIAGNOSES  Diagnosis: Hyponatremia  Assessment and Plan of Treatment: Your sodium was low so you were treated with fluids with sodium which replenished it. Please continue taking your salt tablets twice a day.    Diagnosis: Colon cancer  Assessment and Plan of Treatment: Please follow up with Dr Palafox for your radiation treatment outpatient.    Diagnosis: MAXWELL (acute kidney injury)  Assessment and Plan of Treatment: Your kidneys were not filtering well and the left became swollen. A percutaneous nephrostomy tube was placed which improved your kidney function. Please flush your nephrostomy tube once a day.    Diagnosis: Urinary tract infection  Assessment and Plan of Treatment: You had a urinary infection. You were treated with antibiotics and blood and urine cultures showed no bacteria. Please take the antibiotics until 12/5.    Diagnosis: Anemia of chronic disease  Assessment and Plan of Treatment: Your hemoglobin was low so you recieved a blood tranfusion. Your hemoglobin has since been stable.     PRINCIPAL DISCHARGE DIAGNOSIS  Diagnosis: Septic shock  Assessment and Plan of Treatment: You had a urinary infection that spread to your blood causing a drop in blood pressure. You were treated with antibiotics and blood and urine cultures showed no bacteria. Please take the antibiotics until 12/5.      SECONDARY DISCHARGE DIAGNOSES  Diagnosis: Hyponatremia  Assessment and Plan of Treatment: Your sodium was low so you were treated with fluids with sodium which replenished it. Please continue taking your salt tablets twice a day.    Diagnosis: Colon cancer  Assessment and Plan of Treatment: Please follow up with Dr Palafox for your radiation treatment outpatient.    Diagnosis: MAXWELL (acute kidney injury)  Assessment and Plan of Treatment: Your kidneys were not filtering well and the left became swollen. A percutaneous nephrostomy tube was placed which improved your kidney function. Please flush your nephrostomy tube with 5ml saline once a day.    Diagnosis: Urinary tract infection  Assessment and Plan of Treatment: You had a urinary infection. You were treated with antibiotics and blood and urine cultures showed no bacteria. Please take the antibiotics until 12/5.    Diagnosis: Anemia of chronic disease  Assessment and Plan of Treatment: Your hemoglobin was low so you recieved a blood tranfusion. Your hemoglobin has since been stable.

## 2024-12-03 NOTE — CHART NOTE - NSCHARTNOTEFT_GEN_A_CORE
ERICKA Rand, known to rad onc, numerous previous admission in July, October, and twice in November 2024 including this present admission,  for h/o metastatic colon adenocarcinoma with invasion into the bladder that has caused off/on hematuria.  He has refused RT when offered in the past several times.    Last saw his oncologist Dr. Palafox on 11/19/24- due to weakness and cachexia he is no longer a candidate for any more systemic therapy.    Per mini Tumor Board discussion with Dr. Palomares/Dr. Mora/Dr. Kearns- rad onc: RT is no longer being offered either.  We would not anticipate beneficial effects by adding palliative RT now.      We now recommend hospice care.
Lightweight Wheelchair    The patient will require a lightweight wheelchair due to generalized weakness and difficulty ambulating. The patient has a mobility limitation that significantly impairs the patients ability to participate in one or more of MRADLs such as toileting, eating, dressing and bathing in customary location in the home. The patient's mobility limitation cannot be sufficiently resolved by the use of an appropriately fitted can or walker. The patient is unable to ambulated with a walker. Use of a manual wheelchair will significantly improve the beneficiary's ability to participate in MRALDs and the beneficiary  will use it on a regular basis in the home. The beneficiary is able and willing to use the wheelchair in the home. The beneficiary cannot self-propel in a standard wheelchair. The patient can self=propel in a lightweight wheelchair. The beneficiary has sufficient upper extremity function and other physical and mental capabilities needed to safely self-propel the manual lightweight wheelchair that is provided in the home during a typical day.    Neelima Roldan PA-c
IR Follow-Up     59 year old male patient with PMHx nephrolithiasis, CKD3, colon adenocarcinoma with mets to lymph nodes, bladder and malignant LBO s/p subtotal colectomy/ileostomy, DVT not on AC due to recurrent hematuria s/p IVC Filter admitted for recurrent UTI and MAXWELL. Noted to have severe left hydronephrosis on ultrasound 11/26, similar to findings on Ct abdomen and pelvis 11/30/24. IR consulted for possible left nephrostomy tube placement.    Patient with WBC 23.67, BP 90/60s. Sodium 125 today.    -- Patient with hyponatremia and will have increased risk during sedation however patient needs emergent decompression of left collecting system given the concern of infection with severe left hydronephrosis.  -- Plan for emergent left nephrostomy tube placement today 11/27     Patient discussed with Dr. Zepeda     --  Chidi Oneal DO, PGY-2  Vascular and Interventional Radiology   Available on Microsoft Teams    For EMERGENT inquiries/questions:  IR Pager ([facility]): [phone]  IR Pager ([facility]): [phone] ; [phone]    For non-emergent consults/questions:   Please place a sunrise order "Consult- Interventional Radiology" with an appropriate callback number    For questions about scheduling during appropriate work hours, call IR :  [facility]: [phone]  [facility]: [phone]    For outpatient IR booking:  [facility]: [phone]  [facility]: [phone]
IR Pre-Procedure Note    Patient Age:   59y    Patient Gender:   Male    Procedure (including site / side if known): nephrostomy Tube placement     Diagnosis / Indication: Patient is a 59y old  Male who presents with a chief complaint of severe sepsis 2/2 uti, halley (26 Nov 2024 16:22)      Interventional Radiology Attending Physician: Dr Tsang     Ordering Attending Physician: Dr. Strong     PAST MEDICAL & SURGICAL HISTORY:  Colon cancer  (resection and ileostomy in 2022, with progressive residual metastatic dz, refusing CTX / radiation oncology consultation to date)      H/O ileostomy      Renal stones  (s/p right ureteral stent placement)      Large bowel obstruction  (at time of diagnosis of colon cancer)      History of small bowel obstruction  (4/30/23: non-surgical tx)      History of cholelithiasis      Pulmonary nodule      Splenomegaly      History of ileostomy      History of DVT in adulthood      S/P colon resection      H/O ureteroscopy  (s/p right ureteral stent placement)           Pertinent Labs:   CBC Full  -  ( 26 Nov 2024 06:30 )  WBC Count : 23.67 K/uL  RBC Count : 3.44 M/uL  Hemoglobin : 8.0 g/dL  Hematocrit : 26.9 %  Platelet Count - Automated : 347 K/uL  Mean Cell Volume : 78.2 fL  Mean Cell Hemoglobin : 23.3 pg  Mean Cell Hemoglobin Concentration : 29.7 g/dL  Auto Neutrophil # : 18.95 K/uL  Auto Lymphocyte # : 2.25 K/uL  Auto Monocyte # : 1.99 K/uL  Auto Eosinophil # : 0.09 K/uL  Auto Basophil # : 0.04 K/uL  Auto Neutrophil % : 80.0 %  Auto Lymphocyte % : 9.5 %  Auto Monocyte % : 8.4 %  Auto Eosinophil % : 0.4 %  Auto Basophil % : 0.2 %    11-26    123[L]  |  98  |  46[H]  ----------------------------<  93  5.2   |  12[L]  |  1.61[H]    Ca    8.4      26 Nov 2024 06:30  Phos  4.0     11-25  Mg     1.80     11-25    TPro  5.8[L]  /  Alb  2.4[L]  /  TBili  0.3  /  DBili  x   /  AST  27  /  ALT  10  /  AlkPhos  119  11-25    PT/INR - ( 25 Nov 2024 10:56 )   PT: 15.8 sec;   INR: 1.37 ratio         PTT - ( 25 Nov 2024 10:56 )  PTT:32.7 sec    Patient / Family aware of procedure:   [x] Y   [  ] N
Pt and pt's wife were educated on flushing L PCN. Instructed to use 5ml flushes once a day. Pt and wife verbalized understanding.    Neelima Roldan PA-C

## 2024-12-03 NOTE — DISCHARGE NOTE PROVIDER - NSDCFUSCHEDAPPT_GEN_ALL_CORE_FT
[facility]  UROLOGY [address] [place] [address] R  Scheduled Appointment: 02/03/2025    Brayan Nino  [facility]  UROLOGY [address] [place] [address] R  Scheduled Appointment: 02/03/2025

## 2024-12-03 NOTE — PROGRESS NOTE ADULT - PROVIDER SPECIALTY LIST ADULT
Nephrology
Hospitalist
Hospitalist
Intervent Radiology
Nephrology
Heme/Onc
Hospitalist

## 2024-12-03 NOTE — DISCHARGE NOTE NURSING/CASE MANAGEMENT/SOCIAL WORK - NSDCFUADDAPPT_GEN_ALL_CORE_FT
If Left nephrostomy tube is needed for long-term management of Left hydronephrosis, will need routine nephrostomy tube exchange every 3 months. Can call Outpatient IR office [phone] to schedule.     Follow up for radiation appointment on 12/6 with Dr. Palafox.

## 2024-12-03 NOTE — DISCHARGE NOTE PROVIDER - HOSPITAL COURSE
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 Started on zosyn x 7days and discharged on augmentin to complete the course. Renal US showed severe left hydronephrosis; IR consulted, s/p L PCN placement 11/27. Nephro consulted, started sodium bicarb tabs, d/c'd salt tabs, started 3% HTS which improved the hyponatremia. Salt tabs restarted.    ---Heme Onc---  #Colon adenoca met to LN, bladder  - Met. L sided colon adenoca s/p colectomy/ileostomy  - S/p C1 FOLFOX 6/10/2024, has not been on systemic therapies since  - Plan for palliative RT to bladder mass as outpatient   - Per Dr. Palafox no longer candidate for systemic treatment  - Overall plan per Dr. Palafox.    #Thrombosis   DVT not on AC due to recurrent hematuria/anemia s/p IVC filter    #Anemia  - no active bleed  - received 1u prbc 11/30  - iron studies: ferritin 33, iron 10, TIBC 190, folate 11.9 wnl, vit B12 > 2000    ---ID---   #Severe sepsis.   -T 98.3, -> 86, BP 76/59->98/72, RR 18 satting 100% RA  -WBC 26.8, LA 4.1-> 2.9, CXR: clear lungs  -UA: LE: large, WBC 1562, Nitrite +, , Bacteria: Many   -Ucx hx: e. coli sensitive to Zosyn so started zosyn IV x7 days, transitioned to augmentin PO on discharge for last two days of the course  -s/p rocephin  -s/p IVF prn w/ improvement of hypotension   - Bcx NGTD, Uca 11/27 no growth  - procal 0.35 --> 0.12    ---Renal---  #MAXWELL   -Cr 1.5- baseline 1.1-1.2  - likely 2/2 hypotension/volume depletion vs infection  - IVF prn  -renally dose medications and avoid nephrotoxic agents  -strict i/o  - FeNa 0.4% pre-renal    #Hyponatremia/ acute on chronic   - hx of SIADH  -Na 124 on presentation  -s/p 1L NS -> continue NS 75ml/hr for 10 hrs due to hypotension/volume depletion   Will need to be on fluid restriction once volume resuscitated   -Restarted salt tabs 1g BID as per last d/c >> 2g BID >> d/c'd on 12/1 >> restarted and discharged on 1g BID  - Nepro consulted, started sodium bicarb tabs and bicarb in dextrose, started 3% HTS with improvement so d/c'd         60 yo M with hx of nephrolithiasis, CKD3 (baseline Cr 1.1-1.2), and 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 Started on zosyn x 7days and discharged on augmentin to complete the course. Renal US showed severe left hydronephrosis; IR consulted, s/p L PCN placement 11/27. Nephro consulted, started sodium bicarb tabs, d/c'd salt tabs, started 3% HTS which improved the hyponatremia. Salt tabs restarted at discharge.    ---Heme Onc---  #Colon adenoca met to LN, bladder  - Met. L sided colon adenoca s/p colectomy/ileostomy  - S/p C1 FOLFOX 6/10/2024, has not been on systemic therapies since  - Plan for palliative RT to bladder mass as outpatient   - Per Dr. Palafox no longer candidate for systemic treatment  - Overall plan per Dr. Palafox.    #Thrombosis   DVT not on AC due to recurrent hematuria/anemia s/p IVC filter    #Anemia  - no active bleed  - received 1u prbc 11/30  - iron studies: ferritin 33, iron 10, TIBC 190, folate 11.9 wnl, vit B12 > 2000    ---ID---   #Severe sepsis.   -T 98.3, -> 86, BP 76/59->98/72, RR 18 satting 100% RA  -WBC 26.8, LA 4.1-> 2.9, CXR: clear lungs  -UA: LE: large, WBC 1562, Nitrite +, , Bacteria: Many   -Ucx hx: e. coli sensitive to Zosyn so started zosyn IV x7 days, transitioned to augmentin PO on discharge for last two days of the course  -s/p rocephin  -s/p IVF prn w/ improvement of hypotension   - Bcx NGTD, Uca 11/27 no growth  - procal 0.35 --> 0.12    ---Renal---  #MAXWELL   - Hydronephrosis on renal US  - L PCN placed by IR 11/27, 5 ml flush daily, PCN teaching provided to family at discharge  -Cr 1.5- baseline 1.1-1.2  - likely 2/2 hypotension/volume depletion vs infection  - IVF prn  -renally dose medications and avoid nephrotoxic agents  -strict i/o  - FeNa 0.4% pre-renal    #Hyponatremia/ acute on chronic   - hx of SIADH  -Na 124 on presentation  -s/p 1L NS -> continue NS 75ml/hr for 10 hrs due to hypotension/volume depletion   - 1L volume restriction  -Restarted salt tabs 1g BID as per last d/c >> 2g BID >> d/c'd on 12/1 >> restarted and discharged on 1g BID  - Nepro consulted, started sodium bicarb tabs cont on discharge  - bicarb in dextrose, started 3% HTS with improvement so d/c'd         yo man with nephrolithiasis, CKD3 (baseline Cr 1.1-1.2), and met colon adenoca > dx in 9/2022; Kras WT; disease course c/b malignant LBO s/p subtotal colectomy followed by ileostomy on 9/12/22, path: [id number], +ve margin, 0/11 LNs+  (post-op course c/b high ileostomy output requring hospitalization for hydration, and SMV, L iliac/radial/ulnar vein thrombosis for which he took Eliquis for 6 months); 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 He continued to have POD with scans in 3/2024 showing growth of the bladder dome mass with invasion of the Joseph pouch and chronic PV thrombosis with cavernous malformation. Pt was subsequently started on palliative FOLFOX in 6/2024, s/p C1 on 6/10, and pt has not resumed systemic cancer treatment since then given his overwhelming c/f experiencing chemo-related toxicities. His treatment course has also been c/b SIADH for which pt has been on salt tabs. Of note pt was hospitalized in 10/30-31 for mgmt of acute-on-chronic anemia a/w acute cystitis (Ucx negative, pt discharged on empiric Cipro). He was re-hospitalized on 11/10-13 for mgmt of septic shock a/w pre-syncope 2/2 E colit UTI (completed 7d course of abx).  Pt was readmitted to [facility] on 11/25 with severe sepsis with admission labs notable for abnormal UA, acute-on-chronic hyponatremia, and MAXWELL on CKD; renal U/S showing severe L hydronephrosis. Started on zosyn x 7days and discharged on augmentin to complete the course. IR consulted, s/p L PCN placement 11/27. Nephro consulted, started sodium bicarb tabs, d/c'd salt tabs, started 3% HTS which improved the hyponatremia. Salt tabs restarted at discharge.    ---Heme Onc---  #Colon adenoca met to LN, bladder  - Met. L sided colon adenoca s/p colectomy/ileostomy  - S/p C1 FOLFOX 6/10/2024, has not been on systemic therapies since  - Plan for palliative RT to bladder mass as outpatient   - Per Dr. Palafox no longer candidate for systemic treatment  - Overall plan per Dr. Palafox.    #Thrombosis   DVT not on AC due to recurrent hematuria/anemia s/p IVC filter    #Anemia  - no active bleed  - received 1u prbc 11/30  - iron studies: ferritin 33, iron 10, TIBC 190, folate 11.9 wnl, vit B12 > 2000    ---ID---   #Severe sepsis.   - T 98.3, -> 86, BP 76/59->98/72, RR 18 satting 100% RA  - WBC 26.8, LA 4.1-> 2.9, CXR: clear lungs  - UA: LE: large, WBC 1562, Nitrite +, , Bacteria: Many   - Ucx hx: e. coli sensitive to Zosyn so started zosyn IV x7 days, transitioned to augmentin PO on discharge for last two days of the course  - s/p rocephin  - s/p IVF prn w/ improvement of hypotension   - Bcx NGTD, Uca 11/27 no growth  - procal 0.35 --> 0.12    ---Renal---  #MAXWELL   - Hydronephrosis on renal US  - L PCN placed by IR 11/27, 5 ml flush daily, PCN teaching provided to family at discharge  - Cr 1.5- baseline 1.1-1.2  - likely 2/2 hypotension/volume depletion vs infection  - IVF prn  - Renally dose medications and avoid nephrotoxic agents  - Strict i/o  - FeNa 0.4% pre-renal    #Hyponatremia/ acute on chronic   - hx of SIADH  - Na 124 on presentation  - s/p 1L NS -> continue NS 75ml/hr for 10 hrs due to hypotension/volume depletion   - 1L volume restriction  - Restarted salt tabs 1g BID as per last d/c >> 2g BID >> d/c'd on 12/1 >> restarted on 1gm BID  - Nepro consulted, started sodium bicarb tabs cont on discharge  - bicarb in dextrose, started 3% HTS with improvement so d/c'd  - Na improved to 130 on day of discharge; dced on Salt tabs 1gm bid

## 2024-12-03 NOTE — PROVIDER CONTACT NOTE (OTHER) - BACKGROUND
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

## 2024-12-03 NOTE — DISCHARGE NOTE PROVIDER - NSDCFUADDAPPT_GEN_ALL_CORE_FT
If Left nephrostomy tube is needed for long-term management of Left hydronephrosis, will need routine nephrostomy tube exchange every 3 months. Can call Outpatient IR office [phone] to schedule.     Follow up for radiation appointment on 12/6 with Dr. Palafox.

## 2024-12-12 ENCOUNTER — NON-APPOINTMENT (OUTPATIENT)
Age: 59
End: 2024-12-12

## 2024-12-13 VITALS
RESPIRATION RATE: 16 BRPM | DIASTOLIC BLOOD PRESSURE: 73 MMHG | OXYGEN SATURATION: 100 % | HEART RATE: 85 BPM | SYSTOLIC BLOOD PRESSURE: 99 MMHG

## 2024-12-13 LAB
CULTURE RESULTS: SIGNIFICANT CHANGE UP
SPECIMEN SOURCE: SIGNIFICANT CHANGE UP

## 2024-12-16 ENCOUNTER — NON-APPOINTMENT (OUTPATIENT)
Age: 59
End: 2024-12-16

## 2024-12-17 ENCOUNTER — NON-APPOINTMENT (OUTPATIENT)
Age: 59
End: 2024-12-17

## 2024-12-18 ENCOUNTER — NON-APPOINTMENT (OUTPATIENT)
Age: 59
End: 2024-12-18

## 2024-12-18 DIAGNOSIS — R30.0 DYSURIA: ICD-10-CM

## 2024-12-20 LAB
APPEARANCE: ABNORMAL
BACTERIA: ABNORMAL /HPF
BILIRUBIN URINE: NEGATIVE
BLOOD URINE: ABNORMAL
CAST: 3 /LPF
COLOR: YELLOW
EPITHELIAL CELLS: 24 /HPF
GLUCOSE QUALITATIVE U: NEGATIVE MG/DL
KETONES URINE: NEGATIVE MG/DL
LEUKOCYTE ESTERASE URINE: ABNORMAL
MICROSCOPIC-UA: NORMAL
NITRITE URINE: POSITIVE
PH URINE: 6
PROTEIN URINE: 300 MG/DL
RED BLOOD CELLS URINE: 221 /HPF
REVIEW: NORMAL
SPECIFIC GRAVITY URINE: 1.01
UROBILINOGEN URINE: 0.2 MG/DL
WBC CLUMPS: PRESENT
WHITE BLOOD CELLS URINE: >998 /HPF

## 2024-12-20 RX ORDER — AMOXICILLIN AND CLAVULANATE POTASSIUM 875; 125 MG/1; MG/1
875-125 TABLET, COATED ORAL
Qty: 1 | Refills: 0 | Status: ACTIVE | COMMUNITY
Start: 2024-12-20 | End: 1900-01-01

## 2024-12-24 ENCOUNTER — NON-APPOINTMENT (OUTPATIENT)
Age: 59
End: 2024-12-24

## 2024-12-24 VITALS
HEART RATE: 75 BPM | TEMPERATURE: 97.16 F | SYSTOLIC BLOOD PRESSURE: 108 MMHG | HEIGHT: 67 IN | RESPIRATION RATE: 16 BRPM | OXYGEN SATURATION: 98 % | DIASTOLIC BLOOD PRESSURE: 71 MMHG

## 2024-12-26 DIAGNOSIS — N39.0 URINARY TRACT INFECTION, SITE NOT SPECIFIED: ICD-10-CM

## 2024-12-26 DIAGNOSIS — A49.9 URINARY TRACT INFECTION, SITE NOT SPECIFIED: ICD-10-CM

## 2024-12-26 LAB — BACTERIA UR CULT: ABNORMAL

## 2024-12-26 RX ORDER — CEFPODOXIME PROXETIL 200 MG/1
200 TABLET, FILM COATED ORAL
Qty: 20 | Refills: 0 | Status: ACTIVE | COMMUNITY
Start: 2024-12-26 | End: 1900-01-01

## 2024-12-31 PROBLEM — Z86.718 PERSONAL HISTORY OF OTHER VENOUS THROMBOSIS AND EMBOLISM: Chronic | Status: ACTIVE | Noted: 2024-11-25

## 2025-01-01 ENCOUNTER — INPATIENT (INPATIENT)
Facility: HOSPITAL | Age: 60
LOS: 7 days | Discharge: HOME | End: 2025-02-01
Attending: STUDENT IN AN ORGANIZED HEALTH CARE EDUCATION/TRAINING PROGRAM | Admitting: STUDENT IN AN ORGANIZED HEALTH CARE EDUCATION/TRAINING PROGRAM
Payer: COMMERCIAL

## 2025-01-01 ENCOUNTER — EMERGENCY (EMERGENCY)
Facility: HOSPITAL | Age: 60
LOS: 1 days | End: 2025-01-01
Attending: STUDENT IN AN ORGANIZED HEALTH CARE EDUCATION/TRAINING PROGRAM | Admitting: STUDENT IN AN ORGANIZED HEALTH CARE EDUCATION/TRAINING PROGRAM
Payer: COMMERCIAL

## 2025-01-01 ENCOUNTER — APPOINTMENT (OUTPATIENT)
Dept: UROLOGY | Facility: CLINIC | Age: 60
End: 2025-01-01

## 2025-01-01 VITALS
HEIGHT: 67 IN | SYSTOLIC BLOOD PRESSURE: 107 MMHG | RESPIRATION RATE: 20 BRPM | TEMPERATURE: 97 F | DIASTOLIC BLOOD PRESSURE: 72 MMHG | HEART RATE: 76 BPM | OXYGEN SATURATION: 95 % | WEIGHT: 89.95 LBS

## 2025-01-01 VITALS — DIASTOLIC BLOOD PRESSURE: 100 MMHG | HEART RATE: 76 BPM | SYSTOLIC BLOOD PRESSURE: 144 MMHG | RESPIRATION RATE: 8 BRPM

## 2025-01-01 VITALS
TEMPERATURE: 98 F | DIASTOLIC BLOOD PRESSURE: 83 MMHG | HEART RATE: 86 BPM | OXYGEN SATURATION: 100 % | RESPIRATION RATE: 17 BRPM | SYSTOLIC BLOOD PRESSURE: 139 MMHG

## 2025-01-01 VITALS
HEIGHT: 67 IN | OXYGEN SATURATION: 99 % | WEIGHT: 80.03 LBS | RESPIRATION RATE: 14 BRPM | TEMPERATURE: 98 F | HEART RATE: 135 BPM

## 2025-01-01 DIAGNOSIS — Z90.49 ACQUIRED ABSENCE OF OTHER SPECIFIED PARTS OF DIGESTIVE TRACT: Chronic | ICD-10-CM

## 2025-01-01 DIAGNOSIS — N39.0 URINARY TRACT INFECTION, SITE NOT SPECIFIED: ICD-10-CM

## 2025-01-01 DIAGNOSIS — Z29.9 ENCOUNTER FOR PROPHYLACTIC MEASURES, UNSPECIFIED: ICD-10-CM

## 2025-01-01 DIAGNOSIS — D50.9 IRON DEFICIENCY ANEMIA, UNSPECIFIED: ICD-10-CM

## 2025-01-01 DIAGNOSIS — Z71.89 OTHER SPECIFIED COUNSELING: ICD-10-CM

## 2025-01-01 DIAGNOSIS — I63.9 CEREBRAL INFARCTION, UNSPECIFIED: ICD-10-CM

## 2025-01-01 DIAGNOSIS — N17.9 ACUTE KIDNEY FAILURE, UNSPECIFIED: ICD-10-CM

## 2025-01-01 DIAGNOSIS — C15.9 MALIGNANT NEOPLASM OF ESOPHAGUS, UNSPECIFIED: ICD-10-CM

## 2025-01-01 DIAGNOSIS — Z98.890 OTHER SPECIFIED POSTPROCEDURAL STATES: Chronic | ICD-10-CM

## 2025-01-01 DIAGNOSIS — K92.1 MELENA: ICD-10-CM

## 2025-01-01 DIAGNOSIS — A41.9 SEPSIS, UNSPECIFIED ORGANISM: ICD-10-CM

## 2025-01-01 DIAGNOSIS — Z82.49 FAMILY HISTORY OF ISCHEMIC HEART DISEASE AND OTHER DISEASES OF THE CIRCULATORY SYSTEM: ICD-10-CM

## 2025-01-01 DIAGNOSIS — D64.9 ANEMIA, UNSPECIFIED: ICD-10-CM

## 2025-01-01 DIAGNOSIS — Z86.39 PERSONAL HISTORY OF OTHER ENDOCRINE, NUTRITIONAL AND METABOLIC DISEASE: ICD-10-CM

## 2025-01-01 DIAGNOSIS — N18.30 CHRONIC KIDNEY DISEASE, STAGE 3 UNSPECIFIED: ICD-10-CM

## 2025-01-01 DIAGNOSIS — C18.9 MALIGNANT NEOPLASM OF COLON, UNSPECIFIED: ICD-10-CM

## 2025-01-01 DIAGNOSIS — Z98.890 OTHER SPECIFIED POSTPROCEDURAL STATES: ICD-10-CM

## 2025-01-01 DIAGNOSIS — R53.1 WEAKNESS: ICD-10-CM

## 2025-01-01 LAB
-  AMIKACIN: SIGNIFICANT CHANGE UP
-  AMPICILLIN: SIGNIFICANT CHANGE UP
-  AZTREONAM: SIGNIFICANT CHANGE UP
-  CEFEPIME: SIGNIFICANT CHANGE UP
-  CEFTAZIDIME: SIGNIFICANT CHANGE UP
-  CIPROFLOXACIN: SIGNIFICANT CHANGE UP
-  CIPROFLOXACIN: SIGNIFICANT CHANGE UP
-  IMIPENEM: SIGNIFICANT CHANGE UP
-  LEVOFLOXACIN: SIGNIFICANT CHANGE UP
-  LEVOFLOXACIN: SIGNIFICANT CHANGE UP
-  MEROPENEM: SIGNIFICANT CHANGE UP
-  NITROFURANTOIN: SIGNIFICANT CHANGE UP
-  PIPERACILLIN/TAZOBACTAM: SIGNIFICANT CHANGE UP
-  TETRACYCLINE: SIGNIFICANT CHANGE UP
-  VANCOMYCIN: SIGNIFICANT CHANGE UP
A1C WITH ESTIMATED AVERAGE GLUCOSE RESULT: 4.9 % — SIGNIFICANT CHANGE UP (ref 4–5.6)
ADD ON TEST-SPECIMEN IN LAB: SIGNIFICANT CHANGE UP
ALBUMIN SERPL ELPH-MCNC: 2.1 G/DL — LOW (ref 3.3–5)
ALBUMIN SERPL ELPH-MCNC: 2.3 G/DL — LOW (ref 3.3–5)
ALBUMIN SERPL ELPH-MCNC: 2.4 G/DL — LOW (ref 3.3–5)
ALBUMIN SERPL ELPH-MCNC: 2.5 G/DL — LOW (ref 3.3–5)
ALP SERPL-CCNC: 103 U/L — SIGNIFICANT CHANGE UP (ref 40–120)
ALP SERPL-CCNC: 110 U/L — SIGNIFICANT CHANGE UP (ref 40–120)
ALP SERPL-CCNC: 114 U/L — SIGNIFICANT CHANGE UP (ref 40–120)
ALP SERPL-CCNC: 90 U/L — SIGNIFICANT CHANGE UP (ref 40–120)
ALT FLD-CCNC: 10 U/L — SIGNIFICANT CHANGE UP (ref 4–41)
ALT FLD-CCNC: 11 U/L — SIGNIFICANT CHANGE UP (ref 4–41)
ALT FLD-CCNC: 8 U/L — SIGNIFICANT CHANGE UP (ref 4–41)
ALT FLD-CCNC: 9 U/L — SIGNIFICANT CHANGE UP (ref 4–41)
ANION GAP SERPL CALC-SCNC: 10 MMOL/L — SIGNIFICANT CHANGE UP (ref 7–14)
ANION GAP SERPL CALC-SCNC: 12 MMOL/L — SIGNIFICANT CHANGE UP (ref 7–14)
ANION GAP SERPL CALC-SCNC: 12 MMOL/L — SIGNIFICANT CHANGE UP (ref 7–14)
ANION GAP SERPL CALC-SCNC: 14 MMOL/L — SIGNIFICANT CHANGE UP (ref 7–14)
ANION GAP SERPL CALC-SCNC: 14 MMOL/L — SIGNIFICANT CHANGE UP (ref 7–14)
ANION GAP SERPL CALC-SCNC: 15 MMOL/L — HIGH (ref 7–14)
ANISOCYTOSIS BLD QL: SIGNIFICANT CHANGE UP
APPEARANCE UR: ABNORMAL
APTT BLD: 29.6 SEC — SIGNIFICANT CHANGE UP (ref 24.5–35.6)
APTT BLD: 32 SEC — SIGNIFICANT CHANGE UP (ref 24.5–35.6)
AST SERPL-CCNC: 14 U/L — SIGNIFICANT CHANGE UP (ref 4–40)
AST SERPL-CCNC: 16 U/L — SIGNIFICANT CHANGE UP (ref 4–40)
AST SERPL-CCNC: 19 U/L — SIGNIFICANT CHANGE UP (ref 4–40)
AST SERPL-CCNC: 21 U/L — SIGNIFICANT CHANGE UP (ref 4–40)
BACTERIA # UR AUTO: ABNORMAL /HPF
BASOPHILS # BLD AUTO: 0.01 K/UL — SIGNIFICANT CHANGE UP (ref 0–0.2)
BASOPHILS # BLD AUTO: 0.02 K/UL — SIGNIFICANT CHANGE UP (ref 0–0.2)
BASOPHILS # BLD AUTO: 0.02 K/UL — SIGNIFICANT CHANGE UP (ref 0–0.2)
BASOPHILS # BLD AUTO: 0.03 K/UL — SIGNIFICANT CHANGE UP (ref 0–0.2)
BASOPHILS # BLD AUTO: 0.03 K/UL — SIGNIFICANT CHANGE UP (ref 0–0.2)
BASOPHILS # BLD AUTO: 0.1 K/UL — SIGNIFICANT CHANGE UP (ref 0–0.2)
BASOPHILS NFR BLD AUTO: 0.1 % — SIGNIFICANT CHANGE UP (ref 0–2)
BASOPHILS NFR BLD AUTO: 0.2 % — SIGNIFICANT CHANGE UP (ref 0–2)
BASOPHILS NFR BLD AUTO: 0.2 % — SIGNIFICANT CHANGE UP (ref 0–2)
BASOPHILS NFR BLD AUTO: 0.9 % — SIGNIFICANT CHANGE UP (ref 0–2)
BILIRUB SERPL-MCNC: 0.4 MG/DL — SIGNIFICANT CHANGE UP (ref 0.2–1.2)
BILIRUB SERPL-MCNC: 0.7 MG/DL — SIGNIFICANT CHANGE UP (ref 0.2–1.2)
BILIRUB SERPL-MCNC: 1.3 MG/DL — HIGH (ref 0.2–1.2)
BILIRUB SERPL-MCNC: 1.8 MG/DL — HIGH (ref 0.2–1.2)
BILIRUB UR-MCNC: NEGATIVE — SIGNIFICANT CHANGE UP
BLD GP AB SCN SERPL QL: NEGATIVE — SIGNIFICANT CHANGE UP
BLD GP AB SCN SERPL QL: NEGATIVE — SIGNIFICANT CHANGE UP
BLOOD GAS VENOUS COMPREHENSIVE RESULT: SIGNIFICANT CHANGE UP
BUN SERPL-MCNC: 22 MG/DL — SIGNIFICANT CHANGE UP (ref 7–23)
BUN SERPL-MCNC: 26 MG/DL — HIGH (ref 7–23)
BUN SERPL-MCNC: 26 MG/DL — HIGH (ref 7–23)
BUN SERPL-MCNC: 31 MG/DL — HIGH (ref 7–23)
BUN SERPL-MCNC: 32 MG/DL — HIGH (ref 7–23)
BUN SERPL-MCNC: 33 MG/DL — HIGH (ref 7–23)
BUN SERPL-MCNC: 33 MG/DL — HIGH (ref 7–23)
BUN SERPL-MCNC: 36 MG/DL — HIGH (ref 7–23)
BUN SERPL-MCNC: 38 MG/DL — HIGH (ref 7–23)
CA-I BLD-SCNC: 1.14 MMOL/L — LOW (ref 1.15–1.29)
CALCIUM SERPL-MCNC: 7.7 MG/DL — LOW (ref 8.4–10.5)
CALCIUM SERPL-MCNC: 7.7 MG/DL — LOW (ref 8.4–10.5)
CALCIUM SERPL-MCNC: 8 MG/DL — LOW (ref 8.4–10.5)
CALCIUM SERPL-MCNC: 8.1 MG/DL — LOW (ref 8.4–10.5)
CALCIUM SERPL-MCNC: 8.2 MG/DL — LOW (ref 8.4–10.5)
CALCIUM SERPL-MCNC: 8.2 MG/DL — LOW (ref 8.4–10.5)
CALCIUM SERPL-MCNC: 8.3 MG/DL — LOW (ref 8.4–10.5)
CALCIUM SERPL-MCNC: 8.5 MG/DL — SIGNIFICANT CHANGE UP (ref 8.4–10.5)
CAST: 3 /LPF — SIGNIFICANT CHANGE UP (ref 0–4)
CHLORIDE SERPL-SCNC: 100 MMOL/L — SIGNIFICANT CHANGE UP (ref 98–107)
CHLORIDE SERPL-SCNC: 100 MMOL/L — SIGNIFICANT CHANGE UP (ref 98–107)
CHLORIDE SERPL-SCNC: 101 MMOL/L — SIGNIFICANT CHANGE UP (ref 98–107)
CHLORIDE SERPL-SCNC: 102 MMOL/L — SIGNIFICANT CHANGE UP (ref 98–107)
CHLORIDE SERPL-SCNC: 102 MMOL/L — SIGNIFICANT CHANGE UP (ref 98–107)
CHLORIDE SERPL-SCNC: 103 MMOL/L — SIGNIFICANT CHANGE UP (ref 98–107)
CHLORIDE SERPL-SCNC: 97 MMOL/L — LOW (ref 98–107)
CHOLEST SERPL-MCNC: 121 MG/DL — SIGNIFICANT CHANGE UP
CO2 SERPL-SCNC: 15 MMOL/L — LOW (ref 22–31)
CO2 SERPL-SCNC: 15 MMOL/L — LOW (ref 22–31)
CO2 SERPL-SCNC: 16 MMOL/L — LOW (ref 22–31)
CO2 SERPL-SCNC: 17 MMOL/L — LOW (ref 22–31)
CO2 SERPL-SCNC: 18 MMOL/L — LOW (ref 22–31)
CO2 SERPL-SCNC: 18 MMOL/L — LOW (ref 22–31)
CO2 SERPL-SCNC: 19 MMOL/L — LOW (ref 22–31)
CO2 SERPL-SCNC: 20 MMOL/L — LOW (ref 22–31)
CO2 SERPL-SCNC: 21 MMOL/L — LOW (ref 22–31)
COARSE GRAN CASTS #/AREA URNS AUTO: SIGNIFICANT CHANGE UP
COLOR SPEC: YELLOW — SIGNIFICANT CHANGE UP
COMMENT - URINE: SIGNIFICANT CHANGE UP
CREAT ?TM UR-MCNC: 90 MG/DL — SIGNIFICANT CHANGE UP
CREAT SERPL-MCNC: 1.26 MG/DL — SIGNIFICANT CHANGE UP (ref 0.5–1.3)
CREAT SERPL-MCNC: 1.29 MG/DL — SIGNIFICANT CHANGE UP (ref 0.5–1.3)
CREAT SERPL-MCNC: 1.44 MG/DL — HIGH (ref 0.5–1.3)
CREAT SERPL-MCNC: 1.56 MG/DL — HIGH (ref 0.5–1.3)
CREAT SERPL-MCNC: 1.58 MG/DL — HIGH (ref 0.5–1.3)
CREAT SERPL-MCNC: 1.77 MG/DL — HIGH (ref 0.5–1.3)
CREAT SERPL-MCNC: 2.08 MG/DL — HIGH (ref 0.5–1.3)
CREAT SERPL-MCNC: 2.28 MG/DL — HIGH (ref 0.5–1.3)
CREAT SERPL-MCNC: 2.49 MG/DL — HIGH (ref 0.5–1.3)
CULTURE RESULTS: ABNORMAL
CULTURE RESULTS: SIGNIFICANT CHANGE UP
CULTURE RESULTS: SIGNIFICANT CHANGE UP
DACRYOCYTES BLD QL SMEAR: SLIGHT — SIGNIFICANT CHANGE UP
DIFF PNL FLD: ABNORMAL
EGFR: 29 ML/MIN/1.73M2 — LOW
EGFR: 29 ML/MIN/1.73M2 — LOW
EGFR: 32 ML/MIN/1.73M2 — LOW
EGFR: 32 ML/MIN/1.73M2 — LOW
EGFR: 36 ML/MIN/1.73M2 — LOW
EGFR: 36 ML/MIN/1.73M2 — LOW
EGFR: 43 ML/MIN/1.73M2 — LOW
EGFR: 43 ML/MIN/1.73M2 — LOW
EGFR: 50 ML/MIN/1.73M2 — LOW
EGFR: 50 ML/MIN/1.73M2 — LOW
EGFR: 51 ML/MIN/1.73M2 — LOW
EGFR: 51 ML/MIN/1.73M2 — LOW
EGFR: 56 ML/MIN/1.73M2 — LOW
EGFR: 56 ML/MIN/1.73M2 — LOW
EGFR: 63 ML/MIN/1.73M2 — SIGNIFICANT CHANGE UP
EGFR: 63 ML/MIN/1.73M2 — SIGNIFICANT CHANGE UP
EGFR: 65 ML/MIN/1.73M2 — SIGNIFICANT CHANGE UP
EGFR: 65 ML/MIN/1.73M2 — SIGNIFICANT CHANGE UP
ELLIPTOCYTES BLD QL SMEAR: SLIGHT — SIGNIFICANT CHANGE UP
EOSINOPHIL # BLD AUTO: 0 K/UL — SIGNIFICANT CHANGE UP (ref 0–0.5)
EOSINOPHIL # BLD AUTO: 0.03 K/UL — SIGNIFICANT CHANGE UP (ref 0–0.5)
EOSINOPHIL # BLD AUTO: 0.04 K/UL — SIGNIFICANT CHANGE UP (ref 0–0.5)
EOSINOPHIL # BLD AUTO: 0.06 K/UL — SIGNIFICANT CHANGE UP (ref 0–0.5)
EOSINOPHIL # BLD AUTO: 0.09 K/UL — SIGNIFICANT CHANGE UP (ref 0–0.5)
EOSINOPHIL # BLD AUTO: 0.15 K/UL — SIGNIFICANT CHANGE UP (ref 0–0.5)
EOSINOPHIL # BLD AUTO: 0.16 K/UL — SIGNIFICANT CHANGE UP (ref 0–0.5)
EOSINOPHIL # BLD AUTO: 0.18 K/UL — SIGNIFICANT CHANGE UP (ref 0–0.5)
EOSINOPHIL NFR BLD AUTO: 0 % — SIGNIFICANT CHANGE UP (ref 0–6)
EOSINOPHIL NFR BLD AUTO: 0.2 % — SIGNIFICANT CHANGE UP (ref 0–6)
EOSINOPHIL NFR BLD AUTO: 0.2 % — SIGNIFICANT CHANGE UP (ref 0–6)
EOSINOPHIL NFR BLD AUTO: 0.4 % — SIGNIFICANT CHANGE UP (ref 0–6)
EOSINOPHIL NFR BLD AUTO: 0.5 % — SIGNIFICANT CHANGE UP (ref 0–6)
EOSINOPHIL NFR BLD AUTO: 1 % — SIGNIFICANT CHANGE UP (ref 0–6)
EOSINOPHIL NFR BLD AUTO: 1.1 % — SIGNIFICANT CHANGE UP (ref 0–6)
EOSINOPHIL NFR BLD AUTO: 1.5 % — SIGNIFICANT CHANGE UP (ref 0–6)
ESTIMATED AVERAGE GLUCOSE: 94 — SIGNIFICANT CHANGE UP
FINE GRAN CASTS #/AREA URNS AUTO: PRESENT
FLUAV AG NPH QL: SIGNIFICANT CHANGE UP
FLUBV AG NPH QL: SIGNIFICANT CHANGE UP
GIANT PLATELETS BLD QL SMEAR: PRESENT — SIGNIFICANT CHANGE UP
GLUCOSE SERPL-MCNC: 101 MG/DL — HIGH (ref 70–99)
GLUCOSE SERPL-MCNC: 102 MG/DL — HIGH (ref 70–99)
GLUCOSE SERPL-MCNC: 108 MG/DL — HIGH (ref 70–99)
GLUCOSE SERPL-MCNC: 114 MG/DL — HIGH (ref 70–99)
GLUCOSE SERPL-MCNC: 115 MG/DL — HIGH (ref 70–99)
GLUCOSE SERPL-MCNC: 77 MG/DL — SIGNIFICANT CHANGE UP (ref 70–99)
GLUCOSE SERPL-MCNC: 91 MG/DL — SIGNIFICANT CHANGE UP (ref 70–99)
GLUCOSE SERPL-MCNC: 94 MG/DL — SIGNIFICANT CHANGE UP (ref 70–99)
GLUCOSE SERPL-MCNC: 99 MG/DL — SIGNIFICANT CHANGE UP (ref 70–99)
GLUCOSE UR QL: NEGATIVE MG/DL — SIGNIFICANT CHANGE UP
HCT VFR BLD CALC: 20.4 % — CRITICAL LOW (ref 39–50)
HCT VFR BLD CALC: 27.5 % — LOW (ref 39–50)
HCT VFR BLD CALC: 28.7 % — LOW (ref 39–50)
HCT VFR BLD CALC: 29.6 % — LOW (ref 39–50)
HCT VFR BLD CALC: 30.6 % — LOW (ref 39–50)
HCT VFR BLD CALC: 30.9 % — LOW (ref 39–50)
HCT VFR BLD CALC: 32.8 % — LOW (ref 39–50)
HCT VFR BLD CALC: 34.5 % — LOW (ref 39–50)
HCT VFR BLD CALC: 36 % — LOW (ref 39–50)
HCT VFR BLD CALC: 37.5 % — LOW (ref 39–50)
HCT VFR BLD CALC: 37.5 % — LOW (ref 39–50)
HDLC SERPL-MCNC: 70 MG/DL — SIGNIFICANT CHANGE UP
HGB BLD-MCNC: 10 G/DL — LOW (ref 13–17)
HGB BLD-MCNC: 10.2 G/DL — LOW (ref 13–17)
HGB BLD-MCNC: 11 G/DL — LOW (ref 13–17)
HGB BLD-MCNC: 11.3 G/DL — LOW (ref 13–17)
HGB BLD-MCNC: 11.4 G/DL — LOW (ref 13–17)
HGB BLD-MCNC: 5.5 G/DL — CRITICAL LOW (ref 13–17)
HGB BLD-MCNC: 8.5 G/DL — LOW (ref 13–17)
HGB BLD-MCNC: 8.6 G/DL — LOW (ref 13–17)
HGB BLD-MCNC: 8.6 G/DL — LOW (ref 13–17)
HGB BLD-MCNC: 9 G/DL — LOW (ref 13–17)
HGB BLD-MCNC: 9.1 G/DL — LOW (ref 13–17)
HYALINE CASTS # UR AUTO: PRESENT
HYPOCHROMIA BLD QL: SIGNIFICANT CHANGE UP
IANC: 10.57 K/UL — HIGH (ref 1.8–7.4)
IANC: 12.72 K/UL — HIGH (ref 1.8–7.4)
IANC: 13.23 K/UL — HIGH (ref 1.8–7.4)
IANC: 13.32 K/UL — HIGH (ref 1.8–7.4)
IANC: 13.36 K/UL — HIGH (ref 1.8–7.4)
IANC: 15.85 K/UL — HIGH (ref 1.8–7.4)
IANC: 7.47 K/UL — HIGH (ref 1.8–7.4)
IANC: 9.62 K/UL — HIGH (ref 1.8–7.4)
IMM GRANULOCYTES NFR BLD AUTO: 0.7 % — SIGNIFICANT CHANGE UP (ref 0–0.9)
IMM GRANULOCYTES NFR BLD AUTO: 0.7 % — SIGNIFICANT CHANGE UP (ref 0–0.9)
IMM GRANULOCYTES NFR BLD AUTO: 0.8 % — SIGNIFICANT CHANGE UP (ref 0–0.9)
IMM GRANULOCYTES NFR BLD AUTO: 0.8 % — SIGNIFICANT CHANGE UP (ref 0–0.9)
IMM GRANULOCYTES NFR BLD AUTO: 0.9 % — SIGNIFICANT CHANGE UP (ref 0–0.9)
IMM GRANULOCYTES NFR BLD AUTO: 0.9 % — SIGNIFICANT CHANGE UP (ref 0–0.9)
IMM GRANULOCYTES NFR BLD AUTO: 1.1 % — HIGH (ref 0–0.9)
INR BLD: 1.2 RATIO — HIGH (ref 0.85–1.16)
INR BLD: 1.31 RATIO — HIGH (ref 0.85–1.16)
KETONES UR-MCNC: NEGATIVE MG/DL — SIGNIFICANT CHANGE UP
LDLC SERPL-MCNC: 34 MG/DL — SIGNIFICANT CHANGE UP
LEUKOCYTE ESTERASE UR-ACNC: ABNORMAL
LIPID PNL WITH DIRECT LDL SERPL: 34 MG/DL — SIGNIFICANT CHANGE UP
LYMPHOCYTES # BLD AUTO: 0.71 K/UL — LOW (ref 1–3.3)
LYMPHOCYTES # BLD AUTO: 1.36 K/UL — SIGNIFICANT CHANGE UP (ref 1–3.3)
LYMPHOCYTES # BLD AUTO: 1.39 K/UL — SIGNIFICANT CHANGE UP (ref 1–3.3)
LYMPHOCYTES # BLD AUTO: 1.42 K/UL — SIGNIFICANT CHANGE UP (ref 1–3.3)
LYMPHOCYTES # BLD AUTO: 1.49 K/UL — SIGNIFICANT CHANGE UP (ref 1–3.3)
LYMPHOCYTES # BLD AUTO: 1.56 K/UL — SIGNIFICANT CHANGE UP (ref 1–3.3)
LYMPHOCYTES # BLD AUTO: 1.64 K/UL — SIGNIFICANT CHANGE UP (ref 1–3.3)
LYMPHOCYTES # BLD AUTO: 1.69 K/UL — SIGNIFICANT CHANGE UP (ref 1–3.3)
LYMPHOCYTES # BLD AUTO: 10.2 % — LOW (ref 13–44)
LYMPHOCYTES # BLD AUTO: 10.5 % — LOW (ref 13–44)
LYMPHOCYTES # BLD AUTO: 10.7 % — LOW (ref 13–44)
LYMPHOCYTES # BLD AUTO: 11.4 % — LOW (ref 13–44)
LYMPHOCYTES # BLD AUTO: 6.3 % — LOW (ref 13–44)
LYMPHOCYTES # BLD AUTO: 8.3 % — LOW (ref 13–44)
LYMPHOCYTES # BLD AUTO: 9 % — LOW (ref 13–44)
LYMPHOCYTES # BLD AUTO: 9.1 % — LOW (ref 13–44)
MAGNESIUM SERPL-MCNC: 1.4 MG/DL — LOW (ref 1.6–2.6)
MAGNESIUM SERPL-MCNC: 1.6 MG/DL — SIGNIFICANT CHANGE UP (ref 1.6–2.6)
MAGNESIUM SERPL-MCNC: 1.7 MG/DL — SIGNIFICANT CHANGE UP (ref 1.6–2.6)
MAGNESIUM SERPL-MCNC: 1.8 MG/DL — SIGNIFICANT CHANGE UP (ref 1.6–2.6)
MAGNESIUM SERPL-MCNC: 1.8 MG/DL — SIGNIFICANT CHANGE UP (ref 1.6–2.6)
MAGNESIUM SERPL-MCNC: 1.9 MG/DL — SIGNIFICANT CHANGE UP (ref 1.6–2.6)
MAGNESIUM SERPL-MCNC: 1.9 MG/DL — SIGNIFICANT CHANGE UP (ref 1.6–2.6)
MANUAL SMEAR VERIFICATION: SIGNIFICANT CHANGE UP
MCHC RBC-ENTMCNC: 17.9 PG — LOW (ref 27–34)
MCHC RBC-ENTMCNC: 21.2 PG — LOW (ref 27–34)
MCHC RBC-ENTMCNC: 21.4 PG — LOW (ref 27–34)
MCHC RBC-ENTMCNC: 21.6 PG — LOW (ref 27–34)
MCHC RBC-ENTMCNC: 21.7 PG — LOW (ref 27–34)
MCHC RBC-ENTMCNC: 21.7 PG — LOW (ref 27–34)
MCHC RBC-ENTMCNC: 21.8 PG — LOW (ref 27–34)
MCHC RBC-ENTMCNC: 21.8 PG — LOW (ref 27–34)
MCHC RBC-ENTMCNC: 21.9 PG — LOW (ref 27–34)
MCHC RBC-ENTMCNC: 21.9 PG — LOW (ref 27–34)
MCHC RBC-ENTMCNC: 22.1 PG — LOW (ref 27–34)
MCHC RBC-ENTMCNC: 27 G/DL — LOW (ref 32–36)
MCHC RBC-ENTMCNC: 29.1 G/DL — LOW (ref 32–36)
MCHC RBC-ENTMCNC: 29.1 G/DL — LOW (ref 32–36)
MCHC RBC-ENTMCNC: 29.6 G/DL — LOW (ref 32–36)
MCHC RBC-ENTMCNC: 29.7 G/DL — LOW (ref 32–36)
MCHC RBC-ENTMCNC: 30 G/DL — LOW (ref 32–36)
MCHC RBC-ENTMCNC: 30.1 G/DL — LOW (ref 32–36)
MCHC RBC-ENTMCNC: 30.4 G/DL — LOW (ref 32–36)
MCHC RBC-ENTMCNC: 30.5 G/DL — LOW (ref 32–36)
MCHC RBC-ENTMCNC: 30.6 G/DL — LOW (ref 32–36)
MCHC RBC-ENTMCNC: 30.9 G/DL — LOW (ref 32–36)
MCV RBC AUTO: 66.4 FL — LOW (ref 80–100)
MCV RBC AUTO: 70.8 FL — LOW (ref 80–100)
MCV RBC AUTO: 70.9 FL — LOW (ref 80–100)
MCV RBC AUTO: 71.7 FL — LOW (ref 80–100)
MCV RBC AUTO: 72.1 FL — LOW (ref 80–100)
MCV RBC AUTO: 72.3 FL — LOW (ref 80–100)
MCV RBC AUTO: 72.5 FL — LOW (ref 80–100)
MCV RBC AUTO: 72.7 FL — LOW (ref 80–100)
MCV RBC AUTO: 72.7 FL — LOW (ref 80–100)
MCV RBC AUTO: 73.6 FL — LOW (ref 80–100)
MCV RBC AUTO: 74.6 FL — LOW (ref 80–100)
METHOD TYPE: SIGNIFICANT CHANGE UP
METHOD TYPE: SIGNIFICANT CHANGE UP
MICROCYTES BLD QL: SIGNIFICANT CHANGE UP
MONOCYTES # BLD AUTO: 0.51 K/UL — SIGNIFICANT CHANGE UP (ref 0–0.9)
MONOCYTES # BLD AUTO: 0.88 K/UL — SIGNIFICANT CHANGE UP (ref 0–0.9)
MONOCYTES # BLD AUTO: 0.91 K/UL — HIGH (ref 0–0.9)
MONOCYTES # BLD AUTO: 0.92 K/UL — HIGH (ref 0–0.9)
MONOCYTES # BLD AUTO: 0.95 K/UL — HIGH (ref 0–0.9)
MONOCYTES # BLD AUTO: 1.01 K/UL — HIGH (ref 0–0.9)
MONOCYTES # BLD AUTO: 1.21 K/UL — HIGH (ref 0–0.9)
MONOCYTES # BLD AUTO: 1.22 K/UL — HIGH (ref 0–0.9)
MONOCYTES NFR BLD AUTO: 4.5 % — SIGNIFICANT CHANGE UP (ref 2–14)
MONOCYTES NFR BLD AUTO: 5.7 % — SIGNIFICANT CHANGE UP (ref 2–14)
MONOCYTES NFR BLD AUTO: 5.8 % — SIGNIFICANT CHANGE UP (ref 2–14)
MONOCYTES NFR BLD AUTO: 6.3 % — SIGNIFICANT CHANGE UP (ref 2–14)
MONOCYTES NFR BLD AUTO: 6.4 % — SIGNIFICANT CHANGE UP (ref 2–14)
MONOCYTES NFR BLD AUTO: 7.2 % — SIGNIFICANT CHANGE UP (ref 2–14)
MONOCYTES NFR BLD AUTO: 7.4 % — SIGNIFICANT CHANGE UP (ref 2–14)
MONOCYTES NFR BLD AUTO: 7.5 % — SIGNIFICANT CHANGE UP (ref 2–14)
NEUTROPHILS # BLD AUTO: 10.57 K/UL — HIGH (ref 1.8–7.4)
NEUTROPHILS # BLD AUTO: 12.72 K/UL — HIGH (ref 1.8–7.4)
NEUTROPHILS # BLD AUTO: 13.23 K/UL — HIGH (ref 1.8–7.4)
NEUTROPHILS # BLD AUTO: 13.32 K/UL — HIGH (ref 1.8–7.4)
NEUTROPHILS # BLD AUTO: 13.36 K/UL — HIGH (ref 1.8–7.4)
NEUTROPHILS # BLD AUTO: 15.85 K/UL — HIGH (ref 1.8–7.4)
NEUTROPHILS # BLD AUTO: 9.62 K/UL — HIGH (ref 1.8–7.4)
NEUTROPHILS # BLD AUTO: 9.92 K/UL — HIGH (ref 1.8–7.4)
NEUTROPHILS NFR BLD AUTO: 78.5 % — HIGH (ref 43–77)
NEUTROPHILS NFR BLD AUTO: 80.1 % — HIGH (ref 43–77)
NEUTROPHILS NFR BLD AUTO: 81.5 % — HIGH (ref 43–77)
NEUTROPHILS NFR BLD AUTO: 82 % — HIGH (ref 43–77)
NEUTROPHILS NFR BLD AUTO: 82.9 % — HIGH (ref 43–77)
NEUTROPHILS NFR BLD AUTO: 83.8 % — HIGH (ref 43–77)
NEUTROPHILS NFR BLD AUTO: 84 % — HIGH (ref 43–77)
NEUTROPHILS NFR BLD AUTO: 88.3 % — HIGH (ref 43–77)
NITRITE UR-MCNC: NEGATIVE — SIGNIFICANT CHANGE UP
NONHDLC SERPL-MCNC: 51 MG/DL — SIGNIFICANT CHANGE UP
NRBC # BLD AUTO: 0 K/UL — SIGNIFICANT CHANGE UP (ref 0–0)
NRBC # BLD AUTO: 0.02 K/UL — HIGH (ref 0–0)
NRBC # BLD: 0 /100 WBCS — SIGNIFICANT CHANGE UP (ref 0–0)
NRBC # BLD: 1 /100 WBCS — HIGH (ref 0–0)
NRBC # FLD: 0 K/UL — SIGNIFICANT CHANGE UP (ref 0–0)
NRBC # FLD: 0.02 K/UL — HIGH (ref 0–0)
NRBC BLD-RTO: 0 /100 WBCS — SIGNIFICANT CHANGE UP (ref 0–0)
NRBC BLD-RTO: 1 /100 WBCS — HIGH (ref 0–0)
ORGANISM # SPEC MICROSCOPIC CNT: ABNORMAL
OVALOCYTES BLD QL SMEAR: SIGNIFICANT CHANGE UP
PH UR: 7 — SIGNIFICANT CHANGE UP (ref 5–8)
PHOSPHATE SERPL-MCNC: 2.1 MG/DL — LOW (ref 2.5–4.5)
PHOSPHATE SERPL-MCNC: 2.2 MG/DL — LOW (ref 2.5–4.5)
PHOSPHATE SERPL-MCNC: 2.2 MG/DL — LOW (ref 2.5–4.5)
PHOSPHATE SERPL-MCNC: 2.5 MG/DL — SIGNIFICANT CHANGE UP (ref 2.5–4.5)
PHOSPHATE SERPL-MCNC: 3.1 MG/DL — SIGNIFICANT CHANGE UP (ref 2.5–4.5)
PHOSPHATE SERPL-MCNC: 3.8 MG/DL — SIGNIFICANT CHANGE UP (ref 2.5–4.5)
PHOSPHATE SERPL-MCNC: 3.8 MG/DL — SIGNIFICANT CHANGE UP (ref 2.5–4.5)
PHOSPHATE SERPL-MCNC: 3.9 MG/DL — SIGNIFICANT CHANGE UP (ref 2.5–4.5)
PHOSPHATE SERPL-MCNC: 4.2 MG/DL — SIGNIFICANT CHANGE UP (ref 2.5–4.5)
PLAT MORPH BLD: ABNORMAL
PLATELET # BLD AUTO: 193 K/UL — SIGNIFICANT CHANGE UP (ref 150–400)
PLATELET # BLD AUTO: 208 K/UL — SIGNIFICANT CHANGE UP (ref 150–400)
PLATELET # BLD AUTO: 217 K/UL — SIGNIFICANT CHANGE UP (ref 150–400)
PLATELET # BLD AUTO: 221 K/UL — SIGNIFICANT CHANGE UP (ref 150–400)
PLATELET # BLD AUTO: 224 K/UL — SIGNIFICANT CHANGE UP (ref 150–400)
PLATELET # BLD AUTO: 247 K/UL — SIGNIFICANT CHANGE UP (ref 150–400)
PLATELET # BLD AUTO: 250 K/UL — SIGNIFICANT CHANGE UP (ref 150–400)
PLATELET # BLD AUTO: 279 K/UL — SIGNIFICANT CHANGE UP (ref 150–400)
PLATELET # BLD AUTO: 279 K/UL — SIGNIFICANT CHANGE UP (ref 150–400)
PLATELET # BLD AUTO: 300 K/UL — SIGNIFICANT CHANGE UP (ref 150–400)
PLATELET # BLD AUTO: 303 K/UL — SIGNIFICANT CHANGE UP (ref 150–400)
PLATELET COUNT - ESTIMATE: NORMAL — SIGNIFICANT CHANGE UP
POIKILOCYTOSIS BLD QL AUTO: SIGNIFICANT CHANGE UP
POLYCHROMASIA BLD QL SMEAR: SLIGHT — SIGNIFICANT CHANGE UP
POTASSIUM SERPL-MCNC: 3.3 MMOL/L — LOW (ref 3.5–5.3)
POTASSIUM SERPL-MCNC: 3.4 MMOL/L — LOW (ref 3.5–5.3)
POTASSIUM SERPL-MCNC: 3.6 MMOL/L — SIGNIFICANT CHANGE UP (ref 3.5–5.3)
POTASSIUM SERPL-MCNC: 3.7 MMOL/L — SIGNIFICANT CHANGE UP (ref 3.5–5.3)
POTASSIUM SERPL-MCNC: 3.8 MMOL/L — SIGNIFICANT CHANGE UP (ref 3.5–5.3)
POTASSIUM SERPL-MCNC: 3.9 MMOL/L — SIGNIFICANT CHANGE UP (ref 3.5–5.3)
POTASSIUM SERPL-MCNC: 4 MMOL/L — SIGNIFICANT CHANGE UP (ref 3.5–5.3)
POTASSIUM SERPL-MCNC: 4.2 MMOL/L — SIGNIFICANT CHANGE UP (ref 3.5–5.3)
POTASSIUM SERPL-MCNC: 4.6 MMOL/L — SIGNIFICANT CHANGE UP (ref 3.5–5.3)
POTASSIUM SERPL-SCNC: 3.3 MMOL/L — LOW (ref 3.5–5.3)
POTASSIUM SERPL-SCNC: 3.4 MMOL/L — LOW (ref 3.5–5.3)
POTASSIUM SERPL-SCNC: 3.6 MMOL/L — SIGNIFICANT CHANGE UP (ref 3.5–5.3)
POTASSIUM SERPL-SCNC: 3.7 MMOL/L — SIGNIFICANT CHANGE UP (ref 3.5–5.3)
POTASSIUM SERPL-SCNC: 3.8 MMOL/L — SIGNIFICANT CHANGE UP (ref 3.5–5.3)
POTASSIUM SERPL-SCNC: 3.9 MMOL/L — SIGNIFICANT CHANGE UP (ref 3.5–5.3)
POTASSIUM SERPL-SCNC: 4 MMOL/L — SIGNIFICANT CHANGE UP (ref 3.5–5.3)
POTASSIUM SERPL-SCNC: 4.2 MMOL/L — SIGNIFICANT CHANGE UP (ref 3.5–5.3)
POTASSIUM SERPL-SCNC: 4.6 MMOL/L — SIGNIFICANT CHANGE UP (ref 3.5–5.3)
PROT SERPL-MCNC: 5.5 G/DL — LOW (ref 6–8.3)
PROT SERPL-MCNC: 5.7 G/DL — LOW (ref 6–8.3)
PROT SERPL-MCNC: 6 G/DL — SIGNIFICANT CHANGE UP (ref 6–8.3)
PROT SERPL-MCNC: 6.6 G/DL — SIGNIFICANT CHANGE UP (ref 6–8.3)
PROT UR-MCNC: 100 MG/DL
PROTHROM AB SERPL-ACNC: 14.3 SEC — HIGH (ref 9.9–13.4)
PROTHROM AB SERPL-ACNC: 15.1 SEC — HIGH (ref 9.9–13.4)
PTH-INTACT FLD-MCNC: 43 PG/ML — SIGNIFICANT CHANGE UP (ref 15–65)
RBC # BLD: 3.07 M/UL — LOW (ref 4.2–5.8)
RBC # BLD: 3.88 M/UL — LOW (ref 4.2–5.8)
RBC # BLD: 3.95 M/UL — LOW (ref 4.2–5.8)
RBC # BLD: 3.97 M/UL — LOW (ref 4.2–5.8)
RBC # BLD: 4.16 M/UL — LOW (ref 4.2–5.8)
RBC # BLD: 4.25 M/UL — SIGNIFICANT CHANGE UP (ref 4.2–5.8)
RBC # BLD: 4.63 M/UL — SIGNIFICANT CHANGE UP (ref 4.2–5.8)
RBC # BLD: 4.76 M/UL — SIGNIFICANT CHANGE UP (ref 4.2–5.8)
RBC # BLD: 4.98 M/UL — SIGNIFICANT CHANGE UP (ref 4.2–5.8)
RBC # BLD: 5.2 M/UL — SIGNIFICANT CHANGE UP (ref 4.2–5.8)
RBC # BLD: 5.23 M/UL — SIGNIFICANT CHANGE UP (ref 4.2–5.8)
RBC # FLD: 19.9 % — HIGH (ref 10.3–14.5)
RBC # FLD: 22.4 % — HIGH (ref 10.3–14.5)
RBC # FLD: 22.6 % — HIGH (ref 10.3–14.5)
RBC # FLD: 23.3 % — HIGH (ref 10.3–14.5)
RBC # FLD: 23.4 % — HIGH (ref 10.3–14.5)
RBC # FLD: 23.4 % — HIGH (ref 10.3–14.5)
RBC # FLD: 23.5 % — HIGH (ref 10.3–14.5)
RBC # FLD: 23.9 % — HIGH (ref 10.3–14.5)
RBC # FLD: 24.1 % — HIGH (ref 10.3–14.5)
RBC BLD AUTO: ABNORMAL
RBC CASTS # UR COMP ASSIST: 10 /HPF — HIGH (ref 0–4)
REVIEW: SIGNIFICANT CHANGE UP
RH IG SCN BLD-IMP: POSITIVE — SIGNIFICANT CHANGE UP
RH IG SCN BLD-IMP: POSITIVE — SIGNIFICANT CHANGE UP
RSV RNA NPH QL NAA+NON-PROBE: SIGNIFICANT CHANGE UP
SARS-COV-2 RNA SPEC QL NAA+PROBE: SIGNIFICANT CHANGE UP
SMUDGE CELLS # BLD: PRESENT — SIGNIFICANT CHANGE UP
SODIUM SERPL-SCNC: 130 MMOL/L — LOW (ref 135–145)
SODIUM SERPL-SCNC: 131 MMOL/L — LOW (ref 135–145)
SODIUM SERPL-SCNC: 132 MMOL/L — LOW (ref 135–145)
SODIUM SERPL-SCNC: 132 MMOL/L — LOW (ref 135–145)
SODIUM SERPL-SCNC: 133 MMOL/L — LOW (ref 135–145)
SODIUM SERPL-SCNC: 134 MMOL/L — LOW (ref 135–145)
SODIUM SERPL-SCNC: 134 MMOL/L — LOW (ref 135–145)
SODIUM UR-SCNC: 75 MMOL/L — SIGNIFICANT CHANGE UP
SP GR SPEC: 1.02 — SIGNIFICANT CHANGE UP (ref 1–1.03)
SPECIMEN SOURCE: SIGNIFICANT CHANGE UP
SQUAMOUS # UR AUTO: 1 /HPF — SIGNIFICANT CHANGE UP (ref 0–5)
T4 FREE SERPL-MCNC: 1.2 NG/DL — SIGNIFICANT CHANGE UP (ref 0.9–1.7)
TARGETS BLD QL SMEAR: SIGNIFICANT CHANGE UP
TRIGL SERPL-MCNC: 89 MG/DL — SIGNIFICANT CHANGE UP
TSH SERPL-MCNC: 1.77 UIU/ML — SIGNIFICANT CHANGE UP (ref 0.27–4.2)
UROBILINOGEN FLD QL: 0.2 MG/DL — SIGNIFICANT CHANGE UP (ref 0.2–1)
UUN UR-MCNC: 296.4 MG/DL — SIGNIFICANT CHANGE UP
WBC # BLD: 11.23 K/UL — HIGH (ref 3.8–10.5)
WBC # BLD: 12.24 K/UL — HIGH (ref 3.8–10.5)
WBC # BLD: 13.21 K/UL — HIGH (ref 3.8–10.5)
WBC # BLD: 15.13 K/UL — HIGH (ref 3.8–10.5)
WBC # BLD: 15.43 K/UL — HIGH (ref 3.8–10.5)
WBC # BLD: 15.56 K/UL — HIGH (ref 3.8–10.5)
WBC # BLD: 16.08 K/UL — HIGH (ref 3.8–10.5)
WBC # BLD: 16.12 K/UL — HIGH (ref 3.8–10.5)
WBC # BLD: 16.37 K/UL — HIGH (ref 3.8–10.5)
WBC # BLD: 18.9 K/UL — HIGH (ref 3.8–10.5)
WBC # BLD: 19.02 K/UL — HIGH (ref 3.8–10.5)
WBC # FLD AUTO: 11.23 K/UL — HIGH (ref 3.8–10.5)
WBC # FLD AUTO: 12.24 K/UL — HIGH (ref 3.8–10.5)
WBC # FLD AUTO: 13.21 K/UL — HIGH (ref 3.8–10.5)
WBC # FLD AUTO: 15.13 K/UL — HIGH (ref 3.8–10.5)
WBC # FLD AUTO: 15.43 K/UL — HIGH (ref 3.8–10.5)
WBC # FLD AUTO: 15.56 K/UL — HIGH (ref 3.8–10.5)
WBC # FLD AUTO: 16.08 K/UL — HIGH (ref 3.8–10.5)
WBC # FLD AUTO: 16.12 K/UL — HIGH (ref 3.8–10.5)
WBC # FLD AUTO: 16.37 K/UL — HIGH (ref 3.8–10.5)
WBC # FLD AUTO: 18.9 K/UL — HIGH (ref 3.8–10.5)
WBC # FLD AUTO: 19.02 K/UL — HIGH (ref 3.8–10.5)
WBC UR QL: 1488 /HPF — HIGH (ref 0–5)

## 2025-01-01 PROCEDURE — 72157 MRI CHEST SPINE W/O & W/DYE: CPT | Mod: 26

## 2025-01-01 PROCEDURE — 93010 ELECTROCARDIOGRAM REPORT: CPT

## 2025-01-01 PROCEDURE — 99239 HOSP IP/OBS DSCHRG MGMT >30: CPT | Mod: GC

## 2025-01-01 PROCEDURE — 99232 SBSQ HOSP IP/OBS MODERATE 35: CPT

## 2025-01-01 PROCEDURE — 70450 CT HEAD/BRAIN W/O DYE: CPT | Mod: 26

## 2025-01-01 PROCEDURE — 99223 1ST HOSP IP/OBS HIGH 75: CPT

## 2025-01-01 PROCEDURE — 70544 MR ANGIOGRAPHY HEAD W/O DYE: CPT | Mod: 26,59

## 2025-01-01 PROCEDURE — 74176 CT ABD & PELVIS W/O CONTRAST: CPT | Mod: 26

## 2025-01-01 PROCEDURE — 99233 SBSQ HOSP IP/OBS HIGH 50: CPT | Mod: GC

## 2025-01-01 PROCEDURE — 99222 1ST HOSP IP/OBS MODERATE 55: CPT | Mod: GC

## 2025-01-01 PROCEDURE — 70553 MRI BRAIN STEM W/O & W/DYE: CPT | Mod: 26

## 2025-01-01 PROCEDURE — 99232 SBSQ HOSP IP/OBS MODERATE 35: CPT | Mod: GC

## 2025-01-01 PROCEDURE — 72156 MRI NECK SPINE W/O & W/DYE: CPT | Mod: 26

## 2025-01-01 PROCEDURE — 71045 X-RAY EXAM CHEST 1 VIEW: CPT | Mod: 26

## 2025-01-01 PROCEDURE — 99285 EMERGENCY DEPT VISIT HI MDM: CPT

## 2025-01-01 PROCEDURE — 72158 MRI LUMBAR SPINE W/O & W/DYE: CPT | Mod: 26

## 2025-01-01 PROCEDURE — 93308 TTE F-UP OR LMTD: CPT | Mod: 26,GC

## 2025-01-01 PROCEDURE — 50435 EXCHANGE NEPHROSTOMY CATH: CPT | Mod: LT

## 2025-01-01 PROCEDURE — 99285 EMERGENCY DEPT VISIT HI MDM: CPT | Mod: 25

## 2025-01-01 PROCEDURE — 70549 MR ANGIOGRAPH NECK W/O&W/DYE: CPT | Mod: 26

## 2025-01-01 PROCEDURE — 76770 US EXAM ABDO BACK WALL COMP: CPT | Mod: 26

## 2025-01-01 PROCEDURE — 99222 1ST HOSP IP/OBS MODERATE 55: CPT

## 2025-01-01 RX ORDER — CIPROFLOXACIN HCL 250 MG
1 TABLET ORAL
Qty: 4 | Refills: 0
Start: 2025-01-01 | End: 2025-01-01

## 2025-01-01 RX ORDER — HEPARIN SODIUM 1000 [USP'U]/ML
5000 INJECTION INTRAVENOUS; SUBCUTANEOUS EVERY 12 HOURS
Refills: 0 | Status: DISCONTINUED | OUTPATIENT
Start: 2025-01-01 | End: 2025-01-01

## 2025-01-01 RX ORDER — HEPARIN SODIUM 1000 [USP'U]/ML
5000 INJECTION INTRAVENOUS; SUBCUTANEOUS EVERY 12 HOURS
Refills: 0 | Status: COMPLETED | OUTPATIENT
Start: 2025-01-01 | End: 2025-01-01

## 2025-01-01 RX ORDER — CALCIUM GLUCONATE 20 MG/ML
1 INJECTION, SOLUTION INTRAVENOUS ONCE
Refills: 0 | Status: COMPLETED | OUTPATIENT
Start: 2025-01-01 | End: 2025-01-01

## 2025-01-01 RX ORDER — CEFTRIAXONE 500 MG/1
1000 INJECTION, POWDER, FOR SOLUTION INTRAMUSCULAR; INTRAVENOUS ONCE
Refills: 0 | Status: COMPLETED | OUTPATIENT
Start: 2025-01-01 | End: 2025-01-01

## 2025-01-01 RX ORDER — POTASSIUM PHOSPHATE, MONOBASIC POTASSIUM PHOSPHATE, DIBASIC INJECTION, 236; 224 MG/ML; MG/ML
15 SOLUTION, CONCENTRATE INTRAVENOUS ONCE
Refills: 0 | Status: COMPLETED | OUTPATIENT
Start: 2025-01-01 | End: 2025-01-01

## 2025-01-01 RX ORDER — ACETAMINOPHEN 500 MG/5ML
650 LIQUID (ML) ORAL EVERY 6 HOURS
Refills: 0 | Status: DISCONTINUED | OUTPATIENT
Start: 2025-01-01 | End: 2025-01-01

## 2025-01-01 RX ORDER — ASPIRIN 325 MG
81 TABLET ORAL AT BEDTIME
Refills: 0 | Status: DISCONTINUED | OUTPATIENT
Start: 2025-01-01 | End: 2025-01-01

## 2025-01-01 RX ORDER — ATORVASTATIN CALCIUM 80 MG/1
40 TABLET, FILM COATED ORAL AT BEDTIME
Refills: 0 | Status: DISCONTINUED | OUTPATIENT
Start: 2025-01-01 | End: 2025-01-01

## 2025-01-01 RX ORDER — ASPIRIN 325 MG
81 TABLET ORAL DAILY
Refills: 0 | Status: DISCONTINUED | OUTPATIENT
Start: 2025-01-01 | End: 2025-01-01

## 2025-01-01 RX ORDER — ATORVASTATIN CALCIUM 80 MG/1
80 TABLET, FILM COATED ORAL AT BEDTIME
Refills: 0 | Status: DISCONTINUED | OUTPATIENT
Start: 2025-01-01 | End: 2025-01-01

## 2025-01-01 RX ORDER — PIPERACILLIN-TAZO-DEXTROSE,ISO 3.375G/5
3.38 IV SOLUTION, PIGGYBACK PREMIX FROZEN(ML) INTRAVENOUS ONCE
Refills: 0 | Status: COMPLETED | OUTPATIENT
Start: 2025-01-01 | End: 2025-01-01

## 2025-01-01 RX ORDER — MAGNESIUM SULFATE 500 MG/ML
4 SYRINGE (ML) INJECTION ONCE
Refills: 0 | Status: COMPLETED | OUTPATIENT
Start: 2025-01-01 | End: 2025-01-01

## 2025-01-01 RX ORDER — MAGNESIUM SULFATE 500 MG/ML
2 SYRINGE (ML) INJECTION ONCE
Refills: 0 | Status: COMPLETED | OUTPATIENT
Start: 2025-01-01 | End: 2025-01-01

## 2025-01-01 RX ORDER — SOD PHOS DI, MONO/K PHOS MONO 250 MG
1 TABLET ORAL EVERY 6 HOURS
Refills: 0 | Status: COMPLETED | OUTPATIENT
Start: 2025-01-01 | End: 2025-01-01

## 2025-01-01 RX ORDER — HYDROMORPHONE HYDROCHLORIDE 4 MG/ML
1 INJECTION, SOLUTION INTRAMUSCULAR; INTRAVENOUS; SUBCUTANEOUS ONCE
Refills: 0 | Status: DISCONTINUED | OUTPATIENT
Start: 2025-01-01 | End: 2025-01-01

## 2025-01-01 RX ORDER — PIPERACILLIN-TAZO-DEXTROSE,ISO 3.375G/5
3.38 IV SOLUTION, PIGGYBACK PREMIX FROZEN(ML) INTRAVENOUS EVERY 8 HOURS
Refills: 0 | Status: DISCONTINUED | OUTPATIENT
Start: 2025-01-01 | End: 2025-01-01

## 2025-01-01 RX ORDER — ASPIRIN 325 MG
1 TABLET ORAL
Qty: 30 | Refills: 0
Start: 2025-01-01 | End: 2025-03-02

## 2025-01-01 RX ADMIN — Medication 650 MILLIGRAM(S): at 10:44

## 2025-01-01 RX ADMIN — Medication 1 GRAM(S): at 05:18

## 2025-01-01 RX ADMIN — Medication 25 GRAM(S): at 17:31

## 2025-01-01 RX ADMIN — Medication 25 GRAM(S): at 10:23

## 2025-01-01 RX ADMIN — HEPARIN SODIUM 5000 UNIT(S): 1000 INJECTION INTRAVENOUS; SUBCUTANEOUS at 05:22

## 2025-01-01 RX ADMIN — Medication 1 GRAM(S): at 05:45

## 2025-01-01 RX ADMIN — HYDROMORPHONE HYDROCHLORIDE 1 MILLIGRAM(S): 4 INJECTION, SOLUTION INTRAMUSCULAR; INTRAVENOUS; SUBCUTANEOUS at 15:05

## 2025-01-01 RX ADMIN — Medication 1 GRAM(S): at 17:33

## 2025-01-01 RX ADMIN — Medication 75 MILLILITER(S): at 16:33

## 2025-01-01 RX ADMIN — Medication 25 GRAM(S): at 18:10

## 2025-01-01 RX ADMIN — Medication 40 MILLIGRAM(S): at 06:13

## 2025-01-01 RX ADMIN — Medication 650 MILLIGRAM(S): at 11:44

## 2025-01-01 RX ADMIN — Medication 1 GRAM(S): at 18:10

## 2025-01-01 RX ADMIN — Medication 25 GRAM(S): at 10:04

## 2025-01-01 RX ADMIN — Medication 200 GRAM(S): at 21:16

## 2025-01-01 RX ADMIN — Medication 1 PACKET(S): at 14:37

## 2025-01-01 RX ADMIN — Medication 25 GRAM(S): at 02:19

## 2025-01-01 RX ADMIN — Medication 25 GRAM(S): at 09:13

## 2025-01-01 RX ADMIN — Medication 25 GRAM(S): at 01:53

## 2025-01-01 RX ADMIN — Medication 25 GRAM(S): at 09:20

## 2025-01-01 RX ADMIN — Medication 25 GRAM(S): at 17:33

## 2025-01-01 RX ADMIN — Medication 25 GRAM(S): at 10:45

## 2025-01-01 RX ADMIN — Medication 25 GRAM(S): at 01:22

## 2025-01-01 RX ADMIN — Medication 25 GRAM(S): at 02:03

## 2025-01-01 RX ADMIN — Medication 25 GRAM(S): at 11:00

## 2025-01-01 RX ADMIN — HEPARIN SODIUM 5000 UNIT(S): 1000 INJECTION INTRAVENOUS; SUBCUTANEOUS at 18:03

## 2025-01-01 RX ADMIN — Medication 1 GRAM(S): at 05:52

## 2025-01-01 RX ADMIN — Medication 25 GRAM(S): at 17:25

## 2025-01-01 RX ADMIN — Medication 1 GRAM(S): at 17:31

## 2025-01-01 RX ADMIN — Medication 40 MILLIEQUIVALENT(S): at 18:49

## 2025-01-01 RX ADMIN — Medication 1 PACKET(S): at 17:56

## 2025-01-01 RX ADMIN — Medication 650 MILLIGRAM(S): at 17:29

## 2025-01-01 RX ADMIN — Medication 1 GRAM(S): at 17:25

## 2025-01-01 RX ADMIN — Medication 25 GRAM(S): at 02:08

## 2025-01-01 RX ADMIN — HEPARIN SODIUM 5000 UNIT(S): 1000 INJECTION INTRAVENOUS; SUBCUTANEOUS at 18:30

## 2025-01-01 RX ADMIN — Medication 25 GRAM(S): at 10:24

## 2025-01-01 RX ADMIN — Medication 25 GRAM(S): at 18:03

## 2025-01-01 RX ADMIN — Medication 25 GRAM(S): at 10:22

## 2025-01-01 RX ADMIN — Medication 650 MILLIGRAM(S): at 23:00

## 2025-01-01 RX ADMIN — Medication 650 MILLIGRAM(S): at 16:29

## 2025-01-01 RX ADMIN — CEFTRIAXONE 100 MILLIGRAM(S): 500 INJECTION, POWDER, FOR SOLUTION INTRAMUSCULAR; INTRAVENOUS at 13:36

## 2025-01-01 RX ADMIN — Medication 81 MILLIGRAM(S): at 21:21

## 2025-01-01 RX ADMIN — Medication 80 MILLIGRAM(S): at 15:56

## 2025-01-01 RX ADMIN — POTASSIUM PHOSPHATE, MONOBASIC POTASSIUM PHOSPHATE, DIBASIC INJECTION, 62.5 MILLIMOLE(S): 236; 224 SOLUTION, CONCENTRATE INTRAVENOUS at 15:14

## 2025-01-01 RX ADMIN — CALCIUM GLUCONATE 100 GRAM(S): 20 INJECTION, SOLUTION INTRAVENOUS at 10:55

## 2025-01-01 RX ADMIN — Medication 25 GRAM(S): at 02:24

## 2025-01-01 RX ADMIN — Medication 1 GRAM(S): at 17:59

## 2025-01-01 RX ADMIN — Medication 25 GRAM(S): at 18:30

## 2025-01-01 RX ADMIN — Medication 650 MILLIGRAM(S): at 22:02

## 2025-01-01 RX ADMIN — Medication 40 MILLIGRAM(S): at 05:18

## 2025-01-01 RX ADMIN — Medication 25 GRAM(S): at 01:49

## 2025-01-01 RX ADMIN — Medication 1 GRAM(S): at 19:39

## 2025-01-01 RX ADMIN — Medication 500 MILLILITER(S): at 10:59

## 2025-01-01 RX ADMIN — Medication 75 MILLILITER(S): at 15:58

## 2025-01-01 RX ADMIN — Medication 1 GRAM(S): at 06:14

## 2025-01-01 RX ADMIN — Medication 25 GRAM(S): at 10:30

## 2025-01-01 RX ADMIN — Medication 25 GRAM(S): at 17:58

## 2025-01-01 RX ADMIN — Medication 20 MILLIEQUIVALENT(S): at 11:00

## 2025-01-27 ENCOUNTER — RESULT REVIEW (OUTPATIENT)
Age: 60
End: 2025-01-27

## 2025-01-31 ENCOUNTER — TRANSCRIPTION ENCOUNTER (OUTPATIENT)
Age: 60
End: 2025-01-31

## 2025-02-01 ENCOUNTER — TRANSCRIPTION ENCOUNTER (OUTPATIENT)
Age: 60
End: 2025-02-01

## 2025-02-14 NOTE — DIETITIAN INITIAL EVALUATION ADULT - NUTRITIONGOAL OUTCOME1
Gene Mccollum (:  1970) is a 55 y.o. male,Established patient, here for evaluation of the following chief complaint(s):  Gastroesophageal Reflux      Assessment & Plan   ASSESSMENT/PLAN:  1. Migraine with aura and without status migrainosus, not intractable  -     SUMAtriptan (IMITREX) 100 MG tablet; Take 1 tablet by mouth once as needed for Migraine, Disp-30 tablet, R-3Normal  2. Gastro-esophageal reflux disease with esophagitis, without bleeding  3. Other chest pain  -     External Referral To Cardiology  -     CBC with Auto Differential; Future  -     Comprehensive Metabolic Panel; Future  -     Lipid Panel; Future  -     Apolipoprotein B-100; Future  -     EKG 12 Lead  4. Prostate cancer screening  -     PSA Screening; Future      Chest pain- EKG is reassuring. Patient has had episodes of chest pain in the past, fortunately his work up has been negative. Overall he has not had issues with GERD recently either, hence his chest pain is a little concerning. Will refer him back to Cardiology who he saw in the past. Await labs.    GERD- chronic, overall doing well especially with the treatment for SIBO. Advised to take Omeprazole 40 mg daily in am and Famotidine 40 mg daily at HS for 1-2 weeks and monitor chest pain symptoms.    Migraines- chronic, well controlled, continue Sumatriptan, he was given samples of Nurtec 75 mg today to try- discussed when and how to take and possible side effects.     Prostate screening- await labs.    Return if symptoms worsen or fail to improve.         Subjective   SUBJECTIVE/OBJECTIVE:  Gastroesophageal Reflux  He complains of abdominal pain, chest pain and coughing. He reports no nausea, no sore throat or no wheezing.     History of Present Illness  The patient presents for evaluation of left-sided chest pain, intestinal methanogen overgrowth, and health maintenance.    Chest pain, h/o GERD- He has been experiencing intermittent left-sided chest pain for the past 2 weeks, 
Patient will meet >75% estimated energy requirements.

## 2025-02-15 NOTE — ED ADULT NURSE REASSESSMENT NOTE - NS ED NURSE REASSESS COMMENT FT1
MD RACHANA Ellis at bedside, patient , family at bedside, organ donation called, will continue to monitor.

## 2025-02-15 NOTE — ED PROVIDER NOTE - CONVERSATION DETAILS
I spoke with the patient's wife Ms. Naomy Diaz who states that patient has been declining at home over the past several days but has not wanted to go to the hospital and wanted to be made hospice which she was planning at home.  She notes that the only reason she is bringing him to the hospital is due to severe pain.  She discussed that she would like him to be DNR/DNI with comfort measures only and understands that this would mean that he would not be receiving antibiotics dialysis IV fluids or a feeding tube.  She understands that this would likely result in his eminent passing given his condition.

## 2025-02-15 NOTE — ED ADULT NURSE REASSESSMENT NOTE - NS ED NURSE REASSESS COMMENT FT1
pt p/w apneic, shallow resps- wife @ bedside states they are discussing hospice/comfort care at home- direct to oc Pleitez

## 2025-02-15 NOTE — ED PROVIDER NOTE - AUTOPSY COMMENTS
patient made DNR/DNI with no interventions on arrival to ED; severe medical comorbidities; family declines autopsy.

## 2025-02-15 NOTE — ED PROVIDER NOTE - ATTENDING CONTRIBUTION TO CARE
Dr. Ellis, Attending Physician-  I performed a face to face bedside interview with patient regarding history of present illness, review of symptoms and past medical history. I completed an independent physical exam.  I have discussed patient's plan of care with the resident.
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
## 2025-02-15 NOTE — ED ADULT NURSE NOTE - OBJECTIVE STATEMENT
Bianca RN: pt A&Ox0 unresponsive, pt brought in by wife for increased weakness/fatigue and c/o pain at home x 2-3 days. pt is end stage colon cancer patient -completed radiation. pt is not on chemo. pt presents with right lower colostomy to abd. right sided chest wall port accessed. meds given as ordered. apneic breathing noted. unable to obtain pulse ox. pt is DNR/DNI. MD Ellis at bedside, time of death 1510.

## 2025-02-15 NOTE — ED ADULT NURSE NOTE - NSFALLRISKINTERV_ED_ALL_ED
Assistance OOB with selected safe patient handling equipment if applicable/Assistance with ambulation/Communicate fall risk and risk factors to all staff, patient, and family/Monitor for mental status changes and reorient to person, place, and time, as needed/Move patient closer to nursing station/within visual sight of ED staff/Provide visual cue: yellow wristband, yellow gown, etc/Reinforce activity limits and safety measures with patient and family/Toileting schedule using arm’s reach rule for commode and bathroom/Use of alarms - bed, stretcher, chair and/or video monitoring/Call bell, personal items and telephone in reach/Instruct patient to call for assistance before getting out of bed/chair/stretcher/Non-slip footwear applied when patient is off stretcher/[place] to call system/Physically safe environment - no spills, clutter or unnecessary equipment/Purposeful Proactive Rounding/Room/bathroom lighting operational, light cord in reach

## 2025-02-15 NOTE — ED PROVIDER NOTE - CLINICAL SUMMARY MEDICAL DECISION MAKING FREE TEXT BOX
TOD 15:10. TANK 15:10. me #[id number]. TODARÍO 15:10. me #[id number].    Olu Katz,  (PGY-2) patient is a 58 y/o M, accompanied by wife at bedside, PMHx CKD stage 3, metastatic colon Ca (s/p ileostomy; most recent RT 12/30/24), bladder Ca, SIADH (on salt tabs), L PCN 2/2 hydro 11/27/24, and IVC filter (7/29/24) BIB EMS for weakness. Wife was at bedside providing story , stating patient over the past 3 days has become increasingly weak but wanted to be hospice and today endorsing generalized pain which is why she is bringing him in, for pain control only.  On exam patient chronically ill appearing, minimally responsive to verbal stimulation. Kussmaul respirations. Palpable pulse extremities cold. cannot obtain BP. HR initially 77.

## 2025-02-15 NOTE — ED ADULT TRIAGE NOTE - CHIEF COMPLAINT QUOTE
hx colon ca (finalized radiation tx)- arrives with wife, endorsing worsening fatigue with generalized weakness x 3 days-   *unable to obtain BP on extremities x 4*

## 2025-02-26 NOTE — ASU PREOP CHECKLIST - LAST TOOK
Patient states he takes OTC Omar (Orlistat) for weight loss. Per policy tech, recommended to hold orlistat day of procedure. Please review and advise if in agreement, thank you    solids

## 2025-02-27 ENCOUNTER — APPOINTMENT (OUTPATIENT)
Dept: RADIATION ONCOLOGY | Facility: CLINIC | Age: 60
End: 2025-02-27

## 2025-05-20 NOTE — ED ADULT NURSE NOTE - NS_SISCREENINGSR_GEN_ALL_ED
[de-identified] : Referred by: Insurance   HPI: 34 year old male here for his f/u medical weight loss appointment. PMHx is significant for class 3 obesity, prediabetes, schizoaffective disorder, medication induced weight gain, dyslipidemia, metabolic syndrome, vitamin D deficiency, depression, disturbed sleep patterns/insomnia, fatty liver, food insecurity, hx of alcohol misuse, FLOWER and tardive dyskinesia.    Weight History: Highest Weight: 310 lbs (2022) Lowest Weight: 210 lbs (10 yrs ago) Weight at [facility]: 270 lbs/45.63 kg/m2 (Sept 2024) Weight trajectory 270 --> 283 --> 273 --> 279 --> 265 Current weight: 265 lbs  Since we last met, continues on metformin. Was off for 2 weeks due to a pharmacy error. He has also started Mounjaro. Is now taking 5 mg. Reports decreased appetite. Denies any GI side effects.   Has continued  to go to the gym 3x/week for 45 min. Also has a treadmill in his home and has started to do weights for 25 min.   His brother cooks for him at night.   Reports improved mood.   Reports that the [place] makes him feel more sleepy.   Review of Systems is also notable for: denies numbness and tingling Reflux improved since weight loss +HAs Denies knee and back pain central adiposity  Labs Oct 2024: AST 42 H ALT 83 H hemoglobin A1c 5.9 H TChol 208H TGs 153 H HDL 39 L  H  H low vitamin D levels   Previous Weight Loss Efforts:  Has struggled with his weight for years. In 2022, his weight was 310 lbs and was able to lose down to 270 lbs.  Has lost weight in recent months (~30 lbs) due to being homeless.   Just established a stable place to live ~1.5 months ago.  Was homeless from December 2023 until Aug.   Has previously tried Ozempic. Was on it briefly and lost 5 lbs.  Was also tried on metformin - tolerated well. Uncertain why it was discontinued.  Reports that he keeps losing and gaining ~10 lbs. 260 +/- 10 lbs  Has been on many medications for management of his schizoaffective disorder contributing to weight gain. Medications include: abilify and haldol.   Previous use of AOMs: Ozempic as above in the HPI Medications that may have contributed to weight gain: As above in the HPI   Eating behaviors: Craves sweets (doughnuts and caramel apples_ Loves cheese Has been working to decrease his intake of fried food Struggles with night eating +Bored, emotional and stress eating Has decreased his intake of soda    Movement: Walking Goes to the gym for 45-60 min Does the treadmill for 45-60  min 5-7 days/week No weights  SocHx: Now lives in permanent housing - was recently homeless Currently looking for work  Former smoker - quit 10 yrs ago - smoked 1ppd x 10 yrs Drinks alcohol - has a history of alcohol use disorder - now decreased   Sleep: Goes to bed at 4 AM Sleeps throughout the day Wakes up at 8 AM Back to sleep at 12 pm - wakes up at 6 pm Hx of FLOWER - had a CPAP   Mental Health: Going through a divorce Goes to therapy 1x/week Getting a new psychiatrist on Tuesday Had food stamps. Was cut off when he was homeless. Is trying to re-establish them - has a 
Negative

## 2025-06-09 NOTE — PROCEDURE NOTE - NSTIMEOUT_GEN_A_CORE
Patient's first and last name, , procedure, and correct site confirmed prior to the start of procedure. [Time Spent: ___ minutes] : I have spent [unfilled] minutes of time on the encounter which excludes teaching and separately reported services.

## 2025-06-24 NOTE — ED ADULT TRIAGE NOTE - PRO INTERPRETER NEED 2
English Drysol Pregnancy And Lactation Text: This medication is considered safe during pregnancy and breast feeding. Tetracycline Counseling: Patient counseled regarding possible photosensitivity and increased risk for sunburn.  Patient instructed to avoid sunlight, if possible.  When exposed to sunlight, patients should wear protective clothing, sunglasses, and sunscreen.  The patient was instructed to call the office immediately if the following severe adverse effects occur:  hearing changes, easy bruising/bleeding, severe headache, or vision changes.  The patient verbalized understanding of the proper use and possible adverse effects of tetracycline.  All of the patient's questions and concerns were addressed. Patient understands to avoid pregnancy while on therapy due to potential birth defects. Humira Pregnancy And Lactation Text: This medication is Pregnancy Category B and is considered safe during pregnancy. It is unknown if this medication is excreted in breast milk. Acitretin Pregnancy And Lactation Text: This medication is Pregnancy Category X and should not be given to women who are pregnant or may become pregnant in the future. This medication is excreted in breast milk. Dutasteride Male Counseling: Dustasteride Counseling:  I discussed with the patient the risks of use of dutasteride including but not limited to decreased libido, decreased ejaculate volume, and gynecomastia. Women who can become pregnant should not handle medication.  All of the patient's questions and concerns were addressed. Glycopyrrolate Pregnancy And Lactation Text: This medication is Pregnancy Category B and is considered safe during pregnancy. It is unknown if it is excreted breast milk. Thalidomide Counseling: I discussed with the patient the risks of thalidomide including but not limited to birth defects, anxiety, weakness, chest pain, dizziness, cough and severe allergy. Picato Pregnancy And Lactation Text: This medication is Pregnancy Category C. It is unknown if this medication is excreted in breast milk. Methotrexate Counseling:  Patient counseled regarding adverse effects of methotrexate including but not limited to nausea, vomiting, abnormalities in liver function tests. Patients may develop mouth sores, rash, diarrhea, and abnormalities in blood counts. The patient understands that monitoring is required including LFT's and blood counts.  There is a rare possibility of scarring of the liver and lung problems that can occur when taking methotrexate. Persistent nausea, loss of appetite, pale stools, dark urine, cough, and shortness of breath should be reported immediately. Patient advised to discontinue methotrexate treatment at least three months before attempting to become pregnant.  I discussed the need for folate supplements while taking methotrexate.  These supplements can decrease side effects during methotrexate treatment. The patient verbalized understanding of the proper use and possible adverse effects of methotrexate.  All of the patient's questions and concerns were addressed. Tazorac Counseling:  Patient advised that medication is irritating and drying.  Patient may need to apply sparingly and wash off after an hour before eventually leaving it on overnight.  The patient verbalized understanding of the proper use and possible adverse effects of tazorac.  All of the patient's questions and concerns were addressed. Minocycline Pregnancy And Lactation Text: This medication is Pregnancy Category D and not consider safe during pregnancy. It is also excreted in breast milk. Olanzapine Counseling- I discussed with the patient the common side effects of olanzapine including but are not limited to: lack of energy, dry mouth, increased appetite, sleepiness, tremor, constipation, dizziness, changes in behavior, or restlessness.  Explained that teenagers are more likely to experience headaches, abdominal pain, pain in the arms or legs, tiredness, and sleepiness.  Serious side effects include but are not limited: increased risk of death in elderly patients who are confused, have memory loss, or dementia-related psychosis; hyperglycemia; increased cholesterol and triglycerides; and weight gain. Olumiant Counseling: I discussed with the patient the risks of Olumiant therapy including but not limited to upper respiratory tract infections, shingles, cold sores, and nausea. Live vaccines should be avoided.  This medication has been linked to serious infections; higher rate of mortality; malignancy and lymphoproliferative disorders; major adverse cardiovascular events; thrombosis; gastrointestinal perforations; neutropenia; lymphopenia; anemia; liver enzyme elevations; and lipid elevations. Siliq Counseling:  I discussed with the patient the risks of Siliq including but not limited to new or worsening depression, suicidal thoughts and behavior, immunosuppression, malignancy, posterior leukoencephalopathy syndrome, and serious infections.  The patient understands that monitoring is required including a PPD at baseline and must alert us or the primary physician if symptoms of infection or other concerning signs are noted. There is also a special program designed to monitor depression which is required with Siliq. Spironolactone Pregnancy And Lactation Text: This medication can cause feminization of the male fetus and should be avoided during pregnancy. The active metabolite is also found in breast milk. Olanzapine Pregnancy And Lactation Text: This medication is pregnancy category C.   There are no adequate and well controlled trials with olanzapine in pregnant females.  Olanzapine should be used during pregnancy only if the potential benefit justifies the potential risk to the fetus.   In a study in lactating healthy women, olanzapine was excreted in breast milk.  It is recommended that women taking olanzapine should not breast feed. Tazorac Pregnancy And Lactation Text: This medication is not safe during pregnancy. It is unknown if this medication is excreted in breast milk. Benzoyl Peroxide Counseling: Patient counseled that medicine may cause skin irritation and bleach clothing.  In the event of skin irritation, the patient was advised to reduce the amount of the drug applied or use it less frequently.   The patient verbalized understanding of the proper use and possible adverse effects of benzoyl peroxide.  All of the patient's questions and concerns were addressed. Tremfya Counseling: I discussed with the patient the risks of guselkumab including but not limited to immunosuppression, serious infections, and drug reactions.  The patient understands that monitoring is required including a PPD at baseline and must alert us or the primary physician if symptoms of infection or other concerning signs are noted. Winlevi Counseling:  I discussed with the patient the risks of topical clascoterone including but not limited to erythema, scaling, itching, and stinging. Patient voiced their understanding. Imiquimod Counseling:  I discussed with the patient the risks of imiquimod including but not limited to erythema, scaling, itching, weeping, crusting, and pain.  Patient understands that the inflammatory response to imiquimod is variable from person to person and was educated regarded proper titration schedule.  If flu-like symptoms develop, patient knows to discontinue the medication and contact us. Clofazimine Counseling:  I discussed with the patient the risks of clofazimine including but not limited to skin and eye pigmentation, liver damage, nausea/vomiting, gastrointestinal bleeding and allergy. Cimzia Counseling:  I discussed with the patient the risks of Cimzia including but not limited to immunosuppression, allergic reactions and infections.  The patient understands that monitoring is required including a PPD at baseline and must alert us or the primary physician if symptoms of infection or other concerning signs are noted. Clindamycin Counseling: I counseled the patient regarding use of clindamycin as an antibiotic for prophylactic and/or therapeutic purposes. Clindamycin is active against numerous classes of bacteria, including skin bacteria. Side effects may include nausea, diarrhea, gastrointestinal upset, rash, hives, yeast infections, and in rare cases, colitis. Ketoconazole Pregnancy And Lactation Text: This medication is Pregnancy Category C and it isn't know if it is safe during pregnancy. It is also excreted in breast milk and breast feeding isn't recommended. Clindamycin Pregnancy And Lactation Text: This medication can be used in pregnancy if certain situations. Clindamycin is also present in breast milk. Elidel Counseling: Patient may experience a mild burning sensation during topical application. Elidel is not approved in children less than 2 years of age. There have been case reports of hematologic and skin malignancies in patients using topical calcineurin inhibitors although causality is questionable. Dutasteride Female Counseling: Dutasteride Counseling:  I discussed with the patient the risks of use of dutasteride including but not limited to decreased libido and sexual dysfunction. Explained the teratogenic nature of the medication and stressed the importance of not getting pregnant during treatment. All of the patient's questions and concerns were addressed. Thalidomide Pregnancy And Lactation Text: This medication is Pregnancy Category X and is absolutely contraindicated during pregnancy. It is unknown if it is excreted in breast milk. Clofazimine Pregnancy And Lactation Text: This medication is Pregnancy Category C and isn't considered safe during pregnancy. It is excreted in breast milk. Cimzia Pregnancy And Lactation Text: This medication crosses the placenta but can be considered safe in certain situations. Cimzia may be excreted in breast milk. Hyrimoz Counseling:  I discussed with the patient the risks of adalimumab including but not limited to myelosuppression, immunosuppression, autoimmune hepatitis, demyelinating diseases, lymphoma, and serious infections.  The patient understands that monitoring is required including a PPD at baseline and must alert us or the primary physician if symptoms of infection or other concerning signs are noted. Bexarotene Counseling:  I discussed with the patient the risks of bexarotene including but not limited to hair loss, dry lips/skin/eyes, liver abnormalities, hyperlipidemia, pancreatitis, depression/suicidal ideation, photosensitivity, drug rash/allergic reactions, hypothyroidism, anemia, leukopenia, infection, cataracts, and teratogenicity.  Patient understands that they will need regular blood tests to check lipid profile, liver function tests, white blood cell count, thyroid function tests and pregnancy test if applicable. Protopic Counseling: Patient may experience a mild burning sensation during topical application. Protopic is not approved in children less than 2 years of age. There have been case reports of hematologic and skin malignancies in patients using topical calcineurin inhibitors although causality is questionable. Siliq Pregnancy And Lactation Text: The risk during pregnancy and breastfeeding is uncertain with this medication. Hydroxychloroquine Counseling:  I discussed with the patient that a baseline ophthalmologic exam is needed at the start of therapy and every year thereafter while on therapy. A CBC may also be warranted for monitoring.  The side effects of this medication were discussed with the patient, including but not limited to agranulocytosis, aplastic anemia, seizures, rashes, retinopathy, and liver toxicity. Patient instructed to call the office should any adverse effect occur.  The patient verbalized understanding of the proper use and possible adverse effects of Plaquenil.  All the patient's questions and concerns were addressed. Albendazole Counseling:  I discussed with the patient the risks of albendazole including but not limited to cytopenia, kidney damage, nausea/vomiting and severe allergy.  The patient understands that this medication is being used in an off-label manner. Quinolones Counseling:  I discussed with the patient the risks of fluoroquinolones including but not limited to GI upset, allergic reaction, drug rash, diarrhea, dizziness, photosensitivity, yeast infections, liver function test abnormalities, tendonitis/tendon rupture. Methotrexate Pregnancy And Lactation Text: This medication is Pregnancy Category X and is known to cause fetal harm. This medication is excreted in breast milk. Olumiant Pregnancy And Lactation Text: Based on animal studies, Olumiant may cause embryo-fetal harm when administered to pregnant women.  The medication should not be used in pregnancy.  Breastfeeding is not recommended during treatment. Simponi Counseling:  I discussed with the patient the risks of golimumab including but not limited to myelosuppression, immunosuppression, autoimmune hepatitis, demyelinating diseases, lymphoma, and serious infections.  The patient understands that monitoring is required including a PPD at baseline and must alert us or the primary physician if symptoms of infection or other concerning signs are noted. Topical Clindamycin Counseling: Patient counseled that this medication may cause skin irritation or allergic reactions.  In the event of skin irritation, the patient was advised to reduce the amount of the drug applied or use it less frequently.   The patient verbalized understanding of the proper use and possible adverse effects of clindamycin.  All of the patient's questions and concerns were addressed. Oral Minoxidil Counseling- I discussed with the patient the risks of oral minoxidil including but not limited to shortness of breath, swelling of the feet or ankles, dizziness, lightheadedness, unwanted hair growth and allergic reaction.  The patient verbalized understanding of the proper use and possible adverse effects of oral minoxidil.  All of the patient's questions and concerns were addressed. Winlevi Pregnancy And Lactation Text: This medication is considered safe during pregnancy and breastfeeding. Azathioprine Counseling:  I discussed with the patient the risks of azathioprine including but not limited to myelosuppression, immunosuppression, hepatotoxicity, lymphoma, and infections.  The patient understands that monitoring is required including baseline LFTs, Creatinine, possible TPMP genotyping and weekly CBCs for the first month and then every 2 weeks thereafter.  The patient verbalized understanding of the proper use and possible adverse effects of azathioprine.  All of the patient's questions and concerns were addressed. Terbinafine Counseling: Patient counseling regarding adverse effects of terbinafine including but not limited to headache, diarrhea, rash, upset stomach, liver function test abnormalities, itching, taste/smell disturbance, nausea, abdominal pain, and flatulence.  There is a rare possibility of liver failure that can occur when taking terbinafine.  The patient understands that a baseline LFT and kidney function test may be required. The patient verbalized understanding of the proper use and possible adverse effects of terbinafine.  All of the patient's questions and concerns were addressed. Benzoyl Peroxide Pregnancy And Lactation Text: This medication is Pregnancy Category C. It is unknown if benzoyl peroxide is excreted in breast milk. Otezla Counseling: The side effects of Otezla were discussed with the patient, including but not limited to worsening or new depression, weight loss, diarrhea, nausea, upper respiratory tract infection, and headache. Patient instructed to call the office should any adverse effect occur.  The patient verbalized understanding of the proper use and possible adverse effects of Otezla.  All the patient's questions and concerns were addressed. Terbinafine Pregnancy And Lactation Text: This medication is Pregnancy Category B and is considered safe during pregnancy. It is also excreted in breast milk and breast feeding isn't recommended. Tranexamic Acid Counseling:  Patient advised of the small risk of bleeding problems with tranexamic acid. They were also instructed to call if they developed any nausea, vomiting or diarrhea. All of the patient's questions and concerns were addressed. Carac Counseling:  I discussed with the patient the risks of Carac including but not limited to erythema, scaling, itching, weeping, crusting, and pain. Zyclara Counseling:  I discussed with the patient the risks of imiquimod including but not limited to erythema, scaling, itching, weeping, crusting, and pain.  Patient understands that the inflammatory response to imiquimod is variable from person to person and was educated regarded proper titration schedule.  If flu-like symptoms develop, patient knows to discontinue the medication and contact us. Cosentyx Counseling:  I discussed with the patient the risks of Cosentyx including but not limited to worsening of Crohn's disease, immunosuppression, allergic reactions and infections.  The patient understands that monitoring is required including a PPD at baseline and must alert us or the primary physician if symptoms of infection or other concerning signs are noted. Otezla Pregnancy And Lactation Text: This medication is Pregnancy Category C and it isn't known if it is safe during pregnancy. It is unknown if it is excreted in breast milk. Azathioprine Pregnancy And Lactation Text: This medication is Pregnancy Category D and isn't considered safe during pregnancy. It is unknown if this medication is excreted in breast milk. Minoxidil Counseling: Minoxidil is a topical medication which can increase blood flow where it is applied. It is uncertain how this medication increases hair growth. Side effects are uncommon and include stinging and allergic reactions. Colchicine Counseling:  Patient counseled regarding adverse effects including but not limited to stomach upset (nausea, vomiting, stomach pain, or diarrhea).  Patient instructed to limit alcohol consumption while taking this medication.  Colchicine may reduce blood counts especially with prolonged use.  The patient understands that monitoring of kidney function and blood counts may be required, especially at baseline. The patient verbalized understanding of the proper use and possible adverse effects of colchicine.  All of the patient's questions and concerns were addressed. Protopic Pregnancy And Lactation Text: This medication is Pregnancy Category C. It is unknown if this medication is excreted in breast milk when applied topically. Bexarotene Pregnancy And Lactation Text: This medication is Pregnancy Category X and should not be given to women who are pregnant or may become pregnant. This medication should not be used if you are breast feeding. Doxycycline Counseling:  Patient counseled regarding possible photosensitivity and increased risk for sunburn.  Patient instructed to avoid sunlight, if possible.  When exposed to sunlight, patients should wear protective clothing, sunglasses, and sunscreen.  The patient was instructed to call the office immediately if the following severe adverse effects occur:  hearing changes, easy bruising/bleeding, severe headache, or vision changes.  The patient verbalized understanding of the proper use and possible adverse effects of doxycycline.  All of the patient's questions and concerns were addressed. Albendazole Pregnancy And Lactation Text: This medication is Pregnancy Category C and it isn't known if it is safe during pregnancy. It is also excreted in breast milk. Hydroxychloroquine Pregnancy And Lactation Text: This medication has been shown to cause fetal harm but it isn't assigned a Pregnancy Risk Category. There are small amounts excreted in breast milk. Prednisone Counseling:  I discussed with the patient the risks of prolonged use of prednisone including but not limited to weight gain, insomnia, osteoporosis, mood changes, diabetes, susceptibility to infection, glaucoma and high blood pressure.  In cases where prednisone use is prolonged, patients should be monitored with blood pressure checks, serum glucose levels and an eye exam.  Additionally, the patient may need to be placed on GI prophylaxis, PCP prophylaxis, and calcium and vitamin D supplementation and/or a bisphosphonate.  The patient verbalized understanding of the proper use and the possible adverse effects of prednisone.  All of the patient's questions and concerns were addressed. Quinolones Pregnancy And Lactation Text: This medication is Pregnancy Category C and it isn't know if it is safe during pregnancy. It is also excreted in breast milk. Rinvoq Counseling: I discussed with the patient the risks of Rinvoq therapy including but not limited to upper respiratory tract infections, shingles, cold sores, bronchitis, nausea, cough, fever, acne, and headache. Live vaccines should be avoided.  This medication has been linked to serious infections; higher rate of mortality; malignancy and lymphoproliferative disorders; major adverse cardiovascular events; thrombosis; thrombocytopenia, anemia, and neutropenia; lipid elevations; liver enzyme elevations; and gastrointestinal perforations. Dutasteride Pregnancy And Lactation Text: This medication is absolutely contraindicated in women, especially during pregnancy and breast feeding. Feminization of male fetuses is possible if taking while pregnant. Rinvoq Pregnancy And Lactation Text: Based on animal studies, Rinvoq may cause embryo-fetal harm when administered to pregnant women.  The medication should not be used in pregnancy.  Breastfeeding is not recommended during treatment and for 6 days after the last dose. Low Dose Naltrexone Counseling- I discussed with the patient the potential risks and side effects of low dose naltrexone including but not limited to: more vivid dreams, headaches, nausea, vomiting, abdominal pain, fatigue, dizziness, and anxiety. Rhofade Counseling: Rhofade is a topical medication which can decrease superficial blood flow where applied. Side effects are uncommon and include stinging, redness and allergic reactions. Ilumya Counseling: I discussed with the patient the risks of tildrakizumab including but not limited to immunosuppression, malignancy, posterior leukoencephalopathy syndrome, and serious infections.  The patient understands that monitoring is required including a PPD at baseline and must alert us or the primary physician if symptoms of infection or other concerning signs are noted. Finasteride Male Counseling: Finasteride Counseling:  I discussed with the patient the risks of use of finasteride including but not limited to decreased libido, decreased ejaculate volume, gynecomastia, and depression. Women should not handle medication.  All of the patient's questions and concerns were addressed. Oral Minoxidil Pregnancy And Lactation Text: This medication should only be used when clearly needed if you are pregnant, attempting to become pregnant or breast feeding. Topical Clindamycin Pregnancy And Lactation Text: This medication is Pregnancy Category B and is considered safe during pregnancy. It is unknown if it is excreted in breast milk. Xolair Counseling:  Patient informed of potential adverse effects including but not limited to fever, muscle aches, rash and allergic reactions.  The patient verbalized understanding of the proper use and possible adverse effects of Xolair.  All of the patient's questions and concerns were addressed. Fluconazole Counseling:  Patient counseled regarding adverse effects of fluconazole including but not limited to headache, diarrhea, nausea, upset stomach, liver function test abnormalities, taste disturbance, and stomach pain.  There is a rare possibility of liver failure that can occur when taking fluconazole.  The patient understands that monitoring of LFTs and kidney function test may be required, especially at baseline. The patient verbalized understanding of the proper use and possible adverse effects of fluconazole.  All of the patient's questions and concerns were addressed. Erivedge Counseling- I discussed with the patient the risks of Erivedge including but not limited to nausea, vomiting, diarrhea, constipation, weight loss, changes in the sense of taste, decreased appetite, muscle spasms, and hair loss.  The patient verbalized understanding of the proper use and possible adverse effects of Erivedge.  All of the patient's questions and concerns were addressed. Oxybutynin Counseling:  I discussed with the patient the risks of oxybutynin including but not limited to skin rash, drowsiness, dry mouth, difficulty urinating, and blurred vision. Xolair Pregnancy And Lactation Text: This medication is Pregnancy Category B and is considered safe during pregnancy. This medication is excreted in breast milk. Minoxidil Pregnancy And Lactation Text: This medication has not been assigned a Pregnancy Risk Category but animal studies failed to show danger with the topical medication. It is unknown if the medication is excreted in breast milk. Isotretinoin Counseling: Patient should get monthly blood tests, not donate blood, not drive at night if vision affected, not share medication, and not undergo elective surgery for 6 months after tx completed. Side effects reviewed, pt to contact office should one occur. Azithromycin Counseling:  I discussed with the patient the risks of azithromycin including but not limited to GI upset, allergic reaction, drug rash, diarrhea, and yeast infections. Carac Pregnancy And Lactation Text: This medication is Pregnancy Category X and contraindicated in pregnancy and in women who may become pregnant. It is unknown if this medication is excreted in breast milk. Cellcept Counseling:  I discussed with the patient the risks of mycophenolate mofetil including but not limited to infection/immunosuppression, GI upset, hypokalemia, hypercholesterolemia, bone marrow suppression, lymphoproliferative disorders, malignancy, GI ulceration/bleed/perforation, colitis, interstitial lung disease, kidney failure, progressive multifocal leukoencephalopathy, and birth defects.  The patient understands that monitoring is required including a baseline creatinine and regular CBC testing. In addition, patient must alert us immediately if symptoms of infection or other concerning signs are noted. Ivermectin Counseling:  Patient instructed to take medication on an empty stomach with a full glass of water.  Patient informed of potential adverse effects including but not limited to nausea, diarrhea, dizziness, itching, and swelling of the extremities or lymph nodes.  The patient verbalized understanding of the proper use and possible adverse effects of ivermectin.  All of the patient's questions and concerns were addressed. Doxycycline Pregnancy And Lactation Text: This medication is Pregnancy Category D and not consider safe during pregnancy. It is also excreted in breast milk but is considered safe for shorter treatment courses. Prednisone Pregnancy And Lactation Text: This medication is Pregnancy Category C and it isn't know if it is safe during pregnancy. This medication is excreted in breast milk. Tranexamic Acid Pregnancy And Lactation Text: It is unknown if this medication is safe during pregnancy or breast feeding. Dapsone Counseling: I discussed with the patient the risks of dapsone including but not limited to hemolytic anemia, agranulocytosis, rashes, methemoglobinemia, kidney failure, peripheral neuropathy, headaches, GI upset, and liver toxicity.  Patients who start dapsone require monitoring including baseline LFTs and weekly CBCs for the first month, then every month thereafter.  The patient verbalized understanding of the proper use and possible adverse effects of dapsone.  All of the patient's questions and concerns were addressed. Finasteride Female Counseling: Finasteride Counseling:  I discussed with the patient the risks of use of finasteride including but not limited to decreased libido and sexual dysfunction. Explained the teratogenic nature of the medication and stressed the importance of not getting pregnant during treatment. All of the patient's questions and concerns were addressed. Mirvaso Counseling: Mirvaso is a topical medication which can decrease superficial blood flow where applied. Side effects are uncommon and include stinging, redness and allergic reactions. Low Dose Naltrexone Pregnancy And Lactation Text: Naltrexone is pregnancy category C.  There have been no adequate and well-controlled studies in pregnant women.  It should be used in pregnancy only if the potential benefit justifies the potential risk to the fetus.   Limited data indicates that naltrexone is minimally excreted into breastmilk. Valtrex Counseling: I discussed with the patient the risks of valacyclovir including but not limited to kidney damage, nausea, vomiting and severe allergy.  The patient understands that if the infection seems to be worsening or is not improving, they are to call. Erythromycin Counseling:  I discussed with the patient the risks of erythromycin including but not limited to GI upset, allergic reaction, drug rash, diarrhea, increase in liver enzymes, and yeast infections. Rhofade Pregnancy And Lactation Text: This medication has not been assigned a Pregnancy Risk Category. It is unknown if the medication is excreted in breast milk. Topical Ketoconazole Counseling: Patient counseled that this medication may cause skin irritation or allergic reactions.  In the event of skin irritation, the patient was advised to reduce the amount of the drug applied or use it less frequently.   The patient verbalized understanding of the proper use and possible adverse effects of ketoconazole.  All of the patient's questions and concerns were addressed. Skyrizi Counseling: I discussed with the patient the risks of risankizumab-rzaa including but not limited to immunosuppression, and serious infections.  The patient understands that monitoring is required including a PPD at baseline and must alert us or the primary physician if symptoms of infection or other concerning signs are noted. Sotyktu Counseling:  I discussed the most common side effects of Sotyktu including: common cold, sore throat, sinus infections, cold sores, canker sores, folliculitis, and acne.? I also discussed more serious side effects of Sotyktu including but not limited to: serious allergic reactions; increased risk for infections such as TB; cancers such as lymphomas; rhabdomyolysis and elevated CPK; and elevated triglycerides and liver enzymes.? Cimetidine Counseling:  I discussed with the patient the risks of Cimetidine including but not limited to gynecomastia, headache, diarrhea, nausea, drowsiness, arrhythmias, pancreatitis, skin rashes, psychosis, bone marrow suppression and kidney toxicity. Griseofulvin Counseling:  I discussed with the patient the risks of griseofulvin including but not limited to photosensitivity, cytopenia, liver damage, nausea/vomiting and severe allergy.  The patient understands that this medication is best absorbed when taken with a fatty meal (e.g., ice cream or french fries). Dupixent Counseling: I discussed with the patient the risks of dupilumab including but not limited to eye infection and irritation, cold sores, injection site reactions, worsening of asthma, allergic reactions and increased risk of parasitic infection.  Live vaccines should be avoided while taking dupilumab. Dupilumab will also interact with certain medications such as warfarin and cyclosporine. The patient understands that monitoring is required and they must alert us or the primary physician if symptoms of infection or other concerning signs are noted. Azithromycin Pregnancy And Lactation Text: This medication is considered safe during pregnancy and is also secreted in breast milk. Isotretinoin Pregnancy And Lactation Text: This medication is Pregnancy Category X and is considered extremely dangerous during pregnancy. It is unknown if it is excreted in breast milk. Calcipotriene Counseling:  I discussed with the patient the risks of calcipotriene including but not limited to erythema, scaling, itching, and irritation. Erythromycin Pregnancy And Lactation Text: This medication is Pregnancy Category B and is considered safe during pregnancy. It is also excreted in breast milk. Finasteride Pregnancy And Lactation Text: This medication is absolutely contraindicated during pregnancy. It is unknown if it is excreted in breast milk. Valtrex Pregnancy And Lactation Text: this medication is Pregnancy Category B and is considered safe during pregnancy. This medication is not directly found in breast milk but it's metabolite acyclovir is present. Rifampin Counseling: I discussed with the patient the risks of rifampin including but not limited to liver damage, kidney damage, red-orange body fluids, nausea/vomiting and severe allergy. Dupixent Pregnancy And Lactation Text: This medication likely crosses the placenta but the risk for the fetus is uncertain. This medication is excreted in breast milk. Infliximab Counseling:  I discussed with the patient the risks of infliximab including but not limited to myelosuppression, immunosuppression, autoimmune hepatitis, demyelinating diseases, lymphoma, and serious infections.  The patient understands that monitoring is required including a PPD at baseline and must alert us or the primary physician if symptoms of infection or other concerning signs are noted. Propranolol Counseling:  I discussed with the patient the risks of propranolol including but not limited to low heart rate, low blood pressure, low blood sugar, restlessness and increased cold sensitivity. They should call the office if they experience any of these side effects. High Dose Vitamin A Counseling: Side effects reviewed, pt to contact office should one occur. Cyclophosphamide Counseling:  I discussed with the patient the risks of cyclophosphamide including but not limited to hair loss, hormonal abnormalities, decreased fertility, abdominal pain, diarrhea, nausea and vomiting, bone marrow suppression and infection. The patient understands that monitoring is required while taking this medication. Dapsone Pregnancy And Lactation Text: This medication is Pregnancy Category C and is not considered safe during pregnancy or breast feeding. Libtayo Counseling- I discussed with the patient the risks of Libtayo including but not limited to nausea, vomiting, diarrhea, and bone or muscle pain.  The patient verbalized understanding of the proper use and possible adverse effects of Libtayo.  All of the patient's questions and concerns were addressed. Solaraze Counseling:  I discussed with the patient the risks of Solaraze including but not limited to erythema, scaling, itching, weeping, crusting, and pain. Opioid Counseling: I discussed with the patient the potential side effects of opioids including but not limited to addiction, altered mental status, and depression. I stressed avoiding alcohol, benzodiazepines, muscle relaxants and sleep aids unless specifically okayed by a physician. The patient verbalized understanding of the proper use and possible adverse effects of opioids. All of the patient's questions and concerns were addressed. They were instructed to flush the remaining pills down the toilet if they did not need them for pain. Niacinamide Counseling: I recommended taking niacin or niacinamide, also know as vitamin B3, twice daily. Recent evidence suggests that taking vitamin B3 (500 mg twice daily) can reduce the risk of actinic keratoses and non-melanoma skin cancers. Side effects of vitamin B3 include flushing and headache. Cibinqo Counseling: I discussed with the patient the risks of Cibinqo therapy including but not limited to common cold, nausea, headache, cold sores, increased blood CPK levels, dizziness, UTIs, fatigue, acne, and vomitting. Live vaccines should be avoided.  This medication has been linked to serious infections; higher rate of mortality; malignancy and lymphoproliferative disorders; major adverse cardiovascular events; thrombosis; thrombocytopenia and lymphopenia; lipid elevations; and retinal detachment. Sotyktu Pregnancy And Lactation Text: There is insufficient data to evaluate whether or not Sotyktu is safe to use during pregnancy.? ?It is not known if Sotyktu passes into breast milk and whether or not it is safe to use when breastfeeding.?? Xeljanz Counseling: I discussed with the patient the risks of Xeljanz therapy including increased risk of infection, liver issues, headache, diarrhea, or cold symptoms. Live vaccines should be avoided. They were instructed to call if they have any problems. Niacinamide Pregnancy And Lactation Text: These medications are considered safe during pregnancy. Doxepin Counseling:  Patient advised that the medication is sedating and not to drive a car after taking this medication. Patient informed of potential adverse effects including but not limited to dry mouth, urinary retention, and blurry vision.  The patient verbalized understanding of the proper use and possible adverse effects of doxepin.  All of the patient's questions and concerns were addressed. Solaraze Pregnancy And Lactation Text: This medication is Pregnancy Category B and is considered safe. There is some data to suggest avoiding during the third trimester. It is unknown if this medication is excreted in breast milk. Stelara Counseling:  I discussed with the patient the risks of ustekinumab including but not limited to immunosuppression, malignancy, posterior leukoencephalopathy syndrome, and serious infections.  The patient understands that monitoring is required including a PPD at baseline and must alert us or the primary physician if symptoms of infection or other concerning signs are noted. Topical Sulfur Applications Counseling: Topical Sulfur Counseling: Patient counseled that this medication may cause skin irritation or allergic reactions.  In the event of skin irritation, the patient was advised to reduce the amount of the drug applied or use it less frequently.   The patient verbalized understanding of the proper use and possible adverse effects of topical sulfur application.  All of the patient's questions and concerns were addressed. Libtayo Pregnancy And Lactation Text: This medication is contraindicated in pregnancy and when breast feeding. Eucrisa Counseling: Patient may experience a mild burning sensation during topical application. Eucrisa is not approved in children less than 3 months of age. Griseofulvin Pregnancy And Lactation Text: This medication is Pregnancy Category X and is known to cause serious birth defects. It is unknown if this medication is excreted in breast milk but breast feeding should be avoided. Detail Level: Detailed Bactrim Counseling:  I discussed with the patient the risks of sulfa antibiotics including but not limited to GI upset, allergic reaction, drug rash, diarrhea, dizziness, photosensitivity, and yeast infections.  Rarely, more serious reactions can occur including but not limited to aplastic anemia, agranulocytosis, methemoglobinemia, blood dyscrasias, liver or kidney failure, lung infiltrates or desquamative/blistering drug rashes. Adbry Counseling: I discussed with the patient the risks of tralokinumab including but not limited to eye infection and irritation, cold sores, injection site reactions, worsening of asthma, allergic reactions and increased risk of parasitic infection.  Live vaccines should be avoided while taking tralokinumab. The patient understands that monitoring is required and they must alert us or the primary physician if symptoms of infection or other concerning signs are noted. Calcipotriene Pregnancy And Lactation Text: This medication has not been proven safe during pregnancy. It is unknown if this medication is excreted in breast milk. Bactrim Pregnancy And Lactation Text: This medication is Pregnancy Category D and is known to cause fetal risk.  It is also excreted in breast milk. 5-Fu Counseling: 5-Fluorouracil Counseling:  I discussed with the patient the risks of 5-fluorouracil including but not limited to erythema, scaling, itching, weeping, crusting, and pain. Adbry Pregnancy And Lactation Text: It is unknown if this medication will adversely affect pregnancy or breast feeding. Cibinqo Pregnancy And Lactation Text: It is unknown if this medication will adversely affect pregnancy or breast feeding.  You should not take this medication if you are currently pregnant or planning a pregnancy or while breastfeeding. Enbrel Counseling:  I discussed with the patient the risks of etanercept including but not limited to myelosuppression, immunosuppression, autoimmune hepatitis, demyelinating diseases, lymphoma, and infections.  The patient understands that monitoring is required including a PPD at baseline and must alert us or the primary physician if symptoms of infection or other concerning signs are noted. Gabapentin Counseling: I discussed with the patient the risks of gabapentin including but not limited to dizziness, somnolence, fatigue and ataxia. Rifampin Pregnancy And Lactation Text: This medication is Pregnancy Category C and it isn't know if it is safe during pregnancy. It is also excreted in breast milk and should not be used if you are breast feeding. Cyclophosphamide Pregnancy And Lactation Text: This medication is Pregnancy Category D and it isn't considered safe during pregnancy. This medication is excreted in breast milk. Opzelura Counseling:  I discussed with the patient the risks of Opzelura including but not limited to nasopharngitis, bronchitis, ear infection, eosinophila, hives, diarrhea, folliculitis, tonsillitis, and rhinorrhea.  Taken orally, this medication has been linked to serious infections; higher rate of mortality; malignancy and lymphoproliferative disorders; major adverse cardiovascular events; thrombosis; thrombocytopenia, anemia, and neutropenia; and lipid elevations. High Dose Vitamin A Pregnancy And Lactation Text: High dose vitamin A therapy is contraindicated during pregnancy and breast feeding. Metronidazole Counseling:  I discussed with the patient the risks of metronidazole including but not limited to seizures, nausea/vomiting, a metallic taste in the mouth, nausea/vomiting and severe allergy. Birth Control Pills Counseling: Birth Control Pill Counseling: I discussed with the patient the potential side effects of OCPs including but not limited to increased risk of stroke, heart attack, thrombophlebitis, deep venous thrombosis, hepatic adenomas, breast changes, GI upset, headaches, and depression.  The patient verbalized understanding of the proper use and possible adverse effects of OCPs. All of the patient's questions and concerns were addressed. Opioid Pregnancy And Lactation Text: These medications can lead to premature delivery and should be avoided during pregnancy. These medications are also present in breast milk in small amounts. Rituxan Counseling:  I discussed with the patient the risks of Rituxan infusions. Side effects can include infusion reactions, severe drug rashes including mucocutaneous reactions, reactivation of latent hepatitis and other infections and rarely progressive multifocal leukoencephalopathy.  All of the patient's questions and concerns were addressed. Topical Retinoid counseling:  Patient advised to apply a pea-sized amount only at bedtime and wait 30 minutes after washing their face before applying.  If too drying, patient may add a non-comedogenic moisturizer. The patient verbalized understanding of the proper use and possible adverse effects of retinoids.  All of the patient's questions and concerns were addressed. Use Enhanced Medication Counseling?: No Doxepin Pregnancy And Lactation Text: This medication is Pregnancy Category C and it isn't known if it is safe during pregnancy. It is also excreted in breast milk and breast feeding isn't recommended. Birth Control Pills Pregnancy And Lactation Text: This medication should be avoided if pregnant and for the first 30 days post-partum. Topical Sulfur Applications Pregnancy And Lactation Text: This medication is considered safe during pregnancy and breast feeding secondary to limited systemic absorption. Xeleduarz Pregnancy And Lactation Text: This medication is Pregnancy Category D and is not considered safe during pregnancy.  The risk during breast feeding is also uncertain. Propranolol Pregnancy And Lactation Text: This medication is Pregnancy Category C and it isn't known if it is safe during pregnancy. It is excreted in breast milk. Itraconazole Counseling:  I discussed with the patient the risks of itraconazole including but not limited to liver damage, nausea/vomiting, neuropathy, and severe allergy.  The patient understands that this medication is best absorbed when taken with acidic beverages such as non-diet cola or ginger ale.  The patient understands that monitoring is required including baseline LFTs and repeat LFTs at intervals.  The patient understands that they are to contact us or the primary physician if concerning signs are noted. Odomzo Counseling- I discussed with the patient the risks of Odomzo including but not limited to nausea, vomiting, diarrhea, constipation, weight loss, changes in the sense of taste, decreased appetite, muscle spasms, and hair loss.  The patient verbalized understanding of the proper use and possible adverse effects of Odomzo.  All of the patient's questions and concerns were addressed. Azelaic Acid Counseling: Patient counseled that medicine may cause skin irritation and to avoid applying near the eyes.  In the event of skin irritation, the patient was advised to reduce the amount of the drug applied or use it less frequently.   The patient verbalized understanding of the proper use and possible adverse effects of azelaic acid.  All of the patient's questions and concerns were addressed. SSKI Counseling:  I discussed with the patient the risks of SSKI including but not limited to thyroid abnormalities, metallic taste, GI upset, fever, headache, acne, arthralgias, paraesthesias, lymphadenopathy, easy bleeding, arrhythmias, and allergic reaction. Bimzelx Counseling:  I discussed with the patient the risks of Bimzelx including but not limited to depression, immunosuppression, allergic reactions and infections.  The patient understands that monitoring is required including a PPD at baseline and must alert us or the primary physician if symptoms of infection or other concerning signs are noted. Wartpeel Counseling:  I discussed with the patient the risks of Wartpeel including but not limited to erythema, scaling, itching, weeping, crusting, and pain. Hydroquinone Counseling:  Patient advised that medication may result in skin irritation, lightening (hypopigmentation), dryness, and burning.  In the event of skin irritation, the patient was advised to reduce the amount of the drug applied or use it less frequently.  Rarely, spots that are treated with hydroquinone can become darker (pseudoochronosis).  Should this occur, patient instructed to stop medication and call the office. The patient verbalized understanding of the proper use and possible adverse effects of hydroquinone.  All of the patient's questions and concerns were addressed. Arava Counseling:  Patient counseled regarding adverse effects of Arava including but not limited to nausea, vomiting, abnormalities in liver function tests. Patients may develop mouth sores, rash, diarrhea, and abnormalities in blood counts. The patient understands that monitoring is required including LFTs and blood counts.  There is a rare possibility of scarring of the liver and lung problems that can occur when taking methotrexate. Persistent nausea, loss of appetite, pale stools, dark urine, cough, and shortness of breath should be reported immediately. Patient advised to discontinue Arava treatment and consult with a physician prior to attempting conception. The patient will have to undergo a treatment to eliminate Arava from the body prior to conception. Opzelura Pregnancy And Lactation Text: There is insufficient data to evaluate drug-associated risk for major birth defects, miscarriage, or other adverse maternal or fetal outcomes.  There is a pregnancy registry that monitors pregnancy outcomes in pregnant persons exposed to the medication during pregnancy.  It is unknown if this medication is excreted in breast milk.  Do not breastfeed during treatment and for about 4 weeks after the last dose. Cephalexin Counseling: I counseled the patient regarding use of cephalexin as an antibiotic for prophylactic and/or therapeutic purposes. Cephalexin (commonly prescribed under brand name Keflex) is a cephalosporin antibiotic which is active against numerous classes of bacteria, including most skin bacteria. Side effects may include nausea, diarrhea, gastrointestinal upset, rash, hives, yeast infections, and in rare cases, hepatitis, kidney disease, seizures, fever, confusion, neurologic symptoms, and others. Patients with severe allergies to penicillin medications are cautioned that there is about a 10% incidence of cross-reactivity with cephalosporins. When possible, patients with penicillin allergies should use alternatives to cephalosporins for antibiotic therapy. Cyclosporine Counseling:  I discussed with the patient the risks of cyclosporine including but not limited to hypertension, gingival hyperplasia,myelosuppression, immunosuppression, liver damage, kidney damage, neurotoxicity, lymphoma, and serious infections. The patient understands that monitoring is required including baseline blood pressure, CBC, CMP, lipid panel and uric acid, and then 1-2 times monthly CMP and blood pressure. Metronidazole Pregnancy And Lactation Text: This medication is Pregnancy Category B and considered safe during pregnancy.  It is also excreted in breast milk. Nsaids Counseling: NSAID Counseling: I discussed with the patient that NSAIDs should be taken with food. Prolonged use of NSAIDs can result in the development of stomach ulcers.  Patient advised to stop taking NSAIDs if abdominal pain occurs.  The patient verbalized understanding of the proper use and possible adverse effects of NSAIDs.  All of the patient's questions and concerns were addressed. Litfulo Counseling: Litfulo (Ritlecitinib) Counseling: I discussed with the patient the risks of Litfulo therapy including but not limited to headache, upper respiratory infections, nausea, diarrhea, acne, and urticaria. Live vaccines should be avoided.  This medication has been linked to serious infections; higher rate of mortality; malignancy and lymphoproliferative disorders; major adverse cardiovascular events; thrombosis; decreases in lymphocytes and platelets; liver enzyme elevations; and CPK elevations. Sarecycline Counseling: Patient advised regarding possible photosensitivity and discoloration of the teeth, skin, lips, tongue and gums.  Patient instructed to avoid sunlight, if possible.  When exposed to sunlight, patients should wear protective clothing, sunglasses, and sunscreen.  The patient was instructed to call the office immediately if the following severe adverse effects occur:  hearing changes, easy bruising/bleeding, severe headache, or vision changes.  The patient verbalized understanding of the proper use and possible adverse effects of sarecycline.  All of the patient's questions and concerns were addressed. Minocycline Counseling: Patient advised regarding possible photosensitivity and discoloration of the teeth, skin, lips, tongue and gums.  Patient instructed to avoid sunlight, if possible.  When exposed to sunlight, patients should wear protective clothing, sunglasses, and sunscreen.  The patient was instructed to call the office immediately if the following severe adverse effects occur:  hearing changes, easy bruising/bleeding, severe headache, or vision changes.  The patient verbalized understanding of the proper use and possible adverse effects of minocycline.  All of the patient's questions and concerns were addressed. Litfulo Pregnancy And Lactation Text: There is insufficient data to evaluate whether or not Litfulo is safe to use during pregnancy.  Breastfeeding is not recommended during treatment. Glycopyrrolate Counseling:  I discussed with the patient the risks of glycopyrrolate including but not limited to skin rash, drowsiness, dry mouth, difficulty urinating, and blurred vision. Picato Counseling:  I discussed with the patient the risks of Picato including but not limited to erythema, scaling, itching, weeping, crusting, and pain. Spironolactone Counseling: Patient advised regarding risks of diarrhea, abdominal pain, hyperkalemia, birth defects (for female patients), liver toxicity and renal toxicity. The patient may need blood work to monitor liver and kidney function and potassium levels while on therapy. The patient verbalized understanding of the proper use and possible adverse effects of spironolactone.  All of the patient's questions and concerns were addressed. Nsaids Pregnancy And Lactation Text: These medications are considered safe up to 30 weeks gestation. It is excreted in breast milk. Rituxan Pregnancy And Lactation Text: This medication is Pregnancy Category C and it isn't know if it is safe during pregnancy. It is unknown if this medication is excreted in breast milk but similar antibodies are known to be excreted. Hydroxyzine Counseling: Patient advised that the medication is sedating and not to drive a car after taking this medication.  Patient informed of potential adverse effects including but not limited to dry mouth, urinary retention, and blurry vision.  The patient verbalized understanding of the proper use and possible adverse effects of hydroxyzine.  All of the patient's questions and concerns were addressed. Taltz Counseling: I discussed with the patient the risks of ixekizumab including but not limited to immunosuppression, serious infections, worsening of inflammatory bowel disease and drug reactions.  The patient understands that monitoring is required including a PPD at baseline and must alert us or the primary physician if symptoms of infection or other concerning signs are noted. Hydroxyzine Pregnancy And Lactation Text: This medication is not safe during pregnancy and should not be taken. It is also excreted in breast milk and breast feeding isn't recommended. Bimzelx Pregnancy And Lactation Text: This medication crosses the placenta and the safety is uncertain during pregnancy. It is unknown if this medication is present in breast milk. Acitretin Counseling:  I discussed with the patient the risks of acitretin including but not limited to hair loss, dry lips/skin/eyes, liver damage, hyperlipidemia, depression/suicidal ideation, photosensitivity.  Serious rare side effects can include but are not limited to pancreatitis, pseudotumor cerebri, bony changes, clot formation/stroke/heart attack.  Patient understands that alcohol is contraindicated since it can result in liver toxicity and significantly prolong the elimination of the drug by many years. Humira Counseling:  I discussed with the patient the risks of adalimumab including but not limited to myelosuppression, immunosuppression, autoimmune hepatitis, demyelinating diseases, lymphoma, and serious infections.  The patient understands that monitoring is required including a PPD at baseline and must alert us or the primary physician if symptoms of infection or other concerning signs are noted. Ketoconazole Counseling:   Patient counseled regarding improving absorption with orange juice.  Adverse effects include but are not limited to breast enlargement, headache, diarrhea, nausea, upset stomach, liver function test abnormalities, taste disturbance, and stomach pain.  There is a rare possibility of liver failure that can occur when taking ketoconazole. The patient understands that monitoring of LFTs may be required, especially at baseline. The patient verbalized understanding of the proper use and possible adverse effects of ketoconazole.  All of the patient's questions and concerns were addressed. Cephalexin Pregnancy And Lactation Text: This medication is Pregnancy Category B and considered safe during pregnancy.  It is also excreted in breast milk but can be used safely for shorter doses. Drysol Counseling:  I discussed with the patient the risks of drysol/aluminum chloride including but not limited to skin rash, itching, irritation, burning. Sski Pregnancy And Lactation Text: This medication is Pregnancy Category D and isn't considered safe during pregnancy. It is excreted in breast milk.

## 2025-06-27 NOTE — ED CDU PROVIDER SUBSEQUENT DAY NOTE - CLINICAL SUMMARY MEDICAL DECISION MAKING FREE TEXT BOX
pt aaox1. awake moving all extremities. left iJ AND LEFT CHEST PORT ACCESS AVAILABLE. PT TACHYCARDIC. PROVIDER MADE AWARE. EKG TO BE ORDERED.
IV hydration, AM labs / trend electrolytes, supportive care, general observation care / monitoring.

## (undated) DEVICE — STOPCOCK 4-WAY W SWIVEL MALE LUER LOCK NON VENTED RED CAP

## (undated) DEVICE — SOL IRR BAG NS 0.9% 3000ML

## (undated) DEVICE — POSITIONER FOAM EGG CRATE ULNAR 2PCS (PINK)

## (undated) DEVICE — GOWN TRIMAX LG

## (undated) DEVICE — FOLEY HOLDER STATLOCK 2 WAY ADULT

## (undated) DEVICE — PACK GENERAL LAPAROSCOPY

## (undated) DEVICE — DRAPE IOBAN 23" X 23"

## (undated) DEVICE — PRESSURE INFUSOR BAG 3000ML

## (undated) DEVICE — POSITIONER FOAM HEADREST (PINK)

## (undated) DEVICE — BASIN SET SINGLE

## (undated) DEVICE — GLV 8 PROTEXIS (WHITE)

## (undated) DEVICE — PROTECTOR HEEL / ELBOW FLUFFY

## (undated) DEVICE — PACK CYSTO

## (undated) DEVICE — GLV 7 PROTEXIS (WHITE)

## (undated) DEVICE — SHEARS HARMONIC ACE 5MM X 36CM CURVED TIP

## (undated) DEVICE — SPECIMEN CONTAINER 100ML

## (undated) DEVICE — TUBING SUCTION 20FT

## (undated) DEVICE — TIP METZENBAUM SCISSOR MONOPOLAR ENDOCUT (ORANGE)

## (undated) DEVICE — ELCTR GROUNDING PAD ADULT COVIDIEN

## (undated) DEVICE — SOL IRR POUR NS 0.9% 500ML

## (undated) DEVICE — MEDICATION LABELS W MARKER

## (undated) DEVICE — TUBING RANGER FLUID IRRIGATION SET DISP

## (undated) DEVICE — SYR ASEPTO

## (undated) DEVICE — SUT VICRYL 2-0 18" TIES UNDYED

## (undated) DEVICE — SUT VICRYL 0 27" UR-6

## (undated) DEVICE — DRAPE EQUIPMENT BANDED BAG 30 X 30" (SHOWER CAP)

## (undated) DEVICE — GLV 6.5 PROTEXIS (WHITE)

## (undated) DEVICE — PREP BETADINE SPONGE STICKS

## (undated) DEVICE — WARMING BLANKET UPPER ADULT

## (undated) DEVICE — FRA-ESU BOVIE FORCE TRIAD T0E42286E: Type: DURABLE MEDICAL EQUIPMENT

## (undated) DEVICE — GLV 7.5 PROTEXIS (WHITE)

## (undated) DEVICE — VENODYNE/SCD SLEEVE CALF LARGE

## (undated) DEVICE — SOL IRR POUR H2O 1500ML

## (undated) DEVICE — SOL IRR BAG H2O 3000ML

## (undated) DEVICE — PACK CYSTOSCOPY TIBURON

## (undated) DEVICE — STAPLER COVIDIEN ENDO GIA STANDARD HANDLE

## (undated) DEVICE — TUBING OLYMPUS INSUFFLATION

## (undated) DEVICE — DRSG BENZOIN 0.6CC

## (undated) DEVICE — FRA-ESU BOVIE FORCE FX F3B25575A: Type: DURABLE MEDICAL EQUIPMENT

## (undated) DEVICE — SOL IRR POUR H2O 500ML

## (undated) DEVICE — SUT VICRYL 2-0 27" SH UNDYED

## (undated) DEVICE — DRAPE C ARM UNIVERSAL

## (undated) DEVICE — SUT VICRYL 0 18" TIES UNDYED

## (undated) DEVICE — TROCAR COVIDIEN VERSAONE BLUNT TIP HASSAN 12MM

## (undated) DEVICE — TROCAR COVIDIEN VERSAPORT BLADELESS OPTICAL 5MM STANDARD

## (undated) DEVICE — IRR BULB PATHFINDER + 10"

## (undated) DEVICE — POSITIONER STRAP ARMBOARD VELCRO TS-30

## (undated) DEVICE — Device

## (undated) DEVICE — BOSTON SCIENTIFC PUMPING SYSTEM SAPS SINGLE ACTION 10CC

## (undated) DEVICE — ADAPTER URETHRAL CATH CONNECTING

## (undated) DEVICE — VENODYNE/SCD SLEEVE CALF MEDIUM

## (undated) DEVICE — FOLEY TRAY 16FR 5CC LF UMETER CLOSED

## (undated) DEVICE — BLADE SURGICAL #15 CARBON

## (undated) DEVICE — LIGASURE IMPACT

## (undated) DEVICE — TUBING TUR 2 PRONG

## (undated) DEVICE — SUT MONOCRYL 4-0 27" PS-2 UNDYED

## (undated) DEVICE — TUBING SUCTION NON CONDUCTIVE 316X18" STERILE

## (undated) DEVICE — SUT SILK 3-0 18" SH (POP-OFF)

## (undated) DEVICE — SOL IRR POUR NS 0.9% 1000ML

## (undated) DEVICE — ACMI SELF-SEALING SEAL UP TO 7FR

## (undated) DEVICE — SYR LUER LOK 10CC

## (undated) DEVICE — TUBING HYDRO-SURG PLUS IRRIGATOR W SMOKEVAC & PROBE

## (undated) DEVICE — VISITEC 4X4